# Patient Record
Sex: MALE | Race: WHITE | Employment: OTHER | URBAN - METROPOLITAN AREA
[De-identification: names, ages, dates, MRNs, and addresses within clinical notes are randomized per-mention and may not be internally consistent; named-entity substitution may affect disease eponyms.]

---

## 2017-01-20 PROBLEM — Z91.148 NONCOMPLIANCE WITH MEDICATIONS: Status: ACTIVE | Noted: 2017-01-20

## 2017-01-20 PROBLEM — Z91.14 NONCOMPLIANCE WITH MEDICATIONS: Status: ACTIVE | Noted: 2017-01-20

## 2017-01-20 PROBLEM — I38 VHD (VALVULAR HEART DISEASE): Status: ACTIVE | Noted: 2017-01-20

## 2017-01-20 PROBLEM — I48.0 PAROXYSMAL ATRIAL FIBRILLATION (HCC): Status: ACTIVE | Noted: 2017-01-20

## 2017-01-20 PROBLEM — I10 ESSENTIAL HYPERTENSION: Status: ACTIVE | Noted: 2017-01-20

## 2017-01-20 PROBLEM — I42.8 NONISCHEMIC CARDIOMYOPATHY (HCC): Status: ACTIVE | Noted: 2017-01-20

## 2017-01-20 PROBLEM — E66.01 MORBID OBESITY DUE TO EXCESS CALORIES (HCC): Status: ACTIVE | Noted: 2017-01-20

## 2017-04-21 PROBLEM — Z74.09 DECREASED AMBULATION STATUS: Status: ACTIVE | Noted: 2017-04-21

## 2017-04-21 PROBLEM — M25.50 POLYARTHRALGIA: Status: ACTIVE | Noted: 2017-04-21

## 2017-04-21 PROBLEM — M25.562 ARTHRALGIA OF LEFT KNEE: Status: ACTIVE | Noted: 2017-04-21

## 2018-03-26 ENCOUNTER — OFFICE VISIT (OUTPATIENT)
Dept: FAMILY MEDICINE CLINIC | Age: 38
End: 2018-03-26
Payer: MEDICARE

## 2018-03-26 VITALS
BODY MASS INDEX: 42.66 KG/M2 | RESPIRATION RATE: 18 BRPM | TEMPERATURE: 98.1 F | WEIGHT: 315 LBS | SYSTOLIC BLOOD PRESSURE: 116 MMHG | HEART RATE: 79 BPM | DIASTOLIC BLOOD PRESSURE: 83 MMHG | HEIGHT: 72 IN | OXYGEN SATURATION: 98 %

## 2018-03-26 DIAGNOSIS — Z79.4 TYPE 2 DIABETES MELLITUS WITH COMPLICATION, WITH LONG-TERM CURRENT USE OF INSULIN (HCC): Chronic | ICD-10-CM

## 2018-03-26 DIAGNOSIS — E11.8 TYPE 2 DIABETES MELLITUS WITH COMPLICATION, WITH LONG-TERM CURRENT USE OF INSULIN (HCC): Chronic | ICD-10-CM

## 2018-03-26 PROCEDURE — 99212 OFFICE O/P EST SF 10 MIN: CPT | Performed by: FAMILY MEDICINE

## 2018-03-26 PROCEDURE — 99213 OFFICE O/P EST LOW 20 MIN: CPT | Performed by: FAMILY MEDICINE

## 2018-03-27 NOTE — PROGRESS NOTES
[facility]   Ambulatory visit note  DATE OF VISIT : 3/26/2018    Patient : Nilay Boateng   Sex : male   Age : 40 y.o.  : 1980   MRN : [id number]            Chief Complaint :   Chief Complaint   Patient presents with    Diabetes     f/u visit       HPI:  Nilay Boateng presents to the office today for evaluation of Diabetes Mellitus    Current status:      Symptoms related to above problems: No    Tolerating medication:   Yes   Patient does not report new complaints today. Patient increased levemir as requested but not humalog. Sugars still running high. He denies any new symptoms    Past Medical History :  has a past medical history of Acute CHF ([facility] Acute CHF ([facility] Acute idiopathic gout of right foot; Anemia; CAD (coronary artery disease); Cerebral artery occlusion with cerebral infarction [facility]); CKD (chronic kidney disease); Hepatocellular carcinoma ([facility] HTN (hypertension); Hyperlipidemia; Hypertension; Nonischemic cardiomyopathy ([address] Nonischemic cardiomyopathy ([address] ARVIND (obstructive sleep apnea); S/P left heart catheterization by percutaneous approach; Systolic heart failure ([address] Type 2 diabetes mellitus with complication, with long-term current use of insulin ([facility] and URI, acute. Past Surgical history :    Past Surgical History:   Procedure Laterality Date    ECHO COMPL W DOP COLOR FLOW  2011         ECHO COMPL W DOP COLOR FLOW  3/27/2012         PACEMAKER PLACEMENT         Family Medical History :   Family History   Problem Relation Age of Onset    Cancer Mother        Social History :   Social History     Social History    Marital status: Single     Spouse name: N/A    Number of children: N/A    Years of education: N/A     Occupational History    Not on file.      Social History Main Topics    Smoking status: Never Smoker    Smokeless tobacco: Never Used    Alcohol use Yes      Comment: rare mixed drink;

## 2018-05-07 ENCOUNTER — OFFICE VISIT (OUTPATIENT)
Dept: FAMILY MEDICINE CLINIC | Age: 38
End: 2018-05-07
Payer: MEDICARE

## 2018-05-07 ENCOUNTER — HOSPITAL ENCOUNTER (OUTPATIENT)
Age: 38
Discharge: HOME | End: 2018-05-09
Payer: MEDICARE

## 2018-05-07 VITALS
WEIGHT: 315 LBS | HEIGHT: 72 IN | BODY MASS INDEX: 42.66 KG/M2 | DIASTOLIC BLOOD PRESSURE: 80 MMHG | RESPIRATION RATE: 18 BRPM | HEART RATE: 79 BPM | OXYGEN SATURATION: 98 % | SYSTOLIC BLOOD PRESSURE: 112 MMHG

## 2018-05-07 DIAGNOSIS — E11.8 TYPE 2 DIABETES MELLITUS WITH COMPLICATION, WITH LONG-TERM CURRENT USE OF INSULIN (HCC): Chronic | ICD-10-CM

## 2018-05-07 DIAGNOSIS — M54.50 ACUTE BILATERAL LOW BACK PAIN WITHOUT SCIATICA: ICD-10-CM

## 2018-05-07 DIAGNOSIS — I10 ESSENTIAL HYPERTENSION: Primary | Chronic | ICD-10-CM

## 2018-05-07 DIAGNOSIS — Z79.4 TYPE 2 DIABETES MELLITUS WITH COMPLICATION, WITH LONG-TERM CURRENT USE OF INSULIN (HCC): Chronic | ICD-10-CM

## 2018-05-07 LAB
ALBUMIN SERPL-MCNC: 4.2 G/DL (ref 3.5–5.2)
ALP BLD-CCNC: 163 U/L (ref 40–129)
ALT SERPL-CCNC: 24 U/L (ref 0–40)
ANION GAP SERPL CALCULATED.3IONS-SCNC: 18 MMOL/L (ref 7–16)
AST SERPL-CCNC: 20 U/L (ref 0–39)
BILIRUB SERPL-MCNC: 0.4 MG/DL (ref 0–1.2)
BUN BLDV-MCNC: 22 MG/DL (ref 6–20)
CALCIUM SERPL-MCNC: 9.8 MG/DL (ref 8.6–10.2)
CHLORIDE BLD-SCNC: 93 MMOL/L (ref 98–107)
CHOLESTEROL, TOTAL: 156 MG/DL (ref 0–199)
CO2: 21 MMOL/L (ref 22–29)
CREAT SERPL-MCNC: 1.1 MG/DL (ref 0.7–1.2)
GFR AFRICAN AMERICAN: >60
GFR NON-AFRICAN AMERICAN: >60 ML/MIN/1.73
GLUCOSE BLD-MCNC: 461 MG/DL (ref 74–109)
HBA1C MFR BLD: 14 %
HDLC SERPL-MCNC: 43 MG/DL
LDL CHOLESTEROL CALCULATED: 86 MG/DL (ref 0–99)
POTASSIUM SERPL-SCNC: 4.1 MMOL/L (ref 3.5–5)
SODIUM BLD-SCNC: 132 MMOL/L (ref 132–146)
TOTAL PROTEIN: 8.2 G/DL (ref 6.4–8.3)
TRIGL SERPL-MCNC: 137 MG/DL (ref 0–149)
VLDLC SERPL CALC-MCNC: 27 MG/DL

## 2018-05-07 PROCEDURE — 36415 COLL VENOUS BLD VENIPUNCTURE: CPT | Performed by: FAMILY MEDICINE

## 2018-05-07 PROCEDURE — 80053 COMPREHEN METABOLIC PANEL: CPT

## 2018-05-07 PROCEDURE — 99212 OFFICE O/P EST SF 10 MIN: CPT | Performed by: FAMILY MEDICINE

## 2018-05-07 PROCEDURE — 80061 LIPID PANEL: CPT

## 2018-05-07 PROCEDURE — 99214 OFFICE O/P EST MOD 30 MIN: CPT | Performed by: FAMILY MEDICINE

## 2018-05-07 PROCEDURE — 83036 HEMOGLOBIN GLYCOSYLATED A1C: CPT | Performed by: FAMILY MEDICINE

## 2018-05-24 ENCOUNTER — HOSPITAL ENCOUNTER (OUTPATIENT)
Dept: OTHER | Age: 38
Setting detail: THERAPIES SERIES
Discharge: HOME | End: 2018-05-24
Payer: MEDICARE

## 2018-05-24 VITALS
HEART RATE: 77 BPM | RESPIRATION RATE: 18 BRPM | DIASTOLIC BLOOD PRESSURE: 69 MMHG | SYSTOLIC BLOOD PRESSURE: 119 MMHG | BODY MASS INDEX: 44.05 KG/M2 | WEIGHT: 315 LBS

## 2018-05-24 LAB — PRO-BNP: 324 PG/ML (ref 0–125)

## 2018-05-24 PROCEDURE — 83880 ASSAY OF NATRIURETIC PEPTIDE: CPT

## 2018-05-24 PROCEDURE — 99214 OFFICE O/P EST MOD 30 MIN: CPT

## 2018-05-24 PROCEDURE — 36415 COLL VENOUS BLD VENIPUNCTURE: CPT

## 2018-06-13 ENCOUNTER — APPOINTMENT (OUTPATIENT)
Dept: GENERAL RADIOLOGY | Age: 38
End: 2018-06-13
Payer: MEDICARE

## 2018-06-13 ENCOUNTER — HOSPITAL ENCOUNTER (EMERGENCY)
Age: 38
Discharge: HOME | End: 2018-06-13
Attending: EMERGENCY MEDICINE
Payer: MEDICARE

## 2018-06-13 VITALS
OXYGEN SATURATION: 94 % | DIASTOLIC BLOOD PRESSURE: 98 MMHG | SYSTOLIC BLOOD PRESSURE: 120 MMHG | RESPIRATION RATE: 16 BRPM | HEART RATE: 96 BPM | BODY MASS INDEX: 42.66 KG/M2 | WEIGHT: 315 LBS | HEIGHT: 72 IN | TEMPERATURE: 98.6 F

## 2018-06-13 DIAGNOSIS — Z45.02 AICD DISCHARGE: Primary | ICD-10-CM

## 2018-06-13 LAB
ANION GAP SERPL CALCULATED.3IONS-SCNC: 16 MMOL/L (ref 7–16)
BASOPHILS ABSOLUTE: 0.03 E9/L (ref 0–0.2)
BASOPHILS RELATIVE PERCENT: 0.4 % (ref 0–2)
BUN BLDV-MCNC: 24 MG/DL (ref 6–20)
CALCIUM SERPL-MCNC: 9.5 MG/DL (ref 8.6–10.2)
CHLORIDE BLD-SCNC: 101 MMOL/L (ref 98–107)
CO2: 21 MMOL/L (ref 22–29)
CREAT SERPL-MCNC: 1.4 MG/DL (ref 0.7–1.2)
EOSINOPHILS ABSOLUTE: 0.15 E9/L (ref 0.05–0.5)
EOSINOPHILS RELATIVE PERCENT: 2 % (ref 0–6)
GFR AFRICAN AMERICAN: >60
GFR NON-AFRICAN AMERICAN: 57 ML/MIN/1.73
GLUCOSE BLD-MCNC: 255 MG/DL (ref 74–109)
HCT VFR BLD CALC: 41.7 % (ref 37–54)
HEMOGLOBIN: 14 G/DL (ref 12.5–16.5)
IMMATURE GRANULOCYTES #: 0.03 E9/L
IMMATURE GRANULOCYTES %: 0.4 % (ref 0–5)
LYMPHOCYTES ABSOLUTE: 2.28 E9/L (ref 1.5–4)
LYMPHOCYTES RELATIVE PERCENT: 29.9 % (ref 20–42)
MAGNESIUM: 1.8 MG/DL (ref 1.6–2.6)
MCH RBC QN AUTO: 29 PG (ref 26–35)
MCHC RBC AUTO-ENTMCNC: 33.6 % (ref 32–34.5)
MCV RBC AUTO: 86.5 FL (ref 80–99.9)
MONOCYTES ABSOLUTE: 0.62 E9/L (ref 0.1–0.95)
MONOCYTES RELATIVE PERCENT: 8.1 % (ref 2–12)
NEUTROPHILS ABSOLUTE: 4.52 E9/L (ref 1.8–7.3)
NEUTROPHILS RELATIVE PERCENT: 59.2 % (ref 43–80)
PDW BLD-RTO: 14.2 FL (ref 11.5–15)
PLATELET # BLD: 292 E9/L (ref 130–450)
PMV BLD AUTO: 10.1 FL (ref 7–12)
POTASSIUM SERPL-SCNC: 4.7 MMOL/L (ref 3.5–5)
PRO-BNP: 1932 PG/ML (ref 0–125)
RBC # BLD: 4.82 E12/L (ref 3.8–5.8)
SODIUM BLD-SCNC: 138 MMOL/L (ref 132–146)
WBC # BLD: 7.6 E9/L (ref 4.5–11.5)

## 2018-06-13 PROCEDURE — 93005 ELECTROCARDIOGRAM TRACING: CPT | Performed by: PHYSICIAN ASSISTANT

## 2018-06-13 PROCEDURE — 85025 COMPLETE CBC W/AUTO DIFF WBC: CPT

## 2018-06-13 PROCEDURE — 36415 COLL VENOUS BLD VENIPUNCTURE: CPT

## 2018-06-13 PROCEDURE — 6360000002 HC RX W HCPCS: Performed by: STUDENT IN AN ORGANIZED HEALTH CARE EDUCATION/TRAINING PROGRAM

## 2018-06-13 PROCEDURE — 83880 ASSAY OF NATRIURETIC PEPTIDE: CPT

## 2018-06-13 PROCEDURE — 99285 EMERGENCY DEPT VISIT HI MDM: CPT

## 2018-06-13 PROCEDURE — 83735 ASSAY OF MAGNESIUM: CPT

## 2018-06-13 PROCEDURE — 71045 X-RAY EXAM CHEST 1 VIEW: CPT

## 2018-06-13 PROCEDURE — 80048 BASIC METABOLIC PNL TOTAL CA: CPT

## 2018-06-13 PROCEDURE — 96374 THER/PROPH/DIAG INJ IV PUSH: CPT

## 2018-06-13 RX ORDER — FUROSEMIDE 10 MG/ML
40 INJECTION INTRAMUSCULAR; INTRAVENOUS ONCE
Status: COMPLETED | OUTPATIENT
Start: 2018-06-13 | End: 2018-06-13

## 2018-06-13 RX ADMIN — FUROSEMIDE 40 MG: 10 INJECTION, SOLUTION INTRAMUSCULAR; INTRAVENOUS at 17:13

## 2018-06-13 ASSESSMENT — ENCOUNTER SYMPTOMS
DIARRHEA: 0
CHEST TIGHTNESS: 0
SHORTNESS OF BREATH: 1
COLOR CHANGE: 0
CONSTIPATION: 0
WHEEZING: 0
COUGH: 0
VOMITING: 0
BACK PAIN: 0
ABDOMINAL PAIN: 0
NAUSEA: 0

## 2018-06-15 LAB
EKG ATRIAL RATE: 107 BPM
EKG P-R INTERVAL: 184 MS
EKG Q-T INTERVAL: 394 MS
EKG QRS DURATION: 164 MS
EKG QTC CALCULATION (BAZETT): 525 MS
EKG R AXIS: -127 DEGREES
EKG T AXIS: 49 DEGREES
EKG VENTRICULAR RATE: 107 BPM

## 2018-06-18 DIAGNOSIS — I42.8 CARDIOMYOPATHY, NONISCHEMIC (HCC): Chronic | ICD-10-CM

## 2018-06-18 RX ORDER — ALLOPURINOL 300 MG/1
300 TABLET ORAL DAILY
Qty: 30 TABLET | Refills: 5 | Status: SHIPPED | OUTPATIENT
Start: 2018-06-18 | End: 2018-11-12 | Stop reason: SDUPTHER

## 2018-06-18 RX ORDER — TORSEMIDE 20 MG/1
20 TABLET ORAL 2 TIMES DAILY
Qty: 60 TABLET | Refills: 9 | Status: SHIPPED | OUTPATIENT
Start: 2018-06-18 | End: 2018-12-17 | Stop reason: SDUPTHER

## 2018-06-18 RX ORDER — ISOSORBIDE DINITRATE 10 MG/1
10 TABLET ORAL 3 TIMES DAILY
Qty: 90 TABLET | Refills: 5 | Status: SHIPPED | OUTPATIENT
Start: 2018-06-18 | End: 2018-10-23 | Stop reason: SDUPTHER

## 2018-06-18 RX ORDER — MAGNESIUM OXIDE 400 MG/1
400 TABLET ORAL DAILY
Qty: 30 TABLET | Refills: 5 | Status: SHIPPED | OUTPATIENT
Start: 2018-06-18 | End: 2018-08-09 | Stop reason: SDUPTHER

## 2018-06-18 RX ORDER — SPIRONOLACTONE 25 MG/1
25 TABLET ORAL DAILY
Qty: 30 TABLET | Refills: 5 | Status: SHIPPED | OUTPATIENT
Start: 2018-06-18 | End: 2018-11-12 | Stop reason: SDUPTHER

## 2018-06-18 RX ORDER — FERROUS SULFATE 325(65) MG
325 TABLET ORAL
Qty: 30 TABLET | Refills: 3 | Status: SHIPPED | OUTPATIENT
Start: 2018-06-18 | End: 2018-07-16 | Stop reason: SDUPTHER

## 2018-06-18 RX ORDER — INSULIN DETEMIR 100 [IU]/ML
30 INJECTION, SOLUTION SUBCUTANEOUS 2 TIMES DAILY
Qty: 10 PEN | Refills: 5 | Status: SHIPPED | OUTPATIENT
Start: 2018-06-18 | End: 2018-07-16 | Stop reason: SDUPTHER

## 2018-06-18 RX ORDER — CARVEDILOL 6.25 MG/1
6.25 TABLET ORAL 2 TIMES DAILY
Qty: 60 TABLET | Refills: 9 | Status: ON HOLD | OUTPATIENT
Start: 2018-06-18 | End: 2018-10-15 | Stop reason: HOSPADM

## 2018-06-18 RX ORDER — POTASSIUM CHLORIDE 750 MG/1
10 TABLET, FILM COATED, EXTENDED RELEASE ORAL DAILY
Qty: 30 TABLET | Refills: 5 | Status: SHIPPED | OUTPATIENT
Start: 2018-06-18 | End: 2018-11-12 | Stop reason: SDUPTHER

## 2018-07-05 ENCOUNTER — HOSPITAL ENCOUNTER (OUTPATIENT)
Dept: OTHER | Age: 38
Setting detail: THERAPIES SERIES
Discharge: HOME | End: 2018-07-05
Payer: MEDICARE

## 2018-07-05 VITALS
RESPIRATION RATE: 18 BRPM | SYSTOLIC BLOOD PRESSURE: 120 MMHG | HEART RATE: 90 BPM | WEIGHT: 315 LBS | DIASTOLIC BLOOD PRESSURE: 75 MMHG | BODY MASS INDEX: 43.62 KG/M2

## 2018-07-05 LAB
ANION GAP SERPL CALCULATED.3IONS-SCNC: 15 MMOL/L (ref 7–16)
BUN BLDV-MCNC: 18 MG/DL (ref 6–20)
CALCIUM SERPL-MCNC: 9.4 MG/DL (ref 8.6–10.2)
CHLORIDE BLD-SCNC: 102 MMOL/L (ref 98–107)
CO2: 22 MMOL/L (ref 22–29)
CREAT SERPL-MCNC: 1 MG/DL (ref 0.7–1.2)
GFR AFRICAN AMERICAN: >60
GFR NON-AFRICAN AMERICAN: >60 ML/MIN/1.73
GLUCOSE BLD-MCNC: 307 MG/DL (ref 74–109)
POTASSIUM SERPL-SCNC: 3.6 MMOL/L (ref 3.5–5)
PRO-BNP: 936 PG/ML (ref 0–125)
SODIUM BLD-SCNC: 139 MMOL/L (ref 132–146)

## 2018-07-05 PROCEDURE — 36415 COLL VENOUS BLD VENIPUNCTURE: CPT

## 2018-07-05 PROCEDURE — 96374 THER/PROPH/DIAG INJ IV PUSH: CPT

## 2018-07-05 PROCEDURE — 80048 BASIC METABOLIC PNL TOTAL CA: CPT

## 2018-07-05 PROCEDURE — 83880 ASSAY OF NATRIURETIC PEPTIDE: CPT

## 2018-07-05 PROCEDURE — 99214 OFFICE O/P EST MOD 30 MIN: CPT

## 2018-07-05 PROCEDURE — 6360000002 HC RX W HCPCS: Performed by: INTERNAL MEDICINE

## 2018-07-05 PROCEDURE — 2580000003 HC RX 258: Performed by: INTERNAL MEDICINE

## 2018-07-05 RX ORDER — FUROSEMIDE 10 MG/ML
40 INJECTION INTRAMUSCULAR; INTRAVENOUS ONCE
Status: COMPLETED | OUTPATIENT
Start: 2018-07-05 | End: 2018-07-05

## 2018-07-05 RX ORDER — SODIUM CHLORIDE 0.9 % (FLUSH) 0.9 %
10 SYRINGE (ML) INJECTION PRN
Status: DISCONTINUED | OUTPATIENT
Start: 2018-07-05 | End: 2018-07-06 | Stop reason: HOSPADM

## 2018-07-05 RX ADMIN — Medication 10 ML: at 12:43

## 2018-07-05 RX ADMIN — FUROSEMIDE 40 MG: 10 INJECTION, SOLUTION INTRAMUSCULAR; INTRAVENOUS at 12:43

## 2018-07-10 ENCOUNTER — OFFICE VISIT (OUTPATIENT)
Dept: FAMILY MEDICINE CLINIC | Age: 38
End: 2018-07-10
Payer: MEDICARE

## 2018-07-10 VITALS
SYSTOLIC BLOOD PRESSURE: 102 MMHG | HEIGHT: 72 IN | OXYGEN SATURATION: 96 % | HEART RATE: 90 BPM | WEIGHT: 315 LBS | DIASTOLIC BLOOD PRESSURE: 70 MMHG | RESPIRATION RATE: 16 BRPM | TEMPERATURE: 98.6 F | BODY MASS INDEX: 42.66 KG/M2

## 2018-07-10 DIAGNOSIS — Z79.4 TYPE 2 DIABETES MELLITUS WITH COMPLICATION, WITH LONG-TERM CURRENT USE OF INSULIN (HCC): Chronic | ICD-10-CM

## 2018-07-10 DIAGNOSIS — R05.9 COUGH: Primary | ICD-10-CM

## 2018-07-10 DIAGNOSIS — E11.8 TYPE 2 DIABETES MELLITUS WITH COMPLICATION, WITH LONG-TERM CURRENT USE OF INSULIN (HCC): Chronic | ICD-10-CM

## 2018-07-10 LAB — GLUCOSE BLD-MCNC: 365 MG/DL

## 2018-07-10 PROCEDURE — 99212 OFFICE O/P EST SF 10 MIN: CPT | Performed by: STUDENT IN AN ORGANIZED HEALTH CARE EDUCATION/TRAINING PROGRAM

## 2018-07-10 PROCEDURE — 82962 GLUCOSE BLOOD TEST: CPT | Performed by: STUDENT IN AN ORGANIZED HEALTH CARE EDUCATION/TRAINING PROGRAM

## 2018-07-10 PROCEDURE — 99213 OFFICE O/P EST LOW 20 MIN: CPT | Performed by: STUDENT IN AN ORGANIZED HEALTH CARE EDUCATION/TRAINING PROGRAM

## 2018-07-10 RX ORDER — RANITIDINE 150 MG/1
150 TABLET ORAL 2 TIMES DAILY
Qty: 60 TABLET | Refills: 3 | Status: SHIPPED | OUTPATIENT
Start: 2018-07-10 | End: 2018-08-22 | Stop reason: ALTCHOICE

## 2018-07-10 RX ORDER — ATORVASTATIN CALCIUM 40 MG/1
40 TABLET, FILM COATED ORAL DAILY
Qty: 30 TABLET | Refills: 5 | Status: SHIPPED | OUTPATIENT
Start: 2018-07-10 | End: 2018-11-12 | Stop reason: SDUPTHER

## 2018-07-10 ASSESSMENT — PATIENT HEALTH QUESTIONNAIRE - PHQ9
2. FEELING DOWN, DEPRESSED OR HOPELESS: 0
1. LITTLE INTEREST OR PLEASURE IN DOING THINGS: 0
SUM OF ALL RESPONSES TO PHQ QUESTIONS 1-9: 0
SUM OF ALL RESPONSES TO PHQ9 QUESTIONS 1 & 2: 0

## 2018-07-10 NOTE — PROGRESS NOTES
[address] [facility]  Department of Family Medicine  Family Medicine Residency Program      Patient:  Varun Jovel III 40 y.o. male     Date of Service: 7/10/18      Chief complaint:   Chief Complaint   Patient presents with    Cough     worse at night, dry persistant          History of Present Illness   The patient is a 40 y.o. male  presented to the clinic with complaints as above. He has had cough x 1 month, worse at night. Cough is non productive. He denies chest pain, wheezing, fever or chills. He has been taking entresto for a couple of years and had not had any cough problems previously. He has no hx of asthma. He reports that he also occasionally has heartburn but has never taken anything for it.      Past Medical History:      Diagnosis Date    Acute CHF ([address] 11/29/2011    Acute CHF ([address] 12/26/2011    Acute idiopathic gout of right foot 8/13/2016    Anemia 11/29/2011    CAD (coronary artery disease)     Cerebral artery occlusion with cerebral infarction ([address]     CKD (chronic kidney disease)     Hepatocellular carcinoma ([address] 12/2/2013    HTN (hypertension) 11/29/2011    Hyperlipidemia     Hypertension     Nonischemic cardiomyopathy ([address] 11/29/2011    Nonischemic cardiomyopathy ([address] 7/2013 7/2013- cardiac MRI revealed an LVEF of 20%    ARVIND (obstructive sleep apnea) 11/29/2011    S/P left heart catheterization by percutaneous approach 12-27/2011    Dr Coretta Ballesteros Systolic heart failure [facility]) 5/7/2012 5/7/12- cardiac catheterization revealed an LVEF of 10-15%, mitral regurgitation along with borderline prolapse of mitral valve    Type 2 diabetes mellitus with complication, with long-term current use of insulin ([address] 8/22/2016    URI, acute 11/29/2011       Past Surgical History:        Procedure Laterality Date    ECHO COMPL W DOP COLOR FLOW  11/30/2011         ECHO COMPL W DOP COLOR FLOW  3/27/2012         PACEMAKER PLACEMENT         Allergies:    Patient has no known allergies. Social History:   Social History     Social History    Marital status: Single     Spouse name: N/A    Number of children: N/A    Years of education: N/A     Occupational History    Not on file. Social History Main Topics    Smoking status: Never Smoker    Smokeless tobacco: Never Used    Alcohol use Yes      Comment: rare mixed drink;  denies caffeine    Drug use: No    Sexual activity: Yes     Partners: Female     Other Topics Concern    Not on file     Social History Narrative    No narrative on file        Family History:   Family History   Problem Relation Age of Onset    Cancer Mother        Review of Systems:   Review of Systems   Respiratory: Positive for cough. Negative for sputum production, shortness of breath and wheezing. Cardiovascular: Negative for chest pain and palpitations. Gastrointestinal: Negative for abdominal pain and heartburn. Physical Exam   Vitals: /70   Pulse 90   Temp 98.6 °F (37 °C) (Oral)   Resp 16   Ht 6' (1.829 m)   Wt (!) 317 lb (143.8 kg)   SpO2 96%   BMI 42.99 kg/m²   General Appearance: awake, alert, oriented, no acute distress  HEENT: Normocephalic, atraumatic. PERRL, EOM's intact, no pallor or icterus. Neck: Supple, symmetrical, trachea midline. No JVD. Chest wall/Lung: Clear to auscultation bilaterally,  respirations unlabored. No ronchi/wheezing/rales  Heart: Regular rate and rhythm, S1 and S2 normal, no murmur, rub or gallop. Abdomen: Soft, non-tender, bowel sounds normoactive, no masses, no organomegaly  Extremities:  Extremities normal, atraumatic, no cyanosis. Mild peripheral edema edema. Psychiatric: has a normal mood and affect. Behavior is normal.     Assessment and Plan     1. Chronic cough  This is possibly due to CHF exacerbation however XR from 1 month ago (when his symptoms began) did not show any pulmonary edema, and his lungs are CTABL today.  There is also no remarkable peripheral edema that might (with breakfast) 30 tablet 3    LEVEMIR FLEXTOUCH 100 UNIT/ML injection pen Inject 30 Units into the skin 2 times daily 10 pen 5    insulin lispro (HUMALOG KWIKPEN) 100 UNIT/ML pen Inject 12 Units into the skin 3 times daily (before meals) 15 mL 11     No current facility-administered medications for this visit.          Agus Steven MD     Electronically signed by Agus Steven MD on 7/16/2018 at 1:39 PM

## 2018-07-16 ENCOUNTER — TELEPHONE (OUTPATIENT)
Dept: CARDIOLOGY CLINIC | Age: 38
End: 2018-07-16

## 2018-07-16 RX ORDER — INSULIN DETEMIR 100 [IU]/ML
30 INJECTION, SOLUTION SUBCUTANEOUS 2 TIMES DAILY
Qty: 10 PEN | Refills: 5 | Status: SHIPPED | OUTPATIENT
Start: 2018-07-16 | End: 2018-11-12 | Stop reason: SDUPTHER

## 2018-07-16 RX ORDER — FERROUS SULFATE 325(65) MG
325 TABLET ORAL
Qty: 30 TABLET | Refills: 3 | Status: SHIPPED | OUTPATIENT
Start: 2018-07-16 | End: 2018-11-12 | Stop reason: SDUPTHER

## 2018-07-16 ASSESSMENT — ENCOUNTER SYMPTOMS
WHEEZING: 0
COUGH: 1
SPUTUM PRODUCTION: 0
ABDOMINAL PAIN: 0
HEARTBURN: 0
SHORTNESS OF BREATH: 0

## 2018-07-16 NOTE — TELEPHONE ENCOUNTER
From: Nahun Montalvo III  Sent: 7/13/2018 6:22 PM EDT  Subject: Medication Renewal Request    Nahun Montalvo III would like a refill of the following medications:     ferrous sulfate 325 (65 Fe) MG tablet Cruz Dawn MD]    Preferred pharmacy: [address] [phone]    Comment:

## 2018-07-16 NOTE — TELEPHONE ENCOUNTER
From: Katarina Ponce III  Sent: 7/13/2018 6:21 PM EDT  Subject: Medication Renewal Request    Katarina Ponce III would like a refill of the following medications:     LEVEMIR FLEXTOUCH 100 UNIT/ML injection pen Jessica Wynn MD]    Preferred pharmacy: [address] [phone] - F [phone]    Comment:

## 2018-07-17 NOTE — TELEPHONE ENCOUNTER
I don't think his cardiac medications causing his cough.   He needs an OV to see Me or one of the [facility]

## 2018-08-09 ENCOUNTER — OFFICE VISIT (OUTPATIENT)
Dept: FAMILY MEDICINE CLINIC | Age: 38
End: 2018-08-09
Payer: MEDICARE

## 2018-08-09 ENCOUNTER — HOSPITAL ENCOUNTER (OUTPATIENT)
Age: 38
Discharge: HOME | End: 2018-08-11
Payer: MEDICARE

## 2018-08-09 VITALS
BODY MASS INDEX: 42.66 KG/M2 | OXYGEN SATURATION: 97 % | SYSTOLIC BLOOD PRESSURE: 102 MMHG | TEMPERATURE: 98.2 F | HEIGHT: 72 IN | DIASTOLIC BLOOD PRESSURE: 72 MMHG | WEIGHT: 315 LBS | HEART RATE: 76 BPM

## 2018-08-09 DIAGNOSIS — E11.8 TYPE 2 DIABETES MELLITUS WITH COMPLICATION, UNSPECIFIED WHETHER LONG TERM INSULIN USE: ICD-10-CM

## 2018-08-09 DIAGNOSIS — I42.8 CARDIOMYOPATHY, NONISCHEMIC (HCC): Chronic | ICD-10-CM

## 2018-08-09 DIAGNOSIS — E11.8 TYPE 2 DIABETES MELLITUS WITH COMPLICATION, UNSPECIFIED WHETHER LONG TERM INSULIN USE: Primary | ICD-10-CM

## 2018-08-09 LAB
CREATININE URINE: 199 MG/DL (ref 40–278)
HBA1C MFR BLD: 10.7 %
MICROALBUMIN UR-MCNC: 49.5 MG/L
MICROALBUMIN/CREAT UR-RTO: 24.9 (ref 0–30)

## 2018-08-09 PROCEDURE — 83036 HEMOGLOBIN GLYCOSYLATED A1C: CPT | Performed by: FAMILY MEDICINE

## 2018-08-09 PROCEDURE — 99214 OFFICE O/P EST MOD 30 MIN: CPT | Performed by: FAMILY MEDICINE

## 2018-08-09 PROCEDURE — 99212 OFFICE O/P EST SF 10 MIN: CPT | Performed by: FAMILY MEDICINE

## 2018-08-09 PROCEDURE — 82044 UR ALBUMIN SEMIQUANTITATIVE: CPT

## 2018-08-09 PROCEDURE — 82570 ASSAY OF URINE CREATININE: CPT

## 2018-08-09 RX ORDER — MAGNESIUM OXIDE 400 MG/1
400 TABLET ORAL DAILY
Qty: 30 TABLET | Refills: 5 | Status: SHIPPED | OUTPATIENT
Start: 2018-08-09 | End: 2019-07-15 | Stop reason: SDUPTHER

## 2018-08-09 ASSESSMENT — PATIENT HEALTH QUESTIONNAIRE - PHQ9
1. LITTLE INTEREST OR PLEASURE IN DOING THINGS: 0
SUM OF ALL RESPONSES TO PHQ9 QUESTIONS 1 & 2: 0
2. FEELING DOWN, DEPRESSED OR HOPELESS: 0
SUM OF ALL RESPONSES TO PHQ QUESTIONS 1-9: 0
SUM OF ALL RESPONSES TO PHQ QUESTIONS 1-9: 0

## 2018-08-09 ASSESSMENT — ENCOUNTER SYMPTOMS
HEARTBURN: 0
CONSTIPATION: 0
BLURRED VISION: 0
ABDOMINAL PAIN: 0
SPUTUM PRODUCTION: 0
HEMOPTYSIS: 0
DIARRHEA: 0
NAUSEA: 0
COUGH: 1

## 2018-08-09 NOTE — PROGRESS NOTES
3/27/2012         PACEMAKER PLACEMENT         Family Medical History :   Family History   Problem Relation Age of Onset    Cancer Mother        Social History :   Social History     Social History    Marital status: Single     Spouse name: N/A    Number of children: N/A    Years of education: N/A     Occupational History    Not on file.      Social History Main Topics    Smoking status: Never Smoker    Smokeless tobacco: Never Used    Alcohol use Yes      Comment: rare mixed drink;  denies caffeine    Drug use: No    Sexual activity: Yes     Partners: Female     Other Topics Concern    Not on file     Social History Narrative    No narrative on file        Current Medications    Current Outpatient Prescriptions on File Prior to Visit   Medication Sig Dispense Refill    ferrous sulfate 325 (65 Fe) MG tablet Take 1 tablet by mouth daily (with breakfast) 30 tablet 3    LEVEMIR FLEXTOUCH 100 UNIT/ML injection pen Inject 30 Units into the skin 2 times daily 10 pen 5    insulin lispro (HUMALOG KWIKPEN) 100 UNIT/ML pen Inject 12 Units into the skin 3 times daily (before meals) 15 mL 11    atorvastatin (LIPITOR) 40 MG tablet Take 1 tablet by mouth daily 30 tablet 5    ranitidine (ZANTAC) 150 MG tablet Take 1 tablet by mouth 2 times daily 60 tablet 3    carvedilol (COREG) 6.25 MG tablet Take 1 tablet by mouth 2 times daily 60 tablet 9    torsemide (DEMADEX) 20 MG tablet Take 1 tablet by mouth 2 times daily 60 tablet 9    allopurinol (ZYLOPRIM) 300 MG tablet Take 1 tablet by mouth daily 30 tablet 5    isosorbide dinitrate (ISORDIL) 10 MG tablet Take 1 tablet by mouth 3 times daily 90 tablet 5    potassium chloride (KLOR-CON) 10 MEQ extended release tablet Take 1 tablet by mouth daily 30 tablet 5    rivaroxaban (XARELTO) 20 MG TABS tablet Take 1 tablet by mouth daily 30 tablet 5    spironolactone (ALDACTONE) 25 MG tablet Take 1 tablet by mouth daily 30 tablet 5    hydrALAZINE (APRESOLINE) 25 MG tablet TAKE ONE (1) TABLET BY MOUTH THREE TIMES DAILY 90 tablet 11    Insulin Pen Needle 31G X 5 MM MISC 1 each by Does not apply route daily 100 each 3     No current facility-administered medications on file prior to visit. Allergies : No Known Allergies    Review of Systems :    Review of Systems   Constitutional: Negative for chills and fever. Eyes: Negative for blurred vision. Respiratory: Positive for cough. Negative for hemoptysis and sputum production. Cardiovascular: Positive for leg swelling. Negative for chest pain and palpitations. Gastrointestinal: Negative for abdominal pain, constipation, diarrhea, heartburn and nausea. Skin: Negative for rash. Neurological: Negative for dizziness, focal weakness and headaches. Physical Exam :    VITAL SIGNS : Blood pressure 102/72, pulse 76, temperature 98.2 °F (36.8 °C), temperature source Oral, height 6' (1.829 m), weight (!) 325 lb (147.4 kg), SpO2 97 %. GENERAL APPEARANCE : NAD, cooperative, appears stated age  NECK : Supple, trachea midline, no thyromegaly, full ROM  LUNGS : Clear to auscultation, without wheezes, rales, or rhonchi. Normal chest expansion. HEART : RRR, normal S1/S2, no murmurs, gallops, or abnormal sounds  EXTREMITIES : 1+ edema bilat  SKIN : Warm and dry, no rash or abnormal skin lesions noted  PSYCHIATRIC : Appropriate affect, AAO X 3         Assessment & Plan :    Edwige Blancas was seen today for hypertension, diabetes and 3 month follow-up. Diagnoses and all orders for this visit:    Type 2 diabetes mellitus with complication, unspecified whether long term insulin use (HCC)  -     POCT glycosylated hemoglobin (Hb A1C)  -     MICROALBUMIN / CREATININE URINE RATIO; Future    Cardiomyopathy, nonischemic (HCC)  -     sacubitril-valsartan (ENTRESTO) 24-26 MG per tablet; Take 1 tablet by mouth 2 times daily    Other orders  -     magnesium oxide (MAG-OX) 400 MG tablet;  Take 1 tablet by mouth daily        Health Maintenance

## 2018-08-22 ENCOUNTER — OFFICE VISIT (OUTPATIENT)
Dept: CARDIOLOGY CLINIC | Age: 38
End: 2018-08-22
Payer: MEDICARE

## 2018-08-22 VITALS
BODY MASS INDEX: 42.66 KG/M2 | HEART RATE: 86 BPM | OXYGEN SATURATION: 97 % | RESPIRATION RATE: 16 BRPM | WEIGHT: 315 LBS | HEIGHT: 72 IN | DIASTOLIC BLOOD PRESSURE: 72 MMHG | SYSTOLIC BLOOD PRESSURE: 104 MMHG

## 2018-08-22 DIAGNOSIS — Z95.810 ICD (IMPLANTABLE CARDIOVERTER-DEFIBRILLATOR) IN PLACE: ICD-10-CM

## 2018-08-22 DIAGNOSIS — G47.33 OSA (OBSTRUCTIVE SLEEP APNEA): Chronic | ICD-10-CM

## 2018-08-22 DIAGNOSIS — Z86.73 HISTORY OF CVA (CEREBROVASCULAR ACCIDENT): ICD-10-CM

## 2018-08-22 DIAGNOSIS — I48.0 PAF (PAROXYSMAL ATRIAL FIBRILLATION) (HCC): Primary | ICD-10-CM

## 2018-08-22 DIAGNOSIS — Z91.199 HISTORY OF NONCOMPLIANCE WITH MEDICAL TREATMENT, PRESENTING HAZARDS TO HEALTH: ICD-10-CM

## 2018-08-22 DIAGNOSIS — I38 VHD (VALVULAR HEART DISEASE): ICD-10-CM

## 2018-08-22 DIAGNOSIS — I10 ESSENTIAL HYPERTENSION: ICD-10-CM

## 2018-08-22 DIAGNOSIS — E66.01 MORBID OBESITY WITH BMI OF 40.0-44.9, ADULT (HCC): ICD-10-CM

## 2018-08-22 DIAGNOSIS — I50.42 CHRONIC COMBINED SYSTOLIC AND DIASTOLIC CONGESTIVE HEART FAILURE (HCC): ICD-10-CM

## 2018-08-22 DIAGNOSIS — I42.8 NONISCHEMIC CARDIOMYOPATHY (HCC): ICD-10-CM

## 2018-08-22 PROCEDURE — 93000 ELECTROCARDIOGRAM COMPLETE: CPT | Performed by: INTERNAL MEDICINE

## 2018-08-22 PROCEDURE — 99214 OFFICE O/P EST MOD 30 MIN: CPT | Performed by: INTERNAL MEDICINE

## 2018-08-22 NOTE — PROGRESS NOTES
deficit. Psych:   Alert, good mood and effect. REVIEW OF DIAGNOSTIC TESTS:        Electrocardiogram: Reviewed                      A/P:   ASSESSMENT / PLAN:    Cecile Graham was seen today for congestive heart failure, cardiomyopathy, atrial fibrillation, cardiac valve problem, sleep apnea, hypertension, shortness of breath and dizziness. Diagnoses and all orders for this visit:      PAF (paroxysmal atrial fibrillation) ([facility] On Xarelto  -     EKG 12 Lead     Nonischemic cardiomyopathy ([facility]): EF 20% by Echo 8/2016; On BB, Entresto, NTG+Hydralazine, and Aldactone, can not up-titrate today due to borderline BP; Goes to CHF clinic Q 3months  - EKG 12 Lead     ICD (implantable cardioverter-defibrillator) in place; 9/2013, Dr Hanna Helm follows     Noncompliance with medications, counseled to be compliant with meds and f/u visits     Essential hypertension, stable     Hx of CVA in 2015     Morbid obesity due to excess calories ([facility] Diet, exercise and weight loss discussed.     VHD (valvular heart disease), Mild MR and TR, stable     ARVIND (obstructive sleep apnea) - Compliant with CPAP     Preventive Cardiology: Low cholesterol diet, regular exercise as tolerate, and gradual weight loss discussed. Monitor BP and heart rates. All questions answered about cardiac diagnoses and cardiac medications. Continue current medications. Compliance with medications and f/u with all physicians discussed. Risk factor modification based on risk profile discussed. Call if any exertional chest pain, short of breath, dizzy or palpitations   Follow up in 6 months or earlier if needed.          [facility]  [address], [place], [address] [facility]  [phone]

## 2018-08-23 ENCOUNTER — HOSPITAL ENCOUNTER (OUTPATIENT)
Dept: OTHER | Age: 38
Setting detail: THERAPIES SERIES
Discharge: HOME | End: 2018-08-23
Payer: MEDICARE

## 2018-10-13 ENCOUNTER — APPOINTMENT (OUTPATIENT)
Dept: GENERAL RADIOLOGY | Age: 38
End: 2018-10-13
Payer: MEDICARE

## 2018-10-13 ENCOUNTER — HOSPITAL ENCOUNTER (OUTPATIENT)
Age: 38
Setting detail: OBSERVATION
Discharge: HOME | End: 2018-10-15
Attending: EMERGENCY MEDICINE | Admitting: FAMILY MEDICINE
Payer: MEDICARE

## 2018-10-13 DIAGNOSIS — R00.0 TACHYCARDIA: ICD-10-CM

## 2018-10-13 DIAGNOSIS — R00.2 PALPITATIONS: Primary | ICD-10-CM

## 2018-10-13 PROBLEM — I47.10 SVT (SUPRAVENTRICULAR TACHYCARDIA): Status: ACTIVE | Noted: 2018-10-13

## 2018-10-13 PROBLEM — I47.1 SVT (SUPRAVENTRICULAR TACHYCARDIA) (HCC): Status: ACTIVE | Noted: 2018-10-13

## 2018-10-13 LAB
ALBUMIN SERPL-MCNC: 3.7 G/DL (ref 3.5–5.2)
ALP BLD-CCNC: 178 U/L (ref 40–129)
ALT SERPL-CCNC: 37 U/L (ref 0–40)
ANION GAP SERPL CALCULATED.3IONS-SCNC: 15 MMOL/L (ref 7–16)
AST SERPL-CCNC: 40 U/L (ref 0–39)
BILIRUB SERPL-MCNC: 0.5 MG/DL (ref 0–1.2)
BUN BLDV-MCNC: 30 MG/DL (ref 6–20)
CALCIUM SERPL-MCNC: 9.4 MG/DL (ref 8.6–10.2)
CHLORIDE BLD-SCNC: 96 MMOL/L (ref 98–107)
CHLORIDE URINE RANDOM: 73 MMOL/L
CO2: 19 MMOL/L (ref 22–29)
CREAT SERPL-MCNC: 1.4 MG/DL (ref 0.7–1.2)
CREATININE URINE: 67 MG/DL (ref 40–278)
GFR AFRICAN AMERICAN: >60
GFR NON-AFRICAN AMERICAN: 57 ML/MIN/1.73
GLUCOSE BLD-MCNC: 364 MG/DL (ref 74–109)
HCT VFR BLD CALC: 40.7 % (ref 37–54)
HEMOGLOBIN: 13.2 G/DL (ref 12.5–16.5)
INR BLD: 1.5
MCH RBC QN AUTO: 28.2 PG (ref 26–35)
MCHC RBC AUTO-ENTMCNC: 32.4 % (ref 32–34.5)
MCV RBC AUTO: 87 FL (ref 80–99.9)
METER GLUCOSE: 161 MG/DL (ref 70–110)
METER GLUCOSE: 273 MG/DL (ref 70–110)
METER GLUCOSE: 304 MG/DL (ref 70–110)
PDW BLD-RTO: 14.8 FL (ref 11.5–15)
PLATELET # BLD: 298 E9/L (ref 130–450)
PMV BLD AUTO: 9.5 FL (ref 7–12)
POTASSIUM SERPL-SCNC: 3.9 MMOL/L (ref 3.5–5)
POTASSIUM, UR: 28.2 MMOL/L
PRO-BNP: 779 PG/ML (ref 0–125)
PROTHROMBIN TIME: 16.7 SEC (ref 9.3–12.4)
RBC # BLD: 4.68 E12/L (ref 3.8–5.8)
SODIUM BLD-SCNC: 130 MMOL/L (ref 132–146)
SODIUM URINE: 83 MMOL/L
TOTAL PROTEIN: 7.6 G/DL (ref 6.4–8.3)
TROPONIN: <0.01 NG/ML (ref 0–0.03)
WBC # BLD: 10.2 E9/L (ref 4.5–11.5)

## 2018-10-13 PROCEDURE — 6370000000 HC RX 637 (ALT 250 FOR IP): Performed by: STUDENT IN AN ORGANIZED HEALTH CARE EDUCATION/TRAINING PROGRAM

## 2018-10-13 PROCEDURE — 99285 EMERGENCY DEPT VISIT HI MDM: CPT

## 2018-10-13 PROCEDURE — 2580000003 HC RX 258: Performed by: STUDENT IN AN ORGANIZED HEALTH CARE EDUCATION/TRAINING PROGRAM

## 2018-10-13 PROCEDURE — G0378 HOSPITAL OBSERVATION PER HR: HCPCS

## 2018-10-13 PROCEDURE — 80053 COMPREHEN METABOLIC PANEL: CPT

## 2018-10-13 PROCEDURE — 82962 GLUCOSE BLOOD TEST: CPT

## 2018-10-13 PROCEDURE — 36415 COLL VENOUS BLD VENIPUNCTURE: CPT

## 2018-10-13 PROCEDURE — 85610 PROTHROMBIN TIME: CPT

## 2018-10-13 PROCEDURE — 99219 PR INITIAL OBSERVATION CARE/DAY 50 MINUTES: CPT | Performed by: FAMILY MEDICINE

## 2018-10-13 PROCEDURE — 6370000000 HC RX 637 (ALT 250 FOR IP): Performed by: INTERNAL MEDICINE

## 2018-10-13 PROCEDURE — 84300 ASSAY OF URINE SODIUM: CPT

## 2018-10-13 PROCEDURE — 83880 ASSAY OF NATRIURETIC PEPTIDE: CPT

## 2018-10-13 PROCEDURE — 96374 THER/PROPH/DIAG INJ IV PUSH: CPT

## 2018-10-13 PROCEDURE — 84133 ASSAY OF URINE POTASSIUM: CPT

## 2018-10-13 PROCEDURE — 99215 OFFICE O/P EST HI 40 MIN: CPT | Performed by: INTERNAL MEDICINE

## 2018-10-13 PROCEDURE — 71045 X-RAY EXAM CHEST 1 VIEW: CPT

## 2018-10-13 PROCEDURE — 84484 ASSAY OF TROPONIN QUANT: CPT

## 2018-10-13 PROCEDURE — 6360000002 HC RX W HCPCS: Performed by: INTERNAL MEDICINE

## 2018-10-13 PROCEDURE — 85027 COMPLETE CBC AUTOMATED: CPT

## 2018-10-13 PROCEDURE — 82436 ASSAY OF URINE CHLORIDE: CPT

## 2018-10-13 PROCEDURE — 2580000003 HC RX 258: Performed by: EMERGENCY MEDICINE

## 2018-10-13 PROCEDURE — 82570 ASSAY OF URINE CREATININE: CPT

## 2018-10-13 PROCEDURE — 93005 ELECTROCARDIOGRAM TRACING: CPT | Performed by: EMERGENCY MEDICINE

## 2018-10-13 RX ORDER — ALLOPURINOL 300 MG/1
300 TABLET ORAL DAILY
Status: CANCELLED | OUTPATIENT
Start: 2018-10-13

## 2018-10-13 RX ORDER — SPIRONOLACTONE 25 MG/1
25 TABLET ORAL DAILY
Status: CANCELLED | OUTPATIENT
Start: 2018-10-13

## 2018-10-13 RX ORDER — INSULIN GLARGINE 100 [IU]/ML
40 INJECTION, SOLUTION SUBCUTANEOUS NIGHTLY
Status: DISCONTINUED | OUTPATIENT
Start: 2018-10-13 | End: 2018-10-16 | Stop reason: HOSPADM

## 2018-10-13 RX ORDER — DEXTROSE MONOHYDRATE 50 MG/ML
100 INJECTION, SOLUTION INTRAVENOUS PRN
Status: DISCONTINUED | OUTPATIENT
Start: 2018-10-13 | End: 2018-10-16 | Stop reason: HOSPADM

## 2018-10-13 RX ORDER — POTASSIUM CHLORIDE 750 MG/1
10 TABLET, FILM COATED, EXTENDED RELEASE ORAL DAILY
Status: CANCELLED | OUTPATIENT
Start: 2018-10-13

## 2018-10-13 RX ORDER — INSULIN GLARGINE 100 [IU]/ML
40 INJECTION, SOLUTION SUBCUTANEOUS NIGHTLY
Status: CANCELLED | OUTPATIENT
Start: 2018-10-13

## 2018-10-13 RX ORDER — CARVEDILOL 6.25 MG/1
12.5 TABLET ORAL 2 TIMES DAILY WITH MEALS
Status: DISCONTINUED | OUTPATIENT
Start: 2018-10-14 | End: 2018-10-16 | Stop reason: HOSPADM

## 2018-10-13 RX ORDER — HYDRALAZINE HYDROCHLORIDE 25 MG/1
25 TABLET, FILM COATED ORAL EVERY 8 HOURS SCHEDULED
Status: DISCONTINUED | OUTPATIENT
Start: 2018-10-13 | End: 2018-10-13

## 2018-10-13 RX ORDER — HYDRALAZINE HYDROCHLORIDE 25 MG/1
25 TABLET, FILM COATED ORAL EVERY 8 HOURS SCHEDULED
Status: CANCELLED | OUTPATIENT
Start: 2018-10-13

## 2018-10-13 RX ORDER — FERROUS SULFATE 325(65) MG
325 TABLET ORAL
Status: DISCONTINUED | OUTPATIENT
Start: 2018-10-13 | End: 2018-10-16 | Stop reason: HOSPADM

## 2018-10-13 RX ORDER — ISOSORBIDE DINITRATE 10 MG/1
10 TABLET ORAL 3 TIMES DAILY
Status: CANCELLED | OUTPATIENT
Start: 2018-10-13

## 2018-10-13 RX ORDER — SODIUM CHLORIDE 0.9 % (FLUSH) 0.9 %
10 SYRINGE (ML) INJECTION EVERY 12 HOURS SCHEDULED
Status: CANCELLED | OUTPATIENT
Start: 2018-10-13

## 2018-10-13 RX ORDER — ATORVASTATIN CALCIUM 40 MG/1
40 TABLET, FILM COATED ORAL DAILY
Status: CANCELLED | OUTPATIENT
Start: 2018-10-13

## 2018-10-13 RX ORDER — SODIUM CHLORIDE 0.9 % (FLUSH) 0.9 %
10 SYRINGE (ML) INJECTION PRN
Status: DISCONTINUED | OUTPATIENT
Start: 2018-10-13 | End: 2018-10-13

## 2018-10-13 RX ORDER — CARVEDILOL 6.25 MG/1
6.25 TABLET ORAL 2 TIMES DAILY
Status: CANCELLED | OUTPATIENT
Start: 2018-10-13

## 2018-10-13 RX ORDER — 0.9 % SODIUM CHLORIDE 0.9 %
1000 INTRAVENOUS SOLUTION INTRAVENOUS ONCE
Status: COMPLETED | OUTPATIENT
Start: 2018-10-13 | End: 2018-10-13

## 2018-10-13 RX ORDER — LANOLIN ALCOHOL/MO/W.PET/CERES
400 CREAM (GRAM) TOPICAL DAILY
Status: DISCONTINUED | OUTPATIENT
Start: 2018-10-13 | End: 2018-10-16 | Stop reason: HOSPADM

## 2018-10-13 RX ORDER — FUROSEMIDE 10 MG/ML
40 INJECTION INTRAMUSCULAR; INTRAVENOUS ONCE
Status: COMPLETED | OUTPATIENT
Start: 2018-10-13 | End: 2018-10-13

## 2018-10-13 RX ORDER — CARVEDILOL 6.25 MG/1
6.25 TABLET ORAL 2 TIMES DAILY
Status: DISCONTINUED | OUTPATIENT
Start: 2018-10-13 | End: 2018-10-13

## 2018-10-13 RX ORDER — ISOSORBIDE DINITRATE 10 MG/1
10 TABLET ORAL 3 TIMES DAILY
Status: DISCONTINUED | OUTPATIENT
Start: 2018-10-13 | End: 2018-10-13

## 2018-10-13 RX ORDER — ALLOPURINOL 300 MG/1
300 TABLET ORAL DAILY
Status: DISCONTINUED | OUTPATIENT
Start: 2018-10-13 | End: 2018-10-16 | Stop reason: HOSPADM

## 2018-10-13 RX ORDER — SODIUM CHLORIDE 0.9 % (FLUSH) 0.9 %
10 SYRINGE (ML) INJECTION EVERY 12 HOURS SCHEDULED
Status: DISCONTINUED | OUTPATIENT
Start: 2018-10-13 | End: 2018-10-13

## 2018-10-13 RX ORDER — ONDANSETRON 2 MG/ML
4 INJECTION INTRAMUSCULAR; INTRAVENOUS EVERY 6 HOURS PRN
Status: DISCONTINUED | OUTPATIENT
Start: 2018-10-13 | End: 2018-10-16 | Stop reason: HOSPADM

## 2018-10-13 RX ORDER — MAGNESIUM OXIDE 400 MG/1
400 TABLET ORAL DAILY
Status: CANCELLED | OUTPATIENT
Start: 2018-10-13

## 2018-10-13 RX ORDER — SODIUM CHLORIDE 0.9 % (FLUSH) 0.9 %
10 SYRINGE (ML) INJECTION PRN
Status: CANCELLED | OUTPATIENT
Start: 2018-10-13

## 2018-10-13 RX ORDER — DEXTROSE MONOHYDRATE 25 G/50ML
12.5 INJECTION, SOLUTION INTRAVENOUS PRN
Status: DISCONTINUED | OUTPATIENT
Start: 2018-10-13 | End: 2018-10-16 | Stop reason: HOSPADM

## 2018-10-13 RX ORDER — POTASSIUM CHLORIDE 750 MG/1
10 TABLET, EXTENDED RELEASE ORAL DAILY
Status: DISCONTINUED | OUTPATIENT
Start: 2018-10-13 | End: 2018-10-16 | Stop reason: HOSPADM

## 2018-10-13 RX ORDER — SPIRONOLACTONE 25 MG/1
25 TABLET ORAL DAILY
Status: DISCONTINUED | OUTPATIENT
Start: 2018-10-13 | End: 2018-10-16 | Stop reason: HOSPADM

## 2018-10-13 RX ORDER — NICOTINE POLACRILEX 4 MG
15 LOZENGE BUCCAL PRN
Status: DISCONTINUED | OUTPATIENT
Start: 2018-10-13 | End: 2018-10-16 | Stop reason: HOSPADM

## 2018-10-13 RX ORDER — TORSEMIDE 20 MG/1
20 TABLET ORAL 2 TIMES DAILY
Status: DISCONTINUED | OUTPATIENT
Start: 2018-10-13 | End: 2018-10-16 | Stop reason: HOSPADM

## 2018-10-13 RX ORDER — ACETAMINOPHEN 325 MG/1
650 TABLET ORAL EVERY 4 HOURS PRN
Status: CANCELLED | OUTPATIENT
Start: 2018-10-13

## 2018-10-13 RX ORDER — ATORVASTATIN CALCIUM 40 MG/1
40 TABLET, FILM COATED ORAL DAILY
Status: DISCONTINUED | OUTPATIENT
Start: 2018-10-13 | End: 2018-10-16 | Stop reason: HOSPADM

## 2018-10-13 RX ORDER — TORSEMIDE 20 MG/1
20 TABLET ORAL 2 TIMES DAILY
Status: CANCELLED | OUTPATIENT
Start: 2018-10-13

## 2018-10-13 RX ORDER — ACETAMINOPHEN 325 MG/1
650 TABLET ORAL EVERY 4 HOURS PRN
Status: DISCONTINUED | OUTPATIENT
Start: 2018-10-13 | End: 2018-10-13

## 2018-10-13 RX ORDER — SODIUM CHLORIDE 0.9 % (FLUSH) 0.9 %
10 SYRINGE (ML) INJECTION EVERY 12 HOURS SCHEDULED
Status: DISCONTINUED | OUTPATIENT
Start: 2018-10-13 | End: 2018-10-16 | Stop reason: HOSPADM

## 2018-10-13 RX ORDER — SODIUM CHLORIDE 0.9 % (FLUSH) 0.9 %
10 SYRINGE (ML) INJECTION PRN
Status: DISCONTINUED | OUTPATIENT
Start: 2018-10-13 | End: 2018-10-16 | Stop reason: HOSPADM

## 2018-10-13 RX ORDER — FERROUS SULFATE 325(65) MG
325 TABLET ORAL
Status: CANCELLED | OUTPATIENT
Start: 2018-10-13

## 2018-10-13 RX ORDER — ONDANSETRON 2 MG/ML
4 INJECTION INTRAMUSCULAR; INTRAVENOUS EVERY 6 HOURS PRN
Status: CANCELLED | OUTPATIENT
Start: 2018-10-13

## 2018-10-13 RX ADMIN — INSULIN GLARGINE 40 UNITS: 100 INJECTION, SOLUTION SUBCUTANEOUS at 22:02

## 2018-10-13 RX ADMIN — Medication 400 MG: at 10:12

## 2018-10-13 RX ADMIN — FERROUS SULFATE TAB 325 MG (65 MG ELEMENTAL FE) 325 MG: 325 (65 FE) TAB at 10:13

## 2018-10-13 RX ADMIN — SACUBITRIL AND VALSARTAN 1 TABLET: 24; 26 TABLET, FILM COATED ORAL at 10:13

## 2018-10-13 RX ADMIN — RIVAROXABAN 20 MG: 20 TABLET, FILM COATED ORAL at 18:56

## 2018-10-13 RX ADMIN — SODIUM CHLORIDE 1000 ML: 9 INJECTION, SOLUTION INTRAVENOUS at 03:15

## 2018-10-13 RX ADMIN — HYDRALAZINE HYDROCHLORIDE 25 MG: 25 TABLET, FILM COATED ORAL at 10:12

## 2018-10-13 RX ADMIN — INSULIN LISPRO 12 UNITS: 100 INJECTION, SOLUTION INTRAVENOUS; SUBCUTANEOUS at 13:46

## 2018-10-13 RX ADMIN — POTASSIUM CHLORIDE 10 MEQ: 10 TABLET, EXTENDED RELEASE ORAL at 10:12

## 2018-10-13 RX ADMIN — SPIRONOLACTONE 25 MG: 25 TABLET ORAL at 10:12

## 2018-10-13 RX ADMIN — CARVEDILOL 6.25 MG: 6.25 TABLET, FILM COATED ORAL at 10:12

## 2018-10-13 RX ADMIN — FUROSEMIDE 40 MG: 10 INJECTION, SOLUTION INTRAMUSCULAR; INTRAVENOUS at 15:28

## 2018-10-13 RX ADMIN — Medication 10 ML: at 21:18

## 2018-10-13 RX ADMIN — ALLOPURINOL 300 MG: 300 TABLET ORAL at 10:13

## 2018-10-13 RX ADMIN — Medication 10 ML: at 15:28

## 2018-10-13 RX ADMIN — ATORVASTATIN CALCIUM 40 MG: 40 TABLET, FILM COATED ORAL at 21:18

## 2018-10-13 RX ADMIN — TORSEMIDE 20 MG: 20 TABLET ORAL at 10:13

## 2018-10-13 RX ADMIN — ISOSORBIDE DINITRATE 10 MG: 10 TABLET ORAL at 10:12

## 2018-10-13 RX ADMIN — SACUBITRIL AND VALSARTAN 1 TABLET: 49; 51 TABLET, FILM COATED ORAL at 21:18

## 2018-10-13 RX ADMIN — INSULIN LISPRO 12 UNITS: 100 INJECTION, SOLUTION INTRAVENOUS; SUBCUTANEOUS at 17:47

## 2018-10-13 RX ADMIN — TORSEMIDE 20 MG: 20 TABLET ORAL at 21:18

## 2018-10-13 ASSESSMENT — PAIN SCALES - GENERAL: PAINLEVEL_OUTOF10: 0

## 2018-10-13 NOTE — H&P
blood glucose less than 50 mg/dL and patient  ALERT and TOLERATING PO    HYPOGLYCEMIA TREATMENT: blood glucose less than 70 mg/dL and patient ALERT and TOLERATING PO    HYPOGLYCEMIA TREATMENT: blood glucose less than 70 mg/dL and patient NOT ALERT or NPO    Full Code    Inpatient consult to Cardiology    Initiate Oxygen Therapy Protocol    POCT Glucose    EKG 12 Lead    EKG 12 Lead    EKG 12 Lead    PATIENT STATUS (FROM ED OR OR/PROCEDURAL) Observation       PMH:  has a past medical history of Acute CHF ([facility] Acute CHF ([facility] Acute idiopathic gout of right foot; Anemia; CAD (coronary artery disease); Cerebral artery occlusion with cerebral infarction [facility]); CKD (chronic kidney disease); Hepatocellular carcinoma ([facility] HTN (hypertension); Hyperlipidemia; Hypertension; Nonischemic cardiomyopathy ([facility] Nonischemic cardiomyopathy ([facility] ARVIND (obstructive sleep apnea); S/P left heart catheterization by percutaneous approach; Systolic heart failure ([facility] Type 2 diabetes mellitus with complication, with long-term current use of insulin ([facility] and URI, acute. PSH:  has a past surgical history that includes ECHO Compl W Dop Color Flow (11/30/2011); ECHO Compl W Dop Color Flow (3/27/2012); and Cardiac defibrillator placement. FH: family history includes Cancer in his mother. Social:  reports that he has never smoked. He has never used smokeless tobacco. He reports that he drinks alcohol. He reports that he does not use drugs. Allergies: No Known Allergies     Home Medications:   No current facility-administered medications on file prior to encounter.       Current Outpatient Prescriptions on File Prior to Encounter   Medication Sig Dispense Refill    magnesium oxide (MAG-OX) 400 MG tablet Take 1 tablet by mouth daily 30 tablet 5    sacubitril-valsartan (ENTRESTO) 24-26 MG per tablet Take 1 tablet by mouth 2 times daily 60 tablet 5    ferrous sulfate 325 (65 Fe) MG tablet Take 1 tablet by mouth daily ideation    PE:  Blood pressure 116/72, pulse 81, temperature 97.8 °F (36.6 °C), temperature source Temporal, resp. rate 18, height 6' (1.829 m), weight (!) 320 lb (145.2 kg), SpO2 98 %. General: Alert, cooperative, no acute distress, morbidly obese   Chest: No tenderness or deformity, full & symmetric excursion  Lung: Clear to auscultation bilaterally,  respirations unlabored. No rales/wheezing/rubs  Heart: Slightly tachycardic, S1 and S2 normal, no murmur, rub or gallop. DP pulses 2/4  Abdomen: SNTND, no masses, no organomegaly, no guarding, rebound or rigidity. Genital/Rectal: deferred  Extremities:  Extremities normal, atraumatic, no cyanosis or edema.  Distal pulses equal bilaterally  Neurologic: Alert & Oriented    Labs:   Results for orders placed or performed during the hospital encounter of 10/13/18   CBC   Result Value Ref Range    WBC 10.2 4.5 - 11.5 E9/L    RBC 4.68 3.80 - 5.80 E12/L    Hemoglobin 13.2 12.5 - 16.5 g/dL    Hematocrit 40.7 37.0 - 54.0 %    MCV 87.0 80.0 - 99.9 fL    MCH 28.2 26.0 - 35.0 pg    MCHC 32.4 32.0 - 34.5 %    RDW 14.8 11.5 - 15.0 fL    Platelets 485 091 - 888 E9/L    MPV 9.5 7.0 - 12.0 fL   Comprehensive Metabolic Panel   Result Value Ref Range    Sodium 130 (L) 132 - 146 mmol/L    Potassium 3.9 3.5 - 5.0 mmol/L    Chloride 96 (L) 98 - 107 mmol/L    CO2 19 (L) 22 - 29 mmol/L    Anion Gap 15 7 - 16 mmol/L    Glucose 364 (H) 74 - 109 mg/dL    BUN 30 (H) 6 - 20 mg/dL    CREATININE 1.4 (H) 0.7 - 1.2 mg/dL    GFR Non-African American 57 >=60 mL/min/1.73    GFR African American >60     Calcium 9.4 8.6 - 10.2 mg/dL    Total Protein 7.6 6.4 - 8.3 g/dL    Alb 3.7 3.5 - 5.2 g/dL    Total Bilirubin 0.5 0.0 - 1.2 mg/dL    Alkaline Phosphatase 178 (H) 40 - 129 U/L    ALT 37 0 - 40 U/L    AST 40 (H) 0 - 39 U/L   Troponin   Result Value Ref Range    Troponin <0.01 0.00 - 0.03 ng/mL   Protime-INR   Result Value Ref Range    Protime 16.7 (H) 9.3 - 12.4 sec    INR 1.5    Brain Natriuretic Levemir 40 units QHS and Humalog 12 units pre-meals  -Started on 40 units of Lantus and 12 units pre-meals  -Carb-controlled diet  -Hypoglycemia protocol in place  -Continue to monitor BG     Paroxysmal PAF  -On BB for rate control   -Continue Xarelto    HTN  -controlled, continue home meds    TAIWO  -Creatinine of 1.4, BUN of 30  -Check urine lytes  -Likely prerenal  -Given IVF in the ED, follow-up BMP tomorrow       DVT / GI prophylaxis: On Xarelto, no indication for PPI    Dispo -  telemetry     Code: Full      Electronically signed by Eugene Moran MD on 10/13/2018 at 12:59 PM  This case was discussed with attending physician, Dr. Estelle Marques

## 2018-10-13 NOTE — ED NOTES
Pt continues to deny CP. States he still feels his heart is racing. Pt awaiting dispo.       Kenneth Bishop RN  10/13/18 [id number]

## 2018-10-13 NOTE — CONSULTS
defibrillator. On the night of hospitalization, he reported the development of paroxysmal nocturnal dyspnea followed by the onset of palpitations causing him to present to the emergency room where his initial electrocardiographic tracing demonstrated sinus rhythm with ventricular ectopy and evidence of a left bundle branch block conduction pattern. During the course of hospitalization, episodes of a wide complex tachycardia of approximately 170 bpm lasting up to reportedly approximately 2 minutes duration were noted with characteristics suggestive of probable atrial fibrillation. In addition the time of hospitalization a chest x-ray demonstrated evidence of cardiomegaly with diffuse interstitial infiltrates and a proBNP level of 780 pg/mL he relates no symptoms of a focal neurologic origin nor bleeding in the face of his existing chronic oral anticoagulation. .    Review of Systems: The remainder of a complete multisystem review including consitutional, central nervous, respiratory, circulatory, gastrointestinal, genitourinary, endocrinologic, hematologic, musculoskeletal and psychiatric are negative.     Past Medical History:  Past Medical History:   Diagnosis Date    Acute CHF ([address] 11/29/2011    Acute CHF ([address] 12/26/2011    Acute idiopathic gout of right foot 8/13/2016    Anemia 11/29/2011    CAD (coronary artery disease)     Cerebral artery occlusion with cerebral infarction ([address]     CKD (chronic kidney disease)     Hepatocellular carcinoma ([address] 12/2/2013    HTN (hypertension) 11/29/2011    Hyperlipidemia     Hypertension     Nonischemic cardiomyopathy ([address] 11/29/2011    Nonischemic cardiomyopathy ([facility] 7/2013 7/2013- cardiac MRI revealed an LVEF of 20%    ARVIND (obstructive sleep apnea) 11/29/2011    S/P left heart catheterization by percutaneous approach 12-27/2011    Dr Baker [facility] Systolic heart failure [facility]) 5/7/2012 5/7/12- cardiac catheterization revealed an LVEF of 10-15%, 10 mEq Oral Daily Atif Ley MD   10 mEq at 10/13/18 1012    rivaroxaban (XARELTO) tablet 20 mg  20 mg Oral Dinner Atif Ley MD        sacubitril-valsartan (ENTRESTO) 24-26 MG per tablet 1 tablet  1 tablet Oral BID Atif Ley MD   1 tablet at 10/13/18 1013    spironolactone (ALDACTONE) tablet 25 mg  25 mg Oral Daily Atif Ley MD   25 mg at 10/13/18 1012    torsemide (DEMADEX) tablet 20 mg  20 mg Oral BID Atif Ley MD   20 mg at 10/13/18 1013    sodium chloride flush 0.9 % injection 10 mL  10 mL Intravenous 2 times per day Atif Ley MD        sodium chloride flush 0.9 % injection 10 mL  10 mL Intravenous PRN Atif Ley MD        magnesium hydroxide (MILK OF MAGNESIA) 400 MG/5ML suspension 30 mL  30 mL Oral Daily PRN Atif Ley MD        ondansetron [facility]) injection 4 mg  4 mg Intravenous Q6H PRN Atif Ley MD        insulin glargine (LANTUS) injection vial 40 Units  40 Units Subcutaneous Nightly Atif Ley MD        glucose (GLUTOSE) 40 % oral gel 15 g  15 g Oral PRN Atif Ley MD        dextrose 50 % solution 12.5 g  12.5 g Intravenous PRN Atif Ley MD        glucagon (rDNA) injection 1 mg  1 mg Intramuscular PRN Atif Ley MD        dextrose 5 % solution  100 mL/hr Intravenous PRN Atif Ley MD          dextrose           Physical Exam:  /72   Pulse 81   Temp 97.8 °F (36.6 °C) (Temporal)   Resp 18   Ht 6' (1.829 m)   Wt (!) 320 lb (145.2 kg)   SpO2 98%   BMI 43.40 kg/m²   Weight change: Wt Readings from Last 3 Encounters:   10/13/18 (!) 320 lb (145.2 kg)   08/22/18 (!) 324 lb 6.4 oz (147.1 kg)   08/09/18 (!) 325 lb (147.4 kg)     The patient is awake, alert and in no discomfort or distress. No gross musculoskeletal deformity or lymphadenopathy are present. No significant skin or nail changes are present.  Gross examination of head, eyes, nose and throat are

## 2018-10-13 NOTE — ED PROVIDER NOTES
Gap 15 7 - 16 mmol/L    Glucose 364 (H) 74 - 109 mg/dL    BUN 30 (H) 6 - 20 mg/dL    CREATININE 1.4 (H) 0.7 - 1.2 mg/dL    GFR Non-African American 57 >=60 mL/min/1.73    GFR African American >60     Calcium 9.4 8.6 - 10.2 mg/dL    Total Protein 7.6 6.4 - 8.3 g/dL    Alb 3.7 3.5 - 5.2 g/dL    Total Bilirubin 0.5 0.0 - 1.2 mg/dL    Alkaline Phosphatase 178 (H) 40 - 129 U/L    ALT 37 0 - 40 U/L    AST 40 (H) 0 - 39 U/L   Troponin   Result Value Ref Range    Troponin <0.01 0.00 - 0.03 ng/mL   Protime-INR   Result Value Ref Range    Protime 16.7 (H) 9.3 - 12.4 sec    INR 1.5    Brain Natriuretic Peptide   Result Value Ref Range    Pro- (H) 0 - 125 pg/mL   EKG 12 Lead   Result Value Ref Range    Ventricular Rate 107 BPM    Atrial Rate 107 BPM    P-R Interval 240 ms    QRS Duration 168 ms    Q-T Interval 354 ms    QTc Calculation (Bazett) 472 ms    R Axis -64 degrees    T Axis 102 degrees       RADIOLOGY:  Interpreted by Radiologist.  XR CHEST PORTABLE    (Results Pending)       EKG: This EKG is signed and interpreted by me. Rate: 107  Rhythm: Sinus tachycardia  Interpretation: non-specific EKG, Left bundle branch block  Comparison: stable as compared to patient's most recent EKG    EKG Interpretation      This X-Ray is independently viewed and interpreted by me:   - Study: Chest X-Ray   - Number of Views: 1  - Findings: Mediastinum is normal, No pneumothorax and Cardiomegaly noted and vascular congestion    ------------------------- NURSING NOTES AND VITALS REVIEWED ---------------------------   The nursing notes within the ED encounter and vital signs as below have been reviewed by myself. BP 98/72   Pulse 107   Temp 96.9 °F (36.1 °C)   Resp 20   Ht 6' (1.829 m)   Wt (!) 320 lb (145.2 kg)   SpO2 95%   BMI 43.40 kg/m²   Oxygen Saturation Interpretation: Normal    The patients available past medical records and past encounters were reviewed.         ------------------------------ ED COURSE/MEDICAL

## 2018-10-14 LAB
ANION GAP SERPL CALCULATED.3IONS-SCNC: 14 MMOL/L (ref 7–16)
BUN BLDV-MCNC: 31 MG/DL (ref 6–20)
CALCIUM SERPL-MCNC: 9.5 MG/DL (ref 8.6–10.2)
CHLORIDE BLD-SCNC: 102 MMOL/L (ref 98–107)
CO2: 21 MMOL/L (ref 22–29)
CREAT SERPL-MCNC: 1.4 MG/DL (ref 0.7–1.2)
EKG ATRIAL RATE: 107 BPM
EKG P-R INTERVAL: 240 MS
EKG Q-T INTERVAL: 354 MS
EKG QRS DURATION: 168 MS
EKG QTC CALCULATION (BAZETT): 472 MS
EKG R AXIS: -64 DEGREES
EKG T AXIS: 102 DEGREES
EKG VENTRICULAR RATE: 107 BPM
GFR AFRICAN AMERICAN: >60
GFR NON-AFRICAN AMERICAN: 57 ML/MIN/1.73
GLUCOSE BLD-MCNC: 228 MG/DL (ref 74–109)
METER GLUCOSE: 156 MG/DL (ref 70–110)
METER GLUCOSE: 252 MG/DL (ref 70–110)
POTASSIUM REFLEX MAGNESIUM: 3.9 MMOL/L (ref 3.5–5)
SODIUM BLD-SCNC: 137 MMOL/L (ref 132–146)

## 2018-10-14 PROCEDURE — 80048 BASIC METABOLIC PNL TOTAL CA: CPT

## 2018-10-14 PROCEDURE — G0378 HOSPITAL OBSERVATION PER HR: HCPCS

## 2018-10-14 PROCEDURE — 2580000003 HC RX 258: Performed by: STUDENT IN AN ORGANIZED HEALTH CARE EDUCATION/TRAINING PROGRAM

## 2018-10-14 PROCEDURE — 6370000000 HC RX 637 (ALT 250 FOR IP): Performed by: INTERNAL MEDICINE

## 2018-10-14 PROCEDURE — 6370000000 HC RX 637 (ALT 250 FOR IP): Performed by: STUDENT IN AN ORGANIZED HEALTH CARE EDUCATION/TRAINING PROGRAM

## 2018-10-14 PROCEDURE — 82962 GLUCOSE BLOOD TEST: CPT

## 2018-10-14 PROCEDURE — 99225 PR SBSQ OBSERVATION CARE/DAY 25 MINUTES: CPT | Performed by: FAMILY MEDICINE

## 2018-10-14 PROCEDURE — 99215 OFFICE O/P EST HI 40 MIN: CPT | Performed by: INTERNAL MEDICINE

## 2018-10-14 PROCEDURE — 36415 COLL VENOUS BLD VENIPUNCTURE: CPT

## 2018-10-14 RX ADMIN — SPIRONOLACTONE 25 MG: 25 TABLET ORAL at 17:12

## 2018-10-14 RX ADMIN — FERROUS SULFATE TAB 325 MG (65 MG ELEMENTAL FE) 325 MG: 325 (65 FE) TAB at 09:13

## 2018-10-14 RX ADMIN — TORSEMIDE 20 MG: 20 TABLET ORAL at 20:34

## 2018-10-14 RX ADMIN — INSULIN LISPRO 12 UNITS: 100 INJECTION, SOLUTION INTRAVENOUS; SUBCUTANEOUS at 12:29

## 2018-10-14 RX ADMIN — INSULIN GLARGINE 40 UNITS: 100 INJECTION, SOLUTION SUBCUTANEOUS at 22:05

## 2018-10-14 RX ADMIN — SPIRONOLACTONE 25 MG: 25 TABLET ORAL at 09:15

## 2018-10-14 RX ADMIN — INSULIN LISPRO 12 UNITS: 100 INJECTION, SOLUTION INTRAVENOUS; SUBCUTANEOUS at 09:23

## 2018-10-14 RX ADMIN — SACUBITRIL AND VALSARTAN 1 TABLET: 49; 51 TABLET, FILM COATED ORAL at 20:34

## 2018-10-14 RX ADMIN — ATORVASTATIN CALCIUM 40 MG: 40 TABLET, FILM COATED ORAL at 20:34

## 2018-10-14 RX ADMIN — Medication 400 MG: at 09:14

## 2018-10-14 RX ADMIN — CARVEDILOL 12.5 MG: 6.25 TABLET, FILM COATED ORAL at 17:07

## 2018-10-14 RX ADMIN — TORSEMIDE 20 MG: 20 TABLET ORAL at 09:16

## 2018-10-14 RX ADMIN — SACUBITRIL AND VALSARTAN 1 TABLET: 49; 51 TABLET, FILM COATED ORAL at 09:14

## 2018-10-14 RX ADMIN — POTASSIUM CHLORIDE 10 MEQ: 10 TABLET, EXTENDED RELEASE ORAL at 09:14

## 2018-10-14 RX ADMIN — CARVEDILOL 12.5 MG: 6.25 TABLET, FILM COATED ORAL at 09:12

## 2018-10-14 RX ADMIN — Medication 10 ML: at 20:34

## 2018-10-14 RX ADMIN — RIVAROXABAN 20 MG: 20 TABLET, FILM COATED ORAL at 20:34

## 2018-10-14 RX ADMIN — INSULIN LISPRO 12 UNITS: 100 INJECTION, SOLUTION INTRAVENOUS; SUBCUTANEOUS at 17:09

## 2018-10-14 RX ADMIN — Medication 10 ML: at 09:15

## 2018-10-14 RX ADMIN — ALLOPURINOL 300 MG: 300 TABLET ORAL at 09:13

## 2018-10-14 ASSESSMENT — PAIN SCALES - GENERAL
PAINLEVEL_OUTOF10: 0

## 2018-10-15 VITALS
WEIGHT: 315 LBS | BODY MASS INDEX: 42.66 KG/M2 | HEART RATE: 81 BPM | SYSTOLIC BLOOD PRESSURE: 118 MMHG | TEMPERATURE: 97.1 F | DIASTOLIC BLOOD PRESSURE: 68 MMHG | HEIGHT: 72 IN | OXYGEN SATURATION: 96 % | RESPIRATION RATE: 16 BRPM

## 2018-10-15 LAB
ANION GAP SERPL CALCULATED.3IONS-SCNC: 14 MMOL/L (ref 7–16)
BUN BLDV-MCNC: 34 MG/DL (ref 6–20)
CALCIUM SERPL-MCNC: 9.6 MG/DL (ref 8.6–10.2)
CHLORIDE BLD-SCNC: 100 MMOL/L (ref 98–107)
CO2: 22 MMOL/L (ref 22–29)
CREAT SERPL-MCNC: 1.3 MG/DL (ref 0.7–1.2)
GFR AFRICAN AMERICAN: >60
GFR NON-AFRICAN AMERICAN: >60 ML/MIN/1.73
GLUCOSE BLD-MCNC: 194 MG/DL (ref 74–109)
LV EF: 18 %
LVEF MODALITY: NORMAL
METER GLUCOSE: 172 MG/DL (ref 70–110)
METER GLUCOSE: 294 MG/DL (ref 70–110)
POTASSIUM REFLEX MAGNESIUM: 3.7 MMOL/L (ref 3.5–5)
SODIUM BLD-SCNC: 136 MMOL/L (ref 132–146)

## 2018-10-15 PROCEDURE — 6370000000 HC RX 637 (ALT 250 FOR IP): Performed by: STUDENT IN AN ORGANIZED HEALTH CARE EDUCATION/TRAINING PROGRAM

## 2018-10-15 PROCEDURE — 99215 OFFICE O/P EST HI 40 MIN: CPT | Performed by: INTERNAL MEDICINE

## 2018-10-15 PROCEDURE — 80048 BASIC METABOLIC PNL TOTAL CA: CPT

## 2018-10-15 PROCEDURE — 93306 TTE W/DOPPLER COMPLETE: CPT

## 2018-10-15 PROCEDURE — 99239 HOSP IP/OBS DSCHRG MGMT >30: CPT | Performed by: FAMILY MEDICINE

## 2018-10-15 PROCEDURE — G0378 HOSPITAL OBSERVATION PER HR: HCPCS

## 2018-10-15 PROCEDURE — 6370000000 HC RX 637 (ALT 250 FOR IP): Performed by: INTERNAL MEDICINE

## 2018-10-15 PROCEDURE — 36415 COLL VENOUS BLD VENIPUNCTURE: CPT

## 2018-10-15 PROCEDURE — 2580000003 HC RX 258: Performed by: STUDENT IN AN ORGANIZED HEALTH CARE EDUCATION/TRAINING PROGRAM

## 2018-10-15 PROCEDURE — 82962 GLUCOSE BLOOD TEST: CPT

## 2018-10-15 RX ORDER — CARVEDILOL 12.5 MG/1
12.5 TABLET ORAL 2 TIMES DAILY WITH MEALS
Qty: 60 TABLET | Refills: 3 | Status: SHIPPED | OUTPATIENT
Start: 2018-10-15 | End: 2018-11-01 | Stop reason: SDUPTHER

## 2018-10-15 RX ORDER — BENZONATATE 100 MG/1
100 CAPSULE ORAL 3 TIMES DAILY PRN
Status: DISCONTINUED | OUTPATIENT
Start: 2018-10-15 | End: 2018-10-16 | Stop reason: HOSPADM

## 2018-10-15 RX ADMIN — CARVEDILOL 12.5 MG: 6.25 TABLET, FILM COATED ORAL at 09:08

## 2018-10-15 RX ADMIN — TORSEMIDE 20 MG: 20 TABLET ORAL at 21:17

## 2018-10-15 RX ADMIN — ATORVASTATIN CALCIUM 40 MG: 40 TABLET, FILM COATED ORAL at 21:17

## 2018-10-15 RX ADMIN — CARVEDILOL 12.5 MG: 6.25 TABLET, FILM COATED ORAL at 17:31

## 2018-10-15 RX ADMIN — RIVAROXABAN 20 MG: 20 TABLET, FILM COATED ORAL at 17:30

## 2018-10-15 RX ADMIN — Medication 400 MG: at 09:08

## 2018-10-15 RX ADMIN — INSULIN LISPRO 12 UNITS: 100 INJECTION, SOLUTION INTRAVENOUS; SUBCUTANEOUS at 12:42

## 2018-10-15 RX ADMIN — SACUBITRIL AND VALSARTAN 1 TABLET: 49; 51 TABLET, FILM COATED ORAL at 09:08

## 2018-10-15 RX ADMIN — TORSEMIDE 20 MG: 20 TABLET ORAL at 09:08

## 2018-10-15 RX ADMIN — FERROUS SULFATE TAB 325 MG (65 MG ELEMENTAL FE) 325 MG: 325 (65 FE) TAB at 09:08

## 2018-10-15 RX ADMIN — ALLOPURINOL 300 MG: 300 TABLET ORAL at 09:08

## 2018-10-15 RX ADMIN — Medication 10 ML: at 09:09

## 2018-10-15 RX ADMIN — INSULIN LISPRO 12 UNITS: 100 INJECTION, SOLUTION INTRAVENOUS; SUBCUTANEOUS at 09:09

## 2018-10-15 RX ADMIN — INSULIN LISPRO 12 UNITS: 100 INJECTION, SOLUTION INTRAVENOUS; SUBCUTANEOUS at 17:31

## 2018-10-15 RX ADMIN — POTASSIUM CHLORIDE 10 MEQ: 10 TABLET, EXTENDED RELEASE ORAL at 09:08

## 2018-10-15 RX ADMIN — SPIRONOLACTONE 25 MG: 25 TABLET ORAL at 09:08

## 2018-10-15 RX ADMIN — SACUBITRIL AND VALSARTAN 1 TABLET: 49; 51 TABLET, FILM COATED ORAL at 21:17

## 2018-10-15 ASSESSMENT — PAIN SCALES - GENERAL: PAINLEVEL_OUTOF10: 0

## 2018-10-15 NOTE — PROGRESS NOTES
Called Dr. Johnathon Hylton office, stated she is on her way to [facility] now.
Neha Soto MD   40 Units at 10/13/18 2202    glucose (GLUTOSE) 40 % oral gel 15 g  15 g Oral PRN Ying Bautista MD        dextrose 50 % solution 12.5 g  12.5 g Intravenous PRN Ying Bautista MD        glucagon (rDNA) injection 1 mg  1 mg Intramuscular PRN Ying Bautista MD        dextrose 5 % solution  100 mL/hr Intravenous PRN Ying Bautista MD        sacubitril-valsartan [facility]) 49-51 MG per tablet 1 tablet  1 tablet Oral BID Laly Gerber MD   1 tablet at 10/13/18 2118    perflutren lipid microspheres (DEFINITY) injection 1.65 mg  1.5 mL Intravenous ONCE PRN Laly Gerber MD        carvedilol (COREG) tablet 12.5 mg  12.5 mg Oral BID WC Katie Kuhn MD          dextrose         Physical Exam:  BP (!) 116/54   Pulse 110   Temp 98 °F (36.7 °C)   Resp 16   Ht 6' (1.829 m)   Wt (!) 320 lb (145.2 kg)   SpO2 96%   BMI 43.40 kg/m²   Weight change: Wt Readings from Last 3 Encounters:   10/13/18 (!) 320 lb (145.2 kg)   08/22/18 (!) 324 lb 6.4 oz (147.1 kg)   08/09/18 (!) 325 lb (147.4 kg)     The patient is awake, alert and in no discomfort or distress. No gross musculoskeletal deformity is present. No significant skin or nail changes are present. Gross examination of head, eyes, nose and throat are negative. Jugular venous pressure is normal and no carotid bruits are present. Normal respiratory effort is noted with no accessory muscle usage present. Lung fields are clear to ascultation. Cardiac examination is notable for a regular rate and rhythm with no palpable thrill. No gallop rhythm or cardiac murmur are identified. A benign abdominal examination is present with the exception of obesity and no masses or organomegaly. Intact pulses are present throughout all extremities and no peripheral edema is present. No focal neurologic deficits are present.     Intake/Output:    Intake/Output Summary (Last 24 hours) at 10/14/18 0628  Last data filed at 10/13/18 2125   Gross per 24
current use of insulin (HCC)    Paroxysmal atrial fibrillation (HCC)    Nonischemic cardiomyopathy (HCC)    VHD (valvular heart disease)    Morbid obesity with BMI of 40.0-44.9, adult (HCC)    Palpitations  Resolved Problems:    * No resolved hospital problems.  *    SVT  -Multiple runs of SVT/v-tach without ICD firing  -EKG in the ED with sinus tachycardia, ventricular rate of 107, occasional PVCs, LBBB, grossly unchanged from previously  -Repeated EKG several hours later showing SVT with HR of 177  -Patient of Tomasa Quiroz (cardiologist) and Dr. Kevin Oseguera (electrophysiologist) - both services consulted and following  -Based on history, questionable compliance with medications  -Patient on carvedilol, hydralazine, nitrate, Entresto, and Aldactone at home - cardiology stopped hydralazine, and Isordil; increased doses of carvedilol and Entresto.  -Echo ordered - follow-up results  -ICD device interrogation requested  -Follow up electrophysiologist recommendations  -Follow telemetry monitor     DM 2  -Elevated glucose in the ED  -On Levemir 40 units QHS and Humalog 12 units pre-meals  -Started on 40 units of Lantus and 12 units pre-meals  -Carb-controlled diet  -Hypoglycemia protocol in place  -Continue to monitor BG      Paroxysmal PAF  -On BB for rate control   -Continue Xarelto     HTN  -controlled, continue home meds     TAIWO  -Creatinine of 1.4, BUN of 30  -Repeat Bun/Cr unchanged; patient does not appear dehydrated or fluid overloaded  -Continue to monitor BMP        DVT / GI prophylaxis: On Xarelto, no indication for PPI     Dispo -  telemetry      Code: Full    Electronically signed by Jia Damon MD PGY-2 on 10/14/2018 at 7:25 AM  This case was discussed with attending physician: Dr. Jaime Rodriguez

## 2018-10-16 ENCOUNTER — TELEPHONE (OUTPATIENT)
Dept: FAMILY MEDICINE CLINIC | Age: 38
End: 2018-10-16

## 2018-10-17 ENCOUNTER — TELEPHONE (OUTPATIENT)
Dept: CARDIOLOGY CLINIC | Age: 38
End: 2018-10-17

## 2018-10-17 NOTE — TELEPHONE ENCOUNTER
----- Message from Christo Ontiveros MD sent at 10/15/2018 11:41 AM EDT -----  Please schedule patient for follow-up with Christen Mena later this week and with [facility] or  [place] within the next 10 days

## 2018-10-18 ENCOUNTER — HOSPITAL ENCOUNTER (EMERGENCY)
Age: 38
Discharge: HOME | End: 2018-10-19
Attending: EMERGENCY MEDICINE
Payer: MEDICARE

## 2018-10-18 ENCOUNTER — APPOINTMENT (OUTPATIENT)
Dept: GENERAL RADIOLOGY | Age: 38
End: 2018-10-18
Payer: MEDICARE

## 2018-10-18 DIAGNOSIS — R00.2 PALPITATIONS: Primary | ICD-10-CM

## 2018-10-18 LAB
ALBUMIN SERPL-MCNC: 4.1 G/DL (ref 3.5–5.2)
ALP BLD-CCNC: 177 U/L (ref 40–129)
ALT SERPL-CCNC: 28 U/L (ref 0–40)
ANION GAP SERPL CALCULATED.3IONS-SCNC: 14 MMOL/L (ref 7–16)
AST SERPL-CCNC: 23 U/L (ref 0–39)
BILIRUB SERPL-MCNC: 0.5 MG/DL (ref 0–1.2)
BILIRUBIN URINE: NEGATIVE
BLOOD, URINE: NEGATIVE
BUN BLDV-MCNC: 40 MG/DL (ref 6–20)
CALCIUM SERPL-MCNC: 9.7 MG/DL (ref 8.6–10.2)
CHLORIDE BLD-SCNC: 96 MMOL/L (ref 98–107)
CLARITY: CLEAR
CO2: 21 MMOL/L (ref 22–29)
COLOR: YELLOW
CREAT SERPL-MCNC: 1.5 MG/DL (ref 0.7–1.2)
EKG ATRIAL RATE: 83 BPM
EKG P AXIS: 29 DEGREES
EKG P-R INTERVAL: 196 MS
EKG Q-T INTERVAL: 454 MS
EKG QRS DURATION: 178 MS
EKG QTC CALCULATION (BAZETT): 533 MS
EKG R AXIS: -72 DEGREES
EKG T AXIS: 77 DEGREES
EKG VENTRICULAR RATE: 83 BPM
GFR AFRICAN AMERICAN: >60
GFR NON-AFRICAN AMERICAN: 52 ML/MIN/1.73
GLUCOSE BLD-MCNC: 246 MG/DL (ref 74–109)
GLUCOSE URINE: NEGATIVE MG/DL
HCT VFR BLD CALC: 43.3 % (ref 37–54)
HEMOGLOBIN: 14.2 G/DL (ref 12.5–16.5)
INR BLD: 2.8
KETONES, URINE: NEGATIVE MG/DL
LEUKOCYTE ESTERASE, URINE: NEGATIVE
MCH RBC QN AUTO: 28 PG (ref 26–35)
MCHC RBC AUTO-ENTMCNC: 32.8 % (ref 32–34.5)
MCV RBC AUTO: 85.2 FL (ref 80–99.9)
NITRITE, URINE: NEGATIVE
PDW BLD-RTO: 14.7 FL (ref 11.5–15)
PH UA: 5.5 (ref 5–9)
PLATELET # BLD: 326 E9/L (ref 130–450)
PMV BLD AUTO: 9.8 FL (ref 7–12)
POTASSIUM SERPL-SCNC: 4.1 MMOL/L (ref 3.5–5)
PRO-BNP: 483 PG/ML (ref 0–125)
PROTEIN UA: NEGATIVE MG/DL
PROTHROMBIN TIME: 31.3 SEC (ref 9.3–12.4)
RBC # BLD: 5.08 E12/L (ref 3.8–5.8)
SODIUM BLD-SCNC: 131 MMOL/L (ref 132–146)
SPECIFIC GRAVITY UA: 1.01 (ref 1–1.03)
TOTAL PROTEIN: 8.2 G/DL (ref 6.4–8.3)
TROPONIN: 0.01 NG/ML (ref 0–0.03)
UROBILINOGEN, URINE: 0.2 E.U./DL
WBC # BLD: 9.8 E9/L (ref 4.5–11.5)

## 2018-10-18 PROCEDURE — 85610 PROTHROMBIN TIME: CPT

## 2018-10-18 PROCEDURE — 36415 COLL VENOUS BLD VENIPUNCTURE: CPT

## 2018-10-18 PROCEDURE — 99285 EMERGENCY DEPT VISIT HI MDM: CPT

## 2018-10-18 PROCEDURE — 84484 ASSAY OF TROPONIN QUANT: CPT

## 2018-10-18 PROCEDURE — 83880 ASSAY OF NATRIURETIC PEPTIDE: CPT

## 2018-10-18 PROCEDURE — 80053 COMPREHEN METABOLIC PANEL: CPT

## 2018-10-18 PROCEDURE — 93005 ELECTROCARDIOGRAM TRACING: CPT | Performed by: EMERGENCY MEDICINE

## 2018-10-18 PROCEDURE — 71045 X-RAY EXAM CHEST 1 VIEW: CPT

## 2018-10-18 PROCEDURE — 81003 URINALYSIS AUTO W/O SCOPE: CPT

## 2018-10-18 PROCEDURE — 85027 COMPLETE CBC AUTOMATED: CPT

## 2018-10-18 ASSESSMENT — PAIN SCALES - GENERAL: PAINLEVEL_OUTOF10: 4

## 2018-10-18 ASSESSMENT — PAIN DESCRIPTION - PAIN TYPE: TYPE: ACUTE PAIN

## 2018-10-18 ASSESSMENT — PAIN DESCRIPTION - ORIENTATION: ORIENTATION: MID

## 2018-10-18 ASSESSMENT — PAIN DESCRIPTION - LOCATION: LOCATION: CHEST

## 2018-10-19 VITALS
BODY MASS INDEX: 42.66 KG/M2 | WEIGHT: 315 LBS | OXYGEN SATURATION: 98 % | TEMPERATURE: 97.8 F | HEART RATE: 93 BPM | SYSTOLIC BLOOD PRESSURE: 125 MMHG | RESPIRATION RATE: 18 BRPM | DIASTOLIC BLOOD PRESSURE: 80 MMHG | HEIGHT: 72 IN

## 2018-10-19 LAB — TROPONIN: 0.01 NG/ML (ref 0–0.03)

## 2018-10-19 PROCEDURE — 36415 COLL VENOUS BLD VENIPUNCTURE: CPT

## 2018-10-19 PROCEDURE — 84484 ASSAY OF TROPONIN QUANT: CPT

## 2018-10-23 ENCOUNTER — OFFICE VISIT (OUTPATIENT)
Dept: FAMILY MEDICINE CLINIC | Age: 38
End: 2018-10-23
Payer: MEDICARE

## 2018-10-23 VITALS
BODY MASS INDEX: 42.66 KG/M2 | HEART RATE: 100 BPM | RESPIRATION RATE: 16 BRPM | WEIGHT: 315 LBS | SYSTOLIC BLOOD PRESSURE: 96 MMHG | HEIGHT: 72 IN | OXYGEN SATURATION: 96 % | TEMPERATURE: 98.3 F | DIASTOLIC BLOOD PRESSURE: 62 MMHG

## 2018-10-23 DIAGNOSIS — Z23 NEED FOR INFLUENZA VACCINATION: ICD-10-CM

## 2018-10-23 DIAGNOSIS — I10 ESSENTIAL HYPERTENSION: ICD-10-CM

## 2018-10-23 DIAGNOSIS — E11.8 TYPE 2 DIABETES MELLITUS WITH COMPLICATION, UNSPECIFIED WHETHER LONG TERM INSULIN USE: ICD-10-CM

## 2018-10-23 DIAGNOSIS — I42.8 CARDIOMYOPATHY, NONISCHEMIC (HCC): Primary | ICD-10-CM

## 2018-10-23 DIAGNOSIS — I48.0 PAROXYSMAL ATRIAL FIBRILLATION (HCC): ICD-10-CM

## 2018-10-23 PROCEDURE — 6360000002 HC RX W HCPCS

## 2018-10-23 PROCEDURE — 1111F DSCHRG MED/CURRENT MED MERGE: CPT | Performed by: FAMILY MEDICINE

## 2018-10-23 PROCEDURE — 90686 IIV4 VACC NO PRSV 0.5 ML IM: CPT

## 2018-10-23 PROCEDURE — G0008 ADMIN INFLUENZA VIRUS VAC: HCPCS

## 2018-10-23 PROCEDURE — 99495 TRANSJ CARE MGMT MOD F2F 14D: CPT | Performed by: FAMILY MEDICINE

## 2018-10-23 PROCEDURE — 99212 OFFICE O/P EST SF 10 MIN: CPT | Performed by: FAMILY MEDICINE

## 2018-10-23 RX ORDER — HYDRALAZINE HYDROCHLORIDE 25 MG/1
TABLET, FILM COATED ORAL
Qty: 90 TABLET | Refills: 11 | Status: CANCELLED | OUTPATIENT
Start: 2018-10-23

## 2018-10-23 RX ORDER — ISOSORBIDE DINITRATE 10 MG/1
10 TABLET ORAL 2 TIMES DAILY
Qty: 90 TABLET | Refills: 1
Start: 2018-10-23 | End: 2018-11-01 | Stop reason: ALTCHOICE

## 2018-10-23 RX ORDER — HYDRALAZINE HYDROCHLORIDE 25 MG/1
25 TABLET, FILM COATED ORAL 2 TIMES DAILY
Qty: 90 TABLET | Refills: 0
Start: 2018-10-23 | End: 2018-11-01 | Stop reason: ALTCHOICE

## 2018-10-23 NOTE — PROGRESS NOTES
sulfate 325 (65 Fe) MG tablet Take 1 tablet by mouth daily (with breakfast) 30 tablet 3    LEVEMIR FLEXTOUCH 100 UNIT/ML injection pen Inject 30 Units into the skin 2 times daily 10 pen 5    insulin lispro (HUMALOG KWIKPEN) 100 UNIT/ML pen Inject 12 Units into the skin 3 times daily (before meals) 15 mL 11    atorvastatin (LIPITOR) 40 MG tablet Take 1 tablet by mouth daily 30 tablet 5    torsemide (DEMADEX) 20 MG tablet Take 1 tablet by mouth 2 times daily 60 tablet 9    allopurinol (ZYLOPRIM) 300 MG tablet Take 1 tablet by mouth daily 30 tablet 5    potassium chloride (KLOR-CON) 10 MEQ extended release tablet Take 1 tablet by mouth daily 30 tablet 5    rivaroxaban (XARELTO) 20 MG TABS tablet Take 1 tablet by mouth daily 30 tablet 5    spironolactone (ALDACTONE) 25 MG tablet Take 1 tablet by mouth daily 30 tablet 5    Insulin Pen Needle 31G X 5 MM MISC 1 each by Does not apply route daily 100 each 3        Medications patient taking as of now reconciled against medications ordered at time of hospital discharge: Yes, Hydralazine and Isordil decreased from TID to BID at this visit per cardiology recommendations in the hospital.    Chief Complaint   Patient presents with   [address] Visit        History of Present illness - Follow up of Hospital diagnosis(es): Patient presented to the ED with complaints of tachycardia and SOB especially when laying flat. Patient was seen in the hospital and the discussion was had with the patient about replacing his current 2 lead pacemaker with a 4 lead device. He is agree able to this and is scheduled to follow up with EP and cardiology on the near future. While hospitalized he was started on entresto and was recommended to discontinue hydralazine and isordil by cardiology. Since starting the new medication his symptoms have improved slightly but have not totally resolved. Inpatient course: Discharge summary reviewed- see chart.     Interval history/Current status: Awaiting EP and cardiology follow up for further medication titration and implantable device discussion. Review of Systems:  Constitutional: negative for chills, fatigue, fevers and malaise  Eyes: negative for icterus, redness and visual disturbance  Respiratory: negative for cough and sputum production but positive for SOBOE  Cardiovascular: negative for chest pain, claudication and syncope but positive for palpitations   Gastrointestinal: negative for constipation, diarrhea, nausea and vomiting  Musculoskeletal: negative for arthralgias, muscle weakness and myalgias  Neurological: negative for dizziness, headaches, paresthesia and weakness  Behavioral/Psych: negative for anxiety, depression and SI/HI    Vitals:    10/23/18 0946   BP: 96/62   Pulse: 100   Resp: 16   Temp: 98.3 °F (36.8 °C)   TempSrc: Oral   SpO2: 96%   Weight: (!) 321 lb (145.6 kg)   Height: 6' (1.829 m)     Body mass index is 43.54 kg/m². Wt Readings from Last 3 Encounters:   10/23/18 (!) 321 lb (145.6 kg)   10/18/18 (!) 320 lb (145.2 kg)   10/15/18 (!) 315 lb 8 oz (143.1 kg)     BP Readings from Last 3 Encounters:   10/23/18 96/62   10/19/18 125/80   10/15/18 118/68      Physical Exam:  General Appearance: alert and oriented to person, place and time and in no acute distress  Neck: neck supple and non tender without mass, thyroid non tender, no cervical adenopathy and no JVD or carotid bruit   Pulmonary/Chest: clear to auscultation bilaterally- no wheezes, rales or rhonchi, breath sounds diminished in bilateral bases, no respiratory distress  Cardiovascular: Irregularly irregular rhythm, normal rate, no murmurs, intact distal pulses  Abdomen: soft, non-tender, mildly distended, +BSx4, no obvious masses  Extremities: no cyanosis, clubbing or edema    Assessment/Plan:  1. Cardiomyopathy, nonischemic ([address]  2. Type 2 diabetes mellitus with complication, unspecified whether long term insulin use ([address]  3.  Essential

## 2018-10-25 NOTE — DISCHARGE SUMMARY
Chest: No tenderness or deformity, full & symmetric excursion  Lung: Clear to auscultation bilaterally,  respirations unlabored. No rales/wheezing/rubs  Heart: Slightly tachycardic, S1 and S2 normal, no murmur, rub or gallop. DP pulses 2/4  Abdomen: SNTND, no masses, no organomegaly, no guarding, rebound or rigidity. Genital/Rectal: deferred  Extremities:  Extremities normal, atraumatic, no cyanosis or edema. Distal pulses equal bilaterally  Neurologic: Alert & Oriented    Disposition:   home    Future Appointments  Date Time Provider Clemente Davis   11/1/2018 1:00 PM NATI Wong - CNP [facility]   11/12/2018 2:00 PM [facility]   3/6/2019 10:45 AM Torres Tyson MD [facility]       More than 30 minutes was spent in preparation of this patient's discharge including, but not limited to, examination, preparation of documents, prescription preparation, counseling and coordination.     Signed:  Josue Dejesus MD  10/25/2018, 9:35 AM

## 2018-10-26 LAB
EKG ATRIAL RATE: 177 BPM
EKG ATRIAL RATE: 82 BPM
EKG P AXIS: 19 DEGREES
EKG P-R INTERVAL: 186 MS
EKG P-R INTERVAL: 96 MS
EKG Q-T INTERVAL: 282 MS
EKG Q-T INTERVAL: 450 MS
EKG QRS DURATION: 158 MS
EKG QRS DURATION: 172 MS
EKG QTC CALCULATION (BAZETT): 484 MS
EKG QTC CALCULATION (BAZETT): 525 MS
EKG R AXIS: -66 DEGREES
EKG R AXIS: -89 DEGREES
EKG T AXIS: 66 DEGREES
EKG T AXIS: 99 DEGREES
EKG VENTRICULAR RATE: 177 BPM
EKG VENTRICULAR RATE: 82 BPM

## 2018-11-01 ENCOUNTER — OFFICE VISIT (OUTPATIENT)
Dept: CARDIOLOGY CLINIC | Age: 38
End: 2018-11-01
Payer: MEDICARE

## 2018-11-01 VITALS
DIASTOLIC BLOOD PRESSURE: 58 MMHG | HEART RATE: 68 BPM | BODY MASS INDEX: 42.66 KG/M2 | RESPIRATION RATE: 20 BRPM | OXYGEN SATURATION: 97 % | WEIGHT: 315 LBS | SYSTOLIC BLOOD PRESSURE: 92 MMHG | HEIGHT: 72 IN

## 2018-11-01 DIAGNOSIS — E66.01 CLASS 3 SEVERE OBESITY DUE TO EXCESS CALORIES WITH SERIOUS COMORBIDITY AND BODY MASS INDEX (BMI) OF 40.0 TO 44.9 IN ADULT (HCC): ICD-10-CM

## 2018-11-01 DIAGNOSIS — I42.8 NONISCHEMIC CARDIOMYOPATHY (HCC): ICD-10-CM

## 2018-11-01 DIAGNOSIS — I48.0 PAF (PAROXYSMAL ATRIAL FIBRILLATION) (HCC): ICD-10-CM

## 2018-11-01 DIAGNOSIS — E61.1 IRON DEFICIENCY: ICD-10-CM

## 2018-11-01 DIAGNOSIS — I50.22 CHRONIC SYSTOLIC HEART FAILURE (HCC): Primary | ICD-10-CM

## 2018-11-01 DIAGNOSIS — E11.8 TYPE 2 DIABETES MELLITUS WITH COMPLICATION, WITH LONG-TERM CURRENT USE OF INSULIN (HCC): ICD-10-CM

## 2018-11-01 DIAGNOSIS — I38 VHD (VALVULAR HEART DISEASE): ICD-10-CM

## 2018-11-01 DIAGNOSIS — Z79.4 TYPE 2 DIABETES MELLITUS WITH COMPLICATION, WITH LONG-TERM CURRENT USE OF INSULIN (HCC): ICD-10-CM

## 2018-11-01 DIAGNOSIS — I10 ESSENTIAL HYPERTENSION: ICD-10-CM

## 2018-11-01 DIAGNOSIS — G47.33 OBSTRUCTIVE SLEEP APNEA: ICD-10-CM

## 2018-11-01 DIAGNOSIS — Z91.199 HISTORY OF NONCOMPLIANCE WITH MEDICAL TREATMENT, PRESENTING HAZARDS TO HEALTH: ICD-10-CM

## 2018-11-01 PROCEDURE — 93000 ELECTROCARDIOGRAM COMPLETE: CPT | Performed by: INTERNAL MEDICINE

## 2018-11-01 PROCEDURE — 99214 OFFICE O/P EST MOD 30 MIN: CPT | Performed by: NURSE PRACTITIONER

## 2018-11-01 RX ORDER — TORSEMIDE 20 MG/1
20 TABLET ORAL DAILY PRN
Qty: 30 TABLET | Refills: 3 | Status: SHIPPED | OUTPATIENT
Start: 2018-11-01 | End: 2018-11-15 | Stop reason: SDUPTHER

## 2018-11-01 RX ORDER — CARVEDILOL 12.5 MG/1
18.75 TABLET ORAL 2 TIMES DAILY WITH MEALS
Qty: 60 TABLET | Refills: 3 | Status: SHIPPED | OUTPATIENT
Start: 2018-11-01 | End: 2018-11-15 | Stop reason: SDUPTHER

## 2018-11-01 NOTE — PATIENT INSTRUCTIONS
1. Increase Entresto to 97/103 mg TWICE daily  That is (take pill pack entresto 24-26mg oblong  Pink tablet with marking Z 7 , along with the prescription bottle marked 49-51mg  Look oblong light yellow with marking L 1 and the sample bottle 24-26mg oblong tablet marked Z 7:until you receive your new pill pack with a single pill of 97-103mg twice daily)    2. Increase Coreg to 18.75 mg TWICE daily (take pill pack and prescription bottle until your new pill pack arrives)    3. Discontinue hydralazine and isordil for now. These pills are: ( orange round  pill with markings: Pliva 27 and the white round pill scored with markings 66/26)    4. [facility] in one week [facility]. 5. Referral for cardioMEM    6. Return visit in two weeks. 6. Diet should sodium restricted to 2 grams    -STAY HYDRATED    -Again watch your daily weight trends and if you gain water weight please follow above instructions.    -If you gain 3-5 pounds in 2-3 days OR notice that you are retaining fluid in anyway just like you did before then take an extra dose of your water pill (torsemide) every day until you lose the weight or feel better.     -If you notice that you have taken more than 3 extra doses in 1 week then please call and let us know. -If at any time you feel that you are retaining fluid, your medications are not working, or you feel ill in anyway, then please call us for either same day appointment or the next day, and for instructions. Our goal is to keep you out of the emergency room and the hospital and we have ways to do it. You just need to call us in a timely manner.     -If you become sick for other reasons, and notice that you are not urinating as much, the urine is very dark, you have significant diarrhea or vomiting, then please DO NOT take your water pill and CALL US immediately.

## 2018-11-01 NOTE — PROGRESS NOTES
bleeding [id number]/93/9471    Systolic and diastolic CHF, acute on chronic ([facility] 09/30/2013     Updating Deprecated Diagnoses      History of noncompliance with medical treatment, presenting hazards to health 09/30/2013    Obesity 09/22/2013    Chronic combined systolic and diastolic congestive heart failure ([facility] 12/26/2011    Cardiomyopathy, nonischemic ([facility] 11/29/2011    HTN (hypertension) 11/29/2011    Obstructive sleep apnea 11/29/2011      PAST MEDICAL/SURGICAL HISTORY:   1. Cardiac catheterization February and December of 2011:  Patent coronary      arteries. EF 10% to 15%. Patient reportedly did receive an ICD vest at      that time, however, did not return for a regular followup. 2.    Echo March 2012:  EF 30% to 82%, stage 2 diastolic dysfunction, septal      hypokinesia, mild LVH, left atrium mildly-moderately dilated, mild MR.      RVSP of 25-30. Trace PI.   3.    Cardiac MRI July 2013:  EF 20%   4. Status post single chamber ICD implant, Medtronic Evera [id number],      September 30, 2013, for nonischemic cardiomyopathy (single chamber)   5. Hypertension   6. Hyperlipidemia   7. Obesity   8. History of medical noncompliance   9. Obstructive sleep apnea   10. Raven-Barr virus at age 28   8. Paroxysmal atrial fibrillation. Chronic anticoagulation with Xarelto      (initiation time unknown)   12. Reported history of ischemic left middle cerebral artery stroke      (further details unknown)   13. Bilateral carotid duplex January 2016, reportedly showed no      hemodynamically significant stenosis with 0% to 49% narrowing of both      carotid arteries   14. Echo 01/24/2016 [facility]):  LV severely dilated. LA      moderately dilated. Mild PHTN. Mild TR. Trace TR. Pacer wire visible      in the right atrium and ventricle. RA mildly dilated. EF 10% to 15%. Class 3 diastolic dysfunction. RV moderately dilated.   RV systolic      function Medication Sig Dispense Refill    sacubitril-valsartan (ENTRESTO)  MG per tablet Take 1 tablet by mouth 2 times daily 60 tablet 3    carvedilol (COREG) 12.5 MG tablet Take 1.5 tablets by mouth 2 times daily (with meals) 60 tablet 3    torsemide (DEMADEX) 20 MG tablet Take 1 tablet by mouth daily as needed (weight gain, shortness of breath, swelling or abdmonial bloating.) 30 tablet 3    magnesium oxide (MAG-OX) 400 MG tablet Take 1 tablet by mouth daily 30 tablet 5    ferrous sulfate 325 (65 Fe) MG tablet Take 1 tablet by mouth daily (with breakfast) 30 tablet 3    LEVEMIR FLEXTOUCH 100 UNIT/ML injection pen Inject 30 Units into the skin 2 times daily 10 pen 5    insulin lispro (HUMALOG KWIKPEN) 100 UNIT/ML pen Inject 12 Units into the skin 3 times daily (before meals) 15 mL 11    atorvastatin (LIPITOR) 40 MG tablet Take 1 tablet by mouth daily 30 tablet 5    torsemide (DEMADEX) 20 MG tablet Take 1 tablet by mouth 2 times daily 60 tablet 9    allopurinol (ZYLOPRIM) 300 MG tablet Take 1 tablet by mouth daily 30 tablet 5    potassium chloride (KLOR-CON) 10 MEQ extended release tablet Take 1 tablet by mouth daily 30 tablet 5    rivaroxaban (XARELTO) 20 MG TABS tablet Take 1 tablet by mouth daily 30 tablet 5    spironolactone (ALDACTONE) 25 MG tablet Take 1 tablet by mouth daily 30 tablet 5           Guideline directed medical/device therapy:  ARNI/ACE I/ARB: Yes  Beta blocker:   Yes  Aldosterone antagonist:  Yes  ICD/CRT-P/-D:  ICD  QRS interval on recent ECG (personally reviewed/interpreted): >150 ms  Percentage RV pacing (personally reviewed/interpreted): %/NA            Review of Systems:   Cardiac: As per HPI  General: No fever, chills, rigors  Pulmonary: As per HPI  HEENT: No visual disturbances, difficult swallowing  GI: No nausea, vomiting, abdominal pain  : No dysuria or hematuria  Endocrine: No thyroid disease or diabetes  Musculoskeletal: SILVA x 4, no focal motor deficits  Skin: Intact, no rashes  Neuro/Psych: No headache or seizures          Weights: Wt Readings from Last 3 Encounters:   11/01/18 (!) 318 lb 6.4 oz (144.4 kg)   10/23/18 (!) 321 lb (145.6 kg)   10/18/18 (!) 320 lb (145.2 kg)             Physical Examination:     BP (!) 92/58   Pulse 68   Resp 20   Ht 6' (1.829 m)   Wt (!) 318 lb 6.4 oz (144.4 kg)   SpO2 97% Comment: room air  BMI 43.18 kg/m²      CONSTITUTIONAL: Alert and oriented times 3, no acute distress and cooperative to examination with proper mood and affect. SKIN: Skin color, texture, turgor normal. No rashes or lesions. Warm and well perfused. LYMPH: no cervical nodes, no inguinal nodes  HEENT: Head is normocephalic, atraumatic. EOMI, PERRLA. NECK: Supple, symmetrical, trachea midline, no adenopathy, thyroid symmetric, not enlarged and no tenderness, skin normal. No JVD  CHEST/LUNGS: chest symmetric with normal A/P diameter, normal respiratory rate and rhythm, lungs clear to auscultation without wheezes, rales or rhonchi. No accessory muscle use. Scars None   CARDIOVASCULAR: Heart sounds are normal.  Regular rate and rhythm without murmur, gallop or rub. Normal S1 and S2. . Carotid and femoral pulses 2+/4 and equal bilaterally. ABDOMEN: Obese. No and Laparoscopic scar(s) present. Normal bowel sounds. No bruits. soft, nondistended, no masses or organomegaly. no evidence of hernia. Percussion: Normal without hepatosplenomegally. Tenderness: absent. RECTAL: deferred, not clinically indicated  NEUROLOGIC: There are no focalizing motor or sensory deficits. CN II-XII are grossly intact. EXTREMITIES: no cyanosis, no clubbing and no edema. All the following diagnostics were personally reviewed and interpreted by me.      LAB DATA:     10/18/2018 21:55   Sodium 131 (L)   Potassium 4.1   Chloride 96 (L)   CO2 21 (L)   BUN 40 (H)   Creatinine 1.5 (H)   Anion Gap 14   GFR Non- 52   GFR African American >60   Glucose 246 (H)   Calcium 9.7   Total

## 2018-11-02 ENCOUNTER — TELEPHONE (OUTPATIENT)
Dept: CARDIOLOGY CLINIC | Age: 38
End: 2018-11-02

## 2018-11-09 ENCOUNTER — HOSPITAL ENCOUNTER (OUTPATIENT)
Dept: OTHER | Age: 38
Setting detail: THERAPIES SERIES
Discharge: HOME | End: 2018-11-09
Payer: MEDICARE

## 2018-11-09 VITALS
RESPIRATION RATE: 18 BRPM | WEIGHT: 315 LBS | HEART RATE: 68 BPM | BODY MASS INDEX: 42.86 KG/M2 | SYSTOLIC BLOOD PRESSURE: 98 MMHG | DIASTOLIC BLOOD PRESSURE: 62 MMHG

## 2018-11-09 LAB
ANION GAP SERPL CALCULATED.3IONS-SCNC: 16 MMOL/L (ref 7–16)
BUN BLDV-MCNC: 29 MG/DL (ref 6–20)
CALCIUM SERPL-MCNC: 9.9 MG/DL (ref 8.6–10.2)
CHLORIDE BLD-SCNC: 99 MMOL/L (ref 98–107)
CO2: 20 MMOL/L (ref 22–29)
CREAT SERPL-MCNC: 1.2 MG/DL (ref 0.7–1.2)
FERRITIN: 332 NG/ML
GFR AFRICAN AMERICAN: >60
GFR NON-AFRICAN AMERICAN: >60 ML/MIN/1.73
GLUCOSE BLD-MCNC: 241 MG/DL (ref 74–99)
IRON SATURATION: 20 % (ref 20–55)
IRON: 49 MCG/DL (ref 59–158)
POTASSIUM SERPL-SCNC: 4.1 MMOL/L (ref 3.5–5)
PRO-BNP: 680 PG/ML (ref 0–125)
SODIUM BLD-SCNC: 135 MMOL/L (ref 132–146)
TOTAL IRON BINDING CAPACITY: 251 MCG/DL (ref 250–450)
TRANSFERRIN: 210 MG/DL (ref 200–360)

## 2018-11-09 PROCEDURE — 36415 COLL VENOUS BLD VENIPUNCTURE: CPT

## 2018-11-09 PROCEDURE — 84466 ASSAY OF TRANSFERRIN: CPT

## 2018-11-09 PROCEDURE — 83550 IRON BINDING TEST: CPT

## 2018-11-09 PROCEDURE — 82728 ASSAY OF FERRITIN: CPT

## 2018-11-09 PROCEDURE — 99214 OFFICE O/P EST MOD 30 MIN: CPT

## 2018-11-09 PROCEDURE — 83880 ASSAY OF NATRIURETIC PEPTIDE: CPT

## 2018-11-09 PROCEDURE — 80048 BASIC METABOLIC PNL TOTAL CA: CPT

## 2018-11-09 PROCEDURE — 83540 ASSAY OF IRON: CPT

## 2018-11-12 ENCOUNTER — HOSPITAL ENCOUNTER (OUTPATIENT)
Age: 38
Discharge: HOME | End: 2018-11-14
Payer: MEDICARE

## 2018-11-12 ENCOUNTER — TELEPHONE (OUTPATIENT)
Dept: CARDIOLOGY CLINIC | Age: 38
End: 2018-11-12

## 2018-11-12 ENCOUNTER — OFFICE VISIT (OUTPATIENT)
Dept: FAMILY MEDICINE CLINIC | Age: 38
End: 2018-11-12
Payer: MEDICARE

## 2018-11-12 VITALS
DIASTOLIC BLOOD PRESSURE: 83 MMHG | HEIGHT: 72 IN | TEMPERATURE: 98.3 F | WEIGHT: 315 LBS | BODY MASS INDEX: 42.66 KG/M2 | RESPIRATION RATE: 18 BRPM | SYSTOLIC BLOOD PRESSURE: 114 MMHG | OXYGEN SATURATION: 96 % | HEART RATE: 79 BPM

## 2018-11-12 DIAGNOSIS — E11.8 TYPE 2 DIABETES MELLITUS WITH COMPLICATION, WITH LONG-TERM CURRENT USE OF INSULIN (HCC): Chronic | ICD-10-CM

## 2018-11-12 DIAGNOSIS — Z79.4 TYPE 2 DIABETES MELLITUS WITH COMPLICATION, WITH LONG-TERM CURRENT USE OF INSULIN (HCC): Chronic | ICD-10-CM

## 2018-11-12 DIAGNOSIS — Z23 NEED FOR TDAP VACCINATION: ICD-10-CM

## 2018-11-12 DIAGNOSIS — I48.0 PAROXYSMAL ATRIAL FIBRILLATION (HCC): ICD-10-CM

## 2018-11-12 DIAGNOSIS — E11.8 TYPE 2 DIABETES MELLITUS WITH COMPLICATION, WITH LONG-TERM CURRENT USE OF INSULIN (HCC): Primary | Chronic | ICD-10-CM

## 2018-11-12 DIAGNOSIS — I42.8 CARDIOMYOPATHY, NONISCHEMIC (HCC): Chronic | ICD-10-CM

## 2018-11-12 DIAGNOSIS — I50.42 CHRONIC COMBINED SYSTOLIC AND DIASTOLIC CONGESTIVE HEART FAILURE (HCC): ICD-10-CM

## 2018-11-12 DIAGNOSIS — Z79.4 TYPE 2 DIABETES MELLITUS WITH COMPLICATION, WITH LONG-TERM CURRENT USE OF INSULIN (HCC): Primary | Chronic | ICD-10-CM

## 2018-11-12 LAB — HBA1C MFR BLD: 10.1 %

## 2018-11-12 PROCEDURE — 36415 COLL VENOUS BLD VENIPUNCTURE: CPT | Performed by: FAMILY MEDICINE

## 2018-11-12 PROCEDURE — 80053 COMPREHEN METABOLIC PANEL: CPT

## 2018-11-12 PROCEDURE — 83036 HEMOGLOBIN GLYCOSYLATED A1C: CPT | Performed by: FAMILY MEDICINE

## 2018-11-12 PROCEDURE — 99212 OFFICE O/P EST SF 10 MIN: CPT | Performed by: FAMILY MEDICINE

## 2018-11-12 PROCEDURE — 99214 OFFICE O/P EST MOD 30 MIN: CPT | Performed by: FAMILY MEDICINE

## 2018-11-12 RX ORDER — FERROUS SULFATE 325(65) MG
325 TABLET ORAL
Qty: 30 TABLET | Refills: 3 | Status: SHIPPED | OUTPATIENT
Start: 2018-11-12 | End: 2019-07-30 | Stop reason: ALTCHOICE

## 2018-11-12 RX ORDER — ATORVASTATIN CALCIUM 40 MG/1
40 TABLET, FILM COATED ORAL DAILY
Qty: 30 TABLET | Refills: 5 | Status: SHIPPED | OUTPATIENT
Start: 2018-11-12 | End: 2019-08-09 | Stop reason: SDUPTHER

## 2018-11-12 RX ORDER — ALLOPURINOL 300 MG/1
300 TABLET ORAL DAILY
Qty: 30 TABLET | Refills: 5 | Status: SHIPPED | OUTPATIENT
Start: 2018-11-12 | End: 2019-06-11 | Stop reason: SDUPTHER

## 2018-11-12 RX ORDER — INSULIN DETEMIR 100 [IU]/ML
30 INJECTION, SOLUTION SUBCUTANEOUS 2 TIMES DAILY
Qty: 10 PEN | Refills: 5 | Status: SHIPPED | OUTPATIENT
Start: 2018-11-12 | End: 2019-01-14 | Stop reason: DRUGHIGH

## 2018-11-12 RX ORDER — SPIRONOLACTONE 25 MG/1
25 TABLET ORAL DAILY
Qty: 30 TABLET | Refills: 5 | Status: SHIPPED | OUTPATIENT
Start: 2018-11-12 | End: 2019-06-11 | Stop reason: SDUPTHER

## 2018-11-12 RX ORDER — POTASSIUM CHLORIDE 750 MG/1
10 TABLET, FILM COATED, EXTENDED RELEASE ORAL DAILY
Qty: 30 TABLET | Refills: 5 | Status: SHIPPED | OUTPATIENT
Start: 2018-11-12 | End: 2018-12-17 | Stop reason: SDUPTHER

## 2018-11-12 NOTE — PROGRESS NOTES
Social History Main Topics    Smoking status: Never Smoker    Smokeless tobacco: Never Used    Alcohol use Yes      Comment: rare mixed drink    Drug use: No    Sexual activity: Yes     Partners: Female     Other Topics Concern    Not on file     Social History Narrative    Denies caffeine. Current Medications    Current Outpatient Prescriptions on File Prior to Visit   Medication Sig Dispense Refill    sacubitril-valsartan (ENTRESTO)  MG per tablet Take 1 tablet by mouth 2 times daily 60 tablet 3    torsemide (DEMADEX) 20 MG tablet Take 1 tablet by mouth daily as needed (weight gain, shortness of breath, swelling or abdmonial bloating.) 30 tablet 3    magnesium oxide (MAG-OX) 400 MG tablet Take 1 tablet by mouth daily 30 tablet 5    insulin lispro (HUMALOG KWIKPEN) 100 UNIT/ML pen Inject 12 Units into the skin 3 times daily (before meals) 15 mL 11    torsemide (DEMADEX) 20 MG tablet Take 1 tablet by mouth 2 times daily 60 tablet 9    Insulin Pen Needle 31G X 5 MM MISC 1 each by Does not apply route daily 100 each 3    carvedilol (COREG) 12.5 MG tablet Take 1.5 tablets by mouth 2 times daily (with meals) (Patient taking differently: Take 12.5 mg by mouth 2 times daily (with meals) ) 60 tablet 3     No current facility-administered medications on file prior to visit. Allergies : No Known Allergies    Review of Systems :    Review of Systems - History obtained from the patient  General ROS: negative  Respiratory ROS: no cough, shortness of breath, or wheezing  Cardiovascular ROS: no chest pain or dyspnea on exertion  Gastrointestinal ROS: no abdominal pain, change in bowel habits, or black or bloody stools  Genito-Urinary ROS: no dysuria, trouble voiding, or hematuria  Neurological ROS: no TIA or stroke symptoms  Dermatological ROS: negative      Physical Exam :    VITAL SIGNS : Blood pressure 114/83, pulse 79, temperature 98.3 °F (36.8 °C), temperature source Oral, resp.  rate 18, height 6' (1.829 m), weight (!) 324 lb (147 kg), SpO2 96 %. GENERAL APPEARANCE : NAD, cooperative, appears stated age  NECK : Supple, trachea midline, no thyromegaly, full ROM  LUNGS : Clear to auscultation, without wheezes, rales, or rhonchi. Normal chest expansion. HEART : irreg irreg, normal S1/S2, no murmurs, gallops, or abnormal sounds  ABDOMEN : Normoactive bowel sounds, soft, non-tender, no mass or organomegaly  EXTREMITIES : No peripheral edema, no clubbing, no cyanosis  SKIN : Warm and dry, no rash or abnormal skin lesions noted  PSYCHIATRIC : Appropriate affect, AAO X 3         Assessment & Plan :    Adam Smith was seen today for diabetes. Diagnoses and all orders for this visit:    Type 2 diabetes mellitus with complication, with long-term current use of insulin (HCC)  -     POCT glycosylated hemoglobin (Hb A1C)  -     Comprehensive Metabolic Panel; Future    Cardiomyopathy, nonischemic (HCC)    Chronic combined systolic and diastolic congestive heart failure (HCC)    Paroxysmal atrial fibrillation ([facility]    Need for Tdap vaccination  -     Tetanus-Diphth-Acell Pertussis ([address]) 5-2.5-18.5 LF-MCG/0.5 injection; Inject 0.5 mLs into the muscle once for 1 dose    Other orders  -     spironolactone (ALDACTONE) 25 MG tablet; Take 1 tablet by mouth daily  -     rivaroxaban (XARELTO) 20 MG TABS tablet; Take 1 tablet by mouth daily  -     potassium chloride (KLOR-CON) 10 MEQ extended release tablet; Take 1 tablet by mouth daily  -     LEVEMIR FLEXTOUCH 100 UNIT/ML injection pen; Inject 30 Units into the skin 2 times daily  -     ferrous sulfate 325 (65 Fe) MG tablet; Take 1 tablet by mouth daily (with breakfast)  -     atorvastatin (LIPITOR) 40 MG tablet; Take 1 tablet by mouth daily  -     allopurinol (ZYLOPRIM) 300 MG tablet;  Take 1 tablet by mouth daily        Health Maintenance     Health Maintenance Due   Topic Date Due    Diabetic retinal exam  07/26/1990    DTaP/Tdap/Td vaccine (1 - Tdap) 07/26/1999

## 2018-11-12 NOTE — TELEPHONE ENCOUNTER
Ricky Lovett III 1980 [id number]   Hemoglobin A1C   And labs sent to PCP to review and manage      Results for Molly Lemus (MRN [id number]) as of 11/12/2018 17:04   Ref.  Range 11/9/2018 12:50 11/12/2018 14:03   Sodium Latest Ref Range: 132 - 146 mmol/L 135    Potassium Latest Ref Range: 3.5 - 5.0 mmol/L 4.1    Chloride Latest Ref Range: 98 - 107 mmol/L 99    CO2 Latest Ref Range: 22 - 29 mmol/L 20 (L)    BUN Latest Ref Range: 6 - 20 mg/dL 29 (H)    Creatinine Latest Ref Range: 0.7 - 1.2 mg/dL 1.2    Anion Gap Latest Ref Range: 7 - 16 mmol/L 16    GFR Non- Latest Ref Range: >=60 mL/min/1.73 >60    GFR African American Unknown >60    Glucose Latest Ref Range: 74 - 99 mg/dL 241 (H)    Calcium Latest Ref Range: 8.6 - 10.2 mg/dL 9.9    Pro-BNP Latest Ref Range: 0 - 125 pg/mL 680 (H)    Hemoglobin A1C Latest Units: %  10.1   Ferritin Latest Units: ng/mL 332    Iron Latest Ref Range: 59 - 158 mcg/dL 49 (L)    Iron Saturation Latest Ref Range: 20 - 55 % 20    TIBC Latest Ref Range: 250 - 450 mcg/dL 251    Transferrin Latest Ref Range: 200 - 360 mg/dL 210      Current Outpatient Prescriptions   Medication Sig Dispense Refill    spironolactone (ALDACTONE) 25 MG tablet Take 1 tablet by mouth daily 30 tablet 5    rivaroxaban (XARELTO) 20 MG TABS tablet Take 1 tablet by mouth daily 30 tablet 5    potassium chloride (KLOR-CON) 10 MEQ extended release tablet Take 1 tablet by mouth daily 30 tablet 5    LEVEMIR FLEXTOUCH 100 UNIT/ML injection pen Inject 30 Units into the skin 2 times daily 10 pen 5    ferrous sulfate 325 (65 Fe) MG tablet Take 1 tablet by mouth daily (with breakfast) 30 tablet 3    atorvastatin (LIPITOR) 40 MG tablet Take 1 tablet by mouth daily 30 tablet 5    allopurinol (ZYLOPRIM) 300 MG tablet Take 1 tablet by mouth daily 30 tablet 5    Tetanus-Diphth-Acell Pertussis (BOOSTRIX) 5-2.5-18.5 LF-MCG/0.5 injection Inject 0.5 mLs into the muscle once for 1 dose 1 each 0    sacubitril-valsartan (ENTRESTO)  MG per tablet Take 1 tablet by mouth 2 times daily 60 tablet 3    carvedilol (COREG) 12.5 MG tablet Take 1.5 tablets by mouth 2 times daily (with meals) (Patient taking differently: Take 12.5 mg by mouth 2 times daily (with meals) ) 60 tablet 3    torsemide (DEMADEX) 20 MG tablet Take 1 tablet by mouth daily as needed (weight gain, shortness of breath, swelling or abdmonial bloating.) 30 tablet 3    magnesium oxide (MAG-OX) 400 MG tablet Take 1 tablet by mouth daily 30 tablet 5    insulin lispro (HUMALOG KWIKPEN) 100 UNIT/ML pen Inject 12 Units into the skin 3 times daily (before meals) 15 mL 11    torsemide (DEMADEX) 20 MG tablet Take 1 tablet by mouth 2 times daily 60 tablet 9    Insulin Pen Needle 31G X 5 MM MISC 1 each by Does not apply route daily 100 each 3     No current facility-administered medications for this visit.

## 2018-11-13 LAB
ALBUMIN SERPL-MCNC: 4.3 G/DL (ref 3.5–5.2)
ALP BLD-CCNC: 145 U/L (ref 40–129)
ALT SERPL-CCNC: 17 U/L (ref 0–40)
ANION GAP SERPL CALCULATED.3IONS-SCNC: 16 MMOL/L (ref 7–16)
AST SERPL-CCNC: 17 U/L (ref 0–39)
BILIRUB SERPL-MCNC: 0.3 MG/DL (ref 0–1.2)
BUN BLDV-MCNC: 26 MG/DL (ref 6–20)
CALCIUM SERPL-MCNC: 9.6 MG/DL (ref 8.6–10.2)
CHLORIDE BLD-SCNC: 101 MMOL/L (ref 98–107)
CO2: 21 MMOL/L (ref 22–29)
CREAT SERPL-MCNC: 1.1 MG/DL (ref 0.7–1.2)
GFR AFRICAN AMERICAN: >60
GFR NON-AFRICAN AMERICAN: >60 ML/MIN/1.73
GLUCOSE BLD-MCNC: 183 MG/DL (ref 74–99)
POTASSIUM SERPL-SCNC: 4.2 MMOL/L (ref 3.5–5)
SODIUM BLD-SCNC: 138 MMOL/L (ref 132–146)
TOTAL PROTEIN: 8.1 G/DL (ref 6.4–8.3)

## 2018-11-15 ENCOUNTER — OFFICE VISIT (OUTPATIENT)
Dept: CARDIOLOGY CLINIC | Age: 38
End: 2018-11-15
Payer: MEDICARE

## 2018-11-15 VITALS
HEART RATE: 75 BPM | DIASTOLIC BLOOD PRESSURE: 60 MMHG | HEIGHT: 72 IN | WEIGHT: 315 LBS | RESPIRATION RATE: 18 BRPM | SYSTOLIC BLOOD PRESSURE: 108 MMHG | BODY MASS INDEX: 42.66 KG/M2

## 2018-11-15 DIAGNOSIS — I42.8 CARDIOMYOPATHY, NONISCHEMIC (HCC): Primary | Chronic | ICD-10-CM

## 2018-11-15 DIAGNOSIS — E66.01 MORBID OBESITY WITH BMI OF 40.0-44.9, ADULT (HCC): ICD-10-CM

## 2018-11-15 DIAGNOSIS — N18.9 CHRONIC KIDNEY DISEASE, UNSPECIFIED CKD STAGE: ICD-10-CM

## 2018-11-15 DIAGNOSIS — Z91.199 HISTORY OF NONCOMPLIANCE WITH MEDICAL TREATMENT, PRESENTING HAZARDS TO HEALTH: ICD-10-CM

## 2018-11-15 DIAGNOSIS — I38 VHD (VALVULAR HEART DISEASE): ICD-10-CM

## 2018-11-15 DIAGNOSIS — G47.33 OBSTRUCTIVE SLEEP APNEA: ICD-10-CM

## 2018-11-15 DIAGNOSIS — I48.0 PAROXYSMAL ATRIAL FIBRILLATION (HCC): ICD-10-CM

## 2018-11-15 DIAGNOSIS — Z79.4 TYPE 2 DIABETES MELLITUS WITH COMPLICATION, WITH LONG-TERM CURRENT USE OF INSULIN (HCC): ICD-10-CM

## 2018-11-15 DIAGNOSIS — E11.8 TYPE 2 DIABETES MELLITUS WITH COMPLICATION, WITH LONG-TERM CURRENT USE OF INSULIN (HCC): ICD-10-CM

## 2018-11-15 DIAGNOSIS — I10 ESSENTIAL HYPERTENSION: ICD-10-CM

## 2018-11-15 DIAGNOSIS — Z95.810 S/P ICD (INTERNAL CARDIAC DEFIBRILLATOR) PROCEDURE: ICD-10-CM

## 2018-11-15 DIAGNOSIS — I50.42 CHRONIC COMBINED SYSTOLIC AND DIASTOLIC CONGESTIVE HEART FAILURE (HCC): ICD-10-CM

## 2018-11-15 PROCEDURE — 99214 OFFICE O/P EST MOD 30 MIN: CPT | Performed by: NURSE PRACTITIONER

## 2018-11-15 PROCEDURE — 93000 ELECTROCARDIOGRAM COMPLETE: CPT | Performed by: INTERNAL MEDICINE

## 2018-11-15 RX ORDER — POTASSIUM CHLORIDE 750 MG/1
10 TABLET, EXTENDED RELEASE ORAL DAILY PRN
Qty: 30 TABLET | Refills: 3 | Status: SHIPPED | OUTPATIENT
Start: 2018-11-15 | End: 2018-11-29

## 2018-11-15 RX ORDER — CARVEDILOL 25 MG/1
25 TABLET ORAL 2 TIMES DAILY WITH MEALS
Qty: 60 TABLET | Refills: 3 | Status: SHIPPED | OUTPATIENT
Start: 2018-11-15 | End: 2018-11-29

## 2018-11-15 RX ORDER — TORSEMIDE 20 MG/1
20 TABLET ORAL DAILY PRN
Qty: 30 TABLET | Refills: 3 | Status: SHIPPED | OUTPATIENT
Start: 2018-11-15 | End: 2018-11-29 | Stop reason: RX

## 2018-11-16 ENCOUNTER — HOSPITAL ENCOUNTER (OUTPATIENT)
Dept: OTHER | Age: 38
Setting detail: THERAPIES SERIES
Discharge: HOME | End: 2018-11-16
Payer: MEDICARE

## 2018-11-16 VITALS
RESPIRATION RATE: 18 BRPM | WEIGHT: 315 LBS | BODY MASS INDEX: 43.94 KG/M2 | SYSTOLIC BLOOD PRESSURE: 121 MMHG | HEART RATE: 72 BPM | DIASTOLIC BLOOD PRESSURE: 74 MMHG

## 2018-11-16 LAB
ANION GAP SERPL CALCULATED.3IONS-SCNC: 14 MMOL/L (ref 7–16)
BUN BLDV-MCNC: 19 MG/DL (ref 6–20)
CALCIUM SERPL-MCNC: 10.1 MG/DL (ref 8.6–10.2)
CHLORIDE BLD-SCNC: 100 MMOL/L (ref 98–107)
CO2: 25 MMOL/L (ref 22–29)
CREAT SERPL-MCNC: 1.1 MG/DL (ref 0.7–1.2)
GFR AFRICAN AMERICAN: >60
GFR NON-AFRICAN AMERICAN: >60 ML/MIN/1.73
GLUCOSE BLD-MCNC: 302 MG/DL (ref 74–99)
POTASSIUM SERPL-SCNC: 4.5 MMOL/L (ref 3.5–5)
PRO-BNP: 807 PG/ML (ref 0–125)
SODIUM BLD-SCNC: 139 MMOL/L (ref 132–146)

## 2018-11-16 PROCEDURE — 36415 COLL VENOUS BLD VENIPUNCTURE: CPT

## 2018-11-16 PROCEDURE — 96374 THER/PROPH/DIAG INJ IV PUSH: CPT

## 2018-11-16 PROCEDURE — 83880 ASSAY OF NATRIURETIC PEPTIDE: CPT

## 2018-11-16 PROCEDURE — 2580000003 HC RX 258: Performed by: NURSE PRACTITIONER

## 2018-11-16 PROCEDURE — 6360000002 HC RX W HCPCS: Performed by: NURSE PRACTITIONER

## 2018-11-16 PROCEDURE — 99214 OFFICE O/P EST MOD 30 MIN: CPT

## 2018-11-16 PROCEDURE — 80048 BASIC METABOLIC PNL TOTAL CA: CPT

## 2018-11-16 RX ORDER — FUROSEMIDE 10 MG/ML
40 INJECTION INTRAMUSCULAR; INTRAVENOUS ONCE
Status: COMPLETED | OUTPATIENT
Start: 2018-11-16 | End: 2018-11-16

## 2018-11-16 RX ORDER — SODIUM CHLORIDE 0.9 % (FLUSH) 0.9 %
10 SYRINGE (ML) INJECTION 2 TIMES DAILY
Status: DISCONTINUED | OUTPATIENT
Start: 2018-11-16 | End: 2018-11-17 | Stop reason: HOSPADM

## 2018-11-16 RX ORDER — HEPARIN SODIUM (PORCINE) LOCK FLUSH IV SOLN 100 UNIT/ML 100 UNIT/ML
100 SOLUTION INTRAVENOUS PRN
Status: DISCONTINUED | OUTPATIENT
Start: 2018-11-16 | End: 2018-11-16

## 2018-11-16 RX ADMIN — Medication 10 ML: at 12:36

## 2018-11-16 RX ADMIN — FUROSEMIDE 40 MG: 10 INJECTION, SOLUTION INTRAMUSCULAR; INTRAVENOUS at 12:35

## 2018-11-19 ENCOUNTER — HOSPITAL ENCOUNTER (OUTPATIENT)
Dept: OTHER | Age: 38
Setting detail: THERAPIES SERIES
Discharge: HOME | End: 2018-11-19
Payer: MEDICARE

## 2018-11-19 ENCOUNTER — TELEPHONE (OUTPATIENT)
Dept: CARDIOLOGY CLINIC | Age: 38
End: 2018-11-19

## 2018-11-19 VITALS
SYSTOLIC BLOOD PRESSURE: 125 MMHG | BODY MASS INDEX: 43.26 KG/M2 | RESPIRATION RATE: 18 BRPM | DIASTOLIC BLOOD PRESSURE: 76 MMHG | HEART RATE: 75 BPM | WEIGHT: 315 LBS

## 2018-11-19 LAB
ANION GAP SERPL CALCULATED.3IONS-SCNC: 18 MMOL/L (ref 7–16)
BUN BLDV-MCNC: 14 MG/DL (ref 6–20)
CALCIUM SERPL-MCNC: 9.7 MG/DL (ref 8.6–10.2)
CHLORIDE BLD-SCNC: 98 MMOL/L (ref 98–107)
CO2: 22 MMOL/L (ref 22–29)
CREAT SERPL-MCNC: 1.1 MG/DL (ref 0.7–1.2)
GFR AFRICAN AMERICAN: >60
GFR NON-AFRICAN AMERICAN: >60 ML/MIN/1.73
GLUCOSE BLD-MCNC: 353 MG/DL (ref 74–99)
POTASSIUM SERPL-SCNC: 3.9 MMOL/L (ref 3.5–5)
PRO-BNP: 501 PG/ML (ref 0–125)
SODIUM BLD-SCNC: 138 MMOL/L (ref 132–146)

## 2018-11-19 PROCEDURE — 6360000002 HC RX W HCPCS: Performed by: INTERNAL MEDICINE

## 2018-11-19 PROCEDURE — 36415 COLL VENOUS BLD VENIPUNCTURE: CPT

## 2018-11-19 PROCEDURE — 99214 OFFICE O/P EST MOD 30 MIN: CPT

## 2018-11-19 PROCEDURE — 96374 THER/PROPH/DIAG INJ IV PUSH: CPT

## 2018-11-19 PROCEDURE — 80048 BASIC METABOLIC PNL TOTAL CA: CPT

## 2018-11-19 PROCEDURE — 83880 ASSAY OF NATRIURETIC PEPTIDE: CPT

## 2018-11-19 PROCEDURE — 2580000003 HC RX 258: Performed by: INTERNAL MEDICINE

## 2018-11-19 RX ORDER — SODIUM CHLORIDE 0.9 % (FLUSH) 0.9 %
10 SYRINGE (ML) INJECTION PRN
Status: DISCONTINUED | OUTPATIENT
Start: 2018-11-19 | End: 2018-11-20 | Stop reason: HOSPADM

## 2018-11-19 RX ORDER — SODIUM CHLORIDE 0.9 % (FLUSH) 0.9 %
10 SYRINGE (ML) INJECTION PRN
Status: DISCONTINUED | OUTPATIENT
Start: 2018-11-19 | End: 2018-11-20 | Stop reason: SDUPTHER

## 2018-11-19 RX ORDER — FUROSEMIDE 10 MG/ML
40 INJECTION INTRAMUSCULAR; INTRAVENOUS ONCE
Status: DISCONTINUED | OUTPATIENT
Start: 2018-11-19 | End: 2018-11-22 | Stop reason: HOSPADM

## 2018-11-19 RX ORDER — FUROSEMIDE 10 MG/ML
40 INJECTION INTRAMUSCULAR; INTRAVENOUS ONCE
Status: COMPLETED | OUTPATIENT
Start: 2018-11-19 | End: 2018-11-19

## 2018-11-19 RX ADMIN — FUROSEMIDE 40 MG: 10 INJECTION, SOLUTION INTRAMUSCULAR; INTRAVENOUS at 14:03

## 2018-11-19 RX ADMIN — Medication 10 ML: at 14:03

## 2018-11-19 NOTE — TELEPHONE ENCOUNTER
I Called [employer]. 10:44 AM (they weren't open earlier, thus the email to [facility] this am)  Per pharmacist the K+ and torsemide has been dispensed in the pill pack along with the carvedilol. She will call Ryan Green III to tell him what the pills look like so he may be able to take as needed. Next shipment [facility] will  Provide Ryan Green III potassium and torsemide in bottles so Ryan Green III can manage his CHF with his as needed torsemide and K+ replacement. Ryan Green III  Will need to communicate with his pill pack pharmacy each time a med dose is changed, altered or new so they can explain expected delivery dates and how the med will be dispensed. (pill bottles and pill pack) to coordinate best care.   Tita Contreras RN 11/19/2018 10:46 AM

## 2018-11-20 ENCOUNTER — ANESTHESIA EVENT (OUTPATIENT)
Dept: CARDIAC CATH/INVASIVE PROCEDURES | Age: 38
End: 2018-11-20

## 2018-11-20 ENCOUNTER — APPOINTMENT (OUTPATIENT)
Dept: GENERAL RADIOLOGY | Age: 38
End: 2018-11-20
Attending: INTERNAL MEDICINE
Payer: MEDICARE

## 2018-11-20 ENCOUNTER — HOSPITAL ENCOUNTER (OUTPATIENT)
Dept: CARDIAC CATH/INVASIVE PROCEDURES | Age: 38
Discharge: HOME | End: 2018-11-21
Attending: INTERNAL MEDICINE | Admitting: INTERNAL MEDICINE
Payer: MEDICARE

## 2018-11-20 ENCOUNTER — ANESTHESIA (OUTPATIENT)
Dept: CARDIAC CATH/INVASIVE PROCEDURES | Age: 38
End: 2018-11-20

## 2018-11-20 ENCOUNTER — HOSPITAL ENCOUNTER (OUTPATIENT)
Dept: GENERAL RADIOLOGY | Age: 38
Discharge: HOME | End: 2018-11-22
Payer: MEDICARE

## 2018-11-20 VITALS
SYSTOLIC BLOOD PRESSURE: 125 MMHG | DIASTOLIC BLOOD PRESSURE: 72 MMHG | RESPIRATION RATE: 20 BRPM | OXYGEN SATURATION: 92 %

## 2018-11-20 DIAGNOSIS — Z95.810 S/P ICD (INTERNAL CARDIAC DEFIBRILLATOR) PROCEDURE: ICD-10-CM

## 2018-11-20 DIAGNOSIS — I50.43 CHF (CONGESTIVE HEART FAILURE), NYHA CLASS I, ACUTE ON CHRONIC, COMBINED (HCC): ICD-10-CM

## 2018-11-20 LAB
ANION GAP SERPL CALCULATED.3IONS-SCNC: 19 MMOL/L (ref 7–16)
BUN BLDV-MCNC: 14 MG/DL (ref 6–20)
CALCIUM SERPL-MCNC: 9.6 MG/DL (ref 8.6–10.2)
CHLORIDE BLD-SCNC: 98 MMOL/L (ref 98–107)
CO2: 23 MMOL/L (ref 22–29)
CREAT SERPL-MCNC: 1.1 MG/DL (ref 0.7–1.2)
GFR AFRICAN AMERICAN: >60
GFR NON-AFRICAN AMERICAN: >60 ML/MIN/1.73
GLUCOSE BLD-MCNC: 246 MG/DL (ref 74–99)
HCT VFR BLD CALC: 42.7 % (ref 37–54)
HEMOGLOBIN: 14.1 G/DL (ref 12.5–16.5)
INR BLD: 1.2
MCH RBC QN AUTO: 28.4 PG (ref 26–35)
MCHC RBC AUTO-ENTMCNC: 33 % (ref 32–34.5)
MCV RBC AUTO: 86.1 FL (ref 80–99.9)
METER GLUCOSE: 232 MG/DL (ref 74–99)
METER GLUCOSE: 239 MG/DL (ref 74–99)
PDW BLD-RTO: 15.2 FL (ref 11.5–15)
PLATELET # BLD: 287 E9/L (ref 130–450)
PMV BLD AUTO: 10.9 FL (ref 7–12)
POTASSIUM SERPL-SCNC: 4.1 MMOL/L (ref 3.5–5)
PROTHROMBIN TIME: 13.7 SEC (ref 9.3–12.4)
RBC # BLD: 4.96 E12/L (ref 3.8–5.8)
SODIUM BLD-SCNC: 140 MMOL/L (ref 132–146)
WBC # BLD: 8.6 E9/L (ref 4.5–11.5)

## 2018-11-20 PROCEDURE — 2709999900 HC NON-CHARGEABLE SUPPLY

## 2018-11-20 PROCEDURE — C1894 INTRO/SHEATH, NON-LASER: HCPCS

## 2018-11-20 PROCEDURE — C1898 LEAD, PMKR, OTHER THAN TRANS: HCPCS

## 2018-11-20 PROCEDURE — C1730 CATH, EP, 19 OR FEW ELECT: HCPCS

## 2018-11-20 PROCEDURE — C1781 MESH (IMPLANTABLE): HCPCS

## 2018-11-20 PROCEDURE — 6360000002 HC RX W HCPCS: Performed by: INTERNAL MEDICINE

## 2018-11-20 PROCEDURE — 2720000010 HC SURG SUPPLY STERILE

## 2018-11-20 PROCEDURE — 85027 COMPLETE CBC AUTOMATED: CPT

## 2018-11-20 PROCEDURE — 3700000001 HC ADD 15 MINUTES (ANESTHESIA)

## 2018-11-20 PROCEDURE — 82962 GLUCOSE BLOOD TEST: CPT

## 2018-11-20 PROCEDURE — 85610 PROTHROMBIN TIME: CPT

## 2018-11-20 PROCEDURE — 6360000002 HC RX W HCPCS

## 2018-11-20 PROCEDURE — C1900 LEAD, CORONARY VENOUS: HCPCS

## 2018-11-20 PROCEDURE — 6360000002 HC RX W HCPCS: Performed by: NURSE ANESTHETIST, CERTIFIED REGISTERED

## 2018-11-20 PROCEDURE — 2580000003 HC RX 258: Performed by: NURSE ANESTHETIST, CERTIFIED REGISTERED

## 2018-11-20 PROCEDURE — 33225 L VENTRIC PACING LEAD ADD-ON: CPT | Performed by: INTERNAL MEDICINE

## 2018-11-20 PROCEDURE — 36415 COLL VENOUS BLD VENIPUNCTURE: CPT

## 2018-11-20 PROCEDURE — C1769 GUIDE WIRE: HCPCS

## 2018-11-20 PROCEDURE — 2580000003 HC RX 258

## 2018-11-20 PROCEDURE — 33249 INSJ/RPLCMT DEFIB W/LEAD(S): CPT | Performed by: INTERNAL MEDICINE

## 2018-11-20 PROCEDURE — C1882 AICD, OTHER THAN SING/DUAL: HCPCS

## 2018-11-20 PROCEDURE — C1887 CATHETER, GUIDING: HCPCS

## 2018-11-20 PROCEDURE — C1893 INTRO/SHEATH, FIXED,NON-PEEL: HCPCS

## 2018-11-20 PROCEDURE — 71045 X-RAY EXAM CHEST 1 VIEW: CPT

## 2018-11-20 PROCEDURE — 80048 BASIC METABOLIC PNL TOTAL CA: CPT

## 2018-11-20 PROCEDURE — 2580000003 HC RX 258: Performed by: INTERNAL MEDICINE

## 2018-11-20 PROCEDURE — 6370000000 HC RX 637 (ALT 250 FOR IP): Performed by: INTERNAL MEDICINE

## 2018-11-20 PROCEDURE — 3700000000 HC ANESTHESIA ATTENDED CARE

## 2018-11-20 PROCEDURE — 2500000003 HC RX 250 WO HCPCS

## 2018-11-20 PROCEDURE — 33241 REMOVE PULSE GENERATOR: CPT | Performed by: INTERNAL MEDICINE

## 2018-11-20 RX ORDER — CEFAZOLIN SODIUM 1 G/3ML
INJECTION, POWDER, FOR SOLUTION INTRAMUSCULAR; INTRAVENOUS PRN
Status: DISCONTINUED | OUTPATIENT
Start: 2018-11-20 | End: 2018-11-20 | Stop reason: SDUPTHER

## 2018-11-20 RX ORDER — DEXTROSE MONOHYDRATE 25 G/50ML
12.5 INJECTION, SOLUTION INTRAVENOUS PRN
Status: DISCONTINUED | OUTPATIENT
Start: 2018-11-20 | End: 2018-11-21 | Stop reason: HOSPADM

## 2018-11-20 RX ORDER — CARVEDILOL 25 MG/1
25 TABLET ORAL 2 TIMES DAILY WITH MEALS
Status: DISCONTINUED | OUTPATIENT
Start: 2018-11-20 | End: 2018-11-21 | Stop reason: HOSPADM

## 2018-11-20 RX ORDER — DOXYCYCLINE HYCLATE 100 MG
100 TABLET ORAL 2 TIMES DAILY
Qty: 14 TABLET | Refills: 0 | Status: SHIPPED | OUTPATIENT
Start: 2018-11-20 | End: 2018-11-21

## 2018-11-20 RX ORDER — SODIUM CHLORIDE 9 MG/ML
INJECTION, SOLUTION INTRAVENOUS CONTINUOUS PRN
Status: DISCONTINUED | OUTPATIENT
Start: 2018-11-20 | End: 2018-11-20 | Stop reason: SDUPTHER

## 2018-11-20 RX ORDER — POTASSIUM CHLORIDE 750 MG/1
10 TABLET, EXTENDED RELEASE ORAL DAILY
Status: DISCONTINUED | OUTPATIENT
Start: 2018-11-21 | End: 2018-11-21 | Stop reason: HOSPADM

## 2018-11-20 RX ORDER — SODIUM CHLORIDE 0.9 % (FLUSH) 0.9 %
10 SYRINGE (ML) INJECTION EVERY 12 HOURS SCHEDULED
Status: DISCONTINUED | OUTPATIENT
Start: 2018-11-20 | End: 2018-11-21 | Stop reason: HOSPADM

## 2018-11-20 RX ORDER — ALLOPURINOL 300 MG/1
300 TABLET ORAL DAILY
Status: DISCONTINUED | OUTPATIENT
Start: 2018-11-21 | End: 2018-11-21 | Stop reason: HOSPADM

## 2018-11-20 RX ORDER — TORSEMIDE 20 MG/1
20 TABLET ORAL 2 TIMES DAILY
Status: DISCONTINUED | OUTPATIENT
Start: 2018-11-20 | End: 2018-11-21 | Stop reason: HOSPADM

## 2018-11-20 RX ORDER — PROPOFOL 10 MG/ML
INJECTION, EMULSION INTRAVENOUS CONTINUOUS PRN
Status: DISCONTINUED | OUTPATIENT
Start: 2018-11-20 | End: 2018-11-20 | Stop reason: SDUPTHER

## 2018-11-20 RX ORDER — PROPOFOL 10 MG/ML
INJECTION, EMULSION INTRAVENOUS PRN
Status: DISCONTINUED | OUTPATIENT
Start: 2018-11-20 | End: 2018-11-20 | Stop reason: SDUPTHER

## 2018-11-20 RX ORDER — LANOLIN ALCOHOL/MO/W.PET/CERES
400 CREAM (GRAM) TOPICAL DAILY
Status: DISCONTINUED | OUTPATIENT
Start: 2018-11-21 | End: 2018-11-21 | Stop reason: HOSPADM

## 2018-11-20 RX ORDER — MIDAZOLAM HYDROCHLORIDE 1 MG/ML
INJECTION INTRAMUSCULAR; INTRAVENOUS PRN
Status: DISCONTINUED | OUTPATIENT
Start: 2018-11-20 | End: 2018-11-20 | Stop reason: SDUPTHER

## 2018-11-20 RX ORDER — FERROUS SULFATE 325(65) MG
325 TABLET ORAL
Status: DISCONTINUED | OUTPATIENT
Start: 2018-11-21 | End: 2018-11-21 | Stop reason: HOSPADM

## 2018-11-20 RX ORDER — POTASSIUM CHLORIDE 750 MG/1
10 TABLET, EXTENDED RELEASE ORAL DAILY PRN
Status: DISCONTINUED | OUTPATIENT
Start: 2018-11-20 | End: 2018-11-21 | Stop reason: HOSPADM

## 2018-11-20 RX ORDER — FENTANYL CITRATE 50 UG/ML
INJECTION, SOLUTION INTRAMUSCULAR; INTRAVENOUS PRN
Status: DISCONTINUED | OUTPATIENT
Start: 2018-11-20 | End: 2018-11-20 | Stop reason: SDUPTHER

## 2018-11-20 RX ORDER — INSULIN GLARGINE 100 [IU]/ML
30 INJECTION, SOLUTION SUBCUTANEOUS 2 TIMES DAILY
Status: DISCONTINUED | OUTPATIENT
Start: 2018-11-20 | End: 2018-11-21 | Stop reason: HOSPADM

## 2018-11-20 RX ORDER — ACETAMINOPHEN 325 MG/1
650 TABLET ORAL EVERY 4 HOURS PRN
Status: DISCONTINUED | OUTPATIENT
Start: 2018-11-20 | End: 2018-11-21 | Stop reason: HOSPADM

## 2018-11-20 RX ORDER — SODIUM CHLORIDE 0.9 % (FLUSH) 0.9 %
10 SYRINGE (ML) INJECTION PRN
Status: DISCONTINUED | OUTPATIENT
Start: 2018-11-20 | End: 2018-11-21 | Stop reason: HOSPADM

## 2018-11-20 RX ORDER — ATORVASTATIN CALCIUM 40 MG/1
40 TABLET, FILM COATED ORAL DAILY
Status: DISCONTINUED | OUTPATIENT
Start: 2018-11-20 | End: 2018-11-21 | Stop reason: HOSPADM

## 2018-11-20 RX ORDER — HYDROCODONE BITARTRATE AND ACETAMINOPHEN 5; 325 MG/1; MG/1
1 TABLET ORAL EVERY 4 HOURS PRN
Status: DISCONTINUED | OUTPATIENT
Start: 2018-11-20 | End: 2018-11-21 | Stop reason: HOSPADM

## 2018-11-20 RX ORDER — NICOTINE POLACRILEX 4 MG
15 LOZENGE BUCCAL PRN
Status: DISCONTINUED | OUTPATIENT
Start: 2018-11-20 | End: 2018-11-21 | Stop reason: HOSPADM

## 2018-11-20 RX ORDER — DEXTROSE MONOHYDRATE 50 MG/ML
100 INJECTION, SOLUTION INTRAVENOUS PRN
Status: DISCONTINUED | OUTPATIENT
Start: 2018-11-20 | End: 2018-11-21 | Stop reason: HOSPADM

## 2018-11-20 RX ORDER — SPIRONOLACTONE 25 MG/1
25 TABLET ORAL DAILY
Status: DISCONTINUED | OUTPATIENT
Start: 2018-11-21 | End: 2018-11-21 | Stop reason: HOSPADM

## 2018-11-20 RX ADMIN — Medication 10 ML: at 20:13

## 2018-11-20 RX ADMIN — FENTANYL CITRATE 25 MCG: 50 INJECTION, SOLUTION INTRAMUSCULAR; INTRAVENOUS at 10:18

## 2018-11-20 RX ADMIN — PROPOFOL 10 MG: 10 INJECTION, EMULSION INTRAVENOUS at 11:28

## 2018-11-20 RX ADMIN — CEFAZOLIN 3000 MG: 1 INJECTION, POWDER, FOR SOLUTION INTRAMUSCULAR; INTRAVENOUS at 10:22

## 2018-11-20 RX ADMIN — MIDAZOLAM HYDROCHLORIDE 1 MG: 1 INJECTION, SOLUTION INTRAMUSCULAR; INTRAVENOUS at 10:43

## 2018-11-20 RX ADMIN — CARVEDILOL 25 MG: 25 TABLET, FILM COATED ORAL at 17:44

## 2018-11-20 RX ADMIN — PROPOFOL 20 MG: 10 INJECTION, EMULSION INTRAVENOUS at 12:10

## 2018-11-20 RX ADMIN — ATORVASTATIN CALCIUM 40 MG: 40 TABLET, FILM COATED ORAL at 20:13

## 2018-11-20 RX ADMIN — INSULIN LISPRO 12 UNITS: 100 INJECTION, SOLUTION INTRAVENOUS; SUBCUTANEOUS at 16:26

## 2018-11-20 RX ADMIN — PROPOFOL 10 MG: 10 INJECTION, EMULSION INTRAVENOUS at 11:00

## 2018-11-20 RX ADMIN — PROPOFOL 25 MCG/KG/MIN: 10 INJECTION, EMULSION INTRAVENOUS at 10:43

## 2018-11-20 RX ADMIN — SODIUM CHLORIDE: 9 INJECTION, SOLUTION INTRAVENOUS at 09:00

## 2018-11-20 RX ADMIN — HYDROCODONE BITARTRATE AND ACETAMINOPHEN 1 TABLET: 5; 325 TABLET ORAL at 18:11

## 2018-11-20 RX ADMIN — PROPOFOL 30 MG: 10 INJECTION, EMULSION INTRAVENOUS at 12:08

## 2018-11-20 RX ADMIN — MIDAZOLAM HYDROCHLORIDE 0.5 MG: 1 INJECTION, SOLUTION INTRAMUSCULAR; INTRAVENOUS at 10:18

## 2018-11-20 RX ADMIN — PROPOFOL 20 MG: 10 INJECTION, EMULSION INTRAVENOUS at 10:45

## 2018-11-20 RX ADMIN — FENTANYL CITRATE 50 MCG: 50 INJECTION, SOLUTION INTRAMUSCULAR; INTRAVENOUS at 10:43

## 2018-11-20 RX ADMIN — MIDAZOLAM HYDROCHLORIDE 0.5 MG: 1 INJECTION, SOLUTION INTRAMUSCULAR; INTRAVENOUS at 10:08

## 2018-11-20 RX ADMIN — INSULIN GLARGINE 30 UNITS: 100 INJECTION, SOLUTION SUBCUTANEOUS at 20:13

## 2018-11-20 RX ADMIN — SACUBITRIL AND VALSARTAN 1 TABLET: 97; 103 TABLET, FILM COATED ORAL at 20:13

## 2018-11-20 RX ADMIN — PROPOFOL 20 MG: 10 INJECTION, EMULSION INTRAVENOUS at 12:12

## 2018-11-20 RX ADMIN — DEXTROSE MONOHYDRATE 1 G: 50 INJECTION, SOLUTION INTRAVENOUS at 18:12

## 2018-11-20 RX ADMIN — SODIUM CHLORIDE: 9 INJECTION, SOLUTION INTRAVENOUS at 08:50

## 2018-11-20 RX ADMIN — ACETAMINOPHEN 650 MG: 325 TABLET, FILM COATED ORAL at 16:10

## 2018-11-20 RX ADMIN — TORSEMIDE 20 MG: 20 TABLET ORAL at 16:25

## 2018-11-20 RX ADMIN — PROPOFOL 10 MG: 10 INJECTION, EMULSION INTRAVENOUS at 11:15

## 2018-11-20 RX ADMIN — FENTANYL CITRATE 25 MCG: 50 INJECTION, SOLUTION INTRAMUSCULAR; INTRAVENOUS at 10:08

## 2018-11-20 ASSESSMENT — PAIN DESCRIPTION - ONSET
ONSET: ON-GOING
ONSET: ON-GOING

## 2018-11-20 ASSESSMENT — PAIN DESCRIPTION - DESCRIPTORS
DESCRIPTORS: DISCOMFORT;THROBBING;SORE
DESCRIPTORS: DISCOMFORT;SORE;THROBBING
DESCRIPTORS: DISCOMFORT;SORE;THROBBING

## 2018-11-20 ASSESSMENT — PULMONARY FUNCTION TESTS
PIF_VALUE: 1
PIF_VALUE: 0
PIF_VALUE: 1
PIF_VALUE: 0
PIF_VALUE: 1
PIF_VALUE: 1
PIF_VALUE: 0

## 2018-11-20 ASSESSMENT — PAIN DESCRIPTION - LOCATION
LOCATION: CHEST

## 2018-11-20 ASSESSMENT — PAIN DESCRIPTION - ORIENTATION
ORIENTATION: LEFT

## 2018-11-20 ASSESSMENT — PAIN SCALES - GENERAL
PAINLEVEL_OUTOF10: 7
PAINLEVEL_OUTOF10: 5
PAINLEVEL_OUTOF10: 7
PAINLEVEL_OUTOF10: 8
PAINLEVEL_OUTOF10: 5
PAINLEVEL_OUTOF10: 8

## 2018-11-20 ASSESSMENT — PAIN DESCRIPTION - FREQUENCY
FREQUENCY: INTERMITTENT
FREQUENCY: INTERMITTENT

## 2018-11-20 ASSESSMENT — PAIN DESCRIPTION - PAIN TYPE
TYPE: SURGICAL PAIN

## 2018-11-20 NOTE — ANESTHESIA PRE PROCEDURE
skin 3 times daily (before meals) 7/16/18   Pamela Dietrich MD   torsemide [facility]) 20 MG tablet Take 1 tablet by mouth 2 times daily 6/18/18   Pamela Dietrich MD   Insulin Pen Needle 31G X 5 MM MISC 1 each by Does not apply route daily 4/23/17   Tory Barton MD       Current medications:    Current Outpatient Prescriptions   Medication Sig Dispense Refill    torsemide (DEMADEX) 20 MG tablet Take 1 tablet by mouth daily as needed (weight gain, shortness of breath, swelling or abdmonial bloating.) 30 tablet 3    carvedilol (COREG) 25 MG tablet Take 1 tablet by mouth 2 times daily (with meals) 60 tablet 3    potassium chloride (KLOR-CON M) 10 MEQ extended release tablet Take 1 tablet by mouth daily as needed (on days that you take extra dose of torsemide) 30 tablet 3    spironolactone (ALDACTONE) 25 MG tablet Take 1 tablet by mouth daily 30 tablet 5    rivaroxaban (XARELTO) 20 MG TABS tablet Take 1 tablet by mouth daily 30 tablet 5    potassium chloride (KLOR-CON) 10 MEQ extended release tablet Take 1 tablet by mouth daily 30 tablet 5    LEVEMIR FLEXTOUCH 100 UNIT/ML injection pen Inject 30 Units into the skin 2 times daily 10 pen 5    ferrous sulfate 325 (65 Fe) MG tablet Take 1 tablet by mouth daily (with breakfast) 30 tablet 3    atorvastatin (LIPITOR) 40 MG tablet Take 1 tablet by mouth daily 30 tablet 5    allopurinol (ZYLOPRIM) 300 MG tablet Take 1 tablet by mouth daily 30 tablet 5    sacubitril-valsartan (ENTRESTO)  MG per tablet Take 1 tablet by mouth 2 times daily 60 tablet 3    magnesium oxide (MAG-OX) 400 MG tablet Take 1 tablet by mouth daily 30 tablet 5    insulin lispro (HUMALOG KWIKPEN) 100 UNIT/ML pen Inject 12 Units into the skin 3 times daily (before meals) 15 mL 11    torsemide (DEMADEX) 20 MG tablet Take 1 tablet by mouth 2 times daily 60 tablet 9    Insulin Pen Needle 31G X 5 MM MISC 1 each by Does not apply route daily 100 each 3     No current facility-administered medications for this encounter. Facility-Administered Medications Ordered in Other Encounters   Medication Dose Route Frequency Provider Last Rate Last Dose    sodium chloride flush 0.9 % injection 10 mL  10 mL Intravenous PRN aRmses Zhou MD        furosemide (LASIX) injection 40 mg  40 mg Intravenous Once Ramses Zhou MD           Allergies:  No Known Allergies    Problem List:    Patient Active Problem List   Diagnosis Code    Cardiomyopathy, nonischemic ([facility] I42.8    HTN (hypertension) I10    Obstructive sleep apnea G47.33    Chronic combined systolic and diastolic congestive heart failure (HCC) I50.42    Obesity O19.6    Systolic and diastolic CHF, acute on chronic (HCC) I50.43    History of noncompliance with medical treatment, presenting hazards to health Z91.19    Rectal bleeding K62.5    Chronic gout M1A. 9XX0    CKD (chronic kidney disease) N18.9    Anasarca R60.1    Type 2 diabetes mellitus with complication, with long-term current use of insulin (HCC) E11.8, Z79.4    Iron deficiency E61.1    Hepatomegaly R16.0    Paroxysmal atrial fibrillation (HCC) I48.0    Noncompliance with medications Z91.14    Nonischemic cardiomyopathy (HCC) I42.8    Morbid obesity (HCC) E66.01    Essential hypertension I10    VHD (valvular heart disease) I38    Arthralgia of left knee M25.562    Polyarthralgia M25.50    Decreased ambulation status Z74.09    Inability to ambulate due to multiple joints R26.2    Morbid obesity with BMI of 40.0-44.9, adult (HCC) E66.01, Z68.41    SVT (supraventricular tachycardia) ([facility]) I47.1    Palpitations R00.2       Past Medical History:        Diagnosis Date    Acute CHF ([facility] 11/29/2011    [facility] 12/26/2011    Acute idiopathic gout of right foot 8/13/2016    Anemia 11/29/2011    CAD (coronary artery disease)     Cerebral artery occlusion with cerebral infarction (HCC)     CKD (chronic kidney disease)     Hepatocellular carcinoma

## 2018-11-21 ENCOUNTER — APPOINTMENT (OUTPATIENT)
Dept: GENERAL RADIOLOGY | Age: 38
End: 2018-11-21
Attending: INTERNAL MEDICINE
Payer: MEDICARE

## 2018-11-21 VITALS
OXYGEN SATURATION: 100 % | HEART RATE: 86 BPM | DIASTOLIC BLOOD PRESSURE: 72 MMHG | WEIGHT: 315 LBS | TEMPERATURE: 99.1 F | RESPIRATION RATE: 18 BRPM | HEIGHT: 72 IN | SYSTOLIC BLOOD PRESSURE: 103 MMHG | BODY MASS INDEX: 42.66 KG/M2

## 2018-11-21 LAB
ANION GAP SERPL CALCULATED.3IONS-SCNC: 13 MMOL/L (ref 7–16)
BUN BLDV-MCNC: 15 MG/DL (ref 6–20)
CALCIUM SERPL-MCNC: 9.2 MG/DL (ref 8.6–10.2)
CHLORIDE BLD-SCNC: 96 MMOL/L (ref 98–107)
CO2: 23 MMOL/L (ref 22–29)
CREAT SERPL-MCNC: 1 MG/DL (ref 0.7–1.2)
GFR AFRICAN AMERICAN: >60
GFR NON-AFRICAN AMERICAN: >60 ML/MIN/1.73
GLUCOSE BLD-MCNC: 264 MG/DL (ref 74–99)
HCT VFR BLD CALC: 41.8 % (ref 37–54)
HEMOGLOBIN: 13.6 G/DL (ref 12.5–16.5)
MCH RBC QN AUTO: 28.5 PG (ref 26–35)
MCHC RBC AUTO-ENTMCNC: 32.5 % (ref 32–34.5)
MCV RBC AUTO: 87.6 FL (ref 80–99.9)
METER GLUCOSE: 205 MG/DL (ref 74–99)
METER GLUCOSE: 225 MG/DL (ref 74–99)
METER GLUCOSE: 275 MG/DL (ref 74–99)
METER GLUCOSE: 281 MG/DL (ref 74–99)
PDW BLD-RTO: 15.5 FL (ref 11.5–15)
PLATELET # BLD: 253 E9/L (ref 130–450)
PMV BLD AUTO: 10.5 FL (ref 7–12)
POTASSIUM REFLEX MAGNESIUM: 3.8 MMOL/L (ref 3.5–5)
RBC # BLD: 4.77 E12/L (ref 3.8–5.8)
SODIUM BLD-SCNC: 132 MMOL/L (ref 132–146)
WBC # BLD: 8.8 E9/L (ref 4.5–11.5)

## 2018-11-21 PROCEDURE — 80048 BASIC METABOLIC PNL TOTAL CA: CPT

## 2018-11-21 PROCEDURE — 82962 GLUCOSE BLOOD TEST: CPT

## 2018-11-21 PROCEDURE — 71046 X-RAY EXAM CHEST 2 VIEWS: CPT

## 2018-11-21 PROCEDURE — 85027 COMPLETE CBC AUTOMATED: CPT

## 2018-11-21 PROCEDURE — 2580000003 HC RX 258: Performed by: INTERNAL MEDICINE

## 2018-11-21 PROCEDURE — 6370000000 HC RX 637 (ALT 250 FOR IP): Performed by: INTERNAL MEDICINE

## 2018-11-21 PROCEDURE — 36415 COLL VENOUS BLD VENIPUNCTURE: CPT

## 2018-11-21 PROCEDURE — 6360000002 HC RX W HCPCS: Performed by: INTERNAL MEDICINE

## 2018-11-21 RX ORDER — DOXYCYCLINE HYCLATE 100 MG
100 TABLET ORAL 2 TIMES DAILY
Qty: 14 TABLET | Refills: 0 | Status: SHIPPED | OUTPATIENT
Start: 2018-11-21 | End: 2018-11-28

## 2018-11-21 RX ADMIN — DEXTROSE MONOHYDRATE 1 G: 50 INJECTION, SOLUTION INTRAVENOUS at 10:27

## 2018-11-21 RX ADMIN — HYDROCODONE BITARTRATE AND ACETAMINOPHEN 1 TABLET: 5; 325 TABLET ORAL at 00:25

## 2018-11-21 RX ADMIN — FERROUS SULFATE TAB 325 MG (65 MG ELEMENTAL FE) 325 MG: 325 (65 FE) TAB at 08:58

## 2018-11-21 RX ADMIN — SPIRONOLACTONE 25 MG: 25 TABLET ORAL at 10:28

## 2018-11-21 RX ADMIN — TORSEMIDE 20 MG: 20 TABLET ORAL at 10:28

## 2018-11-21 RX ADMIN — SACUBITRIL AND VALSARTAN 1 TABLET: 97; 103 TABLET, FILM COATED ORAL at 20:02

## 2018-11-21 RX ADMIN — CARVEDILOL 25 MG: 25 TABLET, FILM COATED ORAL at 16:42

## 2018-11-21 RX ADMIN — TORSEMIDE 20 MG: 20 TABLET ORAL at 16:42

## 2018-11-21 RX ADMIN — ALLOPURINOL 300 MG: 300 TABLET ORAL at 10:27

## 2018-11-21 RX ADMIN — INSULIN GLARGINE 30 UNITS: 100 INJECTION, SOLUTION SUBCUTANEOUS at 20:02

## 2018-11-21 RX ADMIN — Medication 400 MG: at 10:28

## 2018-11-21 RX ADMIN — ATORVASTATIN CALCIUM 40 MG: 40 TABLET, FILM COATED ORAL at 20:02

## 2018-11-21 RX ADMIN — INSULIN LISPRO 12 UNITS: 100 INJECTION, SOLUTION INTRAVENOUS; SUBCUTANEOUS at 17:43

## 2018-11-21 RX ADMIN — Medication 10 ML: at 10:28

## 2018-11-21 RX ADMIN — Medication 10 ML: at 11:51

## 2018-11-21 RX ADMIN — POTASSIUM CHLORIDE 10 MEQ: 750 TABLET, EXTENDED RELEASE ORAL at 10:28

## 2018-11-21 RX ADMIN — INSULIN GLARGINE 30 UNITS: 100 INJECTION, SOLUTION SUBCUTANEOUS at 10:34

## 2018-11-21 RX ADMIN — SACUBITRIL AND VALSARTAN 1 TABLET: 97; 103 TABLET, FILM COATED ORAL at 10:27

## 2018-11-21 RX ADMIN — INSULIN LISPRO 12 UNITS: 100 INJECTION, SOLUTION INTRAVENOUS; SUBCUTANEOUS at 12:38

## 2018-11-21 RX ADMIN — HYDROCODONE BITARTRATE AND ACETAMINOPHEN 1 TABLET: 5; 325 TABLET ORAL at 13:53

## 2018-11-21 RX ADMIN — DEXTROSE MONOHYDRATE 1 G: 50 INJECTION, SOLUTION INTRAVENOUS at 03:20

## 2018-11-21 RX ADMIN — CARVEDILOL 25 MG: 25 TABLET, FILM COATED ORAL at 08:58

## 2018-11-21 ASSESSMENT — PAIN DESCRIPTION - DESCRIPTORS
DESCRIPTORS: DISCOMFORT;THROBBING;SORE
DESCRIPTORS: DISCOMFORT

## 2018-11-21 ASSESSMENT — PAIN DESCRIPTION - ORIENTATION
ORIENTATION: LEFT
ORIENTATION: LEFT

## 2018-11-21 ASSESSMENT — PAIN DESCRIPTION - LOCATION
LOCATION: CHEST
LOCATION: CHEST

## 2018-11-21 ASSESSMENT — PAIN SCALES - GENERAL
PAINLEVEL_OUTOF10: 0
PAINLEVEL_OUTOF10: 0
PAINLEVEL_OUTOF10: 5
PAINLEVEL_OUTOF10: 8
PAINLEVEL_OUTOF10: 8
PAINLEVEL_OUTOF10: 0

## 2018-11-21 ASSESSMENT — PAIN DESCRIPTION - ONSET: ONSET: ON-GOING

## 2018-11-21 ASSESSMENT — PAIN DESCRIPTION - PAIN TYPE
TYPE: SURGICAL PAIN

## 2018-11-21 ASSESSMENT — PAIN DESCRIPTION - FREQUENCY
FREQUENCY: INTERMITTENT
FREQUENCY: INTERMITTENT

## 2018-11-21 NOTE — H&P
[address]                  [address] [facility],  [facility]                              HISTORY AND PHYSICAL    PATIENT NAME: Madhu Perales                      :        1980  MED REC NO:   [id number]                            ROOM:       [id number]  ACCOUNT NO:   [de-identified]                           ADMIT DATE: 2018  PROVIDER:     Sole Mulligan MD    HISTORY OF PRESENT ILLNESS:  The patient is a 80-year-old patient known  to me for his single-chamber ICD implant more than 5 years ago. The  patient's problem list includes the followin. Nonischemic cardiomyopathy. 2.  Morbid obesity. 3.  Congestive heart failure functional class II to III NYHA  classification. 4.  Sleep apnea, but the patient is noncompliant with the use of CPAP. 5.  Hypertension, longstanding. 6.  Diabetes, longstanding. 7.  Recent hospitalization in October with an ICD discharge. The  patient's heart rate was above 180 beats per minute for which he  received an ICD discharge. His cardiac workup during that  hospitalization revealed LVEF of 15%, echocardiography. The patient's  medications were changed and he was placed on Entresto during his last  hospitalization. He was also noted to have marked widening of his QRS  duration, intraventricular conduction delay, and a QRS duration of 160  milliseconds. In view of his relatively young age as well as severe LV  dysfunction, I had suggested also compliance with CPAP and placement of  biventricular pacer ICD. The patient was agreeable. He was brought in  for the same today. MEDICATIONS:  At home therefore include allopurinol, Lipitor, Coreg 25  b.i.d., Entresto 97/103 twice a day, Aldactone 25 mg daily, Demadex 20  mg twice a day, insulin, and Xarelto. REVIEW OF SYSTEMS:  Not significant for any other major noncardiac  issues.     PHYSICAL EXAMINATION:  VITAL SIGNS:  Today, his blood pressure in the hospital was

## 2018-11-27 NOTE — OP NOTE
outpatient.         Lbira Gold MD    D: 11/26/2018 19:59:57       T: 11/27/2018 3:58:24     /[email or web address]  Job#: [id number]     Doc#: [id number]    CC:

## 2018-11-29 ENCOUNTER — HOSPITAL ENCOUNTER (OUTPATIENT)
Dept: OTHER | Age: 38
Setting detail: THERAPIES SERIES
Discharge: HOME | End: 2018-11-29
Payer: MEDICARE

## 2018-11-29 ENCOUNTER — OFFICE VISIT (OUTPATIENT)
Dept: CARDIOLOGY CLINIC | Age: 38
End: 2018-11-29
Payer: MEDICARE

## 2018-11-29 VITALS
HEIGHT: 72 IN | DIASTOLIC BLOOD PRESSURE: 64 MMHG | SYSTOLIC BLOOD PRESSURE: 90 MMHG | BODY MASS INDEX: 42.66 KG/M2 | WEIGHT: 315 LBS | OXYGEN SATURATION: 98 % | HEART RATE: 68 BPM | RESPIRATION RATE: 18 BRPM

## 2018-11-29 VITALS
HEART RATE: 77 BPM | BODY MASS INDEX: 43.13 KG/M2 | RESPIRATION RATE: 18 BRPM | WEIGHT: 315 LBS | DIASTOLIC BLOOD PRESSURE: 62 MMHG | SYSTOLIC BLOOD PRESSURE: 100 MMHG

## 2018-11-29 DIAGNOSIS — I42.8 NONISCHEMIC CARDIOMYOPATHY (HCC): ICD-10-CM

## 2018-11-29 DIAGNOSIS — I51.9 RIGHT VENTRICULAR DYSFUNCTION: ICD-10-CM

## 2018-11-29 DIAGNOSIS — G47.33 OSA (OBSTRUCTIVE SLEEP APNEA): ICD-10-CM

## 2018-11-29 DIAGNOSIS — I50.22 CHRONIC SYSTOLIC HEART FAILURE (HCC): Primary | ICD-10-CM

## 2018-11-29 DIAGNOSIS — I48.0 PAROXYSMAL ATRIAL FIBRILLATION (HCC): ICD-10-CM

## 2018-11-29 DIAGNOSIS — Z95.810 PRESENCE OF CARDIAC RESYNCHRONIZATION THERAPY DEFIBRILLATOR (CRT-D): ICD-10-CM

## 2018-11-29 DIAGNOSIS — I34.0 NON-RHEUMATIC MITRAL REGURGITATION: ICD-10-CM

## 2018-11-29 DIAGNOSIS — I36.1 NON-RHEUMATIC TRICUSPID VALVE INSUFFICIENCY: ICD-10-CM

## 2018-11-29 DIAGNOSIS — E61.1 IRON DEFICIENCY: ICD-10-CM

## 2018-11-29 LAB
ANION GAP SERPL CALCULATED.3IONS-SCNC: 16 MMOL/L (ref 7–16)
BUN BLDV-MCNC: 24 MG/DL (ref 6–20)
CALCIUM SERPL-MCNC: 9.8 MG/DL (ref 8.6–10.2)
CHLORIDE BLD-SCNC: 98 MMOL/L (ref 98–107)
CO2: 22 MMOL/L (ref 22–29)
CREAT SERPL-MCNC: 1 MG/DL (ref 0.7–1.2)
GFR AFRICAN AMERICAN: >60
GFR NON-AFRICAN AMERICAN: >60 ML/MIN/1.73
GLUCOSE BLD-MCNC: 244 MG/DL (ref 74–99)
POTASSIUM SERPL-SCNC: 4.1 MMOL/L (ref 3.5–5)
PRO-BNP: 434 PG/ML (ref 0–125)
SODIUM BLD-SCNC: 136 MMOL/L (ref 132–146)

## 2018-11-29 PROCEDURE — 83880 ASSAY OF NATRIURETIC PEPTIDE: CPT

## 2018-11-29 PROCEDURE — 96374 THER/PROPH/DIAG INJ IV PUSH: CPT

## 2018-11-29 PROCEDURE — 6360000002 HC RX W HCPCS: Performed by: NURSE PRACTITIONER

## 2018-11-29 PROCEDURE — 2580000003 HC RX 258: Performed by: NURSE PRACTITIONER

## 2018-11-29 PROCEDURE — 99214 OFFICE O/P EST MOD 30 MIN: CPT

## 2018-11-29 PROCEDURE — 99214 OFFICE O/P EST MOD 30 MIN: CPT | Performed by: NURSE PRACTITIONER

## 2018-11-29 PROCEDURE — 80048 BASIC METABOLIC PNL TOTAL CA: CPT

## 2018-11-29 PROCEDURE — 36415 COLL VENOUS BLD VENIPUNCTURE: CPT

## 2018-11-29 PROCEDURE — 93000 ELECTROCARDIOGRAM COMPLETE: CPT | Performed by: INTERNAL MEDICINE

## 2018-11-29 RX ORDER — CARVEDILOL 12.5 MG/1
TABLET ORAL
COMMUNITY
End: 2018-12-26 | Stop reason: DRUGHIGH

## 2018-11-29 RX ORDER — TORSEMIDE 10 MG/1
20 TABLET ORAL SEE ADMIN INSTRUCTIONS
Qty: 60 TABLET | Refills: 3 | Status: SHIPPED | OUTPATIENT
Start: 2018-11-29 | End: 2019-02-22 | Stop reason: SDUPTHER

## 2018-11-29 RX ORDER — SODIUM CHLORIDE 0.9 % (FLUSH) 0.9 %
10 SYRINGE (ML) INJECTION 2 TIMES DAILY
Status: DISCONTINUED | OUTPATIENT
Start: 2018-11-29 | End: 2018-11-30 | Stop reason: HOSPADM

## 2018-11-29 RX ORDER — FUROSEMIDE 10 MG/ML
40 INJECTION INTRAMUSCULAR; INTRAVENOUS ONCE
Status: COMPLETED | OUTPATIENT
Start: 2018-11-29 | End: 2018-11-29

## 2018-11-29 RX ADMIN — FUROSEMIDE 40 MG: 10 INJECTION, SOLUTION INTRAMUSCULAR; INTRAVENOUS at 13:56

## 2018-11-29 RX ADMIN — Medication 10 ML: at 13:56

## 2018-11-29 NOTE — PROGRESS NOTES
mildly-moderately dilated, mild MR.      RVSP of 25-30. Trace PI.   3.    Cardiac MRI 2013:  EF 20%   4. Status post single chamber ICD implant, Medtronic Evera [id number],      2013, for nonischemic cardiomyopathy (single chamber)   5. Hypertension   6. Hyperlipidemia   7. Obesity   8. History of medical noncompliance   9. Obstructive sleep apnea   10. Raven-Barr virus at age 28   8. Paroxysmal atrial fibrillation. Chronic anticoagulation with Xarelto      (initiation time unknown)   12. Reported history of ischemic left middle cerebral artery stroke      (further details unknown)   13. Bilateral carotid duplex 2016, reportedly showed no      hemodynamically significant stenosis with 0% to 49% narrowing of both      carotid arteries   14. Echo 2016 [facility]):  LV severely dilated. LA      moderately dilated. Mild PHTN. Mild TR. Trace TR. Pacer wire visible      in the right atrium and ventricle. RA mildly dilated. EF 10% to 15%. Class 3 diastolic dysfunction. RV moderately dilated. RV systolic      function moderate-severely reduced. Mild-moderate MR.   15.   Echo 2012 (Dr. Shanelle Nolan):  LVEF 30% to 35% and enlarged LV   16.   Diabetes mellitus   17. Gout   18. Syncopal episode 2016, presented to [facility] (reports requested); per      patient underwent echocardiogram and ICD interrogation. Reported ICD      interrogation showed normal device function and no changes made (reports      requested)      FAMILY HISTORY:    Mother  at the age of 48 from complications of stomach cancer. His father is alive and well in his 62s. His siblings are alive and well. He has no children.        Past Medical History:   Diagnosis Date    Acute CHF ([address] 2011    Acute CHF ([address] 2011    Acute idiopathic gout of right foot 2016    Anemia 2011    CAD (coronary artery disease)     daily weight trends and if you gain water weight please follow above instructions.    -If you gain 3-5 pounds in 2-3 days OR notice that you are retaining fluid in anyway just like you did before then take an extra dose of your water pill (Torsemide) every day until you lose the weight or feel better.      -If you notice that you have taken more than 3 extra doses in 1 week then please call and let us know. -If at any time you feel that you are retaining fluid, your medications are not working, or you feel ill in anyway, then please call us for either same day appointment or the next day, and for instructions. Our goal is to keep you out of the emergency room and the hospital and we have ways to do it. You just need to call us in a timely manner.     -If you become sick for other reasons, and notice that you are not urinating as much, the urine is very dark, you have significant diarrhea or vomiting, then please DO NOT take your water pill and CALL US immediately. 5. Return visit with Dr. Latanya Martines end of December - will order follow up echocardiogram at that time. (on highest tolerated GDMT for 90 days come 2/1/2019) and discuss CPET testing. Felipa DAMIAN-CNP  Advanced Heart Failure and Pulmonary Hypertension  [facility].

## 2018-11-30 ENCOUNTER — OFFICE VISIT (OUTPATIENT)
Dept: FAMILY MEDICINE CLINIC | Age: 38
End: 2018-11-30
Payer: MEDICARE

## 2018-11-30 VITALS
TEMPERATURE: 98.1 F | OXYGEN SATURATION: 97 % | SYSTOLIC BLOOD PRESSURE: 88 MMHG | HEIGHT: 72 IN | HEART RATE: 69 BPM | WEIGHT: 315 LBS | BODY MASS INDEX: 42.66 KG/M2 | DIASTOLIC BLOOD PRESSURE: 61 MMHG

## 2018-11-30 DIAGNOSIS — I50.42 CHRONIC COMBINED SYSTOLIC AND DIASTOLIC CONGESTIVE HEART FAILURE (HCC): Primary | ICD-10-CM

## 2018-11-30 PROCEDURE — 99213 OFFICE O/P EST LOW 20 MIN: CPT | Performed by: FAMILY MEDICINE

## 2018-12-02 ASSESSMENT — ENCOUNTER SYMPTOMS
ABDOMINAL PAIN: 0
PHOTOPHOBIA: 0
CONSTIPATION: 0
VOMITING: 0
ABDOMINAL DISTENTION: 0
WHEEZING: 0
CHEST TIGHTNESS: 0
COUGH: 0
SHORTNESS OF BREATH: 0
DIARRHEA: 0

## 2018-12-04 LAB
EKG ATRIAL RATE: 77 BPM
EKG P AXIS: 19 DEGREES
EKG Q-T INTERVAL: 520 MS
EKG QRS DURATION: 202 MS
EKG QTC CALCULATION (BAZETT): 588 MS
EKG R AXIS: 161 DEGREES
EKG T AXIS: -21 DEGREES
EKG VENTRICULAR RATE: 77 BPM

## 2018-12-10 ENCOUNTER — HOSPITAL ENCOUNTER (OUTPATIENT)
Dept: OTHER | Age: 38
Setting detail: THERAPIES SERIES
Discharge: HOME | End: 2018-12-10
Payer: MEDICARE

## 2018-12-10 VITALS
WEIGHT: 315 LBS | HEART RATE: 70 BPM | SYSTOLIC BLOOD PRESSURE: 100 MMHG | BODY MASS INDEX: 42.86 KG/M2 | DIASTOLIC BLOOD PRESSURE: 62 MMHG | RESPIRATION RATE: 18 BRPM

## 2018-12-10 DIAGNOSIS — I50.42 CHRONIC COMBINED SYSTOLIC AND DIASTOLIC CONGESTIVE HEART FAILURE (HCC): Primary | ICD-10-CM

## 2018-12-10 DIAGNOSIS — Z01.818 PRE-OP TESTING: ICD-10-CM

## 2018-12-10 LAB
ANION GAP SERPL CALCULATED.3IONS-SCNC: 13 MMOL/L (ref 7–16)
BUN BLDV-MCNC: 18 MG/DL (ref 6–20)
CALCIUM SERPL-MCNC: 9.6 MG/DL (ref 8.6–10.2)
CHLORIDE BLD-SCNC: 101 MMOL/L (ref 98–107)
CO2: 22 MMOL/L (ref 22–29)
CREAT SERPL-MCNC: 1 MG/DL (ref 0.7–1.2)
GFR AFRICAN AMERICAN: >60
GFR NON-AFRICAN AMERICAN: >60 ML/MIN/1.73
GLUCOSE BLD-MCNC: 253 MG/DL (ref 74–99)
POTASSIUM SERPL-SCNC: 4.2 MMOL/L (ref 3.5–5)
PRO-BNP: 467 PG/ML (ref 0–125)
SODIUM BLD-SCNC: 136 MMOL/L (ref 132–146)

## 2018-12-10 PROCEDURE — 99214 OFFICE O/P EST MOD 30 MIN: CPT

## 2018-12-10 PROCEDURE — 83880 ASSAY OF NATRIURETIC PEPTIDE: CPT

## 2018-12-10 PROCEDURE — 36415 COLL VENOUS BLD VENIPUNCTURE: CPT

## 2018-12-10 PROCEDURE — 96374 THER/PROPH/DIAG INJ IV PUSH: CPT

## 2018-12-10 PROCEDURE — 80048 BASIC METABOLIC PNL TOTAL CA: CPT

## 2018-12-10 RX ORDER — FUROSEMIDE 10 MG/ML
40 INJECTION INTRAMUSCULAR; INTRAVENOUS ONCE
Status: DISCONTINUED | OUTPATIENT
Start: 2018-12-10 | End: 2018-12-10

## 2018-12-10 RX ORDER — SODIUM CHLORIDE 0.9 % (FLUSH) 0.9 %
10 SYRINGE (ML) INJECTION 2 TIMES DAILY
Status: DISCONTINUED | OUTPATIENT
Start: 2018-12-10 | End: 2018-12-10

## 2018-12-11 ENCOUNTER — CARE COORDINATION (OUTPATIENT)
Dept: CARE COORDINATION | Age: 38
End: 2018-12-11

## 2018-12-12 ENCOUNTER — HOSPITAL ENCOUNTER (OUTPATIENT)
Age: 38
Discharge: HOME | End: 2018-12-12
Payer: MEDICARE

## 2018-12-12 DIAGNOSIS — I50.42 CHRONIC COMBINED SYSTOLIC AND DIASTOLIC CONGESTIVE HEART FAILURE (HCC): ICD-10-CM

## 2018-12-12 DIAGNOSIS — Z01.818 PRE-OP TESTING: ICD-10-CM

## 2018-12-12 LAB
ANION GAP SERPL CALCULATED.3IONS-SCNC: 12 MMOL/L (ref 7–16)
BUN BLDV-MCNC: 19 MG/DL (ref 6–20)
CALCIUM SERPL-MCNC: 9.3 MG/DL (ref 8.6–10.2)
CHLORIDE BLD-SCNC: 99 MMOL/L (ref 98–107)
CO2: 25 MMOL/L (ref 22–29)
CREAT SERPL-MCNC: 1.1 MG/DL (ref 0.7–1.2)
GFR AFRICAN AMERICAN: >60
GFR NON-AFRICAN AMERICAN: >60 ML/MIN/1.73
GLUCOSE BLD-MCNC: 347 MG/DL (ref 74–99)
HCT VFR BLD CALC: 39.1 % (ref 37–54)
HEMOGLOBIN: 12.7 G/DL (ref 12.5–16.5)
INR BLD: 1.3
MCH RBC QN AUTO: 28.7 PG (ref 26–35)
MCHC RBC AUTO-ENTMCNC: 32.5 % (ref 32–34.5)
MCV RBC AUTO: 88.5 FL (ref 80–99.9)
PDW BLD-RTO: 15.3 FL (ref 11.5–15)
PLATELET # BLD: 231 E9/L (ref 130–450)
PMV BLD AUTO: 10.7 FL (ref 7–12)
POTASSIUM SERPL-SCNC: 4.1 MMOL/L (ref 3.5–5)
PROTHROMBIN TIME: 14.4 SEC (ref 9.3–12.4)
RBC # BLD: 4.42 E12/L (ref 3.8–5.8)
SODIUM BLD-SCNC: 136 MMOL/L (ref 132–146)
WBC # BLD: 6.2 E9/L (ref 4.5–11.5)

## 2018-12-12 PROCEDURE — 85027 COMPLETE CBC AUTOMATED: CPT

## 2018-12-12 PROCEDURE — 36415 COLL VENOUS BLD VENIPUNCTURE: CPT

## 2018-12-12 PROCEDURE — 85610 PROTHROMBIN TIME: CPT

## 2018-12-12 PROCEDURE — 80048 BASIC METABOLIC PNL TOTAL CA: CPT

## 2018-12-13 ENCOUNTER — HOSPITAL ENCOUNTER (OUTPATIENT)
Dept: CARDIAC CATH/INVASIVE PROCEDURES | Age: 38
Discharge: HOME | End: 2018-12-13
Payer: MEDICARE

## 2018-12-13 VITALS
RESPIRATION RATE: 20 BRPM | HEIGHT: 72 IN | HEART RATE: 70 BPM | WEIGHT: 315 LBS | SYSTOLIC BLOOD PRESSURE: 113 MMHG | DIASTOLIC BLOOD PRESSURE: 72 MMHG | BODY MASS INDEX: 42.66 KG/M2 | TEMPERATURE: 97.6 F

## 2018-12-13 LAB
ABO/RH: NORMAL
ANTIBODY SCREEN: NORMAL

## 2018-12-13 PROCEDURE — 86900 BLOOD TYPING SEROLOGIC ABO: CPT

## 2018-12-13 PROCEDURE — C1751 CATH, INF, PER/CENT/MIDLINE: HCPCS

## 2018-12-13 PROCEDURE — 2709999900 HC NON-CHARGEABLE SUPPLY

## 2018-12-13 PROCEDURE — 86850 RBC ANTIBODY SCREEN: CPT

## 2018-12-13 PROCEDURE — C9741 IMPL PRESSURE SENSOR W/ANGIO: HCPCS | Performed by: INTERNAL MEDICINE

## 2018-12-13 PROCEDURE — 6360000002 HC RX W HCPCS

## 2018-12-13 PROCEDURE — 93799 UNLISTED CV SVC/PROCEDURE: CPT | Performed by: INTERNAL MEDICINE

## 2018-12-13 PROCEDURE — C1769 GUIDE WIRE: HCPCS

## 2018-12-13 PROCEDURE — 93451 RIGHT HEART CATH: CPT | Performed by: INTERNAL MEDICINE

## 2018-12-13 PROCEDURE — 86901 BLOOD TYPING SEROLOGIC RH(D): CPT

## 2018-12-13 PROCEDURE — 2500000003 HC RX 250 WO HCPCS

## 2018-12-13 PROCEDURE — 93568 NJX CAR CTH NSLC P-ART ANGRP: CPT | Performed by: INTERNAL MEDICINE

## 2018-12-13 PROCEDURE — C1894 INTRO/SHEATH, NON-LASER: HCPCS

## 2018-12-13 PROCEDURE — C2624 WIRELESS PRESSURE SENSOR: HCPCS

## 2018-12-13 PROCEDURE — C1880 VENA CAVA FILTER: HCPCS

## 2018-12-13 PROCEDURE — 36415 COLL VENOUS BLD VENIPUNCTURE: CPT

## 2018-12-13 PROCEDURE — 6370000000 HC RX 637 (ALT 250 FOR IP)

## 2018-12-13 RX ORDER — SODIUM CHLORIDE 9 MG/ML
INJECTION, SOLUTION INTRAVENOUS ONCE
Status: DISCONTINUED | OUTPATIENT
Start: 2018-12-13 | End: 2018-12-14 | Stop reason: HOSPADM

## 2018-12-17 ENCOUNTER — TELEPHONE (OUTPATIENT)
Dept: CARDIOLOGY CLINIC | Age: 38
End: 2018-12-17

## 2018-12-17 DIAGNOSIS — I10 ESSENTIAL HYPERTENSION: ICD-10-CM

## 2018-12-17 DIAGNOSIS — I42.8 CARDIOMYOPATHY, NONISCHEMIC (HCC): Chronic | ICD-10-CM

## 2018-12-17 DIAGNOSIS — I42.8 CARDIOMYOPATHY, NONISCHEMIC (HCC): Primary | Chronic | ICD-10-CM

## 2018-12-17 DIAGNOSIS — I50.22 CHRONIC SYSTOLIC CONGESTIVE HEART FAILURE (HCC): ICD-10-CM

## 2018-12-20 RX ORDER — TORSEMIDE 20 MG/1
40 TABLET ORAL 2 TIMES DAILY
Qty: 60 TABLET | Refills: 3 | Status: SHIPPED | OUTPATIENT
Start: 2018-12-20 | End: 2019-02-22 | Stop reason: SDUPTHER

## 2018-12-20 RX ORDER — POTASSIUM CHLORIDE 1500 MG/1
20 TABLET, FILM COATED, EXTENDED RELEASE ORAL 2 TIMES DAILY
Qty: 60 TABLET | Refills: 3 | Status: SHIPPED | OUTPATIENT
Start: 2018-12-20 | End: 2019-03-18 | Stop reason: SDUPTHER

## 2018-12-21 ENCOUNTER — TELEPHONE (OUTPATIENT)
Dept: CARDIOLOGY CLINIC | Age: 38
End: 2018-12-21

## 2018-12-21 NOTE — TELEPHONE ENCOUNTER
5:01 PM  12/21/2018 called Deb Liriano [phone] (home) [phone] (work)     Says he feels good. Urinating well. Feels the demadex has helped his s/s chf.   Color : yellow, lemonade color  Sleeps on 3-4 pillows as usual  Encouraged cardiomems daily and follow low sodium diet    He had no ride to lab today. He will try to go over the weekend. Has appt next week at chf clinic will get labs then if cant arrange ride to any [place] lab prior to the 27th.

## 2018-12-21 NOTE — TELEPHONE ENCOUNTER
Jossue Simsstacey III 1980 [id number]           Had increased diuretic 12-17-18 per DR Amalia Pemberton  demadex to 40mg BID and to increase KCL to 20meq BID   Order for repeat labs for 12/21/2018 . Pending patient to get labs still.                                             PAS  PAD     MARILIA         Heart Rate   12-, 08:06 AM   74 mmHg  38 mmHg  52 mmHg  75 bpm   12-, 08:45 AM   65 mmHg  34 mmHg  46 mmHg  72 bpm   12-, 08:06 AM  69 mmHg  37 mmHg  49 mmHg  73 bpm

## 2018-12-26 ENCOUNTER — TELEPHONE (OUTPATIENT)
Dept: CARDIOLOGY CLINIC | Age: 38
End: 2018-12-26

## 2018-12-26 DIAGNOSIS — I10 ESSENTIAL HYPERTENSION: ICD-10-CM

## 2018-12-26 DIAGNOSIS — I50.22 CHRONIC SYSTOLIC CONGESTIVE HEART FAILURE (HCC): Primary | ICD-10-CM

## 2018-12-26 DIAGNOSIS — I48.0 PAROXYSMAL ATRIAL FIBRILLATION (HCC): ICD-10-CM

## 2018-12-26 RX ORDER — CARVEDILOL 25 MG/1
25 TABLET ORAL 2 TIMES DAILY WITH MEALS
Qty: 180 TABLET | Refills: 3 | Status: ON HOLD | OUTPATIENT
Start: 2018-12-26 | End: 2019-08-02 | Stop reason: HOSPADM

## 2018-12-27 ENCOUNTER — HOSPITAL ENCOUNTER (OUTPATIENT)
Dept: OTHER | Age: 38
Setting detail: THERAPIES SERIES
Discharge: HOME | End: 2018-12-27
Payer: MEDICARE

## 2018-12-27 VITALS
HEART RATE: 76 BPM | BODY MASS INDEX: 42.86 KG/M2 | WEIGHT: 315 LBS | DIASTOLIC BLOOD PRESSURE: 73 MMHG | RESPIRATION RATE: 18 BRPM | SYSTOLIC BLOOD PRESSURE: 116 MMHG

## 2018-12-27 LAB
ANION GAP SERPL CALCULATED.3IONS-SCNC: 13 MMOL/L (ref 7–16)
BUN BLDV-MCNC: 19 MG/DL (ref 6–20)
CALCIUM SERPL-MCNC: 9.6 MG/DL (ref 8.6–10.2)
CHLORIDE BLD-SCNC: 98 MMOL/L (ref 98–107)
CO2: 20 MMOL/L (ref 22–29)
CREAT SERPL-MCNC: 1 MG/DL (ref 0.7–1.2)
GFR AFRICAN AMERICAN: >60
GFR NON-AFRICAN AMERICAN: >60 ML/MIN/1.73
GLUCOSE BLD-MCNC: 336 MG/DL (ref 74–99)
POTASSIUM SERPL-SCNC: 4.1 MMOL/L (ref 3.5–5)
PRO-BNP: 549 PG/ML (ref 0–125)
SODIUM BLD-SCNC: 131 MMOL/L (ref 132–146)

## 2018-12-27 PROCEDURE — 80048 BASIC METABOLIC PNL TOTAL CA: CPT

## 2018-12-27 PROCEDURE — 99214 OFFICE O/P EST MOD 30 MIN: CPT

## 2018-12-27 PROCEDURE — 36415 COLL VENOUS BLD VENIPUNCTURE: CPT

## 2018-12-27 PROCEDURE — 83880 ASSAY OF NATRIURETIC PEPTIDE: CPT

## 2019-01-02 ENCOUNTER — HOSPITAL ENCOUNTER (OUTPATIENT)
Dept: OTHER | Age: 39
Setting detail: THERAPIES SERIES
Discharge: HOME | End: 2019-01-02
Payer: MEDICARE

## 2019-01-02 VITALS
RESPIRATION RATE: 18 BRPM | DIASTOLIC BLOOD PRESSURE: 67 MMHG | BODY MASS INDEX: 42.86 KG/M2 | WEIGHT: 315 LBS | HEART RATE: 71 BPM | SYSTOLIC BLOOD PRESSURE: 112 MMHG

## 2019-01-02 DIAGNOSIS — I50.22 CHRONIC SYSTOLIC CONGESTIVE HEART FAILURE (HCC): Primary | ICD-10-CM

## 2019-01-02 LAB
ANION GAP SERPL CALCULATED.3IONS-SCNC: 13 MMOL/L (ref 7–16)
BUN BLDV-MCNC: 34 MG/DL (ref 6–20)
CALCIUM SERPL-MCNC: 9.4 MG/DL (ref 8.6–10.2)
CHLORIDE BLD-SCNC: 100 MMOL/L (ref 98–107)
CO2: 21 MMOL/L (ref 22–29)
CREAT SERPL-MCNC: 1.1 MG/DL (ref 0.7–1.2)
GFR AFRICAN AMERICAN: >60
GFR NON-AFRICAN AMERICAN: >60 ML/MIN/1.73
GLUCOSE BLD-MCNC: 390 MG/DL (ref 74–99)
POTASSIUM SERPL-SCNC: 4.1 MMOL/L (ref 3.5–5)
PRO-BNP: 463 PG/ML (ref 0–125)
SODIUM BLD-SCNC: 134 MMOL/L (ref 132–146)

## 2019-01-02 PROCEDURE — 93299 PR REM INTERROG ICPMS/SCRMS <30 D TECH REVIEW: CPT | Performed by: INTERNAL MEDICINE

## 2019-01-02 PROCEDURE — 99214 OFFICE O/P EST MOD 30 MIN: CPT

## 2019-01-02 PROCEDURE — 36415 COLL VENOUS BLD VENIPUNCTURE: CPT

## 2019-01-02 PROCEDURE — 93297 REM INTERROG DEV EVAL ICPMS: CPT | Performed by: INTERNAL MEDICINE

## 2019-01-02 PROCEDURE — 83880 ASSAY OF NATRIURETIC PEPTIDE: CPT

## 2019-01-02 PROCEDURE — 80048 BASIC METABOLIC PNL TOTAL CA: CPT

## 2019-01-11 DIAGNOSIS — I50.22 CHRONIC SYSTOLIC CONGESTIVE HEART FAILURE (HCC): Primary | ICD-10-CM

## 2019-01-14 ENCOUNTER — OFFICE VISIT (OUTPATIENT)
Dept: FAMILY MEDICINE CLINIC | Age: 39
End: 2019-01-14
Payer: MEDICARE

## 2019-01-14 VITALS
BODY MASS INDEX: 42.66 KG/M2 | OXYGEN SATURATION: 99 % | DIASTOLIC BLOOD PRESSURE: 69 MMHG | HEIGHT: 72 IN | WEIGHT: 315 LBS | SYSTOLIC BLOOD PRESSURE: 99 MMHG | TEMPERATURE: 97.8 F | HEART RATE: 75 BPM

## 2019-01-14 DIAGNOSIS — I50.22 CHRONIC SYSTOLIC CONGESTIVE HEART FAILURE (HCC): ICD-10-CM

## 2019-01-14 DIAGNOSIS — Z79.4 TYPE 2 DIABETES MELLITUS WITH COMPLICATION, WITH LONG-TERM CURRENT USE OF INSULIN (HCC): Primary | Chronic | ICD-10-CM

## 2019-01-14 DIAGNOSIS — M75.41 IMPINGEMENT SYNDROME OF SHOULDER, RIGHT: ICD-10-CM

## 2019-01-14 DIAGNOSIS — E11.8 TYPE 2 DIABETES MELLITUS WITH COMPLICATION, WITH LONG-TERM CURRENT USE OF INSULIN (HCC): Primary | Chronic | ICD-10-CM

## 2019-01-14 DIAGNOSIS — E66.01 MORBID OBESITY WITH BMI OF 40.0-44.9, ADULT (HCC): ICD-10-CM

## 2019-01-14 PROBLEM — Z91.148 NONCOMPLIANCE WITH MEDICATIONS: Status: RESOLVED | Noted: 2017-01-20 | Resolved: 2019-01-14

## 2019-01-14 PROBLEM — M25.562 ARTHRALGIA OF LEFT KNEE: Status: RESOLVED | Noted: 2017-04-21 | Resolved: 2019-01-14

## 2019-01-14 PROBLEM — M25.50 POLYARTHRALGIA: Status: RESOLVED | Noted: 2017-04-21 | Resolved: 2019-01-14

## 2019-01-14 PROBLEM — Z91.14 NONCOMPLIANCE WITH MEDICATIONS: Status: RESOLVED | Noted: 2017-01-20 | Resolved: 2019-01-14

## 2019-01-14 PROBLEM — I42.8 NONISCHEMIC CARDIOMYOPATHY (HCC): Status: RESOLVED | Noted: 2017-01-20 | Resolved: 2019-01-14

## 2019-01-14 PROBLEM — I50.43 CHF (CONGESTIVE HEART FAILURE), NYHA CLASS I, ACUTE ON CHRONIC, COMBINED (HCC): Status: RESOLVED | Noted: 2018-11-20 | Resolved: 2019-01-14

## 2019-01-14 PROBLEM — Z74.09 DECREASED AMBULATION STATUS: Status: RESOLVED | Noted: 2017-04-21 | Resolved: 2019-01-14

## 2019-01-14 PROCEDURE — 99214 OFFICE O/P EST MOD 30 MIN: CPT | Performed by: FAMILY MEDICINE

## 2019-01-14 PROCEDURE — 99212 OFFICE O/P EST SF 10 MIN: CPT | Performed by: FAMILY MEDICINE

## 2019-01-14 RX ORDER — INSULIN DETEMIR 100 [IU]/ML
32 INJECTION, SOLUTION SUBCUTANEOUS 2 TIMES DAILY
Qty: 10 PEN | Refills: 5 | Status: ON HOLD
Start: 2019-01-14 | End: 2019-08-02 | Stop reason: SDUPTHER

## 2019-02-06 PROCEDURE — 93264 REM MNTR WRLS P-ART PRS SNR: CPT | Performed by: INTERNAL MEDICINE

## 2019-02-08 DIAGNOSIS — I50.22 CHRONIC SYSTOLIC CONGESTIVE HEART FAILURE (HCC): Primary | ICD-10-CM

## 2019-02-22 DIAGNOSIS — I42.8 CARDIOMYOPATHY, NONISCHEMIC (HCC): Chronic | ICD-10-CM

## 2019-02-22 RX ORDER — TORSEMIDE 10 MG/1
20 TABLET ORAL SEE ADMIN INSTRUCTIONS
Qty: 60 TABLET | Refills: 3 | Status: SHIPPED | OUTPATIENT
Start: 2019-02-22 | End: 2019-04-11 | Stop reason: SDUPTHER

## 2019-02-22 RX ORDER — TORSEMIDE 20 MG/1
40 TABLET ORAL 2 TIMES DAILY
Qty: 60 TABLET | Refills: 6 | Status: SHIPPED | OUTPATIENT
Start: 2019-02-22 | End: 2019-03-15

## 2019-03-06 ENCOUNTER — OFFICE VISIT (OUTPATIENT)
Dept: CARDIOLOGY CLINIC | Age: 39
End: 2019-03-06
Payer: MEDICARE

## 2019-03-06 VITALS
RESPIRATION RATE: 20 BRPM | WEIGHT: 315 LBS | HEIGHT: 72 IN | HEART RATE: 76 BPM | DIASTOLIC BLOOD PRESSURE: 80 MMHG | BODY MASS INDEX: 42.66 KG/M2 | SYSTOLIC BLOOD PRESSURE: 108 MMHG

## 2019-03-06 DIAGNOSIS — I42.8 CARDIOMYOPATHY, NONISCHEMIC (HCC): ICD-10-CM

## 2019-03-06 DIAGNOSIS — I34.0 NON-RHEUMATIC MITRAL REGURGITATION: ICD-10-CM

## 2019-03-06 DIAGNOSIS — I10 ESSENTIAL HYPERTENSION: ICD-10-CM

## 2019-03-06 DIAGNOSIS — Z95.810 BIVENTRICULAR IMPLANTABLE CARDIOVERTER-DEFIBRILLATOR (ICD) IN SITU: ICD-10-CM

## 2019-03-06 DIAGNOSIS — G47.33 OSA (OBSTRUCTIVE SLEEP APNEA): ICD-10-CM

## 2019-03-06 DIAGNOSIS — I50.22 CHRONIC SYSTOLIC CONGESTIVE HEART FAILURE (HCC): ICD-10-CM

## 2019-03-06 DIAGNOSIS — E66.01 MORBID OBESITY WITH BMI OF 40.0-44.9, ADULT (HCC): ICD-10-CM

## 2019-03-06 DIAGNOSIS — I48.91 ATRIAL FIBRILLATION, UNSPECIFIED TYPE (HCC): Primary | ICD-10-CM

## 2019-03-06 PROCEDURE — 99214 OFFICE O/P EST MOD 30 MIN: CPT | Performed by: INTERNAL MEDICINE

## 2019-03-06 PROCEDURE — 93000 ELECTROCARDIOGRAM COMPLETE: CPT | Performed by: INTERNAL MEDICINE

## 2019-03-13 DIAGNOSIS — I50.22 CHRONIC SYSTOLIC CONGESTIVE HEART FAILURE (HCC): Primary | ICD-10-CM

## 2019-03-13 PROCEDURE — 93264 REM MNTR WRLS P-ART PRS SNR: CPT | Performed by: INTERNAL MEDICINE

## 2019-03-14 ENCOUNTER — TELEPHONE (OUTPATIENT)
Dept: FAMILY MEDICINE CLINIC | Age: 39
End: 2019-03-14

## 2019-03-14 ENCOUNTER — HOSPITAL ENCOUNTER (OUTPATIENT)
Dept: OTHER | Age: 39
Setting detail: THERAPIES SERIES
Discharge: HOME | End: 2019-03-14
Payer: MEDICARE

## 2019-03-14 VITALS
WEIGHT: 315 LBS | BODY MASS INDEX: 43.67 KG/M2 | DIASTOLIC BLOOD PRESSURE: 69 MMHG | SYSTOLIC BLOOD PRESSURE: 126 MMHG | RESPIRATION RATE: 20 BRPM | HEART RATE: 72 BPM

## 2019-03-14 LAB
ANION GAP SERPL CALCULATED.3IONS-SCNC: 14 MMOL/L (ref 7–16)
BUN BLDV-MCNC: 41 MG/DL (ref 6–20)
CALCIUM SERPL-MCNC: 9.6 MG/DL (ref 8.6–10.2)
CHLORIDE BLD-SCNC: 90 MMOL/L (ref 98–107)
CO2: 21 MMOL/L (ref 22–29)
CREAT SERPL-MCNC: 1.4 MG/DL (ref 0.7–1.2)
GFR AFRICAN AMERICAN: >60
GFR NON-AFRICAN AMERICAN: >60 ML/MIN/1.73
GLUCOSE BLD-MCNC: 611 MG/DL (ref 74–99)
POTASSIUM SERPL-SCNC: 4.4 MMOL/L (ref 3.5–5)
PRO-BNP: 297 PG/ML (ref 0–125)
SODIUM BLD-SCNC: 125 MMOL/L (ref 132–146)

## 2019-03-14 PROCEDURE — 83880 ASSAY OF NATRIURETIC PEPTIDE: CPT

## 2019-03-14 PROCEDURE — 96374 THER/PROPH/DIAG INJ IV PUSH: CPT

## 2019-03-14 PROCEDURE — 80048 BASIC METABOLIC PNL TOTAL CA: CPT

## 2019-03-14 PROCEDURE — 99214 OFFICE O/P EST MOD 30 MIN: CPT

## 2019-03-14 PROCEDURE — 2580000003 HC RX 258: Performed by: INTERNAL MEDICINE

## 2019-03-14 PROCEDURE — 36415 COLL VENOUS BLD VENIPUNCTURE: CPT

## 2019-03-14 PROCEDURE — 6360000002 HC RX W HCPCS: Performed by: INTERNAL MEDICINE

## 2019-03-14 RX ORDER — SODIUM CHLORIDE 0.9 % (FLUSH) 0.9 %
10 SYRINGE (ML) INJECTION PRN
Status: DISCONTINUED | OUTPATIENT
Start: 2019-03-14 | End: 2019-03-15 | Stop reason: HOSPADM

## 2019-03-14 RX ORDER — FUROSEMIDE 10 MG/ML
40 INJECTION INTRAMUSCULAR; INTRAVENOUS ONCE
Status: COMPLETED | OUTPATIENT
Start: 2019-03-14 | End: 2019-03-14

## 2019-03-14 RX ADMIN — FUROSEMIDE 40 MG: 10 INJECTION, SOLUTION INTRAMUSCULAR; INTRAVENOUS at 08:45

## 2019-03-14 RX ADMIN — Medication 10 ML: at 08:45

## 2019-03-15 DIAGNOSIS — I42.8 CARDIOMYOPATHY, NONISCHEMIC (HCC): Chronic | ICD-10-CM

## 2019-03-15 RX ORDER — TORSEMIDE 20 MG/1
40 TABLET ORAL 2 TIMES DAILY
Qty: 120 TABLET | Refills: 6 | Status: ON HOLD | OUTPATIENT
Start: 2019-03-15 | End: 2019-08-02 | Stop reason: HOSPADM

## 2019-03-18 ENCOUNTER — OFFICE VISIT (OUTPATIENT)
Dept: FAMILY MEDICINE CLINIC | Age: 39
End: 2019-03-18
Payer: MEDICARE

## 2019-03-18 VITALS
HEIGHT: 72 IN | DIASTOLIC BLOOD PRESSURE: 68 MMHG | WEIGHT: 315 LBS | SYSTOLIC BLOOD PRESSURE: 101 MMHG | BODY MASS INDEX: 42.66 KG/M2 | HEART RATE: 69 BPM | OXYGEN SATURATION: 98 % | TEMPERATURE: 97.3 F

## 2019-03-18 DIAGNOSIS — E11.8 TYPE 2 DIABETES MELLITUS WITH COMPLICATION, WITH LONG-TERM CURRENT USE OF INSULIN (HCC): Chronic | ICD-10-CM

## 2019-03-18 DIAGNOSIS — I48.0 PAROXYSMAL ATRIAL FIBRILLATION (HCC): ICD-10-CM

## 2019-03-18 DIAGNOSIS — I10 ESSENTIAL HYPERTENSION: ICD-10-CM

## 2019-03-18 DIAGNOSIS — E66.01 MORBID OBESITY WITH BMI OF 40.0-44.9, ADULT (HCC): ICD-10-CM

## 2019-03-18 DIAGNOSIS — M75.51 BURSITIS OF RIGHT SHOULDER: ICD-10-CM

## 2019-03-18 DIAGNOSIS — G47.33 OBSTRUCTIVE SLEEP APNEA: Chronic | ICD-10-CM

## 2019-03-18 DIAGNOSIS — N18.9 CHRONIC KIDNEY DISEASE, UNSPECIFIED CKD STAGE: ICD-10-CM

## 2019-03-18 DIAGNOSIS — Z79.4 TYPE 2 DIABETES MELLITUS WITH COMPLICATION, WITH LONG-TERM CURRENT USE OF INSULIN (HCC): Chronic | ICD-10-CM

## 2019-03-18 DIAGNOSIS — E11.8 TYPE 2 DIABETES MELLITUS WITH COMPLICATION, UNSPECIFIED WHETHER LONG TERM INSULIN USE: Primary | ICD-10-CM

## 2019-03-18 DIAGNOSIS — I50.22 CHRONIC SYSTOLIC CONGESTIVE HEART FAILURE (HCC): ICD-10-CM

## 2019-03-18 LAB — HBA1C MFR BLD: 15 %

## 2019-03-18 PROCEDURE — 99213 OFFICE O/P EST LOW 20 MIN: CPT | Performed by: FAMILY MEDICINE

## 2019-03-18 PROCEDURE — 83036 HEMOGLOBIN GLYCOSYLATED A1C: CPT | Performed by: FAMILY MEDICINE

## 2019-03-18 PROCEDURE — 99212 OFFICE O/P EST SF 10 MIN: CPT | Performed by: FAMILY MEDICINE

## 2019-03-18 RX ORDER — POTASSIUM CHLORIDE 1500 MG/1
20 TABLET, FILM COATED, EXTENDED RELEASE ORAL 2 TIMES DAILY
Qty: 60 TABLET | Refills: 3 | Status: ON HOLD | OUTPATIENT
Start: 2019-03-18 | End: 2019-08-02 | Stop reason: HOSPADM

## 2019-03-18 ASSESSMENT — PATIENT HEALTH QUESTIONNAIRE - PHQ9
SUM OF ALL RESPONSES TO PHQ QUESTIONS 1-9: 0
SUM OF ALL RESPONSES TO PHQ9 QUESTIONS 1 & 2: 0
2. FEELING DOWN, DEPRESSED OR HOPELESS: 0
SUM OF ALL RESPONSES TO PHQ QUESTIONS 1-9: 0
1. LITTLE INTEREST OR PLEASURE IN DOING THINGS: 0

## 2019-03-25 ENCOUNTER — HOSPITAL ENCOUNTER (OUTPATIENT)
Dept: PHYSICAL THERAPY | Age: 39
Setting detail: THERAPIES SERIES
Discharge: HOME | End: 2019-03-25
Payer: MEDICARE

## 2019-03-25 ENCOUNTER — TELEPHONE (OUTPATIENT)
Dept: FAMILY MEDICINE CLINIC | Age: 39
End: 2019-03-25

## 2019-03-25 PROCEDURE — 97530 THERAPEUTIC ACTIVITIES: CPT | Performed by: PHYSICAL THERAPIST

## 2019-03-25 PROCEDURE — 97161 PT EVAL LOW COMPLEX 20 MIN: CPT | Performed by: PHYSICAL THERAPIST

## 2019-03-25 ASSESSMENT — PAIN DESCRIPTION - PAIN TYPE: TYPE: CHRONIC PAIN

## 2019-03-25 ASSESSMENT — PAIN DESCRIPTION - DESCRIPTORS: DESCRIPTORS: ACHING;CONSTANT

## 2019-03-25 ASSESSMENT — PAIN DESCRIPTION - LOCATION: LOCATION: SHOULDER

## 2019-03-25 ASSESSMENT — PAIN DESCRIPTION - ORIENTATION: ORIENTATION: RIGHT

## 2019-03-25 ASSESSMENT — PAIN - FUNCTIONAL ASSESSMENT: PAIN_FUNCTIONAL_ASSESSMENT: PREVENTS OR INTERFERES WITH ALL ACTIVE AND SOME PASSIVE ACTIVITIES

## 2019-03-26 ENCOUNTER — HOSPITAL ENCOUNTER (OUTPATIENT)
Dept: OTHER | Age: 39
Setting detail: THERAPIES SERIES
Discharge: HOME | End: 2019-03-26
Payer: MEDICARE

## 2019-03-28 ENCOUNTER — HOSPITAL ENCOUNTER (OUTPATIENT)
Dept: PHYSICAL THERAPY | Age: 39
Setting detail: THERAPIES SERIES
Discharge: HOME | End: 2019-03-28
Payer: MEDICARE

## 2019-03-28 PROCEDURE — 97110 THERAPEUTIC EXERCISES: CPT

## 2019-03-28 PROCEDURE — 97140 MANUAL THERAPY 1/> REGIONS: CPT

## 2019-04-03 ENCOUNTER — HOSPITAL ENCOUNTER (OUTPATIENT)
Dept: PHYSICAL THERAPY | Age: 39
Setting detail: THERAPIES SERIES
Discharge: HOME | End: 2019-04-03
Payer: MEDICARE

## 2019-04-03 PROCEDURE — 97110 THERAPEUTIC EXERCISES: CPT | Performed by: PHYSICAL THERAPIST

## 2019-04-03 NOTE — PROGRESS NOTES
[address] [facility]                Phone: [phone]   Fax: [phone]    Physical Therapy Daily Treatment Note  Date:  4/3/2019    Patient Name:  Marycruz Tidwell    :  1980  MRN: [id number]    Evaluating therapist:  DELIA Mazariegos            (3/25/19)  Restrictions/Precautions:  DEFIBRILLATOR IN PLACE  Diagnosis:  bursitis R shoulder   Treatment Diagnosis:    Insurance/Certification information:  [employer]                cert dates:    to  19               ICD-10:  M25.519, R29.90  Referring Physician:  [facility] signed (Y/N):  Y  Visit# / total visits:  3/6-  Pain level: 5/10       Time In:    1355  Time Out:   1506    Subjective:  Patient presents for firstof two scheduled treatment sessions this week. He states his shoulder is feeling better today and reports pain in R shoulder as 5/10 prior to session this afternoon. Exercises:  Exercise/Equipment Resistance/Repetitions Other comments   UBE  F/B 3/3 min           pulleys for flex/abd 5 min ea Rest between        Self extension str 3 x10  3s hold Rest breaks        scap ret 20x 3s         finger ladder flexion 2 x5  5s           table st:  flex 4 x20s                   abd 4 x20s                   ER 4 x20s         doorway str nt           pendulums   20x ea 2# Crosses circles. Objective: There. ex as listed per flow sheet above. Treatment completed with  x 15 minutes R shoulder. AROM R shoulder flexion to 120°   abduction to 110°   Strength R shoulder  3+ to 4-/5 WAR    Assessment:   Patient performs all exercises/stretches well with good effort and pacing. AROM into elevations is improved for flexion and abduction vs previous session. Strength is unchanged. Home Exercise Program:   Reviewed with copy provided 3/24/19.       Manual Treatments:      Modalities:  MH to R shoulder x 15 min    Timed Code Treatment Minutes: Total Treatment Minutes:      Treatment/Activity Tolerance:  [x] Patient tolerated treatment well [] Patient limited by fatique  [] Patient limited by pain  [] Patient limited by other medical complications  [] Other:     Prognosis: [] Good [] Fair  [] Poor    Patient Requires Follow-up: [x] Yes  [] No    Plan:   [x] Continue per plan of care [] Alter current plan (see comments)  [] Plan of care initiated [] Hold pending MD visit [] Discharge    Plan for Next Session:      See Weekly Progress Note: []  Yes  []  No  Next due:        Electronically signed by:   Juan Ramon Sagastume DPT

## 2019-04-05 ENCOUNTER — HOSPITAL ENCOUNTER (OUTPATIENT)
Dept: PHYSICAL THERAPY | Age: 39
Setting detail: THERAPIES SERIES
Discharge: HOME | End: 2019-04-05
Payer: MEDICARE

## 2019-04-05 NOTE — PROGRESS NOTES
[address] [facility]                Phone: [phone]  Fax: [phone]    Physical Therapy  Cancellation/No-show Note  Patient Name:  Taryn Beltran  :  1980   Date:  2019    For today's appointment patient:  [x]  Cancelled  []  Rescheduled appointment  []  No-show     Reason given by patient:  []  Patient ill  []  Conflicting appointment  []  No transportation    []  Conflict with work  []  No reason given  []  Other:     Comments:      Electronically signed by: Leia Sequeira.  MPT

## 2019-04-08 ENCOUNTER — CARE COORDINATION (OUTPATIENT)
Dept: CARE COORDINATION | Age: 39
End: 2019-04-08

## 2019-04-08 NOTE — CARE COORDINATION
[facility] invited [place] to care coordination, he politely declined. Will place in 90 day exclusion.

## 2019-04-10 ENCOUNTER — HOSPITAL ENCOUNTER (OUTPATIENT)
Dept: PHYSICAL THERAPY | Age: 39
Setting detail: THERAPIES SERIES
Discharge: HOME | End: 2019-04-10
Payer: MEDICARE

## 2019-04-10 PROCEDURE — 97110 THERAPEUTIC EXERCISES: CPT | Performed by: PHYSICAL THERAPIST

## 2019-04-10 PROCEDURE — 97530 THERAPEUTIC ACTIVITIES: CPT | Performed by: PHYSICAL THERAPIST

## 2019-04-12 ENCOUNTER — HOSPITAL ENCOUNTER (OUTPATIENT)
Dept: PHYSICAL THERAPY | Age: 39
Setting detail: THERAPIES SERIES
Discharge: HOME | End: 2019-04-12
Payer: MEDICARE

## 2019-04-12 PROCEDURE — 97110 THERAPEUTIC EXERCISES: CPT | Performed by: PHYSICAL THERAPIST

## 2019-04-12 RX ORDER — TORSEMIDE 10 MG/1
20 TABLET ORAL SEE ADMIN INSTRUCTIONS
Qty: 180 TABLET | Refills: 3 | Status: SHIPPED | OUTPATIENT
Start: 2019-04-12 | End: 2019-04-22 | Stop reason: DRUGHIGH

## 2019-04-17 ENCOUNTER — HOSPITAL ENCOUNTER (OUTPATIENT)
Dept: PHYSICAL THERAPY | Age: 39
Setting detail: THERAPIES SERIES
Discharge: HOME | End: 2019-04-17
Payer: MEDICARE

## 2019-04-17 PROCEDURE — 97110 THERAPEUTIC EXERCISES: CPT

## 2019-04-17 NOTE — PROGRESS NOTES
in strength noted R shoulder. Home Exercise Program:   Reviewed with copy provided 3/24/19. Elastic band high ext/mid rows added 4/17/19. Manual Treatments:      Modalities:  None today. Timed Code Treatment Minutes:       Total Treatment Minutes:      Treatment/Activity Tolerance:  [x] Patient tolerated treatment well [] Patient limited by fatique  [] Patient limited by pain  [] Patient limited by other medical complications  [] Other:     Prognosis: [] Good [] Fair  [] Poor    Patient Requires Follow-up: [x] Yes  [] No    Plan:   [x] Continue per plan of care [] Alter current plan (see comments)  [] Plan of care initiated [] Hold pending MD visit [] Discharge    Plan for Next Session:      See Weekly Progress Note: []  Yes  []  No  Next due:        Electronically signed by:  Yoli Martini DPT

## 2019-04-19 ENCOUNTER — HOSPITAL ENCOUNTER (OUTPATIENT)
Dept: PHYSICAL THERAPY | Age: 39
Setting detail: THERAPIES SERIES
Discharge: HOME | End: 2019-04-19
Payer: MEDICARE

## 2019-04-19 PROCEDURE — 97110 THERAPEUTIC EXERCISES: CPT

## 2019-04-19 NOTE — PROGRESS NOTES
Modalities:  None today. Timed Code Treatment Minutes:       Total Treatment Minutes:      Treatment/Activity Tolerance:  [x] Patient tolerated treatment well [] Patient limited by fatique  [] Patient limited by pain  [] Patient limited by other medical complications  [] Other:     Prognosis: [] Good [] Fair  [] Poor    Patient Requires Follow-up: [x] Yes  [] No    Plan:   [x] Continue per plan of care [] Alter current plan (see comments)  [] Plan of care initiated [] Hold pending MD visit [] Discharge    Plan for Next Session:      See Weekly Progress Note: []  Yes  []  No  Next due:        Electronically signed by:  Narciso Rebollar, PT, DPT

## 2019-04-22 ENCOUNTER — OFFICE VISIT (OUTPATIENT)
Dept: FAMILY MEDICINE CLINIC | Age: 39
End: 2019-04-22
Payer: MEDICARE

## 2019-04-22 VITALS
SYSTOLIC BLOOD PRESSURE: 85 MMHG | HEART RATE: 83 BPM | HEIGHT: 72 IN | WEIGHT: 315 LBS | BODY MASS INDEX: 42.66 KG/M2 | DIASTOLIC BLOOD PRESSURE: 62 MMHG | TEMPERATURE: 98 F | OXYGEN SATURATION: 96 % | RESPIRATION RATE: 18 BRPM

## 2019-04-22 DIAGNOSIS — Z79.4 TYPE 2 DIABETES MELLITUS WITH COMPLICATION, WITH LONG-TERM CURRENT USE OF INSULIN (HCC): Primary | Chronic | ICD-10-CM

## 2019-04-22 DIAGNOSIS — E11.8 TYPE 2 DIABETES MELLITUS WITH COMPLICATION, WITH LONG-TERM CURRENT USE OF INSULIN (HCC): Primary | Chronic | ICD-10-CM

## 2019-04-22 DIAGNOSIS — E66.01 MORBID OBESITY WITH BMI OF 40.0-44.9, ADULT (HCC): ICD-10-CM

## 2019-04-22 DIAGNOSIS — I48.0 PAROXYSMAL ATRIAL FIBRILLATION (HCC): ICD-10-CM

## 2019-04-22 DIAGNOSIS — I10 ESSENTIAL HYPERTENSION: ICD-10-CM

## 2019-04-22 DIAGNOSIS — I50.22 CHRONIC SYSTOLIC CONGESTIVE HEART FAILURE (HCC): ICD-10-CM

## 2019-04-22 PROCEDURE — 99213 OFFICE O/P EST LOW 20 MIN: CPT | Performed by: FAMILY MEDICINE

## 2019-04-22 PROCEDURE — 99212 OFFICE O/P EST SF 10 MIN: CPT | Performed by: FAMILY MEDICINE

## 2019-04-22 NOTE — PROGRESS NOTES
[facility]  FAMILY MEDICINE RESIDENCY PROGRAM   OFFICE PROGRESS NOTE  DATE OF VISIT : 2019    Patient : Belkis Nguyen   Sex : male   Age : 45 y.o.    : 1980   MRN : [id number]            Chief Complaint :   Chief Complaint   Patient presents with    Shoulder Pain     right  follow-up    Hypertension     BP low today please review VS flow sheet        HPI:   Silvia Orellana III comes to clinic today for     F/U of chronic problem(s)     Chronic problems reviewed today include:     Hypertension, Diabetes mellitus, Congestive Heart Failure and atrial fibrillation, CKD, right shoulder bursitis    Current status of this/these condition(s):  unstable    Tolerating meds: Yes    Additional history: Shoulder improved after PT, but still with limitations and pain when abducting/flexing beyond 90 degrees. Has to add insulin when he checks sugars, but doesn't check them more than 1-2 x daily. Never has hypoglycemic symptoms.      Patient Active Problem List   Diagnosis    Cardiomyopathy, nonischemic ([address]    Obstructive sleep apnea    Chronic systolic congestive heart failure ([address]    History of noncompliance with medical treatment, presenting hazards to health    Chronic gout    CKD (chronic kidney disease)    Type 2 diabetes mellitus with complication, with long-term current use of insulin (HCC)    Hepatomegaly    Paroxysmal atrial fibrillation ([address]    Essential hypertension    VHD (valvular heart disease)    Morbid obesity with BMI of 40.0-44.9, adult ([address]    SVT (supraventricular tachycardia) ([address]    S/P ICD (internal cardiac defibrillator) procedure       Past Medical History:   Diagnosis Date    Acute CHF ([address] 2011    Acute CHF ([address] 2011    Acute idiopathic gout of right foot 2016    Anemia 2011    CAD (coronary artery disease)     Cerebral artery occlusion with cerebral infarction (HCC)     CKD (chronic kidney disease)     daily 30 tablet 5    allopurinol (ZYLOPRIM) 300 MG tablet Take 1 tablet by mouth daily 30 tablet 5    magnesium oxide (MAG-OX) 400 MG tablet Take 1 tablet by mouth daily 30 tablet 5    insulin lispro (HUMALOG KWIKPEN) 100 UNIT/ML pen Inject 12 Units into the skin 3 times daily (before meals) (Patient taking differently: Inject 18 Units into the skin 3 times daily (before meals) ) 15 mL 11     No current facility-administered medications on file prior to visit. No Known Allergies    Family History   Problem Relation Age of Onset    Cancer Mother        Past Surgical History:   Procedure Laterality Date    CARDIAC DEFIBRILLATOR PLACEMENT  11/20/2018    UPGRADE S-ICD TO BIV ICD   ([employer]TR[employer]IC)  ARIC     ECHO COMPL W DOP COLOR FLOW  11/30/2011         ECHO COMPL W DOP COLOR FLOW  3/27/2012         INSERTABLE CARDIAC MONITOR  12/13/2018    cardioMIGUELINA luke, Dr. Rene Lloyd       Social History     Tobacco Use    Smoking status: Never Smoker    Smokeless tobacco: Never Used   Substance Use Topics    Alcohol use: Not Currently     Comment: rare mixed drink    Drug use: No       Review of Systems - History obtained from the patient  General ROS: negative  Respiratory ROS: no cough, shortness of breath, or wheezing  Cardiovascular ROS: no chest pain or dyspnea on exertion  Musculoskeletal ROS: See HPI    Objective:    VS:  Blood pressure 85/62, pulse 83, temperature 98 °F (36.7 °C), resp. rate 18, height 6' (1.829 m), weight (!) 320 lb (145.2 kg), SpO2 96 %. General Appearance: Well developed, awake, alert, oriented, no acute distress. Obese  Chest wall/Lung: Clear to auscultation bilaterally,  respirations unlabored. No ronchi/wheezing/rales  Heart[de-identified]  Regular rate and rhythm, S1and S2 normal, +murmur. Extremities:  Extremities normal, atraumatic, no cyanosis. No edema. Skin: Skin color, texture, turgor normal, no rashes or lesions  Musculokeletal: right shoulder with painful arc.   No active abduction beyond 90 degrees         Psychiatric: has a normal mood and affect. Behavior is normal.     Most recent labs and imaging reviewed. Findings include:     A1C > 15. Reviewed PT notes    Collette July was seen today for shoulder pain and hypertension. Diagnoses and all orders for this visit:    Type 2 diabetes mellitus with complication, with long-term current use of insulin (HCC)    Essential hypertension    Chronic systolic congestive heart failure (HCC)    Paroxysmal atrial fibrillation ([facility]    Morbid obesity with BMI of 40.0-44.9, adult ([facility]      Additional Plan:    Patient Instructions   Increase Levemir to 40 units twice a day  Increase mealtime insulin to 20 units with meals  Continue home exercises for shoulder  Make appointment for 2 months    We had a prolonged discussion regarding bariatric surgery. I explained risks/benefits and he would like to meet with the bariatric group. I did make referral.  Ailin Leaven in in 2 month(s) or sooner for any persistent, new, or worsening symptoms. Please see Patient Instructions for further counseling and information given. Advised to be adherent to the treatment plans discussed today, and please call with any questions or concerns, letting the office know of any reasons that the plans may not be followed. The risks of untreated conditions include worsening illness, injury, disability, and possibly, death. Please call if symptoms change in any way, worsen, or fail to completely resolve, as this could necessitate a change to treatment plans. Patient and/or caregiver expressed understanding. Indications and proper use of medication(s) reviewed. Potential side-effects and risks of medication(s) also explained. Patient and/or caregiver was instructed to call if any new symptoms develop prior to next visit. Health risk factors discussed and addressed. I have personally reviewed labs and/or imaging (if any).       Signed by : Gloria Montalvo M.D.

## 2019-04-22 NOTE — PATIENT INSTRUCTIONS
Increase Levemir to 40 units twice a day  Increase mealtime insulin to 20 units with meals  Continue home exercises for shoulder  Make appointment for 2 months

## 2019-05-24 ENCOUNTER — TELEPHONE (OUTPATIENT)
Dept: BARIATRICS/WEIGHT MGMT | Age: 39
End: 2019-05-24

## 2019-05-24 ENCOUNTER — INITIAL CONSULT (OUTPATIENT)
Dept: BARIATRICS/WEIGHT MGMT | Age: 39
End: 2019-05-24
Payer: MEDICARE

## 2019-05-24 VITALS
BODY MASS INDEX: 42.66 KG/M2 | HEIGHT: 72 IN | TEMPERATURE: 97 F | SYSTOLIC BLOOD PRESSURE: 97 MMHG | HEART RATE: 74 BPM | WEIGHT: 315 LBS | DIASTOLIC BLOOD PRESSURE: 70 MMHG | RESPIRATION RATE: 20 BRPM

## 2019-05-24 DIAGNOSIS — Z01.818 PRE-OPERATIVE CLEARANCE: Primary | ICD-10-CM

## 2019-05-24 DIAGNOSIS — K30 INDIGESTION: ICD-10-CM

## 2019-05-24 DIAGNOSIS — Z79.4 DIABETES MELLITUS DUE TO UNDERLYING CONDITION WITH COMPLICATION, WITH LONG-TERM CURRENT USE OF INSULIN (HCC): ICD-10-CM

## 2019-05-24 DIAGNOSIS — Z01.818 PRE-OP EVALUATION: ICD-10-CM

## 2019-05-24 DIAGNOSIS — E46 MALNUTRITION, UNSPECIFIED TYPE (HCC): ICD-10-CM

## 2019-05-24 DIAGNOSIS — E08.8 DIABETES MELLITUS DUE TO UNDERLYING CONDITION WITH COMPLICATION, WITH LONG-TERM CURRENT USE OF INSULIN (HCC): ICD-10-CM

## 2019-05-24 DIAGNOSIS — E43 SEVERE PROTEIN-CALORIE MALNUTRITION (HCC): ICD-10-CM

## 2019-05-24 DIAGNOSIS — I50.22 CHRONIC SYSTOLIC CONGESTIVE HEART FAILURE (HCC): ICD-10-CM

## 2019-05-24 DIAGNOSIS — E66.01 MORBID OBESITY DUE TO EXCESS CALORIES (HCC): Primary | ICD-10-CM

## 2019-05-24 DIAGNOSIS — K21.9 GASTROESOPHAGEAL REFLUX DISEASE WITHOUT ESOPHAGITIS: ICD-10-CM

## 2019-05-24 PROCEDURE — 99202 OFFICE O/P NEW SF 15 MIN: CPT

## 2019-05-24 PROCEDURE — 99204 OFFICE O/P NEW MOD 45 MIN: CPT | Performed by: SURGERY

## 2019-05-24 NOTE — PROGRESS NOTES
Patient has tried the following programs to lose weight in the past, but none have yielded substantial, long-term success:    No Atkins   No Herbal Life    No [place]ise [place]  No [place] Weight Loss    No Liquid protein fast  No Medifast    No Optifast   No Overeaters Anonymous    No Daniel Jaramillo  Yes Slim Fast    No Zulma Villalpando  No TOPS    Yes [facility] Watchers  Yes Dexatrim    No Redux   No Fenfluramine    No Meridia   No Phentermine    No Xenical   No Physician Supervised    Yes Self-Imposed      Other: Total number of years dieting: Dieting on and off for years.

## 2019-05-24 NOTE — PROGRESS NOTES
Salina Chavez III  5/24/2019  [facility]    Initial Evaluation  Bariatric Surgery and EGD       Subjective:  CHIEF COMPLAINT: Morbid obesity, malnutrition, Type 2 Diabetes Mellitus, Hypertension, Hyperlipidemia, Obstructive Sleep Apnea, Dyspnea on Exertion, GERD, Degenerative Joint Disease (DJD), CHF and hx CVA    HISTORY OF PRESENT ILLNESS: Salina Chavez III is a morbidly-obese 45 y.o.  male, who weighs (!) 316 lb (143.3 kg). He is 146 pounds over his ideal body weight. The Body mass index is 42.86 kg/m². He has multiple medical problems aggravated by his obesity. He wishes to have bariatric surgery so that he can lose a large amount of weight and keep the weight off. I have [facility] where we discussed the surgery in great detail and went over the risks and benefits. He has watched our informational video so he understands all of the extensive risks involved. He states that he understands all of the risks and wishes to proceed with the evaluation. Diagnosed with CHF at age 29yrs old. Severely advanced with EF 14% in 2018. He is followed by cardiology.     Past Medical History  Patient Active Problem List   Diagnosis    Cardiomyopathy, nonischemic ([address]    Obstructive sleep apnea    Chronic systolic congestive heart failure (HCC)    History of noncompliance with medical treatment, presenting hazards to health    Chronic gout    CKD (chronic kidney disease)    Type 2 diabetes mellitus with complication, with long-term current use of insulin (HCC)    Hepatomegaly    Paroxysmal atrial fibrillation ([address]    Essential hypertension    VHD (valvular heart disease)    Morbid obesity with BMI of 40.0-44.9, adult (HCC)    SVT (supraventricular tachycardia) ([address]    S/P ICD (internal cardiac defibrillator) procedure     Past Surgical History  Past Surgical History:   Procedure Laterality Date    CARDIAC DEFIBRILLATOR PLACEMENT cessation program and stop smoking for 3 months before having any Bariatric surgery. ETOH:    reports that he drank alcohol. The patient has a family history that is negative for severe cardiovascular or respiratory issues, negative for reaction to anesthesia. Review of Systems    A complete 10 system review was performed and are otherwise negative unless mentioned in the above HPI. Specific negatives are listed below but may not include all those reviewed.     General ROS: negative obtundation, AMS  ENT ROS: negative rhinorrhea, epistaxis  Allergy and Immunology ROS: negative itchy/watery eyes or nasal congestion  Hematological and Lymphatic ROS: negative spontaneous bleeding or bruising  Endocrine ROS: negative  lethargy, mood swings, palpitations or polydipsia/polyuria  Respiratory ROS: negative sputum changes, stridor, tachypnea or wheezing  Cardiovascular ROS: negative for - loss of consciousness, murmur or orthopnea  Gastrointestinal ROS: negative for - hematochezia or hematemesis  Genito-Urinary ROS: negative for -  genital discharge or hematuria  Musculoskeletal ROS: negative for - focal weakness, gangrene  Psych/Neuro ROS: negative for - visual or auditory hallucinations, suicidal ideation      Physical Exam:   VITALS: BP 97/70 (Site: Left Upper Arm, Position: Sitting, Cuff Size: Medium Adult)   Pulse 74   Temp 97 °F (36.1 °C) (Temporal)   Resp 20   Ht 6' (1.829 m)   Wt (!) 316 lb (143.3 kg)   BMI 42.86 kg/m²   General appearance: AAO, NAD  Eyes: PERRL, EOMI, red conjunctiva  Lungs: equal chest rise bilateral, no wheeze, no rhonchi  Chest wall: atraumatic, no tenderness, no echymosis or abrasions  Heart: reg rate, no murmur  Abdomen: soft, nondistended, nontender  Extremities: full ROM all 4 ext, no gross motor or sensory deficits  Pulses: 2+ distal  Skin: warm and dry  Neurologic: spontanous eye opening, purposeful, follows complex commands      Assessment:  Morbid obesity with inability to keep the weight off with diet and exercise. He is interested in Laparoscopic Vasu-en- Y Gastric Bypass or Sleeve Gastrectomy. We discussed that our goal is to ameliorate the medical problems and not to obtain a specific body mass index. He understands the risks and benefits, and wishes to proceed with the evaluation. Plan:  Psych evaluation, medical and cardio/pulmonary clearance, gallbladder ultrasound. These patients often have vitamin deficiencies so I will do screening labs for malnutrition. I will do an upper endoscopy to check for hiatal hernias, ulcers and H. Pylori while we wait for insurance approval for the surgery. Significant cardiac hx with EF 14% in 2018. Will need cardiac eval to determine if he is candidate for our program. If high risk would need referred to tertiary center.     Physician Signature: Electronically signed by Dr. Srinivasa Sun MD    Send copy of H&P to PCP, Dino Aguirre MD

## 2019-05-24 NOTE — PATIENT INSTRUCTIONS
What is the next step to proceed with weight loss surgery? Please be aware that any co-pays or deductibles may be requested prior to testing and / or procedures. You will need to schedule a psychological evaluation for weight loss surgery. Patients will be required to complete all psychological testing as required by the psychologist. Patients must follow all of the psychologist's recommendations before weight loss surgery can be scheduled. The evaluation must be done a standard way for weight loss surgery. We strongly recommend that you contact one of our preferred providers listed below to arrange this:    Dr. Lorelee Alpers, PhD [address]        [phone]      Dr. Elsa Carvajal, PhD   [address] [place], [place]         [phone]    You will also need to plan on attending a 2 hour nutrition class at the [facility] prior to your surgery. We will schedule this for you when we schedule your surgery. Please remember to have your labs drawn prior to your scheduled appointment to review the results of your testing. Please remember, that while we will submit your case to insurance for surgery authorization, it is your responsibility to know if your plan covers weight loss surgery and keep up-to-date with changes to your insurance coverage. We will do everything possible to help you get approved for weight loss surgery, but cannot guarantee an approval.     Please note that you will not be submitted to your insurance company until all   pre-operative testing requirements are met.

## 2019-05-24 NOTE — TELEPHONE ENCOUNTER
Patient will be referred to Dr Trip Morales ( established with him ) for cardiac clearance prior to any testing to be done.  Patient has strong hx of chf.

## 2019-06-07 DIAGNOSIS — I50.22 CHRONIC SYSTOLIC CONGESTIVE HEART FAILURE (HCC): Primary | ICD-10-CM

## 2019-06-07 PROCEDURE — 93264 REM MNTR WRLS P-ART PRS SNR: CPT | Performed by: INTERNAL MEDICINE

## 2019-06-11 RX ORDER — RIVAROXABAN 20 MG/1
TABLET, FILM COATED ORAL
Qty: 30 TABLET | Refills: 10 | Status: SHIPPED | OUTPATIENT
Start: 2019-06-11 | End: 2019-10-16 | Stop reason: SDUPTHER

## 2019-06-11 RX ORDER — ALLOPURINOL 300 MG/1
TABLET ORAL
Qty: 30 TABLET | Refills: 10 | Status: SHIPPED | OUTPATIENT
Start: 2019-06-11 | End: 2019-10-16 | Stop reason: SDUPTHER

## 2019-06-11 RX ORDER — SPIRONOLACTONE 25 MG/1
TABLET ORAL
Qty: 30 TABLET | Refills: 10 | Status: ON HOLD | OUTPATIENT
Start: 2019-06-11 | End: 2019-08-02 | Stop reason: HOSPADM

## 2019-06-11 RX ORDER — TORSEMIDE 10 MG/1
20 TABLET ORAL SEE ADMIN INSTRUCTIONS
Qty: 60 TABLET | Refills: 10 | Status: SHIPPED | OUTPATIENT
Start: 2019-06-11 | End: 2019-06-17

## 2019-06-17 ENCOUNTER — HOSPITAL ENCOUNTER (OUTPATIENT)
Age: 39
Discharge: HOME | End: 2019-06-19
Payer: MEDICARE

## 2019-06-17 ENCOUNTER — OFFICE VISIT (OUTPATIENT)
Dept: FAMILY MEDICINE CLINIC | Age: 39
End: 2019-06-17
Payer: MEDICARE

## 2019-06-17 VITALS
DIASTOLIC BLOOD PRESSURE: 70 MMHG | HEART RATE: 79 BPM | TEMPERATURE: 98.3 F | WEIGHT: 315 LBS | SYSTOLIC BLOOD PRESSURE: 111 MMHG | OXYGEN SATURATION: 97 % | BODY MASS INDEX: 42.66 KG/M2 | HEIGHT: 72 IN

## 2019-06-17 DIAGNOSIS — I42.8 CARDIOMYOPATHY, NONISCHEMIC (HCC): Chronic | ICD-10-CM

## 2019-06-17 DIAGNOSIS — Z79.4 TYPE 2 DIABETES MELLITUS WITH COMPLICATION, WITH LONG-TERM CURRENT USE OF INSULIN (HCC): Chronic | ICD-10-CM

## 2019-06-17 DIAGNOSIS — E66.01 MORBID OBESITY WITH BMI OF 40.0-44.9, ADULT (HCC): ICD-10-CM

## 2019-06-17 DIAGNOSIS — I50.22 CHRONIC SYSTOLIC CONGESTIVE HEART FAILURE (HCC): Primary | ICD-10-CM

## 2019-06-17 DIAGNOSIS — N18.9 CHRONIC KIDNEY DISEASE, UNSPECIFIED CKD STAGE: ICD-10-CM

## 2019-06-17 DIAGNOSIS — E11.8 TYPE 2 DIABETES MELLITUS WITH COMPLICATION, WITH LONG-TERM CURRENT USE OF INSULIN (HCC): Chronic | ICD-10-CM

## 2019-06-17 DIAGNOSIS — I10 ESSENTIAL HYPERTENSION: ICD-10-CM

## 2019-06-17 LAB
ALBUMIN SERPL-MCNC: 4.3 G/DL (ref 3.5–5.2)
ALP BLD-CCNC: 141 U/L (ref 40–129)
ALT SERPL-CCNC: 20 U/L (ref 0–40)
ANION GAP SERPL CALCULATED.3IONS-SCNC: 18 MMOL/L (ref 7–16)
AST SERPL-CCNC: 16 U/L (ref 0–39)
BILIRUB SERPL-MCNC: 0.3 MG/DL (ref 0–1.2)
BUN BLDV-MCNC: 32 MG/DL (ref 6–20)
CALCIUM SERPL-MCNC: 10.4 MG/DL (ref 8.6–10.2)
CHLORIDE BLD-SCNC: 96 MMOL/L (ref 98–107)
CHOLESTEROL, TOTAL: 191 MG/DL (ref 0–199)
CO2: 20 MMOL/L (ref 22–29)
CREAT SERPL-MCNC: 1.4 MG/DL (ref 0.7–1.2)
GFR AFRICAN AMERICAN: >60
GFR NON-AFRICAN AMERICAN: >60 ML/MIN/1.73
GLUCOSE BLD-MCNC: 380 MG/DL (ref 74–99)
HDLC SERPL-MCNC: 48 MG/DL
LDL CHOLESTEROL CALCULATED: 109 MG/DL (ref 0–99)
POTASSIUM SERPL-SCNC: 4.4 MMOL/L (ref 3.5–5)
SODIUM BLD-SCNC: 134 MMOL/L (ref 132–146)
TOTAL PROTEIN: 8.6 G/DL (ref 6.4–8.3)
TRIGL SERPL-MCNC: 169 MG/DL (ref 0–149)
VLDLC SERPL CALC-MCNC: 34 MG/DL

## 2019-06-17 PROCEDURE — 99212 OFFICE O/P EST SF 10 MIN: CPT | Performed by: FAMILY MEDICINE

## 2019-06-17 PROCEDURE — 83036 HEMOGLOBIN GLYCOSYLATED A1C: CPT

## 2019-06-17 PROCEDURE — 99214 OFFICE O/P EST MOD 30 MIN: CPT | Performed by: FAMILY MEDICINE

## 2019-06-17 PROCEDURE — 80061 LIPID PANEL: CPT

## 2019-06-17 PROCEDURE — 80053 COMPREHEN METABOLIC PANEL: CPT

## 2019-06-17 NOTE — PROGRESS NOTES
with cerebral infarction ([facility]     CKD (chronic kidney disease)     Gout     HTN (hypertension) 11/29/2011    Hyperlipidemia     Hypertension     Morbid obesity due to excess calories (HCC)     Nonische[facility] ([facility] 11/29/2011    Nonischemic cardiomyopathy ([facility] 7/2013 7/2013- cardiac MRI revealed an LVEF of 20%    ARVIND (obstructive sleep apnea) 11/29/2011    S/P left heart catheterization by percutaneous approach 12-27/2011    Dr Steve Vila Systolic heart failure [facility]) 5/7/2012 5/7/12- cardiac catheterization revealed an LVEF of 10-15%, mitral regurgitation along with borderline prolapse of mitral valve    Type 2 diabetes mellitus with complication, with long-term current use of insulin ([facility] 8/22/2016    URI, acute 11/29/2011       Current Outpatient Medications on File Prior to Visit   Medication Sig Dispense Refill    XARELTO 20 MG TABS tablet TAKE 1 TABLET BY MOUTH DAILY 30 tablet 10    spironolactone (ALDACTONE) 25 MG tablet TAKE (1) TABLET BY MOUTH DAILY 30 tablet 10    allopurinol (ZYLOPRIM) 300 MG tablet TAKE (1) TABLET BY MOUTH DAILY 30 tablet 10    potassium chloride (KLOR-CON M) 20 MEQ TBCR extended release tablet Take 1 tablet by mouth 2 times daily 60 tablet 3    torsemide (DEMADEX) 20 MG tablet Take 2 tablets by mouth 2 times daily 120 tablet 6    LEVEMIR FLEXTOUCH 100 UNIT/ML injection pen Inject 32 Units into the skin 2 times daily (Patient taking differently: Inject 34 Units into the skin 2 times daily ) 10 pen 5    sacubitril-valsartan (ENTRESTO)  MG per tablet Take 1 tablet by mouth 2 times daily 60 tablet 11    carvedilol (COREG) 25 MG tablet Take 1 tablet by mouth 2 times daily (with meals) 180 tablet 3    ferrous sulfate 325 (65 Fe) MG tablet Take 1 tablet by mouth daily (with breakfast) 30 tablet 3    atorvastatin (LIPITOR) 40 MG tablet Take 1 tablet by mouth daily 30 tablet 5    magnesium oxide (MAG-OX) 400 MG tablet Take 1 tablet by mouth daily 30 tablet 5    insulin lispro (HUMALOG KWIKPEN) 100 UNIT/ML pen Inject 12 Units into the skin 3 times daily (before meals) (Patient taking differently: Inject 18 Units into the skin 3 times daily (before meals) ) 15 mL 11    sharps container For needle disposal. 1 each 5     No current facility-administered medications on file prior to visit. No Known Allergies    Family History   Problem Relation Age of Onset    Cancer Mother        Past Surgical History:   Procedure Laterality Date    CARDIAC DEFIBRILLATOR PLACEMENT  11/20/2018    UPGRADE S-ICD TO BIV ICD   ([employer])  ARIC     ECHO COMPL W DOP COLOR FLOW  11/30/2011         ECHO COMPL W DOP COLOR FLOW  3/27/2012         INSERTABLE CARDIAC MONITOR  12/13/2018    cardiomems, [place], Dr. Rene Lloyd       Social History     Tobacco Use    Smoking status: Never Smoker    Smokeless tobacco: Never Used   Substance Use Topics    Alcohol use: Not Currently     Comment: rare mixed drink    Drug use: No       Review of Systems - History obtained from the patient  General ROS: negative  Respiratory ROS: no cough, shortness of breath, or wheezing  Cardiovascular ROS: no chest pain or dyspnea on exertion  Gastrointestinal ROS: no abdominal pain, change in bowel habits, or black or bloody stools  Genito-Urinary ROS: no dysuria, trouble voiding, or hematuria  Musculoskeletal ROS: negative  Neurological ROS: negative    Objective:    VS:  Blood pressure 111/70, pulse 79, temperature 98.3 °F (36.8 °C), temperature source Oral, height 6' (1.829 m), weight (!) 327 lb (148.3 kg), SpO2 97 %. General Appearance: Well developed, awake, alert, oriented, no acute distress  Neck: Supple, symmetrical, trachea midline. No JVD. Thyroid smooth, not enlarged. Chest wall/Lung: Clear to auscultation bilaterally,  respirations unlabored. No rhonchi/wheezing/rales  Heart[de-identified]  Regular rate and rhythm, S1and S2 normal, no murmur, rub or gallop.   Abdomen: Obese  Extremities:  Extremities normal, atraumatic, no cyanosis. 1+ edema. Skin: Skin color, texture, turgor normal, no rashes or lesions  Musculoskeletal: ROM grossly normal in all joints of extremities, no obvious joint swelling. Lymph nodes: no lymph node enlargement appreciated  Neurologic:    Alert, oriented. Normal gait and coordination   No focal neurologic deficits noted. Psychiatric: has a normal mood and affect. Behavior is normal.    Foot:  Normal pulses, no skin changes, normal sensation by monofilament      Most recent labs and imaging reviewed. Findings include:     N/A    Yesenia Green was seen today for other. Diagnoses and all orders for this visit:    Chronic systolic congestive heart failure (HCC)    Chronic kidney disease, unspecified CKD stage  -     Comprehensive Metabolic Panel; Future    Essential hypertension    Cardiomyopathy, nonischemic ([facility]    Morbid obesity with BMI of 40.0-44.9, adult ([facility]  -     Lipid Panel; Future    Type 2 diabetes mellitus with complication, with long-term current use of insulin ([facility])  -     Hemoglobin A1C; Future  -     Lipid Panel; Future  -      DIABETES FOOT EXAM        Additional Plan:  I once again reviewed changes to his insulin and printed them out for him. I'm going to have him increase on his own until sugars are better controlled. We discussed diet/exercise. I will see him in several months, but he will definitely need cardiology appointment prior to any surgery. RTO in in 2 month(s) or sooner for any persistent, new, or worsening symptoms. Please see Patient Instructions for further counseling and information given.      Patient Instructions   Increase Levemir to 40 units twice a day  Keep humalog at 20 units with each meal  Check your sugar at least once or twice a day  If you never have sugars below 100 and most are above 200, increase Levemir to 42 units twice a day  You can continue to increase Levemir by 2 units at a time every 3-7 days until your sugars are mostly below 180  Return here in two months        Advised to be adherent to the treatment plans discussed today, and please call with any questions or concerns, letting the office know of any reasons that the plans may not be followed. The risks of untreated conditions include worsening illness, injury, disability, and possibly, death. Please call if symptoms change in any way, worsen, or fail to completely resolve, as this could necessitate a change to treatment plans. Patient and/or caregiver expressed understanding. Indications and proper use of medication(s) reviewed. Potential side-effects and risks of medication(s) also explained. Patient and/or caregiver was instructed to call if any new symptoms develop prior to next visit. Health risk factors discussed and addressed. I have personally reviewed labs and/or imaging (if any).       Signed by : Damon Loomis M.D.

## 2019-06-17 NOTE — PATIENT INSTRUCTIONS
Increase Levemir to 40 units twice a day  Keep humalog at 20 units with each meal  Check your sugar at least once or twice a day  If you never have sugars below 100 and most are above 200, increase Levemir to 42 units twice a day  You can continue to increase Levemir by 2 units at a time every 3-7 days until your sugars are mostly below 180  Return here in two months

## 2019-06-20 LAB — HBA1C MFR BLD: 15.2 %

## 2019-07-11 ENCOUNTER — TELEPHONE (OUTPATIENT)
Dept: CARDIOLOGY CLINIC | Age: 39
End: 2019-07-11

## 2019-07-11 NOTE — TELEPHONE ENCOUNTER
Spoke with patient regarding no cardiomems reading since 6-20-19. Pt stated he has been out of town and was unable to send a reading. Pt states he feels great. He denies any shortness of breath, edema or orthopnea. He reports medication and dietary compliance. He stated he would send a reading sometime today.

## 2019-07-16 DIAGNOSIS — I50.22 CHRONIC SYSTOLIC CONGESTIVE HEART FAILURE (HCC): Primary | ICD-10-CM

## 2019-07-16 PROCEDURE — 93264 REM MNTR WRLS P-ART PRS SNR: CPT | Performed by: INTERNAL MEDICINE

## 2019-07-16 RX ORDER — MAGNESIUM OXIDE 400 MG/1
400 TABLET ORAL DAILY
Qty: 30 TABLET | Refills: 11 | Status: SHIPPED | OUTPATIENT
Start: 2019-07-16 | End: 2019-08-08 | Stop reason: SDUPTHER

## 2019-07-22 ENCOUNTER — TELEPHONE (OUTPATIENT)
Dept: CARDIOLOGY CLINIC | Age: 39
End: 2019-07-22

## 2019-07-22 NOTE — TELEPHONE ENCOUNTER
Danish Allen III 1980 [phone] (home) [phone] (work)     He will send reading , cardiomems when he gets home today. Had \"food poisoning\" few days ago. Better now  Weights steady, no swelling, no trouble breathing.     hemaglobin A1C 15.2 pcp managing. Says hes taking cardiac meds as directed.     8-7 to see Dr Cassandra Tejeda (cardiology)    Last mems reading was 7-18-19   Taken on 07-, 24:83 AM   PA Systolic: 46 mmHg PA Diastolic: 25 mmHg PA Mean: 33 mmHg Heart Rate: 90 bpm

## 2019-07-30 ENCOUNTER — HOSPITAL ENCOUNTER (INPATIENT)
Age: 39
LOS: 3 days | Discharge: HOME | DRG: 287 | End: 2019-08-02
Attending: EMERGENCY MEDICINE | Admitting: FAMILY MEDICINE
Payer: MEDICARE

## 2019-07-30 ENCOUNTER — APPOINTMENT (OUTPATIENT)
Dept: CT IMAGING | Age: 39
DRG: 287 | End: 2019-07-30
Payer: MEDICARE

## 2019-07-30 ENCOUNTER — APPOINTMENT (OUTPATIENT)
Dept: GENERAL RADIOLOGY | Age: 39
DRG: 287 | End: 2019-07-30
Payer: MEDICARE

## 2019-07-30 DIAGNOSIS — N18.9 CHRONIC KIDNEY DISEASE, UNSPECIFIED CKD STAGE: ICD-10-CM

## 2019-07-30 DIAGNOSIS — E61.1 LOW IRON: ICD-10-CM

## 2019-07-30 DIAGNOSIS — R73.9 HYPERGLYCEMIA: ICD-10-CM

## 2019-07-30 DIAGNOSIS — Z45.02 ICD (IMPLANTABLE CARDIOVERTER-DEFIBRILLATOR) DISCHARGE: ICD-10-CM

## 2019-07-30 DIAGNOSIS — R55 SYNCOPE AND COLLAPSE: Primary | ICD-10-CM

## 2019-07-30 LAB
ALBUMIN SERPL-MCNC: 4.2 G/DL (ref 3.5–5.2)
ALP BLD-CCNC: 126 U/L (ref 40–129)
ALT SERPL-CCNC: 25 U/L (ref 0–40)
ANION GAP SERPL CALCULATED.3IONS-SCNC: 14 MMOL/L (ref 7–16)
ANION GAP SERPL CALCULATED.3IONS-SCNC: 20 MMOL/L (ref 7–16)
AST SERPL-CCNC: 29 U/L (ref 0–39)
BASOPHILS ABSOLUTE: 0.03 E9/L (ref 0–0.2)
BASOPHILS RELATIVE PERCENT: 0.3 % (ref 0–2)
BETA-HYDROXYBUTYRATE: 0.36 MMOL/L (ref 0.02–0.27)
BILIRUB SERPL-MCNC: 0.5 MG/DL (ref 0–1.2)
BILIRUBIN URINE: NEGATIVE
BLOOD, URINE: NEGATIVE
BUN BLDV-MCNC: 57 MG/DL (ref 6–20)
BUN BLDV-MCNC: 60 MG/DL (ref 6–20)
CALCIUM SERPL-MCNC: 9.8 MG/DL (ref 8.6–10.2)
CALCIUM SERPL-MCNC: 9.9 MG/DL (ref 8.6–10.2)
CHLORIDE BLD-SCNC: 87 MMOL/L (ref 98–107)
CHLORIDE BLD-SCNC: 89 MMOL/L (ref 98–107)
CHLORIDE URINE RANDOM: 27 MMOL/L
CLARITY: CLEAR
CO2: 19 MMOL/L (ref 22–29)
CO2: 21 MMOL/L (ref 22–29)
COLOR: YELLOW
CREAT SERPL-MCNC: 1.9 MG/DL (ref 0.7–1.2)
CREAT SERPL-MCNC: 2 MG/DL (ref 0.7–1.2)
EKG ATRIAL RATE: 75 BPM
EKG P AXIS: 47 DEGREES
EKG Q-T INTERVAL: 462 MS
EKG QRS DURATION: 164 MS
EKG QTC CALCULATION (BAZETT): 515 MS
EKG R AXIS: 151 DEGREES
EKG T AXIS: -35 DEGREES
EKG VENTRICULAR RATE: 75 BPM
EOSINOPHILS ABSOLUTE: 0.09 E9/L (ref 0.05–0.5)
EOSINOPHILS RELATIVE PERCENT: 1 % (ref 0–6)
GFR AFRICAN AMERICAN: 45
GFR AFRICAN AMERICAN: 48
GFR NON-AFRICAN AMERICAN: 45 ML/MIN/1.73
GFR NON-AFRICAN AMERICAN: 48 ML/MIN/1.73
GLUCOSE BLD-MCNC: 465 MG/DL (ref 74–99)
GLUCOSE BLD-MCNC: 530 MG/DL (ref 74–99)
GLUCOSE URINE: >=1000 MG/DL
HCT VFR BLD CALC: 40.5 % (ref 37–54)
HEMOGLOBIN: 13.7 G/DL (ref 12.5–16.5)
IMMATURE GRANULOCYTES #: 0.05 E9/L
IMMATURE GRANULOCYTES %: 0.5 % (ref 0–5)
KETONES, URINE: NEGATIVE MG/DL
LEUKOCYTE ESTERASE, URINE: NEGATIVE
LYMPHOCYTES ABSOLUTE: 1.45 E9/L (ref 1.5–4)
LYMPHOCYTES RELATIVE PERCENT: 15.6 % (ref 20–42)
MCH RBC QN AUTO: 29.5 PG (ref 26–35)
MCHC RBC AUTO-ENTMCNC: 33.8 % (ref 32–34.5)
MCV RBC AUTO: 87.1 FL (ref 80–99.9)
METER GLUCOSE: 435 MG/DL (ref 74–99)
METER GLUCOSE: 472 MG/DL (ref 74–99)
MONOCYTES ABSOLUTE: 0.67 E9/L (ref 0.1–0.95)
MONOCYTES RELATIVE PERCENT: 7.2 % (ref 2–12)
NEUTROPHILS ABSOLUTE: 6.98 E9/L (ref 1.8–7.3)
NEUTROPHILS RELATIVE PERCENT: 75.4 % (ref 43–80)
NITRITE, URINE: NEGATIVE
PDW BLD-RTO: 13.3 FL (ref 11.5–15)
PH UA: 5.5 (ref 5–9)
PLATELET # BLD: 286 E9/L (ref 130–450)
PMV BLD AUTO: 10.1 FL (ref 7–12)
POTASSIUM REFLEX MAGNESIUM: 6.1 MMOL/L (ref 3.5–5)
POTASSIUM SERPL-SCNC: 4.8 MMOL/L (ref 3.5–5)
POTASSIUM SERPL-SCNC: 5.7 MMOL/L (ref 3.5–5)
POTASSIUM, UR: 39.4 MMOL/L
PRO-BNP: 631 PG/ML (ref 0–125)
PROTEIN UA: NEGATIVE MG/DL
RBC # BLD: 4.65 E12/L (ref 3.8–5.8)
SODIUM BLD-SCNC: 122 MMOL/L (ref 132–146)
SODIUM BLD-SCNC: 128 MMOL/L (ref 132–146)
SODIUM URINE: 25 MMOL/L
SPECIFIC GRAVITY UA: 1.01 (ref 1–1.03)
TOTAL PROTEIN: 8.2 G/DL (ref 6.4–8.3)
TROPONIN: 0.02 NG/ML (ref 0–0.03)
TROPONIN: 0.02 NG/ML (ref 0–0.03)
TROPONIN: 0.03 NG/ML (ref 0–0.03)
UREA NITROGEN, UR: 421 MG/DL (ref 800–1666)
UROBILINOGEN, URINE: 0.2 E.U./DL
WBC # BLD: 9.3 E9/L (ref 4.5–11.5)

## 2019-07-30 PROCEDURE — 93005 ELECTROCARDIOGRAM TRACING: CPT | Performed by: STUDENT IN AN ORGANIZED HEALTH CARE EDUCATION/TRAINING PROGRAM

## 2019-07-30 PROCEDURE — 82962 GLUCOSE BLOOD TEST: CPT

## 2019-07-30 PROCEDURE — 93010 ELECTROCARDIOGRAM REPORT: CPT | Performed by: INTERNAL MEDICINE

## 2019-07-30 PROCEDURE — 82010 KETONE BODYS QUAN: CPT

## 2019-07-30 PROCEDURE — 99285 EMERGENCY DEPT VISIT HI MDM: CPT

## 2019-07-30 PROCEDURE — 84540 ASSAY OF URINE/UREA-N: CPT

## 2019-07-30 PROCEDURE — 71045 X-RAY EXAM CHEST 1 VIEW: CPT

## 2019-07-30 PROCEDURE — 82436 ASSAY OF URINE CHLORIDE: CPT

## 2019-07-30 PROCEDURE — 85025 COMPLETE CBC W/AUTO DIFF WBC: CPT

## 2019-07-30 PROCEDURE — 2580000003 HC RX 258: Performed by: STUDENT IN AN ORGANIZED HEALTH CARE EDUCATION/TRAINING PROGRAM

## 2019-07-30 PROCEDURE — 84132 ASSAY OF SERUM POTASSIUM: CPT

## 2019-07-30 PROCEDURE — 83880 ASSAY OF NATRIURETIC PEPTIDE: CPT

## 2019-07-30 PROCEDURE — 81003 URINALYSIS AUTO W/O SCOPE: CPT

## 2019-07-30 PROCEDURE — 80053 COMPREHEN METABOLIC PANEL: CPT

## 2019-07-30 PROCEDURE — 84484 ASSAY OF TROPONIN QUANT: CPT

## 2019-07-30 PROCEDURE — 6370000000 HC RX 637 (ALT 250 FOR IP): Performed by: STUDENT IN AN ORGANIZED HEALTH CARE EDUCATION/TRAINING PROGRAM

## 2019-07-30 PROCEDURE — 80048 BASIC METABOLIC PNL TOTAL CA: CPT

## 2019-07-30 PROCEDURE — 70450 CT HEAD/BRAIN W/O DYE: CPT

## 2019-07-30 PROCEDURE — 94760 N-INVAS EAR/PLS OXIMETRY 1: CPT

## 2019-07-30 PROCEDURE — 84133 ASSAY OF URINE POTASSIUM: CPT

## 2019-07-30 PROCEDURE — 84300 ASSAY OF URINE SODIUM: CPT

## 2019-07-30 PROCEDURE — 2060000000 HC ICU INTERMEDIATE R&B

## 2019-07-30 PROCEDURE — 36415 COLL VENOUS BLD VENIPUNCTURE: CPT

## 2019-07-30 RX ORDER — ONDANSETRON 2 MG/ML
4 INJECTION INTRAMUSCULAR; INTRAVENOUS EVERY 6 HOURS PRN
Status: DISCONTINUED | OUTPATIENT
Start: 2019-07-30 | End: 2019-07-31

## 2019-07-30 RX ORDER — ALLOPURINOL 300 MG/1
300 TABLET ORAL DAILY
Status: DISCONTINUED | OUTPATIENT
Start: 2019-07-31 | End: 2019-08-02 | Stop reason: HOSPADM

## 2019-07-30 RX ORDER — 0.9 % SODIUM CHLORIDE 0.9 %
1000 INTRAVENOUS SOLUTION INTRAVENOUS ONCE
Status: COMPLETED | OUTPATIENT
Start: 2019-07-30 | End: 2019-07-30

## 2019-07-30 RX ORDER — DEXTROSE MONOHYDRATE 25 G/50ML
12.5 INJECTION, SOLUTION INTRAVENOUS PRN
Status: DISCONTINUED | OUTPATIENT
Start: 2019-07-30 | End: 2019-08-02 | Stop reason: HOSPADM

## 2019-07-30 RX ORDER — CARVEDILOL 25 MG/1
25 TABLET ORAL 2 TIMES DAILY WITH MEALS
Status: DISCONTINUED | OUTPATIENT
Start: 2019-07-30 | End: 2019-08-01

## 2019-07-30 RX ORDER — DEXTROSE MONOHYDRATE 50 MG/ML
100 INJECTION, SOLUTION INTRAVENOUS PRN
Status: DISCONTINUED | OUTPATIENT
Start: 2019-07-30 | End: 2019-08-02 | Stop reason: HOSPADM

## 2019-07-30 RX ORDER — NICOTINE POLACRILEX 4 MG
15 LOZENGE BUCCAL PRN
Status: DISCONTINUED | OUTPATIENT
Start: 2019-07-30 | End: 2019-08-02 | Stop reason: HOSPADM

## 2019-07-30 RX ORDER — SPIRONOLACTONE 25 MG/1
25 TABLET ORAL ONCE
Status: COMPLETED | OUTPATIENT
Start: 2019-07-31 | End: 2019-07-30

## 2019-07-30 RX ORDER — ACETAMINOPHEN 325 MG/1
650 TABLET ORAL EVERY 4 HOURS PRN
Status: DISCONTINUED | OUTPATIENT
Start: 2019-07-30 | End: 2019-08-02 | Stop reason: HOSPADM

## 2019-07-30 RX ORDER — POTASSIUM CHLORIDE 20 MEQ/1
20 TABLET, EXTENDED RELEASE ORAL 2 TIMES DAILY
Status: DISCONTINUED | OUTPATIENT
Start: 2019-07-31 | End: 2019-07-31

## 2019-07-30 RX ORDER — INSULIN GLARGINE 100 [IU]/ML
40 INJECTION, SOLUTION SUBCUTANEOUS 2 TIMES DAILY
Status: DISCONTINUED | OUTPATIENT
Start: 2019-07-30 | End: 2019-08-01

## 2019-07-30 RX ORDER — ATORVASTATIN CALCIUM 40 MG/1
40 TABLET, FILM COATED ORAL DAILY
Status: DISCONTINUED | OUTPATIENT
Start: 2019-07-31 | End: 2019-08-02 | Stop reason: HOSPADM

## 2019-07-30 RX ORDER — TORSEMIDE 20 MG/1
40 TABLET ORAL 2 TIMES DAILY
Status: DISCONTINUED | OUTPATIENT
Start: 2019-07-30 | End: 2019-07-30

## 2019-07-30 RX ORDER — LANOLIN ALCOHOL/MO/W.PET/CERES
400 CREAM (GRAM) TOPICAL DAILY
Status: DISCONTINUED | OUTPATIENT
Start: 2019-07-31 | End: 2019-08-02 | Stop reason: HOSPADM

## 2019-07-30 RX ORDER — 0.9 % SODIUM CHLORIDE 0.9 %
500 INTRAVENOUS SOLUTION INTRAVENOUS ONCE
Status: COMPLETED | OUTPATIENT
Start: 2019-07-30 | End: 2019-07-30

## 2019-07-30 RX ORDER — SODIUM CHLORIDE 0.9 % (FLUSH) 0.9 %
10 SYRINGE (ML) INJECTION EVERY 12 HOURS SCHEDULED
Status: DISCONTINUED | OUTPATIENT
Start: 2019-07-30 | End: 2019-08-02 | Stop reason: HOSPADM

## 2019-07-30 RX ORDER — TORSEMIDE 20 MG/1
20 TABLET ORAL DAILY
Status: DISCONTINUED | OUTPATIENT
Start: 2019-07-31 | End: 2019-07-31

## 2019-07-30 RX ORDER — SODIUM CHLORIDE 0.9 % (FLUSH) 0.9 %
10 SYRINGE (ML) INJECTION PRN
Status: DISCONTINUED | OUTPATIENT
Start: 2019-07-30 | End: 2019-08-02 | Stop reason: HOSPADM

## 2019-07-30 RX ADMIN — SPIRONOLACTONE 25 MG: 25 TABLET ORAL at 23:31

## 2019-07-30 RX ADMIN — SODIUM CHLORIDE 1000 ML: 9 INJECTION, SOLUTION INTRAVENOUS at 14:04

## 2019-07-30 RX ADMIN — CARVEDILOL 25 MG: 25 TABLET, FILM COATED ORAL at 16:52

## 2019-07-30 RX ADMIN — INSULIN GLARGINE 40 UNITS: 100 INJECTION, SOLUTION SUBCUTANEOUS at 20:11

## 2019-07-30 RX ADMIN — INSULIN LISPRO 9 UNITS: 100 INJECTION, SOLUTION INTRAVENOUS; SUBCUTANEOUS at 21:17

## 2019-07-30 RX ADMIN — Medication 10 ML: at 21:17

## 2019-07-30 RX ADMIN — INSULIN LISPRO 18 UNITS: 100 INJECTION, SOLUTION INTRAVENOUS; SUBCUTANEOUS at 16:53

## 2019-07-30 RX ADMIN — INSULIN LISPRO 20 UNITS: 100 INJECTION, SOLUTION INTRAVENOUS; SUBCUTANEOUS at 16:53

## 2019-07-30 RX ADMIN — ACETAMINOPHEN 650 MG: 325 TABLET, FILM COATED ORAL at 23:31

## 2019-07-30 RX ADMIN — INSULIN HUMAN 10 UNITS: 100 INJECTION, SOLUTION PARENTERAL at 16:51

## 2019-07-30 RX ADMIN — ACETAMINOPHEN 650 MG: 325 TABLET, FILM COATED ORAL at 19:04

## 2019-07-30 RX ADMIN — SACUBITRIL AND VALSARTAN 1 TABLET: 97; 103 TABLET, FILM COATED ORAL at 21:17

## 2019-07-30 RX ADMIN — SODIUM CHLORIDE 500 ML: 9 INJECTION, SOLUTION INTRAVENOUS at 12:24

## 2019-07-30 ASSESSMENT — ENCOUNTER SYMPTOMS
EYE PAIN: 0
VOMITING: 0
BACK PAIN: 0
ABDOMINAL PAIN: 0
DIARRHEA: 0
COUGH: 0
NAUSEA: 0
WHEEZING: 0
SORE THROAT: 0
EYE DISCHARGE: 0
SHORTNESS OF BREATH: 0
SINUS PRESSURE: 0
EYE REDNESS: 0

## 2019-07-30 ASSESSMENT — PAIN DESCRIPTION - LOCATION: LOCATION: FACE

## 2019-07-30 ASSESSMENT — PAIN SCALES - GENERAL
PAINLEVEL_OUTOF10: 4
PAINLEVEL_OUTOF10: 7
PAINLEVEL_OUTOF10: 8

## 2019-07-30 ASSESSMENT — PAIN DESCRIPTION - PAIN TYPE: TYPE: ACUTE PAIN

## 2019-07-30 NOTE — H&P
betahydroxybutyrate, daily BMP  · Hypoglycemia protocol in place, ordered diabetic education       HTN/HLD/CAD  · Home meds: lipitor and medications for HF  · Continue home meds      ARVIND  · Continue cpap at night      Gout  · Continue allopurinol      Paroxysmal A-fib/CVA 2015  · NSR on admission with ventricular pacing  · Continue xarelto        Diet: cardiac, carb control, fluid restricted    Disposition: tele, intermediate    DVT / GI prophylaxis: xarelto and GI prophylaxis not indicated      Denys Prado M.D., PGY2    Attending physician: Dr. Hector De Leon

## 2019-07-30 NOTE — CONSULTS
torsemide (DEMADEX) tablet 20 mg, 20 mg, Oral, Daily  Allergies:  Patient has no known allergies. Social History:    TOBACCO:   reports that he has never smoked. He has never used smokeless tobacco.  ETOH:   reports that he drank alcohol.     Family History:       Problem Relation Age of Onset    Cancer Mother      REVIEW OF SYSTEMS:    CONSTITUTIONAL:  negative for  fevers and chills  EYES:  negative for  double vision  HEENT:  negative for  hearing loss and epistaxis  RESPIRATORY:  positive for  dyspnea  CARDIOVASCULAR:  negative for  chest pain  GASTROINTESTINAL:  negative for nausea, vomiting and diarrhea  GENITOURINARY:  negative for frequency and hematuria  INTEGUMENT/BREAST:  negative for rash  HEMATOLOGIC/LYMPHATIC:  negative for easy bruising and bleeding  ALLERGIC/IMMUNOLOGIC:  negative  ENDOCRINE:  negative for heat intolerance and cold intolerance  MUSCULOSKELETAL:  negative for  myalgias and arthralgias  NEUROLOGICAL:  negative for headaches and seizures    PHYSICAL EXAM:      Vitals:    VITALS:  BP (!) 92/46   Pulse 74   Temp 97.9 °F (36.6 °C) (Temporal)   Resp 18   Ht 6' (1.829 m)   Wt (!) 322 lb (146.1 kg)   SpO2 99%   BMI 43.67 kg/m²   24HR INTAKE/OUTPUT:    Intake/Output Summary (Last 24 hours) at 7/30/2019 1621  Last data filed at 7/30/2019 1324  Gross per 24 hour   Intake 500 ml   Output --   Net 500 ml       Constitutional: Patient is awake alert no distress  HEENT: Pupils are equal reactive  Respiratory: Lungs are clear  Cardiovascular/Edema: Heart sounds are regular rate  Gastrointestinal: Abdomen soft nontender  Neurologic: Nonfocal  Skin: No lesion  Other: No edema    DATA:    CBC:   Lab Results   Component Value Date    WBC 9.3 07/30/2019    RBC 4.65 07/30/2019    HGB 13.7 07/30/2019    HCT 40.5 07/30/2019    MCV 87.1 07/30/2019    MCH 29.5 07/30/2019    MCHC 33.8 07/30/2019    RDW 13.3 07/30/2019     07/30/2019    MPV 10.1 07/30/2019     CMP:    Lab Results   Component

## 2019-07-31 LAB
ANION GAP SERPL CALCULATED.3IONS-SCNC: 16 MMOL/L (ref 7–16)
BUN BLDV-MCNC: 57 MG/DL (ref 6–20)
CALCIUM SERPL-MCNC: 9.7 MG/DL (ref 8.6–10.2)
CHLORIDE BLD-SCNC: 94 MMOL/L (ref 98–107)
CO2: 22 MMOL/L (ref 22–29)
CREAT SERPL-MCNC: 2 MG/DL (ref 0.7–1.2)
FERRITIN: 649 NG/ML
GFR AFRICAN AMERICAN: 45
GFR NON-AFRICAN AMERICAN: 45 ML/MIN/1.73
GLUCOSE BLD-MCNC: 226 MG/DL (ref 74–99)
HCT VFR BLD CALC: 37.2 % (ref 37–54)
HEMOGLOBIN: 12.4 G/DL (ref 12.5–16.5)
IRON SATURATION: 12 % (ref 20–55)
IRON: 36 MCG/DL (ref 59–158)
LEFT VENTRICULAR EJECTION FRACTION HIGH VALUE: 25 %
LEFT VENTRICULAR EJECTION FRACTION MODE: NORMAL
LV EF: 20 %
LV EF: 23 %
LVEF MODALITY: NORMAL
MCH RBC QN AUTO: 29 PG (ref 26–35)
MCHC RBC AUTO-ENTMCNC: 33.3 % (ref 32–34.5)
MCV RBC AUTO: 87.1 FL (ref 80–99.9)
METER GLUCOSE: 179 MG/DL (ref 74–99)
METER GLUCOSE: 211 MG/DL (ref 74–99)
METER GLUCOSE: 221 MG/DL (ref 74–99)
METER GLUCOSE: 270 MG/DL (ref 74–99)
PDW BLD-RTO: 13.6 FL (ref 11.5–15)
PLATELET # BLD: 244 E9/L (ref 130–450)
PMV BLD AUTO: 9.8 FL (ref 7–12)
POTASSIUM REFLEX MAGNESIUM: 4.2 MMOL/L (ref 3.5–5)
RBC # BLD: 4.27 E12/L (ref 3.8–5.8)
SODIUM BLD-SCNC: 132 MMOL/L (ref 132–146)
TOTAL IRON BINDING CAPACITY: 294 MCG/DL (ref 250–450)
TRANSFERRIN: 217 MG/DL (ref 200–360)
WBC # BLD: 9.1 E9/L (ref 4.5–11.5)

## 2019-07-31 PROCEDURE — 6360000002 HC RX W HCPCS: Performed by: NURSE PRACTITIONER

## 2019-07-31 PROCEDURE — 99222 1ST HOSP IP/OBS MODERATE 55: CPT | Performed by: FAMILY MEDICINE

## 2019-07-31 PROCEDURE — 6370000000 HC RX 637 (ALT 250 FOR IP): Performed by: INTERNAL MEDICINE

## 2019-07-31 PROCEDURE — 6370000000 HC RX 637 (ALT 250 FOR IP): Performed by: STUDENT IN AN ORGANIZED HEALTH CARE EDUCATION/TRAINING PROGRAM

## 2019-07-31 PROCEDURE — 2580000003 HC RX 258: Performed by: FAMILY MEDICINE

## 2019-07-31 PROCEDURE — 80048 BASIC METABOLIC PNL TOTAL CA: CPT

## 2019-07-31 PROCEDURE — 84466 ASSAY OF TRANSFERRIN: CPT

## 2019-07-31 PROCEDURE — 2580000003 HC RX 258: Performed by: STUDENT IN AN ORGANIZED HEALTH CARE EDUCATION/TRAINING PROGRAM

## 2019-07-31 PROCEDURE — 85027 COMPLETE CBC AUTOMATED: CPT

## 2019-07-31 PROCEDURE — 82962 GLUCOSE BLOOD TEST: CPT

## 2019-07-31 PROCEDURE — 36415 COLL VENOUS BLD VENIPUNCTURE: CPT

## 2019-07-31 PROCEDURE — 2580000003 HC RX 258: Performed by: NURSE PRACTITIONER

## 2019-07-31 PROCEDURE — 83550 IRON BINDING TEST: CPT

## 2019-07-31 PROCEDURE — 94660 CPAP INITIATION&MGMT: CPT

## 2019-07-31 PROCEDURE — 93306 TTE W/DOPPLER COMPLETE: CPT

## 2019-07-31 PROCEDURE — 93005 ELECTROCARDIOGRAM TRACING: CPT | Performed by: STUDENT IN AN ORGANIZED HEALTH CARE EDUCATION/TRAINING PROGRAM

## 2019-07-31 PROCEDURE — 82728 ASSAY OF FERRITIN: CPT

## 2019-07-31 PROCEDURE — 83540 ASSAY OF IRON: CPT

## 2019-07-31 PROCEDURE — 99223 1ST HOSP IP/OBS HIGH 75: CPT | Performed by: INTERNAL MEDICINE

## 2019-07-31 PROCEDURE — 2060000000 HC ICU INTERMEDIATE R&B

## 2019-07-31 RX ORDER — 0.9 % SODIUM CHLORIDE 0.9 %
250 INTRAVENOUS SOLUTION INTRAVENOUS ONCE
Status: DISCONTINUED | OUTPATIENT
Start: 2019-07-31 | End: 2019-07-31

## 2019-07-31 RX ORDER — 0.9 % SODIUM CHLORIDE 0.9 %
250 INTRAVENOUS SOLUTION INTRAVENOUS ONCE
Status: COMPLETED | OUTPATIENT
Start: 2019-07-31 | End: 2019-07-31

## 2019-07-31 RX ORDER — POTASSIUM CHLORIDE 20 MEQ/1
20 TABLET, EXTENDED RELEASE ORAL
Status: DISCONTINUED | OUTPATIENT
Start: 2019-08-01 | End: 2019-08-02 | Stop reason: HOSPADM

## 2019-07-31 RX ADMIN — Medication 10 ML: at 20:48

## 2019-07-31 RX ADMIN — SACUBITRIL AND VALSARTAN 1 TABLET: 97; 103 TABLET, FILM COATED ORAL at 23:27

## 2019-07-31 RX ADMIN — INSULIN LISPRO 20 UNITS: 100 INJECTION, SOLUTION INTRAVENOUS; SUBCUTANEOUS at 17:46

## 2019-07-31 RX ADMIN — ALLOPURINOL 300 MG: 300 TABLET ORAL at 09:14

## 2019-07-31 RX ADMIN — INSULIN LISPRO 20 UNITS: 100 INJECTION, SOLUTION INTRAVENOUS; SUBCUTANEOUS at 08:18

## 2019-07-31 RX ADMIN — ACETAMINOPHEN 650 MG: 325 TABLET, FILM COATED ORAL at 22:54

## 2019-07-31 RX ADMIN — INSULIN LISPRO 20 UNITS: 100 INJECTION, SOLUTION INTRAVENOUS; SUBCUTANEOUS at 12:51

## 2019-07-31 RX ADMIN — Medication: at 22:54

## 2019-07-31 RX ADMIN — INSULIN LISPRO 6 UNITS: 100 INJECTION, SOLUTION INTRAVENOUS; SUBCUTANEOUS at 08:18

## 2019-07-31 RX ADMIN — ACETAMINOPHEN 650 MG: 325 TABLET, FILM COATED ORAL at 12:06

## 2019-07-31 RX ADMIN — Medication 10 ML: at 09:14

## 2019-07-31 RX ADMIN — INSULIN GLARGINE 40 UNITS: 100 INJECTION, SOLUTION SUBCUTANEOUS at 20:48

## 2019-07-31 RX ADMIN — SODIUM CHLORIDE 25 MG: 9 INJECTION, SOLUTION INTRAVENOUS at 22:55

## 2019-07-31 RX ADMIN — Medication 10 ML: at 22:55

## 2019-07-31 RX ADMIN — INSULIN LISPRO 6 UNITS: 100 INJECTION, SOLUTION INTRAVENOUS; SUBCUTANEOUS at 17:44

## 2019-07-31 RX ADMIN — INSULIN LISPRO 9 UNITS: 100 INJECTION, SOLUTION INTRAVENOUS; SUBCUTANEOUS at 12:52

## 2019-07-31 RX ADMIN — ACETAMINOPHEN 650 MG: 325 TABLET, FILM COATED ORAL at 17:47

## 2019-07-31 RX ADMIN — SODIUM CHLORIDE 100 MG: 9 INJECTION, SOLUTION INTRAVENOUS at 23:53

## 2019-07-31 RX ADMIN — POTASSIUM CHLORIDE 20 MEQ: 20 TABLET, EXTENDED RELEASE ORAL at 09:14

## 2019-07-31 RX ADMIN — Medication 400 MG: at 09:14

## 2019-07-31 RX ADMIN — SODIUM CHLORIDE 250 ML: 9 INJECTION, SOLUTION INTRAVENOUS at 01:16

## 2019-07-31 RX ADMIN — ATORVASTATIN CALCIUM 40 MG: 40 TABLET, FILM COATED ORAL at 09:14

## 2019-07-31 RX ADMIN — SACUBITRIL AND VALSARTAN 1 TABLET: 97; 103 TABLET, FILM COATED ORAL at 09:14

## 2019-07-31 RX ADMIN — INSULIN GLARGINE 40 UNITS: 100 INJECTION, SOLUTION SUBCUTANEOUS at 09:14

## 2019-07-31 RX ADMIN — INSULIN LISPRO 2 UNITS: 100 INJECTION, SOLUTION INTRAVENOUS; SUBCUTANEOUS at 20:48

## 2019-07-31 ASSESSMENT — PAIN DESCRIPTION - ONSET
ONSET: ON-GOING
ONSET: ON-GOING

## 2019-07-31 ASSESSMENT — PAIN DESCRIPTION - DESCRIPTORS
DESCRIPTORS: ACHING;CONSTANT;DISCOMFORT
DESCRIPTORS: ACHING;CONSTANT;DISCOMFORT

## 2019-07-31 ASSESSMENT — PAIN SCALES - GENERAL
PAINLEVEL_OUTOF10: 0
PAINLEVEL_OUTOF10: 4
PAINLEVEL_OUTOF10: 0
PAINLEVEL_OUTOF10: 10
PAINLEVEL_OUTOF10: 8
PAINLEVEL_OUTOF10: 8

## 2019-07-31 ASSESSMENT — PAIN DESCRIPTION - PAIN TYPE
TYPE: ACUTE PAIN

## 2019-07-31 ASSESSMENT — PAIN DESCRIPTION - FREQUENCY
FREQUENCY: CONTINUOUS
FREQUENCY: CONTINUOUS

## 2019-07-31 ASSESSMENT — PAIN DESCRIPTION - LOCATION: LOCATION: HEAD

## 2019-07-31 ASSESSMENT — PAIN DESCRIPTION - ORIENTATION
ORIENTATION: LEFT
ORIENTATION: LEFT

## 2019-07-31 NOTE — PROGRESS NOTES
Department of Internal Medicine  Nephrology Attending Consult Note      Reason for Consult: Hyponatremia  Requesting Physician:  Dr. Malou Cline: Syncopal episode    History Obtained From:  patient, electronic medical record    Sub: Feels better today  States dizziness has improved    Past Medical History:        Diagnosis Date    Acute CHF ([address] 11/29/2011    Acute CHF ([address] 12/26/2011    Acute idiopathic gout of right foot 8/13/2016    Anemia 11/29/2011    CAD (coronary artery disease)     Cerebral artery occlusion with cerebral infarction ([address]     CKD (chronic kidney disease)     Gout     HTN (hypertension) 11/29/2011    Hyperlipidemia     Hypertension     Morbid obesity due to excess calories ([address]     Nonischemic cardiomyopathy ([address] 11/29/2011    Nonischemic cardiomyopathy ([address] 7/2013 7/2013- cardiac MRI revealed an LVEF of 20%    ARVIND (obstructive sleep apnea) 11/29/2011    S/P left heart catheterization by percutaneous approach 12-27/2011    Dr Raul Rider Systolic heart failure [facility]) 5/7/2012 5/7/12- cardiac catheterization revealed an LVEF of 10-15%, mitral regurgitation along with borderline prolapse of mitral valve    Type 2 diabetes mellitus with complication, with long-term current use of insulin ([address] 8/22/2016    URI, acute 11/29/2011     Past Surgical History:        Procedure Laterality Date    CARDIAC DEFIBRILLATOR PLACEMENT  11/20/2018    UPGRADE S-ICD TO BIV ICD   (MEDTRONIC)  ARIC     ECHO COMPL W DOP COLOR FLOW  11/30/2011         ECHO COMPL W DOP COLOR FLOW  3/27/2012         INSERTABLE CARDIAC MONITOR  12/13/2018    cardiomems, [facility], Dr. Hodan Carrasquillo     Current Medications:    Current Facility-Administered Medications: benzocaine (ORAJEL) 20 % mucosal gel, , Mouth/Throat, BID PRN  perflutren lipid microspheres (DEFINITY) injection 1.65 mg, 1.5 mL, Intravenous, ONCE PRN  [START ON 8/1/2019] potassium chloride (KLOR-CON M) extended release tablet 20 mEq, 20 mEq, Oral, Daily with breakfast  sacubitril-valsartan (ENTRESTO)  MG per tablet 1 tablet, 1 tablet, Oral, BID  glucose (GLUTOSE) 40 % oral gel 15 g, 15 g, Oral, PRN  dextrose 50 % IV solution, 12.5 g, Intravenous, PRN  glucagon (rDNA) injection 1 mg, 1 mg, Intramuscular, PRN  dextrose 5 % solution, 100 mL/hr, Intravenous, PRN  allopurinol (ZYLOPRIM) tablet 300 mg, 300 mg, Oral, Daily  atorvastatin (LIPITOR) tablet 40 mg, 40 mg, Oral, Daily  carvedilol (COREG) tablet 25 mg, 25 mg, Oral, BID WC  insulin lispro (HUMALOG) injection vial 20 Units, 20 Units, Subcutaneous, TID AC  insulin glargine (LANTUS) injection vial 40 Units, 40 Units, Subcutaneous, BID  magnesium oxide (MAG-OX) tablet 400 mg, 400 mg, Oral, Daily  rivaroxaban (XARELTO) tablet 20 mg, 20 mg, Oral, Daily  sodium chloride flush 0.9 % injection 10 mL, 10 mL, Intravenous, 2 times per day  sodium chloride flush 0.9 % injection 10 mL, 10 mL, Intravenous, PRN  magnesium hydroxide (MILK OF MAGNESIA) 400 MG/5ML suspension 30 mL, 30 mL, Oral, Daily PRN  acetaminophen (TYLENOL) tablet 650 mg, 650 mg, Oral, Q4H PRN  insulin lispro (HUMALOG) injection vial 0-18 Units, 0-18 Units, Subcutaneous, TID WC  insulin lispro (HUMALOG) injection vial 0-9 Units, 0-9 Units, Subcutaneous, Nightly  Allergies:  Patient has no known allergies. Social History:    TOBACCO:   reports that he has never smoked. He has never used smokeless tobacco.  ETOH:   reports that he drank alcohol.     Family History:       Problem Relation Age of Onset    Cancer Mother      PHYSICAL EXAM:      Vitals:    VITALS:  BP (!) 86/51   Pulse 75   Temp 98.7 °F (37.1 °C) (Temporal)   Resp 18   Ht 6' (1.829 m)   Wt (!) 322 lb (146.1 kg)   SpO2 100%   BMI 43.67 kg/m²   24HR INTAKE/OUTPUT:      Intake/Output Summary (Last 24 hours) at 7/31/2019 1413  Last data filed at 7/31/2019 0930  Gross per 24 hour   Intake 480 ml   Output --   Net 480 ml       Constitutional: Patient is awake alert no distress, bruises on face and lips  HEENT: Pupils are equal reactive  Respiratory: Lungs are clear  Cardiovascular/Edema: Heart sounds are regular rate  Gastrointestinal: Abdomen soft nontender  Neurologic: Nonfocal  Skin: No lesion  Other: No edema    DATA:    CBC:   Lab Results   Component Value Date    WBC 9.1 07/31/2019    RBC 4.27 07/31/2019    HGB 12.4 07/31/2019    HCT 37.2 07/31/2019    MCV 87.1 07/31/2019    MCH 29.0 07/31/2019    MCHC 33.3 07/31/2019    RDW 13.6 07/31/2019     07/31/2019    MPV 9.8 07/31/2019     CMP:    Lab Results   Component Value Date     07/31/2019    K 4.2 07/31/2019    CL 94 07/31/2019    CO2 22 07/31/2019    BUN 57 07/31/2019    CREATININE 2.0 07/31/2019    GFRAA 45 07/31/2019    LABGLOM 45 07/31/2019    GLUCOSE 226 07/31/2019    GLUCOSE 192 05/15/2012    PROT 8.2 07/30/2019    LABALBU 4.2 07/30/2019    LABALBU 4.5 05/15/2012    CALCIUM 9.7 07/31/2019    BILITOT 0.5 07/30/2019    ALKPHOS 126 07/30/2019    AST 29 07/30/2019    ALT 25 07/30/2019     Magnesium:    Lab Results   Component Value Date    MG 1.8 06/13/2018     Phosphorus:    Lab Results   Component Value Date    PHOS 3.9 08/16/2016     Radiology Review:      CT head without contrast July 30, 2019   1. No CT evidence of acute intracranial abnormality, as questioned. If   there is clinical concern for potential underlying acute   cerebrovascular infarction/accident or other potential abnormalities   of underlying brain parenchyma, MRI of the brain would be recommended   for more detailed evaluation. .   2. Vascular calcifications within the bilateral internal carotid   artery cavernous segments. .   3. Hypodensity left occipital region suggestive of remote   cerebrovascular infarction, noted on 10/31/2013 exam.     Chest x-ray July 30, 2019   1. Stable, enlarged cardiac mediastinal silhouette with underlying   pulmonary edema.    2. Nonspecific bibasilar airspace disease, findings can be seen in infiltrate/pneumonia and/or atelectasis. .           IMPRESSION/RECOMMENDATIONS:      Briefly Mr. Noemi Murillo is a 78-year-old man with history of HTN, type II DM, HFrEF 10-15%, status post biventricular ICD placement, ARVIND, hyperlipidemia, CVA, on July 30, 2019 after a syncopal episode. On admission he was found to have a sodium of 122 mEq/L and hyperkalemia K:5.7 mEq/L, creatinine of 2 mg procedure, reasons for this consultation. Letter to admission his medications included torsemide, spironolactone, sacubitril-valsartan. Prior to admission motor wise he reports doing fairly well he has not noticed edema. He denies any nausea vomiting or diarrhea. His baseline creatinine is 1.4 mg/dL. 1. TAIWO stage 2, likely volume responsive prerenal TAIWO due to overt diuresis?and ARB's,  Nonoliguric  2. Hyponatremia, both with component of translocation (hyperglycemia), corrected sodium of 129 mEq/L, with hypervolemia. The latter hemodynamically mediated due to intravascular volume depletion  3. Hyperkalemia, multifactorial due to decreased GFR and translocation due to hyperglycemia  4. HFrEF 10-15%, no signs of volume overload, proBNP only 621  5.  Status post syncopal episode      Plan:    · Better blood glucose control  · Monitor sodium levels  · Monitor potassium level  · Monitor renal function closely  · For right heart cath tomorrow      Thank you very much Dr. Boaz Martini for allowing us to participate in the care of Mr. Noemi Murillo

## 2019-07-31 NOTE — PROGRESS NOTES
NYHA Class 3 HF  · Echo this admission shows ejection fraction of 20-25%, moderate mitral regurge, greatly dilated LA, mildly dilated RA  · R catch 12/2018 showed moderate to severe pulmonary HTN cardiomems inserted at this time  · Resume entresto, coreg, torsemide, aldactone, hold KCl for now  · Monitor vitals, admitted to intermediate floor  · For L and R heart cath tomorrow with HF specialist, possible cardiomems recalibration        TAIWO on CKD 3a  · Patient experiencing polyuria and polydipsia   · Baseline Cr. 1.3, today 2.0   · F/u urine studies for FeUrea, daily BMP  · Nephrology Bryant Jackson) consulted        Electrolyte abnormalities  · Hyponatremia resolved  · Hyperkalemia resolved, pt is on KCl at home 20meq BID  · Order 10 regular insulin, hold KCl replacement at this time  · F/u daily BMP        Hyperglycemia hyperosmolar, non anion gap, DM2  · A1C 15.2%  · Home meds: levemir 40 BID, humalog 20u TID  · Resume home meds + HDSS  · UA + for glucose, betahydroxybutyrate elevated 1x, daily BMP  · Hypoglycemia protocol in place, ordered diabetic education         HTN/HLD/CAD  · Home meds: lipitor and medications for HF  · Continue home meds        ARVIND  · Continue cpap at night        Gout  · Continue allopurinol        Paroxysmal A-fib/CVA 2015  · NSR on admission with ventricular pacing  · Continue xarelto           Diet: cardiac, carb control, fluid restricted     Disposition: tele, intermediate     DVT / GI prophylaxis: xarelto and GI prophylaxis not indicated    Electronically signed by Hollie Mcclelland MD PGY-2 on 7/31/2019 at 8:16 AM  This case was discussed with attending physician: Dr. Jhon Mckeon        This note was dictated with [address].

## 2019-07-31 NOTE — PROGRESS NOTES
collapse  Active Problems:    Cardiomyopathy, nonischemic (HCC)    Obstructive sleep apnea    CKD (chronic kidney disease)    Type 2 diabetes mellitus with complication, with long-term current use of insulin (HCC)    Ventricular arrhythmia    Hypotension    Nonischemic cardiomyopathy (HCC)    Syncope    Chronic HFrEF (heart failure with reduced ejection fraction) ([facility])    Ventricular tachycardia (HCC)    S/P left pulmonary artery pressure sensor implant placement  Resolved Problems:    Obesity      TAIWO- CKD 3 (follow with nephros recommendation)    ICD fire/Chest pain  · Syncopal episode, interrogation showed run of V. Tach, patient felt no palpitations or shock  · CardioMEMS was placed on 12/2018  · EKG showed ventricular paced sinus rhythm, unchanged from last time, follow-up EKG in a.m.   · Troponin 0.02, repeat x2  · Orthostatic (-) in ER, repeat (-)   · EP consulted (Freeman), Cardiology consulted (Mackenzie)        HFrEF, NYHA Class 3 HF  · Echo this admission shows ejection fraction of 20-25%, moderate mitral regurge, greatly dilated LA, mildly dilated RA  · R catch 12/2018 showed moderate to severe pulmonary HTN cardiomems inserted at this time  · Resume entresto, coreg, torsemide, aldactone, hold KCl for now  · Monitor vitals, admitted to intermediate floor  · For L and R heart cath today with HF specialist, possible cardiomems recalibration        TAIWO on CKD 3a  · Patient experiencing polyuria and polydipsia   · Baseline Cr. 1.3, today 2.0   · F/u urine studies for FeUrea, daily BMP  · Continue to follow with nephrologies reccomendation        Electrolyte abnormalities  · Hyponatremia resolved  · Hyperkalemia resolved, pt is on KCl at home 20meq BID  · Order 10 regular insulin, hold KCl replacement at this time  · F/u daily BMP        Hyperglycemia hyperosmolar, non anion gap, DM2  · A1C 15.2%  · Pt admits to non-compliance of medication, glucose has been high throughout admission  · Increased Humalog

## 2019-07-31 NOTE — CONSULTS
showed no      hemodynamically significant stenosis with 0% to 49% narrowing of both      carotid arteries   14.   Echo 2016 [facility]):  LV severely dilated.  LA      moderately dilated.  Mild PHTN.  Mild TR.  Trace TR.  Pacer wire visible      in the right atrium and ventricle.  RA mildly dilated.  EF 10% to 15%.     Class 3 diastolic dysfunction.  RV moderately dilated.  RV systolic      function moderate-severely reduced.  Mild-moderate MR.   15.   Echo 2012 (Dr. Noemi Toledo):  LVEF 30% to 35% and enlarged LV   16.   Diabetes mellitus   17.   Gout   61.   Syncopal episode 2016, presented to [facility] (reports requested); per      patient underwent echocardiogram and ICD interrogation.  Reported ICD      interrogation showed normal device function and no changes made (reports      requested)    19. TTE (10/15/2018, Dr. Peyton Osborne) Severely dilated left ventricle. Abnormal (paradoxical) motion consistent with conduction abnormality. Severely reduced left ventricular systolic function. EF 15-20%. There is doppler evidence of stage III diastolic dysfunction. The left atrium is severely dilated. Right ventricle global systolic function is reduced .   Mild to moderate mitral regurgitation is present. Mild tricuspid regurgitation. There is a trivial circumferential pericardial effusion noted. 20. RHC with CardioMEM implant (2018, Dr. Lauren Clark) PRESSURES: RA 12/15, 11. RV 67/8, 14. PA 65/27, 43. PCW 22. CARDIAC OUTPUT:  4.3 L/min. CARDIAC INDEX:  1.7 L/min/m2. PULSE OXIMETRY:  100%, (on 2 L nasal cannula). Pulmonary artery, oxygen saturation 56%.       FAMILY HISTORY:    Mother  at the age of 48 from complications of stomach cancer. Father is alive and well in his 62s.    His siblings are alive and well.    He has no children.       Past Medical History:   Diagnosis Date    Acute CHF ([address] 2011    Acute CHF ([address] 2011    Acute idiopathic gout of right foot 2016 extends and wraps around the apex.  It covers also the inferoapical segment of the left ventricle with no significant disease. 3.    Left circumflex is a nondominant vessel with no significant disease. 4.    Right coronary artery is a dominant vessel with no significant disease. 5.    Right common femoral is a large vessel with no atherosclerosis and the site of insertion is above bifurcation. FINAL IMPRESSION:  1.    Huge left ventricle with evidence of severe left ventricular dysfunction, ejection fraction around 10% to 15%. 2.    Mitral regurgitation along with borderline prolapse of mitral valve. 3.    Normal coronaries. 4.    Significantly elevated left ventricular end-diastolic pressure, around 34 to 36 mmHg. TTE (10/15/2018, Dr. Carlos Soliz)   Summary   Severely dilated left ventricle.   Abnormal (paradoxical) motion consistent with conduction abnormality.   Severely reduced left ventricular systolic function. EF 15-20%.    There is doppler evidence of stage III diastolic dysfunction.   The left atrium is severely dilated.   Right ventricle global systolic function is reduced .   Mild to moderate mitral regurgitation is present.   Mild tricuspid regurgitation.  Espiridion Amel is a trivial circumferential pericardial effusion noted. RHC with CardioMEM implant (12/13/2018, Dr. Bozena Angulo)  PRESSURES:  RA 12/15, 11.  RV 67/8, 14.  PA 65/27, 43. PCW 22. CARDIAC OUTPUT:  4.3 L/min. CARDIAC INDEX:  1.7 L/min/m2. PULSE OXIMETRY:  100%, (on 2 L nasal cannula). Pulmonary artery, oxygen saturation 56%.     CardioMEM Readings  Taken on PA Systolic PA Diastolic PA Mean Heart Rate    07-, 08:04 PM 44 mmHg 28 mmHg 34 mmHg 103 bpm    07-, 10:12 AM 43 mmHg 25 mmHg 32 mmHg 93 bpm    07-, 03:15 PM 41 mmHg 23 mmHg 30 mmHg 95 bpm    07-, 03:34 PM 38 mmHg 21 mmHg 28 mmHg 105 bpm    07-, 08:16 AM 48 mmHg 27 mmHg 36 mmHg 111 bpm    07-, 06:54 AM 44 mmHg 25 mmHg 33 mmHg 86 bpm    07-, %/NA          ASSESSMENT:  1. Syncope / Monomorphic VT > Vfib > ICD shock x 1  2. Chronic HFrEF  3. ACC stage C / NYHA class II  4. PAD 27 per cardiomem today   5. Nonischemic cardiomyopathy, possible familial  6. lvef 15-20%, lvedd 7.5  7. CRT-D in situ - follows with Dr. Yvette Calderon  8. Last ischemia evaluation - Normal coronaries (2012)  9. RV dysfunction  10. Hypotension with hx of hypertension  11. PAF - anticoagulated with Eliquis  12. T2DM - uncontrolled (a1c 15.2)  13. CKD  14. Iron deficiency anemia   15. HLD  16. Iron deficiency  17. Morbid obesity - contemplating gastric bypass surgery (would need cardiac clearance)  18. ARVIND - compliant over the past 6 months. PLAN:  1. Hold torsemide   2. Device interrogation reviewed  3. Interrogated cardioMEM device - PAD 27 today  4. Echocardiogram  5. Needs ischemia evaluation > LHC/RHC  6. Iron studies  7. Daily weights, I/O  8. Sodium restricted diet      Above case discussed with Dr. Nancy Arredondo    Thank you for allowing us to participate in the care of this patient. We will follow as medical course develops. Do not hesitate to contact us with further questions. Codi Moreno APRN-CNP  Advanced Heart Failure and Pulmonary Hypertension  [id number] [facility]. I have reviewed the notes, assessments, and/or procedures performed by Codi Moreno NP, I interviewed and examined the patient, I concur with her/his documentation of Shanta Mendez III.          LHC (ischemia eval in light of VT and only mildly improving LVEF despite GDMT) and RHC tomorrow, depending on RHC possible CardioMEMS recalibration. Otherwise continue other GDMT at optimal dosing - carvedilol 25 mg BID, Entresto  mg BID, and spironolactone 25 mg. If ultimately need BP room (ie after invasive hemodynamic assessment, etc) would switch to Toprol. Can reassess as outpatient as well as BPs have already improved with hydration.

## 2019-07-31 NOTE — PROGRESS NOTES
arrange for this when he is ready. Evaluation/Plan/Recommendations:   Patient's understanding of diabetes:   []Poor   [x]Fair    []Good   [] Excellent     Outpatient diabetes education is recommended:   [x] Yes  [] No  Patient is interested in outpatient diabetes education:   [x] Yes    [] No    [] Unsure    Recommended:     [] Consult to social work; patient has no insurance or financial hardship      [x] Script for glucometer and supplies (per preference of patient's insurance)               [x] Script for outpatient diabetes education classes from PCP    [x] Carbohydrate-controlled diet      Thank you for this consult.

## 2019-08-01 PROBLEM — I95.9 HYPOTENSION: Status: ACTIVE | Noted: 2019-08-01

## 2019-08-01 PROBLEM — I49.9 VENTRICULAR ARRHYTHMIA: Status: ACTIVE | Noted: 2019-08-01

## 2019-08-01 LAB
ABO/RH: NORMAL
ANION GAP SERPL CALCULATED.3IONS-SCNC: 16 MMOL/L (ref 7–16)
ANTIBODY SCREEN: NORMAL
BUN BLDV-MCNC: 38 MG/DL (ref 6–20)
CALCIUM SERPL-MCNC: 9.6 MG/DL (ref 8.6–10.2)
CHLORIDE BLD-SCNC: 100 MMOL/L (ref 98–107)
CO2: 20 MMOL/L (ref 22–29)
CREAT SERPL-MCNC: 1.6 MG/DL (ref 0.7–1.2)
GFR AFRICAN AMERICAN: 59
GFR NON-AFRICAN AMERICAN: 59 ML/MIN/1.73
GLUCOSE BLD-MCNC: 245 MG/DL (ref 74–99)
HCT VFR BLD CALC: 38.4 % (ref 37–54)
HEMOGLOBIN: 12.8 G/DL (ref 12.5–16.5)
MCH RBC QN AUTO: 29.5 PG (ref 26–35)
MCHC RBC AUTO-ENTMCNC: 33.3 % (ref 32–34.5)
MCV RBC AUTO: 88.5 FL (ref 80–99.9)
METER GLUCOSE: 236 MG/DL (ref 74–99)
METER GLUCOSE: 264 MG/DL (ref 74–99)
METER GLUCOSE: 277 MG/DL (ref 74–99)
METER GLUCOSE: 278 MG/DL (ref 74–99)
METER GLUCOSE: 331 MG/DL (ref 74–99)
PDW BLD-RTO: 13.7 FL (ref 11.5–15)
PLATELET # BLD: 230 E9/L (ref 130–450)
PMV BLD AUTO: 10 FL (ref 7–12)
POTASSIUM REFLEX MAGNESIUM: 4.6 MMOL/L (ref 3.5–5)
RBC # BLD: 4.34 E12/L (ref 3.8–5.8)
SODIUM BLD-SCNC: 136 MMOL/L (ref 132–146)
WBC # BLD: 7.4 E9/L (ref 4.5–11.5)

## 2019-08-01 PROCEDURE — 2580000003 HC RX 258: Performed by: INTERNAL MEDICINE

## 2019-08-01 PROCEDURE — 93458 L HRT ARTERY/VENTRICLE ANGIO: CPT | Performed by: INTERNAL MEDICINE

## 2019-08-01 PROCEDURE — 6370000000 HC RX 637 (ALT 250 FOR IP): Performed by: STUDENT IN AN ORGANIZED HEALTH CARE EDUCATION/TRAINING PROGRAM

## 2019-08-01 PROCEDURE — C1894 INTRO/SHEATH, NON-LASER: HCPCS

## 2019-08-01 PROCEDURE — 86900 BLOOD TYPING SEROLOGIC ABO: CPT

## 2019-08-01 PROCEDURE — 6360000002 HC RX W HCPCS

## 2019-08-01 PROCEDURE — C1769 GUIDE WIRE: HCPCS

## 2019-08-01 PROCEDURE — 6370000000 HC RX 637 (ALT 250 FOR IP): Performed by: INTERNAL MEDICINE

## 2019-08-01 PROCEDURE — C1725 CATH, TRANSLUMIN NON-LASER: HCPCS

## 2019-08-01 PROCEDURE — 2709999900 HC NON-CHARGEABLE SUPPLY

## 2019-08-01 PROCEDURE — 86850 RBC ANTIBODY SCREEN: CPT

## 2019-08-01 PROCEDURE — 2500000003 HC RX 250 WO HCPCS

## 2019-08-01 PROCEDURE — 2060000000 HC ICU INTERMEDIATE R&B

## 2019-08-01 PROCEDURE — 85027 COMPLETE CBC AUTOMATED: CPT

## 2019-08-01 PROCEDURE — 4A023N7 MEASUREMENT OF CARDIAC SAMPLING AND PRESSURE, LEFT HEART, PERCUTANEOUS APPROACH: ICD-10-PCS | Performed by: INTERNAL MEDICINE

## 2019-08-01 PROCEDURE — 2580000003 HC RX 258: Performed by: STUDENT IN AN ORGANIZED HEALTH CARE EDUCATION/TRAINING PROGRAM

## 2019-08-01 PROCEDURE — 80048 BASIC METABOLIC PNL TOTAL CA: CPT

## 2019-08-01 PROCEDURE — 6360000002 HC RX W HCPCS: Performed by: INTERNAL MEDICINE

## 2019-08-01 PROCEDURE — 99233 SBSQ HOSP IP/OBS HIGH 50: CPT | Performed by: INTERNAL MEDICINE

## 2019-08-01 PROCEDURE — 86901 BLOOD TYPING SEROLOGIC RH(D): CPT

## 2019-08-01 PROCEDURE — 36415 COLL VENOUS BLD VENIPUNCTURE: CPT

## 2019-08-01 PROCEDURE — 99232 SBSQ HOSP IP/OBS MODERATE 35: CPT | Performed by: FAMILY MEDICINE

## 2019-08-01 PROCEDURE — B2111ZZ FLUOROSCOPY OF MULTIPLE CORONARY ARTERIES USING LOW OSMOLAR CONTRAST: ICD-10-PCS | Performed by: INTERNAL MEDICINE

## 2019-08-01 PROCEDURE — 94660 CPAP INITIATION&MGMT: CPT

## 2019-08-01 PROCEDURE — 82962 GLUCOSE BLOOD TEST: CPT

## 2019-08-01 RX ORDER — METOPROLOL SUCCINATE 50 MG/1
50 TABLET, EXTENDED RELEASE ORAL 2 TIMES DAILY
Status: DISCONTINUED | OUTPATIENT
Start: 2019-08-01 | End: 2019-08-02 | Stop reason: HOSPADM

## 2019-08-01 RX ORDER — SODIUM CHLORIDE 9 MG/ML
INJECTION, SOLUTION INTRAVENOUS CONTINUOUS
Status: DISCONTINUED | OUTPATIENT
Start: 2019-08-01 | End: 2019-08-02 | Stop reason: HOSPADM

## 2019-08-01 RX ORDER — INSULIN GLARGINE 100 [IU]/ML
45 INJECTION, SOLUTION SUBCUTANEOUS 2 TIMES DAILY
Status: DISCONTINUED | OUTPATIENT
Start: 2019-08-01 | End: 2019-08-02 | Stop reason: HOSPADM

## 2019-08-01 RX ADMIN — INSULIN LISPRO 5 UNITS: 100 INJECTION, SOLUTION INTRAVENOUS; SUBCUTANEOUS at 20:15

## 2019-08-01 RX ADMIN — ACETAMINOPHEN 650 MG: 325 TABLET, FILM COATED ORAL at 20:12

## 2019-08-01 RX ADMIN — SODIUM CHLORIDE: 9 INJECTION, SOLUTION INTRAVENOUS at 14:02

## 2019-08-01 RX ADMIN — Medication 10 ML: at 09:26

## 2019-08-01 RX ADMIN — METOPROLOL SUCCINATE 50 MG: 50 TABLET, EXTENDED RELEASE ORAL at 20:12

## 2019-08-01 RX ADMIN — Medication 10 ML: at 20:12

## 2019-08-01 RX ADMIN — INSULIN GLARGINE 40 UNITS: 100 INJECTION, SOLUTION SUBCUTANEOUS at 09:30

## 2019-08-01 RX ADMIN — INSULIN LISPRO 9 UNITS: 100 INJECTION, SOLUTION INTRAVENOUS; SUBCUTANEOUS at 12:48

## 2019-08-01 RX ADMIN — SODIUM CHLORIDE: 9 INJECTION, SOLUTION INTRAVENOUS at 14:13

## 2019-08-01 RX ADMIN — SODIUM CHLORIDE: 9 INJECTION, SOLUTION INTRAVENOUS at 14:14

## 2019-08-01 RX ADMIN — INSULIN LISPRO 20 UNITS: 100 INJECTION, SOLUTION INTRAVENOUS; SUBCUTANEOUS at 17:36

## 2019-08-01 RX ADMIN — SODIUM CHLORIDE 125 MG: 9 INJECTION, SOLUTION INTRAVENOUS at 22:56

## 2019-08-01 RX ADMIN — INSULIN GLARGINE 45 UNITS: 100 INJECTION, SOLUTION SUBCUTANEOUS at 20:16

## 2019-08-01 RX ADMIN — POTASSIUM CHLORIDE 20 MEQ: 20 TABLET, EXTENDED RELEASE ORAL at 09:25

## 2019-08-01 RX ADMIN — INSULIN LISPRO 12 UNITS: 100 INJECTION, SOLUTION INTRAVENOUS; SUBCUTANEOUS at 17:29

## 2019-08-01 RX ADMIN — ATORVASTATIN CALCIUM 40 MG: 40 TABLET, FILM COATED ORAL at 09:25

## 2019-08-01 RX ADMIN — Medication 10 ML: at 22:56

## 2019-08-01 RX ADMIN — Medication 400 MG: at 09:25

## 2019-08-01 RX ADMIN — ALLOPURINOL 300 MG: 300 TABLET ORAL at 09:25

## 2019-08-01 RX ADMIN — CARVEDILOL 25 MG: 25 TABLET, FILM COATED ORAL at 09:25

## 2019-08-01 ASSESSMENT — PAIN SCALES - GENERAL
PAINLEVEL_OUTOF10: 4
PAINLEVEL_OUTOF10: 8
PAINLEVEL_OUTOF10: 8
PAINLEVEL_OUTOF10: 0
PAINLEVEL_OUTOF10: 7

## 2019-08-01 ASSESSMENT — PAIN DESCRIPTION - ORIENTATION
ORIENTATION: LEFT
ORIENTATION: LEFT

## 2019-08-01 ASSESSMENT — PAIN DESCRIPTION - PAIN TYPE
TYPE: ACUTE PAIN

## 2019-08-01 ASSESSMENT — PAIN DESCRIPTION - LOCATION: LOCATION: FACE

## 2019-08-01 ASSESSMENT — PAIN DESCRIPTION - FREQUENCY
FREQUENCY: CONTINUOUS

## 2019-08-01 ASSESSMENT — PAIN DESCRIPTION - ONSET
ONSET: ON-GOING

## 2019-08-01 ASSESSMENT — PAIN DESCRIPTION - DESCRIPTORS
DESCRIPTORS: ACHING;CONSTANT;DISCOMFORT
DESCRIPTORS: ACHING;CONSTANT;DISCOMFORT
DESCRIPTORS: ACHING;DISCOMFORT;SORE

## 2019-08-01 NOTE — PROGRESS NOTES
[facility] - [facility] Inpatient   Resident Progress Note     S:  Hospital day: 2   Brief Synopsis: Chinmay Posey is a 44 y.o. male who presented with syncope, ICD fire     - pt is going in for L Cath, R Cath today, possible rrecalibration of ICD     Patient was seen and examined at bedside. · Denies any cp or sob, denies any palpitations           Most Recent Echo or Stress:  7/2019 ECHO EF 20-25% diffuse hypokinesis, MR, severely dilated LA, mildly enlarged RA,      Allergy: Patient has no known allergies.     Scheduled Medications:    Scheduled Medications single line   allopurinol, 300 mg, Oral, Daily    atorvastatin, 40 mg, Oral, Daily    carvedilol, 25 mg, Oral, BID WC    insulin lispro, 20 Units, Subcutaneous, TID AC    insulin glargine, 40 Units, Subcutaneous, BID    magnesium oxide, 400 mg, Oral, Daily    potassium chloride, 20 mEq, Oral, BID    sacubitril-valsartan, 1 tablet, Oral, BID    rivaroxaban, 20 mg, Oral, Daily    sodium chloride flush, 10 mL, Intravenous, 2 times per day    insulin lispro, 0-18 Units, Subcutaneous, TID WC    insulin lispro, 0-9 Units, Subcutaneous, Nightly    torsemide, 20 mg, Oral, Daily         PRN:   PRN Medications   benzocaine, glucose, dextrose, glucagon (rDNA), dextrose, sodium chloride flush, magnesium hydroxide, ondansetron, acetaminophen          Infusions:   Infusions Meds   dextrose           I reviewed the patient's past medical and surgical history, Medications and Allergies.     O:  BP (!) 86/51   Pulse 75   Temp 98.7 °F (37.1 °C) (Temporal)   Resp 18   Ht 6' (1.829 m)   Wt (!) 322 lb (146.1 kg)   SpO2 100%   BMI 43.67 kg/m²   24 hour I&O: I/O last 3 completed shifts: In: 740 [P.O.:240; IV Piggyback:500]  Out: -   No intake/output data recorded.      General Appearance: Alert, cooperative, no acute distress. Morbid truncal obesity  HEENT: Normocephalic, atraumatic. SHYANN, conjunctiva/corneas clear, EOM's intact, no pallor  or icterus. (-)   · EP consulted (Freeman), Cardiology consulted (Mackenzie)        HFrEF, NYHA Class 3 HF  · Echo this admission shows ejection fraction of 20-25%, moderate mitral regurge, greatly dilated LA, mildly dilated RA  · R catch 12/2018 showed moderate to severe pulmonary HTN cardiomems inserted at this time  · Resume entresto, coreg, torsemide, aldactone, hold KCl for now  · Monitor vitals, admitted to intermediate floor  · For L and R heart cath tomorrow with HF specialist, possible cardiomems recalibration        TAIWO on CKD 3a  · Patient experiencing polyuria and polydipsia   · Baseline Cr. 1.3, today 2.0   · F/u urine studies for FeUrea, daily BMP  · Nephrology Maggie Lundborg) consulted        Electrolyte abnormalities  · Hyponatremia resolved  · Hyperkalemia resolved, pt is on KCl at home 20meq BID  · Order 10 regular insulin, hold KCl replacement at this time  · F/u daily BMP        Hyperglycemia hyperosmolar, non anion gap, DM2  · A1C 15.2%  · Increased Humalog and Lantus dose  · Home meds: levemir 40 BID, humalog 20u TID  · Resume home meds + HDSS  · UA + for glucose, betahydroxybutyrate elevated 1x, daily BMP  · Hypoglycemia protocol in place, ordered diabetic education         HTN/HLD/CAD  · Home meds: lipitor and medications for HF  · Continue home meds        ARVIND  · Continue cpap at night        Gout  · Continue allopurinol        Paroxysmal A-fib/CVA 2015  · NSR on admission with ventricular pacing  · Continue xarelto           Diet: cardiac, carb control, fluid restricted     Disposition: tele, intermediate     DVT / GI prophylaxis: xarelto and GI prophylaxis not indicated     Electronically signed by Chary Beach MD PGY-1.

## 2019-08-01 NOTE — PROGRESS NOTES
Department of Internal Medicine  Nephrology Attending Consult Note      SUBJECTIVE: We following Mr. Дмитрий Lancaster for hyponatremia and TAIWO. Reports no complaints.     PHYSICAL EXAM:      Vitals:    VITALS:  BP (!) 93/58   Pulse 82   Temp 98.9 °F (37.2 °C) (Temporal)   Resp 16   Ht 6' (1.829 m)   Wt (!) 321 lb 3.2 oz (145.7 kg)   SpO2 96%   BMI 43.56 kg/m²   24HR INTAKE/OUTPUT:      Intake/Output Summary (Last 24 hours) at 8/1/2019 1133  Last data filed at 8/1/2019 7908  Gross per 24 hour   Intake 395 ml   Output 700 ml   Net -305 ml       Constitutional: Patient is awake alert no distress  HEENT: Pupils are equal reactive  Respiratory: Lungs are clear  Cardiovascular/Edema: Heart sounds are regular rate  Gastrointestinal: Abdomen soft nontender  Neurologic: Nonfocal  Skin: No lesion  Other: No edema    Scheduled Meds:   potassium chloride  20 mEq Oral Daily with breakfast    [Held by provider] sacubitril-valsartan  1 tablet Oral BID    ferric gluconate (FERRLECIT) IVPB  125 mg Intravenous Once    allopurinol  300 mg Oral Daily    atorvastatin  40 mg Oral Daily    carvedilol  25 mg Oral BID WC    insulin lispro  20 Units Subcutaneous TID AC    insulin glargine  40 Units Subcutaneous BID    magnesium oxide  400 mg Oral Daily    sodium chloride flush  10 mL Intravenous 2 times per day    insulin lispro  0-18 Units Subcutaneous TID WC    insulin lispro  0-9 Units Subcutaneous Nightly     Continuous Infusions:   dextrose       PRN Meds:.benzocaine, perflutren lipid microspheres, glucose, dextrose, glucagon (rDNA), dextrose, sodium chloride flush, magnesium hydroxide, acetaminophen    DATA:    CBC:   Lab Results   Component Value Date    WBC 7.4 08/01/2019    RBC 4.34 08/01/2019    HGB 12.8 08/01/2019    HCT 38.4 08/01/2019    MCV 88.5 08/01/2019    MCH 29.5 08/01/2019    MCHC 33.3 08/01/2019    RDW 13.7 08/01/2019     08/01/2019    MPV 10.0 08/01/2019     CMP:    Lab Results   Component Value Date

## 2019-08-01 NOTE — PROGRESS NOTES
Medical resident contacted about low BP's overnight - going as low as 62/41. Patient asymptomatic and resting. Advised to monitor BP for the time being.

## 2019-08-01 NOTE — PROCEDURES
30%  angiographic luminal narrowing. RCA:  The right coronary artery is a very large dominant vessel which  also had luminal irregularities in its proximal segment with around 20%  to 30% angiographic luminal narrowing. There was also around 20% to 30%  narrowing more distally at the level of the crux. The rest of the  vessel did not appear to have any significant disease. The right radial arterial sheath was removed at the end of the procedure  and a TR band was applied with adequate hemostasis and with preservation  of pulse. The patient tolerated the procedure well and left the cardiac  catheterization laboratory in a stable condition. CONCLUSION:  1. Coronary artery disease:  a. Left main:  No significant disease. b.  LAD:  Up to 40% to 50% proximal/mid vessel narrowing and 30% to 40%  distal vessel disease. Around 50% hazy proximal narrowing of a long,  but small caliber diagonal branch.  c.  LCX:  Diffuse atherosclerosis, but with no more than 30%  angiographic luminal narrowing.  d.  RCA:  A large-dominant vessel with around 30% proximal and mid to  distal vessel narrowing. 2.  Mildly elevated left ventricular end-diastolic pressure, consistent  with LV diastolic dysfunction.         Hoyle Gowers, MD    D: 08/01/2019 15:32:58       T: 08/01/2019 16:46:28     [email or web address]  Job#: [id number]     Doc#: [id number]    CC:

## 2019-08-02 VITALS
HEIGHT: 72 IN | OXYGEN SATURATION: 98 % | DIASTOLIC BLOOD PRESSURE: 75 MMHG | HEART RATE: 82 BPM | RESPIRATION RATE: 16 BRPM | SYSTOLIC BLOOD PRESSURE: 113 MMHG | BODY MASS INDEX: 42.66 KG/M2 | TEMPERATURE: 99.1 F | WEIGHT: 315 LBS

## 2019-08-02 LAB
ANION GAP SERPL CALCULATED.3IONS-SCNC: 15 MMOL/L (ref 7–16)
BUN BLDV-MCNC: 29 MG/DL (ref 6–20)
CALCIUM SERPL-MCNC: 9.5 MG/DL (ref 8.6–10.2)
CHLORIDE BLD-SCNC: 100 MMOL/L (ref 98–107)
CO2: 18 MMOL/L (ref 22–29)
CREAT SERPL-MCNC: 1.5 MG/DL (ref 0.7–1.2)
EKG ATRIAL RATE: 74 BPM
EKG P AXIS: 21 DEGREES
EKG P-R INTERVAL: 152 MS
EKG Q-T INTERVAL: 490 MS
EKG QRS DURATION: 202 MS
EKG QTC CALCULATION (BAZETT): 543 MS
EKG R AXIS: 151 DEGREES
EKG T AXIS: -34 DEGREES
EKG VENTRICULAR RATE: 74 BPM
GFR AFRICAN AMERICAN: >60
GFR NON-AFRICAN AMERICAN: >60 ML/MIN/1.73
GLUCOSE BLD-MCNC: 209 MG/DL (ref 74–99)
HCT VFR BLD CALC: 36.9 % (ref 37–54)
HEMOGLOBIN: 12.3 G/DL (ref 12.5–16.5)
MCH RBC QN AUTO: 29.6 PG (ref 26–35)
MCHC RBC AUTO-ENTMCNC: 33.3 % (ref 32–34.5)
MCV RBC AUTO: 88.7 FL (ref 80–99.9)
METER GLUCOSE: 255 MG/DL (ref 74–99)
METER GLUCOSE: 334 MG/DL (ref 74–99)
PDW BLD-RTO: 13.7 FL (ref 11.5–15)
PLATELET # BLD: 233 E9/L (ref 130–450)
PMV BLD AUTO: 9.6 FL (ref 7–12)
POTASSIUM REFLEX MAGNESIUM: 4.5 MMOL/L (ref 3.5–5)
RBC # BLD: 4.16 E12/L (ref 3.8–5.8)
SODIUM BLD-SCNC: 133 MMOL/L (ref 132–146)
WBC # BLD: 9.3 E9/L (ref 4.5–11.5)

## 2019-08-02 PROCEDURE — 93010 ELECTROCARDIOGRAM REPORT: CPT | Performed by: FAMILY MEDICINE

## 2019-08-02 PROCEDURE — 85027 COMPLETE CBC AUTOMATED: CPT

## 2019-08-02 PROCEDURE — 80048 BASIC METABOLIC PNL TOTAL CA: CPT

## 2019-08-02 PROCEDURE — 6370000000 HC RX 637 (ALT 250 FOR IP): Performed by: INTERNAL MEDICINE

## 2019-08-02 PROCEDURE — 2580000003 HC RX 258: Performed by: INTERNAL MEDICINE

## 2019-08-02 PROCEDURE — 6370000000 HC RX 637 (ALT 250 FOR IP): Performed by: NURSE PRACTITIONER

## 2019-08-02 PROCEDURE — 99233 SBSQ HOSP IP/OBS HIGH 50: CPT | Performed by: INTERNAL MEDICINE

## 2019-08-02 PROCEDURE — 99239 HOSP IP/OBS DSCHRG MGMT >30: CPT | Performed by: FAMILY MEDICINE

## 2019-08-02 PROCEDURE — 82962 GLUCOSE BLOOD TEST: CPT

## 2019-08-02 PROCEDURE — 36415 COLL VENOUS BLD VENIPUNCTURE: CPT

## 2019-08-02 PROCEDURE — 6370000000 HC RX 637 (ALT 250 FOR IP): Performed by: STUDENT IN AN ORGANIZED HEALTH CARE EDUCATION/TRAINING PROGRAM

## 2019-08-02 PROCEDURE — 94660 CPAP INITIATION&MGMT: CPT

## 2019-08-02 RX ORDER — SPIRONOLACTONE 25 MG/1
12.5 TABLET ORAL
Qty: 30 TABLET | Refills: 3 | Status: SHIPPED | OUTPATIENT
Start: 2019-08-03 | End: 2020-02-25

## 2019-08-02 RX ORDER — METOPROLOL SUCCINATE 50 MG/1
50 TABLET, EXTENDED RELEASE ORAL 2 TIMES DAILY
Qty: 30 TABLET | Refills: 3 | Status: SHIPPED | OUTPATIENT
Start: 2019-08-02 | End: 2019-08-03 | Stop reason: SDUPTHER

## 2019-08-02 RX ORDER — INSULIN DETEMIR 100 [IU]/ML
45 INJECTION, SOLUTION SUBCUTANEOUS 2 TIMES DAILY
Qty: 10 PEN | Refills: 5 | Status: SHIPPED | OUTPATIENT
Start: 2019-08-02 | End: 2020-01-02

## 2019-08-02 RX ORDER — SPIRONOLACTONE 25 MG/1
12.5 TABLET ORAL
Status: DISCONTINUED | OUTPATIENT
Start: 2019-08-02 | End: 2019-08-02 | Stop reason: HOSPADM

## 2019-08-02 RX ADMIN — INSULIN LISPRO 22 UNITS: 100 INJECTION, SOLUTION INTRAVENOUS; SUBCUTANEOUS at 09:52

## 2019-08-02 RX ADMIN — ALLOPURINOL 300 MG: 300 TABLET ORAL at 09:50

## 2019-08-02 RX ADMIN — METOPROLOL SUCCINATE 50 MG: 50 TABLET, EXTENDED RELEASE ORAL at 09:50

## 2019-08-02 RX ADMIN — INSULIN GLARGINE 45 UNITS: 100 INJECTION, SOLUTION SUBCUTANEOUS at 09:52

## 2019-08-02 RX ADMIN — INSULIN LISPRO 12 UNITS: 100 INJECTION, SOLUTION INTRAVENOUS; SUBCUTANEOUS at 09:52

## 2019-08-02 RX ADMIN — INSULIN LISPRO 22 UNITS: 100 INJECTION, SOLUTION INTRAVENOUS; SUBCUTANEOUS at 12:13

## 2019-08-02 RX ADMIN — SPIRONOLACTONE 12.5 MG: 25 TABLET ORAL at 12:14

## 2019-08-02 RX ADMIN — SACUBITRIL AND VALSARTAN 1 TABLET: 24; 26 TABLET, FILM COATED ORAL at 10:58

## 2019-08-02 RX ADMIN — POTASSIUM CHLORIDE 20 MEQ: 20 TABLET, EXTENDED RELEASE ORAL at 09:50

## 2019-08-02 RX ADMIN — INSULIN LISPRO 9 UNITS: 100 INJECTION, SOLUTION INTRAVENOUS; SUBCUTANEOUS at 12:13

## 2019-08-02 RX ADMIN — ATORVASTATIN CALCIUM 40 MG: 40 TABLET, FILM COATED ORAL at 09:50

## 2019-08-02 RX ADMIN — Medication 10 ML: at 09:50

## 2019-08-02 RX ADMIN — Medication 400 MG: at 09:50

## 2019-08-02 ASSESSMENT — PAIN SCALES - GENERAL: PAINLEVEL_OUTOF10: 0

## 2019-08-02 NOTE — PROGRESS NOTES
Electrophysiology progress note         Date:  8/1/2019  Patient: Becka Dutton III  Admission:  7/30/2019 11:28 AM  Admit DX: Syncope and collapse [R55]  Syncope and collapse [R55]  Age:  44 y.o., 1980     LOS: 2 days     Reason for evaluation:   cardiomyopathy, CHF and biventricular pacer ICD in situ      SUBJECTIVE:    The patient was seen and examined. Notes and labs reviewed. There were no complications over night. Patient's cardiac review of systems: negative. The patient is generally feeling unchanged. OBJECTIVE:    Telemetry: Sinus with biv pacing  /66   Pulse 83   Temp 98 °F (36.7 °C) (Temporal)   Resp 18   Ht 6' (1.829 m)   Wt (!) 321 lb 3.2 oz (145.7 kg)   SpO2 97%   BMI 43.56 kg/m²     Intake/Output Summary (Last 24 hours) at 8/1/2019 2207  Last data filed at 8/1/2019 1414  Gross per 24 hour   Intake 155 ml   Output 700 ml   Net -545 ml       EXAM:   CONSTITUTIONAL:  awake, alert, cooperative, no apparent distress, and appears stated age. HEENT: Normal jugular venous pulsations, . Head is atraumatic, normocephalic. Eyes and oral mucosa are normal.  LUNGS: Good respiratory effort. No for increased work of breathing. On auscultation: clear to auscultation bilaterally  CARDIOVASCULAR:  Normal apical impulse, regular rate and rhythm, normal S1 and S2, no S3   ABDOMEN: Soft, nontender, nondistended. SKIN: Warm and dry. EXTREMITIES: No lower extremity edema. Motor movement grossly intact. No cyanosis or clubbing.     Current Inpatient Medications:   insulin glargine  45 Units Subcutaneous BID    [START ON 8/2/2019] insulin lispro  22 Units Subcutaneous TID AC    metoprolol succinate  50 mg Oral BID    potassium chloride  20 mEq Oral Daily with breakfast    [Held by provider] sacubitril-valsartan  1 tablet Oral BID    ferric gluconate (FERRLECIT) IVPB  125 mg Intravenous Once    allopurinol  300 mg Oral Daily    atorvastatin  40 mg Oral Daily    magnesium oxide  400 mg

## 2019-08-02 NOTE — DISCHARGE INSTR - DIET

## 2019-08-02 NOTE — DISCHARGE SUMMARY
and mid-distal vessel narrowing. Mildly elevated left ventircular end-diastolic pressure consistent with LV diastolic dysfunction. He progressed well and pain was controlled on PO medications. He was tolerating a regular diet with no nausea or vomiting and was in suitable condition for discharge with follow-up out-patient. Discharge Medications:         Medication List      START taking these medications    insulin lispro 100 UNIT/ML injection vial  Commonly known as:  HUMALOG  Inject 22 Units into the skin 3 times daily (before meals)  Replaces:  insulin lispro 100 UNIT/ML pen     metoprolol succinate 50 MG extended release tablet  Commonly known as:  TOPROL XL  Take 1 tablet by mouth 2 times daily     sacubitril-valsartan 24-26 MG per tablet  Commonly known as:  ENTRESTO  Take 1 tablet by mouth 2 times daily  Replaces:  sacubitril-valsartan  MG per tablet        CHANGE how you take these medications    LEVEMIR FLEXTOUCH 100 UNIT/ML injection pen  Generic drug:  insulin detemir  Inject 45 Units into the skin 2 times daily  What changed:  how much to take     spironolactone 25 MG tablet  Commonly known as:  ALDACTONE  Take 0.5 tablets by mouth Daily with lunch  Start taking on:  8/3/2019  What changed:  See the new instructions.         CONTINUE taking these medications    allopurinol 300 MG tablet  Commonly known as:  ZYLOPRIM  TAKE (1) TABLET BY MOUTH DAILY     atorvastatin 40 MG tablet  Commonly known as:  LIPITOR  Take 1 tablet by mouth daily     magnesium oxide 400 MG tablet  Commonly known as:  MAG-OX  TAKE 1 TABLET BY MOUTH DAILY     sharps container  For needle disposal.     XARELTO 20 MG Tabs tablet  Generic drug:  rivaroxaban  TAKE 1 TABLET BY MOUTH DAILY        STOP taking these medications    carvedilol 25 MG tablet  Commonly known as:  COREG     ferrous sulfate 325 (65 Fe) MG tablet     insulin lispro 100 UNIT/ML pen  Commonly known as:  HUMALOG  Replaced by:  insulin lispro 100 UNIT/ML unremarkable. No Chiari malformation. . Thinning of the optic lenses bilaterally, otherwise, optic globes and orbital contents are unremarkable. Visualized paranasal sinuses are unremarkable. No acute fracture or lytic or blastic bony lesion is noted. 1. No CT evidence of acute intracranial abnormality, as questioned. If there is clinical concern for potential underlying acute cerebrovascular infarction/accident or other potential abnormalities of underlying brain parenchyma, MRI of the brain would be recommended for more detailed evaluation. . 2. Vascular calcifications within the bilateral internal carotid artery cavernous segments. . 3. Hypodensity left occipital region suggestive of remote cerebrovascular infarction, noted on 10/31/2013 exam.    Xr Chest Portable    Result Date: 2019  Patient MRN: [id number] : 1980 Age:  44 years Gender: Male Order Date: 2019 12:00 PM Exam: XR CHEST PORTABLE Number of Views: 1 Indication:   Syncope Comparison: 2018 Findings: There is a stable, enlarged cardiomediastinal silhouette with underlying pulmonary edema with nonspecific bibasilar airspace disease. Stable postoperative changes. . No pneumothorax. 1. Stable, enlarged cardiac mediastinal silhouette with underlying pulmonary edema. 2. Nonspecific bibasilar airspace disease, findings can be seen in infiltrate/pneumonia and/or atelectasis. .        Treatments:  Cardiac Catheterization    Discharge Exam: please refer to progress note       Disposition:       Future Appointments   Date Time Provider Clemente Davis   2019 12:00 PM [facility] CHF ROOM 4 [facility] CHF None   2019  9:00 AM Awa Hinds MD [facility]   2019  9:20 AM MD Joshua Lara [facility]   2019  2:40 PM MD Joshua Lara [facility]       More than 30 minutes was spent in preparation of this patient's discharge including, but not limited to, examination, preparation of documents, prescription

## 2019-08-02 NOTE — PROGRESS NOTES
[facility] Inpatient   Resident Progress Note    S:  Hospital day: 3     Brief Synopsis: Rober Phillips a 44 y. o. man with a hx of CVA (was on xarelto) pacemaker, HFrEF (15-20%- Echo in 2018), HTN, CAD, HPLD, Paroxysmal Afib, DM 2, CKD 3a,  who is being evaluated for a syncopal episode     The pt went in for cardiac catheterization that revealed CAD and the patient will be discharged with out-patient follow-up today. Pt denies chest pain, sob, abdominal pain, leg swelling and is fairing well after his catheterization. Cont meds:   Infusions Meds    sodium chloride 20 mL/hr at 08/01/19 1414    sodium chloride 20 mL/hr at 08/01/19 1413    dextrose           Scheduled meds:   Scheduled Medications    insulin glargine  45 Units Subcutaneous BID    [START ON 8/2/2019] insulin lispro  22 Units Subcutaneous TID AC    metoprolol succinate  50 mg Oral BID    potassium chloride  20 mEq Oral Daily with breakfast    [Held by provider] sacubitril-valsartan  1 tablet Oral BID    ferric gluconate (FERRLECIT) IVPB  125 mg Intravenous Once    allopurinol  300 mg Oral Daily    atorvastatin  40 mg Oral Daily    magnesium oxide  400 mg Oral Daily    sodium chloride flush  10 mL Intravenous 2 times per day    insulin lispro  0-18 Units Subcutaneous TID WC    insulin lispro  0-9 Units Subcutaneous Nightly         PRN meds:   PRN Medications   benzocaine, perflutren lipid microspheres, glucose, dextrose, glucagon (rDNA), dextrose, sodium chloride flush, magnesium hydroxide, acetaminophen         I reviewed the patient's past medical and surgical history, Medications and Allergies.     O:  BP 97/61   Pulse 77   Temp 98 °F (36.7 °C) (Temporal)   Resp 16   Ht 6' (1.829 m)   Wt (!) 321 lb 3.2 oz (145.7 kg)   SpO2 97%   BMI 43.56 kg/m²   24 hour I&O: I/O last 3 completed shifts:   In: 155 [I.V.:10; IV Piggyback:145]  Out: 700 [Urine:700]  No intake/output data recorded.         GENERAL: Alert,

## 2019-08-02 NOTE — PROGRESS NOTES
[facility] Inpatient   Resident Progress Note     S:  Hospital day: 1   Brief Synopsis: Ignacio Sierra is a 44 y.o. man with a hx of CVA (was on xarelto) pacemaker, HFrEF (15-20%- Echo in 2018), HTN, CAD, HPLD, Paroxysmal Afib, DM 2, CKD 3a,  who presented for a syncopal episode. Mr. Rip Leary was exercising outdoors and started to feel his weight belt tightening, and the next thing he knew he was on the ground. He continued to feel lightheaded, and called the EMS after returning home.  Of note, his echocardiogram showed improvement of EF on this admission (20-25%), moderate MR, dilated LA, mildly dilated RA.      Today the pt will be going in for L&R catheterization, with possible cardiomems for recalibration.            Cont meds:   Infusions Meds    sodium chloride 20 mL/hr at 08/01/19 1414    sodium chloride 20 mL/hr at 08/01/19 1413    dextrose           Scheduled meds:   Scheduled Medications    insulin glargine  45 Units Subcutaneous BID    [START ON 8/2/2019] insulin lispro  22 Units Subcutaneous TID AC    metoprolol succinate  50 mg Oral BID    potassium chloride  20 mEq Oral Daily with breakfast    [Held by provider] sacubitril-valsartan  1 tablet Oral BID    ferric gluconate (FERRLECIT) IVPB  125 mg Intravenous Once    allopurinol  300 mg Oral Daily    atorvastatin  40 mg Oral Daily    magnesium oxide  400 mg Oral Daily    sodium chloride flush  10 mL Intravenous 2 times per day    insulin lispro  0-18 Units Subcutaneous TID WC    insulin lispro  0-9 Units Subcutaneous Nightly         PRN meds:   PRN Medications   benzocaine, perflutren lipid microspheres, glucose, dextrose, glucagon (rDNA), dextrose, sodium chloride flush, magnesium hydroxide, acetaminophen         I reviewed the patient's past medical and surgical history, Medications and Allergies.     O:  BP 97/61   Pulse 77   Temp 98 °F (36.7 °C) (Temporal)   Resp 16   Ht 6' (1.829 m)   Wt (!) 321 lb 3.2 oz (145.7 kg) SpO2 97%   BMI 43.56 kg/m²   24 hour I&O: I/O last 3 completed shifts: In: 155 [I.V.:10; IV Piggyback:145]  Out: 700 [Urine:700]  No intake/output data recorded.         GENERAL: Alert, cooperative, no acute distress. HEENT: Normocephalic, atraumatic. EOMs intact  NECK: Supple. No JVD  CHEST: No tenderness   LUNG: Clear to auscultation bilaterally,  respirations unlabored. No rales/wheezing/rubs  HEART: RRR, S1 and S2 normal, no murmur, rub or gallop. DP pulses 2/4  ABDOMEN: SNTND, no masses, no organomegaly, no guarding, rebound or rigidity. GENITOURINARY: Urinary cath present  EXTREMITIES:  Extremities normal, atraumatic, no cyanosis or edema. Distal pulses equal bilaterally  SKIN: Skin color, texture, turgor normal, no rashes or lesions  MUSCULOSKELETAL: No joint swelling, no muscle tenderness. Normal ROM in extremities. NEUROLOGIC: Alert & Oriented;         Labs:  Na/K/Cl/CO2:  136/4.6/100/20 (08/01 0430)  BUN/Cr/glu/ALT/AST/amyl/lip:  38/1.6/--/--/--/--/-- (08/01 0430)  WBC/Hgb/Hct/Plts:  7.4/12.8/38.4/230 (08/01 0430)  estimated creatinine clearance is 92 mL/min (A) (based on SCr of 1.6 mg/dL (H)). Other pertinent labs as noted below     New Imaging:  CT Head WO Contrast   Final Result   1. No CT evidence of acute intracranial abnormality, as questioned. If   there is clinical concern for potential underlying acute   cerebrovascular infarction/accident or other potential abnormalities   of underlying brain parenchyma, MRI of the brain would be recommended   for more detailed evaluation. .   2. Vascular calcifications within the bilateral internal carotid   artery cavernous segments. .   3. Hypodensity left occipital region suggestive of remote   cerebrovascular infarction, noted on 10/31/2013 exam.       XR CHEST PORTABLE   Final Result   1. Stable, enlarged cardiac mediastinal silhouette with underlying   pulmonary edema.    2. Nonspecific bibasilar airspace disease, findings can be seen in

## 2019-08-02 NOTE — PROGRESS NOTES
Vicky  Medicine Attending    S: 44 y.o. male with a history of htn,  HFrEF s/p AICD placement, CVA, CKD ARVIND, afib on xarelto, CAD and uncontrolled dm2 (a1c 15.2) who presented with several episodes of syncope after exercising with a waist belt. Used a sauna bag as well in the past month. He has been drinking more water. He came to the ER when he didn't feel well afterward and the interogation of his aicd found that it had fired on vtach. He also had vtach overnight here as well. Today, states that he has no CP, palpitations or dizziness. Sugars still high   Had cath yesterday - reviewed    O: VS- Blood pressure 102/72, pulse 82, temperature 98.8 °F (37.1 °C), temperature source Temporal, resp. rate 16, height 6' (1.829 m), weight (!) 324 lb 12.8 oz (147.3 kg), SpO2 97 %. Exam is as noted by resident with the following changes, additions or corrections:   Gen: NAD   HEENT: NCAT, PERRL, MMM  CV-RRR   Lungs-CTA bilaterally  ABD-soft nonttp no masses   Ext-no C/C/E    Impressions:   Principal Problem:    Syncope and collapse  Active Problems:    Cardiomyopathy, nonischemic (HCC)    Obstructive sleep apnea    CKD (chronic kidney disease)    Type 2 diabetes mellitus with complication, with long-term current use of insulin (HCC)    Ventricular arrhythmia    Hypotension    Nonischemic cardiomyopathy (HCC)    Syncope    Chronic HFrEF (heart failure with reduced ejection fraction) (HCC)    Ventricular tachycardia (HCC)    S/P left pulmonary artery pressure sensor implant placement  Resolved Problems:    Obesity      Plan:   OK for home    Cardiology to assist with med rec   Insulin increased to Lantus - 45 U BID, Humalog 22 U with meals. He will try to eat three meals daily   F/U in office next week     Attending Physician Statement  I have reviewed the chart and seen the patient with the resident(s). I personally reviewed images, EKG's and similar tests, if present.   I personally

## 2019-08-03 ENCOUNTER — CARE COORDINATION (OUTPATIENT)
Dept: CASE MANAGEMENT | Age: 39
End: 2019-08-03

## 2019-08-03 DIAGNOSIS — I50.22 CHRONIC SYSTOLIC CONGESTIVE HEART FAILURE (HCC): Primary | ICD-10-CM

## 2019-08-03 DIAGNOSIS — R55 SYNCOPE AND COLLAPSE: Primary | ICD-10-CM

## 2019-08-03 PROCEDURE — 1111F DSCHRG MED/CURRENT MED MERGE: CPT | Performed by: FAMILY MEDICINE

## 2019-08-03 RX ORDER — METOPROLOL SUCCINATE 50 MG/1
50 TABLET, EXTENDED RELEASE ORAL 2 TIMES DAILY
Qty: 30 TABLET | Refills: 3 | Status: SHIPPED | OUTPATIENT
Start: 2019-08-03 | End: 2019-08-12

## 2019-08-05 ENCOUNTER — TELEPHONE (OUTPATIENT)
Dept: CARDIOLOGY CLINIC | Age: 39
End: 2019-08-05

## 2019-08-05 ENCOUNTER — HOSPITAL ENCOUNTER (OUTPATIENT)
Dept: OTHER | Age: 39
Setting detail: THERAPIES SERIES
Discharge: HOME | DRG: 287 | End: 2019-08-05
Payer: MEDICARE

## 2019-08-05 VITALS
RESPIRATION RATE: 16 BRPM | DIASTOLIC BLOOD PRESSURE: 62 MMHG | SYSTOLIC BLOOD PRESSURE: 117 MMHG | WEIGHT: 315 LBS | BODY MASS INDEX: 44.35 KG/M2 | HEART RATE: 80 BPM

## 2019-08-05 LAB
ANION GAP SERPL CALCULATED.3IONS-SCNC: 8 MMOL/L (ref 7–16)
BUN BLDV-MCNC: 13 MG/DL (ref 6–20)
CALCIUM SERPL-MCNC: 9.7 MG/DL (ref 8.6–10.2)
CHLORIDE BLD-SCNC: 107 MMOL/L (ref 98–107)
CO2: 19 MMOL/L (ref 22–29)
CREAT SERPL-MCNC: 1.2 MG/DL (ref 0.7–1.2)
GFR AFRICAN AMERICAN: >60
GFR NON-AFRICAN AMERICAN: >60 ML/MIN/1.73
GLUCOSE BLD-MCNC: 255 MG/DL (ref 74–99)
POTASSIUM SERPL-SCNC: 4.5 MMOL/L (ref 3.5–5)
PRO-BNP: 862 PG/ML (ref 0–125)
SODIUM BLD-SCNC: 134 MMOL/L (ref 132–146)

## 2019-08-05 PROCEDURE — 99204 OFFICE O/P NEW MOD 45 MIN: CPT

## 2019-08-05 PROCEDURE — 36415 COLL VENOUS BLD VENIPUNCTURE: CPT

## 2019-08-05 PROCEDURE — 80048 BASIC METABOLIC PNL TOTAL CA: CPT

## 2019-08-05 PROCEDURE — 83880 ASSAY OF NATRIURETIC PEPTIDE: CPT

## 2019-08-05 NOTE — PROGRESS NOTES
Discharged from hospital last Friday. No current complaints.  Labs obtained per order Dr. Kyle Manzo

## 2019-08-06 ENCOUNTER — CARE COORDINATION (OUTPATIENT)
Dept: CASE MANAGEMENT | Age: 39
End: 2019-08-06

## 2019-08-07 ENCOUNTER — OFFICE VISIT (OUTPATIENT)
Dept: CARDIOLOGY CLINIC | Age: 39
End: 2019-08-07
Payer: MEDICARE

## 2019-08-07 VITALS
HEIGHT: 72 IN | WEIGHT: 315 LBS | SYSTOLIC BLOOD PRESSURE: 116 MMHG | RESPIRATION RATE: 16 BRPM | HEART RATE: 71 BPM | DIASTOLIC BLOOD PRESSURE: 80 MMHG | BODY MASS INDEX: 42.66 KG/M2 | OXYGEN SATURATION: 97 %

## 2019-08-07 DIAGNOSIS — I42.8 CARDIOMYOPATHY, NONISCHEMIC (HCC): ICD-10-CM

## 2019-08-07 DIAGNOSIS — E11.8 TYPE 2 DIABETES MELLITUS WITH COMPLICATION, WITH LONG-TERM CURRENT USE OF INSULIN (HCC): ICD-10-CM

## 2019-08-07 DIAGNOSIS — I10 ESSENTIAL HYPERTENSION: ICD-10-CM

## 2019-08-07 DIAGNOSIS — I34.0 NON-RHEUMATIC MITRAL REGURGITATION: ICD-10-CM

## 2019-08-07 DIAGNOSIS — Z95.810 BIVENTRICULAR IMPLANTABLE CARDIOVERTER-DEFIBRILLATOR (ICD) IN SITU: ICD-10-CM

## 2019-08-07 DIAGNOSIS — I38 VHD (VALVULAR HEART DISEASE): ICD-10-CM

## 2019-08-07 DIAGNOSIS — Z79.4 TYPE 2 DIABETES MELLITUS WITH COMPLICATION, WITH LONG-TERM CURRENT USE OF INSULIN (HCC): ICD-10-CM

## 2019-08-07 DIAGNOSIS — Z86.73 HISTORY OF CVA (CEREBROVASCULAR ACCIDENT): ICD-10-CM

## 2019-08-07 DIAGNOSIS — N18.9 CHRONIC KIDNEY DISEASE, UNSPECIFIED CKD STAGE: ICD-10-CM

## 2019-08-07 DIAGNOSIS — I48.0 PAF (PAROXYSMAL ATRIAL FIBRILLATION) (HCC): Primary | ICD-10-CM

## 2019-08-07 PROCEDURE — 93000 ELECTROCARDIOGRAM COMPLETE: CPT | Performed by: INTERNAL MEDICINE

## 2019-08-07 PROCEDURE — 99214 OFFICE O/P EST MOD 30 MIN: CPT | Performed by: INTERNAL MEDICINE

## 2019-08-07 RX ORDER — POTASSIUM CHLORIDE 1.5 G/1.77G
20 POWDER, FOR SOLUTION ORAL 2 TIMES DAILY
COMMUNITY
End: 2019-08-07 | Stop reason: ALTCHOICE

## 2019-08-07 RX ORDER — CARVEDILOL 25 MG/1
25 TABLET ORAL 2 TIMES DAILY WITH MEALS
COMMUNITY
End: 2019-08-07 | Stop reason: ALTCHOICE

## 2019-08-07 NOTE — PROGRESS NOTES
OFFICE VISIT     PRIMARY CARE PHYSICIAN:      Jodi Duverney, MD       ALLERGIES / SENSITIVITIES:      No Known Allergies       REVIEWED MEDICATIONS:        Current Outpatient Medications:     carvedilol (COREG) 25 MG tablet, Take 25 mg by mouth 2 times daily (with meals), Disp: , Rfl:     potassium chloride (KLOR-CON) 20 MEQ packet, Take 20 mEq by mouth 2 times daily, Disp: , Rfl:     sacubitril-valsartan (ENTRESTO) 24-26 MG per tablet, Take 1 tablet by mouth 2 times daily, Disp: 60 tablet, Rfl: 1    metoprolol succinate (TOPROL XL) 50 MG extended release tablet, Take 1 tablet by mouth 2 times daily, Disp: 30 tablet, Rfl: 3    insulin lispro (HUMALOG) 100 UNIT/ML injection vial, Inject 22 Units into the skin 3 times daily (before meals), Disp: 1 vial, Rfl: 3    LEVEMIR FLEXTOUCH 100 UNIT/ML injection pen, Inject 45 Units into the skin 2 times daily, Disp: 10 pen, Rfl: 5    spironolactone (ALDACTONE) 25 MG tablet, Take 0.5 tablets by mouth Daily with lunch, Disp: 30 tablet, Rfl: 3    magnesium oxide (MAG-OX) 400 MG tablet, TAKE 1 TABLET BY MOUTH DAILY, Disp: 30 tablet, Rfl: 11    XARELTO 20 MG TABS tablet, TAKE 1 TABLET BY MOUTH DAILY, Disp: 30 tablet, Rfl: 10    allopurinol (ZYLOPRIM) 300 MG tablet, TAKE (1) TABLET BY MOUTH DAILY, Disp: 30 tablet, Rfl: 10    atorvastatin (LIPITOR) 40 MG tablet, Take 1 tablet by mouth daily, Disp: 30 tablet, Rfl: 5    sharps container, For needle disposal., Disp: 1 each, Rfl: 5      S: REASON FOR VISIT:       Chief Complaint   Patient presents with    Coronary Artery Disease     5 month ov. (Ov originally sched for cardiac clearance for bariatric surgery). To ED/Hosp 7/30/19 d/t syncope & collapse/ICD discharge; echo done 7/31/19; S/P LHC 8/1/19. Right radial cath site is healed. Last labs 8/5/19. Follows with Dr Claudia Spaulding; no discharges since hosp.  (pt has post hosp visit 8/12/19 with Dr Marcy Almanza). Pt with Cardiomems. No complaints today.   Pt did not Cardiology  [address], [place], [address]  [phone]

## 2019-08-08 ENCOUNTER — HOSPITAL ENCOUNTER (OUTPATIENT)
Age: 39
Discharge: HOME | End: 2019-08-10
Payer: MEDICARE

## 2019-08-08 ENCOUNTER — OFFICE VISIT (OUTPATIENT)
Dept: FAMILY MEDICINE CLINIC | Age: 39
End: 2019-08-08
Payer: MEDICARE

## 2019-08-08 ENCOUNTER — TELEPHONE (OUTPATIENT)
Dept: CARDIOLOGY CLINIC | Age: 39
End: 2019-08-08

## 2019-08-08 VITALS
HEIGHT: 72 IN | HEART RATE: 73 BPM | BODY MASS INDEX: 42.66 KG/M2 | WEIGHT: 315 LBS | DIASTOLIC BLOOD PRESSURE: 75 MMHG | RESPIRATION RATE: 16 BRPM | OXYGEN SATURATION: 99 % | TEMPERATURE: 98.3 F | SYSTOLIC BLOOD PRESSURE: 109 MMHG

## 2019-08-08 DIAGNOSIS — N18.9 CHRONIC KIDNEY DISEASE, UNSPECIFIED CKD STAGE: ICD-10-CM

## 2019-08-08 DIAGNOSIS — E11.8 TYPE 2 DIABETES MELLITUS WITH COMPLICATION, WITH LONG-TERM CURRENT USE OF INSULIN (HCC): Primary | ICD-10-CM

## 2019-08-08 DIAGNOSIS — I42.8 CARDIOMYOPATHY, NONISCHEMIC (HCC): Primary | ICD-10-CM

## 2019-08-08 DIAGNOSIS — G47.33 OBSTRUCTIVE SLEEP APNEA: ICD-10-CM

## 2019-08-08 DIAGNOSIS — I10 ESSENTIAL HYPERTENSION: ICD-10-CM

## 2019-08-08 DIAGNOSIS — E66.01 MORBID OBESITY WITH BMI OF 40.0-44.9, ADULT (HCC): ICD-10-CM

## 2019-08-08 DIAGNOSIS — I48.0 PAF (PAROXYSMAL ATRIAL FIBRILLATION) (HCC): ICD-10-CM

## 2019-08-08 DIAGNOSIS — Z79.4 TYPE 2 DIABETES MELLITUS WITH COMPLICATION, WITH LONG-TERM CURRENT USE OF INSULIN (HCC): ICD-10-CM

## 2019-08-08 DIAGNOSIS — E11.8 TYPE 2 DIABETES MELLITUS WITH COMPLICATION, WITH LONG-TERM CURRENT USE OF INSULIN (HCC): ICD-10-CM

## 2019-08-08 DIAGNOSIS — I50.22 CHRONIC SYSTOLIC CONGESTIVE HEART FAILURE (HCC): ICD-10-CM

## 2019-08-08 DIAGNOSIS — Z79.4 TYPE 2 DIABETES MELLITUS WITH COMPLICATION, WITH LONG-TERM CURRENT USE OF INSULIN (HCC): Primary | ICD-10-CM

## 2019-08-08 LAB
CREATININE URINE: 365 MG/DL (ref 40–278)
MICROALBUMIN UR-MCNC: 68.5 MG/L
MICROALBUMIN/CREAT UR-RTO: 18.8 (ref 0–30)

## 2019-08-08 PROCEDURE — 99495 TRANSJ CARE MGMT MOD F2F 14D: CPT | Performed by: FAMILY MEDICINE

## 2019-08-08 PROCEDURE — 1111F DSCHRG MED/CURRENT MED MERGE: CPT | Performed by: FAMILY MEDICINE

## 2019-08-08 PROCEDURE — 82044 UR ALBUMIN SEMIQUANTITATIVE: CPT

## 2019-08-08 PROCEDURE — 99212 OFFICE O/P EST SF 10 MIN: CPT | Performed by: FAMILY MEDICINE

## 2019-08-08 PROCEDURE — 82570 ASSAY OF URINE CREATININE: CPT

## 2019-08-08 RX ORDER — MAGNESIUM OXIDE 400 MG/1
400 TABLET ORAL DAILY
Qty: 30 TABLET | Refills: 11 | Status: SHIPPED | OUTPATIENT
Start: 2019-08-08 | End: 2019-08-12

## 2019-08-08 RX ORDER — POTASSIUM CHLORIDE 20 MEQ/1
TABLET, EXTENDED RELEASE ORAL
Refills: 3 | COMMUNITY
Start: 2019-06-24 | End: 2019-08-12

## 2019-08-08 NOTE — PATIENT INSTRUCTIONS
Increase Levemir to 46 Units twice a day  Take 22 units of humalog with breakfast and dinner.   Take 20 units with lunch  Call if your sugar goes below 80 or above 300

## 2019-08-09 ENCOUNTER — CARE COORDINATION (OUTPATIENT)
Dept: CASE MANAGEMENT | Age: 39
End: 2019-08-09

## 2019-08-12 ENCOUNTER — OFFICE VISIT (OUTPATIENT)
Dept: CARDIOLOGY CLINIC | Age: 39
End: 2019-08-12
Payer: MEDICARE

## 2019-08-12 VITALS
DIASTOLIC BLOOD PRESSURE: 58 MMHG | HEART RATE: 70 BPM | OXYGEN SATURATION: 97 % | SYSTOLIC BLOOD PRESSURE: 102 MMHG | BODY MASS INDEX: 42.66 KG/M2 | HEIGHT: 72 IN | WEIGHT: 315 LBS

## 2019-08-12 DIAGNOSIS — I50.22 CHRONIC SYSTOLIC HEART FAILURE (HCC): Primary | ICD-10-CM

## 2019-08-12 DIAGNOSIS — I42.8 NONISCHEMIC CARDIOMYOPATHY (HCC): ICD-10-CM

## 2019-08-12 PROCEDURE — 99214 OFFICE O/P EST MOD 30 MIN: CPT | Performed by: INTERNAL MEDICINE

## 2019-08-12 RX ORDER — AMOXICILLIN 500 MG/1
TABLET, FILM COATED ORAL
Refills: 0 | COMMUNITY
Start: 2019-08-09 | End: 2019-11-14 | Stop reason: ALTCHOICE

## 2019-08-12 RX ORDER — ATORVASTATIN CALCIUM 40 MG/1
40 TABLET, FILM COATED ORAL DAILY
Qty: 30 TABLET | Refills: 5 | Status: SHIPPED | OUTPATIENT
Start: 2019-08-12 | End: 2019-08-19

## 2019-08-12 NOTE — PROGRESS NOTES
Raven-Barr virus at age 28   8. Paroxysmal atrial fibrillation. Chronic anticoagulation with Xarelto      (initiation time unknown)   12. Reported history of ischemic left middle cerebral artery stroke      (further details unknown)   13. Bilateral carotid duplex 2016, reportedly showed no      hemodynamically significant stenosis with 0% to 49% narrowing of both      carotid arteries   14. Echo 2016 [facility]):  LV severely dilated. LA      moderately dilated. Mild PHTN. Mild TR. Trace TR. Pacer wire visible      in the right atrium and ventricle. RA mildly dilated. EF 10% to 15%. Class 3 diastolic dysfunction. RV moderately dilated. RV systolic      function moderate-severely reduced. Mild-moderate MR.   15.   Echo 2012 (Dr. Noemi Toledo):  LVEF 30% to 35% and enlarged LV   16.   Diabetes mellitus   17. Gout   18. Syncopal episode 2016, presented to [facility] (reports requested); per      patient underwent echocardiogram and ICD interrogation. Reported ICD      interrogation showed normal device function and no changes made (reports      requested)          FAMILY HISTORY:    Mother  at the age of 48 from complications of stomach cancer. His father is alive and well in his 62s. His siblings are alive and well. He has no children.          Past Medical History:   Diagnosis Date    Acute CHF ([address] 2011    Acute CHF ([address] 2011    Acute idiopathic gout of right foot 2016    Anemia 2011    CAD (coronary artery disease)     Cerebral artery occlusion with cerebral infarction ([address]     CKD (chronic kidney disease)     Gout     HTN (hypertension) 2011    Hyperlipidemia     Hypertension     Morbid obesity due to excess calories ([address]     Nonischemic cardiomyopathy ([address] 2011    Nonischemic cardiomyopathy ([address] 2013- cardiac MRI revealed an LVEF of 20%    ARVIND (obstructive sleep apnea) with regards to his medications though we have tried to simplify it and he has pill packs at home. It seems there is some confusion surrounding the pill packs. He left the visit angry/upset/?feeling bad that he was not taking them correctly and perhaps perceiving the encounter as confrontational though what I witnessed between the patient and nursing/MA was not this. Therefore think he is feeling badly and tried to frame it from a standpoint that we are striving towards cardiac recovery so he can potentially undergo safe gastric bypass surgery. I am hopeful that he returns to the office with his medications so we can see what he is truly taking or not taking. Regardless the CardioMEMS team will follow his pressures on a daily basis to ensure euvolemia. Otherwise no recurrent ICD shocks as he was just hospitalized with VT and syncope. He had undergon LHC which revealed no CAD and an LVEDP of 16. Ultimate goal is to optimize meds, repeat TTE and have him undergo metabolic stress testing. ASSESSMENT:  1. Chronic HFrEF  2. ACC stage C  3. NYHA class III  4. Warm, well perfused and mentating well  5. Clinically near euvolemic   6. Nonischemic dilated cardiomyopathy, likely burn out hypertensive due to noncompliance  7. LVEF 15-20%, LVEDD 75  8. Normal coronaries (2012)  9. CRT-D placed 11/21/2018 - follows with Dr. Giovany Duncan  10. RV dysfunction  11. Mild-moderate MR and Mild TR  12. PAF, anticoagulated with Xarelto  13. T2DM  14. HLD - on statin therapy  15. Iron deficiency  16. Morbid obesity  17. ARVIND - reports compliance with CPAP  18. CKD  19. Hx of medical noncompliance - has improved over the past several months  20. Flat affect          PLAN:  1. Go home and get your medications  2. It is so important for you to take them correctly so your heart will get better        Yves Leung M.D.  [facility] [place]  Heart Failure and Pulmonary Hypertension  Mobile [phone]

## 2019-08-14 ENCOUNTER — CARE COORDINATION (OUTPATIENT)
Dept: CASE MANAGEMENT | Age: 39
End: 2019-08-14

## 2019-08-14 NOTE — CARE COORDINATION
[facility] Transitions Follow Up Call    2019    Patient: Chinmay Posey  Patient : 1980   MRN: [id number]  Reason for Admission: syncope and collapse  Discharge Date: 19 RARS: Readmission Risk Score: 15         Spoke with: [id number] Transitions Subsequent and Final Call    Subsequent and Final Calls  Do you have any ongoing symptoms?:  No  Do you have any questions related to your medications?:  No  Do you currently have any active services?:  No  Do you have any needs or concerns that I can assist you with?:  No  Identified Barriers:  Lack of Education  Care Transitions Interventions  No Identified Needs  Other Interventions:          -Spoke with patient Aliza Santos for final care transition call.   -Patient states he is \"doing great! \"  Patient denies any dizziness, chest pain, palpitations, or episodes of falling.  -Patient states he has all his medications and is taking them as prescribed. -CTN explained the Care Coordination Program available at no cost through the physician practice, patient declines at this time.  -Since Aliza Santos  has expressed no concerns today, care transition signing off, patient is in agreement.     Follow Up  Future Appointments   Date Time Provider Clemente Davis   2019 12:30 PM [facility] CHF ROOM 4 [facility] CHF None   2019  3:40 PM MD Aliza Almanza [facility]       Kimber Kulkarni RN

## 2019-08-16 ENCOUNTER — HOSPITAL ENCOUNTER (OUTPATIENT)
Dept: OTHER | Age: 39
Setting detail: THERAPIES SERIES
Discharge: HOME | End: 2019-08-16
Payer: MEDICARE

## 2019-08-16 VITALS
SYSTOLIC BLOOD PRESSURE: 117 MMHG | RESPIRATION RATE: 16 BRPM | BODY MASS INDEX: 44.76 KG/M2 | HEART RATE: 70 BPM | DIASTOLIC BLOOD PRESSURE: 72 MMHG | WEIGHT: 315 LBS

## 2019-08-16 LAB
ANION GAP SERPL CALCULATED.3IONS-SCNC: 11 MMOL/L (ref 7–16)
BUN BLDV-MCNC: 9 MG/DL (ref 6–20)
CALCIUM SERPL-MCNC: 9.3 MG/DL (ref 8.6–10.2)
CHLORIDE BLD-SCNC: 109 MMOL/L (ref 98–107)
CO2: 20 MMOL/L (ref 22–29)
CREAT SERPL-MCNC: 1 MG/DL (ref 0.7–1.2)
GFR AFRICAN AMERICAN: >60
GFR NON-AFRICAN AMERICAN: >60 ML/MIN/1.73
GLUCOSE BLD-MCNC: 149 MG/DL (ref 74–99)
POTASSIUM SERPL-SCNC: 3.8 MMOL/L (ref 3.5–5)
PRO-BNP: 1293 PG/ML (ref 0–125)
SODIUM BLD-SCNC: 140 MMOL/L (ref 132–146)

## 2019-08-16 PROCEDURE — 36415 COLL VENOUS BLD VENIPUNCTURE: CPT

## 2019-08-16 PROCEDURE — 99204 OFFICE O/P NEW MOD 45 MIN: CPT

## 2019-08-16 PROCEDURE — 6360000002 HC RX W HCPCS

## 2019-08-16 PROCEDURE — 96374 THER/PROPH/DIAG INJ IV PUSH: CPT

## 2019-08-16 PROCEDURE — 80048 BASIC METABOLIC PNL TOTAL CA: CPT

## 2019-08-16 PROCEDURE — 83880 ASSAY OF NATRIURETIC PEPTIDE: CPT

## 2019-08-16 RX ORDER — FUROSEMIDE 10 MG/ML
40 INJECTION INTRAMUSCULAR; INTRAVENOUS ONCE
Status: DISCONTINUED | OUTPATIENT
Start: 2019-08-16 | End: 2019-08-17 | Stop reason: HOSPADM

## 2019-08-16 RX ORDER — SODIUM CHLORIDE 0.9 % (FLUSH) 0.9 %
10 SYRINGE (ML) INJECTION PRN
Status: DISCONTINUED | OUTPATIENT
Start: 2019-08-16 | End: 2019-08-17 | Stop reason: HOSPADM

## 2019-08-16 RX ORDER — TORSEMIDE 20 MG/1
20 TABLET ORAL DAILY
COMMUNITY
End: 2019-11-14 | Stop reason: SDUPTHER

## 2019-08-19 ENCOUNTER — TELEPHONE (OUTPATIENT)
Dept: CARDIOLOGY CLINIC | Age: 39
End: 2019-08-19

## 2019-08-19 RX ORDER — ATORVASTATIN CALCIUM 40 MG/1
TABLET, FILM COATED ORAL
Qty: 30 TABLET | Refills: 5 | Status: SHIPPED | OUTPATIENT
Start: 2019-08-19 | End: 2019-10-16 | Stop reason: SDUPTHER

## 2019-08-19 NOTE — TELEPHONE ENCOUNTER
Spoke with pt regarding cardiomems. Last reading sent on 8-16-19 #36. Per pt he did take demadex 20mg x1 today for orthopnea. He denies any shortness of breath while sitting or walking and no edema. He reports medication compliance and staying hydrated. Since taking the demadex he feels much better. He reports an increase in urination. Pt does have an appt at the CHF clinic this Friday 8-23-19. I offered a sooner appt but pt declined. He has issues with transportation and unable to go to any appts Wednesday of this week. I encouraged daily readings to closely montior his cardiomems. I instructed him to send a reading tonight at some point and to send another one first thing tomorrow morning. I also instructed him to call me if he takes another dose of demadex this week as he is using it PRN only.

## 2019-08-22 DIAGNOSIS — I10 ESSENTIAL HYPERTENSION: Primary | ICD-10-CM

## 2019-08-22 DIAGNOSIS — I50.22 CHRONIC SYSTOLIC HEART FAILURE (HCC): Primary | ICD-10-CM

## 2019-08-22 PROCEDURE — 93264 REM MNTR WRLS P-ART PRS SNR: CPT | Performed by: INTERNAL MEDICINE

## 2019-08-22 RX ORDER — METOPROLOL SUCCINATE 50 MG/1
50 TABLET, EXTENDED RELEASE ORAL 2 TIMES DAILY
Qty: 60 TABLET | Refills: 3 | Status: SHIPPED | OUTPATIENT
Start: 2019-08-22 | End: 2019-10-16 | Stop reason: SDUPTHER

## 2019-08-23 ENCOUNTER — HOSPITAL ENCOUNTER (OUTPATIENT)
Dept: OTHER | Age: 39
Setting detail: THERAPIES SERIES
Discharge: HOME | End: 2019-08-23
Payer: MEDICARE

## 2019-08-23 VITALS
WEIGHT: 315 LBS | HEART RATE: 66 BPM | SYSTOLIC BLOOD PRESSURE: 123 MMHG | BODY MASS INDEX: 43.67 KG/M2 | RESPIRATION RATE: 18 BRPM | DIASTOLIC BLOOD PRESSURE: 71 MMHG

## 2019-08-23 LAB
ANION GAP SERPL CALCULATED.3IONS-SCNC: 11 MMOL/L (ref 7–16)
BUN BLDV-MCNC: 19 MG/DL (ref 6–20)
CALCIUM SERPL-MCNC: 10 MG/DL (ref 8.6–10.2)
CHLORIDE BLD-SCNC: 106 MMOL/L (ref 98–107)
CO2: 23 MMOL/L (ref 22–29)
CREAT SERPL-MCNC: 1.1 MG/DL (ref 0.7–1.2)
GFR AFRICAN AMERICAN: >60
GFR NON-AFRICAN AMERICAN: >60 ML/MIN/1.73
GLUCOSE BLD-MCNC: 146 MG/DL (ref 74–99)
POTASSIUM SERPL-SCNC: 3.9 MMOL/L (ref 3.5–5)
PRO-BNP: 790 PG/ML (ref 0–125)
SODIUM BLD-SCNC: 140 MMOL/L (ref 132–146)

## 2019-08-23 PROCEDURE — 36415 COLL VENOUS BLD VENIPUNCTURE: CPT

## 2019-08-23 PROCEDURE — 99214 OFFICE O/P EST MOD 30 MIN: CPT

## 2019-08-23 PROCEDURE — 80048 BASIC METABOLIC PNL TOTAL CA: CPT

## 2019-08-23 PROCEDURE — 83880 ASSAY OF NATRIURETIC PEPTIDE: CPT

## 2019-08-23 NOTE — PROGRESS NOTES
Weight today 322lb, pt down 8 lbs. From 8/16/19. Pt denies any discomfort, no edema noted, labs drawn and follow up appt. Made.

## 2019-08-26 ENCOUNTER — TELEPHONE (OUTPATIENT)
Dept: CARDIOLOGY CLINIC | Age: 39
End: 2019-08-26

## 2019-08-26 DIAGNOSIS — I42.8 NONISCHEMIC CARDIOMYOPATHY (HCC): ICD-10-CM

## 2019-08-26 DIAGNOSIS — I50.22 CHRONIC SYSTOLIC HEART FAILURE (HCC): ICD-10-CM

## 2019-08-30 ENCOUNTER — HOSPITAL ENCOUNTER (OUTPATIENT)
Dept: OTHER | Age: 39
Setting detail: THERAPIES SERIES
Discharge: HOME | End: 2019-08-30
Payer: MEDICARE

## 2019-08-30 VITALS
RESPIRATION RATE: 18 BRPM | BODY MASS INDEX: 43.81 KG/M2 | WEIGHT: 315 LBS | DIASTOLIC BLOOD PRESSURE: 87 MMHG | HEART RATE: 67 BPM | SYSTOLIC BLOOD PRESSURE: 126 MMHG

## 2019-08-30 LAB
ANION GAP SERPL CALCULATED.3IONS-SCNC: 15 MMOL/L (ref 7–16)
BUN BLDV-MCNC: 11 MG/DL (ref 6–20)
CALCIUM SERPL-MCNC: 9.9 MG/DL (ref 8.6–10.2)
CHLORIDE BLD-SCNC: 106 MMOL/L (ref 98–107)
CO2: 19 MMOL/L (ref 22–29)
CREAT SERPL-MCNC: 1 MG/DL (ref 0.7–1.2)
GFR AFRICAN AMERICAN: >60
GFR NON-AFRICAN AMERICAN: >60 ML/MIN/1.73
GLUCOSE BLD-MCNC: 187 MG/DL (ref 74–99)
POTASSIUM SERPL-SCNC: 4 MMOL/L (ref 3.5–5)
PRO-BNP: 810 PG/ML (ref 0–125)
SODIUM BLD-SCNC: 140 MMOL/L (ref 132–146)

## 2019-08-30 PROCEDURE — 36415 COLL VENOUS BLD VENIPUNCTURE: CPT

## 2019-08-30 PROCEDURE — 99204 OFFICE O/P NEW MOD 45 MIN: CPT

## 2019-08-30 PROCEDURE — 83880 ASSAY OF NATRIURETIC PEPTIDE: CPT

## 2019-08-30 PROCEDURE — 80048 BASIC METABOLIC PNL TOTAL CA: CPT

## 2019-09-09 ENCOUNTER — OFFICE VISIT (OUTPATIENT)
Dept: FAMILY MEDICINE CLINIC | Age: 39
End: 2019-09-09
Payer: MEDICARE

## 2019-09-09 VITALS
WEIGHT: 315 LBS | SYSTOLIC BLOOD PRESSURE: 122 MMHG | HEART RATE: 75 BPM | HEIGHT: 72 IN | BODY MASS INDEX: 42.66 KG/M2 | OXYGEN SATURATION: 98 % | RESPIRATION RATE: 20 BRPM | DIASTOLIC BLOOD PRESSURE: 78 MMHG | TEMPERATURE: 98.4 F

## 2019-09-09 DIAGNOSIS — N18.9 CHRONIC KIDNEY DISEASE, UNSPECIFIED CKD STAGE: ICD-10-CM

## 2019-09-09 DIAGNOSIS — I10 ESSENTIAL HYPERTENSION: ICD-10-CM

## 2019-09-09 DIAGNOSIS — E11.8 TYPE 2 DIABETES MELLITUS WITH COMPLICATION, WITH LONG-TERM CURRENT USE OF INSULIN (HCC): Chronic | ICD-10-CM

## 2019-09-09 DIAGNOSIS — I48.0 PAROXYSMAL ATRIAL FIBRILLATION (HCC): ICD-10-CM

## 2019-09-09 DIAGNOSIS — I42.8 NONISCHEMIC CARDIOMYOPATHY (HCC): ICD-10-CM

## 2019-09-09 DIAGNOSIS — E66.01 MORBID OBESITY WITH BMI OF 40.0-44.9, ADULT (HCC): ICD-10-CM

## 2019-09-09 DIAGNOSIS — G47.33 OBSTRUCTIVE SLEEP APNEA: Chronic | ICD-10-CM

## 2019-09-09 DIAGNOSIS — Z79.4 TYPE 2 DIABETES MELLITUS WITH COMPLICATION, WITH LONG-TERM CURRENT USE OF INSULIN (HCC): Primary | Chronic | ICD-10-CM

## 2019-09-09 DIAGNOSIS — E66.01 MORBID OBESITY WITH BMI OF 40.0-44.9, ADULT (HCC): Primary | ICD-10-CM

## 2019-09-09 DIAGNOSIS — Z79.4 TYPE 2 DIABETES MELLITUS WITH COMPLICATION, WITH LONG-TERM CURRENT USE OF INSULIN (HCC): Chronic | ICD-10-CM

## 2019-09-09 DIAGNOSIS — E11.8 TYPE 2 DIABETES MELLITUS WITH COMPLICATION, WITH LONG-TERM CURRENT USE OF INSULIN (HCC): Primary | Chronic | ICD-10-CM

## 2019-09-09 DIAGNOSIS — I50.22 CHRONIC HFREF (HEART FAILURE WITH REDUCED EJECTION FRACTION) (HCC): ICD-10-CM

## 2019-09-09 PROBLEM — I47.1 SVT (SUPRAVENTRICULAR TACHYCARDIA) (HCC): Status: RESOLVED | Noted: 2018-10-13 | Resolved: 2019-09-09

## 2019-09-09 PROBLEM — I95.9 HYPOTENSION: Status: RESOLVED | Noted: 2019-08-01 | Resolved: 2019-09-09

## 2019-09-09 PROBLEM — I47.10 SVT (SUPRAVENTRICULAR TACHYCARDIA): Status: RESOLVED | Noted: 2018-10-13 | Resolved: 2019-09-09

## 2019-09-09 PROCEDURE — 99212 OFFICE O/P EST SF 10 MIN: CPT | Performed by: FAMILY MEDICINE

## 2019-09-09 PROCEDURE — G0010 ADMIN HEPATITIS B VACCINE: HCPCS

## 2019-09-09 PROCEDURE — 99213 OFFICE O/P EST LOW 20 MIN: CPT | Performed by: FAMILY MEDICINE

## 2019-09-09 PROCEDURE — 90740 HEPB VACC 3 DOSE IMMUNSUP IM: CPT

## 2019-09-09 PROCEDURE — 90715 TDAP VACCINE 7 YRS/> IM: CPT

## 2019-09-09 PROCEDURE — 6360000002 HC RX W HCPCS

## 2019-09-09 PROCEDURE — 90471 IMMUNIZATION ADMIN: CPT

## 2019-09-09 RX ORDER — MAGNESIUM OXIDE 400 MG/1
TABLET ORAL
Refills: 11 | COMMUNITY
Start: 2019-08-30 | End: 2020-07-08

## 2019-09-09 NOTE — PROGRESS NOTES
 CKD (chronic kidney disease)     Gout     HTN (hypertension) 11/29/2011    Hyperlipidemia     Hypertension     Morbid obesity due to excess calories [facility]     Nonischemic cardiomyopathy ([facility] 11/29/2011    [facility] 7/2013 7/2013- cardiac MRI revealed an LVEF of 20%    ARVIND (obstructive sleep apnea) 11/29/2011    S/P left heart catheterization by percutaneous approach 12-27/2011    Dr Kristen Morgan Systolic heart failure [facility]) 5/7/2012 5/7/12- cardiac catheterization revealed an LVEF of 10-15%, mitral regurgitation along with borderline prolapse of mitral valve    Type 2 diabetes mellitus with complication, with long-term current use of insulin ([facility] 8/22/2016    URI, acute 11/29/2011       Current Outpatient Medications on File Prior to Visit   Medication Sig Dispense Refill    sacubitril-valsartan (ENTRESTO)  MG per tablet Take 1 tablet by mouth 2 times daily 90 tablet 3    metoprolol succinate (TOPROL XL) 50 MG extended release tablet Take 1 tablet by mouth 2 times daily 60 tablet 3    atorvastatin (LIPITOR) 40 MG tablet TAKE (1) TABLET BY MOUTH DAILY 30 tablet 5    torsemide (DEMADEX) 20 MG tablet Take 20 mg by mouth daily      Amoxicillin 500 MG TABS TK 1 T PO TID TAT  0    blood glucose test strips (ASCENSIA AUTODISC VI;ONE TOUCH ULTRA TEST VI) strip Test 4-5 times daily for labile hyperglycemia 100 strip 5    insulin lispro (HUMALOG) 100 UNIT/ML injection vial Inject 22 Units into the skin 3 times daily (before meals) 1 vial 3    LEVEMIR FLEXTOUCH 100 UNIT/ML injection pen Inject 45 Units into the skin 2 times daily 10 pen 5    spironolactone (ALDACTONE) 25 MG tablet Take 0.5 tablets by mouth Daily with lunch 30 tablet 3    XARELTO 20 MG TABS tablet TAKE 1 TABLET BY MOUTH DAILY 30 tablet 10    allopurinol (ZYLOPRIM) 300 MG tablet TAKE (1) TABLET BY MOUTH DAILY 30 tablet 10    sharps container For needle disposal. 1 each 5     No current medication(s) reviewed. Potential side-effects and risks of medication(s) also explained. Patient and/or caregiver was instructed to call if any new symptoms develop prior to next visit. Health risk factors discussed and addressed. I have personally reviewed labs and/or imaging (if any).       Signed by : Roma Ma M.D.

## 2019-09-27 ENCOUNTER — HOSPITAL ENCOUNTER (OUTPATIENT)
Dept: OTHER | Age: 39
Setting detail: THERAPIES SERIES
Discharge: HOME | End: 2019-09-27
Payer: MEDICARE

## 2019-09-27 VITALS
RESPIRATION RATE: 18 BRPM | HEART RATE: 69 BPM | BODY MASS INDEX: 42.99 KG/M2 | WEIGHT: 315 LBS | SYSTOLIC BLOOD PRESSURE: 119 MMHG | DIASTOLIC BLOOD PRESSURE: 79 MMHG

## 2019-09-27 DIAGNOSIS — I50.22 CHRONIC SYSTOLIC HEART FAILURE (HCC): Primary | ICD-10-CM

## 2019-09-27 LAB
ANION GAP SERPL CALCULATED.3IONS-SCNC: 10 MMOL/L (ref 7–16)
BUN BLDV-MCNC: 14 MG/DL (ref 6–20)
CALCIUM SERPL-MCNC: 9.7 MG/DL (ref 8.6–10.2)
CHLORIDE BLD-SCNC: 102 MMOL/L (ref 98–107)
CO2: 26 MMOL/L (ref 22–29)
CREAT SERPL-MCNC: 1.1 MG/DL (ref 0.7–1.2)
GFR AFRICAN AMERICAN: >60
GFR NON-AFRICAN AMERICAN: >60 ML/MIN/1.73
GLUCOSE BLD-MCNC: 220 MG/DL (ref 74–99)
POTASSIUM SERPL-SCNC: 3.5 MMOL/L (ref 3.5–5)
PRO-BNP: 836 PG/ML (ref 0–125)
SODIUM BLD-SCNC: 138 MMOL/L (ref 132–146)

## 2019-09-27 PROCEDURE — 93264 REM MNTR WRLS P-ART PRS SNR: CPT | Performed by: INTERNAL MEDICINE

## 2019-09-27 PROCEDURE — 99214 OFFICE O/P EST MOD 30 MIN: CPT

## 2019-09-27 PROCEDURE — 36415 COLL VENOUS BLD VENIPUNCTURE: CPT

## 2019-09-27 PROCEDURE — 80048 BASIC METABOLIC PNL TOTAL CA: CPT

## 2019-09-27 PROCEDURE — 83880 ASSAY OF NATRIURETIC PEPTIDE: CPT

## 2019-09-27 NOTE — PROGRESS NOTES
Pt weight 317 lbs today, down 6 lbs from previous visit. No c/o discomfort or SOB. Labs obtained & f/u appt. Made.

## 2019-10-02 ENCOUNTER — HOSPITAL ENCOUNTER (OUTPATIENT)
Dept: SLEEP CENTER | Age: 39
Discharge: HOME | End: 2019-10-02
Payer: MEDICARE

## 2019-10-02 DIAGNOSIS — I10 ESSENTIAL HYPERTENSION: ICD-10-CM

## 2019-10-02 DIAGNOSIS — E66.01 MORBID OBESITY WITH BMI OF 40.0-44.9, ADULT (HCC): ICD-10-CM

## 2019-10-02 DIAGNOSIS — G47.33 OBSTRUCTIVE SLEEP APNEA: ICD-10-CM

## 2019-10-02 PROCEDURE — 95811 POLYSOM 6/>YRS CPAP 4/> PARM: CPT

## 2019-10-02 PROCEDURE — 95810 POLYSOM 6/> YRS 4/> PARAM: CPT | Performed by: INTERNAL MEDICINE

## 2019-10-03 VITALS
DIASTOLIC BLOOD PRESSURE: 81 MMHG | SYSTOLIC BLOOD PRESSURE: 123 MMHG | HEIGHT: 72 IN | OXYGEN SATURATION: 90 % | HEART RATE: 64 BPM | WEIGHT: 315 LBS | BODY MASS INDEX: 42.66 KG/M2

## 2019-10-03 ASSESSMENT — SLEEP AND FATIGUE QUESTIONNAIRES
HOW LIKELY ARE YOU TO NOD OFF OR FALL ASLEEP WHILE SITTING AND TALKING TO SOMEONE: 0
HOW LIKELY ARE YOU TO NOD OFF OR FALL ASLEEP IN A CAR, WHILE STOPPED FOR A FEW MINUTES IN TRAFFIC: 0
HOW LIKELY ARE YOU TO NOD OFF OR FALL ASLEEP WHILE SITTING AND READING: 0
HOW LIKELY ARE YOU TO NOD OFF OR FALL ASLEEP WHEN YOU ARE A PASSENGER IN A CAR FOR AN HOUR WITHOUT A BREAK: 0
HOW LIKELY ARE YOU TO NOD OFF OR FALL ASLEEP WHILE SITTING QUIETLY AFTER LUNCH WITHOUT ALCOHOL: 0
ESS TOTAL SCORE: 3
HOW LIKELY ARE YOU TO NOD OFF OR FALL ASLEEP WHILE LYING DOWN TO REST IN THE AFTERNOON WHEN CIRCUMSTANCES PERMIT: 1
HOW LIKELY ARE YOU TO NOD OFF OR FALL ASLEEP WHILE WATCHING TV: 2
HOW LIKELY ARE YOU TO NOD OFF OR FALL ASLEEP WHILE SITTING INACTIVE IN A PUBLIC PLACE: 0

## 2019-10-16 DIAGNOSIS — I10 ESSENTIAL HYPERTENSION: ICD-10-CM

## 2019-10-16 RX ORDER — ALLOPURINOL 300 MG/1
300 TABLET ORAL DAILY
Qty: 30 TABLET | Refills: 11 | Status: SHIPPED
Start: 2019-10-16 | End: 2020-05-06

## 2019-10-16 RX ORDER — METOPROLOL SUCCINATE 50 MG/1
50 TABLET, EXTENDED RELEASE ORAL 2 TIMES DAILY
Qty: 60 TABLET | Refills: 3 | Status: SHIPPED | OUTPATIENT
Start: 2019-10-16 | End: 2019-11-19 | Stop reason: ALTCHOICE

## 2019-10-16 RX ORDER — ATORVASTATIN CALCIUM 40 MG/1
TABLET, FILM COATED ORAL
Qty: 30 TABLET | Refills: 5 | Status: SHIPPED
Start: 2019-10-16 | End: 2020-02-13 | Stop reason: SDUPTHER

## 2019-11-06 ENCOUNTER — HOSPITAL ENCOUNTER (OUTPATIENT)
Dept: OTHER | Age: 39
Setting detail: THERAPIES SERIES
Discharge: HOME | End: 2019-11-06
Payer: MEDICARE

## 2019-11-06 VITALS
WEIGHT: 309 LBS | RESPIRATION RATE: 18 BRPM | HEART RATE: 66 BPM | BODY MASS INDEX: 41.91 KG/M2 | SYSTOLIC BLOOD PRESSURE: 107 MMHG | DIASTOLIC BLOOD PRESSURE: 72 MMHG

## 2019-11-06 LAB
ANION GAP SERPL CALCULATED.3IONS-SCNC: 10 MMOL/L (ref 7–16)
BUN BLDV-MCNC: 10 MG/DL (ref 6–20)
CALCIUM SERPL-MCNC: 9.9 MG/DL (ref 8.6–10.2)
CHLORIDE BLD-SCNC: 105 MMOL/L (ref 98–107)
CO2: 26 MMOL/L (ref 22–29)
CREAT SERPL-MCNC: 1.1 MG/DL (ref 0.7–1.2)
GFR AFRICAN AMERICAN: >60
GFR NON-AFRICAN AMERICAN: >60 ML/MIN/1.73
GLUCOSE BLD-MCNC: 190 MG/DL (ref 74–99)
POTASSIUM SERPL-SCNC: 3.6 MMOL/L (ref 3.5–5)
PRO-BNP: 1025 PG/ML (ref 0–125)
SODIUM BLD-SCNC: 141 MMOL/L (ref 132–146)

## 2019-11-06 PROCEDURE — 36415 COLL VENOUS BLD VENIPUNCTURE: CPT

## 2019-11-06 PROCEDURE — 80048 BASIC METABOLIC PNL TOTAL CA: CPT

## 2019-11-06 PROCEDURE — 99214 OFFICE O/P EST MOD 30 MIN: CPT

## 2019-11-06 PROCEDURE — 83880 ASSAY OF NATRIURETIC PEPTIDE: CPT

## 2019-11-14 ENCOUNTER — OFFICE VISIT (OUTPATIENT)
Dept: FAMILY MEDICINE CLINIC | Age: 39
End: 2019-11-14
Payer: MEDICARE

## 2019-11-14 VITALS
OXYGEN SATURATION: 98 % | SYSTOLIC BLOOD PRESSURE: 109 MMHG | HEART RATE: 68 BPM | RESPIRATION RATE: 16 BRPM | WEIGHT: 305 LBS | TEMPERATURE: 97.8 F | DIASTOLIC BLOOD PRESSURE: 78 MMHG | HEIGHT: 72 IN | BODY MASS INDEX: 41.31 KG/M2

## 2019-11-14 DIAGNOSIS — E11.8 TYPE 2 DIABETES MELLITUS WITH COMPLICATION, WITH LONG-TERM CURRENT USE OF INSULIN (HCC): Primary | ICD-10-CM

## 2019-11-14 DIAGNOSIS — Z79.4 TYPE 2 DIABETES MELLITUS WITH COMPLICATION, WITH LONG-TERM CURRENT USE OF INSULIN (HCC): Primary | ICD-10-CM

## 2019-11-14 DIAGNOSIS — Z23 NEED FOR INFLUENZA VACCINATION: ICD-10-CM

## 2019-11-14 LAB — HBA1C MFR BLD: 7.8 %

## 2019-11-14 PROCEDURE — 99214 OFFICE O/P EST MOD 30 MIN: CPT | Performed by: FAMILY MEDICINE

## 2019-11-14 PROCEDURE — 90740 HEPB VACC 3 DOSE IMMUNSUP IM: CPT

## 2019-11-14 PROCEDURE — G0010 ADMIN HEPATITIS B VACCINE: HCPCS

## 2019-11-14 PROCEDURE — 99212 OFFICE O/P EST SF 10 MIN: CPT | Performed by: FAMILY MEDICINE

## 2019-11-14 PROCEDURE — 6360000002 HC RX W HCPCS

## 2019-11-14 PROCEDURE — 90686 IIV4 VACC NO PRSV 0.5 ML IM: CPT

## 2019-11-14 PROCEDURE — 83036 HEMOGLOBIN GLYCOSYLATED A1C: CPT | Performed by: FAMILY MEDICINE

## 2019-11-14 PROCEDURE — G0008 ADMIN INFLUENZA VIRUS VAC: HCPCS

## 2019-11-14 RX ORDER — GLUCOSAMINE HCL/CHONDROITIN SU 500-400 MG
CAPSULE ORAL
Qty: 300 STRIP | Refills: 3 | Status: SHIPPED
Start: 2019-11-14 | End: 2020-11-04

## 2019-11-14 RX ORDER — LANCETS 30 GAUGE
EACH MISCELLANEOUS
Qty: 300 EACH | Refills: 3 | Status: SHIPPED
Start: 2019-11-14 | End: 2021-01-19

## 2019-11-14 RX ORDER — BLOOD-GLUCOSE METER
1 KIT MISCELLANEOUS DAILY
Qty: 1 KIT | Refills: 0 | Status: SHIPPED | OUTPATIENT
Start: 2019-11-14 | End: 2019-12-06 | Stop reason: SDUPTHER

## 2019-11-14 RX ORDER — TORSEMIDE 20 MG/1
20 TABLET ORAL EVERY 12 HOURS PRN
Qty: 60 TABLET | Refills: 3 | Status: SHIPPED
Start: 2019-11-14 | End: 2020-02-24 | Stop reason: DRUGHIGH

## 2019-11-19 ENCOUNTER — TELEPHONE (OUTPATIENT)
Dept: CARDIOLOGY CLINIC | Age: 39
End: 2019-11-19

## 2019-11-19 RX ORDER — CARVEDILOL 25 MG/1
25 TABLET ORAL 2 TIMES DAILY WITH MEALS
COMMUNITY
End: 2020-02-06

## 2019-11-20 ENCOUNTER — HOSPITAL ENCOUNTER (OUTPATIENT)
Dept: OTHER | Age: 39
Setting detail: THERAPIES SERIES
Discharge: HOME | End: 2019-11-20
Payer: MEDICARE

## 2019-11-20 VITALS
SYSTOLIC BLOOD PRESSURE: 137 MMHG | RESPIRATION RATE: 18 BRPM | WEIGHT: 314 LBS | HEART RATE: 71 BPM | DIASTOLIC BLOOD PRESSURE: 89 MMHG | BODY MASS INDEX: 42.59 KG/M2

## 2019-11-20 LAB
ANION GAP SERPL CALCULATED.3IONS-SCNC: 13 MMOL/L (ref 7–16)
BUN BLDV-MCNC: 12 MG/DL (ref 6–20)
CALCIUM SERPL-MCNC: 9.8 MG/DL (ref 8.6–10.2)
CHLORIDE BLD-SCNC: 107 MMOL/L (ref 98–107)
CO2: 20 MMOL/L (ref 22–29)
CREAT SERPL-MCNC: 1 MG/DL (ref 0.7–1.2)
GFR AFRICAN AMERICAN: >60
GFR NON-AFRICAN AMERICAN: >60 ML/MIN/1.73
GLUCOSE BLD-MCNC: 166 MG/DL (ref 74–99)
POTASSIUM SERPL-SCNC: 3.9 MMOL/L (ref 3.5–5)
PRO-BNP: 1470 PG/ML (ref 0–125)
SODIUM BLD-SCNC: 140 MMOL/L (ref 132–146)

## 2019-11-20 PROCEDURE — 36415 COLL VENOUS BLD VENIPUNCTURE: CPT

## 2019-11-20 PROCEDURE — 2580000003 HC RX 258: Performed by: INTERNAL MEDICINE

## 2019-11-20 PROCEDURE — 96374 THER/PROPH/DIAG INJ IV PUSH: CPT

## 2019-11-20 PROCEDURE — 83880 ASSAY OF NATRIURETIC PEPTIDE: CPT

## 2019-11-20 PROCEDURE — 6360000002 HC RX W HCPCS: Performed by: INTERNAL MEDICINE

## 2019-11-20 PROCEDURE — 80048 BASIC METABOLIC PNL TOTAL CA: CPT

## 2019-11-20 PROCEDURE — 99214 OFFICE O/P EST MOD 30 MIN: CPT

## 2019-11-20 RX ORDER — FUROSEMIDE 10 MG/ML
40 INJECTION INTRAMUSCULAR; INTRAVENOUS ONCE
Status: COMPLETED | OUTPATIENT
Start: 2019-11-20 | End: 2019-11-20

## 2019-11-20 RX ORDER — SODIUM CHLORIDE 0.9 % (FLUSH) 0.9 %
10 SYRINGE (ML) INJECTION 2 TIMES DAILY
Status: DISCONTINUED | OUTPATIENT
Start: 2019-11-20 | End: 2019-11-21 | Stop reason: HOSPADM

## 2019-11-20 RX ADMIN — Medication 10 ML: at 13:51

## 2019-11-20 RX ADMIN — FUROSEMIDE 40 MG: 10 INJECTION, SOLUTION INTRAMUSCULAR; INTRAVENOUS at 13:51

## 2019-11-26 ENCOUNTER — TELEPHONE (OUTPATIENT)
Dept: CARDIOLOGY CLINIC | Age: 39
End: 2019-11-26

## 2019-11-27 ENCOUNTER — HOSPITAL ENCOUNTER (OUTPATIENT)
Dept: OTHER | Age: 39
Setting detail: THERAPIES SERIES
Discharge: HOME | End: 2019-11-27
Payer: MEDICARE

## 2019-11-27 VITALS
RESPIRATION RATE: 18 BRPM | BODY MASS INDEX: 41.23 KG/M2 | DIASTOLIC BLOOD PRESSURE: 76 MMHG | SYSTOLIC BLOOD PRESSURE: 116 MMHG | WEIGHT: 304 LBS | HEART RATE: 78 BPM

## 2019-11-27 LAB
ANION GAP SERPL CALCULATED.3IONS-SCNC: 13 MMOL/L (ref 7–16)
BUN BLDV-MCNC: 22 MG/DL (ref 6–20)
CALCIUM SERPL-MCNC: 9.6 MG/DL (ref 8.6–10.2)
CHLORIDE BLD-SCNC: 104 MMOL/L (ref 98–107)
CO2: 23 MMOL/L (ref 22–29)
CREAT SERPL-MCNC: 1.1 MG/DL (ref 0.7–1.2)
GFR AFRICAN AMERICAN: >60
GFR NON-AFRICAN AMERICAN: >60 ML/MIN/1.73
GLUCOSE BLD-MCNC: 181 MG/DL (ref 74–99)
POTASSIUM SERPL-SCNC: 3.7 MMOL/L (ref 3.5–5)
PRO-BNP: 936 PG/ML (ref 0–125)
SODIUM BLD-SCNC: 140 MMOL/L (ref 132–146)

## 2019-11-27 PROCEDURE — 99214 OFFICE O/P EST MOD 30 MIN: CPT

## 2019-11-27 PROCEDURE — 83880 ASSAY OF NATRIURETIC PEPTIDE: CPT

## 2019-11-27 PROCEDURE — 80048 BASIC METABOLIC PNL TOTAL CA: CPT

## 2019-11-27 PROCEDURE — 96374 THER/PROPH/DIAG INJ IV PUSH: CPT

## 2019-11-27 PROCEDURE — 2580000003 HC RX 258: Performed by: INTERNAL MEDICINE

## 2019-11-27 PROCEDURE — 36415 COLL VENOUS BLD VENIPUNCTURE: CPT

## 2019-11-27 PROCEDURE — 6360000002 HC RX W HCPCS: Performed by: INTERNAL MEDICINE

## 2019-11-27 RX ORDER — FUROSEMIDE 10 MG/ML
40 INJECTION INTRAMUSCULAR; INTRAVENOUS ONCE
Status: COMPLETED | OUTPATIENT
Start: 2019-11-27 | End: 2019-11-27

## 2019-11-27 RX ORDER — SODIUM CHLORIDE 0.9 % (FLUSH) 0.9 %
10 SYRINGE (ML) INJECTION 2 TIMES DAILY
Status: DISCONTINUED | OUTPATIENT
Start: 2019-11-27 | End: 2019-11-28 | Stop reason: HOSPADM

## 2019-11-27 RX ADMIN — FUROSEMIDE 40 MG: 10 INJECTION, SOLUTION INTRAMUSCULAR; INTRAVENOUS at 09:39

## 2019-11-27 RX ADMIN — Medication 10 ML: at 09:39

## 2019-12-06 ENCOUNTER — TELEPHONE (OUTPATIENT)
Dept: CARDIOLOGY CLINIC | Age: 39
End: 2019-12-06

## 2019-12-06 DIAGNOSIS — E11.8 TYPE 2 DIABETES MELLITUS WITH COMPLICATION, WITH LONG-TERM CURRENT USE OF INSULIN (HCC): ICD-10-CM

## 2019-12-06 DIAGNOSIS — Z79.4 TYPE 2 DIABETES MELLITUS WITH COMPLICATION, WITH LONG-TERM CURRENT USE OF INSULIN (HCC): ICD-10-CM

## 2019-12-09 RX ORDER — BLOOD-GLUCOSE METER
EACH MISCELLANEOUS
Qty: 1 KIT | Refills: 0 | Status: SHIPPED
Start: 2019-12-09 | End: 2021-01-19

## 2019-12-10 ENCOUNTER — TELEPHONE (OUTPATIENT)
Dept: CARDIOLOGY CLINIC | Age: 39
End: 2019-12-10

## 2019-12-11 ENCOUNTER — HOSPITAL ENCOUNTER (OUTPATIENT)
Dept: OTHER | Age: 39
Setting detail: THERAPIES SERIES
Discharge: HOME | End: 2019-12-11
Payer: MEDICARE

## 2019-12-11 DIAGNOSIS — I50.22 CHRONIC SYSTOLIC HEART FAILURE (HCC): Primary | ICD-10-CM

## 2019-12-11 PROCEDURE — 93264 REM MNTR WRLS P-ART PRS SNR: CPT | Performed by: INTERNAL MEDICINE

## 2019-12-11 RX ORDER — SODIUM CHLORIDE 0.9 % (FLUSH) 0.9 %
10 SYRINGE (ML) INJECTION 2 TIMES DAILY
Status: DISCONTINUED | OUTPATIENT
Start: 2019-12-11 | End: 2019-12-11

## 2019-12-11 RX ORDER — FUROSEMIDE 10 MG/ML
40 INJECTION INTRAMUSCULAR; INTRAVENOUS ONCE
Status: DISCONTINUED | OUTPATIENT
Start: 2019-12-11 | End: 2019-12-11

## 2019-12-13 ENCOUNTER — HOSPITAL ENCOUNTER (OUTPATIENT)
Dept: OTHER | Age: 39
Setting detail: THERAPIES SERIES
Discharge: HOME | End: 2019-12-13
Payer: MEDICARE

## 2019-12-13 VITALS
SYSTOLIC BLOOD PRESSURE: 116 MMHG | HEART RATE: 76 BPM | WEIGHT: 308 LBS | BODY MASS INDEX: 41.77 KG/M2 | DIASTOLIC BLOOD PRESSURE: 76 MMHG | RESPIRATION RATE: 16 BRPM

## 2019-12-13 LAB
ANION GAP SERPL CALCULATED.3IONS-SCNC: 15 MMOL/L (ref 7–16)
BUN BLDV-MCNC: 27 MG/DL (ref 6–20)
CALCIUM SERPL-MCNC: 9.3 MG/DL (ref 8.6–10.2)
CHLORIDE BLD-SCNC: 103 MMOL/L (ref 98–107)
CO2: 20 MMOL/L (ref 22–29)
CREAT SERPL-MCNC: 1.1 MG/DL (ref 0.7–1.2)
GFR AFRICAN AMERICAN: >60
GFR NON-AFRICAN AMERICAN: >60 ML/MIN/1.73
GLUCOSE BLD-MCNC: 172 MG/DL (ref 74–99)
POTASSIUM SERPL-SCNC: 3.8 MMOL/L (ref 3.5–5)
PRO-BNP: 742 PG/ML (ref 0–125)
SODIUM BLD-SCNC: 138 MMOL/L (ref 132–146)

## 2019-12-13 PROCEDURE — 36415 COLL VENOUS BLD VENIPUNCTURE: CPT

## 2019-12-13 PROCEDURE — 80048 BASIC METABOLIC PNL TOTAL CA: CPT

## 2019-12-13 PROCEDURE — 99204 OFFICE O/P NEW MOD 45 MIN: CPT

## 2019-12-13 PROCEDURE — 6360000002 HC RX W HCPCS: Performed by: INTERNAL MEDICINE

## 2019-12-13 PROCEDURE — 2580000003 HC RX 258: Performed by: INTERNAL MEDICINE

## 2019-12-13 PROCEDURE — 83880 ASSAY OF NATRIURETIC PEPTIDE: CPT

## 2019-12-13 PROCEDURE — 96374 THER/PROPH/DIAG INJ IV PUSH: CPT

## 2019-12-13 RX ORDER — SODIUM CHLORIDE 0.9 % (FLUSH) 0.9 %
10 SYRINGE (ML) INJECTION PRN
Status: DISCONTINUED | OUTPATIENT
Start: 2019-12-13 | End: 2019-12-14 | Stop reason: HOSPADM

## 2019-12-13 RX ORDER — FUROSEMIDE 10 MG/ML
40 INJECTION INTRAMUSCULAR; INTRAVENOUS ONCE
Status: COMPLETED | OUTPATIENT
Start: 2019-12-13 | End: 2019-12-13

## 2019-12-13 RX ADMIN — FUROSEMIDE 40 MG: 10 INJECTION, SOLUTION INTRAMUSCULAR; INTRAVENOUS at 09:25

## 2019-12-13 RX ADMIN — Medication 10 ML: at 09:25

## 2019-12-26 ENCOUNTER — HOSPITAL ENCOUNTER (OUTPATIENT)
Dept: OTHER | Age: 39
Setting detail: THERAPIES SERIES
Discharge: HOME | End: 2019-12-26
Payer: MEDICARE

## 2019-12-26 VITALS
BODY MASS INDEX: 42.04 KG/M2 | RESPIRATION RATE: 18 BRPM | DIASTOLIC BLOOD PRESSURE: 80 MMHG | HEART RATE: 67 BPM | WEIGHT: 310 LBS | SYSTOLIC BLOOD PRESSURE: 135 MMHG

## 2019-12-26 LAB
ANION GAP SERPL CALCULATED.3IONS-SCNC: 12 MMOL/L (ref 7–16)
BUN BLDV-MCNC: 17 MG/DL (ref 6–20)
CALCIUM SERPL-MCNC: 9.3 MG/DL (ref 8.6–10.2)
CHLORIDE BLD-SCNC: 104 MMOL/L (ref 98–107)
CO2: 22 MMOL/L (ref 22–29)
CREAT SERPL-MCNC: 1 MG/DL (ref 0.7–1.2)
GFR AFRICAN AMERICAN: >60
GFR NON-AFRICAN AMERICAN: >60 ML/MIN/1.73
GLUCOSE BLD-MCNC: 183 MG/DL (ref 74–99)
POTASSIUM SERPL-SCNC: 3.6 MMOL/L (ref 3.5–5)
PRO-BNP: 1016 PG/ML (ref 0–125)
SODIUM BLD-SCNC: 138 MMOL/L (ref 132–146)

## 2019-12-26 PROCEDURE — 99214 OFFICE O/P EST MOD 30 MIN: CPT

## 2019-12-26 PROCEDURE — 36415 COLL VENOUS BLD VENIPUNCTURE: CPT

## 2019-12-26 PROCEDURE — 96374 THER/PROPH/DIAG INJ IV PUSH: CPT

## 2019-12-26 PROCEDURE — 6360000002 HC RX W HCPCS: Performed by: INTERNAL MEDICINE

## 2019-12-26 PROCEDURE — 80048 BASIC METABOLIC PNL TOTAL CA: CPT

## 2019-12-26 PROCEDURE — 83880 ASSAY OF NATRIURETIC PEPTIDE: CPT

## 2019-12-26 PROCEDURE — 2580000003 HC RX 258: Performed by: INTERNAL MEDICINE

## 2019-12-26 RX ORDER — SODIUM CHLORIDE 0.9 % (FLUSH) 0.9 %
10 SYRINGE (ML) INJECTION 2 TIMES DAILY
Status: DISCONTINUED | OUTPATIENT
Start: 2019-12-26 | End: 2019-12-27 | Stop reason: HOSPADM

## 2019-12-26 RX ORDER — FUROSEMIDE 10 MG/ML
40 INJECTION INTRAMUSCULAR; INTRAVENOUS ONCE
Status: COMPLETED | OUTPATIENT
Start: 2019-12-26 | End: 2019-12-26

## 2019-12-26 RX ADMIN — Medication 10 ML: at 08:44

## 2019-12-26 RX ADMIN — FUROSEMIDE 40 MG: 10 INJECTION, SOLUTION INTRAMUSCULAR; INTRAVENOUS at 08:44

## 2019-12-27 ENCOUNTER — TELEPHONE (OUTPATIENT)
Dept: CARDIOLOGY CLINIC | Age: 39
End: 2019-12-27

## 2019-12-30 ENCOUNTER — TELEPHONE (OUTPATIENT)
Dept: CARDIOLOGY CLINIC | Age: 39
End: 2019-12-30

## 2020-01-02 RX ORDER — INSULIN DETEMIR 100 [IU]/ML
45 INJECTION, SOLUTION SUBCUTANEOUS 2 TIMES DAILY
Qty: 30 ML | Refills: 10 | Status: ON HOLD | OUTPATIENT
Start: 2020-01-02 | End: 2020-03-17 | Stop reason: SDUPTHER

## 2020-01-08 ENCOUNTER — HOSPITAL ENCOUNTER (OUTPATIENT)
Dept: OTHER | Age: 40
Setting detail: THERAPIES SERIES
Discharge: HOME | End: 2020-01-08
Payer: MEDICARE

## 2020-01-08 VITALS
BODY MASS INDEX: 41.64 KG/M2 | HEART RATE: 69 BPM | WEIGHT: 307 LBS | RESPIRATION RATE: 18 BRPM | SYSTOLIC BLOOD PRESSURE: 120 MMHG | DIASTOLIC BLOOD PRESSURE: 70 MMHG

## 2020-01-08 LAB
ANION GAP SERPL CALCULATED.3IONS-SCNC: 15 MMOL/L (ref 7–16)
BUN BLDV-MCNC: 23 MG/DL (ref 6–20)
CALCIUM SERPL-MCNC: 9.7 MG/DL (ref 8.6–10.2)
CHLORIDE BLD-SCNC: 105 MMOL/L (ref 98–107)
CO2: 19 MMOL/L (ref 22–29)
CREAT SERPL-MCNC: 1.1 MG/DL (ref 0.7–1.2)
GFR AFRICAN AMERICAN: >60
GFR NON-AFRICAN AMERICAN: >60 ML/MIN/1.73
GLUCOSE BLD-MCNC: 141 MG/DL (ref 74–99)
POTASSIUM SERPL-SCNC: 3.7 MMOL/L (ref 3.5–5)
PRO-BNP: 841 PG/ML (ref 0–125)
SODIUM BLD-SCNC: 139 MMOL/L (ref 132–146)

## 2020-01-08 PROCEDURE — 36415 COLL VENOUS BLD VENIPUNCTURE: CPT

## 2020-01-08 PROCEDURE — 99214 OFFICE O/P EST MOD 30 MIN: CPT

## 2020-01-08 PROCEDURE — 83880 ASSAY OF NATRIURETIC PEPTIDE: CPT

## 2020-01-08 PROCEDURE — 80048 BASIC METABOLIC PNL TOTAL CA: CPT

## 2020-01-08 NOTE — PROGRESS NOTES
Weight today 307lb. , pt denies any discomfort. Breath sounds clear diminished bilat bases. Trace to  No amt of non pitting edema noted BLE. Respirations easy and non labored. Labs drawn and follow up appt. Made.

## 2020-01-17 PROCEDURE — 93264 REM MNTR WRLS P-ART PRS SNR: CPT | Performed by: INTERNAL MEDICINE

## 2020-01-23 ENCOUNTER — HOSPITAL ENCOUNTER (OUTPATIENT)
Dept: OTHER | Age: 40
Setting detail: THERAPIES SERIES
Discharge: HOME | End: 2020-01-23
Payer: MEDICARE

## 2020-01-23 VITALS
DIASTOLIC BLOOD PRESSURE: 79 MMHG | WEIGHT: 305 LBS | BODY MASS INDEX: 41.37 KG/M2 | HEART RATE: 69 BPM | SYSTOLIC BLOOD PRESSURE: 124 MMHG | RESPIRATION RATE: 20 BRPM

## 2020-01-23 LAB
ANION GAP SERPL CALCULATED.3IONS-SCNC: 16 MMOL/L (ref 7–16)
BUN BLDV-MCNC: 19 MG/DL (ref 6–20)
CALCIUM SERPL-MCNC: 9.8 MG/DL (ref 8.6–10.2)
CHLORIDE BLD-SCNC: 105 MMOL/L (ref 98–107)
CO2: 16 MMOL/L (ref 22–29)
CREAT SERPL-MCNC: 0.9 MG/DL (ref 0.7–1.2)
GFR AFRICAN AMERICAN: >60
GFR NON-AFRICAN AMERICAN: >60 ML/MIN/1.73
GLUCOSE BLD-MCNC: 157 MG/DL (ref 74–99)
POTASSIUM SERPL-SCNC: 4.1 MMOL/L (ref 3.5–5)
PRO-BNP: 693 PG/ML (ref 0–125)
SODIUM BLD-SCNC: 137 MMOL/L (ref 132–146)

## 2020-01-23 PROCEDURE — 36415 COLL VENOUS BLD VENIPUNCTURE: CPT

## 2020-01-23 PROCEDURE — 83880 ASSAY OF NATRIURETIC PEPTIDE: CPT

## 2020-01-23 PROCEDURE — 80048 BASIC METABOLIC PNL TOTAL CA: CPT

## 2020-01-23 PROCEDURE — 99214 OFFICE O/P EST MOD 30 MIN: CPT

## 2020-02-05 ENCOUNTER — HOSPITAL ENCOUNTER (OUTPATIENT)
Dept: OTHER | Age: 40
Setting detail: THERAPIES SERIES
Discharge: HOME | End: 2020-02-05
Payer: MEDICARE

## 2020-02-05 VITALS
BODY MASS INDEX: 40.96 KG/M2 | WEIGHT: 302 LBS | RESPIRATION RATE: 20 BRPM | DIASTOLIC BLOOD PRESSURE: 65 MMHG | HEART RATE: 85 BPM | SYSTOLIC BLOOD PRESSURE: 110 MMHG

## 2020-02-05 LAB
ANION GAP SERPL CALCULATED.3IONS-SCNC: 13 MMOL/L (ref 7–16)
BUN BLDV-MCNC: 31 MG/DL (ref 6–20)
CALCIUM SERPL-MCNC: 9.5 MG/DL (ref 8.6–10.2)
CHLORIDE BLD-SCNC: 105 MMOL/L (ref 98–107)
CO2: 22 MMOL/L (ref 22–29)
CREAT SERPL-MCNC: 1.3 MG/DL (ref 0.7–1.2)
GFR AFRICAN AMERICAN: >60
GFR NON-AFRICAN AMERICAN: >60 ML/MIN/1.73
GLUCOSE BLD-MCNC: 183 MG/DL (ref 74–99)
POTASSIUM SERPL-SCNC: 3.6 MMOL/L (ref 3.5–5)
PRO-BNP: 2323 PG/ML (ref 0–125)
SODIUM BLD-SCNC: 140 MMOL/L (ref 132–146)

## 2020-02-05 PROCEDURE — 80048 BASIC METABOLIC PNL TOTAL CA: CPT

## 2020-02-05 PROCEDURE — 99214 OFFICE O/P EST MOD 30 MIN: CPT

## 2020-02-05 PROCEDURE — 36415 COLL VENOUS BLD VENIPUNCTURE: CPT

## 2020-02-05 PROCEDURE — 83880 ASSAY OF NATRIURETIC PEPTIDE: CPT

## 2020-02-05 RX ORDER — FUROSEMIDE 10 MG/ML
40 INJECTION INTRAMUSCULAR; INTRAVENOUS ONCE
Status: DISCONTINUED | OUTPATIENT
Start: 2020-02-05 | End: 2020-02-05

## 2020-02-05 NOTE — PROGRESS NOTES
Weight today  302.9, pt. Denies any discomfort , pt thought he was in AfIB, monitor shows, v-paced. Breath sounds clear, no edema noted  Checked with ANDREA Holt, cardiomems within normal limits,. Labs drawn and follow up appt. Made.

## 2020-02-13 RX ORDER — ATORVASTATIN CALCIUM 40 MG/1
TABLET, FILM COATED ORAL
Qty: 30 TABLET | Refills: 5 | Status: SHIPPED
Start: 2020-02-13 | End: 2020-08-27

## 2020-02-13 NOTE — TELEPHONE ENCOUNTER
Last Appointment:  11/14/2019  Future Appointments   Date Time Provider Clemente Davis   2/20/2020 12:30 PM [facility] ROOM 4 [facility] CHF [facility]   2/24/2020  3:20 PM MD Jaiden Bird [facility]

## 2020-02-14 ENCOUNTER — ANESTHESIA (OUTPATIENT)
Dept: CARDIAC CATH/INVASIVE PROCEDURES | Age: 40
End: 2020-02-14

## 2020-02-14 ENCOUNTER — ANESTHESIA EVENT (OUTPATIENT)
Dept: CARDIAC CATH/INVASIVE PROCEDURES | Age: 40
End: 2020-02-14

## 2020-02-14 ENCOUNTER — HOSPITAL ENCOUNTER (OUTPATIENT)
Dept: CARDIAC CATH/INVASIVE PROCEDURES | Age: 40
Discharge: HOME | End: 2020-02-14
Payer: MEDICARE

## 2020-02-14 VITALS
DIASTOLIC BLOOD PRESSURE: 69 MMHG | HEART RATE: 66 BPM | OXYGEN SATURATION: 98 % | TEMPERATURE: 97.8 F | WEIGHT: 297 LBS | BODY MASS INDEX: 40.23 KG/M2 | HEIGHT: 72 IN | SYSTOLIC BLOOD PRESSURE: 104 MMHG

## 2020-02-14 VITALS
DIASTOLIC BLOOD PRESSURE: 73 MMHG | RESPIRATION RATE: 14 BRPM | SYSTOLIC BLOOD PRESSURE: 96 MMHG | OXYGEN SATURATION: 100 %

## 2020-02-14 LAB
ANION GAP SERPL CALCULATED.3IONS-SCNC: 14 MMOL/L (ref 7–16)
BUN BLDV-MCNC: 24 MG/DL (ref 6–20)
CALCIUM SERPL-MCNC: 9.9 MG/DL (ref 8.6–10.2)
CHLORIDE BLD-SCNC: 102 MMOL/L (ref 98–107)
CO2: 23 MMOL/L (ref 22–29)
CREAT SERPL-MCNC: 1.2 MG/DL (ref 0.7–1.2)
GFR AFRICAN AMERICAN: >60
GFR NON-AFRICAN AMERICAN: >60 ML/MIN/1.73
GLUCOSE BLD-MCNC: 115 MG/DL (ref 74–99)
HCT VFR BLD CALC: 42.2 % (ref 37–54)
HEMOGLOBIN: 13.3 G/DL (ref 12.5–16.5)
MCH RBC QN AUTO: 27.7 PG (ref 26–35)
MCHC RBC AUTO-ENTMCNC: 31.5 % (ref 32–34.5)
MCV RBC AUTO: 87.9 FL (ref 80–99.9)
PDW BLD-RTO: 16.1 FL (ref 11.5–15)
PLATELET # BLD: 258 E9/L (ref 130–450)
PMV BLD AUTO: 10 FL (ref 7–12)
POTASSIUM SERPL-SCNC: 3.9 MMOL/L (ref 3.5–5)
RBC # BLD: 4.8 E12/L (ref 3.8–5.8)
SODIUM BLD-SCNC: 139 MMOL/L (ref 132–146)
WBC # BLD: 6.1 E9/L (ref 4.5–11.5)

## 2020-02-14 PROCEDURE — 3700000000 HC ANESTHESIA ATTENDED CARE

## 2020-02-14 PROCEDURE — 2709999900 HC NON-CHARGEABLE SUPPLY

## 2020-02-14 PROCEDURE — 85027 COMPLETE CBC AUTOMATED: CPT

## 2020-02-14 PROCEDURE — 80048 BASIC METABOLIC PNL TOTAL CA: CPT

## 2020-02-14 PROCEDURE — 92960 CARDIOVERSION ELECTRIC EXT: CPT | Performed by: INTERNAL MEDICINE

## 2020-02-14 PROCEDURE — 6360000002 HC RX W HCPCS: Performed by: NURSE ANESTHETIST, CERTIFIED REGISTERED

## 2020-02-14 PROCEDURE — 36415 COLL VENOUS BLD VENIPUNCTURE: CPT

## 2020-02-14 PROCEDURE — 3700000001 HC ADD 15 MINUTES (ANESTHESIA)

## 2020-02-14 PROCEDURE — 2580000003 HC RX 258: Performed by: INTERNAL MEDICINE

## 2020-02-14 RX ORDER — PROPOFOL 10 MG/ML
INJECTION, EMULSION INTRAVENOUS PRN
Status: DISCONTINUED | OUTPATIENT
Start: 2020-02-14 | End: 2020-02-14 | Stop reason: SDUPTHER

## 2020-02-14 RX ORDER — SODIUM CHLORIDE 9 MG/ML
INJECTION, SOLUTION INTRAVENOUS CONTINUOUS
Status: DISCONTINUED | OUTPATIENT
Start: 2020-02-14 | End: 2020-02-15 | Stop reason: HOSPADM

## 2020-02-14 RX ADMIN — SODIUM CHLORIDE: 9 INJECTION, SOLUTION INTRAVENOUS at 14:03

## 2020-02-14 RX ADMIN — PROPOFOL 100 MG: 10 INJECTION, EMULSION INTRAVENOUS at 14:29

## 2020-02-14 RX ADMIN — SODIUM CHLORIDE: 9 INJECTION, SOLUTION INTRAVENOUS at 14:23

## 2020-02-14 NOTE — ANESTHESIA POSTPROCEDURE EVALUATION
Department of Anesthesiology  Postprocedure Note    Patient: Gaby Honeycutt  MRN: [id number]  YOB: 1980  Date of evaluation: 2/14/2020  Time:  3:25 PM     Procedure Summary     Date:  02/14/20 Room / Location:  [facility] CATH LAB    Anesthesia Start:  3808 Anesthesia Stop:  7714    Procedure:  CARDIOVERSION WITH ANESTHESIA Diagnosis:      Scheduled Providers:   Responsible Provider:  Jimbo Roger DO    Anesthesia Type:  MAC ASA Status:  4          Anesthesia Type: No value filed. George Phase I:      George Phase II:      Last vitals: Reviewed and per EMR flowsheets.        Anesthesia Post Evaluation    Patient location during evaluation: bedside  Patient participation: complete - patient cannot participate  Level of consciousness: awake and alert  Airway patency: patent  Nausea & Vomiting: no nausea and no vomiting  Complications: no  Cardiovascular status: blood pressure returned to baseline  Respiratory status: acceptable  Hydration status: euvolemic

## 2020-02-14 NOTE — ANESTHESIA PRE PROCEDURE
Department of Anesthesiology  Preprocedure Note       Name:  Gaby Honeycutt   Age:  44 y.o.  :  1980                                          MRN:  [id number]         Date:  2020      Surgeon: * No surgeons listed *Roddyene Clarendon    Procedure: CARDIOVERSION WITH ANESTHESIA    Medications prior to admission:   Prior to Admission medications    Medication Sig Start Date End Date Taking? Authorizing Provider   atorvastatin (LIPITOR) 40 MG tablet TAKE (1) TABLET BY MOUTH DAILY 20  Yes Yanely Jennings MD   carvedilol (COREG) 25 MG tablet Take 1 tablet by mouth 2 times daily (with meals) 20  Yes Jackie Matias MD   LEVEMIR FLEXTOUCH 100 UNIT/ML injection pen INJECT 45 UNITS INTO THE SKIN 2 TIMES DAILY 20  Yes Yanely Jennings MD   insulin lispro (HUMALOG) 100 UNIT/ML injection vial INJECT 22 UNITS SUBCUTANEOUSLY THREE TIMES A DAY BEFORE MEALS 20  Yes Yanely Jennings MD   Blood Glucose Monitoring Suppl (ONE TOUCH ULTRA 2) w/Device KIT USE AS DIRECTED DAILY *SUBSTITUTE AS NEEDED PER INSURANCE PREFERENCE* 19  Yes Rose Espinosa MD   blood glucose monitor strips Test 2-3 times a day & as needed for symptoms of irregular blood glucose.  19  Yes Yanely Jennings MD   Lancets MISC Test 3-4 times daily 19  Yes Yanely Jennings MD   torsemide (DEMADEX) 20 MG tablet Take 1 tablet by mouth every 12 hours as needed (swelling)  Patient taking differently: Take 20 mg by mouth daily  19  Yes Yanely Jennings MD   rivaroxaban (XARELTO) 20 MG TABS tablet TAKE 1 TABLET BY MOUTH DAILY 10/16/19  Yes Yanely Jennings MD   allopurinol (ZYLOPRIM) 300 MG tablet Take 1 tablet by mouth daily 10/16/19  Yes Yanely Jennings MD   sacubitril-valsartan (ENTRESTO)  MG per tablet Take 1 tablet by mouth 2 times daily 10/16/19  Yes Tanika Taylor MD   magnesium oxide (MAG-OX) 400 MG tablet TAKE 1 TABLET BY MOUTH DAILY 19  Yes Historical Provider, MD   spironolactone (ALDACTONE) 25 MG tablet Take 0.5 tablets by mouth Daily with lunch 8/3/19  Yes Princess Magen MD   sharps container For needle disposal. 2/11/19   Bill Castanon MD       Current medications:    Current Outpatient Medications   Medication Sig Dispense Refill    atorvastatin (LIPITOR) 40 MG tablet TAKE (1) TABLET BY MOUTH DAILY 30 tablet 5    carvedilol (COREG) 25 MG tablet Take 1 tablet by mouth 2 times daily (with meals) 60 tablet 11    LEVEMIR FLEXTOUCH 100 UNIT/ML injection pen INJECT 45 UNITS INTO THE SKIN 2 TIMES DAILY 30 mL 10    insulin lispro (HUMALOG) 100 UNIT/ML injection vial INJECT 22 UNITS SUBCUTANEOUSLY THREE TIMES A DAY BEFORE MEALS 10 mL 10    Blood Glucose Monitoring Suppl (ONE TOUCH ULTRA 2) w/Device KIT USE AS DIRECTED DAILY *SUBSTITUTE AS NEEDED PER INSURANCE PREFERENCE* 1 kit 0    blood glucose monitor strips Test 2-3 times a day & as needed for symptoms of irregular blood glucose.  300 strip 3    Lancets MISC Test 3-4 times daily 300 each 3    torsemide (DEMADEX) 20 MG tablet Take 1 tablet by mouth every 12 hours as needed (swelling) (Patient taking differently: Take 20 mg by mouth daily ) 60 tablet 3    rivaroxaban (XARELTO) 20 MG TABS tablet TAKE 1 TABLET BY MOUTH DAILY 30 tablet 10    allopurinol (ZYLOPRIM) 300 MG tablet Take 1 tablet by mouth daily 30 tablet 11    sacubitril-valsartan (ENTRESTO)  MG per tablet Take 1 tablet by mouth 2 times daily 90 tablet 3    magnesium oxide (MAG-OX) 400 MG tablet TAKE 1 TABLET BY MOUTH DAILY  11    spironolactone (ALDACTONE) 25 MG tablet Take 0.5 tablets by mouth Daily with lunch 30 tablet 3    sharps container For needle disposal. 1 each 5     Current Facility-Administered Medications   Medication Dose Route Frequency Provider Last Rate Last Dose    0.9 % sodium chloride infusion   Intravenous Continuous America Ro MD   Stopped at 02/14/20 1530    Tetanus-Diphth-Acell Pertussis (BOOSTRIX) injection 0.5 mL  0.5 mL Intramuscular Once Bill Castanon MD Allergies:  No Known Allergies    Problem List:    Patient Active Problem List   Diagnosis Code    Obstructive sleep apnea G47.33    Chronic gout M1A. 9XX0    CKD (chronic kidney disease) N18.9    Type 2 diabetes mellitus with complication, with long-term current use of insulin (HCC) E11.8, Z79.4    Hepatomegaly R16.0    Paroxysmal atrial fibrillation (HCC) I48.0    Essential hypertension I10    VHD (valvular heart disease) I38    Morbid obesity with BMI of 40.0-44.9, adult (HCC) E66.01, Z68.41    S/P ICD (internal cardiac defibrillator) procedure Z95.810    Ventricular arrhythmia I49.9    Nonischemic cardiomyopathy (HCC) I42.8    Chronic HFrEF (heart failure with reduced ejection fraction) ([facility]) I50.22    Ventricular tachycardia (HCC) I47.2    S/P left pulmonary artery pressure sensor implant placement Z95.9       Past Medical History:        Diagnosis Date    Acute CHF ([address] 11/29/2011    Acute CHF ([address] 12/26/2011    Acute idiopathic gout of right foot 8/13/2016    Anemia 11/29/2011    CAD (coronary artery disease)     Cerebral artery occlusion with cerebral infarction (HCC)     CKD (chronic kidney disease)     Gout     HTN (hypertension) 11/29/2011    Hyperlipidemia     Hypertension     Morbid obesity due to excess calories (HCC)     Nonischemic cardiomyopathy ([address] 11/29/2011    Nonischemic cardiomyopathy ([address] 7/2013 7/2013- cardiac MRI revealed an LVEF of 20%    ARVIND (obstructive sleep apnea) 11/29/2011    S/P left heart catheterization by percutaneous approach 12-27/2011    Dr Fab Hernández Systolic heart failure [facility]) 5/7/2012 5/7/12- cardiac catheterization revealed an LVEF of 10-15%, mitral regurgitation along with borderline prolapse of mitral valve    Type 2 diabetes mellitus with complication, with long-term current use of insulin ([address] 8/22/2016    URI, acute 11/29/2011       Past Surgical History:        Procedure Laterality Date    CARDIAC CATHETERIZATION 08/01/2019    Dr Martínez   11/20/2018    UPGRADE S-ICD TO BIV ICD   ([employer])  ARIC     ECHO COMPL W DOP COLOR FLOW  11/30/2011         ECHO COMPL W DOP COLOR FLOW  3/27/2012         INSERTABLE CARDIAC MONITOR  12/13/2018    cardiomems, [facility], Dr. Chichi Spencer       Social History:    Social History     Tobacco Use    Smoking status: Never Smoker    Smokeless tobacco: Never Used   Substance Use Topics    Alcohol use: Not Currently     Comment: rare mixed drink                                Counseling given: Not Answered      Vital Signs (Current):   Vitals:    02/14/20 1345   BP: 104/69   Pulse: 66   Temp: 97.8 °F (36.6 °C)   SpO2: 98%   Weight: 297 lb (134.7 kg)   Height: 6' (1.829 m)                                              BP Readings from Last 3 Encounters:   02/14/20 96/73   02/14/20 104/69   02/05/20 110/65       NPO Status:  npo today only meds                                                                               BMI:   Wt Readings from Last 3 Encounters:   02/14/20 297 lb (134.7 kg)   02/05/20 (!) 302 lb (137 kg)   01/23/20 (!) 305 lb (138.3 kg)     Body mass index is 40.28 kg/m². CBC:   Lab Results   Component Value Date    WBC 6.1 02/14/2020    RBC 4.80 02/14/2020    HGB 13.3 02/14/2020    HCT 42.2 02/14/2020    MCV 87.9 02/14/2020    RDW 16.1 02/14/2020     02/14/2020       CMP:   Lab Results   Component Value Date     02/14/2020    K 3.9 02/14/2020    K 4.5 08/02/2019     02/14/2020    CO2 23 02/14/2020    BUN 24 02/14/2020    CREATININE 1.2 02/14/2020    GFRAA >60 02/14/2020    LABGLOM >60 02/14/2020    GLUCOSE 115 02/14/2020    GLUCOSE 192 05/15/2012    PROT 8.2 07/30/2019    CALCIUM 9.9 02/14/2020    BILITOT 0.5 07/30/2019    ALKPHOS 126 07/30/2019    AST 29 07/30/2019    ALT 25 07/30/2019       POC Tests: No results for input(s): POCGLU, POCNA, POCK, POCCL, POCBUN, POCHEMO, POCHCT in the last 72 hours.     Coags:   Lab Results

## 2020-02-15 NOTE — H&P
[address]                  [address] [facility], [address] [place] [address]                              HISTORY AND PHYSICAL    PATIENT NAME: Codey Lo                      :        1980  MED REC NO:   [id number]                            ROOM:  ACCOUNT NO:   [de-identified]                           ADMIT DATE: 2020  PROVIDER:     Aileen August MD    HISTORY OF PRESENT ILLNESS:  This is a 68-year-old patient, well known  to me for a history of severe cardiomyopathy, obesity, sleep apnea, and  most recently a biventricular pacer ICD implantation in 2018. The  patient has not followed up in the office regularly; however, we did  receive a communication through his _____ monitor that atrial  fibrillation developed approximately 10 days ago. He was therefore  brought into the office yesterday. We discussed cardioversion since he  is on chronic systemic anticoagulation with Xarelto. The patient  himself is currently asymptomatic, however. He did have another episode  of atrial fibrillation approximately 6 months ago at which time the  arrhythmia deteriorated to ventricular fibrillation and the patient  received an ICD discharge. The patient was, therefore, brought in today for elective cardioversion. He has been taking all his medications regularly and follows up in the  congestive heart failure clinic regularly. He also uses CPAP regularly. MEDICATIONS:  Medication list includes Entresto 97/103 twice a day,  Coreg 25 mg twice a day along with Aldactone 25 mg daily, Xarelto 20 mg  daily, magnesium oxide, insulin, atorvastatin 40 mg daily, and  allopurinol. REVIEW OF SYSTEMS:  Not significant for any other major noncardiac  issues. PHYSICAL EXAMINATION:  VITAL SIGNS:  Blood pressure here was 105/70, pulse was 70 and  irregular. GENERAL:  Revealed no JVD or leg edema. HEENT:  Unremarkable.   RESPIRATORY SYSTEM:  Revealed a normal chest on inspection. Breath  sounds were equal.  No rales or rhonchi. CARDIOVASCULAR SYSTEM:  Revealed a laterally displaced PMI with a soft  S1 and normal S2 at the left sternal border and the apex. ASSESSMENT:  3  A 80-year-old patient with severe LV dysfunction, status post  biventricular pacer ICD, which is in situ. 2.  Device function is appropriate. 3.  Congestive heart failure, chronic, combined. 4.  Paroxysmal atrial fibrillation. The patient has been in this rhythm  for the last 10 days. RECOMMENDATIONS:  Proceed with cardioversion today. Recovery and  discharge, followup in the office in three months.         Eugenio Landin MD    D: 02/14/2020 15:28:25       T: 02/14/2020 17:04:24     /[email or web address]  Job#: [id number]     Doc#: [id number]    CC:  Saddie Klinefelter, MD

## 2020-02-20 ENCOUNTER — HOSPITAL ENCOUNTER (OUTPATIENT)
Dept: OTHER | Age: 40
Setting detail: THERAPIES SERIES
Discharge: HOME | End: 2020-02-20
Payer: MEDICARE

## 2020-02-20 VITALS
HEART RATE: 73 BPM | DIASTOLIC BLOOD PRESSURE: 73 MMHG | RESPIRATION RATE: 18 BRPM | SYSTOLIC BLOOD PRESSURE: 130 MMHG | WEIGHT: 305.5 LBS | BODY MASS INDEX: 41.43 KG/M2

## 2020-02-20 PROCEDURE — 99214 OFFICE O/P EST MOD 30 MIN: CPT

## 2020-02-24 ENCOUNTER — OFFICE VISIT (OUTPATIENT)
Dept: FAMILY MEDICINE CLINIC | Age: 40
End: 2020-02-24
Payer: MEDICARE

## 2020-02-24 ENCOUNTER — HOSPITAL ENCOUNTER (OUTPATIENT)
Age: 40
Discharge: HOME | End: 2020-02-26
Payer: MEDICARE

## 2020-02-24 VITALS
HEART RATE: 70 BPM | WEIGHT: 305 LBS | OXYGEN SATURATION: 96 % | DIASTOLIC BLOOD PRESSURE: 71 MMHG | BODY MASS INDEX: 41.31 KG/M2 | TEMPERATURE: 98.1 F | HEIGHT: 72 IN | SYSTOLIC BLOOD PRESSURE: 103 MMHG

## 2020-02-24 PROCEDURE — 83036 HEMOGLOBIN GLYCOSYLATED A1C: CPT

## 2020-02-24 PROCEDURE — 80053 COMPREHEN METABOLIC PANEL: CPT

## 2020-02-24 PROCEDURE — 99214 OFFICE O/P EST MOD 30 MIN: CPT | Performed by: FAMILY MEDICINE

## 2020-02-24 PROCEDURE — 99212 OFFICE O/P EST SF 10 MIN: CPT | Performed by: FAMILY MEDICINE

## 2020-02-24 PROCEDURE — 80061 LIPID PANEL: CPT

## 2020-02-24 PROCEDURE — 36415 COLL VENOUS BLD VENIPUNCTURE: CPT | Performed by: FAMILY MEDICINE

## 2020-02-24 PROCEDURE — 84443 ASSAY THYROID STIM HORMONE: CPT

## 2020-02-24 RX ORDER — TORSEMIDE 20 MG/1
20 TABLET ORAL DAILY
Qty: 90 TABLET | Refills: 0 | Status: ON HOLD
Start: 2020-02-24 | End: 2020-03-17 | Stop reason: HOSPADM

## 2020-02-24 ASSESSMENT — PATIENT HEALTH QUESTIONNAIRE - PHQ9
2. FEELING DOWN, DEPRESSED OR HOPELESS: 0
SUM OF ALL RESPONSES TO PHQ9 QUESTIONS 1 & 2: 0
SUM OF ALL RESPONSES TO PHQ QUESTIONS 1-9: 0
SUM OF ALL RESPONSES TO PHQ QUESTIONS 1-9: 0
1. LITTLE INTEREST OR PLEASURE IN DOING THINGS: 0

## 2020-02-24 NOTE — PROGRESS NOTES
[facility]  FAMILY MEDICINE RESIDENCY PROGRAM   OFFICE PROGRESS NOTE  DATE OF VISIT : 2020    Patient : Soila Zelaya III   Sex : male   Age : 44 y.o.  : 1980   MRN : [id number]            Chief Complaint :   Chief Complaint   Patient presents with    Diabetes       HPI:   Curt Dunn comes to clinic today for     F/U of chronic problem(s)     Chronic problems reviewed today include:     Hypertension, Diabetes mellitus, Obesity, Congestive Heart Failure, Chronic kidney disease and ARVIND    Current status of this/these condition(s):  stable    Tolerating meds: Yes    Additional history: Lexie Freed is doing well. He is following a diet, although he doesn't exercise regularly. His current insulin dose is  45 units BID of Levemir with humalog 22 units with meals. Sugars run between 120-150. He denies true hypoglycemia. He recently had DC cardioversion for atrial fibrillation and did well with that.      Patient Active Problem List   Diagnosis    Obstructive sleep apnea    Chronic gout    CKD (chronic kidney disease)    Type 2 diabetes mellitus with complication, with long-term current use of insulin (HCC)    Hepatomegaly    Paroxysmal atrial fibrillation (HCC)    Essential hypertension    VHD (valvular heart disease)    Morbid obesity with BMI of 40.0-44.9, adult ([address]    S/P ICD (internal cardiac defibrillator) procedure    Ventricular arrhythmia    Nonischemic cardiomyopathy ([address]    Chronic HFrEF (heart failure with reduced ejection fraction) ([address]    S/P left pulmonary artery pressure sensor implant placement       Past Medical History:   Diagnosis Date    Acute CHF ([address] 2011    Acute CHF ([address] 2011    Acute idiopathic gout of right foot 2016    AF (atrial fibrillation) ([address] 2020    Anemia 2011    CAD (coronary artery disease)     Cerebral artery occlusion with cerebral infarction (HCC)     CKD (chronic kidney disease)     Gout tablet 3    sharps container For needle disposal. 1 each 5     Current Facility-Administered Medications on File Prior to Visit   Medication Dose Route Frequency Provider Last Rate Last Dose    Tetanus-Diphth-Acell Pertussis (BOOSTRIX) injection 0.5 mL  0.5 mL Intramuscular Once Bill Castanon MD           No Known Allergies    Family History   Problem Relation Age of Onset    Cancer Mother     No Known Problems Father        Past Surgical History:   Procedure Laterality Date    CARDIAC CATHETERIZATION  08/01/2019    Dr Jay Smith  11/20/2018    UPGRADE S-ICD TO BIV ICD   ([employer])  [address]     CARDIOVERSION  02/14/2020    Successful CV of AF to NSR      (Dr. Nora aLng)    ECHO COMPL W DOP COLOR FLOW  11/30/2011         ECHO COMPL W DOP COLOR FLOW  3/27/2012         INSERTABLE CARDIAC MONITOR  12/13/2018    cardiomeMIGUELINA fregoso, Dr. Wilson Catchfloridalma       Social History     Tobacco Use    Smoking status: Never Smoker    Smokeless tobacco: Never Used   Substance Use Topics    Alcohol use: Not Currently     Comment: rare mixed drink    Drug use: Never       Review of Systems - History obtained from the patient  General ROS: negative  Psychological ROS: negative  Respiratory ROS: no cough, shortness of breath, or wheezing  Cardiovascular ROS: no chest pain or dyspnea on exertion  Gastrointestinal ROS: no abdominal pain, change in bowel habits, or black or bloody stools  Genito-Urinary ROS: no dysuria, trouble voiding, or hematuria  Musculoskeletal ROS: negative  Neurological ROS: negative    Objective:    VS:  Blood pressure 103/71, pulse 70, temperature 98.1 °F (36.7 °C), temperature source Oral, height 6' (1.829 m), weight (!) 305 lb (138.3 kg), SpO2 96 %. General Appearance: Obese, awake, alert, oriented, no acute distress  HEENT: Normocephalic,atraumatic. PERRL, EOM's intact, EAC without erythema or swelling, no pallor or icterus. Neck: Supple, symmetrical, trachea midline. No JVD. plans discussed today, and please call with any questions or concerns, letting the office know of any reasons that the plans may not be followed. The risks of untreated conditions include worsening illness, injury, disability, and possibly, death. Please call if symptoms change in any way, worsen, or fail to completely resolve, as this could necessitate a change to treatment plans. Patient and/or caregiver expressed understanding. Indications and proper use of medication(s) reviewed. Potential side-effects and risks of medication(s) also explained. Patient and/or caregiver was instructed to call if any new symptoms develop prior to next visit. Health risk factors discussed and addressed. I have personally reviewed labs and/or imaging (if any).       Signed by : Samuel Salas M.D.

## 2020-02-25 LAB
ALBUMIN SERPL-MCNC: 4.1 G/DL (ref 3.5–5.2)
ALP BLD-CCNC: 139 U/L (ref 40–129)
ALT SERPL-CCNC: 20 U/L (ref 0–40)
ANION GAP SERPL CALCULATED.3IONS-SCNC: 16 MMOL/L (ref 7–16)
AST SERPL-CCNC: 21 U/L (ref 0–39)
BILIRUB SERPL-MCNC: 0.9 MG/DL (ref 0–1.2)
BUN BLDV-MCNC: 13 MG/DL (ref 6–20)
CALCIUM SERPL-MCNC: 9.5 MG/DL (ref 8.6–10.2)
CHLORIDE BLD-SCNC: 101 MMOL/L (ref 98–107)
CHOLESTEROL, TOTAL: 128 MG/DL (ref 0–199)
CO2: 21 MMOL/L (ref 22–29)
CREAT SERPL-MCNC: 1 MG/DL (ref 0.7–1.2)
GFR AFRICAN AMERICAN: >60
GFR NON-AFRICAN AMERICAN: >60 ML/MIN/1.73
GLUCOSE BLD-MCNC: 175 MG/DL (ref 74–99)
HBA1C MFR BLD: 7.6 % (ref 4–5.6)
HDLC SERPL-MCNC: 42 MG/DL
LDL CHOLESTEROL CALCULATED: 71 MG/DL (ref 0–99)
POTASSIUM SERPL-SCNC: 3.4 MMOL/L (ref 3.5–5)
SODIUM BLD-SCNC: 138 MMOL/L (ref 132–146)
TOTAL PROTEIN: 7.6 G/DL (ref 6.4–8.3)
TRIGL SERPL-MCNC: 73 MG/DL (ref 0–149)
TSH SERPL DL<=0.05 MIU/L-ACNC: 1.27 UIU/ML (ref 0.27–4.2)
VLDLC SERPL CALC-MCNC: 15 MG/DL

## 2020-02-25 RX ORDER — SPIRONOLACTONE 25 MG/1
25 TABLET ORAL
Qty: 30 TABLET | Refills: 3
Start: 2020-02-25 | End: 2020-03-12

## 2020-03-12 RX ORDER — SPIRONOLACTONE 25 MG/1
TABLET ORAL
Qty: 30 TABLET | Refills: 10 | Status: SHIPPED
Start: 2020-03-12 | End: 2020-08-28 | Stop reason: SDUPTHER

## 2020-03-14 ENCOUNTER — APPOINTMENT (OUTPATIENT)
Dept: GENERAL RADIOLOGY | Age: 40
DRG: 291 | End: 2020-03-14
Payer: MEDICARE

## 2020-03-14 ENCOUNTER — HOSPITAL ENCOUNTER (INPATIENT)
Age: 40
LOS: 3 days | Discharge: HOME | DRG: 291 | End: 2020-03-17
Attending: EMERGENCY MEDICINE | Admitting: FAMILY MEDICINE
Payer: MEDICARE

## 2020-03-14 PROBLEM — I77.9 CAROTID DISEASE, BILATERAL (HCC): Status: ACTIVE | Noted: 2020-03-14

## 2020-03-14 PROBLEM — I50.23 ACUTE ON CHRONIC SYSTOLIC HEART FAILURE (HCC): Status: ACTIVE | Noted: 2020-03-14

## 2020-03-14 LAB
ADENOVIRUS BY PCR: NOT DETECTED
ALBUMIN SERPL-MCNC: 4.2 G/DL (ref 3.5–5.2)
ALBUMIN SERPL-MCNC: 4.4 G/DL (ref 3.5–5.2)
ALP BLD-CCNC: 132 U/L (ref 40–129)
ALP BLD-CCNC: 137 U/L (ref 40–129)
ALT SERPL-CCNC: 21 U/L (ref 0–40)
ALT SERPL-CCNC: 23 U/L (ref 0–40)
ANION GAP SERPL CALCULATED.3IONS-SCNC: 15 MMOL/L (ref 7–16)
ANION GAP SERPL CALCULATED.3IONS-SCNC: 16 MMOL/L (ref 7–16)
AST SERPL-CCNC: 18 U/L (ref 0–39)
AST SERPL-CCNC: 25 U/L (ref 0–39)
BASOPHILS ABSOLUTE: 0.02 E9/L (ref 0–0.2)
BASOPHILS RELATIVE PERCENT: 0.3 % (ref 0–2)
BILIRUB SERPL-MCNC: 0.4 MG/DL (ref 0–1.2)
BILIRUB SERPL-MCNC: 0.6 MG/DL (ref 0–1.2)
BORDETELLA PARAPERTUSSIS BY PCR: NOT DETECTED
BORDETELLA PERTUSSIS BY PCR: NOT DETECTED
BUN BLDV-MCNC: 15 MG/DL (ref 6–20)
BUN BLDV-MCNC: 16 MG/DL (ref 6–20)
C-REACTIVE PROTEIN: 0.3 MG/DL (ref 0–0.4)
CALCIUM SERPL-MCNC: 9.3 MG/DL (ref 8.6–10.2)
CALCIUM SERPL-MCNC: 9.7 MG/DL (ref 8.6–10.2)
CHLAMYDOPHILIA PNEUMONIAE BY PCR: NOT DETECTED
CHLORIDE BLD-SCNC: 103 MMOL/L (ref 98–107)
CHLORIDE BLD-SCNC: 105 MMOL/L (ref 98–107)
CO2: 19 MMOL/L (ref 22–29)
CO2: 20 MMOL/L (ref 22–29)
CORONAVIRUS 229E BY PCR: NOT DETECTED
CORONAVIRUS HKU1 BY PCR: NOT DETECTED
CORONAVIRUS NL63 BY PCR: NOT DETECTED
CORONAVIRUS OC43 BY PCR: NOT DETECTED
CREAT SERPL-MCNC: 1.2 MG/DL (ref 0.7–1.2)
CREAT SERPL-MCNC: 1.2 MG/DL (ref 0.7–1.2)
EOSINOPHILS ABSOLUTE: 0.24 E9/L (ref 0.05–0.5)
EOSINOPHILS RELATIVE PERCENT: 3.8 % (ref 0–6)
GFR AFRICAN AMERICAN: >60
GFR AFRICAN AMERICAN: >60
GFR NON-AFRICAN AMERICAN: >60 ML/MIN/1.73
GFR NON-AFRICAN AMERICAN: >60 ML/MIN/1.73
GLUCOSE BLD-MCNC: 139 MG/DL (ref 74–99)
GLUCOSE BLD-MCNC: 90 MG/DL (ref 74–99)
HCT VFR BLD CALC: 37.7 % (ref 37–54)
HEMOGLOBIN: 12.1 G/DL (ref 12.5–16.5)
HUMAN METAPNEUMOVIRUS BY PCR: NOT DETECTED
HUMAN RHINOVIRUS/ENTEROVIRUS BY PCR: NOT DETECTED
IMMATURE GRANULOCYTES #: 0.03 E9/L
IMMATURE GRANULOCYTES %: 0.5 % (ref 0–5)
INFLUENZA A BY PCR: NOT DETECTED
INFLUENZA B BY PCR: NOT DETECTED
LYMPHOCYTES ABSOLUTE: 1.75 E9/L (ref 1.5–4)
LYMPHOCYTES RELATIVE PERCENT: 27.8 % (ref 20–42)
MAGNESIUM: 1.6 MG/DL (ref 1.6–2.6)
MCH RBC QN AUTO: 28.3 PG (ref 26–35)
MCHC RBC AUTO-ENTMCNC: 32.1 % (ref 32–34.5)
MCV RBC AUTO: 88.1 FL (ref 80–99.9)
METER GLUCOSE: 102 MG/DL (ref 74–99)
METER GLUCOSE: 134 MG/DL (ref 74–99)
METER GLUCOSE: 70 MG/DL (ref 74–99)
MONOCYTES ABSOLUTE: 0.51 E9/L (ref 0.1–0.95)
MONOCYTES RELATIVE PERCENT: 8.1 % (ref 2–12)
MYCOPLASMA PNEUMONIAE BY PCR: NOT DETECTED
NEUTROPHILS ABSOLUTE: 3.75 E9/L (ref 1.8–7.3)
NEUTROPHILS RELATIVE PERCENT: 59.5 % (ref 43–80)
PARAINFLUENZA VIRUS 1 BY PCR: NOT DETECTED
PARAINFLUENZA VIRUS 2 BY PCR: NOT DETECTED
PARAINFLUENZA VIRUS 3 BY PCR: NOT DETECTED
PARAINFLUENZA VIRUS 4 BY PCR: NOT DETECTED
PDW BLD-RTO: 15.8 FL (ref 11.5–15)
PLATELET # BLD: 257 E9/L (ref 130–450)
PMV BLD AUTO: 10.1 FL (ref 7–12)
POTASSIUM SERPL-SCNC: 3.5 MMOL/L (ref 3.5–5)
POTASSIUM SERPL-SCNC: 4 MMOL/L (ref 3.5–5)
PRO-BNP: 3245 PG/ML (ref 0–125)
PROCALCITONIN: 0.05 NG/ML (ref 0–0.08)
RBC # BLD: 4.28 E12/L (ref 3.8–5.8)
RESPIRATORY SYNCYTIAL VIRUS BY PCR: NOT DETECTED
SEDIMENTATION RATE, ERYTHROCYTE: 17 MM/HR (ref 0–15)
SODIUM BLD-SCNC: 137 MMOL/L (ref 132–146)
SODIUM BLD-SCNC: 141 MMOL/L (ref 132–146)
TOTAL PROTEIN: 7.4 G/DL (ref 6.4–8.3)
TOTAL PROTEIN: 7.6 G/DL (ref 6.4–8.3)
TROPONIN: <0.01 NG/ML (ref 0–0.03)
TROPONIN: <0.01 NG/ML (ref 0–0.03)
WBC # BLD: 6.3 E9/L (ref 4.5–11.5)

## 2020-03-14 PROCEDURE — 80053 COMPREHEN METABOLIC PANEL: CPT

## 2020-03-14 PROCEDURE — 6370000000 HC RX 637 (ALT 250 FOR IP): Performed by: INTERNAL MEDICINE

## 2020-03-14 PROCEDURE — 6370000000 HC RX 637 (ALT 250 FOR IP): Performed by: STUDENT IN AN ORGANIZED HEALTH CARE EDUCATION/TRAINING PROGRAM

## 2020-03-14 PROCEDURE — 2500000003 HC RX 250 WO HCPCS: Performed by: NURSE PRACTITIONER

## 2020-03-14 PROCEDURE — 71046 X-RAY EXAM CHEST 2 VIEWS: CPT

## 2020-03-14 PROCEDURE — 84484 ASSAY OF TROPONIN QUANT: CPT

## 2020-03-14 PROCEDURE — 99285 EMERGENCY DEPT VISIT HI MDM: CPT

## 2020-03-14 PROCEDURE — 0100U HC RESPIRPTHGN MULT REV TRANS & AMP PRB TECH 21 TRGT: CPT

## 2020-03-14 PROCEDURE — 85025 COMPLETE CBC W/AUTO DIFF WBC: CPT

## 2020-03-14 PROCEDURE — 36415 COLL VENOUS BLD VENIPUNCTURE: CPT

## 2020-03-14 PROCEDURE — 99222 1ST HOSP IP/OBS MODERATE 55: CPT | Performed by: FAMILY MEDICINE

## 2020-03-14 PROCEDURE — 2140000000 HC CCU INTERMEDIATE R&B

## 2020-03-14 PROCEDURE — 87040 BLOOD CULTURE FOR BACTERIA: CPT

## 2020-03-14 PROCEDURE — 6360000002 HC RX W HCPCS: Performed by: NURSE PRACTITIONER

## 2020-03-14 PROCEDURE — 85651 RBC SED RATE NONAUTOMATED: CPT

## 2020-03-14 PROCEDURE — 6360000002 HC RX W HCPCS: Performed by: STUDENT IN AN ORGANIZED HEALTH CARE EDUCATION/TRAINING PROGRAM

## 2020-03-14 PROCEDURE — 96375 TX/PRO/DX INJ NEW DRUG ADDON: CPT

## 2020-03-14 PROCEDURE — 2580000003 HC RX 258: Performed by: NURSE PRACTITIONER

## 2020-03-14 PROCEDURE — 82962 GLUCOSE BLOOD TEST: CPT

## 2020-03-14 PROCEDURE — 99223 1ST HOSP IP/OBS HIGH 75: CPT | Performed by: INTERNAL MEDICINE

## 2020-03-14 PROCEDURE — 83880 ASSAY OF NATRIURETIC PEPTIDE: CPT

## 2020-03-14 PROCEDURE — 84145 PROCALCITONIN (PCT): CPT

## 2020-03-14 PROCEDURE — 86140 C-REACTIVE PROTEIN: CPT

## 2020-03-14 PROCEDURE — 93005 ELECTROCARDIOGRAM TRACING: CPT | Performed by: NURSE PRACTITIONER

## 2020-03-14 PROCEDURE — 83735 ASSAY OF MAGNESIUM: CPT

## 2020-03-14 PROCEDURE — 6370000000 HC RX 637 (ALT 250 FOR IP): Performed by: NURSE PRACTITIONER

## 2020-03-14 PROCEDURE — 71045 X-RAY EXAM CHEST 1 VIEW: CPT

## 2020-03-14 PROCEDURE — 96374 THER/PROPH/DIAG INJ IV PUSH: CPT

## 2020-03-14 PROCEDURE — 2500000003 HC RX 250 WO HCPCS: Performed by: INTERNAL MEDICINE

## 2020-03-14 RX ORDER — SPIRONOLACTONE 25 MG/1
25 TABLET ORAL DAILY
Status: DISCONTINUED | OUTPATIENT
Start: 2020-03-15 | End: 2020-03-17 | Stop reason: HOSPADM

## 2020-03-14 RX ORDER — ASPIRIN 81 MG/1
162 TABLET, CHEWABLE ORAL ONCE
Status: COMPLETED | OUTPATIENT
Start: 2020-03-14 | End: 2020-03-14

## 2020-03-14 RX ORDER — BUMETANIDE 0.25 MG/ML
2 INJECTION, SOLUTION INTRAMUSCULAR; INTRAVENOUS ONCE
Status: DISCONTINUED | OUTPATIENT
Start: 2020-03-14 | End: 2020-03-14 | Stop reason: SDUPTHER

## 2020-03-14 RX ORDER — POTASSIUM CHLORIDE 7.45 MG/ML
10 INJECTION INTRAVENOUS PRN
Status: DISCONTINUED | OUTPATIENT
Start: 2020-03-14 | End: 2020-03-17 | Stop reason: HOSPADM

## 2020-03-14 RX ORDER — POTASSIUM CHLORIDE 20 MEQ/1
40 TABLET, EXTENDED RELEASE ORAL PRN
Status: DISCONTINUED | OUTPATIENT
Start: 2020-03-14 | End: 2020-03-17 | Stop reason: HOSPADM

## 2020-03-14 RX ORDER — DEXTROSE MONOHYDRATE 25 G/50ML
12.5 INJECTION, SOLUTION INTRAVENOUS PRN
Status: DISCONTINUED | OUTPATIENT
Start: 2020-03-14 | End: 2020-03-17 | Stop reason: HOSPADM

## 2020-03-14 RX ORDER — MAGNESIUM SULFATE IN WATER 40 MG/ML
2 INJECTION, SOLUTION INTRAVENOUS ONCE
Status: COMPLETED | OUTPATIENT
Start: 2020-03-14 | End: 2020-03-14

## 2020-03-14 RX ORDER — POLYETHYLENE GLYCOL 3350 17 G/17G
17 POWDER, FOR SOLUTION ORAL DAILY PRN
Status: DISCONTINUED | OUTPATIENT
Start: 2020-03-14 | End: 2020-03-17 | Stop reason: HOSPADM

## 2020-03-14 RX ORDER — SODIUM CHLORIDE 0.9 % (FLUSH) 0.9 %
10 SYRINGE (ML) INJECTION PRN
Status: DISCONTINUED | OUTPATIENT
Start: 2020-03-14 | End: 2020-03-17 | Stop reason: HOSPADM

## 2020-03-14 RX ORDER — POTASSIUM CHLORIDE 20 MEQ/1
40 TABLET, EXTENDED RELEASE ORAL EVERY 4 HOURS
Status: DISCONTINUED | OUTPATIENT
Start: 2020-03-14 | End: 2020-03-14

## 2020-03-14 RX ORDER — ACETAMINOPHEN 325 MG/1
650 TABLET ORAL EVERY 4 HOURS PRN
Status: DISCONTINUED | OUTPATIENT
Start: 2020-03-14 | End: 2020-03-14 | Stop reason: SDUPTHER

## 2020-03-14 RX ORDER — SODIUM CHLORIDE 0.9 % (FLUSH) 0.9 %
10 SYRINGE (ML) INJECTION PRN
Status: DISCONTINUED | OUTPATIENT
Start: 2020-03-14 | End: 2020-03-14 | Stop reason: SDUPTHER

## 2020-03-14 RX ORDER — ATORVASTATIN CALCIUM 40 MG/1
40 TABLET, FILM COATED ORAL DAILY
Status: DISCONTINUED | OUTPATIENT
Start: 2020-03-14 | End: 2020-03-17 | Stop reason: HOSPADM

## 2020-03-14 RX ORDER — ACETAMINOPHEN 650 MG/1
650 SUPPOSITORY RECTAL EVERY 6 HOURS PRN
Status: DISCONTINUED | OUTPATIENT
Start: 2020-03-14 | End: 2020-03-17 | Stop reason: HOSPADM

## 2020-03-14 RX ORDER — FENTANYL CITRATE 50 UG/ML
25 INJECTION, SOLUTION INTRAMUSCULAR; INTRAVENOUS ONCE
Status: COMPLETED | OUTPATIENT
Start: 2020-03-14 | End: 2020-03-14

## 2020-03-14 RX ORDER — SODIUM CHLORIDE 0.9 % (FLUSH) 0.9 %
10 SYRINGE (ML) INJECTION EVERY 12 HOURS SCHEDULED
Status: DISCONTINUED | OUTPATIENT
Start: 2020-03-14 | End: 2020-03-17 | Stop reason: HOSPADM

## 2020-03-14 RX ORDER — SPIRONOLACTONE 25 MG/1
12.5 TABLET ORAL DAILY
Status: DISCONTINUED | OUTPATIENT
Start: 2020-03-14 | End: 2020-03-14

## 2020-03-14 RX ORDER — CARVEDILOL 25 MG/1
25 TABLET ORAL 2 TIMES DAILY WITH MEALS
Status: DISCONTINUED | OUTPATIENT
Start: 2020-03-14 | End: 2020-03-17 | Stop reason: HOSPADM

## 2020-03-14 RX ORDER — ACETAMINOPHEN 325 MG/1
650 TABLET ORAL EVERY 6 HOURS PRN
Status: DISCONTINUED | OUTPATIENT
Start: 2020-03-14 | End: 2020-03-17 | Stop reason: HOSPADM

## 2020-03-14 RX ORDER — ONDANSETRON 2 MG/ML
4 INJECTION INTRAMUSCULAR; INTRAVENOUS EVERY 6 HOURS PRN
Status: DISCONTINUED | OUTPATIENT
Start: 2020-03-14 | End: 2020-03-14 | Stop reason: ALTCHOICE

## 2020-03-14 RX ORDER — SODIUM CHLORIDE 0.9 % (FLUSH) 0.9 %
10 SYRINGE (ML) INJECTION EVERY 12 HOURS SCHEDULED
Status: DISCONTINUED | OUTPATIENT
Start: 2020-03-14 | End: 2020-03-14 | Stop reason: SDUPTHER

## 2020-03-14 RX ORDER — POTASSIUM CHLORIDE 20 MEQ/1
40 TABLET, EXTENDED RELEASE ORAL ONCE
Status: COMPLETED | OUTPATIENT
Start: 2020-03-14 | End: 2020-03-14

## 2020-03-14 RX ORDER — TORSEMIDE 20 MG/1
20 TABLET ORAL DAILY
Status: DISCONTINUED | OUTPATIENT
Start: 2020-03-14 | End: 2020-03-14

## 2020-03-14 RX ORDER — DEXTROSE MONOHYDRATE 50 MG/ML
100 INJECTION, SOLUTION INTRAVENOUS PRN
Status: DISCONTINUED | OUTPATIENT
Start: 2020-03-14 | End: 2020-03-17 | Stop reason: HOSPADM

## 2020-03-14 RX ORDER — INSULIN GLARGINE 100 [IU]/ML
45 INJECTION, SOLUTION SUBCUTANEOUS 2 TIMES DAILY
Status: DISCONTINUED | OUTPATIENT
Start: 2020-03-14 | End: 2020-03-15

## 2020-03-14 RX ORDER — PROMETHAZINE HYDROCHLORIDE 25 MG/1
12.5 TABLET ORAL EVERY 6 HOURS PRN
Status: DISCONTINUED | OUTPATIENT
Start: 2020-03-14 | End: 2020-03-15

## 2020-03-14 RX ORDER — NICOTINE POLACRILEX 4 MG
15 LOZENGE BUCCAL PRN
Status: DISCONTINUED | OUTPATIENT
Start: 2020-03-14 | End: 2020-03-17 | Stop reason: HOSPADM

## 2020-03-14 RX ORDER — BUMETANIDE 0.25 MG/ML
1 INJECTION, SOLUTION INTRAMUSCULAR; INTRAVENOUS ONCE
Status: COMPLETED | OUTPATIENT
Start: 2020-03-14 | End: 2020-03-14

## 2020-03-14 RX ORDER — ALLOPURINOL 300 MG/1
300 TABLET ORAL DAILY
Status: DISCONTINUED | OUTPATIENT
Start: 2020-03-14 | End: 2020-03-17 | Stop reason: HOSPADM

## 2020-03-14 RX ORDER — BUMETANIDE 0.25 MG/ML
2 INJECTION, SOLUTION INTRAMUSCULAR; INTRAVENOUS 3 TIMES DAILY
Status: DISCONTINUED | OUTPATIENT
Start: 2020-03-14 | End: 2020-03-17

## 2020-03-14 RX ORDER — DOFETILIDE 0.25 MG/1
250 CAPSULE ORAL EVERY 12 HOURS SCHEDULED
Status: DISCONTINUED | OUTPATIENT
Start: 2020-03-14 | End: 2020-03-17 | Stop reason: HOSPADM

## 2020-03-14 RX ADMIN — SPIRONOLACTONE 25 MG: 25 TABLET ORAL at 09:12

## 2020-03-14 RX ADMIN — INSULIN GLARGINE 45 UNITS: 100 INJECTION, SOLUTION SUBCUTANEOUS at 09:20

## 2020-03-14 RX ADMIN — ASPIRIN 81 MG 162 MG: 81 TABLET ORAL at 04:18

## 2020-03-14 RX ADMIN — ALLOPURINOL 300 MG: 300 TABLET ORAL at 09:14

## 2020-03-14 RX ADMIN — Medication 10 ML: at 09:11

## 2020-03-14 RX ADMIN — ATORVASTATIN CALCIUM 40 MG: 40 TABLET, FILM COATED ORAL at 09:13

## 2020-03-14 RX ADMIN — DOXYCYCLINE 100 MG: 100 INJECTION, POWDER, LYOPHILIZED, FOR SOLUTION INTRAVENOUS at 04:19

## 2020-03-14 RX ADMIN — FENTANYL CITRATE 25 MCG: 50 INJECTION, SOLUTION INTRAMUSCULAR; INTRAVENOUS at 04:18

## 2020-03-14 RX ADMIN — Medication 10 ML: at 21:04

## 2020-03-14 RX ADMIN — MAGNESIUM SULFATE HEPTAHYDRATE 2 G: 40 INJECTION, SOLUTION INTRAVENOUS at 09:21

## 2020-03-14 RX ADMIN — CARVEDILOL 25 MG: 25 TABLET, FILM COATED ORAL at 09:13

## 2020-03-14 RX ADMIN — BUMETANIDE 2 MG: 0.25 INJECTION INTRAMUSCULAR; INTRAVENOUS at 14:48

## 2020-03-14 RX ADMIN — DOFETILIDE 250 MCG: 0.25 CAPSULE ORAL at 18:00

## 2020-03-14 RX ADMIN — BUMETANIDE 2 MG: 0.25 INJECTION INTRAMUSCULAR; INTRAVENOUS at 21:04

## 2020-03-14 RX ADMIN — INSULIN LISPRO 20 UNITS: 100 INJECTION, SOLUTION INTRAVENOUS; SUBCUTANEOUS at 09:14

## 2020-03-14 RX ADMIN — RIVAROXABAN 20 MG: 20 TABLET, FILM COATED ORAL at 09:11

## 2020-03-14 RX ADMIN — POTASSIUM CHLORIDE 40 MEQ: 20 TABLET, EXTENDED RELEASE ORAL at 09:12

## 2020-03-14 RX ADMIN — CARVEDILOL 25 MG: 25 TABLET, FILM COATED ORAL at 18:00

## 2020-03-14 RX ADMIN — MAGNESIUM GLUCONATE 500 MG ORAL TABLET 400 MG: 500 TABLET ORAL at 18:00

## 2020-03-14 RX ADMIN — SACUBITRIL AND VALSARTAN 1 TABLET: 97; 103 TABLET, FILM COATED ORAL at 21:04

## 2020-03-14 RX ADMIN — BUMETANIDE 2 MG: 0.25 INJECTION INTRAMUSCULAR; INTRAVENOUS at 09:14

## 2020-03-14 RX ADMIN — SACUBITRIL AND VALSARTAN 1 TABLET: 97; 103 TABLET, FILM COATED ORAL at 09:12

## 2020-03-14 RX ADMIN — BUMETANIDE 1 MG: 0.25 INJECTION INTRAMUSCULAR; INTRAVENOUS at 04:18

## 2020-03-14 RX ADMIN — CEFTRIAXONE SODIUM 1 G: 1 INJECTION, POWDER, FOR SOLUTION INTRAMUSCULAR; INTRAVENOUS at 04:17

## 2020-03-14 RX ADMIN — INSULIN LISPRO 20 UNITS: 100 INJECTION, SOLUTION INTRAVENOUS; SUBCUTANEOUS at 12:36

## 2020-03-14 ASSESSMENT — PAIN DESCRIPTION - LOCATION: LOCATION: CHEST

## 2020-03-14 ASSESSMENT — PAIN DESCRIPTION - FREQUENCY: FREQUENCY: CONTINUOUS

## 2020-03-14 ASSESSMENT — PAIN SCALES - GENERAL
PAINLEVEL_OUTOF10: 2
PAINLEVEL_OUTOF10: 0
PAINLEVEL_OUTOF10: 0
PAINLEVEL_OUTOF10: 4
PAINLEVEL_OUTOF10: 0
PAINLEVEL_OUTOF10: 0

## 2020-03-14 ASSESSMENT — PAIN DESCRIPTION - PROGRESSION: CLINICAL_PROGRESSION: NOT CHANGED

## 2020-03-14 ASSESSMENT — PAIN DESCRIPTION - ONSET: ONSET: SUDDEN

## 2020-03-14 ASSESSMENT — PAIN DESCRIPTION - ORIENTATION: ORIENTATION: MID

## 2020-03-14 ASSESSMENT — PAIN DESCRIPTION - DESCRIPTORS: DESCRIPTORS: HEAVINESS

## 2020-03-14 ASSESSMENT — HEART SCORE: ECG: 1

## 2020-03-14 ASSESSMENT — PAIN DESCRIPTION - PAIN TYPE: TYPE: ACUTE PAIN

## 2020-03-14 NOTE — ED PROVIDER NOTES
ED physician   HPI:  3/14/20, Time: 2:26 AM EDT         Cherry Painter is a 44 y.o. male presenting to the ED for palpitations. Patient reports that he woke up tonight out of his sleep and was having palpitations. Patient reports that he is fearful he may be back in A. fib. Patient reports that while he was having palpitations he had some midsternal chest pain. Patient reports that the chest pain is still constant. Does not radiate anywhere else. Patient denies shortness of breath. Patient does report having a cough but describes it as dry and typical for his CHF. He does report being compliant with medications. Patient does have a history of paroxysmal atrial fibrillation congestive heart failure with an EF of 20%, nonischemic cardiomyopathy coronary artery disease, chronic kidney disease as well as pacemaker AICD. Patient denies ever firing. Patient denies any unusual weakness or fatigue as well as no noted abdominal pain, fevers, vomiting, diarrhea. Patient did have a cardioversion February 14. Patient's cardiologist is Nik Appiah and electrophysiologist is Dr. Nora Lang. Patient denies any lower extremity swelling.   Review of Systems:   Pertinent positives and negatives are stated within HPI, all other systems reviewed and are negative.          --------------------------------------------- PAST HISTORY ---------------------------------------------  Past Medical History:  has a past medical history of Acute CHF ([facility] [address] Acute CHF ([facility] [address] Acute idiopathic gout of right foot, AF (atrial fibrillation) ([facility] [address] Anemia, CAD (coronary artery disease), Cerebral artery occlusion with cerebral infarction ([facility] [address] CKD (chronic kidney disease), Gout, HTN (hypertension), Hyperlipidemia, Hypertension, Morbid obesity due to excess calories ([address] Nonischemic cardiomyopathy ([facility] [address] Nonischemic cardiomyopathy ([facility] [address] ARVIND (obstructive sleep apnea), S/P left heart catheterization by percutaneous approach, Systolic heart failure ([facility] [address] Type 2 diabetes mellitus with complication, with long-term current use of insulin ([facility] [address] and URI, acute. Past Surgical History:  has a past surgical history that includes ECHO Compl W Dop Color Flow (11/30/2011); ECHO Compl W Dop Color Flow (3/27/2012); Cardiac defibrillator placement (11/20/2018); Insertable Cardiac Monitor (12/13/2018); Cardiac catheterization (08/01/2019); and Cardioversion (02/14/2020). Social History:  reports that he has never smoked. He has never used smokeless tobacco. He reports previous alcohol use. He reports that he does not use drugs. Family History: family history includes Cancer in his mother; No Known Problems in his father. The patients home medications have been reviewed. Allergies: Patient has no known allergies.     -------------------------------------------------- RESULTS -------------------------------------------------  All laboratory and radiology results have been personally reviewed by myself   LABS:  Results for orders placed or performed during the hospital encounter of 03/14/20   Troponin   Result Value Ref Range    Troponin <0.01 0.00 - 0.03 ng/mL   CBC Auto Differential   Result Value Ref Range    WBC 6.3 4.5 - 11.5 E9/L    RBC 4.28 3.80 - 5.80 E12/L    Hemoglobin 12.1 (L) 12.5 - 16.5 g/dL    Hematocrit 37.7 37.0 - 54.0 %    MCV 88.1 80.0 - 99.9 fL    MCH 28.3 26.0 - 35.0 pg    MCHC 32.1 32.0 - 34.5 %    RDW 15.8 (H) 11.5 - 15.0 fL    Platelets 549 602 - 420 E9/L    MPV 10.1 7.0 - 12.0 fL    Neutrophils % 59.5 43.0 - 80.0 %    Immature Granulocytes % 0.5 0.0 - 5.0 %    Lymphocytes % 27.8 20.0 - 42.0 %    Monocytes % 8.1 2.0 - 12.0 %    Eosinophils % 3.8 0.0 - 6.0 %    Basophils % 0.3 0.0 - 2.0 %    Neutrophils Absolute 3.75 1.80 - 7.30 E9/L    Immature Granulocytes # 0.03 E9/L    Lymphocytes Absolute 1.75 1.50 - 4.00 E9/L    Monocytes Absolute 0.51 0.10 - 0.95 E9/L    Eosinophils Absolute 0.24 0.05 - 0.50 E9/L    Basophils Absolute 0.02 0.00 - 0.20 E9/L   Comprehensive Metabolic Panel   Result Value Ref Range    Sodium 137 132 - 146 mmol/L    Potassium 3.5 3.5 - 5.0 mmol/L    Chloride 103 98 - 107 mmol/L    CO2 19 (L) 22 - 29 mmol/L    Anion Gap 15 7 - 16 mmol/L    Glucose 139 (H) 74 - 99 mg/dL    BUN 15 6 - 20 mg/dL    CREATININE 1.2 0.7 - 1.2 mg/dL    GFR Non-African American >60 >=60 mL/min/1.73    GFR African American >60     Calcium 9.3 8.6 - 10.2 mg/dL    Total Protein 7.4 6.4 - 8.3 g/dL    Alb 4.2 3.5 - 5.2 g/dL    Total Bilirubin 0.6 0.0 - 1.2 mg/dL    Alkaline Phosphatase 137 (H) 40 - 129 U/L    ALT 23 0 - 40 U/L    AST 25 0 - 39 U/L   Magnesium   Result Value Ref Range    Magnesium 1.6 1.6 - 2.6 mg/dL   Brain Natriuretic Peptide   Result Value Ref Range    Pro-BNP 3,245 (H) 0 - 125 pg/mL   EKG 12 Lead   Result Value Ref Range    Ventricular Rate 104 BPM    Atrial Rate 104 BPM    QRS Duration 206 ms    Q-T Interval 466 ms    QTc Calculation (Bazett) 612 ms    R Axis 150 degrees    T Axis -9 degrees       RADIOLOGY:  Interpreted by Radiologist.  XR CHEST PORTABLE   Final Result   Tortuous ectatic aorta   Cardiomegaly   Airspace disease compatible with pneumonia, worse at the right lung   base as compared to the left   The chest appears to be worse in the interval                      ------------------------- NURSING NOTES AND VITALS REVIEWED ---------------------------   The nursing notes within the ED encounter and vital signs as below have been reviewed.    BP (!) 122/98   Pulse 104   Temp 96.2 °F (35.7 °C) (Temporal)   Resp 16   Ht 6' (1.829 m)   Wt 300 lb (136.1 kg)   SpO2 95%   BMI 40.69 kg/m²   Oxygen Saturation Interpretation: Normal      ---------------------------------------------------PHYSICAL EXAM--------------------------------------      Constitutional/General: Alert and oriented x3, well appearing, non toxic in NAD  Head: Normocephalic and atraumatic  Eyes: PERRL, EOMI  Mouth: Oropharynx clear, handling secretions, no Patient expressed understanding for admission. Patient resting comfortably at this time with head of bed elevated, /90, heart rate 104, temp 96.2, respiratory rate 16, pulse ox 95% on room air. I did speak with Dr. Charline Muller she was agreeable to admit patient to telemetry inpatient. Counseling: The emergency provider has spoken with the patient and discussed todays results, in addition to providing specific details for the plan of care and counseling regarding the diagnosis and prognosis. Questions are answered at this time and they are agreeable with the plan.      --------------------------------- IMPRESSION AND DISPOSITION ---------------------------------    IMPRESSION  1. Acute on chronic systolic heart failure ([facility] [address]    2. Community acquired pneumonia of right lower lobe of lung ([facility] [address]    3. Chest pain, unspecified type    4. Palpitations        DISPOSITION  Disposition: admission to Tele  Patient condition is good      NOTE: This report was transcribed using voice recognition software.  Every effort was made to ensure accuracy; however, inadvertent computerized transcription errors may be present     NATI Lazaro CNP  03/14/20 [id number]

## 2020-03-14 NOTE — CONSULTS
Advanced Heart Failure and Pulmonary Hypertension Consult        Reason for Consultation: acute heart failure   Primary Cardiologist: Dr. Nain Alejandro  EP Cardiologist: Dr. Holly Kitchen        History of Present Illness:     Mr. Khadra Marquez is a 44year old  male with nonischemic cardiomyopathy, chronic HFrEF with recurrent hospitalizations, CRT-D, PAF with recent DCCV, T2DM, iron deficiency anemia of chronic disease, obesity, ARVIND/CPAP non compliance, and poor medical literacy who presented to the emergency room early this morning with palpitations, chest pressure, dyspnea, orthopnea, abdominal distention. We last saw him in August. At that time there was significant confusion about his medications and pill packs had not yet arrived at his home. We were unsure of what he was exactly taking and that exact doses, as he had been hospitalized in the several weeks prior with what was initially felt to be volume depletion and therefore medications were readjusted. He does have a CardioMEMS in place with a 6 % compliance rate. We last called him early February reminding him to lay on his pillow more frequently. He did lay on it 2 days ago with a PAD of 35 however did not intervene since this was an isolated read without any recent trend. We have titrated meds in the past based on PADs, and he is currently on maximum dose Entresto, MRA, torsemide, carvedilol. He is seen regularly in the HF infusion clinic, and the last two visits, in February, demonstrated stable weights though proBNP at the 2/5 visit was elevated; we had called him after this time to remind him to use pillows and to take extra torsemide. In the interim also underwent DCCV by Dr Holly Kitchen restoring NSR however is now back in AF, V pacing, and awaiting a device interrogation. History is negative for neurological symptoms including transient loss of vision, asymmetric weakness, aphasia, dysphasia, numbness, tingling.        Past medical, surgical, family and social histories have been reviewed. Any changes in the past medical history, social history or family history have been made and are reflected in the electronic medical record. Patient Active Problem List    Diagnosis Date Noted    Acute on chronic systolic heart failure ([facility] [address] 03/14/2020     Priority: High    Nonischemic cardiomyopathy [facility])      Priority: High     Raven-Barr virus at age 32    Cardiac catheterization February and December of 2011:  Patent coronary      arteries. EF 10% to 15%. Patient reportedly did receive an ICD vest at      that time, however, did not return for a regular followup. 2.    Echo March 2012:  EF 30% to 42%, stage 2 diastolic dysfunction, septal      hypokinesia, mild LVH, left atrium mildly-moderately dilated, mild MR.      RVSP of 25-30. Trace PI.   3.    Cardiac MRI July 2013:  EF 20%   Echo 01/24/2016 [facility]):  LV severely dilated. LA      moderately dilated. Mild PHTN. Mild TR. Trace TR. Pacer wire visible      in the right atrium and ventricle. RA mildly dilated. EF 10% to 15%. Class 3 diastolic dysfunction. RV moderately dilated. RV systolic      function moderate-severely reduced.   Mild-moderate MR.   15.   Echo March 2012 (Dr. Cuca Mccormick):  LVEF 30% to 35% and enlarged LV      S/P left pulmonary artery pressure sensor implant placement      Priority: Medium    S/P ICD (internal cardiac defibrillator) procedure 11/20/2018     Priority: Medium     Status post single chamber ICD implant, Medtronic Evera [id number],      September 30, 2013, for nonischemic cardiomyopathy (single chamber)      Carotid disease, bilateral ([facility] [address] 03/14/2020     Priority: Low       Bilateral carotid duplex January 2016, reportedly showed no      hemodynamically significant stenosis with 0% to 49% narrowing of both      carotid arteries  Reported history of ischemic left middle cerebral artery stroke      (further details MONITOR  2018    cardiomems, [facility], Dr. Salvador Polanco History     Socioeconomic History    Marital status: Single     Spouse name: None    Number of children: None    Years of education: None    Highest education level: None   Occupational History    None   Social Needs    Financial resource strain: None    Food insecurity     Worry: None     Inability: None    Transportation needs     Medical: None     Non-medical: None   Tobacco Use    Smoking status: Never Smoker    Smokeless tobacco: Never Used   Substance and Sexual Activity    Alcohol use: Not Currently     Comment: rare mixed drink    Drug use: Never    Sexual activity: Yes     Partners: Female   Lifestyle    Physical activity     Days per week: None     Minutes per session: None    Stress: None   Relationships    Social connections     Talks on phone: None     Gets together: None     Attends Spiritism service: None     Active member of club or organization: None     Attends meetings of clubs or organizations: None     Relationship status: None    Intimate partner violence     Fear of current or ex partner: None     Emotionally abused: None     Physically abused: None     Forced sexual activity: None   Other Topics Concern    None   Social History Narrative    Mother  at the age of 48 from complications of stomach cancer. His father is alive and well in his 62s. His siblings are alive and well. He has no children. Denies caffeine.         Family History   Problem Relation Age of Onset   Sherren Kehr Cancer Mother     No Known Problems Father            No Known Allergies        Outpatient Medications Marked as Taking for the 3/14/20 encounter [facility] Encounter)   Medication Sig Dispense Refill    spironolactone (ALDACTONE) 25 MG tablet TAKE 1/2 TABLET BY MOUTH DAILY WITH LUNCH 30 tablet 10    torsemide (DEMADEX) 20 MG tablet Take 1 tablet by mouth daily 90 tablet 0    sacubitril-valsartan perfused. LYMPH: no cervical nodes, no inguinal nodes  HEENT: Head is normocephalic, atraumatic. EOMI, PERRLA. NECK: Supple, symmetrical, trachea midline, no adenopathy, thyroid symmetric, not enlarged and no tenderness, skin normal. No JVD/HJR. CHEST/LUNGS: chest symmetric with normal A/P diameter, normal respiratory rate and rhythm, lungs clear to auscultation without wheezes, rales or rhonchi. CARDIOVASCULAR: Heart sounds are normal. Regular rate and rhythm without murmur, gallop or rub. Normal S1 and S2.   ABDOMEN: Round/obese. No and laparoscopic scar(s) present. Normal bowel sounds. No bruits. soft, nondistended, no masses or organomegaly. No evidence of hernia. NEUROLOGIC: There are no focalizing motor or sensory deficits. CN II-XII are grossly intact. EXTREMITIES: no cyanosis, no clubbing and no edema. All the following diagnostics were personally reviewed and interpreted by me. LAB DATA:       2/5/2020 10:15 2/14/2020 14:05 2/24/2020 12:00 3/14/2020 03:09 3/14/2020 03:38   Sodium 140 139 138 137    Potassium 3.6 3.9 3.4 (L) 3.5    Chloride 105 102 101 103    CO2 22 23 21 (L) 19 (L)    BUN 31 (H) 24 (H) 13 15    Creatinine 1.3 (H) 1.2 1.0 1.2    Anion Gap 13 14 16 15    GFR Non- >60 >60 >60 >60    GFR  >60 >60 >60 >60    Magnesium    1.6    Glucose 183 (H) 115 (H) 175 (H) 139 (H)    Calcium 9.5 9.9 9.5 9.3    Total Protein   7.6 7.4    Procalcitonin     0.05   Pro-BNP 2,323 (H)   3,245 (H)    Cholesterol, Total   128     HDL Cholesterol   42     LDL Calculated   71     Triglycerides   73     VLDL Cholesterol Calculated   15     Troponin    <0.01    Albumin   4.1 4.2    Alk Phos   139 (H) 137 (H)    ALT   20 23    AST   21 25    Bilirubin   0.9 0.6    Hemoglobin A1C   7.6 (H)     TSH   1.270         DIAGNOSTICS:    CXR (10/18/2018)  Impression  Cardiomegaly  Tortuous aorta  There are patchy infiltrates seen throughout both the lung fields.   Pulmonary structure.  Jackson Leonor is mild tricuspid regurgitation, RVSP 35mmHg.   Normal aortic root and ascending aorta.   No evidence of pericardial effusion. Pericardium appears normal.   The inferior vena cava diameter is normal with decreased respiratory variation.   No intracardiac mass. Echocardiogram (10/15/2018, Dr. Domenico Sommers)   Summary   Severely dilated left ventricle.   Abnormal (paradoxical) motion consistent with conduction abnormality.   Severely reduced left ventricular systolic function.   There is doppler evidence of stage III diastolic dysfunction.   The left atrium is severely dilated.   Right ventricle global systolic function is reduced.    Mild to moderate mitral regurgitation is present.   Mild tricuspid regurgitation.  Jackson Leonor is a trivial circumferential pericardial effusion noted. [facility] 7/2019 -  CONCLUSION:  1. Coronary artery disease:  a. Left main:  No significant disease. b.  LAD:  Up to 40% to 50% proximal/mid vessel narrowing and 30% to 40%  distal vessel disease. Around 50% hazy proximal narrowing of a long,  but small caliber diagonal branch.  c.  LCX:  Diffuse atherosclerosis, but with no more than 30%  angiographic luminal narrowing.  d.  RCA:  A large-dominant vessel with around 30% proximal and mid to  distal vessel narrowing. 2.  Mildly elevated left ventricular end-diastolic pressure, consistent  with LV diastolic dysfunction.      6/2019 TTE -      Summary   Severely dilated left ventricle. Ejection fraction is visually estimated at 20-25%. However there is a   definite improvement in overall LV systolic function since previous study   from 2018. Diffuse global hypokinesis   The left atrium is moderate-severely dilated. Structurally normal mitral valve. Mild mitral regurgitation is present. Increased E point septal separation noted,suggesting decreased LV cardiac   output. Aortic valve opens well. The aortic valve appears mildly sclerotic.    Normal tricuspid valve

## 2020-03-14 NOTE — H&P
[address] [facility]  Department of Family Medicine  Family Medicine Residency Program  History and Physical    Patient:  Axel Mondragon III 44 y.o. male MRN: [id number]     Date of Service: 3/14/2020      Chief complaint: Palpitation     History of Present Illness     The patient is a 44 y.o. male PMHX  HFrEF (Echo July 2019 EF 20%) s/o cardiomems, ICD and ventricular pacemaker, HTN/HPLD/CAD, CKD 3a, paroxysmal Afib on xarelto, DM2 (A1C 7.6%). Patient states he was his baseline health until this morning around 1:30 when he woke up from sleep with sensation of  racing of heart and chest pain. He thought he is in atrial fibrillation and called EMS and was brought to the ED. He did not experience any other symptoms and was feeling fine. Currently while having conversation with him he shares he feels no symptoms, denies racing of heart chest pain or shortness of breath. States has been taking all his medication. He states he did not experience fever, chills, cough shortness of breath sputums production, denies sick contact, has been eating normally. Denies orthopnea, leg swelling.      ED Course:     EKG V-paced  Mg 1.6  Pro-BNP 3245 baseline 700  Troponin negative   XR chest -  EKG - ventricular paced   Received - , Bumex 1 mg iv, Rocephin / doxycyline, fentanyl       Past Medical History:       Diagnosis Date    Acute CHF ([address] 11/29/2011    Acute CHF ([address] 12/26/2011    Acute idiopathic gout of right foot 8/13/2016    AF (atrial fibrillation) ([address] 02/2020    Anemia 11/29/2011    CAD (coronary artery disease)     Cerebral artery occlusion with cerebral infarction ([address]     CKD (chronic kidney disease)     Gout     HTN (hypertension) 11/29/2011    Hyperlipidemia     Hypertension     Morbid obesity due to excess calories ([address]     Nonischemic cardiomyopathy ([address] 11/29/2011    Nonischemic cardiomyopathy ([address] 7/2013 7/2013- cardiac MRI revealed an LVEF of 20%    ARVIND (obstructive PREFERENCE* 12/9/19   Gaviota Clemons MD   blood glucose monitor strips Test 2-3 times a day & as needed for symptoms of irregular blood glucose. 11/14/19   Jimbo Barreto MD   Lancets MISC Test 3-4 times daily 11/14/19   Jimbo Barreto MD   rivaroxaban (XARELTO) 20 MG TABS tablet TAKE 1 TABLET BY MOUTH DAILY 10/16/19   Jimbo Barreto MD   allopurinol (ZYLOPRIM) 300 MG tablet Take 1 tablet by mouth daily 10/16/19   Jimbo Barreto MD   magnesium oxide (MAG-OX) 400 MG tablet TAKE 1 TABLET BY MOUTH DAILY 8/30/19   Historical Provider, MD   sharps container For needle disposal. 2/11/19   Jimbo Barreto MD       Allergies:  Patient has no known allergies. Family History:       Problem Relation Age of Onset    Cancer Mother     No Known Problems Father        Social History:   TOBACCO:   reports that he has never smoked. He has never used smokeless tobacco.  ETOH:   reports previous alcohol use. OCCUPATION:      REVIEW OF SYSTEMS:  · Constitutional: No fever, no chill or change in weight; good appetite  · HEENT: No blurred vision, no ear problems, no sore throat, no running nose. · Respiratory: No cough, no sputum, no pleuritic chest pain, no shortness of breath  · Cardiology: PND and palpitation and chest pain, No angina, no dyspnea on exertion, no orthopnea, no leg swelling. · Gastroenterology: No dysphagia, no reflux; no abdominal pain, no nausea or vomiting; no constipation or diarrhea. No blood in stool. · Genitourinary: No dysuria, no frequency, hesitancy; no hematuria  · Musculoskeletal: no joint pain, no myalgia, no change in range of movement  · Neurology: no focal weakness in extremities, no slurred speech, no double vision, no tingling or numbness sensation.    · Endocrinology: no temperature intolerance, no polyphagia, polydipsia or polyuria  · Hematology: no increased bleeding, no bruising, no lymphadenopathy  · Skin: no skin change noticed by patient  · Psychology: no depressed mood, no AST 25 0 - 39 U/L   Magnesium   Result Value Ref Range    Magnesium 1.6 1.6 - 2.6 mg/dL   Brain Natriuretic Peptide   Result Value Ref Range    Pro-BNP 3,245 (H) 0 - 125 pg/mL   Procalcitonin   Result Value Ref Range    Procalcitonin 0.05 0.00 - 0.08 ng/mL   EKG 12 Lead   Result Value Ref Range    Ventricular Rate 104 BPM    Atrial Rate 104 BPM    QRS Duration 206 ms    Q-T Interval 466 ms    QTc Calculation (Bazett) 612 ms    R Axis 150 degrees    T Axis -9 degrees       Imaging Studies:  Radiology:   XR CHEST PORTABLE   Final Result   Tortuous ectatic aorta   Cardiomegaly   Airspace disease compatible with pneumonia, worse at the right lung   base as compared to the left   The chest appears to be worse in the interval                      Resident's Assessment and PLan       Palpitation   Patient with h/o Afib, vtach, syncope on defibrillator pacemaker in past  Currently rhythm regular clinically   Chest pain   Troponin negative, repeat x 2  Pro-BNP 3245 baseline 700  Continue xarelto    HFrEF, NYHA Class 3 HF  Possible decompensation  Elevated pro-BNP and XR showing increased vascularity / opacification R>L   Clinically does not appear pneumonia   Will check CRP and monitor   No signs of volume overload on exam  · Echo 06/2019 shows ejection fraction of 20%  · S/p cardiomems  · Home meds: entresto  BID, coreg 25 BID, Torsemide 20mg BID, aldactone 12.5 QD  · Resume entresto, coreg, torsemide, aldactone  · Monitor vitals, input and output, Diuresis     Electrolyte abnormalities  · K 3.5, Mg 1.6  · Will replace to keep above 4 and 2               DM2  · A1C 7.6 % February 2020  · Home meds: levemir 45 BID, humalog 22u TID  · Resume home meds   · Hypoglycemia protocol in place     HTN/HLD/CAD  · Home meds: lipitor and medications for HF  · Continue home meds     ARVIND  · Continue cpap at night     Gout  · Continue allopurinol     Diet: cardiac, carb control, salt restriction      Disposition: tele, intermediate     DVT / GI prophylaxis:    Marleni Aaron M.D., PGY-2  Attending physician: Dr. Marylene Esters

## 2020-03-14 NOTE — CONSULTS
BEFORE MEALS 1/2/20  Yes Gary Moore MD   rivaroxaban (XARELTO) 20 MG TABS tablet TAKE 1 TABLET BY MOUTH DAILY 10/16/19  Yes Gary Moore MD   allopurinol (ZYLOPRIM) 300 MG tablet Take 1 tablet by mouth daily 10/16/19  Yes Gary Moore MD   magnesium oxide (MAG-OX) 400 MG tablet TAKE 1 TABLET BY MOUTH DAILY 8/30/19  Yes Historical Provider, MD   Blood Glucose Monitoring Suppl (ONE TOUCH ULTRA 2) w/Device KIT USE AS DIRECTED DAILY *SUBSTITUTE AS NEEDED PER INSURANCE PREFERENCE* 12/9/19   Kerline Lott MD   blood glucose monitor strips Test 2-3 times a day & as needed for symptoms of irregular blood glucose. 11/14/19   Gary Moore MD   Lancets MISC Test 3-4 times daily 11/14/19   Gary Moore MD   sharps container For needle disposal. 2/11/19   Gary Moore MD       Allergies:  Patient has no known allergies. Social History:   reports that he has never smoked. He has never used smokeless tobacco. He reports previous alcohol use. He reports that he does not use drugs. Family History: family history includes Cancer in his mother; No Known Problems in his father. No h/o sudden cardiac death. No for premature CAD    REVIEW OF SYSTEMS:    · Constitutional: there has been no unanticipated weight loss. There's been No change in energy level, No change in activity level. · Eyes: No visual changes or diplopia. No scleral icterus. · ENT: No Headaches, hearing loss or vertigo. No mouth sores or sore throat. · Cardiovascular: No chest pain, Yes dyspnea on exertion, Yes palpitations or No loss of consciousness. No cough, hemoptysis, No pleuritic pain, or phlebitis. · Respiratory: No cough or wheezing, no sputum production. No hematemesis. · Gastrointestinal: No abdominal pain, appetite loss, blood in stools. No change in bowel or bladder habits. · Genitourinary: No dysuria, trouble voiding, or hematuria.   · Musculoskeletal:  No gait disturbance, No weakness or joint complaints. · Integumentary: No rash or pruritis. · Neurological: No headache, diplopia, change in muscle strength, numbness or tingling. No change in gait, balance, coordination, mood, affect, memory, mentation, behavior. · Psychiatric: No anxiety, or depression. · Endocrine: No temperature intolerance. No excessive thirst, fluid intake, or urination. No tremor. · Hematologic/Lymphatic: No abnormal bruising or bleeding, blood clots or swollen lymph nodes. · Allergic/Immunologic: No nasal congestion or hives. PHYSICAL EXAM:      BP 96/62   Pulse 70   Temp 97.1 °F (36.2 °C) (Temporal)   Resp 14   Ht 6' (1.829 m)   Wt 299 lb 12.8 oz (136 kg)   SpO2 98%   BMI 40.66 kg/m²    Constitutional and General Appearance: alert, cooperative, no distress and appears stated age  HEENT: PERRL, no cervical lymphadenopathy. No masses palpable. Normal oral mucosa  Respiratory:  · Normal excursion and expansion without use of accessory muscles  · Resp Auscultation: Good respiratory effort. No for increased work of breathing. On auscultation: clear to auscultation bilaterally  Cardiovascular:  · The apical impulse is laterally displaced  · Heart tones are normal. regular S1 and S2, no gallops or murmurs  · Jugular venous pulsation Normal  · The carotid upstroke is normal in amplitude and contour without delay or bruit  · Peripheral pulses are symmetrical and full  Abdomen:  · No masses or tenderness  · Bowel sounds present  Extremities:  ·  No Cyanosis or Clubbing  ·  Lower extremity edema: No  · Skin: Warm and dry  Neurological:  · Alert and oriented. · Moves all extremities well  · No abnormalities of mood, affect, memory, mentation, or behavior are noted    DATA:    Diagnostics:    EKG: Atrial fibrillation with rapid ventricular rate  ECHO: reviewed. 7/2019  Summary   Severely dilated left ventricle. Ejection fraction is visually estimated at 20-25%. However there is a   definite improvement in overall LV systolic function since previous study   from 2018. Diffuse global hypokinesis   The left atrium is moderate-severely dilated. Structurally normal mitral valve. Mild mitral regurgitation is present. Increased E point septal separation noted,suggesting decreased LV cardiac   output. Aortic valve opens well. The aortic valve appears mildly sclerotic. Normal tricuspid valve structure and function. Moderate tricuspid regurgitation. RVSP is 45 mmHg. No evidence of pericardial effusion  Ejection fraction: 20%  Stress Test: not obtained. Cardiac Angiography: reviewed. CONCLUSION:  1. Coronary artery disease:  a. Left main:  No significant disease. b.  LAD:  Up to 40% to 50% proximal/mid vessel narrowing and 30% to 40%  distal vessel disease. Around 50% hazy proximal narrowing of a long,  but small caliber diagonal branch.  c.  LCX:  Diffuse atherosclerosis, but with no more than 30%  angiographic luminal narrowing.  d.  RCA:  A large-dominant vessel with around 30% proximal and mid to  distal vessel narrowing. 2.  Mildly elevated left ventricular end-diastolic pressure, consistent  with LV diastolic dysfunction.       Labs:   CBC:   Recent Labs     03/14/20 0309   WBC 6.3   HGB 12.1*   HCT 37.7        BMP:   Recent Labs     03/14/20 0309 03/14/20  1436    141   K 3.5 4.0   CO2 19* 20*   BUN 15 16   CREATININE 1.2 1.2   LABGLOM >60 >60   GLUCOSE 139* 90     BNP: No results for input(s): BNP in the last 72 hours. PT/INR: No results for input(s): PROTIME, INR in the last 72 hours. APTT:No results for input(s): APTT in the last 72 hours.   CARDIAC ENZYMES:  Recent Labs     03/14/20 0309 03/14/20  1436   TROPONINI <0.01 <0.01     FASTING LIPID PANEL:  Lab Results   Component Value Date    HDL 42 02/24/2020    LDLCALC 71 02/24/2020    TRIG 73 02/24/2020     LIVER PROFILE:  Recent Labs     03/14/20  0309 03/14/20  1436   AST 25 18   ALT 23 21   LABALBU 4.2 4.4       IMPRESSION:    Patient Active

## 2020-03-15 LAB
ANION GAP SERPL CALCULATED.3IONS-SCNC: 14 MMOL/L (ref 7–16)
BASOPHILS ABSOLUTE: 0.03 E9/L (ref 0–0.2)
BASOPHILS RELATIVE PERCENT: 0.5 % (ref 0–2)
BUN BLDV-MCNC: 18 MG/DL (ref 6–20)
CALCIUM SERPL-MCNC: 9.3 MG/DL (ref 8.6–10.2)
CHLORIDE BLD-SCNC: 108 MMOL/L (ref 98–107)
CO2: 21 MMOL/L (ref 22–29)
CREAT SERPL-MCNC: 1.1 MG/DL (ref 0.7–1.2)
EOSINOPHILS ABSOLUTE: 0.31 E9/L (ref 0.05–0.5)
EOSINOPHILS RELATIVE PERCENT: 5.2 % (ref 0–6)
FERRITIN: 248 NG/ML
GFR AFRICAN AMERICAN: >60
GFR NON-AFRICAN AMERICAN: >60 ML/MIN/1.73
GLUCOSE BLD-MCNC: 91 MG/DL (ref 74–99)
HCT VFR BLD CALC: 38.8 % (ref 37–54)
HEMOGLOBIN: 12.3 G/DL (ref 12.5–16.5)
IMMATURE GRANULOCYTES #: 0.01 E9/L
IMMATURE GRANULOCYTES %: 0.2 % (ref 0–5)
IRON SATURATION: 26 % (ref 20–55)
IRON: 63 MCG/DL (ref 59–158)
LYMPHOCYTES ABSOLUTE: 2.17 E9/L (ref 1.5–4)
LYMPHOCYTES RELATIVE PERCENT: 36.1 % (ref 20–42)
MAGNESIUM: 1.9 MG/DL (ref 1.6–2.6)
MCH RBC QN AUTO: 27.8 PG (ref 26–35)
MCHC RBC AUTO-ENTMCNC: 31.7 % (ref 32–34.5)
MCV RBC AUTO: 87.8 FL (ref 80–99.9)
METER GLUCOSE: 129 MG/DL (ref 74–99)
METER GLUCOSE: 178 MG/DL (ref 74–99)
METER GLUCOSE: 220 MG/DL (ref 74–99)
METER GLUCOSE: 65 MG/DL (ref 74–99)
METER GLUCOSE: 83 MG/DL (ref 74–99)
METER GLUCOSE: 88 MG/DL (ref 74–99)
MONOCYTES ABSOLUTE: 0.5 E9/L (ref 0.1–0.95)
MONOCYTES RELATIVE PERCENT: 8.3 % (ref 2–12)
NEUTROPHILS ABSOLUTE: 2.99 E9/L (ref 1.8–7.3)
NEUTROPHILS RELATIVE PERCENT: 49.7 % (ref 43–80)
PDW BLD-RTO: 15.8 FL (ref 11.5–15)
PLATELET # BLD: 248 E9/L (ref 130–450)
PMV BLD AUTO: 10.5 FL (ref 7–12)
POTASSIUM REFLEX MAGNESIUM: 3.6 MMOL/L (ref 3.5–5)
PRO-BNP: 1749 PG/ML (ref 0–125)
RBC # BLD: 4.42 E12/L (ref 3.8–5.8)
SODIUM BLD-SCNC: 143 MMOL/L (ref 132–146)
TOTAL IRON BINDING CAPACITY: 247 MCG/DL (ref 250–450)
TRANSFERRIN: 196 MG/DL (ref 200–360)
WBC # BLD: 6 E9/L (ref 4.5–11.5)

## 2020-03-15 PROCEDURE — 83735 ASSAY OF MAGNESIUM: CPT

## 2020-03-15 PROCEDURE — 2580000003 HC RX 258: Performed by: NURSE PRACTITIONER

## 2020-03-15 PROCEDURE — 85025 COMPLETE CBC W/AUTO DIFF WBC: CPT

## 2020-03-15 PROCEDURE — 6360000002 HC RX W HCPCS: Performed by: STUDENT IN AN ORGANIZED HEALTH CARE EDUCATION/TRAINING PROGRAM

## 2020-03-15 PROCEDURE — 94660 CPAP INITIATION&MGMT: CPT

## 2020-03-15 PROCEDURE — 2500000003 HC RX 250 WO HCPCS: Performed by: INTERNAL MEDICINE

## 2020-03-15 PROCEDURE — 82962 GLUCOSE BLOOD TEST: CPT

## 2020-03-15 PROCEDURE — 99232 SBSQ HOSP IP/OBS MODERATE 35: CPT | Performed by: FAMILY MEDICINE

## 2020-03-15 PROCEDURE — 6370000000 HC RX 637 (ALT 250 FOR IP): Performed by: INTERNAL MEDICINE

## 2020-03-15 PROCEDURE — 2140000000 HC CCU INTERMEDIATE R&B

## 2020-03-15 PROCEDURE — 80048 BASIC METABOLIC PNL TOTAL CA: CPT

## 2020-03-15 PROCEDURE — 82728 ASSAY OF FERRITIN: CPT

## 2020-03-15 PROCEDURE — 99233 SBSQ HOSP IP/OBS HIGH 50: CPT | Performed by: INTERNAL MEDICINE

## 2020-03-15 PROCEDURE — 36415 COLL VENOUS BLD VENIPUNCTURE: CPT

## 2020-03-15 PROCEDURE — 83550 IRON BINDING TEST: CPT

## 2020-03-15 PROCEDURE — 84466 ASSAY OF TRANSFERRIN: CPT

## 2020-03-15 PROCEDURE — 6370000000 HC RX 637 (ALT 250 FOR IP): Performed by: STUDENT IN AN ORGANIZED HEALTH CARE EDUCATION/TRAINING PROGRAM

## 2020-03-15 PROCEDURE — 83880 ASSAY OF NATRIURETIC PEPTIDE: CPT

## 2020-03-15 PROCEDURE — 83540 ASSAY OF IRON: CPT

## 2020-03-15 RX ORDER — MAGNESIUM SULFATE IN WATER 40 MG/ML
2 INJECTION, SOLUTION INTRAVENOUS ONCE
Status: COMPLETED | OUTPATIENT
Start: 2020-03-15 | End: 2020-03-15

## 2020-03-15 RX ORDER — POTASSIUM CHLORIDE 20 MEQ/1
40 TABLET, EXTENDED RELEASE ORAL ONCE
Status: COMPLETED | OUTPATIENT
Start: 2020-03-15 | End: 2020-03-15

## 2020-03-15 RX ORDER — INSULIN GLARGINE 100 [IU]/ML
40 INJECTION, SOLUTION SUBCUTANEOUS 2 TIMES DAILY
Status: DISCONTINUED | OUTPATIENT
Start: 2020-03-15 | End: 2020-03-17 | Stop reason: HOSPADM

## 2020-03-15 RX ADMIN — ALLOPURINOL 300 MG: 300 TABLET ORAL at 09:19

## 2020-03-15 RX ADMIN — SACUBITRIL AND VALSARTAN 1 TABLET: 97; 103 TABLET, FILM COATED ORAL at 20:44

## 2020-03-15 RX ADMIN — ATORVASTATIN CALCIUM 40 MG: 40 TABLET, FILM COATED ORAL at 09:19

## 2020-03-15 RX ADMIN — CARVEDILOL 25 MG: 25 TABLET, FILM COATED ORAL at 17:13

## 2020-03-15 RX ADMIN — Medication 10 ML: at 09:27

## 2020-03-15 RX ADMIN — POTASSIUM CHLORIDE 40 MEQ: 20 TABLET, EXTENDED RELEASE ORAL at 09:17

## 2020-03-15 RX ADMIN — SACUBITRIL AND VALSARTAN 1 TABLET: 97; 103 TABLET, FILM COATED ORAL at 09:18

## 2020-03-15 RX ADMIN — DOFETILIDE 250 MCG: 0.25 CAPSULE ORAL at 18:01

## 2020-03-15 RX ADMIN — CARVEDILOL 25 MG: 25 TABLET, FILM COATED ORAL at 09:19

## 2020-03-15 RX ADMIN — SPIRONOLACTONE 25 MG: 25 TABLET ORAL at 09:19

## 2020-03-15 RX ADMIN — BUMETANIDE 2 MG: 0.25 INJECTION INTRAMUSCULAR; INTRAVENOUS at 09:19

## 2020-03-15 RX ADMIN — MAGNESIUM SULFATE HEPTAHYDRATE 2 G: 40 INJECTION, SOLUTION INTRAVENOUS at 09:18

## 2020-03-15 RX ADMIN — INSULIN LISPRO 20 UNITS: 100 INJECTION, SOLUTION INTRAVENOUS; SUBCUTANEOUS at 11:38

## 2020-03-15 RX ADMIN — DOFETILIDE 250 MCG: 0.25 CAPSULE ORAL at 06:18

## 2020-03-15 RX ADMIN — POTASSIUM CHLORIDE 40 MEQ: 20 TABLET, EXTENDED RELEASE ORAL at 17:13

## 2020-03-15 RX ADMIN — INSULIN GLARGINE 45 UNITS: 100 INJECTION, SOLUTION SUBCUTANEOUS at 09:24

## 2020-03-15 RX ADMIN — BUMETANIDE 2 MG: 0.25 INJECTION INTRAMUSCULAR; INTRAVENOUS at 15:49

## 2020-03-15 RX ADMIN — Medication 10 ML: at 20:44

## 2020-03-15 RX ADMIN — RIVAROXABAN 20 MG: 20 TABLET, FILM COATED ORAL at 09:27

## 2020-03-15 RX ADMIN — BUMETANIDE 2 MG: 0.25 INJECTION INTRAMUSCULAR; INTRAVENOUS at 20:45

## 2020-03-15 ASSESSMENT — PAIN SCALES - GENERAL
PAINLEVEL_OUTOF10: 0
PAINLEVEL_OUTOF10: 0

## 2020-03-15 NOTE — PROGRESS NOTES
Advanced Heart Failure and Pulmonary Hypertension  Inpatient Progress Note          CC/ID: Mr. Macey Goetz is a 44year old  male with nonischemic cardiomyopathy, chronic HFrEF with recurrent hospitalizations, CRT-D, PAF with recent DCCV, T2DM, iron deficiency anemia of chronic disease, obesity, ARVIND/CPAP non compliance, and poor medical literacy who presented to the emergency room early this morning with palpitations, chest pressure, dyspnea, orthopnea, abdominal distention. 24-hour events/subjective:  - No acute events overnight  - Good urinary response to diuretic  - UOP 3.1 L  - Net negative 2.2 L  - Weight down 4 pounds since admission  - Subjectively improving, denies shortness of breath, chest pain or palpitations   - Dofetilide started per EP   - Potential DCCV tomorrow per EP      Telemetry:  Atrial fibrillation         Inotropes, Pressors, and Intravenous Therapy:  IV bumex 2 mg TID          Physical Exam:    /82   Pulse 80   Temp 97.2 °F (36.2 °C) (Temporal)   Resp 16   Ht 6' (1.829 m)   Wt 296 lb 6.4 oz (134.4 kg)   SpO2 100%   BMI 40.20 kg/m²   Wt Readings from Last 3 Encounters:   03/15/20 296 lb 6.4 oz (134.4 kg)   02/24/20 (!) 305 lb (138.3 kg)   02/20/20 (!) 305 lb 8 oz (138.6 kg)     Appearance: Awake, alert, no acute respiratory distress  Skin: Intact, no rash  Head: Normocephalic, atraumatic  Eyes: EOMI, no conjunctival erythema, anicteric sclerae  ENMT: No pharyngeal erythema, MMM, no rhinorrhea  Neck: Soft, supple, JVD  Lungs: Clear to auscultation bilaterally. No wheezes, rales, or rhonchi.   Cardiac: Regular rate and rhythm, +S1S2, no murmurs apparent  Abdomen: Soft, nontender, +bowel sounds  Extremities: Moves all extremities x 4, no lower extremity edema  Neurologic: No focal motor deficits apparent, normal mood and affect  Peripheral Pulses: Intact posterior tibial pulses bilaterally      Laboratory Tests:    Lab Results   Component Value Date CREATININE 1.1 03/15/2020    BUN 18 03/15/2020     03/15/2020    K 3.6 03/15/2020     (H) 03/15/2020    CO2 21 (L) 03/15/2020     Lab Results   Component Value Date    MG 1.9 03/15/2020     Lab Results   Component Value Date    WBC 6.0 03/15/2020    HGB 12.3 (L) 03/15/2020    HCT 38.8 03/15/2020    MCV 87.8 03/15/2020     03/15/2020     Lab Results   Component Value Date    CKTOTAL 186 08/14/2016    CKMB 2.1 11/25/2013    TROPONINI <0.01 03/14/2020     Lab Results   Component Value Date    INR 1.3 12/12/2018    INR 1.2 11/20/2018    INR 2.8 10/18/2018    PROTIME 14.4 (H) 12/12/2018    PROTIME 13.7 (H) 11/20/2018    PROTIME 31.3 (H) 10/18/2018     Lab Results   Component Value Date    ALT 21 03/14/2020    AST 18 03/14/2020    ALKPHOS 132 (H) 03/14/2020    BILITOT 0.4 03/14/2020     Lab Results   Component Value Date    TSH 1.270 02/24/2020     Lab Results   Component Value Date    LABA1C 7.6 (H) 02/24/2020     Lab Results   Component Value Date    CHOL 128 02/24/2020    CHOL 191 06/17/2019    CHOL 156 05/07/2018     Lab Results   Component Value Date    TRIG 73 02/24/2020    TRIG 169 (H) 06/17/2019    TRIG 137 05/07/2018     Lab Results   Component Value Date    HDL 42 02/24/2020    HDL 48 06/17/2019    HDL 43 05/07/2018     Lab Results   Component Value Date    LDLCALC 71 02/24/2020    LDLCALC 109 (H) 06/17/2019    LDLCALC 86 05/07/2018     Lab Results   Component Value Date    LABVLDL 15 02/24/2020    LABVLDL 34 06/17/2019    LABVLDL 27 05/07/2018       Current Medications:  Current Facility-Administered Medications   Medication Dose Route Frequency Provider Last Rate Last Dose    insulin glargine (LANTUS) injection vial 40 Units  40 Units Subcutaneous BID Bird Ann MD        potassium chloride (KLOR-CON M) extended release tablet 40 mEq  40 mEq Oral Once America Ro MD        sodium chloride flush 0.9 % injection 10 mL  10 mL Intravenous 2 times per day NATI Quinn - PRIYA   10 mL at 03/15/20 0927    sodium chloride flush 0.9 % injection 10 mL  10 mL Intravenous PRN Brenda Must, APRN - CNP        allopurinol (ZYLOPRIM) tablet 300 mg  300 mg Oral Daily Verna Diaz MD   300 mg at 03/15/20 0919    atorvastatin (LIPITOR) tablet 40 mg  40 mg Oral Daily Verna Diaz MD   40 mg at 03/15/20 0919    carvedilol (COREG) tablet 25 mg  25 mg Oral BID  Verna Diaz MD   25 mg at 03/15/20 0919    insulin lispro (HUMALOG) injection vial 20 Units  20 Units Subcutaneous TID  Verna Diaz MD   20 Units at 03/15/20 1138    rivaroxaban (XARELTO) tablet 20 mg  20 mg Oral Daily Verna Diaz MD   20 mg at 03/15/20 0927    sacubitril-valsartan (ENTRESTO)  MG per tablet 1 tablet  1 tablet Oral BID Verna Diaz MD   1 tablet at 03/15/20 3694    acetaminophen (TYLENOL) tablet 650 mg  650 mg Oral Q6H PRN Verna Diaz MD        Or   Robb acetaminophen (TYLENOL) suppository 650 mg  650 mg Rectal Q6H PRN Verna Diaz MD        polyethylene glycol (GLYCOLAX) packet 17 g  17 g Oral Daily PRN Verna Diaz MD        glucose (GLUTOSE) 40 % oral gel 15 g  15 g Oral PRN Verna Diaz MD        dextrose 50 % IV solution  12.5 g Intravenous PRN Verna Diaz MD        glucagon (rDNA) injection 1 mg  1 mg Intramuscular PRN Verna Diaz MD        dextrose 5 % solution  100 mL/hr Intravenous PRN Verna Diaz MD        bumetanide (BUMEX) injection 2 mg  2 mg Intravenous TID David Cortez MD   2 mg at 03/15/20 0919    potassium chloride (KLOR-CON M) extended release tablet 40 mEq  40 mEq Oral PRN David Cortez MD        Or    potassium bicarb-citric acid (EFFER-K) effervescent tablet 40 mEq  40 mEq Oral PRN David Cortez MD        Or    potassium chloride 10 mEq/100 mL IVPB (Peripheral Line)  10 mEq Intravenous PRN David Cortez MD        spironolactone (ALDACTONE) tablet 25 mg  25 mg Oral Daily David Cortez MD   25 mg at 03/15/20 0919    dofetilide (TIKOSYN) capsule 250 mcg  250 mcg DCCV tomorrow per EP  6. Sodium restricted diet   7. Check iron studies   8. Potassium sliding scale  9. Encourage CPAP use  10. HF nurse navigator  11. Continue statin  12. Please consider SGLT-2 inhibitor for T2DM and cardiac disease  13. [address] [facility] for AF (xarelto)     Thank you for this consultation. We will continue to participate in the care of this  patient. Please do not hesitate to contact us with any questions.     Demarco Spain M.D.  [facility]  Heart Failure and Pulmonary Hypertension  Mobile [phone]

## 2020-03-15 NOTE — PROGRESS NOTES
[facility], Family Medicine Residency Program  Resident Progress  Note      Patient:  Arjun Ross III 44 y.o. male MRN: [id number]     Date of Service: 3/15/2020      [facility] day 1  Patient admitted for paroxysmal atrial fibrillation, CHF decompensation. Patient on diuresis negative 2000 ml approx, Cardiology and EP consulted. Pacemaker interrogated. Runs of Afib and Vtach, Tikosyn added. Patient feels better. Subjective      Patient was seen and examined this am. Feels better. Denies complaints. 24-hour events  Run of Vtach , missed insulin nighttime dose. Objective     Physical Exam:  · Vitals: BP 98/60   Pulse 72   Temp 97.2 °F (36.2 °C) (Temporal)   Resp 18   Ht 6' (1.829 m)   Wt 299 lb 12.8 oz (136 kg)   SpO2 96%   BMI 40.66 kg/m²     · General Appearance: AAOX3  · HEENT:  NC/AT. · Neck: Trachea midline. · Lung: Clear breath sounds B/l  · Heart: RRR, normal S1, S2. No MRG  · Abdomen: Soft, NT, ND. BS +tru. · Extremities: No leg edema. Pedal pulses 2+ symmetric b/l. · Musculokeletal: No joint swelling, no muscle tenderness. ROM normal in all joints of extremities.    · Neurologic: Mental status: AAOX3     Pertinent/ New Labs and Imaging Studies     CBC:   Lab Results   Component Value Date    WBC 6.3 03/14/2020    RBC 4.28 03/14/2020    HGB 12.1 03/14/2020    HCT 37.7 03/14/2020     03/14/2020    MCV 88.1 03/14/2020     BMP:    Lab Results   Component Value Date     03/14/2020    K 4.0 03/14/2020    K 4.5 08/02/2019     03/14/2020    CO2 20 03/14/2020    BUN 16 03/14/2020    CREATININE 1.2 03/14/2020    GLUCOSE 90 03/14/2020    GLUCOSE 192 05/15/2012    CALCIUM 9.7 03/14/2020       Resident's Assessment and PLan     Paroxysmal Atrial fibrillation with RVR  Asymptomatic and rate controlled this am  Troponin negative, repeat x 2  Pro-BNP 3245 baseline 700  Continue xarelto  EP recs - Tikosyn started     HFrEF, NYHA Class 3 HF  Possible decompensation  Elevated pro-BNP and XR showing increased vascularity / opacification R>L   No signs of volume overload on exam  · Echo 06/2019 shows ejection fraction of 20%  · S/p cardiomems  · entresto  BID, coreg 25 BID,  aldactone 12.5 QD  · Negative 2000 ml, Bumex 2 mg iv tid  · Monitor vitals, input and output, Diuresis     Electrolyte abnormalities  · K 3.6, Mg 1.9  · Will replace to keep above 4 and 2  · Ordered K and Mg               DM2  · A1C 7.6 % February 2020  · Home meds: levemir 45 BID, humalog 22 u TID  · Continue 40 unit levemir BID, Humalog 20 units  · Hypoglycemia protocol in place  · Missed insulin at night, elevated blood sugars      HTN/HLD/CAD  · Home meds: lipitor and medications for HF  · Continue home meds    ARVIND  · Continue cpap at night     Gout  · Continue allopurinol     Diet: cardiac, carb control, salt restriction      Mana Arthur M.D., PGY-2  Family Medicine   Attending physician: Dr. Fabio Clark

## 2020-03-15 NOTE — PLAN OF CARE
Problem: Falls - Risk of:  Goal: Will remain free from falls  Description: Will remain free from falls  3/14/2020 1727 by Edilson Lowe RN  Outcome: Met This Shift     Problem: Cardiac:  Goal: Ability to maintain vital signs within normal range will improve  Description: Ability to maintain vital signs within normal range will improve  Outcome: Ongoing  Goal: Cardiovascular alteration will improve  Description: Cardiovascular alteration will improve  Outcome: Ongoing  Goal: Complications related to the disease process, condition or treatment will be avoided or minimized  Description: Complications related to the disease process, condition or treatment will be avoided or minimized  Outcome: Ongoing  Goal: Ability to maintain an adequate cardiac output will improve  Description: Ability to maintain an adequate cardiac output will improve  Outcome: Ongoing     Problem: Physical Regulation:  Goal: Complications related to the disease process, condition or treatment will be avoided or minimized  Description: Complications related to the disease process, condition or treatment will be avoided or minimized  Outcome: Ongoing

## 2020-03-16 LAB
ANION GAP SERPL CALCULATED.3IONS-SCNC: 12 MMOL/L (ref 7–16)
BUN BLDV-MCNC: 23 MG/DL (ref 6–20)
CALCIUM SERPL-MCNC: 10 MG/DL (ref 8.6–10.2)
CHLORIDE BLD-SCNC: 100 MMOL/L (ref 98–107)
CO2: 24 MMOL/L (ref 22–29)
CREAT SERPL-MCNC: 1.2 MG/DL (ref 0.7–1.2)
EKG ATRIAL RATE: 104 BPM
EKG Q-T INTERVAL: 466 MS
EKG QRS DURATION: 206 MS
EKG QTC CALCULATION (BAZETT): 612 MS
EKG R AXIS: 150 DEGREES
EKG T AXIS: -9 DEGREES
EKG VENTRICULAR RATE: 104 BPM
GFR AFRICAN AMERICAN: >60
GFR NON-AFRICAN AMERICAN: >60 ML/MIN/1.73
GLUCOSE BLD-MCNC: 186 MG/DL (ref 74–99)
MAGNESIUM: 2.1 MG/DL (ref 1.6–2.6)
METER GLUCOSE: 101 MG/DL (ref 74–99)
METER GLUCOSE: 102 MG/DL (ref 74–99)
METER GLUCOSE: 164 MG/DL (ref 74–99)
METER GLUCOSE: 208 MG/DL (ref 74–99)
POTASSIUM REFLEX MAGNESIUM: 3.6 MMOL/L (ref 3.5–5)
SODIUM BLD-SCNC: 136 MMOL/L (ref 132–146)

## 2020-03-16 PROCEDURE — 2500000003 HC RX 250 WO HCPCS: Performed by: INTERNAL MEDICINE

## 2020-03-16 PROCEDURE — 6370000000 HC RX 637 (ALT 250 FOR IP): Performed by: STUDENT IN AN ORGANIZED HEALTH CARE EDUCATION/TRAINING PROGRAM

## 2020-03-16 PROCEDURE — 6370000000 HC RX 637 (ALT 250 FOR IP): Performed by: INTERNAL MEDICINE

## 2020-03-16 PROCEDURE — 94660 CPAP INITIATION&MGMT: CPT

## 2020-03-16 PROCEDURE — 36415 COLL VENOUS BLD VENIPUNCTURE: CPT

## 2020-03-16 PROCEDURE — 2580000003 HC RX 258: Performed by: NURSE PRACTITIONER

## 2020-03-16 PROCEDURE — 83735 ASSAY OF MAGNESIUM: CPT

## 2020-03-16 PROCEDURE — 2140000000 HC CCU INTERMEDIATE R&B

## 2020-03-16 PROCEDURE — 99232 SBSQ HOSP IP/OBS MODERATE 35: CPT | Performed by: FAMILY MEDICINE

## 2020-03-16 PROCEDURE — 80048 BASIC METABOLIC PNL TOTAL CA: CPT

## 2020-03-16 PROCEDURE — 82962 GLUCOSE BLOOD TEST: CPT

## 2020-03-16 RX ORDER — DOFETILIDE 0.25 MG/1
250 CAPSULE ORAL EVERY 12 HOURS SCHEDULED
Qty: 60 CAPSULE | Refills: 3 | Status: SHIPPED | OUTPATIENT
Start: 2020-03-17 | End: 2020-06-11 | Stop reason: SDUPTHER

## 2020-03-16 RX ORDER — POTASSIUM CHLORIDE 20 MEQ/1
40 TABLET, EXTENDED RELEASE ORAL ONCE
Status: COMPLETED | OUTPATIENT
Start: 2020-03-16 | End: 2020-03-16

## 2020-03-16 RX ADMIN — BUMETANIDE 2 MG: 0.25 INJECTION INTRAMUSCULAR; INTRAVENOUS at 14:44

## 2020-03-16 RX ADMIN — SACUBITRIL AND VALSARTAN 1 TABLET: 97; 103 TABLET, FILM COATED ORAL at 20:29

## 2020-03-16 RX ADMIN — BUMETANIDE 2 MG: 0.25 INJECTION INTRAMUSCULAR; INTRAVENOUS at 20:30

## 2020-03-16 RX ADMIN — SACUBITRIL AND VALSARTAN 1 TABLET: 97; 103 TABLET, FILM COATED ORAL at 10:08

## 2020-03-16 RX ADMIN — INSULIN LISPRO 20 UNITS: 100 INJECTION, SOLUTION INTRAVENOUS; SUBCUTANEOUS at 12:42

## 2020-03-16 RX ADMIN — Medication 10 ML: at 10:09

## 2020-03-16 RX ADMIN — SPIRONOLACTONE 25 MG: 25 TABLET ORAL at 10:08

## 2020-03-16 RX ADMIN — DOFETILIDE 250 MCG: 0.25 CAPSULE ORAL at 17:59

## 2020-03-16 RX ADMIN — CARVEDILOL 25 MG: 25 TABLET, FILM COATED ORAL at 10:08

## 2020-03-16 RX ADMIN — DOFETILIDE 250 MCG: 0.25 CAPSULE ORAL at 06:01

## 2020-03-16 RX ADMIN — Medication 10 ML: at 20:38

## 2020-03-16 RX ADMIN — INSULIN LISPRO 20 UNITS: 100 INJECTION, SOLUTION INTRAVENOUS; SUBCUTANEOUS at 16:54

## 2020-03-16 RX ADMIN — BUMETANIDE 2 MG: 0.25 INJECTION INTRAMUSCULAR; INTRAVENOUS at 10:08

## 2020-03-16 RX ADMIN — POTASSIUM CHLORIDE 40 MEQ: 20 TABLET, EXTENDED RELEASE ORAL at 12:14

## 2020-03-16 RX ADMIN — CARVEDILOL 25 MG: 25 TABLET, FILM COATED ORAL at 18:48

## 2020-03-16 RX ADMIN — RIVAROXABAN 20 MG: 20 TABLET, FILM COATED ORAL at 10:08

## 2020-03-16 RX ADMIN — ATORVASTATIN CALCIUM 40 MG: 40 TABLET, FILM COATED ORAL at 10:08

## 2020-03-16 RX ADMIN — ALLOPURINOL 300 MG: 300 TABLET ORAL at 10:08

## 2020-03-16 ASSESSMENT — PAIN SCALES - GENERAL: PAINLEVEL_OUTOF10: 0

## 2020-03-16 NOTE — PLAN OF CARE
Problem: Falls - Risk of:  Goal: Will remain free from falls  Description: Will remain free from falls  Outcome: Met This Shift     Problem: Cardiac:  Goal: Ability to maintain vital signs within normal range will improve  Description: Ability to maintain vital signs within normal range will improve  Outcome: Met This Shift

## 2020-03-16 NOTE — PLAN OF CARE
Problem: Falls - Risk of:  Goal: Will remain free from falls  Description: Will remain free from falls  3/15/2020 2303 by Natanael Negrete RN  Outcome: Met This Shift  3/15/2020 2230 by Carmencita Lucero RN  Outcome: Met This Shift     Problem: Cardiac:  Goal: Ability to maintain vital signs within normal range will improve  Description: Ability to maintain vital signs within normal range will improve  3/15/2020 2303 by Natanael Negrete RN  Outcome: Met This Shift  3/15/2020 2230 by Carmencita Lucero RN  Outcome: Met This Shift  Goal: Risk factors for ineffective tissue perfusion will decrease  Description: Risk factors for ineffective tissue perfusion will decrease  Outcome: Met This Shift     Problem: Health Behavior:  Goal: Will modify at least one risk factor affecting health status  Description: Will modify at least one risk factor affecting health status  Outcome: Met This Shift

## 2020-03-16 NOTE — CARE COORDINATION
Transition of care: Met with pt in room. Pt was started on Tikosyn. Pt lives alone in a 1 story home. Independent with ADLs. Family/friends drive for him or he walks. His medicaid coverage provides rides to doctor triston'ts. DME- CPAP and Cardiomems \" transmission\" unit. Denies any needs for home at present. PCP is Dr. Yvan Felipe and pharmacy is [address] [facility].  Sw/cm will follow

## 2020-03-16 NOTE — PROGRESS NOTES
Electrophysiology progress note         Date:  3/16/2020  Patient: Demetrius Klinefelter III  Admission:  3/14/2020  2:07 AM  Admit DX: Acute on chronic systolic heart failure ([facility] [I50.23]  Age:  44 y.o., 1980     LOS: 2 days     Reason for evaluation:   atrial fibrillation, cardiomyopathy, CHF and monitoring of antiarrhythmic drug therapy      SUBJECTIVE:    The patient was seen and examined. Notes and labs reviewed. There were no complications over night. Patient's cardiac review of systems: negative. The patient is generally feeling gradually improving. OBJECTIVE:    Telemetry: Sinus with biventricular pacing  /64   Pulse 71   Temp 98 °F (36.7 °C) (Oral)   Resp 14   Ht 6' (1.829 m)   Wt 294 lb 6.4 oz (133.5 kg)   SpO2 100%   BMI 39.93 kg/m²     Intake/Output Summary (Last 24 hours) at 3/16/2020 1900  Last data filed at 3/16/2020 1415  Gross per 24 hour   Intake 1320 ml   Output 3425 ml   Net -2105 ml       EXAM:   CONSTITUTIONAL:  awake, alert, cooperative, no apparent distress, and appears stated age. HEENT: Normal jugular venous pulsations, Head is atraumatic, normocephalic. Eyes and oral mucosa are normal.  LUNGS: Good respiratory effort. No for increased work of breathing. On auscultation: clear to auscultation bilaterally  CARDIOVASCULAR: Laterally displaced PMI regular rate and rhythm, normal S1 and S2, no S3   ABDOMEN: Soft, nontender, nondistended. SKIN: Warm and dry. EXTREMITIES: No lower extremity edema. Motor movement grossly intact. No cyanosis or clubbing.     Current Inpatient Medications:   insulin glargine  40 Units Subcutaneous BID    sodium chloride flush  10 mL Intravenous 2 times per day    allopurinol  300 mg Oral Daily    atorvastatin  40 mg Oral Daily    carvedilol  25 mg Oral BID WC    insulin lispro  20 Units Subcutaneous TID WC    rivaroxaban  20 mg Oral Daily    sacubitril-valsartan  1 tablet Oral BID    bumetanide  2 mg Intravenous TID    spironolactone  25 mg Oral Daily    dofetilide  250 mcg Oral 2 times per day       IV Infusions (if any):   dextrose         Diagnostics:    EKG: normal sinus rhythm, QT prolonged,  ms. ECHO: reviewed. Ejection fraction: 20%  Stress Test: not obtained. Cardiac Angiography: not obtained. Labs:   CBC:   Recent Labs     03/14/20  0309 03/15/20  0615   WBC 6.3 6.0   HGB 12.1* 12.3*   HCT 37.7 38.8    248     BMP:   Recent Labs     03/15/20  0615 03/16/20  0844    136   K 3.6 3.6   CO2 21* 24   BUN 18 23*   CREATININE 1.1 1.2   LABGLOM >60 >60   GLUCOSE 91 186*     BNP: No results for input(s): BNP in the last 72 hours. PT/INR: No results for input(s): PROTIME, INR in the last 72 hours. APTT:No results for input(s): APTT in the last 72 hours. CARDIAC ENZYMES:  Recent Labs     03/14/20  0309 03/14/20  1436   TROPONINI <0.01 <0.01     FASTING LIPID PANEL:  Lab Results   Component Value Date    HDL 42 02/24/2020    LDLCALC 71 02/24/2020    TRIG 73 02/24/2020     LIVER PROFILE:  Recent Labs     03/14/20  0309 03/14/20  1436   AST 25 18   ALT 23 21   LABALBU 4.2 4.4       ASSESSMENT:    Patient Active Problem List   Diagnosis    Obstructive sleep apnea    Chronic gout    CKD (chronic kidney disease)    Type 2 diabetes mellitus with complication, with long-term current use of insulin (HCC)    Paroxysmal atrial fibrillation--now on dofetilide and tolerating drug well. Patient converted to sinus rhythm spontaneously overnight with treatment of heart failure and dofetilide    Essential hypertension    Morbid obesity with BMI of 40.0-44.9, adult (HCC)    Nonischemic cardiomyopathy (HCC)    S/P left pulmonary artery pressure sensor implant placement    Acute on chronic systolic heart failure --patient responded to IV diuresis.   However somewhat hypotensive today    Carotid disease, bilateral ([address]       PLAN:    Continue dofetilide  Consider reducing doses of diuretic  Okay for discharge

## 2020-03-16 NOTE — PROGRESS NOTES
[address] [facility]  Family Medicine Attending    S: 44 y.o. male with a history of Dm2 (a1c 7.6), HFrEF with cardiomems s/p pacer/aicd, hicm, afib, keshia, htn, ckd 3, cva, anemia, and gout who presented with palpitations. Denies medication non-compliance. Some cough but no shortness of breath or lower extremity swelling. No f/c/s. This morning, in good spirits. Feels well and has no chest pain or palpitations. O: VS- Blood pressure 105/69, pulse 94, temperature 97.3 °F (36.3 °C), temperature source Temporal, resp. rate 16, height 6' (1.829 m), weight 294 lb 6.4 oz (133.5 kg), SpO2 99 %. Exam is as noted by resident with the following changes, additions or corrections:  Gen: NAD   HEENT: NCAT, PERRL, MMM  CV-irregularly irregular  Lungs-CTA b/l no R/R/W  ABD-soft nonttp no masses, slightly protuberant  Ext-no C/C/tr edema    Impressions: Active Problems:    S/P ICD (internal cardiac defibrillator) procedure    Nonischemic cardiomyopathy (HCC)    S/P left pulmonary artery pressure sensor implant placement    Acute on chronic systolic heart failure ([address]  Resolved Problems:    * No resolved hospital problems. *      Plan:   Appreciate CHF and EPS consults   Potential cardioversion, although improved today on Tikosyn   Monitor glucose and adjust insulin as necessary        Attending Physician Statement  I have reviewed the chart and seen the patient with the resident(s). I personally reviewed images, EKG's and similar tests, if present. I personally reviewed and performed key elements of the history and exam.  I have reviewed and confirmed student and/or resident history and exam with changes as indicated above. I agree with the assessment, plan and orders as documented by the resident. Please refer to the resident and/or student note for additional information.       John Reece

## 2020-03-17 VITALS
OXYGEN SATURATION: 100 % | SYSTOLIC BLOOD PRESSURE: 97 MMHG | HEIGHT: 72 IN | DIASTOLIC BLOOD PRESSURE: 61 MMHG | HEART RATE: 62 BPM | RESPIRATION RATE: 18 BRPM | WEIGHT: 298.4 LBS | TEMPERATURE: 98.1 F | BODY MASS INDEX: 40.42 KG/M2

## 2020-03-17 PROBLEM — I50.23 ACUTE ON CHRONIC SYSTOLIC HEART FAILURE (HCC): Status: RESOLVED | Noted: 2020-03-14 | Resolved: 2020-03-17

## 2020-03-17 LAB
ANION GAP SERPL CALCULATED.3IONS-SCNC: 17 MMOL/L (ref 7–16)
BUN BLDV-MCNC: 34 MG/DL (ref 6–20)
CALCIUM SERPL-MCNC: 10.3 MG/DL (ref 8.6–10.2)
CHLORIDE BLD-SCNC: 104 MMOL/L (ref 98–107)
CO2: 17 MMOL/L (ref 22–29)
CREAT SERPL-MCNC: 1.4 MG/DL (ref 0.7–1.2)
GFR AFRICAN AMERICAN: >60
GFR NON-AFRICAN AMERICAN: 56 ML/MIN/1.73
GLUCOSE BLD-MCNC: 130 MG/DL (ref 74–99)
METER GLUCOSE: 117 MG/DL (ref 74–99)
POTASSIUM REFLEX MAGNESIUM: 5.2 MMOL/L (ref 3.5–5)
PRO-BNP: 454 PG/ML (ref 0–125)
SODIUM BLD-SCNC: 138 MMOL/L (ref 132–146)

## 2020-03-17 PROCEDURE — 82962 GLUCOSE BLOOD TEST: CPT

## 2020-03-17 PROCEDURE — 94660 CPAP INITIATION&MGMT: CPT

## 2020-03-17 PROCEDURE — 99239 HOSP IP/OBS DSCHRG MGMT >30: CPT | Performed by: FAMILY MEDICINE

## 2020-03-17 PROCEDURE — 2500000003 HC RX 250 WO HCPCS: Performed by: INTERNAL MEDICINE

## 2020-03-17 PROCEDURE — 2580000003 HC RX 258: Performed by: NURSE PRACTITIONER

## 2020-03-17 PROCEDURE — 99232 SBSQ HOSP IP/OBS MODERATE 35: CPT | Performed by: INTERNAL MEDICINE

## 2020-03-17 PROCEDURE — 83880 ASSAY OF NATRIURETIC PEPTIDE: CPT

## 2020-03-17 PROCEDURE — 36415 COLL VENOUS BLD VENIPUNCTURE: CPT

## 2020-03-17 PROCEDURE — 6370000000 HC RX 637 (ALT 250 FOR IP): Performed by: INTERNAL MEDICINE

## 2020-03-17 PROCEDURE — 80048 BASIC METABOLIC PNL TOTAL CA: CPT

## 2020-03-17 PROCEDURE — 6370000000 HC RX 637 (ALT 250 FOR IP): Performed by: STUDENT IN AN ORGANIZED HEALTH CARE EDUCATION/TRAINING PROGRAM

## 2020-03-17 RX ORDER — BUMETANIDE 1 MG/1
2 TABLET ORAL 2 TIMES DAILY
Status: DISCONTINUED | OUTPATIENT
Start: 2020-03-18 | End: 2020-03-17 | Stop reason: HOSPADM

## 2020-03-17 RX ORDER — BUMETANIDE 2 MG/1
2 TABLET ORAL 2 TIMES DAILY
Qty: 30 TABLET | Refills: 3 | Status: SHIPPED | OUTPATIENT
Start: 2020-03-18 | End: 2020-03-19

## 2020-03-17 RX ORDER — INSULIN DETEMIR 100 [IU]/ML
40 INJECTION, SOLUTION SUBCUTANEOUS 2 TIMES DAILY
Qty: 30 ML | Refills: 10 | Status: SHIPPED | OUTPATIENT
Start: 2020-03-17 | End: 2020-03-24 | Stop reason: SDUPTHER

## 2020-03-17 RX ORDER — POTASSIUM CHLORIDE 20 MEQ/1
40 TABLET, EXTENDED RELEASE ORAL DAILY
Qty: 60 TABLET | Refills: 3 | Status: SHIPPED | OUTPATIENT
Start: 2020-03-17 | End: 2020-03-19

## 2020-03-17 RX ADMIN — RIVAROXABAN 20 MG: 20 TABLET, FILM COATED ORAL at 09:48

## 2020-03-17 RX ADMIN — BUMETANIDE 2 MG: 0.25 INJECTION INTRAMUSCULAR; INTRAVENOUS at 09:48

## 2020-03-17 RX ADMIN — ALLOPURINOL 300 MG: 300 TABLET ORAL at 09:48

## 2020-03-17 RX ADMIN — INSULIN GLARGINE 40 UNITS: 100 INJECTION, SOLUTION SUBCUTANEOUS at 09:49

## 2020-03-17 RX ADMIN — ATORVASTATIN CALCIUM 40 MG: 40 TABLET, FILM COATED ORAL at 09:48

## 2020-03-17 RX ADMIN — SACUBITRIL AND VALSARTAN 1 TABLET: 97; 103 TABLET, FILM COATED ORAL at 09:48

## 2020-03-17 RX ADMIN — DOFETILIDE 250 MCG: 0.25 CAPSULE ORAL at 06:03

## 2020-03-17 RX ADMIN — INSULIN LISPRO 20 UNITS: 100 INJECTION, SOLUTION INTRAVENOUS; SUBCUTANEOUS at 09:49

## 2020-03-17 RX ADMIN — SPIRONOLACTONE 25 MG: 25 TABLET ORAL at 09:48

## 2020-03-17 RX ADMIN — Medication 10 ML: at 09:48

## 2020-03-17 RX ADMIN — CARVEDILOL 25 MG: 25 TABLET, FILM COATED ORAL at 10:37

## 2020-03-17 ASSESSMENT — PAIN SCALES - GENERAL: PAINLEVEL_OUTOF10: 0

## 2020-03-17 NOTE — PROGRESS NOTES
Advanced Heart Failure and Pulmonary Hypertension  Inpatient Progress Note          CC/ID: Mr. Jose Schmidt is a 44year old  male with nonischemic cardiomyopathy, chronic HFrEF with recurrent hospitalizations, CRT-D, PAF with recent DCCV, T2DM, iron deficiency anemia of chronic disease, obesity, ARVIND/CPAP non compliance, and poor medical literacy who presented to the emergency room early this morning with palpitations, chest pressure, dyspnea, orthopnea, abdominal distention. 24-hour events/subjective:  -no acute overnight events  -diuresing well  -converted to NSR two nights ago after initiation of dofetilide          Inotropes, Pressors, and Intravenous Therapy:  IV bumex 2 mg TID          Physical Exam:    BP (!) 95/55   Pulse 63   Temp 97.4 °F (36.3 °C) (Temporal)   Resp 16   Ht 6' (1.829 m)   Wt 298 lb 6.4 oz (135.4 kg)   SpO2 99%   BMI 40.47 kg/m²   Wt Readings from Last 3 Encounters:   03/17/20 298 lb 6.4 oz (135.4 kg)   02/24/20 (!) 305 lb (138.3 kg)   02/20/20 (!) 305 lb 8 oz (138.6 kg)     Appearance: Awake, alert, no acute respiratory distress  Skin: Intact, no rash  Head: Normocephalic, atraumatic  Eyes: EOMI, no conjunctival erythema, anicteric sclerae  ENMT: No pharyngeal erythema, MMM, no rhinorrhea  Neck: Soft, supple, JVD  Lungs: Clear to auscultation bilaterally. No wheezes, rales, or rhonchi.   Cardiac: Regular rate and rhythm, +S1S2, no murmurs apparent  Abdomen: Soft, nontender, +bowel sounds  Extremities: Moves all extremities x 4, no lower extremity edema  Neurologic: No focal motor deficits apparent, normal mood and affect  Peripheral Pulses: Intact posterior tibial pulses bilaterally      Laboratory Tests:    Lab Results   Component Value Date    CREATININE 1.2 03/16/2020    BUN 23 (H) 03/16/2020     03/16/2020    K 3.6 03/16/2020     03/16/2020    CO2 24 03/16/2020     Lab Results   Component Value Date    MG 2.1 03/16/2020     Lab Results Component Value Date    WBC 6.0 03/15/2020    HGB 12.3 (L) 03/15/2020    HCT 38.8 03/15/2020    MCV 87.8 03/15/2020     03/15/2020     Lab Results   Component Value Date    CKTOTAL 186 08/14/2016    CKMB 2.1 11/25/2013    TROPONINI <0.01 03/14/2020     Lab Results   Component Value Date    INR 1.3 12/12/2018    INR 1.2 11/20/2018    INR 2.8 10/18/2018    PROTIME 14.4 (H) 12/12/2018    PROTIME 13.7 (H) 11/20/2018    PROTIME 31.3 (H) 10/18/2018     Lab Results   Component Value Date    ALT 21 03/14/2020    AST 18 03/14/2020    ALKPHOS 132 (H) 03/14/2020    BILITOT 0.4 03/14/2020     Lab Results   Component Value Date    TSH 1.270 02/24/2020     Lab Results   Component Value Date    LABA1C 7.6 (H) 02/24/2020     Lab Results   Component Value Date    CHOL 128 02/24/2020    CHOL 191 06/17/2019    CHOL 156 05/07/2018     Lab Results   Component Value Date    TRIG 73 02/24/2020    TRIG 169 (H) 06/17/2019    TRIG 137 05/07/2018     Lab Results   Component Value Date    HDL 42 02/24/2020    HDL 48 06/17/2019    HDL 43 05/07/2018     Lab Results   Component Value Date    LDLCALC 71 02/24/2020    LDLCALC 109 (H) 06/17/2019    LDLCALC 86 05/07/2018     Lab Results   Component Value Date    LABVLDL 15 02/24/2020    LABVLDL 34 06/17/2019    LABVLDL 27 05/07/2018       Current Medications:  Current Facility-Administered Medications   Medication Dose Route Frequency Provider Last Rate Last Dose    [START ON 3/18/2020] bumetanide (BUMEX) tablet 2 mg  2 mg Oral BID Felice Hernandez MD        insulin glargine (LANTUS) injection vial 40 Units  40 Units Subcutaneous BID Abdulaziz Cade MD   40 Units at 03/17/20 0949    sodium chloride flush 0.9 % injection 10 mL  10 mL Intravenous 2 times per day NATI Downing Mai - CNP   10 mL at 03/17/20 0948    sodium chloride flush 0.9 % injection 10 mL  10 mL Intravenous PRN NATI Downing Mai - CNP        allopurinol (ZYLOPRIM) tablet 300 mg  300 mg Oral Daily Abdulaziz Cade MD 300 mg at 03/17/20 0948    atorvastatin (LIPITOR) tablet 40 mg  40 mg Oral Daily Marleni Aaron MD   40 mg at 03/17/20 0948    carvedilol (COREG) tablet 25 mg  25 mg Oral BID KEVIN Aaron MD   25 mg at 03/16/20 1848    insulin lispro (HUMALOG) injection vial 20 Units  20 Units Subcutaneous TID KEVIN Aaron MD   20 Units at 03/17/20 0949    rivaroxaban (XARELTO) tablet 20 mg  20 mg Oral Daily Marleni Aaron MD   20 mg at 03/17/20 0948    sacubitril-valsartan (ENTRESTO)  MG per tablet 1 tablet  1 tablet Oral BID Marleni Aaron MD   1 tablet at 03/17/20 0948    acetaminophen (TYLENOL) tablet 650 mg  650 mg Oral Q6H PRN Marleni Aaron MD        Or   Rasheed Jordan acetaminophen (TYLENOL) suppository 650 mg  650 mg Rectal Q6H PRN Marleni Aaron MD        polyethylene glycol (GLYCOLAX) packet 17 g  17 g Oral Daily PRN Marleni Aaron MD        glucose (GLUTOSE) 40 % oral gel 15 g  15 g Oral PRN Marleni Aaron MD        dextrose 50 % IV solution  12.5 g Intravenous PRN Marleni Aaron MD        glucagon (rDNA) injection 1 mg  1 mg Intramuscular PRN Marleni Aaron MD        dextrose 5 % solution  100 mL/hr Intravenous PRN Marleni Aaron MD        potassium chloride (KLOR-CON M) extended release tablet 40 mEq  40 mEq Oral PRN Tony Cope MD        Or    potassium bicarb-citric acid (EFFER-K) effervescent tablet 40 mEq  40 mEq Oral PRN Tony Cope MD        Or    potassium chloride 10 mEq/100 mL IVPB (Peripheral Line)  10 mEq Intravenous PRN Tony Cope MD        spironolactone (ALDACTONE) tablet 25 mg  25 mg Oral Daily Tony Cpoe MD   25 mg at 03/17/20 0948    dofetilide (TIKOSYN) capsule 250 mcg  250 mcg Oral 2 times per day Yoel Lara MD   250 mcg at 03/17/20 0603      dextrose         DIAGNOSTICS:     CXR (3/14/2020)  FINDINGS:    Heart size mildly enlarged. There is a left-sided pacer. There is a  questionable subtle vague infiltrate in the region of the right  costophrenic angle.  Lungs are otherwise normal. There are no effusions. Impression  Questionable vague small infiltrate in the region of the right  costophrenic angle        CARDIAC TESTING:     [facility] (5/7/2012, Dr. Rajinder Meredith)  INTERPRETATION OF LEFT VENTRICULOGRAPHY:    The left ventricle appears to be huge.    The ejection fraction appears to be around 10% to 15%.    MR was also noticed. Unable to quantify due to the amount of dye.    Possible borderline prolapse of the mitral valve. INTERPRETATION OF CORONARY ANGIOGRAPHY:  1.    Left main is a large vessel in length and down until it bifurcates into both left anterior descending and left circumflex with no significant disease. 2.    Left anterior descending artery is a huge vessel, extends and wraps around the apex.  It covers also the inferoapical segment of the left ventricle with no significant disease. 3.    Left circumflex is a nondominant vessel with no significant disease. 4.    Right coronary artery is a dominant vessel with no significant disease. 5.    Right common femoral is a large vessel with no atherosclerosis and the site of insertion is above bifurcation. FINAL IMPRESSION:  1.    Huge left ventricle with evidence of severe left ventricular dysfunction, ejection fraction around 10% to 15%. 2.    Mitral regurgitation along with borderline prolapse of mitral valve. 3.    Normal coronaries.   4.    Significantly elevated left ventricular end-diastolic pressure, around 34 to 36 mmHg.     Echocardiogram (8/11/2016, Dr. Emiliana Lobo)   Summary   Left ventricular chamber moderately dilated at 7.3cm.   Severe global hypokinesis, mild abnormal septal motion, EF estimated about 20%.   Stage 3 diastolic dysfunction.   Left atrium is enlarged.   Increased LA volume index.   Interatrial septum not well visualized but appears intact.   Normal right ventricle structure and function.   Pacer/ICD lead in RV.   No mitral valve prolapse.   There is mild mitral regurgitation.   Normal aortic valve structure and function.   Normal tricuspid valve structure.   There is mild tricuspid regurgitation, RVSP 35mmHg.   Normal aortic root and ascending aorta.   No evidence of pericardial effusion. Pericardium appears normal.   The inferior vena cava diameter is normal with decreased respiratory variation.   No intracardiac mass.     Echocardiogram (10/15/2018, Dr. Julia Madera)   Summary   Severely dilated left ventricle.   Abnormal (paradoxical) motion consistent with conduction abnormality.   Severely reduced left ventricular systolic function.   There is doppler evidence of stage III diastolic dysfunction.   The left atrium is severely dilated.   Right ventricle global systolic function is reduced.    Mild to moderate mitral regurgitation is present.   Mild tricuspid regurgitation.  Liddie Sonoma is a trivial circumferential pericardial effusion noted.     [facility] 7/2019 -  CONCLUSION:  1.  Coronary artery disease:  a.  Left main:  No significant disease. b.  LAD:  Up to 40% to 50% proximal/mid vessel narrowing and 30% to 40%  distal vessel disease.  Around 50% hazy proximal narrowing of a long,  but small caliber diagonal branch.  c.  LCX:  Diffuse atherosclerosis, but with no more than 30%  angiographic luminal narrowing.  d.  RCA:  A large-dominant vessel with around 30% proximal and mid to  distal vessel narrowing. 2. [facility] elevated left ventricular end-diastolic pressure, consistent  with LV diastolic dysfunction.      6/2019 TTE -   Summary   Severely dilated left ventricle.   Ejection fraction is visually estimated at 20-25%. However there is a   definite improvement in overall LV systolic function since previous study   from 2018.   Diffuse global hypokinesis   The left atrium is moderate-severely dilated.   Structurally normal mitral valve.   Mild mitral regurgitation is present.   Increased E point septal separation noted,suggesting decreased LV cardiac   output.   Aortic valve opens well.   The aortic valve appears Hypertension  [place] [phone]

## 2020-03-17 NOTE — PLAN OF CARE
Problem: Falls - Risk of:  Goal: Will remain free from falls  Description: Will remain free from falls  Outcome: Met This Shift     Problem: Cardiac:  Goal: Ability to maintain vital signs within normal range will improve  Description: Ability to maintain vital signs within normal range will improve  Outcome: Met This Shift  Goal: Risk factors for ineffective tissue perfusion will decrease  Description: Risk factors for ineffective tissue perfusion will decrease  Outcome: Met This Shift     Problem: Health Behavior:  Goal: Will modify at least one risk factor affecting health status  Description: Will modify at least one risk factor affecting health status  Outcome: Met This Shift     Problem: Fluid Volume:  Goal: Will show no signs or symptoms of fluid imbalance  Description: Will show no signs or symptoms of fluid imbalance  Outcome: Met This Shift

## 2020-03-17 NOTE — PROGRESS NOTES
[facility]), Family Medicine Residency Program  Resident Progress  Note      Patient:  Justino Price III 44 y.o. male MRN: [id number]     Date of Service: 3/17/2020      [facility] day 3  Patient admitted for paroxysmal atrial fibrillation, CHF decompensation. Patient on diuresis negative 2000 ml approx, Cardiology and EP consulted. Pacemaker interrogated. Runs of Afib and Vtach, Tikosyn added. Patient feels better. Subjective      Patient was seen and examined this am. Feels better. Denies complaints. Spoke with CHF, and okay to discharge. Objective     Physical Exam:  · Vitals: BP 97/61   Pulse 62   Temp 98.1 °F (36.7 °C) (Temporal)   Resp 18   Ht 6' (1.829 m)   Wt 298 lb 6.4 oz (135.4 kg)   SpO2 100%   BMI 40.47 kg/m²     · General Appearance: AAOX3  · HEENT:  NC/AT. · Neck: Trachea midline. · Lung: Clear breath sounds B/l  · Heart: RRR, normal S1, S2. No MRG  · Abdomen: Soft, NT, ND. BS +tru. · Extremities: No leg edema. Pedal pulses 2+ symmetric b/l. · Musculokeletal: No joint swelling, no muscle tenderness. ROM normal in all joints of extremities. · Neurologic: Mental status: AAOX3     Pertinent/ New Labs and Imaging Studies     CBC:   Lab Results   Component Value Date    WBC 6.0 03/15/2020    RBC 4.42 03/15/2020    HGB 12.3 03/15/2020    HCT 38.8 03/15/2020     03/15/2020    MCV 87.8 03/15/2020     BMP:    Lab Results   Component Value Date     03/16/2020    K 3.6 03/16/2020     03/16/2020    CO2 24 03/16/2020    BUN 23 03/16/2020    CREATININE 1.2 03/16/2020    GLUCOSE 186 03/16/2020    GLUCOSE 192 05/15/2012    CALCIUM 10.0 03/16/2020       Resident's Assessment and PLan     Paroxysmal Atrial fibrillation with RVR  Asymptomatic and rate controlled this am  Troponin negative, repeat x 2  Pro-BNP 3245 baseline 700  Continue xarelto  EP okay for discharge.   Discharged on Tikosyn.     HFrEF, NYHA Class 3 HF  Possible decompensation  Elevated pro-BNP

## 2020-03-17 NOTE — PROGRESS NOTES
[address] [facility]  Family Medicine Attending    S: 44 y.o. male with a history of Dm2 (a1c 7.6), HFrEF with cardiomems s/p pacer/aicd, hicm, afib, keshia, htn, ckd 3, cva, anemia, and gout who presented with palpitations. Denies medication non-compliance. Some cough but no shortness of breath or lower extremity swelling. No f/c/s. This morning, in good spirits. Feels well and has no chest pain or palpitations. Rhythm paced    O: VS- Blood pressure (!) 95/55, pulse 63, temperature 97.4 °F (36.3 °C), temperature source Temporal, resp. rate 16, height 6' (1.829 m), weight 298 lb 6.4 oz (135.4 kg), SpO2 99 %. Exam is as noted by resident with the following changes, additions or corrections:  Gen: NAD   HEENT: NCAT, PERRL, MMM  CV-irregularly irregular  Lungs-CTA b/l no R/R/W  ABD-soft nonttp no masses, slightly protuberant  Ext-no C/C/tr edema    Impressions: Active Problems:    S/P ICD (internal cardiac defibrillator) procedure    Nonischemic cardiomyopathy (HCC)    S/P left pulmonary artery pressure sensor implant placement    Acute on chronic systolic heart failure ([address]  Resolved Problems:    * No resolved hospital problems. *      Plan:   Appreciate CHF and EPS consults   Continue tikosyn. Diuretics per cardiology   OK for discharge when cardiology agrees        Attending Physician Statement  I have reviewed the chart and seen the patient with the resident(s). I personally reviewed images, EKG's and similar tests, if present. I personally reviewed and performed key elements of the history and exam.  I have reviewed and confirmed student and/or resident history and exam with changes as indicated above. I agree with the assessment, plan and orders as documented by the resident. Please refer to the resident and/or student note for additional information.       Lindy Chung

## 2020-03-18 ENCOUNTER — CARE COORDINATION (OUTPATIENT)
Dept: CASE MANAGEMENT | Age: 40
End: 2020-03-18

## 2020-03-18 PROBLEM — I50.23 ACUTE ON CHRONIC SYSTOLIC (CONGESTIVE) HEART FAILURE (HCC): Status: ACTIVE | Noted: 2020-03-18

## 2020-03-18 PROCEDURE — 1111F DSCHRG MED/CURRENT MED MERGE: CPT | Performed by: FAMILY MEDICINE

## 2020-03-18 NOTE — CARE COORDINATION
COVID-19 Screening Initial Follow-up Note    Patient contacted regarding COVID-19  risk. Care Transition Nurse/ Ambulatory Care Manager contacted the patient by telephone to perform post discharge assessment. Verified name and  with patient as identifiers. Provided introduction to self, and explanation of the CTN/ACM role, and reason for call due to risk factors for infection and/or exposure to COVID-19. Symptoms reviewed with patient who verbalized the following symptoms:   Fever no    Fatigue no   Pain or aching joints no  Cough no  Shortness of breath no    Confusion or unusual change in mental status no    Chills or shaking no    Sweating no    Fast heart rate no    Fast breathing no    Dizziness/lightheadedness no    Less urine output no    Cold, clammy, and pale skin no  Low body temperature no       Due to onset/worsening of new symptoms, encounter routed to provider for escalation. Patient has following risk factors of: heart failure. CTN/ACM reviewed discharge instructions, medical action plan and red flags such as increased shortness of breath, increasing fever and signs of decompensation with patient who verbalized understanding. Discussed exposure protocols and quarantine with CDC Guidelines What to do if you are sick with coronavirus disease 2019 Patient who was given an opportunity for questions and concerns. The patient agrees to contact the [facility] exposure line, local health department and PCP office for questions related to their healthcare. CTN provided contact information for future reference. Reviewed and educated patient on any new and changed medications related to discharge diagnosis     Plan for follow-up call in 14 days based on severity of symptoms and risk factors related to COVID-19 risk. CTN will contact sooner related to post hospitalization follow up.     L[facility] Transitions Initial Follow Up Call    Call within 2 business days of discharge:

## 2020-03-19 ENCOUNTER — CARE COORDINATION (OUTPATIENT)
Dept: CASE MANAGEMENT | Age: 40
End: 2020-03-19

## 2020-03-19 ENCOUNTER — TELEPHONE (OUTPATIENT)
Dept: CARDIOLOGY CLINIC | Age: 40
End: 2020-03-19

## 2020-03-19 LAB
BLOOD CULTURE, ROUTINE: NORMAL
CULTURE, BLOOD 2: NORMAL

## 2020-03-19 RX ORDER — POTASSIUM CHLORIDE 20 MEQ/1
40 TABLET, EXTENDED RELEASE ORAL DAILY
Qty: 60 TABLET | Refills: 11 | Status: SHIPPED
Start: 2020-03-19 | End: 2020-03-24 | Stop reason: ALTCHOICE

## 2020-03-19 RX ORDER — BUMETANIDE 2 MG/1
2 TABLET ORAL 2 TIMES DAILY
Qty: 60 TABLET | Refills: 11 | Status: SHIPPED
Start: 2020-03-19 | End: 2020-08-28 | Stop reason: SDUPTHER

## 2020-03-19 NOTE — CARE COORDINATION
-CTN phoned patient Rehan Laguerre to inform that he is to be on Entresto per cardiology note today and that communication occurred to [facility] pharmacy. Patient states he plans on calling the pharmacy today. CTN encouraged patient to call CTN for any needs, patient has CTN contact information and verbalized understanding.  -Patient is agreeable to continued follow up from CTN.

## 2020-03-19 NOTE — TELEPHONE ENCOUNTER
· I Faxed [facility] pharmacy [phone] med list from hospital discharge list.   Gema Hauser is listed at the correct dose 97-103mg BID which is a continued medication not a new med!)   · Called [facility] [phone] with pharmacisit Edwina Matos  11:22 AM      RE stop demadex , And Travist Nilton III  locally filled bumex script. Travist Nilton III  is to be on entresto 97-103mg BID as prescribed. · It is also noted [facility] was used to fill his new scripts. Humalog, levemir, bumex, K+  (meds to beds)  See clinical pharmacy notes 3/17 @130pm.   · His  Current cardiology meds were not  escribed as they are active with [facility]  And unchanged! (except the bumex which was locally filled so he could have the med immediately to start use of his new diureitc.)   · Also faxed the PA that is still active and good for entresto. To [facility]. Edwina Matos ran it and entresto went through. PA is good. · 4-8-20  is his next start date for exactpack delivery (pillpacking)   ·  DR Leonora Bowers is EP (managing Afib)  She can decide if she will utilize exactpack for her meds she orders for Travist Nilton III     · 3- note from DR Whitley-Brandon inpatient with:   PLAN:  1. Bumetanide 2 mg oral bid  2. Continue other meds at discharge  3. Chaim Ness for discharge from HF standpoint  4. Encourage CPAP use  5. HF nurse navigator  6. Continue statin  7. Please consider SGLT-2 inhibitor for T2DM and cardiac disease  8. [address] for AF (xarelto); dofetilide per EP    Script was sent 2-20-20  to [facility] for  entresto 97-103mg BID (mail order pharmacy) . It is noted receipt confirmed by pharmacy 2/20/20 8:55am.  Confirmed by Edwina Matos pharmacist this is active script. So is carvedilol 25mg bid, aldactone 25mg daily. Mail order does not have tikosyn , K+, humalog or levimir  Scripts. DR Gema Hines Just sent the bumex script and K+ script to [facility] ( they will dispense bottle not pill pack as dose may change) .  Once doses

## 2020-03-20 ENCOUNTER — CARE COORDINATION (OUTPATIENT)
Dept: CASE MANAGEMENT | Age: 40
End: 2020-03-20

## 2020-03-20 NOTE — DISCHARGE SUMMARY
Physician Discharge Summary     Patient ID:  Shirin England III  [id number]  56 y.o.  1980    Admit date: 3/14/2020    Discharge date:  3/17/2020    Admission Diagnoses:   Present on Admission:   (Resolved) Acute on chronic systolic heart failure (HCC)   Nonischemic cardiomyopathy ([facility] [address]   S/P ICD (internal cardiac defibrillator) procedure   S/P left pulmonary artery pressure sensor implant placement   Acute on chronic systolic (congestive) heart failure ([facility] [address]      Admission Condition:  poor    Discharged Condition:  stable      Hospital Course    The patient is a 44 y.o. male PMHX  HFrEF (Echo July 2019 EF 20%) s/o cardiomems, ICD and ventricular pacemaker, HTN/HPLD/CAD, CKD 3a, paroxysmal Afib on xarelto, DM2 (A1C 7.6%) who presented to the hospital due to chest pain, and racing heart. He was admitted for HFrEF exacerbation, was found to be in paroxysmal atrial fibrillation. Heart failure specialist, and EP were both consulted. EP placed the patient on Tikosyn, and heart rhythm resolved. CHF specialist placed him on Bumex, and diuresed well, and BNP improved. He was discharged home on Tikosyn, and on 2 mg of Bumex twice a day. Torsemide was discontinued. Of note QTC was elongated while in the hospital, was okay  according to EP. Potassium was low likely due to Bumex. Repleted while in the hospital.  Discharged on Klor-Con. Insulin was modified while in the hospital, and was just discharged on 40 mg  of Levemir twice daily, and 20 mg of Humalog 3 times daily. Both were decreased compared to doses  Prior to admission. At follow up appointment, consider adjusting insulin as needed. Ensure medication compliance. Can consider checking ECG since being placed on Tikosyn. Check potassium. Discussed follow-up with specialists. Discharge Diagnoses:    Active Problems:    S/P ICD (internal cardiac defibrillator) procedure    Nonischemic cardiomyopathy (HCC)    S/P left pulmonary artery sacubitril-valsartan  MG per tablet  Commonly known as:  Entresto  Take 1 tablet by mouth 2 times daily     sharps container  For needle disposal.     spironolactone 25 MG tablet  Commonly known as:  ALDACTONE  TAKE 1/2 TABLET BY MOUTH DAILY WITH LUNCH        STOP taking these medications    torsemide 20 MG tablet  Commonly known as:  DEMADEX           Where to Get Your Medications      These medications were sent to [facility], [address] [address]    Phone:  [phone]   · dofetilide 250 MCG capsule  · insulin lispro 100 UNIT/ML injection vial  · Levemir FlexTouch 100 UNIT/ML injection pen              Medication List      START taking these medications    dofetilide 250 MCG capsule  Commonly known as:  TIKOSYN  Take 1 capsule by mouth every 12 hours        CHANGE how you take these medications    insulin lispro 100 UNIT/ML injection vial  Commonly known as:  HUMALOG  Inject 20 Units into the skin 3 times daily (before meals)  What changed:  See the new instructions. Levemir FlexTouch 100 UNIT/ML injection pen  Generic drug:  insulin detemir  Inject 40 Units into the skin 2 times daily  What changed:  how much to take        CONTINUE taking these medications    allopurinol 300 MG tablet  Commonly known as:  ZYLOPRIM  Take 1 tablet by mouth daily     atorvastatin 40 MG tablet  Commonly known as:  LIPITOR  TAKE (1) TABLET BY MOUTH DAILY     blood glucose test strips  Test 2-3 times a day & as needed for symptoms of irregular blood glucose.      carvedilol 25 MG tablet  Commonly known as:  COREG  Take 1 tablet by mouth 2 times daily (with meals)     Lancets Misc  Test 3-4 times daily     magnesium oxide 400 MG tablet  Commonly known as:  MAG-OX     ONE TOUCH ULTRA 2 w/Device Kit  USE AS DIRECTED DAILY *SUBSTITUTE AS NEEDED PER INSURANCE PREFERENCE*     rivaroxaban 20 MG Tabs tablet  Commonly known as:

## 2020-03-20 NOTE — CARE COORDINATION
-Trixie(RN from Dr Darrion Ortiz office) returned CTN call and left detailed message regarding Entresto/patient medications. -CTN phoned Maria R Motley and left message that I had spoken to patient and that he was going to call his pharmacy regarding Entresto. -Maria R Motley responded to [address] [place] [address] Drive via email that she had received CTN message.

## 2020-03-23 ENCOUNTER — OFFICE VISIT (OUTPATIENT)
Dept: FAMILY MEDICINE CLINIC | Age: 40
End: 2020-03-23
Payer: MEDICARE

## 2020-03-23 VITALS
OXYGEN SATURATION: 97 % | DIASTOLIC BLOOD PRESSURE: 76 MMHG | HEART RATE: 64 BPM | WEIGHT: 296 LBS | TEMPERATURE: 98.4 F | BODY MASS INDEX: 40.09 KG/M2 | SYSTOLIC BLOOD PRESSURE: 109 MMHG | HEIGHT: 72 IN

## 2020-03-23 PROCEDURE — 99496 TRANSJ CARE MGMT HIGH F2F 7D: CPT | Performed by: FAMILY MEDICINE

## 2020-03-23 PROCEDURE — 1111F DSCHRG MED/CURRENT MED MERGE: CPT | Performed by: FAMILY MEDICINE

## 2020-03-23 PROCEDURE — 99212 OFFICE O/P EST SF 10 MIN: CPT | Performed by: FAMILY MEDICINE

## 2020-03-23 RX ORDER — TORSEMIDE 20 MG/1
20 TABLET ORAL DAILY
COMMUNITY
End: 2020-03-23

## 2020-03-23 NOTE — PROGRESS NOTES
Post-Discharge Transitional Care Management Services  Nurse/MA Progress Note     Paul Quiñonez III, 1980  Date of Visit:  3/23/2020     Please evaluate the following checklist (all answers must be yes)     The care coordinator documented communication with the patient/caretaker within 2 business days of the discharge date and filed an encounter? Yes If NO - convert to an office visit; patient does not qualify for AMAIRANI visit     If YES - go to next question   The discharge information is available for the physician to review? Yes If NO - convert to an office visit; patient does not qualify for AMAIRANI visit     If YES - go to next question   Is this visit within 7 or 14 days of discharge? Yes, less than 7 days of discharge date. Able to bill [id number] for high complexity visit or [phone] for moderate complexity visit. If NO - convert to an office visit; patient does not qualify for AMAIRANI visit     If YES - go to next question   Is the visit the first TCM in the 30d prior to this admission. Yes - this is the first TCM within the time period including 30d prior to this currently discussed admission. If NO - convert to an office visit; patient does not qualify for AMAIRANI visit      If YES - go on to room the patient as a TCM visit. The Doctor should use the system smart phrase TCMPN as their note template. This visit requires attending presence if completed by a resident.     Billing codes should be as above    - 87520 - moderate complexity (visit occurring between 1-14d after discharge)   - 06841 - high complexity (high complexity visit occurring between 1-7d after discharge)

## 2020-03-24 ENCOUNTER — OFFICE VISIT (OUTPATIENT)
Dept: CARDIOLOGY CLINIC | Age: 40
End: 2020-03-24
Payer: MEDICARE

## 2020-03-24 VITALS
OXYGEN SATURATION: 97 % | RESPIRATION RATE: 16 BRPM | WEIGHT: 293.6 LBS | DIASTOLIC BLOOD PRESSURE: 76 MMHG | BODY MASS INDEX: 39.77 KG/M2 | HEART RATE: 70 BPM | HEIGHT: 72 IN | SYSTOLIC BLOOD PRESSURE: 106 MMHG

## 2020-03-24 PROCEDURE — 99214 OFFICE O/P EST MOD 30 MIN: CPT | Performed by: NURSE PRACTITIONER

## 2020-03-24 PROCEDURE — 93000 ELECTROCARDIOGRAM COMPLETE: CPT | Performed by: INTERNAL MEDICINE

## 2020-03-24 RX ORDER — INSULIN DETEMIR 100 [IU]/ML
40 INJECTION, SOLUTION SUBCUTANEOUS 2 TIMES DAILY
Qty: 30 ML | Refills: 10 | Status: SHIPPED
Start: 2020-03-24 | End: 2020-08-28 | Stop reason: SDUPTHER

## 2020-03-24 NOTE — PATIENT INSTRUCTIONS
1. Stop potassium supplements    2. Restart Entresto at 49/51 mg twice daily ( cut 97/103 mg tablets in half)    3. Has HF infusion clinic appointment on Friday - will evaluate visit and determine medication adjustments. 4. Continue to lay on cardioMEMS pillow daily     5. Continue to wear cpap nightly     6. Follow up visit with Dr. Stiven Chiu in 2-3 weeks ( for E visit due to COVID-19). 7. Weigh yourself daily    -Stay Hydrated    -Diet should sodium restricted to 2 grams    -Again watch your daily weight trends and if you gain water weight please follow below instructions.    -If you gain 3-5 pounds in 2-3 days OR notice that you are retaining fluid in anyway just like you did before then take an extra dose of your water pill (bumetanide/Bumex) every day until you lose the weight or feel better.     -If you notice that you have taken more than 3 extra doses in 1 week then please call and let us know. -If at any time you feel that you are retaining fluid, your medications are not working, or you feel ill in anyway, then please call us for either same day appointment or the next day, and for instructions. Our goal is to keep you out of the emergency room and the hospital and we have ways to do it. You just need to call us in a timely manner.     -If you become sick for other reasons, and notice that you are not urinating as much, the urine is very dark, you have significant diarrhea or vomiting, then please DO NOT take your water pill and CALL US immediately. 8. As you know, there is concern about the spread of COVID-19.  ? People with cardiovascular conditions such as yourself are vulnerable to infection.   ?  We recommend that you remain diligent about social distancing:  -staying 6 feet away from individuals when out in public  -limit public trips for necessary reasons only  -washing your hands frequently  -sanitize all hard surfaces including grocery cart handles  -remain hydrated  -if you feel unwell, call your doctor for advice on what to do  -If you can, avoid prolonged exposure to little children as well as elderly adults    The goal is limiting any potential exposure to COVID-19. You need to protect yourself.   ?

## 2020-03-24 NOTE — TELEPHONE ENCOUNTER
Last Appointment:  3/23/2020  Future Appointments   Date Time Provider Clemente Davis   3/24/2020  1:30 PM NATI Cabrera - CNP [place] [facility]   3/26/2020 12:30 PM [facility] CHF ROOM 4 [facility] CHF [facility]

## 2020-03-24 NOTE — PROGRESS NOTES
Advanced Heart Failure and Pulmonary Hypertension Clinic  Office Visit         Reason for Visit: Heart failure  Primary Cardiologist: Dr. Emiliana Lobo  EP Cardiologist: Dr. Leobardo Hughes        History of Present Illness:     Mr. Macey Goetz is a 44year old  male with a PMHx of long standing nonischemic dilated cardiomyopathy that was dx at age 27, chronic HFrEF with recurrent hospitalizations, CRT-D in situ, PAF, T2DM, iron deficiency, obesity, ARVIND and hx medical noncompliance. He was recently hospitalized for complaints of palpitations and increased shortness of breath. He had underwent a DCCV per Dr. Leobardo Hughes several weeks prior to presentation however on arrival was back in atrial fibrillation. He was IV diuresed with subjective improvement and initiated on Tikosyn therapy per EP services. He had subjective improvement and was discharged home. He presents today in post acute heart failure hospitalization follow-up, since discharge from the hospital he continues to have improving symptoms however continues to have occasional CLAYTON and orthopnea. 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 EF 10% to 15%. Patient reportedly did receive an ICD vest at      that time, however, did not return for a regular followup.    2. Echo March 2012:  EF 30% to 24%, stage 2 diastolic dysfunction, septal      hypokinesia, mild LVH, left atrium mildly-moderately dilated, mild MR.      RVSP of 25-30. Trace PI.   3.    Cardiac MRI July 2013:  EF 20%   Echo 01/24/2016 [facility]):  LV severely dilated. LA      moderately dilated. Mild PHTN. Mild TR. Trace TR. Pacer wire visible      in the right atrium and ventricle. RA mildly dilated. EF 10% to 15%. Class 3 diastolic dysfunction. RV moderately dilated. RV systolic      function moderate-severely reduced. Mild-moderate MR.   15.   Echo March 2012 (Dr. Urmila Chinchilla):  LVEF 30% to 35% and enlarged LV      S/P left pulmonary artery pressure sensor implant placement     S/P ICD (internal cardiac defibrillator) procedure 11/20/2018     Status post single chamber ICD implant, Medtronic Evera [id number],      September 30, 2013, for nonischemic cardiomyopathy (single chamber)      Morbid obesity with BMI of 40.0-44.9, adult ([address] 08/22/2018    Paroxysmal atrial fibrillation ([address] 01/20/2017    Essential hypertension 01/20/2017    VHD (valvular heart disease) 01/20/2017    Hepatomegaly 12/01/2016    Type 2 diabetes mellitus with complication, with long-term current use of insulin ([address] 08/22/2016    Chronic gout 08/13/2016    CKD (chronic kidney disease)     Obstructive sleep apnea 11/29/2011          PAST MEDICAL/SURGICAL HISTORY:   1. Cardiac catheterization February and December of 2011:  Patent coronary      arteries. EF 10% to 15%. Patient reportedly did receive an ICD vest at      that time, however, did not return for a regular followup. 2.    Echo March 2012:  EF 30% to 37%, stage 2 diastolic dysfunction, septal      hypokinesia, mild LVH, left atrium mildly-moderately dilated, mild MR.      RVSP of 25-30. Trace PI.   3.    Cardiac MRI July 2013:  EF 20%   4.     Status post single chamber ICD implant, Medtronic Evera [id number],      September 30, 2013, Hypertension     Morbid obesity due to excess calories ([facility]     Nonischemic cardiomyopathy ([facility] 11/29/2011    Nonischemic cardiomyopathy ([facility] 7/2013 7/2013- cardiac MRI revealed an LVEF of 20%    ARVIND (obstructive sleep apnea) 11/29/2011    S/P left heart catheterization by percutaneous approach 12-27/2011    Dr Mark Caldwell Systolic heart failure [facility]) 5/7/2012 5/7/12- cardiac catheterization revealed an LVEF of 10-15%, mitral regurgitation along with borderline prolapse of mitral valve    Type 2 diabetes mellitus with complication, with long-term current use of insulin ([facility] 8/22/2016    URI, acute 11/29/2011         Past Surgical History:   Procedure Laterality Date    CARDIAC CATHETERIZATION  08/01/2019    Dr Radha Villarreal  11/20/2018    UPGRADE S-ICD TO BIV ICD   ([employer])  [facility]     CARDIOVERSION  02/14/2020    Successful CV of AF to NSR      (Dr. Heydi Estes)    ECHO COMPL W DOP COLOR FLOW  11/30/2011         ECHO COMPL W DOP COLOR FLOW  3/27/2012         INSERTABLE CARDIAC MONITOR  12/13/2018    [facility], [facility], Dr. Hilary Butler             No Known Allergies        Outpatient Medications Marked as Taking for the 3/24/20 encounter (Office Visit) with NATI Chong - CNP   Medication Sig Dispense Refill    bumetanide (BUMEX) 2 MG tablet Take 1 tablet by mouth 2 times daily 60 tablet 11    [DISCONTINUED] LEVEMIR FLEXTOUCH 100 UNIT/ML injection pen Inject 40 Units into the skin 2 times daily 30 mL 10    [DISCONTINUED] insulin lispro (HUMALOG) 100 UNIT/ML injection vial Inject 20 Units into the skin 3 times daily (before meals) 10 mL 10    dofetilide (TIKOSYN) 250 MCG capsule Take 1 capsule by mouth every 12 hours 60 capsule 3    spironolactone (ALDACTONE) 25 MG tablet TAKE 1/2 TABLET BY MOUTH DAILY WITH LUNCH 30 tablet 10    atorvastatin (LIPITOR) 40 MG tablet TAKE (1) TABLET BY MOUTH DAILY 30 tablet 5    carvedilol (COREG) 25 MG tablet Take 1 tablet by mouth 2 times daily (with meals) 60 tablet 11    rivaroxaban (XARELTO) 20 MG TABS tablet TAKE 1 TABLET BY MOUTH DAILY 30 tablet 10    allopurinol (ZYLOPRIM) 300 MG tablet Take 1 tablet by mouth daily 30 tablet 11    magnesium oxide (MAG-OX) 400 MG tablet TAKE 1 TABLET BY MOUTH DAILY  11         Guideline directed medical/device therapy:  ARNI/ACE I/ARB: Yes  Beta blocker: Yes  Aldosterone antagonist:  Yes  ICD/CRT-P/-D:  CRT-D  QRS interval on recent ECG (personally reviewed/interpreted): >150 ms  Percentage RV pacing (personally reviewed/interpreted): %/NA        Review of Systems:   Cardiac: As per HPI  General: No fever, chills, rigors  Pulmonary: As per HPI  HEENT: No visual disturbances, difficult swallowing  GI: No nausea, vomiting, abdominal pain  : No dysuria or hematuria  Endocrine: No thyroid disease or diabetes  Musculoskeletal: SILVA x 4, no focal motor deficits  Skin: Intact, no rashes  Neuro/Psych: No headache or seizures        Weights: Wt Readings from Last 3 Encounters:   03/24/20 293 lb 9.6 oz (133.2 kg)   03/23/20 296 lb (134.3 kg)   03/17/20 298 lb 6.4 oz (135.4 kg)     Physical Examination:     /76 (Site: Right Upper Arm, Position: Sitting, Cuff Size: Large Adult)   Pulse 70   Resp 16   Ht 6' (1.829 m)   Wt 293 lb 9.6 oz (133.2 kg)   SpO2 97%   BMI 39.82 kg/m²      CONSTITUTIONAL: Alert and oriented times 3, no acute distress and cooperative to examination with proper mood and affect. SKIN: Skin color, texture, turgor normal. No rashes or lesions. LYMPH: no cervical nodes, no inguinal nodes  HEENT: Head is normocephalic, atraumatic. EOMI, PERRLA. NECK: Supple, symmetrical, trachea midline, no adenopathy, thyroid symmetric, not enlarged and no tenderness, skin normal. No jvd/hjr. CHEST/LUNGS: chest symmetric with normal A/P diameter, normal respiratory rate and rhythm, lungs clear to auscultation without wheezes, rales or rhonchi. No accessory muscle use. Scars None   CARDIOVASCULAR: Heart sounds are normal.  Regular rate and rhythm without murmur, gallop or rub. Normal S1 and S2. . Carotid and femoral pulses 2+/4 and equal bilaterally. ABDOMEN: Obese/Soft. No and Laparoscopic scar(s) present. Normal bowel sounds. No bruits. soft, nondistended, no masses or organomegaly. no evidence of hernia. Percussion: Normal without hepatosplenomegally. Tenderness: absent. RECTAL: deferred, not clinically indicated  NEUROLOGIC: There are no focalizing motor or sensory deficits. CN II-XII are grossly intact. Aneita Frank EXTREMITIES: no cyanosis, no clubbing and no edema. Warm and well perfused. All the following diagnostics were personally reviewed and interpreted by me. LAB DATA:     3/16/2020 08:44 3/17/2020 11:12   Sodium 136 138   Potassium 3.6 5.2 (H)   Chloride 100 104   CO2 24 17 (L)   BUN 23 (H) 34 (H)   Creatinine 1.2 1.4 (H)   Anion Gap 12 17 (H)   GFR Non- >60 >60   GFR African American >60 >60   Magnesium 2.1    Glucose 186 (H) 130 (H)   Calcium 10.0 10.3 (H)   Meter Glucose     Pro-BNP  454 (H)         DIAGNOSTICS:    CXR (3/14/2020)  FINDINGS:    Heart size mildly enlarged. There is a left-sided pacer. There is a  questionable subtle vague infiltrate in the region of the right  costophrenic angle. Lungs are otherwise normal. There are no effusions.   Impression:  Questionable vague small infiltrate in the region of the right  costophrenic angle       CARDIAC TESTING:      Echocardiogram (8/11/2016, Dr. Shelly Gomez)   Summary   Left ventricular chamber moderately dilated at 7.3cm.   Severe global hypokinesis, mild abnormal septal motion, EF estimated about 20%.   Stage 3 diastolic dysfunction.   Left atrium is enlarged.   Increased LA volume index.   Interatrial septum not well visualized but appears intact.   Normal right ventricle structure and function.   Pacer/ICD lead in RV.   No mitral valve prolapse.   There is mild mitral instructions.    -If you gain 3-5 pounds in 2-3 days OR notice that you are retaining fluid in anyway just like you did before then take an extra dose of your water pill (bumetanide/Bumex) every day until you lose the weight or feel better.     -If you notice that you have taken more than 3 extra doses in 1 week then please call and let us know. -If at any time you feel that you are retaining fluid, your medications are not working, or you feel ill in anyway, then please call us for either same day appointment or the next day, and for instructions. Our goal is to keep you out of the emergency room and the hospital and we have ways to do it. You just need to call us in a timely manner.     -If you become sick for other reasons, and notice that you are not urinating as much, the urine is very dark, you have significant diarrhea or vomiting, then please DO NOT take your water pill and CALL US immediately. 8. As you know, there is concern about the spread of COVID-19.  ? People with cardiovascular conditions such as yourself are vulnerable to infection. ?  We recommend that you remain diligent about social distancing:  -staying 6 feet away from individuals when out in public  -limit public trips for necessary reasons only  -washing your hands frequently  -sanitize all hard surfaces including grocery cart handles  -remain hydrated  -if you feel unwell, call your doctor for advice on what to do  -If you can, avoid prolonged exposure to little children as well as elderly adults    The goal is limiting any potential exposure to COVID-19. You need to protect yourself. Alysia DAMIAN-CNP  Heart Failure and Pulmonary Hypertension  [id number] [facility].

## 2020-03-25 ENCOUNTER — CARE COORDINATION (OUTPATIENT)
Dept: CASE MANAGEMENT | Age: 40
End: 2020-03-25

## 2020-03-25 RX ORDER — SACUBITRIL AND VALSARTAN 49; 51 MG/1; MG/1
1 TABLET, FILM COATED ORAL 2 TIMES DAILY
Qty: 60 TABLET | Refills: 11 | Status: SHIPPED
Start: 2020-03-25 | End: 2020-04-03 | Stop reason: DRUGHIGH

## 2020-03-25 NOTE — PROGRESS NOTES
Carlos Ayala III   Called [facility] pharmacisit updated on L Talia CNP K+ and entresto instructions. Cal Barrier is not scored to be broke in 1/2. Therefore Carlos Ayala III will be sent a script of 49-51mg  (dispense  in a bottle) (marked to send outside of pill packing) inactivated the 97-103mg BID script and pulled from current pill packing to be sent soon. Until his maintenance dose is reached will send bottles outside of pillpacking. Please let [facility] know when a dose is changed and when they can pill pack the Entersto.

## 2020-03-26 ENCOUNTER — HOSPITAL ENCOUNTER (OUTPATIENT)
Dept: OTHER | Age: 40
Setting detail: THERAPIES SERIES
Discharge: HOME | End: 2020-03-26
Payer: MEDICARE

## 2020-03-26 VITALS
SYSTOLIC BLOOD PRESSURE: 109 MMHG | RESPIRATION RATE: 18 BRPM | HEART RATE: 64 BPM | BODY MASS INDEX: 40.28 KG/M2 | WEIGHT: 297 LBS | DIASTOLIC BLOOD PRESSURE: 64 MMHG

## 2020-03-26 LAB
ANION GAP SERPL CALCULATED.3IONS-SCNC: 14 MMOL/L (ref 7–16)
BUN BLDV-MCNC: 33 MG/DL (ref 6–20)
CALCIUM SERPL-MCNC: 10 MG/DL (ref 8.6–10.2)
CHLORIDE BLD-SCNC: 101 MMOL/L (ref 98–107)
CO2: 19 MMOL/L (ref 22–29)
CREAT SERPL-MCNC: 1.1 MG/DL (ref 0.7–1.2)
GFR AFRICAN AMERICAN: >60
GFR NON-AFRICAN AMERICAN: >60 ML/MIN/1.73
GLUCOSE BLD-MCNC: 221 MG/DL (ref 74–99)
POTASSIUM SERPL-SCNC: 4.2 MMOL/L (ref 3.5–5)
PRO-BNP: 682 PG/ML (ref 0–125)
SODIUM BLD-SCNC: 134 MMOL/L (ref 132–146)

## 2020-03-26 PROCEDURE — 99214 OFFICE O/P EST MOD 30 MIN: CPT

## 2020-03-26 PROCEDURE — 80048 BASIC METABOLIC PNL TOTAL CA: CPT

## 2020-03-26 PROCEDURE — 36415 COLL VENOUS BLD VENIPUNCTURE: CPT

## 2020-03-26 PROCEDURE — 83880 ASSAY OF NATRIURETIC PEPTIDE: CPT

## 2020-03-26 NOTE — PROGRESS NOTES
Weight down 8 lbs from last visit. Has no c/o. Assessment unremarkable. Medication changes reviewed. Labs obtained.

## 2020-04-01 ENCOUNTER — CARE COORDINATION (OUTPATIENT)
Dept: CASE MANAGEMENT | Age: 40
End: 2020-04-01

## 2020-04-01 NOTE — CARE COORDINATION
Center   4/8/2020 11:00 AM MD ANGELA Castillo CARDIO [facility]   4/23/2020  1:15 PM [facility] CHF ROOM 4 [facility] CHF [facility]   5/18/2020  3:20 PM [facility]       Nilay Clemente RN

## 2020-04-03 ENCOUNTER — TELEPHONE (OUTPATIENT)
Dept: CARDIOLOGY CLINIC | Age: 40
End: 2020-04-03

## 2020-04-03 RX ORDER — BUMETANIDE 2 MG/1
2 TABLET ORAL 2 TIMES DAILY
Qty: 10 TABLET | Refills: 0 | Status: SHIPPED
Start: 2020-04-03 | End: 2020-05-26 | Stop reason: CLARIF

## 2020-04-03 RX ORDER — SACUBITRIL AND VALSARTAN 97; 103 MG/1; MG/1
1 TABLET, FILM COATED ORAL 2 TIMES DAILY
Qty: 180 TABLET | Refills: 3 | Status: SHIPPED
Start: 2020-04-03 | End: 2020-08-28 | Stop reason: SDUPTHER

## 2020-04-03 NOTE — TELEPHONE ENCOUNTER
----- Message from Batsheva Urbano MD sent at 4/3/2020  3:26 PM EDT -----    PAD elevated  Go up on his entresto to  mg BID  Blood work in 2-4 weeks since he is young

## 2020-04-03 NOTE — TELEPHONE ENCOUNTER
Called [facility] -[phone] (home)      Updated on entresto and lab instructions. Labs placed bmp/bnp. He already has preset chf clinic visit and will have labs done then; unless he has adverse effects and need to have labs sooner, then will go to lab and utilize placed labs from 4/3/2020     [facility] Stop all other entresto scripts. Waleska Caballero says he is out of  Bumex. He is calling exact script to see delivery date of next med packs. Dr Mariano Reis will send a partial script to [facility] outpatient pharmacy so he has meds.      Jesus Oropeza RN

## 2020-04-06 ENCOUNTER — TELEPHONE (OUTPATIENT)
Dept: CARDIOLOGY CLINIC | Age: 40
End: 2020-04-06

## 2020-04-08 ENCOUNTER — TELEPHONE (OUTPATIENT)
Dept: CARDIOLOGY CLINIC | Age: 40
End: 2020-04-08

## 2020-04-08 ENCOUNTER — CARE COORDINATION (OUTPATIENT)
Dept: CASE MANAGEMENT | Age: 40
End: 2020-04-08

## 2020-04-08 ENCOUNTER — VIRTUAL VISIT (OUTPATIENT)
Dept: CARDIOLOGY CLINIC | Age: 40
End: 2020-04-08
Payer: MEDICARE

## 2020-04-08 VITALS — WEIGHT: 295 LBS | BODY MASS INDEX: 40.01 KG/M2

## 2020-04-08 PROCEDURE — 99213 OFFICE O/P EST LOW 20 MIN: CPT | Performed by: INTERNAL MEDICINE

## 2020-04-08 NOTE — CARE COORDINATION
[facility] Transitions Follow Up Call    2020    Patient: Rosa Maria Llamas  Patient : 1980   MRN: [id number]  Reason for Admission: CHF  Discharge Date: 3/17/20 RARS: Readmission Risk Score: 16         Spoke with: Chary Watson III, patient    Care Transitions Subsequent and Final Call    Subsequent and Final Calls  Do you have any ongoing symptoms?:  No  Do you have any questions related to your medications?:  No  Do you currently have any active services?:  No  Do you have any needs or concerns that I can assist you with?:  No  Identified Barriers:  None  Care Transitions Interventions  No Identified Needs  Other Interventions:          -Spoke with patient Kong Wang for follow up care transition call.   -Patient states he is doing well, is following medication changes for entresto and bumex as per cardiology.  -Aware of CHF clinic visit as noted below.  -Patient denies any needs, questions, or concerns at this time and is agreeable to final call from CTN next week.     Follow Up  Future Appointments   Date Time Provider Clemente Davis   2020  1:15 PM [facility] CHF ROOM 4 [facility]   2020  3:20 PM MD Yazmin Calle [facility]   2020 10:00 AM Kale Doss MD [facility] CARDIO [facility]       Lu Cruz RN

## 2020-04-08 NOTE — PROGRESS NOTES
Hypertension  [place] [phone]        Minh Wen is a 44 y.o. male evaluated via telephone on 4/8/2020. Consent:  He and/or health care decision maker is aware that that he may receive a bill for this telephone service, depending on his insurance coverage, and has provided verbal consent to proceed: Yes      Documentation:  I communicated with the patient and/or health care decision maker about chronic hfref. Details of this discussion including any medical advice provided: see above      I affirm this is a Patient Initiated Episode with an Established Patient who has not had a related appointment within my department in the past 7 days or scheduled within the next 24 hours.     Total Time: minutes: 11-20 minutes    Note: not billable if this call serves to triage the patient into an appointment for the relevant concern      Silas Gale

## 2020-04-08 NOTE — PATIENT INSTRUCTIONS
yourself. ?      6. Keep up the good work wearing your cpap and watching the sodium! 7.  Virtual Visit in 6 weeks

## 2020-04-15 ENCOUNTER — CARE COORDINATION (OUTPATIENT)
Dept: CASE MANAGEMENT | Age: 40
End: 2020-04-15

## 2020-04-23 ENCOUNTER — HOSPITAL ENCOUNTER (OUTPATIENT)
Dept: OTHER | Age: 40
Setting detail: THERAPIES SERIES
Discharge: HOME | End: 2020-04-23
Payer: MEDICARE

## 2020-04-23 VITALS
HEART RATE: 70 BPM | DIASTOLIC BLOOD PRESSURE: 72 MMHG | SYSTOLIC BLOOD PRESSURE: 121 MMHG | RESPIRATION RATE: 18 BRPM | WEIGHT: 305.8 LBS | BODY MASS INDEX: 41.47 KG/M2

## 2020-04-23 LAB
ANION GAP SERPL CALCULATED.3IONS-SCNC: 13 MMOL/L (ref 7–16)
BUN BLDV-MCNC: 26 MG/DL (ref 6–20)
CALCIUM SERPL-MCNC: 10.4 MG/DL (ref 8.6–10.2)
CHLORIDE BLD-SCNC: 104 MMOL/L (ref 98–107)
CO2: 24 MMOL/L (ref 22–29)
CREAT SERPL-MCNC: 1 MG/DL (ref 0.7–1.2)
GFR AFRICAN AMERICAN: >60
GFR NON-AFRICAN AMERICAN: >60 ML/MIN/1.73
GLUCOSE BLD-MCNC: 193 MG/DL (ref 74–99)
POTASSIUM SERPL-SCNC: 3.7 MMOL/L (ref 3.5–5)
PRO-BNP: 733 PG/ML (ref 0–125)
SODIUM BLD-SCNC: 141 MMOL/L (ref 132–146)

## 2020-04-23 PROCEDURE — 83880 ASSAY OF NATRIURETIC PEPTIDE: CPT

## 2020-04-23 PROCEDURE — 80048 BASIC METABOLIC PNL TOTAL CA: CPT

## 2020-04-23 PROCEDURE — 36415 COLL VENOUS BLD VENIPUNCTURE: CPT

## 2020-04-23 PROCEDURE — 99204 OFFICE O/P NEW MOD 45 MIN: CPT

## 2020-04-26 LAB
EKG ATRIAL RATE: 277 BPM
EKG ATRIAL RATE: 64 BPM
EKG ATRIAL RATE: 67 BPM
EKG ATRIAL RATE: 87 BPM
EKG ATRIAL RATE: 88 BPM
EKG ATRIAL RATE: 88 BPM
EKG P AXIS: 129 DEGREES
EKG P AXIS: 38 DEGREES
EKG P AXIS: 91 DEGREES
EKG P-R INTERVAL: 132 MS
EKG P-R INTERVAL: 164 MS
EKG Q-T INTERVAL: 510 MS
EKG Q-T INTERVAL: 524 MS
EKG Q-T INTERVAL: 526 MS
EKG Q-T INTERVAL: 530 MS
EKG Q-T INTERVAL: 554 MS
EKG Q-T INTERVAL: 570 MS
EKG QRS DURATION: 150 MS
EKG QRS DURATION: 182 MS
EKG QRS DURATION: 200 MS
EKG QRS DURATION: 206 MS
EKG QRS DURATION: 208 MS
EKG QRS DURATION: 210 MS
EKG QTC CALCULATION (BAZETT): 550 MS
EKG QTC CALCULATION (BAZETT): 571 MS
EKG QTC CALCULATION (BAZETT): 602 MS
EKG QTC CALCULATION (BAZETT): 611 MS
EKG QTC CALCULATION (BAZETT): 634 MS
EKG QTC CALCULATION (BAZETT): 637 MS
EKG R AXIS: -159 DEGREES
EKG R AXIS: 140 DEGREES
EKG R AXIS: 152 DEGREES
EKG R AXIS: 152 DEGREES
EKG R AXIS: 154 DEGREES
EKG R AXIS: 161 DEGREES
EKG T AXIS: -12 DEGREES
EKG T AXIS: -13 DEGREES
EKG T AXIS: -22 DEGREES
EKG T AXIS: 121 DEGREES
EKG VENTRICULAR RATE: 64 BPM
EKG VENTRICULAR RATE: 67 BPM
EKG VENTRICULAR RATE: 70 BPM
EKG VENTRICULAR RATE: 81 BPM
EKG VENTRICULAR RATE: 87 BPM
EKG VENTRICULAR RATE: 88 BPM

## 2020-04-28 PROCEDURE — 93264 REM MNTR WRLS P-ART PRS SNR: CPT | Performed by: INTERNAL MEDICINE

## 2020-04-29 ENCOUNTER — TELEPHONE (OUTPATIENT)
Dept: CARDIOLOGY CLINIC | Age: 40
End: 2020-04-29

## 2020-05-06 RX ORDER — ALLOPURINOL 300 MG/1
TABLET ORAL
Qty: 30 TABLET | Refills: 10 | Status: SHIPPED
Start: 2020-05-06 | End: 2020-08-28 | Stop reason: SDUPTHER

## 2020-05-18 ENCOUNTER — TELEMEDICINE (OUTPATIENT)
Dept: FAMILY MEDICINE CLINIC | Age: 40
End: 2020-05-18
Payer: MEDICARE

## 2020-05-18 VITALS — BODY MASS INDEX: 40.9 KG/M2 | HEIGHT: 72 IN | WEIGHT: 302 LBS

## 2020-05-18 PROCEDURE — 3051F HG A1C>EQUAL 7.0%<8.0%: CPT | Performed by: FAMILY MEDICINE

## 2020-05-18 PROCEDURE — 99214 OFFICE O/P EST MOD 30 MIN: CPT | Performed by: FAMILY MEDICINE

## 2020-05-18 NOTE — PROGRESS NOTES
daily (before meals) 10 mL 10    LEVEMIR FLEXTOUCH 100 UNIT/ML injection pen Inject 40 Units into the skin 2 times daily 30 mL 10    bumetanide (BUMEX) 2 MG tablet Take 1 tablet by mouth 2 times daily 60 tablet 11    spironolactone (ALDACTONE) 25 MG tablet TAKE 1/2 TABLET BY MOUTH DAILY WITH LUNCH 30 tablet 10    atorvastatin (LIPITOR) 40 MG tablet TAKE (1) TABLET BY MOUTH DAILY 30 tablet 5    carvedilol (COREG) 25 MG tablet Take 1 tablet by mouth 2 times daily (with meals) 60 tablet 11    magnesium oxide (MAG-OX) 400 MG tablet TAKE 1 TABLET BY MOUTH DAILY  11    dofetilide (TIKOSYN) 250 MCG capsule Take 1 capsule by mouth every 12 hours 60 capsule 3    Blood Glucose Monitoring Suppl (ONE TOUCH ULTRA 2) w/Device KIT USE AS DIRECTED DAILY *SUBSTITUTE AS NEEDED PER INSURANCE PREFERENCE* 1 kit 0    blood glucose monitor strips Test 2-3 times a day & as needed for symptoms of irregular blood glucose. 300 strip 3    Lancets MISC Test 3-4 times daily 300 each 3    sharps container For needle disposal. (Patient not taking: Reported on 3/18/2020) 1 each 5     Current Facility-Administered Medications on File Prior to Visit   Medication Dose Route Frequency Provider Last Rate Last Dose    Tetanus-Diphth-Acell Pertussis (BOOSTRIX) injection 0.5 mL  0.5 mL Intramuscular Once Roopa Field MD           No Known Allergies    Social History     Tobacco Use    Smoking status: Never Smoker    Smokeless tobacco: Never Used   Substance Use Topics    Alcohol use: Not Currently     Comment: rare mixed drink    Drug use: Never         Physical Exam:    General: normal  Respiratory: normal  Musculoskeletal: Not Examined  Skin: normal  Psych: normal    Most recent labs and imaging reviewed. Findings include:     N/A    Assessments:     1. Paroxysmal atrial fibrillation (HCC)  stable    2. Essential hypertension  Stable    3. Morbid obesity with BMI of 40.0-44.9, adult ([facility]  Improving    4.  Nonischemic cardiomyopathy

## 2020-05-21 ENCOUNTER — HOSPITAL ENCOUNTER (OUTPATIENT)
Dept: OTHER | Age: 40
Setting detail: THERAPIES SERIES
Discharge: HOME | End: 2020-05-21
Payer: MEDICARE

## 2020-05-21 VITALS
WEIGHT: 303 LBS | HEART RATE: 65 BPM | BODY MASS INDEX: 41.09 KG/M2 | RESPIRATION RATE: 16 BRPM | DIASTOLIC BLOOD PRESSURE: 73 MMHG | SYSTOLIC BLOOD PRESSURE: 121 MMHG

## 2020-05-21 LAB
ALBUMIN SERPL-MCNC: 4.6 G/DL (ref 3.5–5.2)
ALP BLD-CCNC: 176 U/L (ref 40–129)
ALT SERPL-CCNC: 30 U/L (ref 0–40)
ANION GAP SERPL CALCULATED.3IONS-SCNC: 15 MMOL/L (ref 7–16)
AST SERPL-CCNC: 37 U/L (ref 0–39)
BILIRUB SERPL-MCNC: 0.5 MG/DL (ref 0–1.2)
BUN BLDV-MCNC: 35 MG/DL (ref 6–20)
CALCIUM SERPL-MCNC: 10 MG/DL (ref 8.6–10.2)
CHLORIDE BLD-SCNC: 101 MMOL/L (ref 98–107)
CHOLESTEROL, TOTAL: 167 MG/DL (ref 0–199)
CO2: 21 MMOL/L (ref 22–29)
CREAT SERPL-MCNC: 1.1 MG/DL (ref 0.7–1.2)
GFR AFRICAN AMERICAN: >60
GFR NON-AFRICAN AMERICAN: >60 ML/MIN/1.73
GLUCOSE BLD-MCNC: 251 MG/DL (ref 74–99)
HDLC SERPL-MCNC: 51 MG/DL
LDL CHOLESTEROL CALCULATED: 102 MG/DL (ref 0–99)
POTASSIUM SERPL-SCNC: 5.4 MMOL/L (ref 3.5–5)
PRO-BNP: 439 PG/ML (ref 0–125)
SODIUM BLD-SCNC: 137 MMOL/L (ref 132–146)
TOTAL PROTEIN: 9.1 G/DL (ref 6.4–8.3)
TRIGL SERPL-MCNC: 69 MG/DL (ref 0–149)
VLDLC SERPL CALC-MCNC: 14 MG/DL

## 2020-05-21 PROCEDURE — 36415 COLL VENOUS BLD VENIPUNCTURE: CPT

## 2020-05-21 PROCEDURE — 99204 OFFICE O/P NEW MOD 45 MIN: CPT

## 2020-05-21 PROCEDURE — 80053 COMPREHEN METABOLIC PANEL: CPT

## 2020-05-21 PROCEDURE — 83880 ASSAY OF NATRIURETIC PEPTIDE: CPT

## 2020-05-21 PROCEDURE — 80061 LIPID PANEL: CPT

## 2020-05-26 ENCOUNTER — TELEPHONE (OUTPATIENT)
Dept: CARDIOLOGY CLINIC | Age: 40
End: 2020-05-26

## 2020-05-26 NOTE — TELEPHONE ENCOUNTER
Young Carver III 1980     Faxed referral to [facility]   DR Kaci Ahn  Advanced HF.      Faxed [facility]:   Echo request and films/ radiology pax transfer last 12 months to [facility] for referral.

## 2020-06-01 PROCEDURE — 93264 REM MNTR WRLS P-ART PRS SNR: CPT | Performed by: INTERNAL MEDICINE

## 2020-06-11 ENCOUNTER — HOSPITAL ENCOUNTER (OUTPATIENT)
Dept: OTHER | Age: 40
Setting detail: THERAPIES SERIES
Discharge: HOME | End: 2020-06-11
Payer: MEDICARE

## 2020-06-11 VITALS
DIASTOLIC BLOOD PRESSURE: 67 MMHG | WEIGHT: 306 LBS | SYSTOLIC BLOOD PRESSURE: 113 MMHG | RESPIRATION RATE: 18 BRPM | HEART RATE: 64 BPM | BODY MASS INDEX: 41.5 KG/M2

## 2020-06-11 PROCEDURE — 6360000002 HC RX W HCPCS: Performed by: INTERNAL MEDICINE

## 2020-06-11 PROCEDURE — 96374 THER/PROPH/DIAG INJ IV PUSH: CPT

## 2020-06-11 PROCEDURE — 2580000003 HC RX 258: Performed by: INTERNAL MEDICINE

## 2020-06-11 PROCEDURE — 99214 OFFICE O/P EST MOD 30 MIN: CPT

## 2020-06-11 RX ORDER — FUROSEMIDE 10 MG/ML
40 INJECTION INTRAMUSCULAR; INTRAVENOUS ONCE
Status: COMPLETED | OUTPATIENT
Start: 2020-06-11 | End: 2020-06-11

## 2020-06-11 RX ORDER — SODIUM CHLORIDE 0.9 % (FLUSH) 0.9 %
10 SYRINGE (ML) INJECTION 2 TIMES DAILY
Status: DISCONTINUED | OUTPATIENT
Start: 2020-06-11 | End: 2020-06-12 | Stop reason: HOSPADM

## 2020-06-11 RX ORDER — DOFETILIDE 0.25 MG/1
250 CAPSULE ORAL EVERY 12 HOURS SCHEDULED
Qty: 60 CAPSULE | Refills: 3 | Status: SHIPPED
Start: 2020-06-11 | End: 2020-08-28 | Stop reason: SDUPTHER

## 2020-06-11 RX ADMIN — Medication 10 ML: at 12:07

## 2020-06-11 RX ADMIN — FUROSEMIDE 40 MG: 10 INJECTION, SOLUTION INTRAMUSCULAR; INTRAVENOUS at 12:07

## 2020-06-18 ENCOUNTER — TELEPHONE (OUTPATIENT)
Dept: CARDIOLOGY CLINIC | Age: 40
End: 2020-06-18

## 2020-07-08 RX ORDER — MAGNESIUM OXIDE 400 MG/1
TABLET ORAL
Qty: 30 TABLET | Refills: 10 | Status: SHIPPED
Start: 2020-07-08 | End: 2020-08-28 | Stop reason: SDUPTHER

## 2020-07-08 NOTE — TELEPHONE ENCOUNTER
Last Appointment:  3/23/2020  Future Appointments   Date Time Provider Clemente Davis   7/21/2020  1:00 PM [facility] CHF ROOM 4 [facility] CHF [facility] Wen   7/22/2020  1:00 PM NATI Head - CNP [facility] CARDIO [facility]

## 2020-07-10 ENCOUNTER — INITIAL CONSULT (OUTPATIENT)
Dept: CARDIOLOGY CLINIC | Age: 40
End: 2020-07-10
Payer: MEDICARE

## 2020-07-10 ENCOUNTER — TELEPHONE (OUTPATIENT)
Dept: CARDIOLOGY CLINIC | Age: 40
End: 2020-07-10

## 2020-07-10 VITALS
HEART RATE: 67 BPM | SYSTOLIC BLOOD PRESSURE: 130 MMHG | WEIGHT: 308 LBS | RESPIRATION RATE: 22 BRPM | DIASTOLIC BLOOD PRESSURE: 60 MMHG | HEIGHT: 72 IN | BODY MASS INDEX: 41.72 KG/M2 | OXYGEN SATURATION: 98 %

## 2020-07-10 PROCEDURE — 93000 ELECTROCARDIOGRAM COMPLETE: CPT | Performed by: INTERNAL MEDICINE

## 2020-07-10 PROCEDURE — 99213 OFFICE O/P EST LOW 20 MIN: CPT | Performed by: NURSE PRACTITIONER

## 2020-07-10 NOTE — PROGRESS NOTES
failure ([address] 03/18/2020    Carotid disease, bilateral ([address] 03/14/2020       Bilateral carotid duplex January 2016, reportedly showed no      hemodynamically significant stenosis with 0% to 49% narrowing of both      carotid arteries  Reported history of ischemic left middle cerebral artery stroke      (further details unknown)      Ventricular arrhythmia 08/01/2019    Nonischemic cardiomyopathy ([address]      Raven-Willson virus at age 32    Cardiac catheterization February and December of 2011:  Patent coronary      arteries. EF 10% to 15%. Patient reportedly did receive an ICD vest at      that time, however, did not return for a regular followup. 2.    Echo March 2012:  EF 30% to 61%, stage 2 diastolic dysfunction, septal      hypokinesia, mild LVH, left atrium mildly-moderately dilated, mild MR.      RVSP of 25-30. Trace PI.   3.    Cardiac MRI July 2013:  EF 20%   Echo 01/24/2016 [facility]):  LV severely dilated. LA      moderately dilated. Mild PHTN. Mild TR. Trace TR. Pacer wire visible      in the right atrium and ventricle. RA mildly dilated. EF 10% to 15%. Class 3 diastolic dysfunction. RV moderately dilated. RV systolic      function moderate-severely reduced.   Mild-moderate MR.   15.   Echo March 2012 (Dr. Rolando Mcfarlane):  LVEF 30% to 35% and enlarged LV      S/P left pulmonary artery pressure sensor implant placement     S/P ICD (internal cardiac defibrillator) procedure 11/20/2018     Status post single chamber ICD implant, Medtronic Evera [id number],      September 30, 2013, for nonischemic cardiomyopathy (single chamber)      Morbid obesity with BMI of 40.0-44.9, adult ([address] 08/22/2018    Paroxysmal atrial fibrillation ([address] 01/20/2017    Essential hypertension 01/20/2017    VHD (valvular heart disease) 01/20/2017    Hepatomegaly 12/01/2016    Type 2 diabetes mellitus with complication, with long-term current use of insulin ([address] 08/22/2016    Chronic gout 08/13/2016    CKD (chronic kidney disease)     Obstructive sleep apnea 11/29/2011          PAST MEDICAL/SURGICAL HISTORY:   1. Cardiac catheterization February and December of 2011:  Patent coronary      arteries. EF 10% to 15%. Patient reportedly did receive an ICD vest at      that time, however, did not return for a regular followup. 2.    Echo March 2012:  EF 30% to 41%, stage 2 diastolic dysfunction, septal      hypokinesia, mild LVH, left atrium mildly-moderately dilated, mild MR.      RVSP of 25-30. Trace PI.   3.    Cardiac MRI July 2013:  EF 20%   4. Status post single chamber ICD implant, Medtronic Evera [id number],      September 30, 2013, for nonischemic cardiomyopathy (single chamber)   5. Hypertension   6. Hyperlipidemia   7. Obesity   8. History of medical noncompliance   9. Obstructive sleep apnea   10. Raven-Barr virus at age 28   8. Paroxysmal atrial fibrillation. Chronic anticoagulation with Xarelto      (initiation time unknown)   12. Reported history of ischemic left middle cerebral artery stroke      (further details unknown)   13. Bilateral carotid duplex January 2016, reportedly showed no      hemodynamically significant stenosis with 0% to 49% narrowing of both      carotid arteries   14. Echo 01/24/2016 [facility]):  LV severely dilated. LA      moderately dilated. Mild PHTN. Mild TR. Trace TR. Pacer wire visible      in the right atrium and ventricle. RA mildly dilated. EF 10% to 15%. Class 3 diastolic dysfunction. RV moderately dilated. RV systolic      function moderate-severely reduced. Mild-moderate MR.   15.   Echo March 2012 (Dr. Monster Clark):  LVEF 30% to 35% and enlarged LV   16.   Diabetes mellitus   17. Gout   18. Syncopal episode 07/30/2016, presented to [facility] (reports requested); per      patient underwent echocardiogram and ICD interrogation.   Reported ICD      interrogation showed normal device function and no changes made (reports      requested)        FAMILY HISTORY:    Mother  at the age of 48 from complications of stomach cancer. His father is alive and well in his 62s. His siblings are alive and well. He has no children.          Past Medical History:   Diagnosis Date    Acute CHF ([address] 2011    Acute CHF ([address] 2011    Acute idiopathic gout of right foot 2016    AF (atrial fibrillation) ([address] 2020    Anemia 2011    CAD (coronary artery disease)     Cerebral artery occlusion with cerebral infarction ([address]     CKD (chronic kidney disease)     Gout     HTN (hypertension) 2011    Hyperlipidemia     Hypertension     Morbid obesity due to excess calories ([address]     Nonischemic cardiomyopathy ([address] 2011    Nonischemic cardiomyopathy ([address] 2013- cardiac MRI revealed an LVEF of 20%    ARVIND (obstructive sleep apnea) 2011    S/P left heart catheterization by percutaneous approach     Dr Jaren Pride Systolic heart failure [facility]) 2012- cardiac catheterization revealed an LVEF of 10-15%, mitral regurgitation along with borderline prolapse of mitral valve    Type 2 diabetes mellitus with complication, with long-term current use of insulin ([address] 2016    URI, acute 2011         Past Surgical History:   Procedure Laterality Date    CARDIAC CATHETERIZATION  2019    Dr Reinaldo Cullen  2018    UPGRADE S-ICD TO BIV ICD   ([employer])  [facility]     CARDIOVERSION  2020    Successful CV of AF to NSR      (Dr. Jackelyn Potter)    ECHO COMPL W DOP COLOR FLOW  2011         ECHO COMPL W DOP COLOR FLOW  3/27/2012         INSERTABLE CARDIAC MONITOR  2018    cardiomems, [facility], Dr. Phylils Queen           No Known Allergies        Outpatient Medications Marked as Taking for the 7/10/20 encounter (Initial consult) with NATI De La Torre - CNP   Medication Sig Skin color, texture, turgor normal. No rashes or lesions. LYMPH: no cervical nodes, no inguinal nodes  HEENT: Head is normocephalic, atraumatic. EOMI, PERRLA. NECK: Supple, symmetrical, trachea midline, no adenopathy, thyroid symmetric, not enlarged and no tenderness, skin normal. No jvd/hjr. CHEST/LUNGS: chest symmetric with normal A/P diameter, normal respiratory rate and rhythm, lungs clear to auscultation without wheezes, rales or rhonchi. No accessory muscle use. Scars None   CARDIOVASCULAR: Heart sounds are normal.  Regular rate and rhythm without murmur, gallop or rub. Normal S1 and S2. . Carotid and femoral pulses 2+/4 and equal bilaterally. ABDOMEN: Normal shape. No and Laparoscopic scar(s) present. Normal bowel sounds. No bruits. soft, nondistended, no masses or organomegaly. no evidence of hernia. Percussion: Normal without hepatosplenomegally. Tenderness: absent. RECTAL: deferred, not clinically indicated  NEUROLOGIC: There are no focalizing motor or sensory deficits. CN II-XII are grossly intact. Thomasena Dilling EXTREMITIES: no cyanosis, no clubbing and no edema. Warm and well perfused. All the following diagnostics were personally reviewed and interpreted by me. LAB DATA:     3/26/2020 12:30 4/23/2020 12:15 5/21/2020 12:40   Sodium 134 141 137   Potassium 4.2 3.7 5.4 (H)   Chloride 101 104 101   CO2 19 (L) 24 21 (L)   BUN 33 (H) 26 (H) 35 (H)   Creatinine 1.1 1.0 1.1   Anion Gap 14 13 15   GFR Non- >60 >60 >60   GFR  >60 >60 >60   Glucose 221 (H) 193 (H) 251 (H)   Calcium 10.0 10.4 (H) 10.0   Total Protein   9.1 (H)   Pro- (H) 733 (H)    Cholesterol, Total   167   HDL Cholesterol   51   LDL Calculated   102 (H)   Triglycerides   69   VLDL Cholesterol Calculated   14   Albumin   4.6   Alk Phos   176 (H)   ALT   30   AST   37   Bilirubin   0.5         DIAGNOSTICS:    CXR (3/14/2020)  FINDINGS:    Heart size mildly enlarged. There is a left-sided pacer.  There is hypertension. 2.  Low pulmonary artery oxygen saturation and low cardiac output and cardiac index consistent with LV dysfunction. 3.  Successful placement of CardioMEMS heart failure monitoring device in the left pulmonary artery. TTE (7/31/2019)   Summary   Severely dilated left ventricle. Ejection fraction is visually estimated at 20-25%. However there is a   definite improvement in overall LV systolic function since previous study from 2018. Diffuse global hypokinesis   The left atrium is moderate-severely dilated. Structurally normal mitral valve. Mild mitral regurgitation is present. Increased E point septal separation noted,suggesting decreased LV cardiac output. Aortic valve opens well. The aortic valve appears mildly sclerotic. Normal tricuspid valve structure and function. Moderate tricuspid regurgitation. RVSP is 45 mmHg. No evidence of pericardial effusion. LHC (8/2019, Dr. Janie Cuenca)  Findings:  Left main: 0%  stenosis  LAD: 40-50 %  stenosis  Circumflex: 30 %   stenosis  RCA: Dominant. 30 %  stenosis  LV angio: not performed  Hemodynamics:  LV: 89 mmHg. EDP: 16 No gradient across AV. Ao: 81/67 ( 69 ). Post op diagnosis:  Mild CAD involving the LAD  PLAN:  Medical therapy / risk factor modification as per the advanced HF / Pulmonary hypertension team        CardioMEMS readings        EKG  V paced           Guideline directed medical/device therapy:  ARNI/ACE I/ARB: Yes  Beta blocker: Yes  Aldosterone antagonist:  Yes  ICD/CRT-P/-D:  CRT-D  QRS interval on recent ECG (personally reviewed/interpreted): >150 ms  Percentage RV pacing (personally reviewed/interpreted): %/NA            Discussion:  Stable from a cardiovascular standpoint. PADs on CardioMEMS is 20, HRs int he 60s. He is tolerating carvedilol 25 mg BID, entresto  mg bid, spironolactone 12.5 mg daily. crt-d in place.  If he continues to have palpitations, advised him to call Dr Hilary Yanez for remote interrogation to determine if he is in and out of afib. Last TTE almost a year ago with LVEF 20 %, LVEDD 6.9 cm. Reporting stable NYHA FC II symptoms, however will refer to advanced HF once every 1-2 years since he is very young, OR if his FC deteriorates, and/or re hospitalized with acute HF.        ASSESSMENT:  1. Chronic HFrEF  2. ACC stage C / NYHA class II  3. PAD on CardioMEMS is 21  5. Nonischemic dilated cardiomyopathy, likely burn out hypertensive due to noncompliance  6. LVEF 15-20% > 20-25%, LVEDD 75 >69  7. Normal coronaries (2012) and only mild disease (7/2019)  -history of EBV  8. CRT-D placed 11/21/2018 - follows with Dr. Lavon Flowers  9. RV dysfunction  10. Mild-moderate MR and Mild TR  11. PAF, on Tikosyn. Anticoagulated with Xarelto, no bleeding, no thromboembolic events  12. T2DM, HgA1c 7.5 in 2/2020 (was 15.9 June 2019)  13. HLD - on statin therapy  14. Iron deficiency, iron replaced during hospitalization earlier this year  13. Morbid obesity, significant amount of flesh weight loss by reducing calories  16. ARVIND - reports compliance with CPAP  17. CKD, egfr fluctuates between 64 and 60, but suspect low numbers were in the setting of AHF and cardiorenal syndrome  18. Hx of poor medical literacy and non adherence - has improved over the past several months. Think now with better understanding and support he is able to better able to self-manage. 19. Flat affect, history of depression  20. His has blood type O (+)      PLAN:  1. Echocardiogram    2. Patient calling Dr. Lavon Flowers to follow up on palpitations and remote transmission    3. Following with CHF infusion clinic     4. Referral to [facility] in [place] ( needs to be within 60 miles for insurance to a provide ride)    5. Continue current cardiac medications. 6. Lay on cardioMEMS pillow daily    7. Continue to wear CPAP nightly     8.  Weigh yourself daily    -Stay Hydrated    -Diet should sodium restricted to 2 grams    -Again watch your daily weight trends and if you gain water weight please follow below instructions.    -If you gain 3-5 pounds in 2-3 days OR notice that you are retaining fluid in anyway just like you did before then take an extra dose of your water pill (bumetanide/Bumex) every day until you lose the weight or feel better.    -If you notice that you have taken more than 3 extra doses in 1 week then please call and let us know. -If at any time you feel that you are retaining fluid, your medications are not working, or you feel ill in anyway, then please call us for either same day appointment or the next day, and for instructions. Our goal is to keep you out of the emergency room and the hospital and we have ways to do it. You just need to call us in a timely manner.     -If you become sick for other reasons, and notice that you are not urinating as much, the urine is very dark, you have significant diarrhea or vomiting, then please DO NOT take your water pill and CALL US immediately. Abigail Harris APRN-CNP  [facility].

## 2020-07-10 NOTE — TELEPHONE ENCOUNTER
Nickolas Mtz III 1980    I Called him to update on Mems trends and see how he was feeling. He says last night developed SOB, feels HR may be irregular  Just doesn't feel good/ right. Add on 2pm today ANTIONE Melgar PRIYA  He Will catch bus to get here (please [place] him what ever time he arrives please) taking public transportation.        His primary cardiologist is DR Vanessa Geuvara  He Was also referred to DR Huntley Goodell in [place] for advanced HF care as DR Keshawn Qiu is not with [address] [place] [address] any further.     07/17/2020 Office Visit [facility], [address] [place] [address],[address]   [address]   [phone] ([facility]

## 2020-07-10 NOTE — PATIENT INSTRUCTIONS
1. Echocardiogram    2. Call Dr. Norma Rose    3. Following with CHF infusion clinic     [facility]. [facility] [place] ( needs to be within 60 miles for insurance to provide ride)    5. Continue current cardiac medications. 6. Lay on cardioMEMS pillow daily    7. Continue to wear CPAP nightly     8. Weigh yourself daily    -Stay Hydrated    -Diet should sodium restricted to 2 grams    -Again watch your daily weight trends and if you gain water weight please follow below instructions.    -If you gain 3-5 pounds in 2-3 days OR notice that you are retaining fluid in anyway just like you did before then take an extra dose of your water pill (bumetanide/Bumex) every day until you lose the weight or feel better.    -If you notice that you have taken more than 3 extra doses in 1 week then please call and let us know. -If at any time you feel that you are retaining fluid, your medications are not working, or you feel ill in anyway, then please call us for either same day appointment or the next day, and for instructions. Our goal is to keep you out of the emergency room and the hospital and we have ways to do it. You just need to call us in a timely manner.     -If you become sick for other reasons, and notice that you are not urinating as much, the urine is very dark, you have significant diarrhea or vomiting, then please DO NOT take your water pill and CALL US immediately.

## 2020-07-14 NOTE — TELEPHONE ENCOUNTER
DR Richard Ponce @ [facility]   Advanced  [phone]  Fax [phone]   Set initial visit 7 21 2020 at 10am

## 2020-07-21 ENCOUNTER — HOSPITAL ENCOUNTER (OUTPATIENT)
Dept: OTHER | Age: 40
Setting detail: THERAPIES SERIES
End: 2020-07-21
Payer: MEDICARE

## 2020-07-22 ENCOUNTER — TELEPHONE (OUTPATIENT)
Dept: CARDIOLOGY CLINIC | Age: 40
End: 2020-07-22

## 2020-07-22 NOTE — TELEPHONE ENCOUNTER
Established with DR [employer] advanced HF [place]. Fax [phone]   [phone] today NP local f/u per ANTIONE Melgar CNP    Upcoming Echo DR Jazmin Pickett to read. Requested DR [employer] receive copy of result and PACS transfer. On call back for DR Jazmin Pickett. Has cardiomems. Currently [facility] managing. Per patient DR [employer] would like to see recent mems trends.    Will fax today to Ovidio Godfrey [facility] 1980 [phone]   [facility] [address] [facility] [phone] (home)        Sherly Meyers RN

## 2020-07-28 ENCOUNTER — HOSPITAL ENCOUNTER (OUTPATIENT)
Dept: OTHER | Age: 40
Setting detail: THERAPIES SERIES
Discharge: HOME | End: 2020-07-28
Payer: MEDICARE

## 2020-07-28 VITALS
WEIGHT: 307 LBS | SYSTOLIC BLOOD PRESSURE: 117 MMHG | DIASTOLIC BLOOD PRESSURE: 61 MMHG | BODY MASS INDEX: 41.64 KG/M2 | RESPIRATION RATE: 18 BRPM | HEART RATE: 66 BPM

## 2020-07-28 LAB
ANION GAP SERPL CALCULATED.3IONS-SCNC: 18 MMOL/L (ref 7–16)
BUN BLDV-MCNC: 26 MG/DL (ref 6–20)
CALCIUM SERPL-MCNC: 9.9 MG/DL (ref 8.6–10.2)
CHLORIDE BLD-SCNC: 96 MMOL/L (ref 98–107)
CO2: 21 MMOL/L (ref 22–29)
CREAT SERPL-MCNC: 1.3 MG/DL (ref 0.7–1.2)
GFR AFRICAN AMERICAN: >60
GFR NON-AFRICAN AMERICAN: >60 ML/MIN/1.73
GLUCOSE BLD-MCNC: 391 MG/DL (ref 74–99)
POTASSIUM SERPL-SCNC: 3.7 MMOL/L (ref 3.5–5)
PRO-BNP: 504 PG/ML (ref 0–125)
SODIUM BLD-SCNC: 135 MMOL/L (ref 132–146)

## 2020-07-28 PROCEDURE — 80048 BASIC METABOLIC PNL TOTAL CA: CPT

## 2020-07-28 PROCEDURE — 36415 COLL VENOUS BLD VENIPUNCTURE: CPT

## 2020-07-28 PROCEDURE — 99214 OFFICE O/P EST MOD 30 MIN: CPT

## 2020-07-28 PROCEDURE — 83880 ASSAY OF NATRIURETIC PEPTIDE: CPT

## 2020-08-04 ENCOUNTER — TELEPHONE (OUTPATIENT)
Dept: CARDIOLOGY | Age: 40
End: 2020-08-04

## 2020-08-05 ENCOUNTER — HOSPITAL ENCOUNTER (OUTPATIENT)
Dept: CARDIOLOGY | Age: 40
Discharge: HOME | End: 2020-08-05
Payer: MEDICARE

## 2020-08-05 LAB
LV EF: 28 %
LVEF MODALITY: NORMAL

## 2020-08-05 PROCEDURE — 93306 TTE W/DOPPLER COMPLETE: CPT

## 2020-08-10 PROCEDURE — 93264 REM MNTR WRLS P-ART PRS SNR: CPT | Performed by: INTERNAL MEDICINE

## 2020-08-27 RX ORDER — ATORVASTATIN CALCIUM 40 MG/1
TABLET, FILM COATED ORAL
Qty: 30 TABLET | Refills: 5 | Status: SHIPPED
Start: 2020-08-27 | End: 2020-08-28 | Stop reason: SDUPTHER

## 2020-08-27 NOTE — TELEPHONE ENCOUNTER
Last Appointment:  5/18/2020  Future Appointments   Date Time Provider Clemente Davis   9/2/2020 12:15 PM [facility] CHF ROOM 4 SEYZ CHF Jenaro Lopez

## 2020-08-28 RX ORDER — CARVEDILOL 25 MG/1
25 TABLET ORAL 2 TIMES DAILY WITH MEALS
Qty: 60 TABLET | Refills: 11 | Status: SHIPPED
Start: 2020-08-28 | End: 2021-09-02

## 2020-08-28 RX ORDER — INSULIN DETEMIR 100 [IU]/ML
40 INJECTION, SOLUTION SUBCUTANEOUS 2 TIMES DAILY
Qty: 30 ML | Refills: 10 | Status: SHIPPED
Start: 2020-08-28 | End: 2021-04-26

## 2020-08-28 RX ORDER — DOFETILIDE 0.25 MG/1
250 CAPSULE ORAL EVERY 12 HOURS SCHEDULED
Qty: 60 CAPSULE | Refills: 3 | Status: SHIPPED
Start: 2020-08-28 | End: 2021-01-21

## 2020-08-28 RX ORDER — ATORVASTATIN CALCIUM 40 MG/1
TABLET, FILM COATED ORAL
Qty: 30 TABLET | Refills: 5 | Status: SHIPPED
Start: 2020-08-28 | End: 2021-01-19

## 2020-08-28 RX ORDER — MAGNESIUM OXIDE 400 MG/1
400 TABLET ORAL DAILY
Qty: 30 TABLET | Refills: 11 | Status: SHIPPED
Start: 2020-08-28 | Stop reason: SDUPTHER

## 2020-08-28 RX ORDER — ALLOPURINOL 300 MG/1
300 TABLET ORAL DAILY
Qty: 30 TABLET | Refills: 11 | Status: SHIPPED
Start: 2020-08-28 | End: 2021-05-04 | Stop reason: SDUPTHER

## 2020-08-28 RX ORDER — SPIRONOLACTONE 25 MG/1
12.5 TABLET ORAL DAILY
Qty: 30 TABLET | Refills: 11 | Status: SHIPPED
Start: 2020-08-28 | End: 2021-04-26

## 2020-08-28 NOTE — TELEPHONE ENCOUNTER
Last Appointment:  5/18/2020  Future Appointments   Date Time Provider Clemente Davis   9/2/2020 12:15 PM [place] CHF ROOM 4 SEYZ CHF [facility]   10/5/2020  1:30 PM Elkin Villafana MD [address] [place] [address]

## 2020-08-31 RX ORDER — BUMETANIDE 2 MG/1
2 TABLET ORAL 2 TIMES DAILY
Qty: 180 TABLET | Refills: 3 | Status: SHIPPED
Start: 2020-08-31 | End: 2021-03-16 | Stop reason: SDUPTHER

## 2020-08-31 RX ORDER — SACUBITRIL AND VALSARTAN 97; 103 MG/1; MG/1
1 TABLET, FILM COATED ORAL 2 TIMES DAILY
Qty: 180 TABLET | Refills: 3 | Status: SHIPPED
Start: 2020-08-31 | End: 2021-02-27 | Stop reason: SDUPTHER

## 2020-09-02 ENCOUNTER — TELEPHONE (OUTPATIENT)
Dept: CARDIOLOGY CLINIC | Age: 40
End: 2020-09-02

## 2020-09-02 ENCOUNTER — HOSPITAL ENCOUNTER (OUTPATIENT)
Dept: OTHER | Age: 40
Setting detail: THERAPIES SERIES
Discharge: HOME | End: 2020-09-02
Payer: MEDICARE

## 2020-09-02 VITALS
SYSTOLIC BLOOD PRESSURE: 120 MMHG | RESPIRATION RATE: 18 BRPM | HEART RATE: 72 BPM | BODY MASS INDEX: 42.18 KG/M2 | DIASTOLIC BLOOD PRESSURE: 66 MMHG | WEIGHT: 311 LBS

## 2020-09-02 LAB
ANION GAP SERPL CALCULATED.3IONS-SCNC: 14 MMOL/L (ref 7–16)
BUN BLDV-MCNC: 18 MG/DL (ref 6–20)
CALCIUM SERPL-MCNC: 10 MG/DL (ref 8.6–10.2)
CHLORIDE BLD-SCNC: 99 MMOL/L (ref 98–107)
CO2: 24 MMOL/L (ref 22–29)
CREAT SERPL-MCNC: 1.3 MG/DL (ref 0.7–1.2)
GFR AFRICAN AMERICAN: >60
GFR NON-AFRICAN AMERICAN: >60 ML/MIN/1.73
GLUCOSE BLD-MCNC: 121 MG/DL (ref 74–99)
POTASSIUM SERPL-SCNC: 4 MMOL/L (ref 3.5–5)
PRO-BNP: 299 PG/ML (ref 0–125)
SODIUM BLD-SCNC: 137 MMOL/L (ref 132–146)

## 2020-09-02 PROCEDURE — 80048 BASIC METABOLIC PNL TOTAL CA: CPT

## 2020-09-02 PROCEDURE — 99214 OFFICE O/P EST MOD 30 MIN: CPT

## 2020-09-02 PROCEDURE — 2580000003 HC RX 258: Performed by: INTERNAL MEDICINE

## 2020-09-02 PROCEDURE — 6360000002 HC RX W HCPCS: Performed by: INTERNAL MEDICINE

## 2020-09-02 PROCEDURE — 36415 COLL VENOUS BLD VENIPUNCTURE: CPT

## 2020-09-02 PROCEDURE — 83880 ASSAY OF NATRIURETIC PEPTIDE: CPT

## 2020-09-02 PROCEDURE — 96374 THER/PROPH/DIAG INJ IV PUSH: CPT

## 2020-09-02 RX ORDER — SODIUM CHLORIDE 0.9 % (FLUSH) 0.9 %
10 SYRINGE (ML) INJECTION PRN
Status: DISCONTINUED | OUTPATIENT
Start: 2020-09-02 | End: 2020-09-03 | Stop reason: HOSPADM

## 2020-09-02 RX ORDER — FUROSEMIDE 10 MG/ML
40 INJECTION INTRAMUSCULAR; INTRAVENOUS ONCE
Status: COMPLETED | OUTPATIENT
Start: 2020-09-02 | End: 2020-09-02

## 2020-09-02 RX ADMIN — Medication 10 ML: at 12:18

## 2020-09-02 RX ADMIN — FUROSEMIDE 40 MG: 10 INJECTION, SOLUTION INTRAMUSCULAR; INTRAVENOUS at 12:17

## 2020-09-02 NOTE — TELEPHONE ENCOUNTER
cardiomems reading today PAD 24 today   Sends readings every few days. Rarely at goal of 21 PAD. Trends 22-27. [facility] wt [facility]  Lasix 40mg iv push given.    See chf assessment/ notes/ med list

## 2020-09-02 NOTE — PROGRESS NOTES
Weight up 7 lbs from last month. Pt admits he's not weighing daily. Encouraged him to start to weigh daily and explained the importance. Assessment basically unchanged. States SOB is unchanged. Feels as though IV Lasix would be helpful because of weight gain. Lasix 40 mg IVP given and labs obtained.

## 2020-09-11 PROCEDURE — 93264 REM MNTR WRLS P-ART PRS SNR: CPT | Performed by: INTERNAL MEDICINE

## 2020-09-14 PROBLEM — I50.42 CHRONIC COMBINED SYSTOLIC AND DIASTOLIC CONGESTIVE HEART FAILURE (HCC): Status: ACTIVE | Noted: 2020-03-18

## 2020-09-29 ENCOUNTER — TELEPHONE (OUTPATIENT)
Dept: CARDIOLOGY CLINIC | Age: 40
End: 2020-09-29

## 2020-09-29 NOTE — TELEPHONE ENCOUNTER
[facility] as we have not been receiving cardioMEM readings, he has been busy and forgot to lay on the pillow. He will restart laying on the pillow today. He has no current complaints of shortness of breath, lower extremity edema, weight gain. He has established care with Dr. Sony Wilks (Advanced heart failure in [place])   He is following locally with Dr. Georgie Castillo and scheduled in October. Scarlett DAMIAN-CNP  [facility].

## 2020-10-05 ENCOUNTER — OFFICE VISIT (OUTPATIENT)
Dept: CARDIOLOGY CLINIC | Age: 40
End: 2020-10-05
Payer: MEDICARE

## 2020-10-05 VITALS
SYSTOLIC BLOOD PRESSURE: 110 MMHG | WEIGHT: 313 LBS | HEIGHT: 72 IN | RESPIRATION RATE: 16 BRPM | OXYGEN SATURATION: 97 % | HEART RATE: 67 BPM | DIASTOLIC BLOOD PRESSURE: 68 MMHG | BODY MASS INDEX: 42.39 KG/M2

## 2020-10-05 PROCEDURE — 99214 OFFICE O/P EST MOD 30 MIN: CPT | Performed by: INTERNAL MEDICINE

## 2020-10-05 PROCEDURE — 93000 ELECTROCARDIOGRAM COMPLETE: CPT | Performed by: INTERNAL MEDICINE

## 2020-10-05 NOTE — PROGRESS NOTES
Denies ED/Hosp admits. Follows with Dr Iam Briones; no discharges. Labs 9/2/20. Is c/o SOBOE, some dizziness (no syncope). Is following with Dr Fredi Valero in [facility] for CHF.  Cardiomyopathy    Cardiac Valve Problem    Atrial Fibrillation    Shortness of Breath    Dizziness          History of Present Illness:       Office Visit for follow up of CMP, A Fib, ICD   36 yr old [place] with history of A fib, CMP, s/p ICD came for f/u visit   c/o mild SOBOE, No PND or orthopnea   C/o occ dizzy, no associated palpitations or syncope.    No hospitalizations or surgeries since last visit   Patient is compliant with all medications   Curt any exertional chest pain   Active at homea   Try to watch diet          Past Medical History:   Diagnosis Date    Acute CHF ([address] 11/29/2011    Acute CHF ([address] 12/26/2011    Acute idiopathic gout of right foot 8/13/2016    AF (atrial fibrillation) ([address] 02/2020    Anemia 11/29/2011    CAD (coronary artery disease)     Cerebral artery occlusion with cerebral infarction ([address]     CKD (chronic kidney disease)     Gout     HTN (hypertension) 11/29/2011    Hyperlipidemia     Hypertension     Morbid obesity due to excess calories ([address]     Nonischemic cardiomyopathy ([address] 11/29/2011    Nonischemic cardiomyopathy ([address] 7/2013 7/2013- cardiac MRI revealed an LVEF of 20%    ARVIND (obstructive sleep apnea) 11/29/2011    S/P left heart catheterization by percutaneous approach 12-27/2011    Dr Charles Allan Systolic heart failure [facility]) 5/7/2012 5/7/12- cardiac catheterization revealed an LVEF of 10-15%, mitral regurgitation along with borderline prolapse of mitral valve    Type 2 diabetes mellitus with complication, with long-term current use of insulin ([address] 8/22/2016    URI, acute 11/29/2011            Past Surgical History:   Procedure Laterality Date    CARDIAC CATHETERIZATION  08/01/2019    Dr Francia Burton  11/20/2018 rhonchi. Cardiovascular:  Regular rhythm, 1/6 systolic murmur, No S3, PMI at 5th ICS, no palpable thrills,    Abdomen:  Soft, Non tender, Bowel sounds normal, no pulsatile abdominal aorta. Extremities:  No edema. Distal pulses palpable. No cyanosis, no clubbing. Skin:   Good turgor, warm and dry, no cyanosis. Musculoskeletal: No joint swelling or deformity. Neuro:   Cranial nerves grossly intact; No focal neurologic deficit. Psych:   Alert, good mood and effect. REVIEW OF DIAGNOSTIC TESTS:        Electrocardiogram: reviewed                   A/P:   ASSESSMENT / PLAN:    Flora Olea was seen today for congestive heart failure, cardiomyopathy, cardiac valve problem, atrial fibrillation, shortness of breath and dizziness. Diagnoses and all orders for this visit:    PAF (paroxysmal atrial fibrillation) ([address] - On On Xarelto for [address]  -     EKG 12 Lead     Cardiomyopathy, nonischemic (HCC) - EF 20% - follows with Dr Carmelo Claudio at [facility], [address]  clinic - had CardioMEMS 12/2018      Biventricular implantable cardioverter-defibrillator (ICD) in situ ICD ugraded to Biv 11/2018-Follows with Dr Millard     Essential hypertension - Controlled     Chronic systolic congestive heart failure (HCC) - Compensated     Non-rheumatic mitral regurgitation  - Stable     ARVIND (obstructive sleep apnea)     Morbid obesity with BMI of 40.0-44.9, adult (HCC) - Diet, exercise and weight loss discussed.     Type 2 diabetes mellitus with complication, with long-term current use of insulin (HCC)     Chronic kidney disease, unspecified CKD stage     History of CVA (cerebrovascular accident)     Preventive Cardiology: Low cholesterol diet, regular exercise as tolerate, and gradual weight loss discussed. Monitor BP and heart rates. Above recommendations discussed with him   All questions answered about cardiac diagnoses and cardiac medications. Continue current medications.                 Compliance with medications and f/u

## 2020-10-07 ENCOUNTER — HOSPITAL ENCOUNTER (OUTPATIENT)
Dept: OTHER | Age: 40
Setting detail: THERAPIES SERIES
Discharge: HOME | End: 2020-10-07
Payer: MEDICARE

## 2020-10-07 VITALS
HEART RATE: 64 BPM | RESPIRATION RATE: 18 BRPM | WEIGHT: 315 LBS | SYSTOLIC BLOOD PRESSURE: 131 MMHG | DIASTOLIC BLOOD PRESSURE: 76 MMHG | BODY MASS INDEX: 42.86 KG/M2

## 2020-10-07 LAB
ANION GAP SERPL CALCULATED.3IONS-SCNC: 16 MMOL/L (ref 7–16)
BUN BLDV-MCNC: 16 MG/DL (ref 6–20)
CALCIUM SERPL-MCNC: 9.9 MG/DL (ref 8.6–10.2)
CHLORIDE BLD-SCNC: 96 MMOL/L (ref 98–107)
CO2: 25 MMOL/L (ref 22–29)
CREAT SERPL-MCNC: 1.2 MG/DL (ref 0.7–1.2)
GFR AFRICAN AMERICAN: >60
GFR NON-AFRICAN AMERICAN: >60 ML/MIN/1.73
GLUCOSE BLD-MCNC: 251 MG/DL (ref 74–99)
POTASSIUM SERPL-SCNC: 3.6 MMOL/L (ref 3.5–5)
PRO-BNP: 362 PG/ML (ref 0–125)
SODIUM BLD-SCNC: 137 MMOL/L (ref 132–146)

## 2020-10-07 PROCEDURE — 6360000002 HC RX W HCPCS: Performed by: INTERNAL MEDICINE

## 2020-10-07 PROCEDURE — 80048 BASIC METABOLIC PNL TOTAL CA: CPT

## 2020-10-07 PROCEDURE — 83880 ASSAY OF NATRIURETIC PEPTIDE: CPT

## 2020-10-07 PROCEDURE — 96374 THER/PROPH/DIAG INJ IV PUSH: CPT

## 2020-10-07 PROCEDURE — 2580000003 HC RX 258: Performed by: INTERNAL MEDICINE

## 2020-10-07 PROCEDURE — 36415 COLL VENOUS BLD VENIPUNCTURE: CPT

## 2020-10-07 PROCEDURE — 99214 OFFICE O/P EST MOD 30 MIN: CPT

## 2020-10-07 RX ORDER — FUROSEMIDE 10 MG/ML
40 INJECTION INTRAMUSCULAR; INTRAVENOUS ONCE
Status: COMPLETED | OUTPATIENT
Start: 2020-10-07 | End: 2020-10-07

## 2020-10-07 RX ORDER — SODIUM CHLORIDE 0.9 % (FLUSH) 0.9 %
10 SYRINGE (ML) INJECTION 2 TIMES DAILY
Status: DISCONTINUED | OUTPATIENT
Start: 2020-10-07 | End: 2020-10-08 | Stop reason: HOSPADM

## 2020-10-07 RX ADMIN — FUROSEMIDE 40 MG: 10 INJECTION, SOLUTION INTRAMUSCULAR; INTRAVENOUS at 12:22

## 2020-10-07 RX ADMIN — Medication 10 ML: at 12:22

## 2020-10-07 NOTE — PROGRESS NOTES
Pt. Up 5lbs from last month, c/o feeling bloated, abd softly distended. Breath sounds clear, diminished bilat. IV Lasix given.

## 2020-10-13 PROCEDURE — 93264 REM MNTR WRLS P-ART PRS SNR: CPT | Performed by: INTERNAL MEDICINE

## 2020-10-26 ENCOUNTER — HOSPITAL ENCOUNTER (OUTPATIENT)
Dept: PHYSICAL THERAPY | Age: 40
Setting detail: THERAPIES SERIES
Discharge: HOME | End: 2020-10-26
Payer: MEDICARE

## 2020-10-26 NOTE — PROGRESS NOTES
[address] [facility]                Phone: [phone]  Fax: [phone]    Physical Therapy  Cancellation/No-show Note  Patient Name:  Narciso Hansen  :  1980   Date:  10/26/2020    For today's appointment patient:  [x]  Cancelled  [x]  Rescheduled appointment  []  No-show     Reason given by patient:  []  Patient ill  []  Conflicting appointment  []  No transportation    []  Conflict with work  []  No reason given  [x]  Other:  Pt rescheduled for evaluation by reception.           Electronically signed by:    Betsey Bailey DPT  [id number]

## 2020-11-04 ENCOUNTER — HOSPITAL ENCOUNTER (OUTPATIENT)
Dept: OTHER | Age: 40
Setting detail: THERAPIES SERIES
Discharge: HOME | End: 2020-11-04
Payer: MEDICARE

## 2020-11-04 VITALS
RESPIRATION RATE: 18 BRPM | HEART RATE: 65 BPM | BODY MASS INDEX: 42.59 KG/M2 | WEIGHT: 314 LBS | SYSTOLIC BLOOD PRESSURE: 121 MMHG | DIASTOLIC BLOOD PRESSURE: 65 MMHG

## 2020-11-04 LAB
ANION GAP SERPL CALCULATED.3IONS-SCNC: 10 MMOL/L (ref 7–16)
BUN BLDV-MCNC: 34 MG/DL (ref 6–20)
CALCIUM SERPL-MCNC: 9.9 MG/DL (ref 8.6–10.2)
CHLORIDE BLD-SCNC: 101 MMOL/L (ref 98–107)
CO2: 25 MMOL/L (ref 22–29)
CREAT SERPL-MCNC: 1.4 MG/DL (ref 0.7–1.2)
GFR AFRICAN AMERICAN: >60
GFR NON-AFRICAN AMERICAN: 56 ML/MIN/1.73
GLUCOSE BLD-MCNC: 246 MG/DL (ref 74–99)
POTASSIUM SERPL-SCNC: 3.9 MMOL/L (ref 3.5–5)
PRO-BNP: 261 PG/ML (ref 0–125)
SODIUM BLD-SCNC: 136 MMOL/L (ref 132–146)

## 2020-11-04 PROCEDURE — 83880 ASSAY OF NATRIURETIC PEPTIDE: CPT

## 2020-11-04 PROCEDURE — 36415 COLL VENOUS BLD VENIPUNCTURE: CPT

## 2020-11-04 PROCEDURE — 99214 OFFICE O/P EST MOD 30 MIN: CPT

## 2020-11-04 PROCEDURE — 80048 BASIC METABOLIC PNL TOTAL CA: CPT

## 2020-11-05 ENCOUNTER — HOSPITAL ENCOUNTER (OUTPATIENT)
Dept: PHYSICAL THERAPY | Age: 40
Setting detail: THERAPIES SERIES
Discharge: HOME | End: 2020-11-05
Payer: MEDICARE

## 2020-11-05 PROCEDURE — 97161 PT EVAL LOW COMPLEX 20 MIN: CPT

## 2020-11-05 NOTE — PROGRESS NOTES
[facility]                Phone: [phone]   Fax: [phone]    Physical Therapy Daily Treatment Note  Date:  2020    Patient Name:  Brianne Valdez    :  1980  MRN: [id number]    Referring Physician:  Rasheed Alcantar  Insurance Information:  [employer]V      Evaluation date:  20   Diagnosis:  L shoulder bursitis     Precautions:  DEFIBRILLATOR (no modalities)    Evaluating Physical Therapist:  Jennifer Zacarias DPT  Plan of care signed (Y/N):    Visit# / total visits:  (approved for 7 visits from 2020 to 1/3/2021)       Subjective:        Exercises:   Exercise/Equipment Reps  During/ after  Other comments    Pulleys        UBE         Wall ladder        Table slides                         Manual PROM stretching                                                     Home Exercise Program:       Comments:       Treatment/Activity Tolerance:  [] Patient tolerated treatment well [] Patient limited by fatique  [] Patient limited by pain  [] Patient limited by other medical complications  [] Other:     Prognosis: [] Good [x] Fair  [] Poor    Patient Requires Follow-up: [x] Yes  [] No    Plan:   [] Continue per plan of care [] Alter current plan (see comments)  [x] Plan of care initiated [] Hold pending MD visit [] Discharge    Plan for Next Session:        See Weekly Progress Note: []  Yes  []  No  Next due:          CPT codes 2020 Units    Low Complexity PT evaluation 78255 13327    Moderate Complexity PT evaluation  31014    High Complexity PT evaluation 61009    PT Re-evaluation  [id number]    Gait training 06592    Manual therapy  [phone]    Therapeutic activities  [phone]    Therapeutic exercises  [phone]    Neuromuscular reeducation  [phone]        Time In:   Time Out:     Electronically signed by:    Jennifer Zacarias DPT  [id number]

## 2020-11-05 NOTE — PROGRESS NOTES
[address] [facility]                Phone: [phone]   Fax: [phone]    Physical Therapy Initial Evaluation  Date:  2020    Patient Name:  Alisa Arita    :  1980  MRN: [id number]  Referring Physician:  Nneka Duarte  Insurance Information:  [employer]V     Evaluation date:  20   Diagnosis:  L shoulder bursitis   Precautions:  DEFIBRILLATOR (no modalities)  Evaluating Physical Therapist:  eHrb Hough DPT      Subjective:  History/Onset of Symptoms:  Pt reports insidious onset L shoulder pain approximately 1 month ago. Pt reports bursitis in R shoulder a few years ago also. Pt denies specific injury to L shoulder. Pt is R hand dominant. Pt denies pain in L shoulder at rest.  He reports pain is worst with attempts at overhead movement. Pain:  0/10 at rest 9/10 with AROM/ PROM L shoulder        Sensation:  Pt reports intermittent tingling to L shoulder only, denies radiating pain and numbness/ tingling down LUE     Previous methods of Treatment:  None       Objective:    R shoulder AROM grossly WFL     L shoulder    AROM     PROM      Flexion  100 °     110 °      Abduction  90 °     95 °      IR  L lateral back pocket   NT     ER  L ear     NT        Strength:  RUE 5/5      L elbow/ hand 5/5    L shoulder 4-/5 throughout except shoulder ext 5/5     Pt reports significant pain with PROM of L shoulder into ER and end range flexion     Cervical spine:   AROM is grossly with min loss in all directions but pt denies pain    No reproduction of L shoulder pain with cervical ROM       Summary/Assessment:    Pt presents to outpatient physical therapy with severely limited L shoulder AROM/ PROM, L shoulder pain, L shoulder weakness, and impaired ability to perform daily activities due to L shoulder limitations.       Plan:     Below checked are areas for improvement during physical therapy POC:        [x]  Pain reduction  []  Balance Improvement       [x]  Strengthening  [x]  Postural Improvement   [x]  Range of Motion  [x]  Soft Tissue Improvement    []  Gait Training   []  Other:      [x]  Home Exercise Program      Pt will be see for 1-2 visits per week for 4-6 weeks for a total of 4-12 visits to accomplish goals set below:        Short Term/ Long Term Goals: (4-6 weeks)      1. Pt will report at least 90% reduction of L shoulder pain with daily activities     2. Pt will increase L shoulder strength to at least 4+/5 throughout     3. Pt will increase L shoulder AROM to at least:      160 ° flexion       160 ° abduction       80  ° IR/ ER     4. Pt will improve sitting posture from FAIR to GOOD    5. Pt will be independent with HEP         Pt's potential for reaching Physical Therapy goals: fair . Time In:  1410  Time Out:  Roman Mcconnell, DPPRANEETH  [id number]    [facility]  T: [phone]   F: [phone]     If you have any questions or concerns, please don't hesitate to call. Thank you for your referral.    Physician Signature:________________________________Date:__________________  By signing above, therapists plan is approved by physician. All patients under [employer]   must be signed by physician.

## 2020-11-09 ENCOUNTER — HOSPITAL ENCOUNTER (OUTPATIENT)
Dept: PHYSICAL THERAPY | Age: 40
Setting detail: THERAPIES SERIES
Discharge: HOME | End: 2020-11-09
Payer: MEDICARE

## 2020-11-09 PROCEDURE — 97110 THERAPEUTIC EXERCISES: CPT

## 2020-11-12 ENCOUNTER — HOSPITAL ENCOUNTER (OUTPATIENT)
Dept: PHYSICAL THERAPY | Age: 40
Setting detail: THERAPIES SERIES
Discharge: HOME | End: 2020-11-12
Payer: MEDICARE

## 2020-11-12 PROCEDURE — 97110 THERAPEUTIC EXERCISES: CPT

## 2020-11-12 PROCEDURE — 97140 MANUAL THERAPY 1/> REGIONS: CPT

## 2020-11-12 NOTE — PROGRESS NOTES
Home Exercise Program:       Treatment/Activity Tolerance:  [x] Patient tolerated treatment well [] Patient limited by fatigue  [x] Patient limited by pain  [] Patient limited by other medical complications  [] Other:     Prognosis: [] Good [x] Fair  [] Poor    Patient Requires Follow-up: [x] Yes  [] No    Plan:   [x] Continue per plan of care [] Alter current plan (see comments)  [] Plan of care initiated [] Hold pending MD visit [] Discharge    Plan for Next Session:  Continue POC with progressions per patient tolerance.         See Weekly Progress Note: []  Yes  []  No  Next due:          CPT codes 6/2/2020 Units    Low Complexity PT evaluation 85927 70403    Moderate Complexity PT evaluation  64201    High Complexity PT evaluation 18252    PT Re-evaluation  34847    Gait training 66584    Manual therapy  46421    Therapeutic activities  07650    Therapeutic exercises  88506 3   Neuromuscular reeducation  07443            Electronically signed by:  Rey Overton, PTA [id number]

## 2020-11-16 PROCEDURE — 93264 REM MNTR WRLS P-ART PRS SNR: CPT | Performed by: INTERNAL MEDICINE

## 2020-11-17 ENCOUNTER — HOSPITAL ENCOUNTER (OUTPATIENT)
Dept: PHYSICAL THERAPY | Age: 40
Setting detail: THERAPIES SERIES
Discharge: HOME | End: 2020-11-17
Payer: MEDICARE

## 2020-11-17 PROCEDURE — 97110 THERAPEUTIC EXERCISES: CPT

## 2020-11-17 NOTE — PROGRESS NOTES
[address] [facility]                Phone: [phone]   Fax: [phone]    Physical Therapy Daily Treatment Note  Date:  2020    Patient Name:  Josh Olvera    :  1980  MRN: [id number]      Evaluating Physical Therapist:  Catrachito Miller DPT   20   Referring Physician:  Isabela Louie  Insurance Information:  [employer]V   Diagnosis:  L shoulder bursitis   Precautions:  DEFIBRILLATOR (no modalities)  Plan of care signed (Y/N):    Visit# / total visits:  / (approved for 7 visits from 2020 to 1/3/2021)  Pain:   0-8/10    Time In:         Time Out:    4     Subjective:   Patient presents for second of two scheduled treatment sessions this week. He reports no pain L shoulder at rest prior to session this afternoon. He reports pain 8/10 with use of L shoulder. Exercises:  Exercise/Equipment Reps  During/ after  Other comments   UBE 5 min     Pulleys 5 min           Doorway str 5 x20s           Table slides/stretch  flex 10x 5s                           scaption 10x 5s                           ER 10x 5s           Wall ladder  10x ea flex/abduction           Cervical stretches demonstrated and performed for HEP          Wand ER str 10x 5s           Manual PROM stretching  10 min           Pendulums  crosses/circles  NT                  Objective:   Ther. exercises as listed per flow sheet above. Standing AROM R shoulder  flex 100°  abd 90°   ER/IR  lateral base of skull/lateral back pocket  Supine PROM  flexion 115°  abduction 95°    Strength L shoulder grossly 3+ to 4-/5    Assessment:  Patient performs all exercises with good effort and pacing. Patient reports pain overall mildly reduced following session. Goals:  Short Term/ Long Term Goals: (4-6 weeks)  1. Pt will report at least 90% reduction of L shoulder pain with daily activities   2. Pt will increase L shoulder strength to at least 4+/5 throughout   3.   Pt will increase L shoulder AROM to at least:                160 ° flexion                 160 ° abduction                  80  ° IR/ ER   4. Pt will improve sitting posture from FAIR to GOOD  5. Pt will be independent with HEP     Home Exercise Program:  Reviewed with copy provided 11/17/20     Treatment/Activity Tolerance:  [x] Patient tolerated treatment well [] Patient limited by fatigue  [x] Patient limited by pain  [] Patient limited by other medical complications  [] Other:     Prognosis: [] Good [x] Fair  [] Poor    Patient Requires Follow-up: [x] Yes  [] No    Plan:   [x] Continue per plan of care [] Alter current plan (see comments)  [] Plan of care initiated [] Hold pending MD visit [] Discharge    Plan for Next Session:  Continue POC with progressions per patient tolerance.         See Weekly Progress Note: []  Yes  []  No  Next due:          CPT codes 6/2/2020 Units    Low Complexity PT evaluation 35963 63630    Moderate Complexity PT evaluation  15615    High Complexity PT evaluation 44903    PT Re-evaluation  09856    Gait training 50649    Manual therapy  07544    Therapeutic activities  23143    Therapeutic exercises  95884 3   Neuromuscular reeducation  93361            Electronically signed by:  Rodena Holstein, [place], PTA [id number]

## 2020-11-19 ENCOUNTER — OFFICE VISIT (OUTPATIENT)
Dept: FAMILY MEDICINE CLINIC | Age: 40
End: 2020-11-19
Payer: MEDICARE

## 2020-11-19 ENCOUNTER — HOSPITAL ENCOUNTER (OUTPATIENT)
Dept: PHYSICAL THERAPY | Age: 40
Setting detail: THERAPIES SERIES
Discharge: HOME | End: 2020-11-19
Payer: MEDICARE

## 2020-11-19 ENCOUNTER — TELEPHONE (OUTPATIENT)
Dept: CARDIOLOGY CLINIC | Age: 40
End: 2020-11-19

## 2020-11-19 VITALS
DIASTOLIC BLOOD PRESSURE: 70 MMHG | RESPIRATION RATE: 16 BRPM | WEIGHT: 315 LBS | BODY MASS INDEX: 42.66 KG/M2 | TEMPERATURE: 97.6 F | HEART RATE: 71 BPM | OXYGEN SATURATION: 95 % | HEIGHT: 72 IN | SYSTOLIC BLOOD PRESSURE: 120 MMHG

## 2020-11-19 DIAGNOSIS — E11.8 TYPE 2 DIABETES MELLITUS WITH COMPLICATION, WITH LONG-TERM CURRENT USE OF INSULIN (HCC): Chronic | ICD-10-CM

## 2020-11-19 DIAGNOSIS — Z79.4 TYPE 2 DIABETES MELLITUS WITH COMPLICATION, WITH LONG-TERM CURRENT USE OF INSULIN (HCC): Chronic | ICD-10-CM

## 2020-11-19 DIAGNOSIS — N18.9 CHRONIC KIDNEY DISEASE, UNSPECIFIED CKD STAGE: ICD-10-CM

## 2020-11-19 LAB
ANION GAP SERPL CALCULATED.3IONS-SCNC: 15 MMOL/L (ref 7–16)
BUN BLDV-MCNC: 25 MG/DL (ref 6–20)
CALCIUM SERPL-MCNC: 10 MG/DL (ref 8.6–10.2)
CHLORIDE BLD-SCNC: 103 MMOL/L (ref 98–107)
CO2: 20 MMOL/L (ref 22–29)
CREAT SERPL-MCNC: 1.2 MG/DL (ref 0.7–1.2)
GFR AFRICAN AMERICAN: >60
GFR NON-AFRICAN AMERICAN: >60 ML/MIN/1.73
GLUCOSE BLD-MCNC: 177 MG/DL (ref 74–99)
HBA1C MFR BLD: 10.4 % (ref 4–5.6)
POTASSIUM SERPL-SCNC: 4 MMOL/L (ref 3.5–5)
SODIUM BLD-SCNC: 138 MMOL/L (ref 132–146)

## 2020-11-19 PROCEDURE — 99213 OFFICE O/P EST LOW 20 MIN: CPT | Performed by: STUDENT IN AN ORGANIZED HEALTH CARE EDUCATION/TRAINING PROGRAM

## 2020-11-19 PROCEDURE — 97140 MANUAL THERAPY 1/> REGIONS: CPT

## 2020-11-19 PROCEDURE — 99212 OFFICE O/P EST SF 10 MIN: CPT | Performed by: STUDENT IN AN ORGANIZED HEALTH CARE EDUCATION/TRAINING PROGRAM

## 2020-11-19 PROCEDURE — G0008 ADMIN INFLUENZA VIRUS VAC: HCPCS

## 2020-11-19 PROCEDURE — 97110 THERAPEUTIC EXERCISES: CPT

## 2020-11-19 PROCEDURE — 36415 COLL VENOUS BLD VENIPUNCTURE: CPT | Performed by: FAMILY MEDICINE

## 2020-11-19 PROCEDURE — 6360000002 HC RX W HCPCS

## 2020-11-19 PROCEDURE — 90686 IIV4 VACC NO PRSV 0.5 ML IM: CPT

## 2020-11-19 NOTE — PROGRESS NOTES
Subjective:      Patient ID: Yaya Page is a 36 y.o. male. Patient 51-year-old male with past medical history of type 2 diabetes, morbid obesity, CHF presenting to the office for follow-up on diabetes. Patient states he has been checking his blood sugars and they have been ranging 180 fasting, some mornings 140s obtain fasting. He states he has been taking his diabetic medication as prescribed. He states he takes 4 units of Levemir at night, 20 units of short acting insulin with meals. He also has noticed blood sugars fluctuate with his diet. His last hemoglobin A1c of 7.6% in February. He states he has been trying to watch salt and sugar intake. He denies chest pain shortness of breath with exertion and orthopnea. Denies leg swelling. Denies fever nausea vomiting chills cough. States have been feeling well. Also has been following up with his cardiologist.  Patient states he has established with bariatric surgery in [place] and has not coming appointment for evaluation. Review of Systems   Constitutional: Negative for activity change, appetite change, fatigue and fever. Respiratory: Negative for cough, shortness of breath and wheezing. Cardiovascular: Negative for chest pain, palpitations and leg swelling. Gastrointestinal: Negative for diarrhea, nausea and vomiting. Endocrine: Negative for polydipsia, polyphagia and polyuria. Psychiatric/Behavioral: Negative for decreased concentration and dysphoric mood. Objective:   Physical Exam  Vitals signs reviewed. Constitutional:       General: He is not in acute distress. Appearance: Normal appearance. He is not ill-appearing, toxic-appearing or diaphoretic. Cardiovascular:      Rate and Rhythm: Normal rate and regular rhythm. Pulses: Normal pulses. Heart sounds: Normal heart sounds. Pulmonary:      Effort: Pulmonary effort is normal.      Breath sounds: Normal breath sounds.    Abdominal:      General: Bowel sounds are normal.      Palpations: Abdomen is soft. Musculoskeletal:      Right lower leg: No edema. Left lower leg: No edema. Neurological:      Mental Status: He is alert and oriented to person, place, and time. Psychiatric:         Mood and Affect: Mood normal.         Behavior: Behavior normal.       Blood pressure 120/70, pulse 71, temperature 97.6 °F (36.4 °C), temperature source Infrared, resp. rate 16, height 6' (1.829 m), weight (!) 319 lb (144.7 kg), SpO2 95 %. Assessment:   Massimo Ramirez was seen today for follow-up and flu vaccine. Diagnoses and all orders for this visit:    Immunization due  -     INFLUENZA, QUADV, 3 YRS AND OLDER, IM PF, PREFILL SYR OR SDV, 0.5ML (AFLURIA QUADV, PF)    Type 2 diabetes mellitus with complication, with long-term current use of insulin ([facility] [address]  Based on previous A1c control  Will call back patient  -     HEMOGLOBIN A1C; Future  -     BASIC METABOLIC PANEL; Future    Chronic kidney disease, unspecified CKD stage  -     BASIC METABOLIC PANEL;  Future      Encouraged patient to watch diet and salt intake  Also encouraged patient to follow-up with breast surgery  Follow-up in 3 months    Sukhi Shaver MD

## 2020-11-19 NOTE — TELEPHONE ENCOUNTER
Antonietta Esparzachment III 1980 says he Feels good, denies edema, weight gain, SOB. Says chf s/s are managed. Knows s/s chf to call CHF Clinic or mems team for. He will send a Mems reading today. Has not sent all week. Diuretic:   bumex 2mg BID    Has not used any PRN dosing of diuretic. Tries to monitor sodium intake, keeps hydrated. Takes meds as directed. Keeps dr f/u cesilia. Participates in PT    Has no c/o at present. Goal :  Send mems readings daily. He feels he can send them at least 2-3 x week consistantly. Will continue to work with him on increasing frequency of mems sends. Call office if any new cardiac issues arise. Cardiology  Primary cardiologist: DR Rio Hutson team: DR Castillo/ Merced Orozco CNP  [id number] - advanced HF      Nonischemic Cardiomyopathy:  EF 25-30%     8 5 2020 Echo            Compared to prior echo from 7/31/19 - No significant changes noted.

## 2020-11-19 NOTE — PROGRESS NOTES
S: Delila Alpers III 36 y.o. male  here for check up. IRDM  IDM. Levemir 40 units/day and Humalog 20 units TID. FBS high 100s; fluctuations based on diet. Last HgbA1C 2/2020: 7.6%  History CHF. No exertional or orthopneic symptoms. Del LE edema. Getting PT for unrelated problem; able to walk here from PT to office  Morbid obesity: Bariatric care in [place]  HM: flu shot  O: VS: /70 (Site: Left Upper Arm, Position: Sitting, Cuff Size: Large Adult)   Pulse 71   Temp 97.6 °F (36.4 °C) (Infrared)   Resp 16   Ht 6' (1.829 m)   Wt (!) 319 lb (144.7 kg)   SpO2 95%   BMI 43.26 kg/m²    General: NAD   CV:  Paced rate & Rhythm, no gallops, rubs, or murmurs   Resp: CTAB no R/R/W   Abd:  Soft, nontender, no masses    Ext:  no C/C/E    Assessment / Plan:      Kourtney Ledezma was seen today for follow-up and flu vaccine. Diagnoses and all orders for this visit:    Immunization due  -     INFLUENZA, QUADV, 3 YRS AND OLDER, IM PF, PREFILL SYR OR SDV, 0.5ML (AFLURIA QUADV, PF)     Type 2 diabetes mellitus with complication, with long-term current use of insulin ([facility] [address]  Based on previous A1c control  Will call back patient  -     HEMOGLOBIN A1C; Future  -     BASIC METABOLIC PANEL; Future     Chronic kidney disease, unspecified CKD stage  -     BASIC METABOLIC PANEL; Future        Encouraged patient to watch diet and salt intake  Also encouraged patient to follow-up with breast surgery  Follow-up in 3 months      IRDM: labs including HgbA1C. No change POC  HM: flu shot        Return in about 3 months (around 2/19/2021). F/U check up in 3 months    Attending Physician Statement  I have discussed the case, including pertinent history and exam findings with the resident. I agree with the documented assessment and plan.          Ricardo Douglas MD

## 2020-11-19 NOTE — PROGRESS NOTES
[address] [facility]                Phone: [phone]   Fax: [phone]    Physical Therapy Daily Treatment Note  Date:  2020    Patient Name:  Brianne Valdez    :  1980  MRN: [id number]      Evaluating Physical Therapist:  Jennifer Zacarias DPT   20   Referring Physician:  Rasheed Alcantar  Insurance Information:  [employer] D[employer] ADV   Diagnosis:  L shoulder bursitis   Precautions:  DEFIBRILLATOR (no modalities)  Plan of care signed (Y/N):    Visit# / total visits:   (approved for 7 visits from 2020 to 1/3/2021)  Pain:    5-8/10    Time In:       1416  Time Out:     1352    Subjective:   Patient presents for second of two scheduled treatment sessions this week. He reports pain 5/10 in L shoulder at rest prior to session this afternoon. He reports pain 8/10 with use of L shoulder. Exercises:  Exercise/Equipment Reps  During/ after  Other comments   UBE 5 min     Pulleys  flex/abd 5/5 min            Doorway str 5 x20s           High ext 2 x10 gtb     Mid row 2 x10 btb           Table slides/stretch  flex 10x 5s                           scaption 10x 5s                           ER 10x 5s           Wall ladder  10x ea flex/abduction           Cervical stretches HEP           Wand ER str 10x 10s           Extension str 5x 20s            Manual PROM stretching  15 min           Pendulums  crosses/circles 20x ea 2#                   Objective:   Ther. exercises as listed per flow sheet above. Elastic band high ext and mid row added to HEP with elastic band provided. Supine PROM flexion to 120° abduction to 115° following manual supine PROM    Assessment:  Patient performs all exercises with good effort and pacing. Patient had increased pain prior to session today compared to previous session. Patient was able to perform all exercises/stretches per flow sheet above. PROM improved from earlier this week.       Goals:  Short Term/ Long Term Goals: (4-6 weeks)  1. Pt will report at least 90% reduction of L shoulder pain with daily activities   2. Pt will increase L shoulder strength to at least 4+/5 throughout   3. Pt will increase L shoulder AROM to at least:                160 ° flexion                 160 ° abduction                  80  ° IR/ ER   4. Pt will improve sitting posture from FAIR to GOOD  5. Pt will be independent with HEP     Home Exercise Program:  Reviewed with copy provided 11/17/20; posture awareness/positioning reviewed 11/17/20     Treatment/Activity Tolerance:  [x] Patient tolerated treatment well [] Patient limited by fatigue  [x] Patient limited by pain  [] Patient limited by other medical complications  [] Other:     Prognosis: [] Good [x] Fair  [] Poor    Patient Requires Follow-up: [x] Yes  [] No    Plan:   [x] Continue per plan of care [] Alter current plan (see comments)  [] Plan of care initiated [] Hold pending MD visit [] Discharge    Plan for Next Session:  Continue POC with progressions per patient tolerance.         See Weekly Progress Note: []  Yes  []  No  Next due:          CPT codes 6/2/2020 Units    Low Complexity PT evaluation 15369 06160    Moderate Complexity PT evaluation  47981    High Complexity PT evaluation 59436    PT Re-evaluation  88525    Gait training 74160    Manual therapy  93213 1   Therapeutic activities  08508    Therapeutic exercises  90211 3   Neuromuscular reeducation  95818            Electronically signed by:  Rey Huang, PTA [id number]

## 2020-11-22 ASSESSMENT — ENCOUNTER SYMPTOMS
SHORTNESS OF BREATH: 0
NAUSEA: 0
COUGH: 0
VOMITING: 0
DIARRHEA: 0
WHEEZING: 0

## 2020-11-24 ENCOUNTER — HOSPITAL ENCOUNTER (OUTPATIENT)
Dept: PHYSICAL THERAPY | Age: 40
Setting detail: THERAPIES SERIES
Discharge: HOME | End: 2020-11-24
Payer: MEDICARE

## 2020-11-24 ENCOUNTER — TELEPHONE (OUTPATIENT)
Dept: FAMILY MEDICINE CLINIC | Age: 40
End: 2020-11-24

## 2020-11-24 PROCEDURE — 97110 THERAPEUTIC EXERCISES: CPT

## 2020-11-24 NOTE — PROGRESS NOTES
[address] [facility]                Phone: [phone]   Fax: [phone]    Physical Therapy Daily Treatment Note  Date:  2020    Patient Name:  Hodan Aleman    :  1980  MRN: [id number]      Evaluating Physical Therapist:  Christine Berman DPT   20   Referring Physician:  Melinda Devine  Insurance Information:  [employer] D[employer] ADV   Diagnosis:  L shoulder bursitis   Precautions:  DEFIBRILLATOR (no modalities)  Plan of care signed (Y/N):    Visit# / total visits:  6/7 (approved for 7 visits from 2020 to 1/3/2021)  Pain:    5-8/10    Time In:       9556  Time Out:    1432    Subjective:   Patient presents for only scheduled treatment session this week. He reports no pain in L shoulder at rest prior to session this afternoon. He reports pain as much as 8/10 with use of L shoulder. Exercises:  Exercise/Equipment Reps  During/ after  Other comments   UBE 5 min     Pulleys  flex/abd 5/5 min            Doorway str 5 x20s           High ext 2 x15 gtb     Mid row 2 x15 btb           Table slides/stretch  flex 10x 5s                           scaption 10x 5s                           ER 10x 5s           Wall ladder  10x ea flex/abduction           Cervical stretches HEP           Wand ER str 10x 10s           Extension str 5x 20s            Manual PROM stretching  nt           Pendulums  crosses/circles 20x ea 2#                   Objective:   Ther. exercises as listed per flow sheet above. Standing AROM R shoulder  flex 110°  abd 95°   ER/IR  lateral base of skull/ midline S1-2  Supine PROM  flexion 130°  abduction 100    Strength L shoulder WAR 3+/5 for flexion, abduction, ER 4-/5 for IR    Assessment:  Patient performs all exercises with good effort and pacing. MIld improvement in AROM noted today. Advised to get over the door pulleys to add to HEP, patient agreed. Pain increased to 5/10 with exercises/activities of session.      Goals:  Short Term/ Long Term Goals: (4-6 weeks)  1. Pt will report at least 90% reduction of L shoulder pain with daily activities   2. Pt will increase L shoulder strength to at least 4+/5 throughout   3. Pt will increase L shoulder AROM to at least:                160 ° flexion                 160 ° abduction                  80  ° IR/ ER   4. Pt will improve sitting posture from FAIR to GOOD  5. Pt will be independent with HEP     Home Exercise Program:  Reviewed with copy provided 11/17/20; posture awareness/positioning reviewed 11/17/20     Treatment/Activity Tolerance:  [x] Patient tolerated treatment well [] Patient limited by fatigue  [x] Patient limited by pain  [] Patient limited by other medical complications  [] Other:     Prognosis: [] Good [x] Fair  [] Poor    Patient Requires Follow-up: [x] Yes  [] No    Plan:   [x] Continue per plan of care [] Alter current plan (see comments)  [] Plan of care initiated [] Hold pending MD visit [] Discharge    Plan for Next Session:  Continue POC with progressions per patient tolerance.         See Weekly Progress Note: []  Yes  []  No  Next due:          CPT codes 6/2/2020 Units    Low Complexity PT evaluation 17355 15200    Moderate Complexity PT evaluation  98136    High Complexity PT evaluation 42122    PT Re-evaluation  02924    Gait training 22937    Manual therapy  09509 1   Therapeutic activities  18698    Therapeutic exercises  38349 3   Neuromuscular reeducation  53308            Electronically signed by:  Rey Zimmerman, PTA [id number]

## 2020-11-24 NOTE — TELEPHONE ENCOUNTER
Spoke with patient showed results of recent hemoglobin A1c. Patient says he has taken metformin in the past he does not have any problem with diet. He is not sure why his not taking it. Check renal function. We discussed and agreed to start Metformin again.

## 2020-12-03 ENCOUNTER — HOSPITAL ENCOUNTER (OUTPATIENT)
Dept: PHYSICAL THERAPY | Age: 40
Setting detail: THERAPIES SERIES
Discharge: HOME | End: 2020-12-03
Payer: MEDICARE

## 2020-12-03 ENCOUNTER — HOSPITAL ENCOUNTER (OUTPATIENT)
Age: 40
Setting detail: OBSERVATION
Discharge: HOME | End: 2020-12-04
Attending: EMERGENCY MEDICINE | Admitting: FAMILY MEDICINE
Payer: MEDICARE

## 2020-12-03 ENCOUNTER — APPOINTMENT (OUTPATIENT)
Dept: GENERAL RADIOLOGY | Age: 40
End: 2020-12-03
Payer: MEDICARE

## 2020-12-03 PROBLEM — I47.20 V-TACH (HCC): Status: ACTIVE | Noted: 2020-12-03

## 2020-12-03 LAB
ANION GAP SERPL CALCULATED.3IONS-SCNC: 14 MMOL/L (ref 7–16)
BASOPHILS ABSOLUTE: 0.03 E9/L (ref 0–0.2)
BASOPHILS RELATIVE PERCENT: 0.4 % (ref 0–2)
BUN BLDV-MCNC: 40 MG/DL (ref 6–20)
CALCIUM SERPL-MCNC: 9.3 MG/DL (ref 8.6–10.2)
CHLORIDE BLD-SCNC: 94 MMOL/L (ref 98–107)
CO2: 20 MMOL/L (ref 22–29)
CREAT SERPL-MCNC: 1.5 MG/DL (ref 0.7–1.2)
EOSINOPHILS ABSOLUTE: 0.11 E9/L (ref 0.05–0.5)
EOSINOPHILS RELATIVE PERCENT: 1.5 % (ref 0–6)
GFR AFRICAN AMERICAN: >60
GFR NON-AFRICAN AMERICAN: 52 ML/MIN/1.73
GLUCOSE BLD-MCNC: 391 MG/DL (ref 74–99)
HCT VFR BLD CALC: 40.3 % (ref 37–54)
HEMOGLOBIN: 13.8 G/DL (ref 12.5–16.5)
IMMATURE GRANULOCYTES #: 0.03 E9/L
IMMATURE GRANULOCYTES %: 0.4 % (ref 0–5)
LYMPHOCYTES ABSOLUTE: 1.85 E9/L (ref 1.5–4)
LYMPHOCYTES RELATIVE PERCENT: 26 % (ref 20–42)
MAGNESIUM: 2.4 MG/DL (ref 1.6–2.6)
MCH RBC QN AUTO: 29.4 PG (ref 26–35)
MCHC RBC AUTO-ENTMCNC: 34.2 % (ref 32–34.5)
MCV RBC AUTO: 85.9 FL (ref 80–99.9)
MONOCYTES ABSOLUTE: 0.91 E9/L (ref 0.1–0.95)
MONOCYTES RELATIVE PERCENT: 12.8 % (ref 2–12)
NEUTROPHILS ABSOLUTE: 4.19 E9/L (ref 1.8–7.3)
NEUTROPHILS RELATIVE PERCENT: 58.9 % (ref 43–80)
PDW BLD-RTO: 14.5 FL (ref 11.5–15)
PLATELET # BLD: 265 E9/L (ref 130–450)
PMV BLD AUTO: 10 FL (ref 7–12)
POTASSIUM REFLEX MAGNESIUM: 4.3 MMOL/L (ref 3.5–5)
PRO-BNP: 1837 PG/ML (ref 0–125)
RBC # BLD: 4.69 E12/L (ref 3.8–5.8)
SODIUM BLD-SCNC: 128 MMOL/L (ref 132–146)
TROPONIN: <0.01 NG/ML (ref 0–0.03)
WBC # BLD: 7.1 E9/L (ref 4.5–11.5)

## 2020-12-03 PROCEDURE — 71045 X-RAY EXAM CHEST 1 VIEW: CPT

## 2020-12-03 PROCEDURE — 80048 BASIC METABOLIC PNL TOTAL CA: CPT

## 2020-12-03 PROCEDURE — 99283 EMERGENCY DEPT VISIT LOW MDM: CPT

## 2020-12-03 PROCEDURE — 83880 ASSAY OF NATRIURETIC PEPTIDE: CPT

## 2020-12-03 PROCEDURE — 83735 ASSAY OF MAGNESIUM: CPT

## 2020-12-03 PROCEDURE — 93005 ELECTROCARDIOGRAM TRACING: CPT | Performed by: EMERGENCY MEDICINE

## 2020-12-03 PROCEDURE — 85025 COMPLETE CBC W/AUTO DIFF WBC: CPT

## 2020-12-03 PROCEDURE — 84484 ASSAY OF TROPONIN QUANT: CPT

## 2020-12-03 NOTE — PROGRESS NOTES
[address] [facility]                Phone: [phone]  Fax: [phone]    Physical Therapy  Cancellation/No-show Note  Patient Name:  Blanca Boone  :  1980   Date:  12/3/2020    For today's appointment patient:  [x]  Cancelled  []  Rescheduled appointment  []  No-show     Reason given by patient:  []  Patient ill  []  Conflicting appointment  []  No transportation    []  Conflict with work  []  No reason given  [x]  Other:     Comments:  Patient called yesterday to report he was going to the hospital (no reason given) and would miss today's appt.       Electronically signed by:  Jarett Schafer ATC, PTA

## 2020-12-04 VITALS
HEIGHT: 72 IN | BODY MASS INDEX: 42.66 KG/M2 | RESPIRATION RATE: 18 BRPM | OXYGEN SATURATION: 97 % | WEIGHT: 315 LBS | HEART RATE: 64 BPM | TEMPERATURE: 97 F | SYSTOLIC BLOOD PRESSURE: 100 MMHG | DIASTOLIC BLOOD PRESSURE: 57 MMHG

## 2020-12-04 PROBLEM — I47.20 V-TACH (HCC): Status: RESOLVED | Noted: 2020-12-03 | Resolved: 2020-12-04

## 2020-12-04 LAB
ANION GAP SERPL CALCULATED.3IONS-SCNC: 12 MMOL/L (ref 7–16)
BASOPHILS ABSOLUTE: 0.04 E9/L (ref 0–0.2)
BASOPHILS RELATIVE PERCENT: 0.5 % (ref 0–2)
BUN BLDV-MCNC: 40 MG/DL (ref 6–20)
CALCIUM SERPL-MCNC: 9.4 MG/DL (ref 8.6–10.2)
CHLORIDE BLD-SCNC: 98 MMOL/L (ref 98–107)
CO2: 22 MMOL/L (ref 22–29)
CREAT SERPL-MCNC: 1.6 MG/DL (ref 0.7–1.2)
EKG ATRIAL RATE: 81 BPM
EKG P-R INTERVAL: 136 MS
EKG Q-T INTERVAL: 516 MS
EKG QRS DURATION: 228 MS
EKG QTC CALCULATION (BAZETT): 599 MS
EKG R AXIS: 161 DEGREES
EKG T AXIS: -39 DEGREES
EKG VENTRICULAR RATE: 81 BPM
EOSINOPHILS ABSOLUTE: 0.14 E9/L (ref 0.05–0.5)
EOSINOPHILS RELATIVE PERCENT: 1.9 % (ref 0–6)
GFR AFRICAN AMERICAN: 58
GFR NON-AFRICAN AMERICAN: 48 ML/MIN/1.73
GLUCOSE BLD-MCNC: 346 MG/DL (ref 74–99)
HCT VFR BLD CALC: 39.8 % (ref 37–54)
HEMOGLOBIN: 13.2 G/DL (ref 12.5–16.5)
IMMATURE GRANULOCYTES #: 0.03 E9/L
IMMATURE GRANULOCYTES %: 0.4 % (ref 0–5)
LYMPHOCYTES ABSOLUTE: 1.86 E9/L (ref 1.5–4)
LYMPHOCYTES RELATIVE PERCENT: 25 % (ref 20–42)
MCH RBC QN AUTO: 29.2 PG (ref 26–35)
MCHC RBC AUTO-ENTMCNC: 33.2 % (ref 32–34.5)
MCV RBC AUTO: 88.1 FL (ref 80–99.9)
METER GLUCOSE: 193 MG/DL (ref 74–99)
METER GLUCOSE: 197 MG/DL (ref 74–99)
METER GLUCOSE: 327 MG/DL (ref 74–99)
MONOCYTES ABSOLUTE: 1.18 E9/L (ref 0.1–0.95)
MONOCYTES RELATIVE PERCENT: 15.9 % (ref 2–12)
NEUTROPHILS ABSOLUTE: 4.19 E9/L (ref 1.8–7.3)
NEUTROPHILS RELATIVE PERCENT: 56.3 % (ref 43–80)
PDW BLD-RTO: 14.5 FL (ref 11.5–15)
PLATELET # BLD: 265 E9/L (ref 130–450)
PMV BLD AUTO: 9.9 FL (ref 7–12)
POTASSIUM REFLEX MAGNESIUM: 4.1 MMOL/L (ref 3.5–5)
RBC # BLD: 4.52 E12/L (ref 3.8–5.8)
SODIUM BLD-SCNC: 132 MMOL/L (ref 132–146)
TROPONIN: 0.02 NG/ML (ref 0–0.03)
TROPONIN: 0.03 NG/ML (ref 0–0.03)
WBC # BLD: 7.4 E9/L (ref 4.5–11.5)

## 2020-12-04 PROCEDURE — 99236 HOSP IP/OBS SAME DATE HI 85: CPT | Performed by: FAMILY MEDICINE

## 2020-12-04 PROCEDURE — 6370000000 HC RX 637 (ALT 250 FOR IP): Performed by: STUDENT IN AN ORGANIZED HEALTH CARE EDUCATION/TRAINING PROGRAM

## 2020-12-04 PROCEDURE — 82962 GLUCOSE BLOOD TEST: CPT

## 2020-12-04 PROCEDURE — G0378 HOSPITAL OBSERVATION PER HR: HCPCS

## 2020-12-04 PROCEDURE — 85025 COMPLETE CBC W/AUTO DIFF WBC: CPT

## 2020-12-04 PROCEDURE — 93010 ELECTROCARDIOGRAM REPORT: CPT | Performed by: INTERNAL MEDICINE

## 2020-12-04 PROCEDURE — 80048 BASIC METABOLIC PNL TOTAL CA: CPT

## 2020-12-04 PROCEDURE — 84484 ASSAY OF TROPONIN QUANT: CPT

## 2020-12-04 PROCEDURE — 36415 COLL VENOUS BLD VENIPUNCTURE: CPT

## 2020-12-04 PROCEDURE — 2580000003 HC RX 258: Performed by: STUDENT IN AN ORGANIZED HEALTH CARE EDUCATION/TRAINING PROGRAM

## 2020-12-04 RX ORDER — SPIRONOLACTONE 25 MG/1
12.5 TABLET ORAL DAILY
Status: DISCONTINUED | OUTPATIENT
Start: 2020-12-04 | End: 2020-12-04 | Stop reason: HOSPADM

## 2020-12-04 RX ORDER — CARVEDILOL 25 MG/1
25 TABLET ORAL 2 TIMES DAILY WITH MEALS
Status: DISCONTINUED | OUTPATIENT
Start: 2020-12-04 | End: 2020-12-04 | Stop reason: HOSPADM

## 2020-12-04 RX ORDER — INSULIN GLARGINE 100 [IU]/ML
40 INJECTION, SOLUTION SUBCUTANEOUS 2 TIMES DAILY
Status: DISCONTINUED | OUTPATIENT
Start: 2020-12-04 | End: 2020-12-04 | Stop reason: HOSPADM

## 2020-12-04 RX ORDER — DEXTROSE MONOHYDRATE 50 MG/ML
100 INJECTION, SOLUTION INTRAVENOUS PRN
Status: DISCONTINUED | OUTPATIENT
Start: 2020-12-04 | End: 2020-12-04 | Stop reason: HOSPADM

## 2020-12-04 RX ORDER — ACETAMINOPHEN 325 MG/1
650 TABLET ORAL EVERY 6 HOURS PRN
Status: DISCONTINUED | OUTPATIENT
Start: 2020-12-04 | End: 2020-12-04 | Stop reason: HOSPADM

## 2020-12-04 RX ORDER — NICOTINE POLACRILEX 4 MG
15 LOZENGE BUCCAL PRN
Status: DISCONTINUED | OUTPATIENT
Start: 2020-12-04 | End: 2020-12-04 | Stop reason: HOSPADM

## 2020-12-04 RX ORDER — SODIUM CHLORIDE 0.9 % (FLUSH) 0.9 %
10 SYRINGE (ML) INJECTION PRN
Status: DISCONTINUED | OUTPATIENT
Start: 2020-12-04 | End: 2020-12-04 | Stop reason: HOSPADM

## 2020-12-04 RX ORDER — ATORVASTATIN CALCIUM 40 MG/1
40 TABLET, FILM COATED ORAL DAILY
Status: DISCONTINUED | OUTPATIENT
Start: 2020-12-04 | End: 2020-12-04 | Stop reason: HOSPADM

## 2020-12-04 RX ORDER — DEXTROSE MONOHYDRATE 25 G/50ML
12.5 INJECTION, SOLUTION INTRAVENOUS PRN
Status: DISCONTINUED | OUTPATIENT
Start: 2020-12-04 | End: 2020-12-04 | Stop reason: HOSPADM

## 2020-12-04 RX ORDER — ALLOPURINOL 300 MG/1
300 TABLET ORAL DAILY
Status: DISCONTINUED | OUTPATIENT
Start: 2020-12-04 | End: 2020-12-04 | Stop reason: HOSPADM

## 2020-12-04 RX ORDER — SODIUM CHLORIDE 0.9 % (FLUSH) 0.9 %
10 SYRINGE (ML) INJECTION EVERY 12 HOURS SCHEDULED
Status: DISCONTINUED | OUTPATIENT
Start: 2020-12-04 | End: 2020-12-04 | Stop reason: HOSPADM

## 2020-12-04 RX ORDER — ACETAMINOPHEN 650 MG/1
650 SUPPOSITORY RECTAL EVERY 6 HOURS PRN
Status: DISCONTINUED | OUTPATIENT
Start: 2020-12-04 | End: 2020-12-04 | Stop reason: HOSPADM

## 2020-12-04 RX ORDER — DOFETILIDE 0.25 MG/1
250 CAPSULE ORAL EVERY 12 HOURS SCHEDULED
Status: DISCONTINUED | OUTPATIENT
Start: 2020-12-04 | End: 2020-12-04 | Stop reason: HOSPADM

## 2020-12-04 RX ORDER — BUMETANIDE 1 MG/1
2 TABLET ORAL 2 TIMES DAILY
Status: DISCONTINUED | OUTPATIENT
Start: 2020-12-04 | End: 2020-12-04 | Stop reason: HOSPADM

## 2020-12-04 RX ADMIN — CARVEDILOL 25 MG: 25 TABLET, FILM COATED ORAL at 08:55

## 2020-12-04 RX ADMIN — CARVEDILOL 25 MG: 25 TABLET, FILM COATED ORAL at 16:24

## 2020-12-04 RX ADMIN — MAGNESIUM GLUCONATE 500 MG ORAL TABLET 400 MG: 500 TABLET ORAL at 08:55

## 2020-12-04 RX ADMIN — Medication 10 ML: at 08:56

## 2020-12-04 RX ADMIN — BUMETANIDE 2 MG: 1 TABLET ORAL at 16:24

## 2020-12-04 RX ADMIN — METFORMIN HYDROCHLORIDE 1000 MG: 1000 TABLET ORAL at 16:24

## 2020-12-04 RX ADMIN — INSULIN LISPRO 20 UNITS: 100 INJECTION, SOLUTION INTRAVENOUS; SUBCUTANEOUS at 10:20

## 2020-12-04 RX ADMIN — INSULIN GLARGINE 40 UNITS: 100 INJECTION, SOLUTION SUBCUTANEOUS at 05:34

## 2020-12-04 RX ADMIN — ALLOPURINOL 300 MG: 300 TABLET ORAL at 08:55

## 2020-12-04 RX ADMIN — INSULIN LISPRO 20 UNITS: 100 INJECTION, SOLUTION INTRAVENOUS; SUBCUTANEOUS at 16:24

## 2020-12-04 RX ADMIN — METFORMIN HYDROCHLORIDE 1000 MG: 1000 TABLET ORAL at 08:55

## 2020-12-04 RX ADMIN — SPIRONOLACTONE 12.5 MG: 25 TABLET ORAL at 08:54

## 2020-12-04 RX ADMIN — DOFETILIDE 250 MCG: 0.25 CAPSULE ORAL at 08:55

## 2020-12-04 RX ADMIN — SACUBITRIL AND VALSARTAN 1 TABLET: 97; 103 TABLET, FILM COATED ORAL at 08:55

## 2020-12-04 RX ADMIN — ATORVASTATIN CALCIUM 40 MG: 40 TABLET, FILM COATED ORAL at 10:19

## 2020-12-04 RX ADMIN — RIVAROXABAN 15 MG: 15 TABLET, FILM COATED ORAL at 16:24

## 2020-12-04 RX ADMIN — BUMETANIDE 2 MG: 1 TABLET ORAL at 08:55

## 2020-12-04 ASSESSMENT — PAIN SCALES - GENERAL
PAINLEVEL_OUTOF10: 0
PAINLEVEL_OUTOF10: 0

## 2020-12-04 NOTE — ED NOTES
Bed: 24  Expected date:   Expected time:   Means of arrival:   Comments:  jorge Manzanares RN  12/03/20 9037

## 2020-12-04 NOTE — ED NOTES
Pacemaker interrogated successfully.  Data sent per medtronic device     Edgar Webster RN  12/03/20 7070

## 2020-12-04 NOTE — PROGRESS NOTES
Stat troponin called in to lab     unable to obtain stat troponin.  Called stat lab to see if another phlebotomist can draw

## 2020-12-04 NOTE — CONSULTS
Today's Date: 12/4/2020  Patient Name: Amanuel Castaneda  Date of admission: 12/3/2020 10:17 PM  Patient's age: 36 y.o., 1980  Admission Dx: [facility]) [I47.2]    Reason for Consult: Ventricular tachycardia and ICD shock  Requesting Physician: Santi Jaffe MD    CHIEF COMPLAINT: ICD discharge    History Obtained From:  patient    HISTORY OF PRESENT ILLNESS:      The patient is a 36 y.o.  male who is admitted to the hospital for presenting to the hospital after an ICD shock  He is known to me for history of nonischemic cardiomyopathy, obesity and sleep apnea  Congestive heart failure, functional class II NYHA classification  Paroxysmal atrial fibrillation  Biventricular pacer ICD implantation in 2018. This was an upgrade of a single-chamber ICD placed previously. He was in his usual state of health, but was noncompliant with his diet during the Thanksgiving holiday. [facility] [place] He developed atrial fibrillation 2 or 3 days ago and did notice  symptoms of rapid heartbeat initially. He was out shopping yesterday however was able to do so without difficulty. Subsequently however  later in the day he noticed symptoms of diaphoresis and lightheadedness and his device discharged. Since hospitalization he has felt better.   No chest pain or shortness of breath at present  No symptoms of paroxysmal nocturnal dyspnea, orthopnea or leg edema at present  No recent episodes of syncope or near syncope  He received 1 ICD shock for sustained VT    Past Medical History:   has a past medical history of Acute CHF ([address] Acute CHF ([address] Acute idiopathic gout of right foot, AF (atrial fibrillation) ([address] Anemia, CAD (coronary artery disease), Cerebral artery occlusion with cerebral infarction ([address] CKD (chronic kidney disease), Gout, HTN (hypertension), Hyperlipidemia, Hypertension, Morbid obesity due to excess calories ([address] Nonischemic cardiomyopathy ([address] Nonischemic cardiomyopathy ([address] ARVIND (obstructive sleep apnea), S/P left heart catheterization by percutaneous approach, Systolic heart failure ([facility] [address] Type 2 diabetes mellitus with complication, with long-term current use of insulin ([facility] [address] and URI, acute. Past Surgical History:   has a past surgical history that includes ECHO Compl W Dop Color Flow (11/30/2011); ECHO Compl W Dop Color Flow (3/27/2012); Insertable Cardiac Monitor (12/13/2018); Cardioversion (02/14/2020); Cardiac defibrillator placement (11/20/2018); Cardiac catheterization (08/01/2019); and pacemaker placement. Home Medications:    Prior to Admission medications    Medication Sig Start Date End Date Taking?  Authorizing Provider   metFORMIN (GLUCOPHAGE) 1000 MG tablet Take 1 tablet by mouth 2 times daily (with meals) 11/24/20  Yes Yuliya Ingram MD   blood glucose test strips (ONETOUCH ULTRA) strip USE TO TEST BLOOD SUGAR 2-3 TIMES DAILY AND AS NEEDED FOR SYMPTOMS OF IRREGULAR BLOOD GLUCOSE *SUBSTITUTE AS NEEDED PER INSURANCE PREFERENCE* 11/4/20  Yes Hansel Hubbard,    bumetanide (BUMEX) 2 MG tablet Take 1 tablet by mouth 2 times daily 8/31/20  Yes Mariama Gomez MD   sacubitril-valsartan (ENTRESTO)  MG per tablet Take 1 tablet by mouth 2 times daily 8/31/20  Yes Mariama Gomez MD   spironolactone (ALDACTONE) 25 MG tablet Take 0.5 tablets by mouth daily 8/28/20  Yes Becka Aguilar MD   insulin lispro (HUMALOG) 100 UNIT/ML injection vial Inject 20 Units into the skin 3 times daily (before meals) 8/28/20  Yes Becka Aguilar MD   LEVEMIR FLEXTOUCH 100 UNIT/ML injection pen Inject 40 Units into the skin 2 times daily 8/28/20  Yes Becka Aguilar MD   rivaroxaban (XARELTO) 20 MG TABS tablet TAKE (1) TABLET BY MOUTH DAILY 8/28/20  Yes Becka Aguilar MD   allopurinol (ZYLOPRIM) 300 MG tablet Take 1 tablet by mouth daily 8/28/20  Yes Becka Aguilar MD   dofetilide (TIKOSYN) 250 MCG capsule Take 1 capsule by mouth every 12 hours 8/28/20  Yes Becka Aguilar MD   magnesium oxide (MAG-OX) 400 MG tablet Take 1 tablet by mouth daily 8/28/20  Yes Matheus Wright MD   atorvastatin (LIPITOR) 40 MG tablet TAKE 1 TABLET BY MOUTH EVERY DAY 8/28/20  Yes Matheus Wright MD   carvedilol (COREG) 25 MG tablet Take 1 tablet by mouth 2 times daily (with meals) 8/28/20  Yes Maren Starr MD   Blood Glucose Monitoring Suppl (ONE TOUCH ULTRA 2) w/Device KIT USE AS DIRECTED DAILY *SUBSTITUTE AS NEEDED PER INSURANCE PREFERENCE* 12/9/19  Yes Kayla Syed MD   Lancets MISC Test 3-4 times daily 11/14/19  Yes Matheus Wright MD       Allergies:  [place] [dapagliflozin]    Social History:   reports that he has never smoked. He has never used smokeless tobacco. He reports previous alcohol use. He reports that he does not use drugs. Family History: family history includes Cancer in his mother; No Known Problems in his father. No h/o sudden cardiac death. No for premature CAD    REVIEW OF SYSTEMS:    · Constitutional: there has been no unanticipated weight loss. There's been No change in energy level, No change in activity level. · Eyes: No visual changes or diplopia. No scleral icterus. · ENT: No Headaches, hearing loss or vertigo. No mouth sores or sore throat. · Cardiovascular: No chest pain, No dyspnea on exertion, Yes palpitations or No loss of consciousness. No cough, hemoptysis, No pleuritic pain, or phlebitis. · Respiratory: No cough or wheezing, no sputum production. No hematemesis. · Gastrointestinal: No abdominal pain, appetite loss, blood in stools. No change in bowel or bladder habits. · Genitourinary: No dysuria, trouble voiding, or hematuria. · Musculoskeletal:  No gait disturbance, No weakness or joint complaints. · Integumentary: No rash or pruritis. · Neurological: No headache, diplopia, change in muscle strength, numbness or tingling. No change in gait, balance, coordination, mood, affect, memory, mentation, behavior.   · Psychiatric: No anxiety, or depression. · Endocrine: No temperature intolerance. No excessive thirst, fluid intake, or urination. No tremor. · Hematologic/Lymphatic: No abnormal bruising or bleeding, blood clots or swollen lymph nodes. · Allergic/Immunologic: No nasal congestion or hives. PHYSICAL EXAM:      BP 97/63   Pulse 66   Temp 97.2 °F (36.2 °C) (Temporal)   Resp 20   Ht 6' (1.829 m)   Wt (!) 320 lb (145.2 kg)   SpO2 97%   BMI 43.40 kg/m²    Constitutional and General Appearance: alert, cooperative, no distress and appears stated age  HEENT: PERRL, no cervical lymphadenopathy. No masses palpable. Normal oral mucosa  Respiratory:  · Normal excursion and expansion without use of accessory muscles  · Resp Auscultation: Good respiratory effort. No for increased work of breathing. On auscultation: clear to auscultation bilaterally  Cardiovascular:  · The apical impulse is laterally displaced  · Heart tones are crisp and normal. regular S1 and S2.  · Jugular venous pulsation Normal  · The carotid upstroke is normal in amplitude and contour without delay or bruit  · Peripheral pulses are symmetrical and full  Abdomen:  · No masses or tenderness  · Bowel sounds present  Extremities:  ·  No Cyanosis or Clubbing  ·  Lower extremity edema: No  · Skin: Warm and dry  Neurological:  · Alert and oriented. · Moves all extremities well  · No abnormalities of mood, affect, memory, mentation, or behavior are noted    DATA:    Diagnostics:    EKG: Sinus rhythm with biventricular pacing, QTC is unremarkable  ECHO: reviewed. Ejection fraction: 30%  Summary   Left ventricle dilated at 7.1cm. Severe global hypokinesis, LV EF visually estimated about 25-30%. Mild left ventricular concentric hypertrophy noted. Stage II diastolic dysfunction. Left atrium is enlarged. Interatrial septum not well visualized but appears intact. Normal right ventricle size and function. Pacer/ICD lead in RV. No mitral valve prolapse.    There is trace aortic regurgitation. There is trace tricuspid regurgitation, RVSP 30mmHg. Normal aortic root size. Trace of pericardial effusion. Pericardium appears normal.   No intra cardiac mass or thrombus. Compared to prior echo from 7/31/19 - No significant changes noted. Signature      ----------------------------------------------------------------   Electronically signed by Stacie Gomez MD(Interpreting   physician) on 08/06/2020 06:39 AM   ----------------------------------------------------------------    Stress Test: not obtained. Cardiac Angiography: not obtained. Device Evaluation    Make/model Medtronic Claria MRI Quad CRT-D  Mode DDD    P waves   2.5    mV     Impedance   361      ohms   Pacing threshold   0.75 [email or web address]    msec  R waves   11.3    MV     Impedance    456     ohms   Pacing threshold 0.75 [email or web address]   msec   LV lead impedance 1140 ohms and a pacing threshold of 3 V at 1 ms, LV 1 to LV 4  Pacing percentage  A    0.3%           V       97.6%  High voltage impedance     61 ohms  Battery longevity    3.2 years  Arrhythmias    1. Paroxysmal atrial fibrillation. This current episode was about 3days old (48 hours)  2. Sustained monomorphic VT which led to his successful ICD shock and converted the patient to sinus rhythm successfully      Evaluation and reprogramming included   Overall device function is normal  All  device programmable settings were evaluated per above, along with iterative adjustments (capture thresholds) to assess and select the most appropriate final programming for consistent delivery off the appropriate therapy and to verify function of the device.     Labs:   CBC:   Recent Labs     12/03/20 2233 12/04/20 0441   WBC 7.1 7.4   HGB 13.8 13.2   HCT 40.3 39.8    265     BMP:   Recent Labs     12/03/20 2233 12/04/20 0441   * 132   K 4.3 4.1   CO2 20* 22   BUN 40* 40*   CREATININE 1.5* 1.6*   LABGLOM 52 48   GLUCOSE 391* 346*     BNP: No results for input(s): BNP in the last 72 hours. PT/INR: No results for input(s): PROTIME, INR in the last 72 hours. APTT:No results for input(s): APTT in the last 72 hours. CARDIAC ENZYMES:  Recent Labs     12/03/20  2233 12/04/20  0441   TROPONINI <0.01 0.02     FASTING LIPID PANEL:  Lab Results   Component Value Date    HDL 51 05/21/2020    LDLCALC 102 05/21/2020    TRIG 69 05/21/2020     LIVER PROFILE:No results for input(s): AST, ALT, LABALBU in the last 72 hours. IMPRESSION:    Patient Active Problem List   Diagnosis    Obstructive sleep apnea    Chronic gout    CKD (chronic kidney disease)    Type 2 diabetes mellitus with complication, with long-term current use of insulin (HCC)    Hepatomegaly    Paroxysmal atrial fibrillation (HCC)--current episode is 50 hours old and converted to sinus rhythm with the ICD discharge    Essential hypertension    VHD (valvular heart disease)    Morbid obesity with BMI of 40.0-44.9, adult ([address]    Ventricular arrhythmia    Nonischemic cardiomyopathy ([address]    S/P left pulmonary artery pressure sensor implant placement    Carotid disease, bilateral (HCC)   Biventricular pacer ICD in situ which is functioning appropriately    Sustained monomorphic VT leading to the successful ICD shock restoring sinus rhythm prior to this hospitalization    Congestive heart failure, combined, chronic. ICD interrogation does not show any recent changes in transthoracic impedance suggesting worsening of heart failure/fluid accumulation in the chest.  Elevated proBNP level is possibly secondary to the episode of A. fib that was present for about 48 hours and underlying renal insufficiency    RECOMMENDATIONS:    No specific intervention from electrophysiology standpoint since ICD function was appropriate.     Compliance with diet and medications discussed with patient at length    Continued use of CPAP    Okay for discharge from electrophysiology standpoint    Follow-up in the office for ICD

## 2020-12-04 NOTE — CARE COORDINATION
Spoke with patient. He lives at home alone. Does not drive. No assistive devices in the home. PCP is Dr. Ayah Boles. His aunt will provide transport home. No homecare or DONNY. Plan is home with no needs when released. For questions I can be reached at [phone].  [place], [place]

## 2020-12-04 NOTE — PROGRESS NOTES
[facility]  Family Medicine Attending    S: 36 y.o. male with PMH of T2DM, A. fib, ARVIND, CHF, nonischemic cardiomyopathy who presented with episode of chest pain followed by ICD firing. Pain resolved afterwards. He had been having feeling of palpitations throughout the day prior to episode that brought him in. No obvious changes in medications, activity or diet. IN ER    O: VS- Blood pressure (!) 118/90, pulse 74, temperature 97.4 °F (36.3 °C), temperature source Temporal, resp. rate 23, height 6' (1.829 m), weight (!) 320 lb (145.2 kg), SpO2 97 %. Exam is as noted by resident with the following changes, additions or corrections:  Awake, alert, NAD  Heart - RRR with soft systolic murmur  Lungs- clear  Ext - trace edema    EKG - ventricular paced rhythm with apparent underlying sinus rhythm    Impressions:   Principal Problem:    V-tach (HCC)  Active Problems:    CKD (chronic kidney disease)    Type 2 diabetes mellitus with complication, with long-term current use of insulin (HCC)    Paroxysmal atrial fibrillation (HCC)    Essential hypertension    S/P ICD (internal cardiac defibrillator) procedure    Nonischemic cardiomyopathy (HCC)    Chronic combined systolic and diastolic congestive heart failure ([address]  Resolved Problems:    * No resolved hospital problems. *      Plan:   Continue same medications   Consult EP to see if they would suggest changes    Outpatient management of DM - still poorly controlled based on history and A1C    Would consider flozin addition     Attending Physician Statement  I have reviewed the chart and seen the patient with the resident(s). I personally reviewed images, EKG's and similar tests, if present. I personally reviewed and performed key elements of the history and exam.  I have reviewed and confirmed student and/or resident history and exam with changes as indicated above. I agree with the assessment, plan and orders as documented by the resident.   Please refer to the resident and/or student note for additional information.       Kath Wheeler

## 2020-12-04 NOTE — H&P
[facility] - Family Medicine Resident Inpatient  History and Physical      CC: chest pain     HPI: History obtained from patient, electronic medical record, Quality of history:  good historian. Patient is oriented to time, place, person   Jean Lesch is a 36 y.o. male with a PMH of T2DM, A. fib, ARVIND, CHF, nonischemic cardiomyopathy who presents to ED for left-sided chest pain that started today associated with palpitations and shortness of breath, patient was feeling of before presenting to the ER today. when he felt the chest pain he dialed 911 but shortly after his ICD fired. Throughout the event he did not experience any syncope. He was alert and oriented x3 he remembers all the events. He headache, syncope, blurred/double vision, nausea, vomiting, diarrhea, cough, fevers, chills. Patient denies smoking, drinking, drug abuse. He says that he has never had a heart attack before, and is never experienced this kind of chest pain before, he says that his nonischemic cardiomyopathy runs in the family and in fact his children got checked for this nonischemic cardiomyopathy. ED Course: The patient remained hemodynamically stable. Labs unremarkable  Imaging was unremarkable  EKG: Paced.   ICD interrogated: V. tach event which led to defibrillation  Pt admitted for chest pain, ACS rule out and V. tach event status post ICD firing       PMH:  has a past medical history of Acute CHF ([address] Acute CHF ([address] Acute idiopathic gout of right foot, AF (atrial fibrillation) ([address] Anemia, CAD (coronary artery disease), Cerebral artery occlusion with cerebral infarction ([address] CKD (chronic kidney disease), Gout, HTN (hypertension), Hyperlipidemia, Hypertension, Morbid obesity due to excess calories ([address] Nonischemic cardiomyopathy ([address] Nonischemic cardiomyopathy ([address] ARVIND (obstructive sleep apnea), S/P left heart catheterization by percutaneous approach, Systolic heart failure ([address] Type 2 diabetes mellitus with complication, with long-term current use of insulin ([address] and URI, acute. PSH:  has a past surgical history that includes ECHO Compl W Dop Color Flow (11/30/2011); ECHO Compl W Dop Color Flow (3/27/2012); Insertable Cardiac Monitor (12/13/2018); Cardioversion (02/14/2020); Cardiac defibrillator placement (11/20/2018); Cardiac catheterization (08/01/2019); and pacemaker placement. FH: family history includes Cancer in his mother; No Known Problems in his father. Social:  reports that he has never smoked. He has never used smokeless tobacco. He reports previous alcohol use. He reports that he does not use drugs. Allergies: Allergies   Allergen Reactions    Farxiga [Dapagliflozin] Other (See Comments)     Caused throat to swell        Home Medications:   No current facility-administered medications on file prior to encounter.       Current Outpatient Medications on File Prior to Encounter   Medication Sig Dispense Refill    metFORMIN (GLUCOPHAGE) 1000 MG tablet Take 1 tablet by mouth 2 times daily (with meals) 60 tablet 0    blood glucose test strips (ONETOUCH ULTRA) strip USE TO TEST BLOOD SUGAR 2-3 TIMES DAILY AND AS NEEDED FOR SYMPTOMS OF IRREGULAR BLOOD GLUCOSE *SUBSTITUTE AS NEEDED PER INSURANCE PREFERENCE* 100 each 5    bumetanide (BUMEX) 2 MG tablet Take 1 tablet by mouth 2 times daily 180 tablet 3    sacubitril-valsartan (ENTRESTO)  MG per tablet Take 1 tablet by mouth 2 times daily 180 tablet 3    spironolactone (ALDACTONE) 25 MG tablet Take 0.5 tablets by mouth daily 30 tablet 11    insulin lispro (HUMALOG) 100 UNIT/ML injection vial Inject 20 Units into the skin 3 times daily (before meals) 10 mL 10    LEVEMIR FLEXTOUCH 100 UNIT/ML injection pen Inject 40 Units into the skin 2 times daily 30 mL 10    rivaroxaban (XARELTO) 20 MG TABS tablet TAKE (1) TABLET BY MOUTH DAILY 30 tablet 10    allopurinol (ZYLOPRIM) 300 MG tablet Take 1 tablet by mouth daily 30 tablet 11    dofetilide (TIKOSYN) 250 MCG capsule Take 1 capsule by mouth every 12 hours 60 capsule 3    magnesium oxide (MAG-OX) 400 MG tablet Take 1 tablet by mouth daily 30 tablet 11    atorvastatin (LIPITOR) 40 MG tablet TAKE 1 TABLET BY MOUTH EVERY DAY 30 tablet 5    carvedilol (COREG) 25 MG tablet Take 1 tablet by mouth 2 times daily (with meals) 60 tablet 11    Blood Glucose Monitoring Suppl (ONE TOUCH ULTRA 2) w/Device KIT USE AS DIRECTED DAILY *SUBSTITUTE AS NEEDED PER INSURANCE PREFERENCE* 1 kit 0    Lancets MISC Test 3-4 times daily 300 each 3       ROS:   relevant ROS as mentioned in HPI  Const: No fever, chills, night sweats, no recent unexplained weight gain/loss  HEENT: No blurred vision, double vision; no URI symptoms  Resp: No cough, no sputum, no pleuritic chest pain, +sob  Cardio: +chest pain, no exertional dyspnea, no PND, no orthopnea, no palpitation, no leg swelling. GI: No dysphagia, no reflux; no abdominal pain, no n/v; no c/d. No hematochezia    : No dysuria, no frequency, hesitancy; no hematuria  MSK: no joint pain, no myalgia, no change in ROM  Neuro: no focal weakness, no slurred speech, no double vision, no numbness or tingling in extremities  Endo: no heat/cold intolerance, no polyphagia, polydipsia or polyuria  Hem: no increased bleeding, no bruising, no lymphadenopathy  Skin: no skin changes  Psych: no depressed mood, no suicidal ideation    PE:  Blood pressure (!) 118/90, pulse 74, temperature 97.4 °F (36.3 °C), temperature source Temporal, resp. rate 23, height 6' (1.829 m), weight (!) 320 lb (145.2 kg), SpO2 97 %. General: Alert, cooperative, no acute distress. Obese   HEENT: Normocephalic, atraumatic. PERRLA, conjunctiva/corneas clear, EOM's intact, no pallor or icterus. Oropharynx clear. Neck: Supple, symmetrical, trachea midline, no JVD. Thyroid non tender, no obvious masses. No cervical lymphadenopathy.    Chest: No tenderness or deformity, full & symmetric excursion  Lung: Clear to auscultation bilaterally,  respirations unlabored. No rales/wheezing/rubs  Heart: RRR, S1 and S2 normal, ?murmur, rub or gallop. DP pulses 2/4  Abdomen: SNTND, no masses, no organomegaly, no guarding, rebound or rigidity. Genital/Rectal: deferred  Extremities:  Extremities normal, atraumatic, no cyanosis or edema. Distal pulses equal bilaterally  Skin: Skin color, texture, turgor normal, no rashes or lesions  Musculoskeletal: No joint swelling, no muscle tenderness. Normal ROM in extremities. Neurologic: Alert & Oriented; CNII-XII intact; Normal and symmetric strength in UEs and LEs; Sensation intact  Psychiatric: appropriate affect. Intact judgment and insight.      Labs:   Results for orders placed or performed during the hospital encounter of 12/03/20   CBC Auto Differential   Result Value Ref Range    WBC 7.1 4.5 - 11.5 E9/L    RBC 4.69 3.80 - 5.80 E12/L    Hemoglobin 13.8 12.5 - 16.5 g/dL    Hematocrit 40.3 37.0 - 54.0 %    MCV 85.9 80.0 - 99.9 fL    MCH 29.4 26.0 - 35.0 pg    MCHC 34.2 32.0 - 34.5 %    RDW 14.5 11.5 - 15.0 fL    Platelets 715 879 - 498 E9/L    MPV 10.0 7.0 - 12.0 fL    Neutrophils % 58.9 43.0 - 80.0 %    Immature Granulocytes % 0.4 0.0 - 5.0 %    Lymphocytes % 26.0 20.0 - 42.0 %    Monocytes % 12.8 (H) 2.0 - 12.0 %    Eosinophils % 1.5 0.0 - 6.0 %    Basophils % 0.4 0.0 - 2.0 %    Neutrophils Absolute 4.19 1.80 - 7.30 E9/L    Immature Granulocytes # 0.03 E9/L    Lymphocytes Absolute 1.85 1.50 - 4.00 E9/L    Monocytes Absolute 0.91 0.10 - 0.95 E9/L    Eosinophils Absolute 0.11 0.05 - 0.50 E9/L    Basophils Absolute 0.03 0.00 - 0.20 O7/M   Basic Metabolic Panel w/ Reflex to MG   Result Value Ref Range    Sodium 128 (L) 132 - 146 mmol/L    Potassium reflex Magnesium 4.3 3.5 - 5.0 mmol/L    Chloride 94 (L) 98 - 107 mmol/L    CO2 20 (L) 22 - 29 mmol/L    Anion Gap 14 7 - 16 mmol/L    Glucose 391 (H) 74 - 99 mg/dL    BUN 40 (H) 6 - 20 mg/dL    CREATININE 1.5 (H) 0.7 - 1.2 mg/dL    GFR Non-African hypokinesis, ICD lead in right ventricle, RVSP 30 mmHg, dilated left ventricle, dilated left atrium, trace pericardial effusion, compared to prior echo it is unchanged,consider repeat ECHO   Admit to Telemetry    ICD firing   VTs/ventricular arrythmia   Consult cardio/EP   Telemetry  Continue to monitor    NICM  Combined CHF  A. fib  Appears to be well compensated now  We will monitor for now  EP consulted  Continue Tikosyn  Continue beta-blockers  Continue Bumex  Continue Entresto  Continue Xarelto  Continue Aldactone    Gout  Continue allopurinol    Apparent hyponatremia when corrected to glucose    T2DM   Last A1c 11/19/2020 10.4%  Continue Levemir, continue lispro      CKD stage II-IIIa  Continue to monitor  Likely element of cardiorenal syndrome        DVT / GI prophylaxis: Xarelto  Dispo - per EP       Electronically signed by Padmini Briceño MD on 12/3/20 at 11:46 PM EST  This case was discussed with attending physician: Dr. Nani Pleitez

## 2020-12-04 NOTE — ED PROVIDER NOTES
HPI:  12/3/20,   Time: 10:25 PM CHIRAG Marte III is a 36 y.o. male presenting to the ED for chest pain, beginning 1 day ago. The complaint has been persistent, moderate in severity, and worsened by nothing. The patient states that throughout the day he felt as if he was in A. fib. He states he has been having palpitations throughout the entire day. He states when he went to bed tonight he began feeling short of breath and had a substernal chest pain that he describes as a dull ache. He states that he called EMS after sustaining the chest pain and then the next thing he knew he was shocked by his defibrillator. He states that after being shocked his symptoms completely resolved. He states that he is feeling better at this time. Review of Systems:   Pertinent positives and negatives are stated within HPI, all other systems reviewed and are negative.          --------------------------------------------- PAST HISTORY ---------------------------------------------  Past Medical History:  has a past medical history of Acute CHF ([address] Acute CHF ([address] Acute idiopathic gout of right foot, AF (atrial fibrillation) ([address] Anemia, CAD (coronary artery disease), Cerebral artery occlusion with cerebral infarction ([address] CKD (chronic kidney disease), Gout, HTN (hypertension), Hyperlipidemia, Hypertension, Morbid obesity due to excess calories ([address] Nonischemic cardiomyopathy ([address] Nonischemic cardiomyopathy ([address] ARVIND (obstructive sleep apnea), S/P left heart catheterization by percutaneous approach, Systolic heart failure ([address] Type 2 diabetes mellitus with complication, with long-term current use of insulin ([address] and URI, acute. Past Surgical History:  has a past surgical history that includes ECHO Compl W Dop Color Flow (11/30/2011); ECHO Compl W Dop Color Flow (3/27/2012); Insertable Cardiac Monitor (12/13/2018); Cardioversion (02/14/2020); Cardiac defibrillator placement (11/20/2018);  Cardiac catheterization (08/01/2019); and pacemaker placement. Social History:  reports that he has never smoked. He has never used smokeless tobacco. He reports previous alcohol use. He reports that he does not use drugs. Family History: family history includes Cancer in his mother; No Known Problems in his father. The patients home medications have been reviewed. Allergies: Amari Princesanjeev        ---------------------------------------------------PHYSICAL EXAM--------------------------------------    Constitutional/General: Alert and oriented x3, well appearing, non toxic in NAD  Head: Normocephalic and atraumatic  Eyes: PERRL, EOMI, conjunctive normal, sclera non icteric  Mouth: Oropharynx clear, handling secretions, no trismus, no asymmetry of the posterior oropharynx or uvular edema  Neck: Supple, full ROM, non tender to palpation in the midline, no stridor, no crepitus, no meningeal signs  Respiratory: Lungs clear to auscultation bilaterally, no wheezes, rales, or rhonchi. Not in respiratory distress  Cardiovascular:  Regular rate. Regular rhythm. No murmurs, gallops, or rubs. 2+ distal pulses  GI:  Abdomen Soft, Non tender, Non distended. +BS. No organomegaly, no palpable masses,  No rebound, guarding, or rigidity. Musculoskeletal: Moves all extremities x 4. Warm and well perfused, no clubbing, cyanosis, or edema. Capillary refill <3 seconds  Integument: skin warm and dry. No rashes. Neurologic: GCS 15, no focal deficits  Psychiatric: Normal Affect    -------------------------------------------------- RESULTS -------------------------------------------------  I have personally reviewed all laboratory and imaging results for this patient. Results are listed below.      LABS:  Results for orders placed or performed during the hospital encounter of 12/03/20   CBC Auto Differential   Result Value Ref Range    WBC 7.1 4.5 - 11.5 E9/L    RBC 4.69 3.80 - 5.80 E12/L    Hemoglobin 13.8 12.5 - 16.5 STEMI.  ------------------------- NURSING NOTES AND VITALS REVIEWED ---------------------------   The nursing notes within the ED encounter and vital signs as below have been reviewed by myself. BP (!) 123/91   Pulse 99   Temp 97.7 °F (36.5 °C) (Oral)   Resp 16   Ht 6' (1.829 m)   Wt (!) 320 lb (145.2 kg)   SpO2 97%   BMI 43.40 kg/m²   Oxygen Saturation Interpretation: Normal    The patients available past medical records and past encounters were reviewed. ------------------------------ ED COURSE/MEDICAL DECISION MAKING----------------------  Medications - No data to display      ED COURSE:       Medical Decision Making: This is a 68-year-old male who presented to the ED for chest pain. Upon arrival to the ED the patient is asymptomatic. Patient's EKG showed a paced rhythm. Labs showed a normal CBC. Chemistry showed a sodium of 128 BUN and creatinine of 40/1.5 with a glucose of 391. BNP elevated at 1837. Magnesium and troponin negative. Chest x-ray shows cardiomegaly but no other acute process. Patient's defibrillator was interrogated which revealed atrial tachycardia throughout the day and a possible run of V. tach tonight prior to defibrillation. Due to the patient having atrial fibrillation the patient will admitted for observation. Case discussed with Dr. Shantel Saavedra who agrees for admission. Patient remains asymptomatic at time of admission. I, Dr. Titi Freeman, am the primary provider for this encounter    This patient's ED course included: a personal history and physicial examination and re-evaluation prior to disposition    This patient has remained hemodynamically stable during their ED course. Re-Evaluations:             Re-evaluation. Patients symptoms are improving      Counseling:    The emergency provider has spoken with the patient and discussed todays results, in addition to providing specific details for the plan of care and counseling regarding the diagnosis and prognosis. Questions are answered at this time and they are agreeable with the plan.       --------------------------------- IMPRESSION AND DISPOSITION ---------------------------------    IMPRESSION  1. Chest pain, unspecified type    2. V tach ([address]        DISPOSITION  Disposition: Admit to telemetry  Patient condition is stable    NOTE: This report was transcribed using voice recognition software.  Every effort was made to ensure accuracy; however, inadvertent computerized transcription errors may be present        Teresa Alvarez DO  12/03/20 [id number]

## 2020-12-10 NOTE — PROGRESS NOTES
[address] [facility]                Phone: [phone]   Fax: [phone]      Physical Therapy  Progress Summary         Date:  12/10/2020    Patient Name:  Glenroy Maria    :  1980  MRN: [id number]      DIAGNOSIS:  L shoulder bursitis   REFERRING PHYSICIAN:  Sanaz Arnett  PHYSICAL THERAPIST:  Karyle Spikes, DPT      ATTENDANCE:  Patient has attended 6 of 7 scheduled treatments from 20   to 12/3/20. TREATMENTS RECEIVED:  Therapeutic exercise, PROM/AROM, stretching, strengthening, functional activities, postural awareness/positioning training, HEP, modalities. INITIAL STATUS:  · c/o pain 9/10 with AROM/ PROM L shoulder  · c/o significant pain with PROM of L shoulder into ER and end range flexion                         · Sensation:  intermittent tingling to L shoulder only   · L shoulder                                     AROM                                     PROM                                       Flexion            100 °                                        110 °                                       Abduction        90 °                                          95 °                                       IR                     L lateral back pocket               NT                                      ER                   L ear                                        NT              · Strength:   L shoulder 4-/5 throughout except shoulder ext 5/5      FINAL STATUS:  · c/o pain 9/10 with AROM/ PROM L shoulder  · Standing AROM R shoulder  flex 110°  abd 95°   ER/IR  lateral base of skull/ midline S1-2  · Supine PROM  flexion 130°  abduction 100    · Strength L shoulder WAR 3+/5 for flexion, abduction, ER 4-/5 for IR  · Sitting posture FAIR+  · Independent with HEP    GOALS:  1 out of 5 Long Term Goals were obtained. LONG TERM GOALS OBTAINED/NOT OBTAINED:   1.   Pt will report at least 90% reduction of L shoulder pain with daily activities   NOT ACHIEVED  2. Pt will increase L shoulder strength to at least 4+/5 throughout   NOT ACHIEVED  3. Pt will increase L shoulder AROM to at least:                160 ° flexion                 160 ° abduction                  80  ° IR/ ER   PROGRESSED  4. Pt will improve sitting posture from FAIR to GOOD  PROGRESSED  5. Pt will be independent with HEP  ACHIEVED       PATIENT EDUCATION/INSTRUCTIONS:  Patient instructed on and provided copy of HEP          Thank you for the opportunity to work with your patient. If you have questions or comments, please feel free to contact me by phone or fax.       Electronically Signed by: Rey Shaver, PTA [id number]   12/10/2020

## 2020-12-11 ENCOUNTER — HOSPITAL ENCOUNTER (OUTPATIENT)
Age: 40
Discharge: HOME | End: 2020-12-13
Payer: MEDICARE

## 2020-12-11 PROCEDURE — U0003 INFECTIOUS AGENT DETECTION BY NUCLEIC ACID (DNA OR RNA); SEVERE ACUTE RESPIRATORY SYNDROME CORONAVIRUS 2 (SARS-COV-2) (CORONAVIRUS DISEASE [COVID-19]), AMPLIFIED PROBE TECHNIQUE, MAKING USE OF HIGH THROUGHPUT TECHNOLOGIES AS DESCRIBED BY CMS-2020-01-R: HCPCS

## 2020-12-12 LAB
SARS-COV-2: NOT DETECTED
SOURCE: NORMAL

## 2020-12-15 ENCOUNTER — ANESTHESIA EVENT (OUTPATIENT)
Dept: CARDIAC CATH/INVASIVE PROCEDURES | Age: 40
End: 2020-12-15

## 2020-12-15 ENCOUNTER — HOSPITAL ENCOUNTER (OUTPATIENT)
Dept: CARDIAC CATH/INVASIVE PROCEDURES | Age: 40
Discharge: HOME | End: 2020-12-15
Payer: MEDICARE

## 2020-12-15 ENCOUNTER — ANESTHESIA (OUTPATIENT)
Dept: CARDIAC CATH/INVASIVE PROCEDURES | Age: 40
End: 2020-12-15

## 2020-12-15 VITALS
SYSTOLIC BLOOD PRESSURE: 117 MMHG | DIASTOLIC BLOOD PRESSURE: 96 MMHG | HEIGHT: 72 IN | TEMPERATURE: 97.8 F | OXYGEN SATURATION: 99 % | RESPIRATION RATE: 20 BRPM | BODY MASS INDEX: 42.66 KG/M2 | WEIGHT: 315 LBS | HEART RATE: 98 BPM

## 2020-12-15 PROBLEM — I48.92 ATRIAL FLUTTER (HCC): Status: ACTIVE | Noted: 2020-12-15

## 2020-12-15 LAB
ANION GAP SERPL CALCULATED.3IONS-SCNC: 9 MMOL/L (ref 7–16)
BASOPHILS ABSOLUTE: 0.04 E9/L (ref 0–0.2)
BASOPHILS RELATIVE PERCENT: 0.4 % (ref 0–2)
BUN BLDV-MCNC: 30 MG/DL (ref 6–20)
CALCIUM SERPL-MCNC: 9.6 MG/DL (ref 8.6–10.2)
CHLORIDE BLD-SCNC: 97 MMOL/L (ref 98–107)
CO2: 24 MMOL/L (ref 22–29)
CREAT SERPL-MCNC: 1.4 MG/DL (ref 0.7–1.2)
EOSINOPHILS ABSOLUTE: 0.22 E9/L (ref 0.05–0.5)
EOSINOPHILS RELATIVE PERCENT: 2.3 % (ref 0–6)
GFR AFRICAN AMERICAN: >60
GFR NON-AFRICAN AMERICAN: 56 ML/MIN/1.73
GLUCOSE BLD-MCNC: 199 MG/DL (ref 74–99)
HCT VFR BLD CALC: 40.3 % (ref 37–54)
HEMOGLOBIN: 13.2 G/DL (ref 12.5–16.5)
IMMATURE GRANULOCYTES #: 0.05 E9/L
IMMATURE GRANULOCYTES %: 0.5 % (ref 0–5)
LYMPHOCYTES ABSOLUTE: 2.64 E9/L (ref 1.5–4)
LYMPHOCYTES RELATIVE PERCENT: 27.5 % (ref 20–42)
MCH RBC QN AUTO: 28.9 PG (ref 26–35)
MCHC RBC AUTO-ENTMCNC: 32.8 % (ref 32–34.5)
MCV RBC AUTO: 88.2 FL (ref 80–99.9)
MONOCYTES ABSOLUTE: 0.79 E9/L (ref 0.1–0.95)
MONOCYTES RELATIVE PERCENT: 8.2 % (ref 2–12)
NEUTROPHILS ABSOLUTE: 5.87 E9/L (ref 1.8–7.3)
NEUTROPHILS RELATIVE PERCENT: 61.1 % (ref 43–80)
PDW BLD-RTO: 14.5 FL (ref 11.5–15)
PLATELET # BLD: 286 E9/L (ref 130–450)
PMV BLD AUTO: 9.7 FL (ref 7–12)
POTASSIUM SERPL-SCNC: 5 MMOL/L (ref 3.5–5)
RBC # BLD: 4.57 E12/L (ref 3.8–5.8)
SODIUM BLD-SCNC: 130 MMOL/L (ref 132–146)
WBC # BLD: 9.6 E9/L (ref 4.5–11.5)

## 2020-12-15 PROCEDURE — 85025 COMPLETE CBC W/AUTO DIFF WBC: CPT

## 2020-12-15 PROCEDURE — 36415 COLL VENOUS BLD VENIPUNCTURE: CPT

## 2020-12-15 PROCEDURE — 93724 ELEC ALYS ANTITCHYCAR PM SYS: CPT | Performed by: INTERNAL MEDICINE

## 2020-12-15 PROCEDURE — 80048 BASIC METABOLIC PNL TOTAL CA: CPT

## 2020-12-15 PROCEDURE — 2580000003 HC RX 258: Performed by: INTERNAL MEDICINE

## 2020-12-15 RX ORDER — SODIUM CHLORIDE 9 MG/ML
INJECTION, SOLUTION INTRAVENOUS ONCE
Status: COMPLETED | OUTPATIENT
Start: 2020-12-15 | End: 2020-12-15

## 2020-12-15 RX ADMIN — SODIUM CHLORIDE: 9 INJECTION, SOLUTION INTRAVENOUS at 09:47

## 2020-12-15 NOTE — ANESTHESIA PRE PROCEDURE
Department of Anesthesiology  Preprocedure Note       Name:  Blanca Boone   Age:  36 y.o.  :  1980                                          MRN:  [id number]         Date:  12/15/2020      Surgeon: * Surgery not found *Raheja    Procedure: CARDIOVERSION WITH ANESTHESIA    Medications prior to admission:   Prior to Admission medications    Medication Sig Start Date End Date Taking?  Authorizing Provider   metFORMIN (GLUCOPHAGE) 1000 MG tablet Take 1 tablet by mouth 2 times daily (with meals) 20   Lyle Mederos MD   blood glucose test strips (ONETOUCH ULTRA) strip USE TO TEST BLOOD SUGAR 2-3 TIMES DAILY AND AS NEEDED FOR SYMPTOMS OF IRREGULAR BLOOD GLUCOSE *SUBSTITUTE AS NEEDED PER INSURANCE PREFERENCE* 20   Martinez Jensen DO   bumetanide (BUMEX) 2 MG tablet Take 1 tablet by mouth 2 times daily 20   Curt Christiansen MD   sacubitril-valsartan (ENTRESTO)  MG per tablet Take 1 tablet by mouth 2 times daily 20   Curt Christiansen MD   spironolactone (ALDACTONE) 25 MG tablet Take 0.5 tablets by mouth daily 20   Oumar Cervantes MD   insulin lispro (HUMALOG) 100 UNIT/ML injection vial Inject 20 Units into the skin 3 times daily (before meals) 20   Oumar Cervantes MD   LEVEMIR FLEXTOUCH 100 UNIT/ML injection pen Inject 40 Units into the skin 2 times daily 20   Oumar Cervantes MD   rivaroxaban (XARELTO) 20 MG TABS tablet TAKE (1) TABLET BY MOUTH DAILY 20   Oumar Cervantes MD   allopurinol (ZYLOPRIM) 300 MG tablet Take 1 tablet by mouth daily 20   Oumar Cervantes MD   dofetilide (TIKOSYN) 250 MCG capsule Take 1 capsule by mouth every 12 hours 20   Oumar Cervantes MD   magnesium oxide (MAG-OX) 400 MG tablet Take 1 tablet by mouth daily 20   Oumar Cervantes MD   atorvastatin (LIPITOR) 40 MG tablet TAKE 1 TABLET BY MOUTH EVERY DAY 20   Oumar Cervantes MD   carvedilol (COREG) 25 MG tablet Take 1 tablet by mouth 2 times daily (with meals) 20   Leona Tello Marce Li MD   Blood Glucose Monitoring Suppl (ONE TOUCH ULTRA 2) w/Device KIT USE AS DIRECTED DAILY *SUBSTITUTE AS NEEDED PER INSURANCE PREFERENCE* 12/9/19   Tyler Dodd MD   Lancets MISC Test 3-4 times daily 11/14/19   Martín Gomez MD       Current medications:    Current Outpatient Medications   Medication Sig Dispense Refill    metFORMIN (GLUCOPHAGE) 1000 MG tablet Take 1 tablet by mouth 2 times daily (with meals) 60 tablet 0    blood glucose test strips (ONETOUCH ULTRA) strip USE TO TEST BLOOD SUGAR 2-3 TIMES DAILY AND AS NEEDED FOR SYMPTOMS OF IRREGULAR BLOOD GLUCOSE *SUBSTITUTE AS NEEDED PER INSURANCE PREFERENCE* 100 each 5    bumetanide (BUMEX) 2 MG tablet Take 1 tablet by mouth 2 times daily 180 tablet 3    sacubitril-valsartan (ENTRESTO)  MG per tablet Take 1 tablet by mouth 2 times daily 180 tablet 3    spironolactone (ALDACTONE) 25 MG tablet Take 0.5 tablets by mouth daily 30 tablet 11    insulin lispro (HUMALOG) 100 UNIT/ML injection vial Inject 20 Units into the skin 3 times daily (before meals) 10 mL 10    LEVEMIR FLEXTOUCH 100 UNIT/ML injection pen Inject 40 Units into the skin 2 times daily 30 mL 10    rivaroxaban (XARELTO) 20 MG TABS tablet TAKE (1) TABLET BY MOUTH DAILY 30 tablet 10    allopurinol (ZYLOPRIM) 300 MG tablet Take 1 tablet by mouth daily 30 tablet 11    dofetilide (TIKOSYN) 250 MCG capsule Take 1 capsule by mouth every 12 hours 60 capsule 3    magnesium oxide (MAG-OX) 400 MG tablet Take 1 tablet by mouth daily 30 tablet 11    atorvastatin (LIPITOR) 40 MG tablet TAKE 1 TABLET BY MOUTH EVERY DAY 30 tablet 5    carvedilol (COREG) 25 MG tablet Take 1 tablet by mouth 2 times daily (with meals) 60 tablet 11    Blood Glucose Monitoring Suppl (ONE TOUCH ULTRA 2) w/Device KIT USE AS DIRECTED DAILY *SUBSTITUTE AS NEEDED PER INSURANCE PREFERENCE* 1 kit 0    Lancets MISC Test 3-4 times daily 300 each 3     No current facility-administered medications for this encounter. Allergies: Allergies   Allergen Reactions    Farxiga [Dapagliflozin] Other (See Comments)     Caused throat to swell       Problem List:    Patient Active Problem List   Diagnosis Code    Obstructive sleep apnea G47.33    Chronic gout M1A. 9XX0    CKD (chronic kidney disease) N18.9    Type 2 diabetes mellitus with complication, with long-term current use of insulin (HCC) E11.8, Z79.4    Hepatomegaly R16.0    Paroxysmal atrial fibrillation (HCC) I48.0    Essential hypertension I10    VHD (valvular heart disease) I38    Morbid obesity with BMI of 40.0-44.9, adult (HCC) E66.01, Z68.41    S/P ICD (internal cardiac defibrillator) procedure Z95.810    Ventricular arrhythmia I49.9    Nonischemic cardiomyopathy (HCC) I42.8    S/P left pulmonary artery pressure sensor implant placement Z95.9    Carotid disease, bilateral (HCC) I77.9    Chronic combined systolic and diastolic congestive heart failure (HCC) I50.42       Past Medical History:        Diagnosis Date    Acute CHF ([address] 11/29/2011    Acute CHF ([address] 12/26/2011    Acute idiopathic gout of right foot 8/13/2016    AF (atrial fibrillation) ([address] 02/2020    Anemia 11/29/2011    CAD (coronary artery disease)     Cerebral artery occlusion with cerebral infarction (HCC)     CKD (chronic kidney disease)     Gout     HTN (hypertension) 11/29/2011    Hyperlipidemia     Hypertension     Morbid obesity due to excess calories (HCC)     Nonischemic cardiomyopathy ([address] 11/29/2011    Nonischemic cardiomyopathy ([facility] 7/2013 7/2013- cardiac MRI revealed an LVEF of 20%    ARVIND (obstructive sleep apnea) 11/29/2011    S/P left heart catheterization by percutaneous approach 12-27/2011    Dr Brian Lombardo Systolic heart failure [facility]) 5/7/2012 5/7/12- cardiac catheterization revealed an LVEF of 10-15%, mitral regurgitation along with borderline prolapse of mitral valve    Type 2 diabetes mellitus with complication, with long-term current use of insulin ([facility] 8/22/2016    URI, acute 11/29/2011       Past Surgical History:        Procedure Laterality Date    CARDIAC CATHETERIZATION  08/01/2019    Dr Sisi Velasco  11/20/2018    UPGRADE S-ICD TO BIV ICD   ([employer])  [facility]     CARDIOVERSION  02/14/2020    Successful CV of AF to NSR      (Dr. Byron Dominguez)    ECHO COMPL W DOP COLOR FLOW  11/30/2011         ECHO COMPL W DOP COLOR FLOW  3/27/2012         INSERTABLE CARDIAC MONITOR  12/13/2018    [facility], Dr. Frankie Richter         Social History:    Social History     Tobacco Use    Smoking status: Never Smoker    Smokeless tobacco: Never Used   Substance Use Topics    Alcohol use: Not Currently                                Counseling given: Not Answered      Vital Signs (Current): There were no vitals filed for this visit.                                            BP Readings from Last 3 Encounters:   12/04/20 (!) 100/57   11/19/20 120/70   11/04/20 121/65       NPO Status:   > 8hrs                                                                               BMI:   Wt Readings from Last 3 Encounters:   12/03/20 (!) 320 lb (145.2 kg)   11/19/20 (!) 319 lb (144.7 kg)   11/04/20 (!) 314 lb (142.4 kg)     There is no height or weight on file to calculate BMI.    CBC:   Lab Results   Component Value Date    WBC 7.4 12/04/2020    RBC 4.52 12/04/2020    HGB 13.2 12/04/2020    HCT 39.8 12/04/2020    MCV 88.1 12/04/2020    RDW 14.5 12/04/2020     12/04/2020       CMP:   Lab Results   Component Value Date     12/04/2020    K 4.1 12/04/2020    CL 98 12/04/2020    CO2 22 12/04/2020    BUN 40 12/04/2020    CREATININE 1.6 12/04/2020    GFRAA 58 12/04/2020    LABGLOM 48 12/04/2020    GLUCOSE 346 12/04/2020    GLUCOSE 192 05/15/2012    PROT 9.1 05/21/2020    CALCIUM 9.4 12/04/2020    BILITOT 0.5 05/21/2020    ALKPHOS 176 05/21/2020    AST 37 05/21/2020    ALT 30 05/21/2020       POC Tests: No results for input(s): POCGLU, POCNA, POCK, POCCL, POCBUN, POCHEMO, POCHCT in the last 72 hours. Coags:   Lab Results   Component Value Date    PROTIME 14.4 12/12/2018    PROTIME 1.8 09/26/2016    INR 1.3 12/12/2018    APTT 36.4 01/11/2017       HCG (If Applicable): No results found for: PREGTESTUR, PREGSERUM, HCG, HCGQUANT     ABGs: No results found for: PHART, PO2ART, RUA5XKK, JGX8XIB, BEART, H6ZVOZRZ       Type & Screen (If Applicable):  No results found for: LABABO, [address]    Anesthesia Evaluation  Patient summary reviewed and Nursing notes reviewed no history of anesthetic complications:   Airway: Mallampati: III  TM distance: >3 FB   Neck ROM: full  Mouth opening: < 3 FB Dental: normal exam         Pulmonary:   (+) sleep apnea: on CPAP,  decreased breath sounds,            Patient did not smoke on day of surgery. Cardiovascular:  Exercise tolerance: good (>4 METS),   (+) hypertension: moderate, pacemaker: AICD, CAD: non-obstructive, dysrhythmias: atrial fibrillation, CHF:,         Rhythm: irregular  Rate: normal           Beta Blocker:  Dose within 24 Hrs         Neuro/Psych:               GI/Hepatic/Renal:   (+) liver disease:,           Endo/Other:    (+) DiabetesType II DM, , blood dyscrasia: anticoagulation therapy:., .                 Abdominal:           Vascular:                                          Anesthesia Plan      MAC     ASA 4       Induction: intravenous. Anesthetic plan and risks discussed with patient. Plan discussed with surgical team and CRNA.                   Alyssa Javier DO   12/15/2020

## 2020-12-15 NOTE — H&P
[address]                  [address] [facility]  [facility]                              HISTORY AND PHYSICAL    PATIENT NAME: Itzel Stubbs                      :        1980  MED REC NO:   [id number]                            ROOM:  ACCOUNT NO:   [de-identified]                           ADMIT DATE: 12/15/2020  PROVIDER:     Miguel Chase MD    HISTORY OF PRESENT ILLNESS:  This is a 49-year-old patient with a known  history of the following issues:  1. Hypertension. 2.  Sleep apnea. 3.  Severe LV dysfunction and heart failure. 4.  Single chamber ICD placed originally in .  5. Upgrade to his biventricular pacer ICD thereafter in 2018. 6.  Paroxysmal atrial fibrillation for which the patient is on  dofetilide therapy. The patient has done well over the last few months with no episodes of  atrial fibrillation since 2020 until recently. Since ,  the patient has been somewhat indiscreet with his diet and possibly has  not been using his CPAP regularly. Atrial flutter has recurred twice. He was hospitalized after he developed an episode of ventricular  tachycardia and received an ICD shock approximately two weeks before  this visit. Atrial fibrillation recurred and since it continued to be  persistent for the last several days. He was brought in today for  elective cardioversion. He does notice symptoms of palpitations with  physical activity when his rhythm has changed. No other cardiac symptoms currently noticed and syncopal episodes. No  ICD discharges since last hospitalization. MEDICATIONS:  List includes dofetilide, Entresto, and Coreg. REVIEW OF SYSTEMS:  Not significant for any other major noncardiac  issues. PHYSICAL EXAMINATION:  VITAL SIGNS:  Today, blood pressure here was 120/70, pulse was 70 to 90  and irregular. GENERAL:  There was no JVD or leg edema. HEENT:  There was no thyromegaly or carotid bruit. Pupils are equal,  reactive to light. SKIN:  Otherwise unremarkable. EXTREMITIES:  Otherwise unremarkable. RESPIRATORY SYSTEM:  Revealed a normal chest on inspection. Breath  sounds are equal.  No rales or rhonchi. CARDIOVASCULAR SYSTEM:  Revealed a laterally displaced PMI with a normal  S1 and S2 at the left sternal border and apex. No loud gallops or  murmurs noted. Brief interrogation of the ICD does show the patient to be in an atrial  flutter. ASSESSMENT:  A 36year-old patient with:  1. Severe nonischemic cardiomyopathy. 2.  Biventricular pacer ICD in situ. 3.  Paroxysmal atrial fibrillation/atrial flutter, on dofetilide  therapy. 4.  Congestive heart failure, functional class II NYHA classification. PLAN:  Proceed with cardioversion today. Discharge home upon recovery. To be followed up as outpatient.         Minal Hannah MD    D: 12/15/2020 10:43:39       T: 12/15/2020 12:27:49     [email or web address]  Job#: [id number]     Doc#: [id number]    CC:  Justen Honeycutt MD

## 2020-12-16 ENCOUNTER — HOSPITAL ENCOUNTER (OUTPATIENT)
Dept: OTHER | Age: 40
Setting detail: THERAPIES SERIES
Discharge: HOME | End: 2020-12-16
Payer: MEDICARE

## 2020-12-16 NOTE — PROCEDURES
[address]                  [address] [facility], [address] [place] [address]                                 PROCEDURE NOTE    PATIENT NAME: Ted Wilkins                      :        1980  MED REC NO:   [id number]                            ROOM:  ACCOUNT NO:   [de-identified]                           ADMIT DATE: 12/15/2020  PROVIDER:     Guillermina Nguyen MD    DATE OF PROCEDURE:  12/15/2020    NAME OF PROCEDURE:  Overdrive pace termination of atrial flutter. PREOPERATIVE DIAGNOSIS:  Atrial flutter. POSTOPERATIVE DIAGNOSIS:  Atrial flutter. :  Guillermina Nguyen MD    COMPLICATIONS:  None. INDICATIONS:  A 66-year-old patient with a history of congestive heart  failure, nonischemic cardiomyopathy, and a biventricular pacer ICD in  situ; was brought in for elective cardioversion today. DESCRIPTION OF PROCEDURE:  The patient was brought to the  electrophysiology area in the postabsorptive, nonsedated state. Before  an anesthesia could be administered, the patient was prepped and draped  in the usual sterile manner. I attempted overdrive pace termination and  was able to utilize this device to cardiovert the patient to sinus  rhythm, using overdrive pace termination as mentioned. His device was  now programmed to a lower rate of 70. In addition, _____ atrial  arrhythmias also programmed. The patient was recovered and discharged  home. CONCLUSIONS:  Successful overdrive pace termination of his atrial  flutter to sinus rhythm. PLAN:  Recovery in the hospital and discharge home to be followed up as  outpatient.         Veena Izaguirre MD    D: 12/15/2020 10:45:56       T: 12/15/2020 13:14:56     [email or web address]  Job#: [id number]     Doc#: [id number]    CC:  Guillermina Nguyen MD

## 2020-12-16 NOTE — PLAN OF CARE
Pt. Called and cancelled appt. For today, stated he is feeling fine and will call back to re schedule.

## 2020-12-17 ENCOUNTER — TELEPHONE (OUTPATIENT)
Dept: CARDIOLOGY CLINIC | Age: 40
End: 2020-12-17

## 2020-12-17 PROCEDURE — 93264 REM MNTR WRLS P-ART PRS SNR: CPT | Performed by: INTERNAL MEDICINE

## 2020-12-17 NOTE — TELEPHONE ENCOUNTER
Called to ask for mems send. No voice mailbox set up. Home and cell the same #  [address] Delaney she will have him send reading. He called office back: sent reading. Trending up on mems readings: inconsistant with sending readings. He says he felt very fatigued and tired with the a flutter prior to admission. Just didn't have energy to send readings. But he will . Sent when on phone. Providers: On recall list DR Eloy Felder  Primary cardiologist  mems team: DR Castillo/ Roxana Castañeda [facility]  CHF Clinic    DR Eugene [facility] for advanced HF. Early Feb 2021 next visit. Diuretics:  bumex 2mg twice daily   Aldactone 25mg  Take 1/2 tab daily       CHF:  8 5 2020 EF 25-30%  (no significant change form 7 31 2019)        · Was last in patient 12 3 to 12 4 2020  VT ICD shock (DR Lilia Mcpherson manages). Sustained monomorphic VT lead to ICD shock converted to SR.      · 12 15 2020 A Flutter  Elective cardioversion override pacing. Back in [address] is now at lower rate of 70  1 17 2021 to see DR Lilia Mcpherson. He self Cancelled 12 16 visit at [facility] \"was feeling fine\"   Urinating good. Urine color clear  No bp cuff. Denies dizzy/lightheaded. Can tell when a fib/flutter. Now feels good. Feels like sustaining sinus rhythm. Reviewed his CHF Clinic was for chf assessment and lab repeat. K+ was 5 (he normally runs low to mid 4's. He will see if can get ride and set visit. Cancelled due to no ride yesterday. CHF Clinic can assist with rides          · Plan:  Take bumex extra  2mg today and tomorrow  Oral   Or call chf clinic and set visit there today or tomorrow for assessment/ labs/ iv diuretics  · Call our office with update on which plan you did. CHF Clinic with IV bumex 2mg or taking oral bumex 2mg extra x 2 days. · Keep hydrated  · Send mems reading daily  · Follow with DR Lilia Mcpherson for a fib/flutter f/u .

## 2020-12-18 NOTE — TELEPHONE ENCOUNTER
No chf clinic visit noted in epic    Spoke with Benita Nguyen III   He did take extra 2mg bumex yesterday and today  Urinating like crazy last 2 days    Didn't weigh self but is sending mems reading . He says he will continue to send readings daily. He will take an extra 2mg tomorrow as your mems number is still elevated. Keep hydrated.    4:55 PM I left message for chf clinic to call Fabien Armendariz next week and coordinate f/u visit with him

## 2020-12-21 NOTE — TELEPHONE ENCOUNTER
Voided good with extra water pills / diuretic use. Walking today outside for exercise. Breathing is better, feels better. Will track PAD. Did set chf clinic visit  next week.

## 2020-12-28 ENCOUNTER — TELEPHONE (OUTPATIENT)
Dept: CARDIOLOGY CLINIC | Age: 40
End: 2020-12-28

## 2020-12-28 ENCOUNTER — HOSPITAL ENCOUNTER (OUTPATIENT)
Dept: OTHER | Age: 40
Setting detail: THERAPIES SERIES
Discharge: HOME | End: 2020-12-28
Payer: MEDICARE

## 2020-12-28 VITALS
SYSTOLIC BLOOD PRESSURE: 121 MMHG | OXYGEN SATURATION: 98 % | WEIGHT: 315 LBS | BODY MASS INDEX: 42.72 KG/M2 | HEART RATE: 74 BPM | DIASTOLIC BLOOD PRESSURE: 65 MMHG | RESPIRATION RATE: 20 BRPM

## 2020-12-28 LAB
ANION GAP SERPL CALCULATED.3IONS-SCNC: 14 MMOL/L (ref 7–16)
BUN BLDV-MCNC: 29 MG/DL (ref 6–20)
CALCIUM SERPL-MCNC: 9.8 MG/DL (ref 8.6–10.2)
CHLORIDE BLD-SCNC: 98 MMOL/L (ref 98–107)
CO2: 23 MMOL/L (ref 22–29)
CREAT SERPL-MCNC: 1.3 MG/DL (ref 0.7–1.2)
GFR AFRICAN AMERICAN: >60
GFR NON-AFRICAN AMERICAN: >60 ML/MIN/1.73
GLUCOSE BLD-MCNC: 184 MG/DL (ref 74–99)
POTASSIUM SERPL-SCNC: 3.9 MMOL/L (ref 3.5–5)
PRO-BNP: 433 PG/ML (ref 0–125)
SODIUM BLD-SCNC: 135 MMOL/L (ref 132–146)

## 2020-12-28 PROCEDURE — 2580000003 HC RX 258: Performed by: INTERNAL MEDICINE

## 2020-12-28 PROCEDURE — 99214 OFFICE O/P EST MOD 30 MIN: CPT

## 2020-12-28 PROCEDURE — 6360000002 HC RX W HCPCS: Performed by: INTERNAL MEDICINE

## 2020-12-28 PROCEDURE — 96374 THER/PROPH/DIAG INJ IV PUSH: CPT

## 2020-12-28 PROCEDURE — 36415 COLL VENOUS BLD VENIPUNCTURE: CPT

## 2020-12-28 PROCEDURE — 80048 BASIC METABOLIC PNL TOTAL CA: CPT

## 2020-12-28 PROCEDURE — 83880 ASSAY OF NATRIURETIC PEPTIDE: CPT

## 2020-12-28 RX ORDER — SODIUM CHLORIDE 0.9 % (FLUSH) 0.9 %
10 SYRINGE (ML) INJECTION 2 TIMES DAILY
Status: DISCONTINUED | OUTPATIENT
Start: 2020-12-28 | End: 2020-12-29 | Stop reason: HOSPADM

## 2020-12-28 RX ORDER — FUROSEMIDE 10 MG/ML
40 INJECTION INTRAMUSCULAR; INTRAVENOUS ONCE
Status: COMPLETED | OUTPATIENT
Start: 2020-12-28 | End: 2020-12-28

## 2020-12-28 RX ADMIN — Medication 10 ML: at 12:23

## 2020-12-28 RX ADMIN — FUROSEMIDE 40 MG: 10 INJECTION, SOLUTION INTRAMUSCULAR; INTRAVENOUS at 12:23

## 2020-12-28 NOTE — TELEPHONE ENCOUNTER
OLTON JOHNSON CALLED AND LEFT A MESSAGE STATING HE WAS HAVING CHEST PAIN AND PALPITATIONS. I CALLED BACK AND TOLD HIM THAT DR Brianna Rae IS NOT IN THIS WEEK AS HE IS IN [place]. I TOLD HIM THE SOONEST I COULD GET HIM IN WOULD BE WITH YASMEEN NIELSON NP ON 1/4, 1/5, 1/6/21. HE CHOSE 1/6/21 @ 1:00. I TOLD HIM THAT IF THE CHEST PAIN WORSENS TO GO TO THE ER RIGHT AWAY.   RLL

## 2021-01-06 ENCOUNTER — OFFICE VISIT (OUTPATIENT)
Dept: CARDIOLOGY CLINIC | Age: 41
End: 2021-01-06
Payer: MEDICARE

## 2021-01-06 VITALS
BODY MASS INDEX: 42.53 KG/M2 | SYSTOLIC BLOOD PRESSURE: 102 MMHG | DIASTOLIC BLOOD PRESSURE: 60 MMHG | WEIGHT: 314 LBS | RESPIRATION RATE: 20 BRPM | HEART RATE: 70 BPM | HEIGHT: 72 IN

## 2021-01-06 DIAGNOSIS — I48.0 PAROXYSMAL ATRIAL FIBRILLATION (HCC): Primary | ICD-10-CM

## 2021-01-06 PROCEDURE — 93000 ELECTROCARDIOGRAM COMPLETE: CPT | Performed by: INTERNAL MEDICINE

## 2021-01-06 PROCEDURE — 99214 OFFICE O/P EST MOD 30 MIN: CPT | Performed by: NURSE PRACTITIONER

## 2021-01-06 RX ORDER — NITROGLYCERIN 0.4 MG/1
0.4 TABLET SUBLINGUAL EVERY 5 MIN PRN
Qty: 25 TABLET | Refills: 2 | Status: ON HOLD
Start: 2021-01-06 | End: 2021-01-19 | Stop reason: HOSPADM

## 2021-01-06 RX ORDER — ISOSORBIDE DINITRATE 10 MG/1
10 TABLET ORAL 3 TIMES DAILY
Qty: 90 TABLET | Refills: 3 | Status: SHIPPED
Start: 2021-01-06 | End: 2021-02-04 | Stop reason: SDUPTHER

## 2021-01-06 NOTE — PROGRESS NOTES
[facility] PHYSICIAN CARDIOLOGY   OFFICE VISIT        PRIMARY CARE PHYSICIAN:      Kanu Case MD         ALLERGIES / SENSITIVITIES:        Allergies   Allergen Reactions    Farxiga [Dapagliflozin] Other (See Comments)     Caused throat to swell          REVIEWED MEDICATIONS:       Current Outpatient Medications   Medication Sig Dispense Refill    bumetanide (BUMEX) 2 MG tablet Take 1 tablet by mouth 2 times daily 180 tablet 3    sacubitril-valsartan (ENTRESTO)  MG per tablet Take 1 tablet by mouth 2 times daily 180 tablet 3    spironolactone (ALDACTONE) 25 MG tablet Take 0.5 tablets by mouth daily 30 tablet 11    insulin lispro (HUMALOG) 100 UNIT/ML injection vial Inject 20 Units into the skin 3 times daily (before meals) 10 mL 10    LEVEMIR FLEXTOUCH 100 UNIT/ML injection pen Inject 40 Units into the skin 2 times daily 30 mL 10    rivaroxaban (XARELTO) 20 MG TABS tablet TAKE (1) TABLET BY MOUTH DAILY 30 tablet 10    allopurinol (ZYLOPRIM) 300 MG tablet Take 1 tablet by mouth daily 30 tablet 11    dofetilide (TIKOSYN) 250 MCG capsule Take 1 capsule by mouth every 12 hours 60 capsule 3    magnesium oxide (MAG-OX) 400 MG tablet Take 1 tablet by mouth daily 30 tablet 11    atorvastatin (LIPITOR) 40 MG tablet TAKE 1 TABLET BY MOUTH EVERY DAY 30 tablet 5    carvedilol (COREG) 25 MG tablet Take 1 tablet by mouth 2 times daily (with meals) 60 tablet 11    metFORMIN (GLUCOPHAGE) 1000 MG tablet Take 1 tablet by mouth 2 times daily (with meals) (Patient not taking: Reported on 1/6/2021) 180 tablet 2    blood glucose test strips (ONETOUCH ULTRA) strip USE TO TEST BLOOD SUGAR 2-3 TIMES DAILY AND AS NEEDED FOR SYMPTOMS OF IRREGULAR BLOOD GLUCOSE *SUBSTITUTE AS NEEDED PER INSURANCE PREFERENCE* 100 each 5    Blood Glucose Monitoring Suppl (ONE TOUCH ULTRA 2) w/Device KIT USE AS DIRECTED DAILY *SUBSTITUTE AS NEEDED PER INSURANCE PREFERENCE* 1 kit 0    Lancets MISC Test 3-4 times daily 300 each 3     No current facility-administered medications for this visit. S: REASON FOR VISIT: Management of chest pain. He is followed here by Ashley Barba. he has a nonischemic cardiomyopathy and paroxysmal atrial fibrillation and biventricular ICD, congestive heart failure. He is followed by EP, Dr. Cipriano Ca also. He was seen in the emergency room on December for the following an ICD shock. ICD function was appropriate and no medications were adjusted by EP and he was discharged. He then had recurrent atrial flutter at the end of December and underwent a cardioversion which was successful. He follows at the CHF clinic and was seen on December 28 and was given IV Lasix. His breathing has improved and he denies any significant dyspnea with exertion or swelling of the lower extremities or orthopnea. He has been having chest pain for about 2 weeks. It does not occur with activity but he stopped walking 2 miles in his apartment building due to fears of bringing the chest pain on. Sometimes the chest pain occurs after eating or when he sitting in a chair. It is an achy feeling in the center of his chest which is nonradiating and there are no precipitating or relieving factors. He denies any dyspnea or diaphoresis with it lasts about an hour. He has no history of reflux or heartburn. He has no eructations nausea or vomiting or diarrhea. REVIEW OF SYSTEMS:       Constitutional: no fevers or chills or fatigue   HEENT: denies changes in hearing or vision, No difficulty swallowing   Endocrine: no weight changes   Musculoskeletal: no arthralgias or myalgias   Skin: denies rashes or itching or lesions   Heme/Lymph: denies bleeding   Heart: as above   Lungs: as above   GI: denies abdominal pain, nausea, vomiting, diarrhea, rectal bleeding, tarry stools   : denies hematuria, dysuria   Psych: denies mood change, anxiety, depression. Neuro: denies numbness, tingling, tremors.        CARDIOVASCULAR HISTORY: 1.      Acute on chronic systolic heart failure ([facility] [address] 03/14/2020       Priority: High    Nonischemic cardiomyopathy [facility])         Priority: High       Raven-Barr virus at age 32    Cardiac catheterization February and December of 2011:  Patent coronary      arteries.  EF 10% to 15%.  Patient reportedly did receive an ICD vest at  [facility] time, however, did not return for a regular followup.   2.    Echo March 2012:  EF 30% to 51%, stage 2 diastolic dysfunction, septal      hypokinesia, mild LVH, left atrium mildly-moderately dilated, mild MR.      RVSP of 25-30.  Trace PI.   3.    Cardiac MRI July 2013:  EF 20%   Echo 01/24/2016 [facility]):  LV severely dilated.  LA      moderately dilated.  Mild PHTN.  Mild TR.  Trace TR.  Pacer wire visible      in the right atrium and ventricle.  RA mildly dilated.  EF 10% to 15%.       Class 3 diastolic dysfunction.  RV moderately dilated.  RV systolic      function moderate-severely reduced.  Mild-moderate MR.  Radha Cooley.   Echo March 2012 (Dr. Ramona Moy):  LVEF 30% to 35% and enlarged LV       S/P left pulmonary artery pressure sensor implant placement         Priority: Medium    S/P ICD (internal cardiac defibrillator) procedure 11/20/2018       Priority: Medium       Status post single chamber ICD implant, Medtronic Evera [id number],      September 30, 2013, for nonischemic cardiomyopathy (single chamber)  Biventricular pacemaker/ICD 2018 which was an upgrade from a single-chamber pacemaker with QRS duration more than 150 ms, Medtronic Leblanc MRI Quad CRT-D SureScan    Carotid disease, bilateral ([facility] [address] 03/14/2020       Priority: Low         Bilateral carotid duplex January 2016, reportedly showed no      hemodynamically significant stenosis with 0% to 49% narrowing of both      carotid arteries  Reported history of ischemic left middle cerebral artery stroke      (further details unknown)       Ventricular arrhythmia 08/01/2019 with sustained ventricular tachycardia on December 4 status post ICD shock    Morbid obesity with BMI of 40.0-44.9, adult ([facility] [address] 08/22/2018    Paroxysmal atrial fibrillation ([facility] [address] with a cardioversion February 14, 2020, recurrent atrial flutter and status post cardioversion on December 15, 2020, on dofetilide   01/20/2017      Essential hypertension 01/20/2017    VHD (valvular heart disease) 01/20/2017    Hepatomegaly 12/01/2016    Type 2 diabetes mellitus with complication, with long-term current use of insulin ([facility] [address] 08/22/2016    Chronic gout 08/13/2016    CKD (chronic kidney disease)      Obstructive sleep apnea 11/29/2011      Hyperlipidemia  Non-smoker  Allergy to Mayela Less  Cardiac catheterization August 1, 2019     Coronary artery disease:  a. Left main:  No significant disease. b.  LAD:  Up to 40% to 50% proximal/mid vessel narrowing and 30% to 40%  distal vessel disease. Around 50% hazy proximal narrowing of a long,  but small caliber diagonal branch.  c.  LCX:  Diffuse atherosclerosis, but with no more than 30%  angiographic luminal narrowing.  d.  RCA:  A large-dominant vessel with around 30% proximal and mid to  distal vessel narrowing. 2.  Mildly elevated left ventricular end-diastolic pressure, consistent  with LV diastolic dysfunction.     2D echo August 5, 2020   Summary   Left ventricle dilated at 7.1cm. Severe global hypokinesis, LV EF visually estimated about 25-30%. Mild left ventricular concentric hypertrophy noted. Stage II diastolic dysfunction. Left atrium is enlarged. Interatrial septum not well visualized but appears intact. Normal right ventricle size and function. Pacer/ICD lead in RV. No mitral valve prolapse. There is trace aortic regurgitation. There is trace tricuspid regurgitation, RVSP 30mmHg. Normal aortic root size. Trace of pericardial effusion. Pericardium appears normal.   No intra cardiac mass or thrombus.    Compared to prior echo from 7/31/19 - No significant changes noted. Lexiscan stress test July 2020 at [place] in [place]          Family History   Problem Relation Age of Onset    Cancer Mother     No Known Problems Father          O:  COMPLETE PHYSICAL EXAM:       /60   Pulse 70   Resp 20   Ht 6' (1.829 m)   Wt (!) 314 lb (142.4 kg)   BMI 42.59 kg/m²       General:   in no acute distress. Well-nourished well-developed   Skin                  Warm and dry, no rashes   Head & Neck:  No JVD. No carotid bruits. Mucous membranes moist.   Chest:   No deformities. Symmetrical and nontender. No respiratory distress ICD left upper chest site unremarkable,   Lungs:   Clear to auscultation bilaterally. Heart:  Normal S1 and S2. No S3 or S4. No Murmur. No gallops no rubs   Abdomen: Soft without organomegaly or masses. Nontender, Bowel sound     normoactive   Extremities:  No edema of lower extremities . No cyanosis and moves all extremities   Neuro:  Alert & orient x 3 no focal deficits     REVIEW OF DIAGNOSTIC TESTS:     EKG today AV pacing    Lab Results   Component Value Date     12/28/2020     12/15/2020     12/04/2020    K 3.9 12/28/2020    K 5.0 12/15/2020    K 4.1 12/04/2020    K 4.3 12/03/2020    K 4.0 11/19/2020    K 5.2 03/17/2020    CL 98 12/28/2020    CL 97 12/15/2020    CL 98 12/04/2020    CO2 23 12/28/2020    CO2 24 12/15/2020    CO2 22 12/04/2020    BUN 29 12/28/2020    BUN 30 12/15/2020    BUN 40 12/04/2020    CREATININE 1.3 12/28/2020    CREATININE 1.4 12/15/2020    CREATININE 1.6 12/04/2020         Lab Results   Component Value Date    PROBNP 433 (H) 12/28/2020    PROBNP 1,837 (H) 12/03/2020    PROBNP 261 (H) 11/04/2020         ASSESSMENT / DIAGNOSIS:   1. Mixed cardiomyopathy systolic heart failure status post biventricular ICD, he appears compensated today  2. Coronary artery disease/was mild by cardiac catheterization in August 2019 with a stress test done at [facility] in 7/2020, report pending.   Chest pain occurs at rest, and sometimes after meals but not with activity. However there is some characteristics of angina. 3. Ventricular arrhythmias and paroxysmal atrial fibrillation. On dofetilide and status post cardioversion with recurrent atrial flutter December 15, 2020 cardioversion was successful, maintaining sinus rhythm and on oral anticoagulation  4. Hypertension, controlled  5. Hyperlipidemia, on a statin  6. Diabetes, controlled  7. Obstructive sleep apnea  8. Obesity  9. Chronic kidney disease  10. Status post CardioMEMS  11. History of CVA  12. Chronic gout  13. Allergies to [place]     TREATMENT PLAN: Discussed with   1. Isosorbide 10 mg 3 times a day  2. Nitroglycerin sublingual 0.4 mg as needed and I did discuss the details of how to take this medication  3. Consider Ranexa  4. Return to the office in 1 month and consider left heart cath  5. Obtain stress test report  6. Follow-up with Dr. Trinity Goetz  7. Instructed to go to the emergency room if the chest pain does not resolve after 3 nitroglycerin      The patient's current medication list, allergies, problem list and results of all previously ordered tests were reviewed at today's visit      James Souza, APRN - CNP        [address] [address] [address]  [facility] [address] [phone]

## 2021-01-11 ENCOUNTER — HOSPITAL ENCOUNTER (OUTPATIENT)
Dept: OTHER | Age: 41
Setting detail: THERAPIES SERIES
Discharge: HOME | End: 2021-01-11
Payer: MEDICARE

## 2021-01-12 DIAGNOSIS — I50.22 CHRONIC SYSTOLIC HEART FAILURE (HCC): Primary | ICD-10-CM

## 2021-01-18 PROCEDURE — 99236 HOSP IP/OBS SAME DATE HI 85: CPT | Performed by: FAMILY MEDICINE

## 2021-01-19 ENCOUNTER — APPOINTMENT (OUTPATIENT)
Dept: GENERAL RADIOLOGY | Age: 41
End: 2021-01-19
Payer: MEDICARE

## 2021-01-19 ENCOUNTER — HOSPITAL ENCOUNTER (OUTPATIENT)
Age: 41
Setting detail: OBSERVATION
Discharge: HOME | End: 2021-01-19
Attending: EMERGENCY MEDICINE | Admitting: FAMILY MEDICINE
Payer: MEDICARE

## 2021-01-19 ENCOUNTER — APPOINTMENT (OUTPATIENT)
Dept: NUCLEAR MEDICINE | Age: 41
End: 2021-01-19
Payer: MEDICARE

## 2021-01-19 ENCOUNTER — APPOINTMENT (OUTPATIENT)
Dept: NON INVASIVE DIAGNOSTICS | Age: 41
End: 2021-01-19
Payer: MEDICARE

## 2021-01-19 VITALS
WEIGHT: 315 LBS | BODY MASS INDEX: 42.66 KG/M2 | DIASTOLIC BLOOD PRESSURE: 88 MMHG | OXYGEN SATURATION: 98 % | TEMPERATURE: 96.7 F | HEART RATE: 74 BPM | SYSTOLIC BLOOD PRESSURE: 125 MMHG | HEIGHT: 72 IN | RESPIRATION RATE: 18 BRPM

## 2021-01-19 DIAGNOSIS — R07.9 CHEST PAIN, UNSPECIFIED TYPE: Primary | ICD-10-CM

## 2021-01-19 DIAGNOSIS — Z79.4 TYPE 2 DIABETES MELLITUS WITH COMPLICATION, WITH LONG-TERM CURRENT USE OF INSULIN (HCC): ICD-10-CM

## 2021-01-19 DIAGNOSIS — E11.8 TYPE 2 DIABETES MELLITUS WITH COMPLICATION, WITH LONG-TERM CURRENT USE OF INSULIN (HCC): ICD-10-CM

## 2021-01-19 LAB
ALBUMIN SERPL-MCNC: 3.8 G/DL (ref 3.5–5.2)
ALBUMIN SERPL-MCNC: 4.5 G/DL (ref 3.5–5.2)
ALP BLD-CCNC: 115 U/L (ref 40–129)
ALP BLD-CCNC: 127 U/L (ref 40–129)
ALT SERPL-CCNC: 13 U/L (ref 0–40)
ALT SERPL-CCNC: 16 U/L (ref 0–40)
AMPHETAMINE SCREEN, URINE: NOT DETECTED
ANION GAP SERPL CALCULATED.3IONS-SCNC: 14 MMOL/L (ref 7–16)
ANION GAP SERPL CALCULATED.3IONS-SCNC: 17 MMOL/L (ref 7–16)
AST SERPL-CCNC: 14 U/L (ref 0–39)
AST SERPL-CCNC: 14 U/L (ref 0–39)
BARBITURATE SCREEN URINE: NOT DETECTED
BASOPHILS ABSOLUTE: 0.04 E9/L (ref 0–0.2)
BASOPHILS RELATIVE PERCENT: 0.3 % (ref 0–2)
BENZODIAZEPINE SCREEN, URINE: NOT DETECTED
BILIRUB SERPL-MCNC: 0.4 MG/DL (ref 0–1.2)
BILIRUB SERPL-MCNC: 0.5 MG/DL (ref 0–1.2)
BUN BLDV-MCNC: 31 MG/DL (ref 6–20)
BUN BLDV-MCNC: 33 MG/DL (ref 6–20)
CALCIUM SERPL-MCNC: 9 MG/DL (ref 8.6–10.2)
CALCIUM SERPL-MCNC: 9.8 MG/DL (ref 8.6–10.2)
CANNABINOID SCREEN URINE: NOT DETECTED
CHLORIDE BLD-SCNC: 96 MMOL/L (ref 98–107)
CHLORIDE BLD-SCNC: 97 MMOL/L (ref 98–107)
CO2: 22 MMOL/L (ref 22–29)
CO2: 23 MMOL/L (ref 22–29)
COCAINE METABOLITE SCREEN URINE: NOT DETECTED
CREAT SERPL-MCNC: 1.5 MG/DL (ref 0.7–1.2)
CREAT SERPL-MCNC: 1.6 MG/DL (ref 0.7–1.2)
EKG ATRIAL RATE: 78 BPM
EKG P AXIS: 105 DEGREES
EKG P-R INTERVAL: 136 MS
EKG Q-T INTERVAL: 508 MS
EKG QRS DURATION: 214 MS
EKG QTC CALCULATION (BAZETT): 579 MS
EKG R AXIS: 180 DEGREES
EKG T AXIS: -19 DEGREES
EKG VENTRICULAR RATE: 78 BPM
EOSINOPHILS ABSOLUTE: 0.22 E9/L (ref 0.05–0.5)
EOSINOPHILS RELATIVE PERCENT: 1.9 % (ref 0–6)
FENTANYL SCREEN, URINE: NOT DETECTED
GFR AFRICAN AMERICAN: 58
GFR AFRICAN AMERICAN: >60
GFR NON-AFRICAN AMERICAN: 48 ML/MIN/1.73
GFR NON-AFRICAN AMERICAN: 52 ML/MIN/1.73
GLUCOSE BLD-MCNC: 162 MG/DL (ref 74–99)
GLUCOSE BLD-MCNC: 164 MG/DL (ref 74–99)
HBA1C MFR BLD: 9.3 % (ref 4–5.6)
HCT VFR BLD CALC: 36.7 % (ref 37–54)
HEMOGLOBIN: 12 G/DL (ref 12.5–16.5)
IMMATURE GRANULOCYTES #: 0.05 E9/L
IMMATURE GRANULOCYTES %: 0.4 % (ref 0–5)
INR BLD: 1.8
LV EF: 31 %
LVEF MODALITY: NORMAL
LYMPHOCYTES ABSOLUTE: 2.22 E9/L (ref 1.5–4)
LYMPHOCYTES RELATIVE PERCENT: 19.3 % (ref 20–42)
Lab: NORMAL
MAGNESIUM: 1.1 MG/DL (ref 1.6–2.6)
MCH RBC QN AUTO: 29.3 PG (ref 26–35)
MCHC RBC AUTO-ENTMCNC: 32.7 % (ref 32–34.5)
MCV RBC AUTO: 89.5 FL (ref 80–99.9)
METHADONE SCREEN, URINE: NOT DETECTED
MONOCYTES ABSOLUTE: 0.91 E9/L (ref 0.1–0.95)
MONOCYTES RELATIVE PERCENT: 7.9 % (ref 2–12)
NEUTROPHILS ABSOLUTE: 8.07 E9/L (ref 1.8–7.3)
NEUTROPHILS RELATIVE PERCENT: 70.2 % (ref 43–80)
OPIATE SCREEN URINE: NOT DETECTED
OXYCODONE URINE: NOT DETECTED
PDW BLD-RTO: 14.6 FL (ref 11.5–15)
PHENCYCLIDINE SCREEN URINE: NOT DETECTED
PLATELET # BLD: 253 E9/L (ref 130–450)
PMV BLD AUTO: 9.8 FL (ref 7–12)
POTASSIUM REFLEX MAGNESIUM: 3.8 MMOL/L (ref 3.5–5)
POTASSIUM SERPL-SCNC: 3.7 MMOL/L (ref 3.5–5)
PRO-BNP: 362 PG/ML (ref 0–125)
PROTHROMBIN TIME: 21 SEC (ref 9.3–12.4)
RBC # BLD: 4.1 E12/L (ref 3.8–5.8)
SODIUM BLD-SCNC: 134 MMOL/L (ref 132–146)
SODIUM BLD-SCNC: 135 MMOL/L (ref 132–146)
TOTAL PROTEIN: 7.1 G/DL (ref 6.4–8.3)
TOTAL PROTEIN: 8.2 G/DL (ref 6.4–8.3)
TROPONIN: 0.01 NG/ML (ref 0–0.03)
TROPONIN: 0.02 NG/ML (ref 0–0.03)
WBC # BLD: 11.5 E9/L (ref 4.5–11.5)

## 2021-01-19 PROCEDURE — 3430000000 HC RX DIAGNOSTIC RADIOPHARMACEUTICAL: Performed by: RADIOLOGY

## 2021-01-19 PROCEDURE — G0378 HOSPITAL OBSERVATION PER HR: HCPCS

## 2021-01-19 PROCEDURE — 83880 ASSAY OF NATRIURETIC PEPTIDE: CPT

## 2021-01-19 PROCEDURE — 99214 OFFICE O/P EST MOD 30 MIN: CPT | Performed by: INTERNAL MEDICINE

## 2021-01-19 PROCEDURE — 78452 HT MUSCLE IMAGE SPECT MULT: CPT | Performed by: INTERNAL MEDICINE

## 2021-01-19 PROCEDURE — 6370000000 HC RX 637 (ALT 250 FOR IP): Performed by: STUDENT IN AN ORGANIZED HEALTH CARE EDUCATION/TRAINING PROGRAM

## 2021-01-19 PROCEDURE — 83735 ASSAY OF MAGNESIUM: CPT

## 2021-01-19 PROCEDURE — 93017 CV STRESS TEST TRACING ONLY: CPT

## 2021-01-19 PROCEDURE — 93010 ELECTROCARDIOGRAM REPORT: CPT | Performed by: INTERNAL MEDICINE

## 2021-01-19 PROCEDURE — A9500 TC99M SESTAMIBI: HCPCS | Performed by: RADIOLOGY

## 2021-01-19 PROCEDURE — 6360000002 HC RX W HCPCS: Performed by: NURSE PRACTITIONER

## 2021-01-19 PROCEDURE — 84484 ASSAY OF TROPONIN QUANT: CPT

## 2021-01-19 PROCEDURE — 71045 X-RAY EXAM CHEST 1 VIEW: CPT

## 2021-01-19 PROCEDURE — 93016 CV STRESS TEST SUPVJ ONLY: CPT | Performed by: INTERNAL MEDICINE

## 2021-01-19 PROCEDURE — 78452 HT MUSCLE IMAGE SPECT MULT: CPT

## 2021-01-19 PROCEDURE — 6360000002 HC RX W HCPCS: Performed by: FAMILY MEDICINE

## 2021-01-19 PROCEDURE — 2580000003 HC RX 258: Performed by: STUDENT IN AN ORGANIZED HEALTH CARE EDUCATION/TRAINING PROGRAM

## 2021-01-19 PROCEDURE — 80307 DRUG TEST PRSMV CHEM ANLYZR: CPT

## 2021-01-19 PROCEDURE — 93005 ELECTROCARDIOGRAM TRACING: CPT | Performed by: EMERGENCY MEDICINE

## 2021-01-19 PROCEDURE — 71046 X-RAY EXAM CHEST 2 VIEWS: CPT

## 2021-01-19 PROCEDURE — 93018 CV STRESS TEST I&R ONLY: CPT | Performed by: INTERNAL MEDICINE

## 2021-01-19 PROCEDURE — 85610 PROTHROMBIN TIME: CPT

## 2021-01-19 PROCEDURE — 99285 EMERGENCY DEPT VISIT HI MDM: CPT

## 2021-01-19 PROCEDURE — 83036 HEMOGLOBIN GLYCOSYLATED A1C: CPT

## 2021-01-19 PROCEDURE — 96365 THER/PROPH/DIAG IV INF INIT: CPT

## 2021-01-19 PROCEDURE — 36415 COLL VENOUS BLD VENIPUNCTURE: CPT

## 2021-01-19 PROCEDURE — 80053 COMPREHEN METABOLIC PANEL: CPT

## 2021-01-19 PROCEDURE — 85025 COMPLETE CBC W/AUTO DIFF WBC: CPT

## 2021-01-19 RX ORDER — ATORVASTATIN CALCIUM 40 MG/1
40 TABLET, FILM COATED ORAL DAILY
COMMUNITY
End: 2021-08-04 | Stop reason: SDUPTHER

## 2021-01-19 RX ORDER — ACETAMINOPHEN 650 MG/1
650 SUPPOSITORY RECTAL EVERY 6 HOURS PRN
Status: DISCONTINUED | OUTPATIENT
Start: 2021-01-19 | End: 2021-01-19 | Stop reason: HOSPADM

## 2021-01-19 RX ORDER — ACETAMINOPHEN 325 MG/1
650 TABLET ORAL EVERY 6 HOURS PRN
Status: DISCONTINUED | OUTPATIENT
Start: 2021-01-19 | End: 2021-01-19 | Stop reason: HOSPADM

## 2021-01-19 RX ORDER — ASPIRIN 81 MG/1
324 TABLET, CHEWABLE ORAL ONCE
Status: COMPLETED | OUTPATIENT
Start: 2021-01-19 | End: 2021-01-19

## 2021-01-19 RX ORDER — CARVEDILOL 6.25 MG/1
25 TABLET ORAL 2 TIMES DAILY WITH MEALS
Status: DISCONTINUED | OUTPATIENT
Start: 2021-01-19 | End: 2021-01-19 | Stop reason: HOSPADM

## 2021-01-19 RX ORDER — SODIUM CHLORIDE 0.9 % (FLUSH) 0.9 %
10 SYRINGE (ML) INJECTION PRN
Status: DISCONTINUED | OUTPATIENT
Start: 2021-01-19 | End: 2021-01-19 | Stop reason: HOSPADM

## 2021-01-19 RX ORDER — SPIRONOLACTONE 25 MG/1
12.5 TABLET ORAL DAILY
Status: DISCONTINUED | OUTPATIENT
Start: 2021-01-19 | End: 2021-01-19 | Stop reason: HOSPADM

## 2021-01-19 RX ORDER — SODIUM CHLORIDE 0.9 % (FLUSH) 0.9 %
10 SYRINGE (ML) INJECTION EVERY 12 HOURS SCHEDULED
Status: DISCONTINUED | OUTPATIENT
Start: 2021-01-19 | End: 2021-01-19 | Stop reason: HOSPADM

## 2021-01-19 RX ORDER — NITROGLYCERIN 0.4 MG/1
TABLET SUBLINGUAL
Qty: 25 TABLET | Refills: 3 | Status: SHIPPED | OUTPATIENT
Start: 2021-01-19 | End: 2021-01-19

## 2021-01-19 RX ORDER — BLOOD SUGAR DIAGNOSTIC
STRIP MISCELLANEOUS
Qty: 100 EACH | Refills: 5 | Status: SHIPPED | OUTPATIENT
Start: 2021-01-19 | End: 2021-02-04

## 2021-01-19 RX ORDER — NITROGLYCERIN 0.4 MG/1
0.4 TABLET SUBLINGUAL EVERY 5 MIN PRN
Status: DISCONTINUED | OUTPATIENT
Start: 2021-01-19 | End: 2021-01-19 | Stop reason: HOSPADM

## 2021-01-19 RX ORDER — DOFETILIDE 0.25 MG/1
250 CAPSULE ORAL EVERY 12 HOURS SCHEDULED
Status: DISCONTINUED | OUTPATIENT
Start: 2021-01-19 | End: 2021-01-19

## 2021-01-19 RX ORDER — POTASSIUM CHLORIDE 7.45 MG/ML
10 INJECTION INTRAVENOUS PRN
Status: DISCONTINUED | OUTPATIENT
Start: 2021-01-19 | End: 2021-01-19 | Stop reason: HOSPADM

## 2021-01-19 RX ORDER — DEXTROSE MONOHYDRATE 50 MG/ML
100 INJECTION, SOLUTION INTRAVENOUS PRN
Status: DISCONTINUED | OUTPATIENT
Start: 2021-01-19 | End: 2021-01-19 | Stop reason: HOSPADM

## 2021-01-19 RX ORDER — MAGNESIUM SULFATE IN WATER 40 MG/ML
2 INJECTION, SOLUTION INTRAVENOUS ONCE
Status: COMPLETED | OUTPATIENT
Start: 2021-01-19 | End: 2021-01-19

## 2021-01-19 RX ORDER — DEXTROSE MONOHYDRATE 25 G/50ML
12.5 INJECTION, SOLUTION INTRAVENOUS PRN
Status: DISCONTINUED | OUTPATIENT
Start: 2021-01-19 | End: 2021-01-19 | Stop reason: HOSPADM

## 2021-01-19 RX ORDER — INSULIN GLARGINE 100 [IU]/ML
40 INJECTION, SOLUTION SUBCUTANEOUS 2 TIMES DAILY
Status: DISCONTINUED | OUTPATIENT
Start: 2021-01-19 | End: 2021-01-19 | Stop reason: HOSPADM

## 2021-01-19 RX ORDER — ISOSORBIDE DINITRATE 10 MG/1
10 TABLET ORAL 3 TIMES DAILY
Status: DISCONTINUED | OUTPATIENT
Start: 2021-01-19 | End: 2021-01-19 | Stop reason: HOSPADM

## 2021-01-19 RX ORDER — BLOOD SUGAR DIAGNOSTIC
STRIP MISCELLANEOUS
Qty: 100 EACH | Refills: 5 | Status: SHIPPED | OUTPATIENT
Start: 2021-01-19 | End: 2021-01-19

## 2021-01-19 RX ORDER — DOFETILIDE 0.25 MG/1
250 CAPSULE ORAL EVERY 12 HOURS SCHEDULED
Status: DISCONTINUED | OUTPATIENT
Start: 2021-01-19 | End: 2021-01-19 | Stop reason: HOSPADM

## 2021-01-19 RX ORDER — SODIUM CHLORIDE 9 MG/ML
INJECTION, SOLUTION INTRAVENOUS CONTINUOUS
Status: DISCONTINUED | OUTPATIENT
Start: 2021-01-19 | End: 2021-01-19 | Stop reason: HOSPADM

## 2021-01-19 RX ORDER — ATORVASTATIN CALCIUM 40 MG/1
40 TABLET, FILM COATED ORAL DAILY
Status: DISCONTINUED | OUTPATIENT
Start: 2021-01-19 | End: 2021-01-19 | Stop reason: HOSPADM

## 2021-01-19 RX ORDER — BUMETANIDE 1 MG/1
2 TABLET ORAL 2 TIMES DAILY
Status: DISCONTINUED | OUTPATIENT
Start: 2021-01-19 | End: 2021-01-19 | Stop reason: HOSPADM

## 2021-01-19 RX ORDER — NICOTINE POLACRILEX 4 MG
15 LOZENGE BUCCAL PRN
Status: DISCONTINUED | OUTPATIENT
Start: 2021-01-19 | End: 2021-01-19 | Stop reason: HOSPADM

## 2021-01-19 RX ORDER — POLYETHYLENE GLYCOL 3350 17 G/17G
17 POWDER, FOR SOLUTION ORAL DAILY PRN
Status: DISCONTINUED | OUTPATIENT
Start: 2021-01-19 | End: 2021-01-19 | Stop reason: HOSPADM

## 2021-01-19 RX ORDER — NITROGLYCERIN 0.4 MG/1
TABLET SUBLINGUAL
Qty: 25 TABLET | Refills: 3 | Status: SHIPPED | OUTPATIENT
Start: 2021-01-19 | End: 2021-02-22 | Stop reason: SDUPTHER

## 2021-01-19 RX ADMIN — MAGNESIUM SULFATE HEPTAHYDRATE 2 G: 40 INJECTION, SOLUTION INTRAVENOUS at 15:09

## 2021-01-19 RX ADMIN — ASPIRIN 324 MG: 81 TABLET, CHEWABLE ORAL at 01:58

## 2021-01-19 RX ADMIN — REGADENOSON 0.4 MG: 0.08 INJECTION, SOLUTION INTRAVENOUS at 12:40

## 2021-01-19 RX ADMIN — ISOSORBIDE DINITRATE 10 MG: 10 TABLET ORAL at 15:21

## 2021-01-19 RX ADMIN — DOFETILIDE 250 MCG: 0.25 CAPSULE ORAL at 15:08

## 2021-01-19 RX ADMIN — CARVEDILOL 25 MG: 6.25 TABLET, FILM COATED ORAL at 16:27

## 2021-01-19 RX ADMIN — Medication 11 MILLICURIE: at 11:24

## 2021-01-19 RX ADMIN — SODIUM CHLORIDE: 9 INJECTION, SOLUTION INTRAVENOUS at 05:03

## 2021-01-19 RX ADMIN — Medication 35 MILLICURIE: at 12:41

## 2021-01-19 ASSESSMENT — ENCOUNTER SYMPTOMS
RHINORRHEA: 0
PHOTOPHOBIA: 0
EYE REDNESS: 0
WHEEZING: 0
SORE THROAT: 0
SHORTNESS OF BREATH: 0
TROUBLE SWALLOWING: 0
NAUSEA: 0
VOMITING: 0
CONSTIPATION: 0
ABDOMINAL PAIN: 0
APNEA: 0
COUGH: 0
EYE PAIN: 0
BACK PAIN: 0
DIARRHEA: 0
CHEST TIGHTNESS: 0

## 2021-01-19 ASSESSMENT — PAIN SCALES - GENERAL
PAINLEVEL_OUTOF10: 0
PAINLEVEL_OUTOF10: 6

## 2021-01-19 ASSESSMENT — PAIN DESCRIPTION - DESCRIPTORS: DESCRIPTORS: SHARP

## 2021-01-19 ASSESSMENT — PAIN DESCRIPTION - PAIN TYPE: TYPE: ACUTE PAIN

## 2021-01-19 NOTE — ED NOTES
Sinan Merino from [place] made aware that patient is currently in nuclear med, if room becomes ready before patient returns to ED, transport will be made aware to take patient to floor from nuclear [facility]. sbar completed and faxed.       Ken Urena RN  01/19/21 [id number]

## 2021-01-19 NOTE — PROCEDURES
Following placement of an intravenous catheter 10 millicuries of technetium 99m sestamibi was administered followed by a saline flush. Approximately 45 minutes later resting SPECT images were obtained. After completion of resting images a regadenoson stress test was performed via a bolus injection of 0.4 milligrams of regadenason followed by a saline flush. Following regadenoson administration 30 millicuries of technetium 99m sestamibi was injected followed by repeat post stress SPECT imaging approximately 60 minutes post completion of administration. The patient experienced no anginal symptoms and remained hemodynamically stable during administration and no electrocardiographic changes were noted with ventricular pacing throughout test. Perfusion images pending.

## 2021-01-19 NOTE — H&P
[place] [facility] Resident Inpatient  History and Physical      CC: Chest Pain (left side chest pain, intermittent since 2130. pain does not radiate. denies n/v.  denies sob)      HPI: History obtained from patient. Kendra Rodriguez is a 36 y.o. male with a PMH of Afib (on dofetilide and AC with xarelto), non-ischemic cardiomyopathy with biventricular pacer ICD(s/p electrocardioversion 12/15), HTN, IDDM(A1C: 9.3) who presented to the ED for chest pain. Pain is left-sided and started at 11 PM evening prior to presentation. Symptoms are not associated with palpitations, dyspnea, abdominal pain, n/v.     He had a stress test done in July 2020 at another hospital, results are pending and patient has been following with Cardiology Dr. Willow Estrella and EP Dr. Erma Canada. Patient also had recent admission on 12/3/2021 for left-sided chest pain. ED Course: The patient remained unchanged. Labs Creatinine: 1.5, Troponin: .02, pro-BNP: 362  Imaging was CXR: minimal infiltrate from PNA or early right basal atelectasis  EKG: atrial sensed ventricular paced rhythm biventricular pacemaker detected  Patient was given aspirin  Pt admitted for Chest Pain evaluation      ED orders    PMH:  has a past medical history of Acute CHF ([address] Acute CHF ([address] Acute idiopathic gout of right foot, AF (atrial fibrillation) ([address] Anemia, CAD (coronary artery disease), Cerebral artery occlusion with cerebral infarction ([address] CKD (chronic kidney disease), Gout, HTN (hypertension), Hyperlipidemia, Hypertension, Morbid obesity due to excess calories ([address] Nonischemic cardiomyopathy ([address] Nonischemic cardiomyopathy ([address] ARVIND (obstructive sleep apnea), S/P left heart catheterization by percutaneous approach, Systolic heart failure ([address] Type 2 diabetes mellitus with complication, with long-term current use of insulin ([address] and URI, acute.     PSH:  has a past surgical history that includes ECHO Compl W Dop Color Flow (11/30/2011); ECHO Compl W Dop Color Flow (03/27/2012); Insertable Cardiac Monitor (12/13/2018); Cardioversion (02/14/2020); Cardiac defibrillator placement (11/20/2018); Cardiac catheterization (08/01/2019); pacemaker placement; and other surgical history (12/15/2020). FH: family history includes Cancer in his mother; No Known Problems in his father. Social:  reports that he has never smoked. He has never used smokeless tobacco. He reports previous alcohol use. He reports that he does not use drugs. Allergies: Allergies   Allergen Reactions    Farxiga [Dapagliflozin] Other (See Comments)     Caused throat to swell        Home Medications:   No current facility-administered medications on file prior to encounter.       Current Outpatient Medications on File Prior to Encounter   Medication Sig Dispense Refill    isosorbide dinitrate (ISORDIL) 10 MG tablet Take 1 tablet by mouth 3 times daily 90 tablet 3    nitroGLYCERIN (NITROSTAT) 0.4 MG SL tablet Place 1 tablet under the tongue every 5 minutes as needed for Chest pain 25 tablet 2    metFORMIN (GLUCOPHAGE) 1000 MG tablet Take 1 tablet by mouth 2 times daily (with meals) (Patient not taking: Reported on 1/6/2021) 180 tablet 2    blood glucose test strips (ONETOUCH ULTRA) strip USE TO TEST BLOOD SUGAR 2-3 TIMES DAILY AND AS NEEDED FOR SYMPTOMS OF IRREGULAR BLOOD GLUCOSE *SUBSTITUTE AS NEEDED PER INSURANCE PREFERENCE* 100 each 5    bumetanide (BUMEX) 2 MG tablet Take 1 tablet by mouth 2 times daily 180 tablet 3    sacubitril-valsartan (ENTRESTO)  MG per tablet Take 1 tablet by mouth 2 times daily 180 tablet 3    spironolactone (ALDACTONE) 25 MG tablet Take 0.5 tablets by mouth daily 30 tablet 11    insulin lispro (HUMALOG) 100 UNIT/ML injection vial Inject 20 Units into the skin 3 times daily (before meals) 10 mL 10    LEVEMIR FLEXTOUCH 100 UNIT/ML injection pen Inject 40 Units into the skin 2 times daily 30 mL 10    rivaroxaban (XARELTO) 20 MG TABS tablet TAKE (1) TABLET BY MOUTH DAILY 30 tablet 10    allopurinol (ZYLOPRIM) 300 MG tablet Take 1 tablet by mouth daily 30 tablet 11    dofetilide (TIKOSYN) 250 MCG capsule Take 1 capsule by mouth every 12 hours 60 capsule 3    magnesium oxide (MAG-OX) 400 MG tablet Take 1 tablet by mouth daily 30 tablet 11    atorvastatin (LIPITOR) 40 MG tablet TAKE 1 TABLET BY MOUTH EVERY DAY 30 tablet 5    carvedilol (COREG) 25 MG tablet Take 1 tablet by mouth 2 times daily (with meals) 60 tablet 11    Blood Glucose Monitoring Suppl (ONE TOUCH ULTRA 2) w/Device KIT USE AS DIRECTED DAILY *SUBSTITUTE AS NEEDED PER INSURANCE PREFERENCE* 1 kit 0    Lancets MISC Test 3-4 times daily 300 each 3       ROS:   Const: No fever, chills, night sweats, no recent unexplained weight gain/loss  HEENT: No blurred vision, double vision; no URI symptoms  Resp: No cough, no sputum, no pleuritic chest pain, no sob  Cardio: Chest pain, no exertional dyspnea, no PND, no orthopnea, no palpitation, no leg swelling. GI: No dysphagia, no reflux; no abdominal pain, no n/v; no c/d. No hematochezia    : No dysuria, no frequency, hesitancy; no hematuria  MSK: no joint pain, no myalgia, no change in ROM  Neuro: no focal weakness, no slurred speech, no double vision, no numbness or tingling in extremities  Endo: no heat/cold intolerance, no polyphagia, polydipsia or polyuria  Hem: no increased bleeding, no bruising, no lymphadenopathy  Skin: no skin changes  Psych: no depressed mood, no suicidal ideation    PE:  Blood pressure 91/70, pulse 70, temperature 98.2 °F (36.8 °C), temperature source Oral, resp. rate 14, height 6' (1.829 m), weight (!) 315 lb (142.9 kg), SpO2 97 %. General: Alert, cooperative, no acute distress. HEENT: Normocephalic, atraumatic. PERRLA, conjunctiva/corneas clear, EOM's intact, no pallor or icterus.    Neck: Supple, symmetrical, trachea midline, no JVD  Chest: No tenderness or deformity, full & symmetric excursion  Lung: Clear to auscultation bilaterally,  respirations unlabored. No rales/wheezing/rubs  Heart: RRR, S1 and S2 normal, no murmur, rub or gallop. DP pulses 2/4  Abdomen: SNTND, no masses, no organomegaly, no guarding, rebound or rigidity. Genital/Rectal: deferred  Extremities:  Extremities normal, atraumatic, no cyanosis or edema. Distal pulses equal bilaterally  Skin: Skin color, texture, turgor normal, no rashes or lesions  Musculoskeletal: No joint swelling, no muscle tenderness. Normal ROM in extremities.    Neurologic: Alert & Oriented;     Labs:   Results for orders placed or performed during the hospital encounter of 01/19/21   CBC auto differential   Result Value Ref Range    WBC 11.5 4.5 - 11.5 E9/L    RBC 4.10 3.80 - 5.80 E12/L    Hemoglobin 12.0 (L) 12.5 - 16.5 g/dL    Hematocrit 36.7 (L) 37.0 - 54.0 %    MCV 89.5 80.0 - 99.9 fL    MCH 29.3 26.0 - 35.0 pg    MCHC 32.7 32.0 - 34.5 %    RDW 14.6 11.5 - 15.0 fL    Platelets 530 521 - 234 E9/L    MPV 9.8 7.0 - 12.0 fL    Neutrophils % 70.2 43.0 - 80.0 %    Immature Granulocytes % 0.4 0.0 - 5.0 %    Lymphocytes % 19.3 (L) 20.0 - 42.0 %    Monocytes % 7.9 2.0 - 12.0 %    Eosinophils % 1.9 0.0 - 6.0 %    Basophils % 0.3 0.0 - 2.0 %    Neutrophils Absolute 8.07 (H) 1.80 - 7.30 E9/L    Immature Granulocytes # 0.05 E9/L    Lymphocytes Absolute 2.22 1.50 - 4.00 E9/L    Monocytes Absolute 0.91 0.10 - 0.95 E9/L    Eosinophils Absolute 0.22 0.05 - 0.50 E9/L    Basophils Absolute 0.04 0.00 - 0.20 E9/L   Comprehensive Metabolic Panel   Result Value Ref Range    Sodium 135 132 - 146 mmol/L    Potassium 3.7 3.5 - 5.0 mmol/L    Chloride 96 (L) 98 - 107 mmol/L    CO2 22 22 - 29 mmol/L    Anion Gap 17 (H) 7 - 16 mmol/L    Glucose 162 (H) 74 - 99 mg/dL    BUN 31 (H) 6 - 20 mg/dL    CREATININE 1.6 (H) 0.7 - 1.2 mg/dL    GFR Non-African American 48 >=60 mL/min/1.73    GFR African American 58     Calcium 9.8 8.6 - 10.2 mg/dL    Total Protein 8.2 6.4 - 8.3 g/dL    Alb 4.5 3.5 - 5.2

## 2021-01-19 NOTE — ED NOTES
RN taking over patient on 7WE given patient chart and patient belongings. Made aware of patient mag level and ordered mag sulfate.       Idris Grady RN  01/19/21 [id number]

## 2021-01-19 NOTE — ED NOTES
Medtronic defibrillator analyzed at this time at bedside      Xi Chamberlain, [place]  01/19/21 1007

## 2021-01-19 NOTE — CONSULTS
Today's Date: 1/19/2021  Patient Name: Thomas Stewart III  Date of admission: 1/19/2021  1:29 AM  Patient's age: 36 y.o., 1980  Admission Dx: Chest pain [R07.9]    Reason for Consult:  atrial fibrillation, cardiomyopathy and biventricular pacer ICD in situ  Requesting Physician: Alfredo Draper MD    CHIEF COMPLAINT: \"Thumping sensation\" in the chest and chest pain    History Obtained From:  patient    HISTORY OF PRESENT ILLNESS:      The patient is a 36 y.o.  male who presents to the emergency room for chest thumping and discomfort. He is well-known to me for a history of  Dilated cardiomyopathy  Mild coronary artery disease  Single-chamber ICD that was upgraded to a biventricular pacer ICD  Paroxysmal atrial arrhythmias, atrial fibrillation and flutter  Chronic therapy with dofetilide for the same  S/p cardioversions for atrial fibrillation in the past.  However most recent episodes have been overdrive paced terminated    He presents to the emergency room with complaint of thumping in his chest last night. [place] but when he came home he felt a thumping sensation in his chest which lasted for a few minutes and then subsided spontaneously. No exertional chest discomfort.   No symptoms of paroxysmal nocturnal dyspnea, orthopnea or leg edema  No recent episodes of syncope or near syncope  No recent ICD discharges      Past Medical History:   has a past medical history of Acute CHF ([address] Acute CHF ([address] Acute idiopathic gout of right foot, AF (atrial fibrillation) ([address] Anemia, CAD (coronary artery disease), Cerebral artery occlusion with cerebral infarction ([address] CKD (chronic kidney disease), Gout, HTN (hypertension), Hyperlipidemia, Hypertension, Morbid obesity due to excess calories ([address] Nonischemic cardiomyopathy ([address] Nonischemic cardiomyopathy ([address] ARVIND (obstructive sleep apnea), S/P left heart catheterization by percutaneous approach, Systolic heart failure ([facility] [address] Type 2 diabetes mellitus with complication, with long-term current use of insulin ([facility] [address] and URI, acute. See details of past medical history and the cardiology notes which is also copied below  1. BMI 42.7 on 1/18/2021  2. Lifelong non smoker  3. Hx HTN  4. HLD  5. T2DM insulin requiring with nephropathy  6. CKD  7. ARVIND attempting to be complaint with C-pap  8. Hx Gout  9. 2/2011 and 12/2011 LH: Normal coronaries  10. 2011 Declined ICD and did not maintain follow up  11. 3/2012 TTE Dr Raymon Kline: EF 30-35% and enlarged LV  12. 7/2013 Cardiac MRI: EF 20% with significant  right ventricular systolic dysfunction and evidence of myocarditis  13. 7/2013 Positive for Geoffry Furl  14. 10/1/2013 Medtronic single lead ICD placement Dr Violet Schmidt  15. 10/31/2013 CT Head-->New decreased attenuation in the left occipital lobe which is thought to likely represent nonacute ischemic change. 16. Chronic HFrEF with recurrent admissions for HF ( in 2013, 2016, 2017, 2018  17. Paroxsymal AF date on onset ? 18. [address] with Xarelto  19. Reported history of hepatomegaly  20. 1/2016 Bilateral carotid Dopplers: 0% to 49% narrowing of both carotid arteries  21. 1/2016 [facility]: EF 10-15%. Severely dilated LV. Moderately dilated LA. Mild PHTN. Mildly dilated RA. Moderately to severely reduced RVSF. Mild to moderate MR.  22. 7/30/2016 Syncopal episode presented to [facility] with normal device interrogation  23. 8/2016 appropriate ICD shock  24. 8/11/2016 villalobos placement due to anasarca  25. 8/2016 + UTI for enterobacter cloacae  26. NYHA FC IIIb ACC/AHA Stage C  27. 10/15/2018 SVT with ICD discharge  28. 11/2018 TTE EF 20%. 29. 11/20/2018 Upgrade to Medtronic Bi-V ICD  30. 12/13/2018 Cardio-Kim's implant  31. 7/2019 TTE: EF 20-25%. Moderately to severely dilated LA. Mild MR. Moderate TR. RVSP 45 mmHg. No pericardial effusions.    32. 7/30/2019 While exercising Sustained monomorphic VT that degenerated into ventricular fibrillation with antitachycardia pacing therapy delivered  during charge.  Successful ICD discharge lead to restoration of sinus rhythm. 33. 7/30/2019 read as old L occiptal CVA  29. 8/1/2019 [facility] Dr Chery Julien: Left main: 0%  stenosis. LAD: 40-50 %  stenosis Circumflex: 30 %   stenosis. RCA: Dominant.  30 %  stenosis. LV angio: not performed. 35. 2/14/2020 Elective cardioversion-->Successful cardioversion to sinus rhythm  36. 3/2020 Palpitations found to be in AF RVR and HFrEF  37. 3/14/2020 started on Tikosyn and converted to SR  38. 3/2020 Torsemide stopped and placed on Bumex 2 mg IV BID  39. 8/5/2020 TTE Dr Rubina Kelley: Left ventricle dilated at 7.1cm. Severe global hypokinesis, LV EF  estimated about 25-30%.  Mild CLVH. Allean Marvel II DD. Left atrium is enlarged. Interatrial septum not well visualized but appears intact. Normal right ventricle size and function. Pacer/ICD lead in RV. No mitral valve prolapse. There is trace aortic regurgitation. There is trace tricuspid regurgitation, RVSP 30mmHg. Normal aortic root size. Trace of pericardial effusion. Pericardium appears normal. No intra cardiac mass or thrombus. Compared to prior echo from 7/31/19 - No significant changes noted  40. L shoulder pain s/p PT 11/2020  41. 12/2020 AF RVR (converted to SR with ICD shock) and sustained VT and ICD shock evaluated by Dr Deirdre Conway  42. 12/15/2020 return to AFL s/p Overdrive pace termination of atrial flutter       Past Surgical History:   has a past surgical history that includes ECHO Compl W Dop Color Flow (11/30/2011); ECHO Compl W Dop Color Flow (03/27/2012); Insertable Cardiac Monitor (12/13/2018); Cardioversion (02/14/2020); Cardiac defibrillator placement (11/20/2018); Cardiac catheterization (08/01/2019); pacemaker placement; and other surgical history (12/15/2020). Home Medications:    Prior to Admission medications    Medication Sig Start Date End Date Taking?  Authorizing Provider   atorvastatin (LIPITOR) 40 MG tablet Take 40 mg by mouth daily   Yes Historical Provider, MD   metFORMIN (GLUCOPHAGE) 1000 MG tablet Take 1,000 mg by mouth 2 times daily (with meals)   Yes Historical Provider, MD   rivaroxaban (XARELTO) 20 MG TABS tablet Take 20 mg by mouth Daily with supper   Yes Historical Provider, MD   isosorbide dinitrate (ISORDIL) 10 MG tablet Take 1 tablet by mouth 3 times daily 1/6/21  Yes NATI Calvert - CNP   nitroGLYCERIN (NITROSTAT) 0.4 MG SL tablet Place 1 tablet under the tongue every 5 minutes as needed for Chest pain 1/6/21  Yes NATI Arreola - CNP   bumetanide (BUMEX) 2 MG tablet Take 1 tablet by mouth 2 times daily 8/31/20  Yes Micah Pillai MD   sacubitril-valsartan (ENTRESTO)  MG per tablet Take 1 tablet by mouth 2 times daily 8/31/20  Yes Micah Pillai MD   spironolactone (ALDACTONE) 25 MG tablet Take 0.5 tablets by mouth daily 8/28/20  Yes Manny Chapman MD   insulin lispro (HUMALOG) 100 UNIT/ML injection vial Inject 20 Units into the skin 3 times daily (before meals) 8/28/20  Yes Manny Chapman MD   LEVEMIR FLEXTOUCH 100 UNIT/ML injection pen Inject 40 Units into the skin 2 times daily 8/28/20  Yes Manny Chapman MD   allopurinol (ZYLOPRIM) 300 MG tablet Take 1 tablet by mouth daily 8/28/20  Yes Manny Chapman MD   dofetilide [facility]) 250 MCG capsule Take 1 capsule by mouth every 12 hours 8/28/20  Yes Manny Chapman MD   magnesium oxide (MAG-OX) 400 MG tablet Take 1 tablet by mouth daily 8/28/20  Yes Manny Chapman MD   carvedilol (COREG) 25 MG tablet Take 1 tablet by mouth 2 times daily (with meals) 8/28/20  Yes Micah Pillai MD       Allergies:  [place] [dapagliflozin]    Social History:   reports that he has never smoked. He has never used smokeless tobacco. He reports previous alcohol use. He reports that he does not use drugs. Family History: family history includes Cancer in his mother; No Known Problems in his father. No h/o sudden cardiac death. No for premature CAD    REVIEW OF SYSTEMS:    · Constitutional: there has been no unanticipated weight loss. There's been No change in energy level, No change in activity level. · Eyes: No visual changes or diplopia. No scleral icterus. · ENT: No Headaches, hearing loss or vertigo. No mouth sores or sore throat. · Cardiovascular: No exertional chest pain, No dyspnea on exertion, Yes palpitations or No loss of consciousness. No cough, hemoptysis, No pleuritic pain, or phlebitis. · Respiratory: No cough or wheezing, no sputum production. No hematemesis. · Gastrointestinal: No abdominal pain, appetite loss, blood in stools. No change in bowel or bladder habits. · Genitourinary: No dysuria, trouble voiding, or hematuria. · Musculoskeletal:  No gait disturbance, No weakness or joint complaints. · Integumentary: No rash or pruritis. · Neurological: No headache, diplopia, change in muscle strength, numbness or tingling. No change in gait, balance, coordination, mood, affect, memory, mentation, behavior. · Psychiatric: No anxiety, or depression. · Endocrine: No temperature intolerance. No excessive thirst, fluid intake, or urination. No tremor. · Hematologic/Lymphatic: No abnormal bruising or bleeding, blood clots or swollen lymph nodes. · Allergic/Immunologic: No nasal congestion or hives. PHYSICAL EXAM:      /66   Pulse 70   Temp 98.2 °F (36.8 °C)   Resp 20   Ht 6' (1.829 m)   Wt (!) 315 lb (142.9 kg)   SpO2 98%   BMI 42.72 kg/m²    Constitutional and General Appearance: alert, cooperative, no distress and appears stated age  HEENT: PERRL, no cervical lymphadenopathy. No masses palpable. Normal oral mucosa  Respiratory:  · Normal excursion and expansion without use of accessory muscles  · Resp Auscultation: Good respiratory effort. No for increased work of breathing.  On auscultation: clear to auscultation bilaterally  Cardiovascular:  · The apical impulse is laterally displaced  · Heart tones are normal. regular S1 and S2.  · Jugular venous pulsation Normal  · The carotid upstroke is normal in amplitude and contour without delay or bruit  · Peripheral pulses are symmetrical and full  Abdomen:  · No masses or tenderness  · Bowel sounds present  Extremities:  ·  No Cyanosis or Clubbing  ·  Lower extremity edema: No  · Skin: Warm and dry  Neurological:  · Alert and oriented. · Moves all extremities well  · No abnormalities of mood, affect, memory, mentation, or behavior are noted    DATA:    Diagnostics:    EKG: normal sinus rhythm, biventricular pacing  ECHO: reviewed. Ejection fraction: 25%  Stress Test: Obtained and results awaited  Cardiac Angiography: not obtained. Device Evaluation    Make/model Medtronic Claria  Mode DDD   P waves 4.1     mV     Impedance   361      ohms   Pacing threshold 0.75   [email or web address]    msec  R waves   9.5    MV     Impedance 456       ohms   Pacing threshold   0.75 [email or web address]    msec   LV pacing impedance 1140 LV 1 to LV 4 and a pacing threshold of 2.37 V at 1 ms    Pacing percentage  A     60.8%          V   92.2%  High voltage impedance   58 ohms  Battery longevity   2.3 years  Arrhythmias  2 episodes of atrial flutter, first on 1/15/2021 lasting for an hour and 50 minutes terminated with ATP  Last episode on 1/18/2021 lasting 22 minutes at 11:45 PM.  Spontaneously terminated      Evaluation  included   Overall device function is normal  All  device programmable settings were evaluated per above, along with iterative adjustments (capture thresholds) to assess and select the most appropriate final programming for consistent delivery off the appropriate therapy and to verify function of the device.     Labs:   CBC:   Recent Labs     01/19/21 0216   WBC 11.5   HGB 12.0*   HCT 36.7*        BMP:   Recent Labs     01/19/21 0216 01/19/21  0455    134   K 3.7 3.8   CO2 22 23   BUN 31* 33*   CREATININE 1.6* 1.5*   LABGLOM 48 52   GLUCOSE 162* 164*     BNP: No results for input(s): BNP in the last 72 hours. PT/INR:   Recent Labs     01/19/21 0216   PROTIME 21.0*   INR 1.8     APTT:No results for input(s): APTT in the last 72 hours. CARDIAC ENZYMES:  Recent Labs     01/19/21  0216 01/19/21  0455 01/19/21  1112   TROPONINI 0.02 0.02 0.01     FASTING LIPID PANEL:  Lab Results   Component Value Date    HDL 51 05/21/2020    LDLCALC 102 05/21/2020    TRIG 69 05/21/2020     LIVER PROFILE:  Recent Labs     01/19/21  0216 01/19/21  0455   AST 14 14   ALT 16 13   LABALBU 4.5 3.8       IMPRESSION:    Patient Active Problem List   Diagnosis    Obstructive sleep apnea    CKD (chronic kidney disease)    Type 2 diabetes mellitus with complication, with long-term current use of insulin (HCC)    Paroxysmal atrial fibrillation ([address]    Essential hypertension    VHD (valvular heart disease)    Morbid obesity with BMI of 40.0-44.9, adult ([address]    Ventricular arrhythmia    Nonischemic cardiomyopathy ([address]    S/P left pulmonary artery pressure sensor implant placement    Carotid disease, bilateral ([address]    Chronic combined systolic and diastolic congestive heart failure (HCC)    Atrial flutter (HCC)--self terminating or overdrive paced terminated episodes lasting for less than 2 hours in the last 3 days    Chest pain--patient undergoing stress test       RECOMMENDATIONS:    Cardiology work-up as in progress  No electrophysiology work-up or intervention needed  Follow-up in the office as outpatient    Discussed with patient and Nurse.     Melinda Solis MD,FACC

## 2021-01-19 NOTE — ED PROVIDER NOTES
[address]      Pt Name: Rufus Perez  MRN: [id number]  [place] 1980  Date of evaluation: 1/19/2021      CHIEF COMPLAINT       Chief Complaint   Patient presents with    Chest Pain     left side chest pain, intermittent since 2130. pain does not radiate. denies n/v.  denies sob        HPI  Rufus Perez is a 36 y.o. male with a history of CHF with ejection fraction of 25 to 30%, stage II diastolic failure, ICD, atrial fibrillation on Xarelto, CKD, obesity who presents to the emergency department with acute onset chest pain. Patient states that he was sitting when around 1130 last night he began to have left-sided chest pain. States at first it was intermittent and became more constant. Describes it as achy pain that does not radiate. He describes her symptoms as constant, mild to moderate, nothing seems to make them better or worse. He denies any associated shortness of breath, syncope, diaphoresis, nausea or vomiting. He states that the pain feels similar to previous chest pain he had several years ago. He was recently admitted for run of V. tach. Denies any cough, shortness of breath, fevers or chills. Except as noted above the remainder of the review of systems was reviewed and negative. Review of Systems   Constitutional: Negative for activity change, chills, diaphoresis, fatigue and fever. HENT: Negative for rhinorrhea, sore throat and trouble swallowing. Eyes: Negative for photophobia, pain and redness. Respiratory: Negative for apnea, cough, chest tightness, shortness of breath and wheezing. Cardiovascular: Positive for chest pain. Negative for palpitations and leg swelling. Gastrointestinal: Negative for abdominal pain, constipation, diarrhea, nausea and vomiting. Endocrine: Negative for polyuria. Genitourinary: Negative for difficulty urinating and dysuria. Musculoskeletal: Negative for back pain, neck pain and neck stiffness. Skin: Negative for pallor and rash. Neurological: Negative for dizziness, syncope, weakness, light-headedness and numbness. Psychiatric/Behavioral: Negative for confusion. The patient is not nervous/anxious. Physical Exam  Vitals signs and nursing note reviewed. Constitutional:       General: He is not in acute distress. Appearance: He is well-developed. He is obese. Comments: Awake and alert. Sitting in the gurney in no obvious distress. HENT:      Head: Normocephalic and atraumatic. Mouth/Throat:      Mouth: Mucous membranes are moist.      Pharynx: No oropharyngeal exudate. Eyes:      General: No scleral icterus. Pupils: Pupils are equal, round, and reactive to light. Neck:      Musculoskeletal: Normal range of motion and neck supple. Cardiovascular:      Rate and Rhythm: Normal rate and regular rhythm. Heart sounds: Normal heart sounds. No murmur. Comments: 2+ radial and dorsal pedis pulses bilaterally  Pulmonary:      Effort: Pulmonary effort is normal. No respiratory distress. Breath sounds: Normal breath sounds. No wheezing. Abdominal:      Palpations: Abdomen is soft. Tenderness: There is no abdominal tenderness. There is no guarding or rebound. Musculoskeletal: Normal range of motion. General: No tenderness or deformity. Right lower leg: No edema. Left lower leg: No edema. Skin:     General: Skin is warm and dry. Capillary Refill: Capillary refill takes less than 2 seconds. Findings: No rash. Neurological:      General: No focal deficit present. Mental Status: He is alert and oriented to person, place, and time. Cranial Nerves: No cranial nerve deficit. Sensory: No sensory deficit. Motor: No weakness or abnormal muscle tone.    Psychiatric:         Mood and Affect: Mood normal.         Behavior: Behavior normal. Procedures     MDM   This is a 77-year-old male with history of CHF with ejection fraction of 30%, stage II diastolic failure, ICD, coronary artery disease, atrial fibrillation on Xarelto who presents with acute onset left-sided chest pain. In the emergency department the patient is awake and alert, hemodynamically stable, afebrile and in no respiratory distress. EKG shows ventricularly paced rhythm. No acute STEMI. Initial troponin 0.02. Aspirin administered. Improvement in pain after aspirin. Given his significant risk factors heart score of at least moderate if not high risk. Discussed the case with Dr. BUCKNER [facility], who excepts patient for further evaluation in the hospital.  Metabolic panel showed normal electrolytes, creatinine 1.6 slightly worsened from baseline. BNP was 362 no signs of fluid overload on chest x-ray. No signs of pneumonia. He is on Xarelto and not likely pulmonary embolism. Patient admitted for further evaluation of his chest pain.                  --------------------------------------------- PAST HISTORY ---------------------------------------------  Past Medical History:  has a past medical history of Acute CHF ([address] Acute CHF ([address] Acute idiopathic gout of right foot, AF (atrial fibrillation) ([address] Anemia, CAD (coronary artery disease), Cerebral artery occlusion with cerebral infarction ([address] CKD (chronic kidney disease), Gout, HTN (hypertension), Hyperlipidemia, Hypertension, Morbid obesity due to excess calories ([address] Nonischemic cardiomyopathy ([address] Nonischemic cardiomyopathy ([address] ARVIND (obstructive sleep apnea), S/P left heart catheterization by percutaneous approach, Systolic heart failure ([address] Type 2 diabetes mellitus with complication, with long-term current use of insulin ([address] and URI, acute. Past Surgical History:  has a past surgical history that includes ECHO Compl W Dop Color Flow (11/30/2011); ECHO Compl W Dop Color Flow (03/27/2012);  Insertable Cardiac Monitor (12/13/2018); Cardioversion (02/14/2020); Cardiac defibrillator placement (11/20/2018); Cardiac catheterization (08/01/2019); pacemaker placement; and other surgical history (12/15/2020). Social History:  reports that he has never smoked. He has never used smokeless tobacco. He reports previous alcohol use. He reports that he does not use drugs. Family History: family history includes Cancer in his mother; No Known Problems in his father. The patients home medications have been reviewed.     Allergies: Farxiga [dapagliflozin]    -------------------------------------------------- RESULTS -------------------------------------------------    LABS:  Results for orders placed or performed during the hospital encounter of 01/19/21   CBC auto differential   Result Value Ref Range    WBC 11.5 4.5 - 11.5 E9/L    RBC 4.10 3.80 - 5.80 E12/L    Hemoglobin 12.0 (L) 12.5 - 16.5 g/dL    Hematocrit 36.7 (L) 37.0 - 54.0 %    MCV 89.5 80.0 - 99.9 fL    MCH 29.3 26.0 - 35.0 pg    MCHC 32.7 32.0 - 34.5 %    RDW 14.6 11.5 - 15.0 fL    Platelets 973 666 - 650 E9/L    MPV 9.8 7.0 - 12.0 fL    Neutrophils % 70.2 43.0 - 80.0 %    Immature Granulocytes % 0.4 0.0 - 5.0 %    Lymphocytes % 19.3 (L) 20.0 - 42.0 %    Monocytes % 7.9 2.0 - 12.0 %    Eosinophils % 1.9 0.0 - 6.0 %    Basophils % 0.3 0.0 - 2.0 %    Neutrophils Absolute 8.07 (H) 1.80 - 7.30 E9/L    Immature Granulocytes # 0.05 E9/L    Lymphocytes Absolute 2.22 1.50 - 4.00 E9/L    Monocytes Absolute 0.91 0.10 - 0.95 E9/L    Eosinophils Absolute 0.22 0.05 - 0.50 E9/L    Basophils Absolute 0.04 0.00 - 0.20 E9/L   Comprehensive Metabolic Panel   Result Value Ref Range    Sodium 135 132 - 146 mmol/L    Potassium 3.7 3.5 - 5.0 mmol/L    Chloride 96 (L) 98 - 107 mmol/L    CO2 22 22 - 29 mmol/L    Anion Gap 17 (H) 7 - 16 mmol/L    Glucose 162 (H) 74 - 99 mg/dL    BUN 31 (H) 6 - 20 mg/dL    CREATININE 1.6 (H) 0.7 - 1.2 mg/dL    GFR Non-African American 48 >=60 mL/min/1.73 GFR African American 58     Calcium 9.8 8.6 - 10.2 mg/dL    Total Protein 8.2 6.4 - 8.3 g/dL    Alb 4.5 3.5 - 5.2 g/dL    Total Bilirubin 0.5 0.0 - 1.2 mg/dL    Alkaline Phosphatase 127 40 - 129 U/L    ALT 16 0 - 40 U/L    AST 14 0 - 39 U/L   Troponin   Result Value Ref Range    Troponin 0.02 0.00 - 0.03 ng/mL   Protime-INR   Result Value Ref Range    Protime 21.0 (H) 9.3 - 12.4 sec    INR 1.8    Brain Natriuretic Peptide   Result Value Ref Range    Pro- (H) 0 - 125 pg/mL   EKG 12 Lead   Result Value Ref Range    Ventricular Rate 78 BPM    Atrial Rate 78 BPM    P-R Interval 136 ms    QRS Duration 214 ms    Q-T Interval 508 ms    QTc Calculation (Bazett) 579 ms    P Axis 105 degrees    R Axis 180 degrees    T Axis -19 degrees       RADIOLOGY:  XR CHEST PORTABLE   Final Result   Hazy opacification of the right mid to lower lung zone could be artifactual   but is concerning for developing infiltrate. No other significant change. PA and lateral views would be useful for further assessment. EKG: This EKG is signed and interpreted by me. Rate: 78bpm  Rhythm: Atrial states ventricular paced rhythm  Interpretation: Ventricular paced rhythm  Comparison: stable as compared to patient's most recent EKG      ------------------------- NURSING NOTES AND VITALS REVIEWED ---------------------------  Date / Time Roomed:  1/19/2021  1:29 AM  ED Bed Assignment:  14A/14A-14    The nursing notes within the ED encounter and vital signs as below have been reviewed.      Patient Vitals for the past 24 hrs:   BP Temp Pulse Resp SpO2 Height Weight   01/19/21 0255 (!) 100/58 -- 77 14 99 % -- --   01/19/21 0132 104/69 96.7 °F (35.9 °C) 82 16 97 % 6' (1.829 m) (!) 315 lb (142.9 kg)       Oxygen Saturation Interpretation: Normal    ------------------------------------------ PROGRESS NOTES ------------------------------------------    Counseling:  I have spoken with the patient and discussed todays results, in addition to providing specific details for the plan of care and counseling regarding the diagnosis and prognosis. Their questions are answered at this time and they are agreeable with the plan of admission.    --------------------------------- ADDITIONAL PROVIDER NOTES ---------------------------------  Consultations:  Spoke with Dr. Sher Zaragoza. Discussed case. They will admit the patient. This patient's ED course included: a personal history and physicial examination, re-evaluation prior to disposition, multiple bedside re-evaluations, cardiac monitoring and continuous pulse oximetry    This patient has remained hemodynamically stable during their ED course. Diagnosis:  1. Chest pain, unspecified type        Disposition:  Patient's disposition: Admit to telemetry  Patient's condition is stable. Dominic Posey DO  Resident  01/19/21 [id number]  ATTENDING PROVIDER ATTESTATION:     I have personally performed and/or participated in the history, exam, medical decision making, and procedures and agree with all pertinent clinical information. I have also reviewed and agree with the past medical, family and social history unless otherwise noted. I have discussed this patient in detail with the resident, and provided the instruction and education regarding chest pain. My findings/Plan: I was Primary provider for patient. Patient presenting here because of chest pain. Patient reports around 9 PM.  Patient reports some intermittent but the last episode lasted for over an hour. He states he does have arm pain but he states he also has history of bursitis. There is some shortness of breath he reports no abdominal pain. He reports no vomiting or diarrhea reports no leg pain or swelling. Patient does have history of pacemaker he is on Xarelto has not missed any dosages. Patient had heart cath in 2019 that showed some cardiac disease.   Patient heart and lung exam normal abdomen is soft and nontender patient has no noted edema patient has normal strength. Labs and EKG noted and reviewed EKG is paced rhythm. Patient's vital signs remained stable here in the emergency department. Patient made aware of findings and results and plan for admission we did speak to Dr. Kristy Florez and he will admit.   Resident will be notified       Josias Burnham MD  01/19/21 [id number] Morro Berry MD  01/19/21 0753

## 2021-01-19 NOTE — CONSULTS
Inpatient Cardiology Consultation      Reason for Consult:  Chest Pain    Consulting Physician: Dr Beltran Care    Requesting Physician:  Dr Seema Ortiz    Date of Consultation: 1/19/2021    HISTORY OF PRESENT ILLNESS: 37 yo AAm known to Dr Wai Maxwell last seen in office by Nickie DAMIAN on 1/6/2021. Also previously known to Dr Francheska Yuen and follows with heart failure clinic. PMH: Poor health literacy, NICMP, ICD in 2013 upgraded to BI-V ICD in 2018, history of appropriate VT ICD shocks, AF/AFL s/p DCCV, OAC with Xarelto, HTN, HLD, Insulin requiring DM (poorly controlled), ARVIND complaint with C-pap,  Chronic HFrEF s/p Cardio-Mem's in 12/2018, mild CAD on [facility] in 2019, and CVA. Was at [facility] 1/18/2021 with is aunt (won 40.00). After coming home from [facility] sat down took his evening medications and then developed CP radiating across chest with no associated symptoms. Took an extra Isordil (thought that was what he was to do with CP after his office visit 1/6/2021). He never received a prescription for Nitroglycerin SL (per patient). CP lasted approximately 30 minutes then resolved. Then heart felt like its beating really hard but no CP. BP upon arrival 104/69 HR 80's Paced on  Monitor. Current BP 85/51 HR 70's. Denies any recent shocks from his ICD. Na 134, K + 3.8, Bun/Cr 31/1.6-->33/1.5 (Bun/Cr 29/1.3 on 12/28/2020), p- ( 433 on 12/28/2020), troponin 0.02 x 2, HgbA1c 9.3, LFT's WNL, Hgb 12.0. Placed on NSS at 100/hr also received  mg, Bumex, insulin, Xarelto ans metformin were held. Apparent s/s of CHF, and currently denies any CP. Please note: past medical records were reviewed per electronic medical record (EMR) - see detailed reports under Past Medical/ Surgical History. Past Medical/Surgical History:    1. BMI 42.7 on 1/18/2021  2. Lifelong non smoker  3. Hx HTN  4. HLD  5. T2DM insulin requiring with nephropathy  6. CKD  7. ARVIND attempting to be complaint with C-pap  8.  Hx Gout  9. 2/2011 and 12/2011 [facility]: Normal coronaries  10. 2011 Declined ICD and did not maintain follow up  11. 3/2012 TTE Dr Arpita Brice: EF 30-35% and enlarged LV  12. 7/2013 Cardiac MRI: EF 20% with significant  right ventricular systolic dysfunction and evidence of myocarditis  13. 7/2013 Positive for Ady Drew  14. 10/1/2013 Medtronic single lead ICD placement Dr Steve Morel  15. 10/31/2013 CT Head-->New decreased attenuation in the left occipital lobe which is thought to likely represent nonacute ischemic change. 16. Chronic HFrEF with recurrent admissions for HF ( in 2013, 2016, 2017, 2018  17. Paroxsymal AF date on onset ? 18. [address] with Xarelto  19. Reported history of hepatomegaly  20. 1/2016 Bilateral carotid Dopplers: 0% to 49% narrowing of both carotid arteries  21. 1/2016 TTE [facility]: EF 10-15%. Severely dilated LV. Moderately dilated LA. Mild PHTN. Mildly dilated RA. Moderately to severely reduced RVSF. Mild to moderate MR.  22. 7/30/2016 Syncopal episode presented to [facility] with normal device interrogation  23. 8/2016 appropriate ICD shock  24. 8/11/2016 villalobos placement due to anasarca  25. 8/2016 + UTI for enterobacter cloacae  26. NYHA FC IIIb ACC/AHA Stage C  27. 10/15/2018 SVT with ICD discharge  28. 11/2018 TTE EF 20%. 29. 11/20/2018 Upgrade to Medtronic Bi-V ICD  30. 12/13/2018 Cardio-Kim's implant  31. 7/2019 TTE: EF 20-25%. Moderately to severely dilated LA. Mild MR. Moderate TR. RVSP 45 mmHg. No pericardial effusions. 32. 7/30/2019 While exercising Sustained monomorphic VT that degenerated into ventricular fibrillation with antitachycardia pacing therapy delivered  during charge. Successful ICD discharge lead to restoration of sinus rhythm. 35. 7/30/2019 read as old L occiptal CVA  29. 8/1/2019 [facility] Dr Nanda Muñoz: Left main: 0%  stenosis. LAD: 40-50 %  stenosis Circumflex: 30 %   stenosis. RCA: Dominant. 30 %  stenosis. LV angio: not performed.   35. 2/14/2020 Elective cardioversion-->Successful daily 8/31/20  Yes Raymundo Alvarez MD   spironolactone (ALDACTONE) 25 MG tablet Take 0.5 tablets by mouth daily 8/28/20  Yes Trip Reeves MD   insulin lispro (HUMALOG) 100 UNIT/ML injection vial Inject 20 Units into the skin 3 times daily (before meals) 8/28/20  Yes Trip Reeves MD   LEVEMIR FLEXTOUCH 100 UNIT/ML injection pen Inject 40 Units into the skin 2 times daily 8/28/20  Yes Trip Reeves MD   allopurinol (ZYLOPRIM) 300 MG tablet Take 1 tablet by mouth daily 8/28/20  Yes Trip Reeves MD   dofetilide [facility]) 250 MCG capsule Take 1 capsule by mouth every 12 hours 8/28/20  Yes Trip Reeves MD   magnesium oxide (MAG-OX) 400 MG tablet Take 1 tablet by mouth daily 8/28/20  Yes Trip Reeves MD   carvedilol (COREG) 25 MG tablet Take 1 tablet by mouth 2 times daily (with meals) 8/28/20  Yes Raymundo Alvarez MD       Current Medications:    Current Facility-Administered Medications: atorvastatin (LIPITOR) tablet 40 mg, 40 mg, Oral, Daily  [Held by provider] bumetanide (BUMEX) tablet 2 mg, 2 mg, Oral, BID  carvedilol (COREG) tablet 25 mg, 25 mg, Oral, BID WC  dofetilide (TIKOSYN) capsule 250 mcg, 250 mcg, Oral, 2 times per day  [Held by provider] insulin lispro (HUMALOG) injection vial 20 Units, 20 Units, Subcutaneous, TID AC  isosorbide dinitrate (ISORDIL) tablet 10 mg, 10 mg, Oral, TID  [Held by provider] insulin detemir (LEVEMIR) injection pen 40 Units, 40 Units, Subcutaneous, BID  magnesium oxide (MAG-OX) tablet 400 mg, 400 mg, Oral, Daily  [Held by provider] metFORMIN (GLUCOPHAGE) tablet 1,000 mg, 1,000 mg, Oral, BID   nitroGLYCERIN (NITROSTAT) SL tablet 0.4 mg, 0.4 mg, Sublingual, Q5 Min PRN  [Held by provider] rivaroxaban (XARELTO) tablet 20 mg, 20 mg, Oral, Daily  sacubitril-valsartan (ENTRESTO)  MG per tablet 1 tablet, 1 tablet, Oral, BID  spironolactone (ALDACTONE) tablet 12.5 mg, 12.5 mg, Oral, Daily  sodium chloride flush 0.9 % injection 10 mL, 10 mL, Intravenous, 2 times per day  sodium chloride flush 0.9 % injection 10 mL, 10 mL, Intravenous, PRN  polyethylene glycol (GLYCOLAX) packet 17 g, 17 g, Oral, Daily PRN  acetaminophen (TYLENOL) tablet 650 mg, 650 mg, Oral, Q6H PRN **OR** acetaminophen (TYLENOL) suppository 650 mg, 650 mg, Rectal, Q6H PRN  potassium chloride 10 mEq/100 mL IVPB (Peripheral Line), 10 mEq, Intravenous, PRN  0.9 % sodium chloride infusion, , Intravenous, Continuous  glucose (GLUTOSE) 40 % oral gel 15 g, 15 g, Oral, PRN  dextrose 50 % IV solution, 12.5 g, Intravenous, PRN  glucagon (rDNA) injection 1 mg, 1 mg, Intramuscular, PRN  dextrose 5 % solution, 100 mL/hr, Intravenous, PRN  regadenoson (LEXISCAN) injection 0.4 mg, 0.4 mg, Intravenous, ONCE PRN    Allergies:  Farxiga [dapagliflozin] throat swelled    Social History:    Lifelong non smoker  ETOH: rarely  Illicit Drugs: Denies  Caffeine: Denies  Activity: Lives in 2nd floor apartment (elevator). Does not drive, on disability. Family History: Mother  at the age of 48 from complications of stomach cancer. + HTN  Father alive age 72 with HLD no reported CAD or PPM/ICD    Sister alive no DM or CAD  He has no children    REVIEW OF SYSTEMS:     · Constitutional: Denies fatigue, fevers, chills or night sweats  · Eyes: Denies visual changes or drainage  · ENT: Denies headaches or hearing loss. No mouth sores or sore throat. No epistaxis   · Cardiovascular: + heart beating hard. Denies chest pain, pressure or palpitations. No lower extremity swelling. · Respiratory: Denies CLAYTON, cough, orthopnea or PND. No hemoptysis   · Gastrointestinal: Denies hematemesis or anorexia. No hematochezia or melena    · Genitourinary: Denies urgency, dysuria or hematuria. · Musculoskeletal: Denies gait disturbance, weakness or joint complaints  · Integumentary: Denies rash, hives or pruritis   · Neurological: Denies dizziness, headaches or seizures.  No numbness or tingling  · Psychiatric: Denies anxiety or depression. · Endocrine: Denies temperature intolerance. No recent weight change. .  · Hematologic/Lymphatic: Denies abnormal bruising or bleeding. No swollen lymph nodes    PHYSICAL EXAM:   /66   Pulse 70   Temp 98.2 °F (36.8 °C) (Oral)   Resp 20   Ht 6' (1.829 m)   Wt (!) 315 lb (142.9 kg)   SpO2 98%   BMI 42.72 kg/m²   CONST:  Well developed, obese AAM who appears of stated age. Awake, alert and cooperative. No apparent distress. HEENT:   Head- Normocephalic, atraumatic   Eyes- Conjunctivae pink, anicteric  Throat- Oral mucosa pink and moist  Neck-  No stridor, trachea midline, no jugular venous distention. No carotid bruit. CHEST: Chest symmetrical and non-tender to palpation. No accessory muscle use or intercostal retractions. L chest ICD in-situ. RESPIRATORY: Lung sounds - clear throughout fields   CARDIOVASCULAR:     Heart Ausculation- Regular rate and rhythm, no murmur. No s3, s4 or rub   PV: No lower extremity edema. No varicosities. Pedal pulses palpable, no clubbing or cyanosis   ABDOMEN: Soft, obese, non-tender to light palpation. Bowel sounds present. No palpable masses; no abdominal bruit  MS: Good muscle strength and tone. No atrophy or abnormal movements. : Deferred  SKIN: Warm and dry no statis dermatitis or ulcers   NEURO / PSYCH: Oriented to person, place and time. Speech clear and appropriate. Follows all commands. Pleasant affect     DATA:    ECG as per Dr Dave Juan interpretation  Tele strips: V-paced    Diagnostic:    CXR 1/19/2021: Hazy opacification of the right mid to lower lung zone could be artifactual but is concerning for developing infiltrate.  No other significant change. PA and lateral views would be useful for further assessment    2 View CXR 1/19/2021: Minimal right basal atelectasis or early infiltrate from pneumonia.  Continued follow-up recommended      Labs:   CBC:   Recent Labs     01/19/21  0216   WBC 11.5   HGB 12.0*   HCT 36.7*        BMP: Recent Labs     01/19/21 0216 01/19/21  0455    134   K 3.7 3.8   CO2 22 23   BUN 31* 33*   CREATININE 1.6* 1.5*   LABGLOM 48 52   CALCIUM 9.8 9.0     HgA1c:   Lab Results   Component Value Date    LABA1C 9.3 (H) 01/19/2021     proBNP:   Recent Labs     01/19/21 0216   PROBNP 362*     PT/INR:   Recent Labs     01/19/21 0216   PROTIME 21.0*   INR 1.8     CARDIAC ENZYMES:  Recent Labs     01/19/21 0216 01/19/21  0455   TROPONINI 0.02 0.02     FASTING LIPID PANEL:  Lab Results   Component Value Date    CHOL 167 05/21/2020    HDL 51 05/21/2020    LDLCALC 102 05/21/2020    TRIG 69 05/21/2020     LIVER PROFILE:  Recent Labs     01/19/21 0216 01/19/21  0455   AST 14 14   ALT 16 13   LABALBU 4.5 3.8   A&P per Dr Rosa Lopes  Electronically signed by Nahomy Leach. SANJUANA Worley on 1/19/2021 at 11:15 AM     ______________________________________________________________________________  I independently interviewed and examined the patient. I have reviewed the above documentation completed by the TRESSA. Please see my additional contributions to the HPI, physical exam, and assessment / medical decision making. HPI, ROS, PMH, PSH, [address], [place], and medications independently reviewed (agree; see above documentation)    History of Present Illness:  Currently with no chest pain, respiratory distress, or palpitations. Paced rhythm on EKG and telemetry.     Review of Systems:   Cardiac: As per HPI  General: No fever, chills  Pulmonary: As per HPI  HEENT: No visual disturbances, difficult swallowing  GI: No nausea, vomiting  : No dysuria, hematuria  Endocrine: No thyroid disease, +DM  Musculoskeletal: SILVA x 4, no focal motor deficits  Skin: Intact, no rashes  Neuro: No headache, seizures  Psych: Currently with no depression, anxiety    Physical Exam:  /88   Pulse 74   Temp 96.7 °F (35.9 °C) (Temporal)   Resp 18   Ht 6' (1.829 m)   Wt (!) 315 lb (142.9 kg)   SpO2 98%   BMI 42.72 kg/m²   Wt Readings from Last 3 Encounters: 01/19/21 (!) 315 lb (142.9 kg)   01/06/21 (!) 314 lb (142.4 kg)   12/28/20 (!) 315 lb (142.9 kg)     Appearance: Awake, alert, no acute respiratory distress  Skin: Intact, no rash  Head: Normocephalic, atraumatic  Eyes: EOMI, no conjunctival erythema  ENMT: No pharyngeal erythema, MMM, no rhinorrhea  Neck: Supple, no elevated JVP, no carotid bruits  Lungs: Clear to auscultation bilaterally. No wheezes, rales, or rhonchi.   Cardiac: Regular rate and rhythm, +S1S2, no murmurs apparent  Abdomen: Soft, nontender, +bowel sounds  Extremities: Moves all extremities x 4, no lower extremity edema  Neurologic: No focal motor deficits apparent, normal mood and affect  Peripheral Pulses: Intact posterior tibial pulses bilaterally    Laboratory Tests:  Recent Labs     01/19/21  0216 01/19/21  0455    134   K 3.7 3.8   CL 96* 97*   CO2 22 23   BUN 31* 33*   CREATININE 1.6* 1.5*   GLUCOSE 162* 164*   CALCIUM 9.8 9.0     Lab Results   Component Value Date    MG 1.1 01/19/2021     Recent Labs     01/19/21  0216 01/19/21  0455   ALKPHOS 127 115   ALT 16 13   AST 14 14   PROT 8.2 7.1   BILITOT 0.5 0.4   LABALBU 4.5 3.8     Recent Labs     01/19/21 0216   WBC 11.5   RBC 4.10   HGB 12.0*   HCT 36.7*   MCV 89.5   MCH 29.3   MCHC 32.7   RDW 14.6      MPV 9.8     Lab Results   Component Value Date    CKTOTAL 186 08/14/2016    CKMB 2.1 11/25/2013    TROPONINI 0.01 01/19/2021    TROPONINI 0.02 01/19/2021    TROPONINI 0.02 01/19/2021     Lab Results   Component Value Date    TSH 1.270 02/24/2020     Lab Results   Component Value Date    LABA1C 9.3 (H) 01/19/2021     No results found for: EAG  Lab Results   Component Value Date    CHOL 167 05/21/2020    CHOL 128 02/24/2020    CHOL 191 06/17/2019     Lab Results   Component Value Date    TRIG 69 05/21/2020    TRIG 73 02/24/2020    TRIG 169 (H) 06/17/2019     Lab Results   Component Value Date    HDL 51 05/21/2020    HDL 42 02/24/2020    HDL 48 06/17/2019     Lab Results

## 2021-01-21 RX ORDER — DOFETILIDE 0.25 MG/1
250 CAPSULE ORAL EVERY 12 HOURS SCHEDULED
Qty: 60 CAPSULE | Refills: 3 | Status: SHIPPED
Start: 2021-01-21 | End: 2021-05-06

## 2021-01-22 ENCOUNTER — TELEPHONE (OUTPATIENT)
Dept: CARDIOLOGY CLINIC | Age: 41
End: 2021-01-22

## 2021-01-22 NOTE — TELEPHONE ENCOUNTER
CardioMEM update:    Greg Record was discharged from the hospital on 1/19/2021 for complaints of chest pain following a nonischemic stress. He denies any return of chest pain since discharge from the hospital.     He has not laid on his cardioMEMS pillow in several days. He denies any shortness of breath, orthopnea, pnd or lower extremity edema. He will lay on cardiomems pillow today and we will continue to monitor.

## 2021-01-25 ENCOUNTER — OFFICE VISIT (OUTPATIENT)
Dept: FAMILY MEDICINE CLINIC | Age: 41
End: 2021-01-25
Payer: MEDICARE

## 2021-01-25 VITALS
DIASTOLIC BLOOD PRESSURE: 69 MMHG | BODY MASS INDEX: 42.66 KG/M2 | SYSTOLIC BLOOD PRESSURE: 101 MMHG | TEMPERATURE: 97.6 F | WEIGHT: 315 LBS | OXYGEN SATURATION: 100 % | HEART RATE: 66 BPM | RESPIRATION RATE: 18 BRPM | HEIGHT: 72 IN

## 2021-01-25 DIAGNOSIS — E11.8 TYPE 2 DIABETES MELLITUS WITH COMPLICATION, WITH LONG-TERM CURRENT USE OF INSULIN (HCC): Chronic | ICD-10-CM

## 2021-01-25 DIAGNOSIS — M75.42 IMPINGEMENT SYNDROME OF SHOULDER REGION, LEFT: ICD-10-CM

## 2021-01-25 DIAGNOSIS — I65.23 BILATERAL CAROTID ARTERY STENOSIS: ICD-10-CM

## 2021-01-25 DIAGNOSIS — I10 ESSENTIAL HYPERTENSION: ICD-10-CM

## 2021-01-25 DIAGNOSIS — N18.9 CHRONIC KIDNEY DISEASE, UNSPECIFIED CKD STAGE: ICD-10-CM

## 2021-01-25 DIAGNOSIS — E66.01 MORBID OBESITY WITH BMI OF 40.0-44.9, ADULT (HCC): ICD-10-CM

## 2021-01-25 DIAGNOSIS — Z79.4 TYPE 2 DIABETES MELLITUS WITH COMPLICATION, WITH LONG-TERM CURRENT USE OF INSULIN (HCC): Chronic | ICD-10-CM

## 2021-01-25 DIAGNOSIS — Z11.59 NEED FOR HEPATITIS C SCREENING TEST: ICD-10-CM

## 2021-01-25 DIAGNOSIS — I48.0 PAROXYSMAL ATRIAL FIBRILLATION (HCC): Primary | ICD-10-CM

## 2021-01-25 DIAGNOSIS — I42.8 NONISCHEMIC CARDIOMYOPATHY (HCC): ICD-10-CM

## 2021-01-25 DIAGNOSIS — I50.42 CHRONIC COMBINED SYSTOLIC AND DIASTOLIC CONGESTIVE HEART FAILURE (HCC): ICD-10-CM

## 2021-01-25 PROBLEM — I48.92 ATRIAL FLUTTER (HCC): Status: RESOLVED | Noted: 2020-12-15 | Resolved: 2021-01-25

## 2021-01-25 LAB
CREATININE URINE: 60 MG/DL (ref 40–278)
MICROALBUMIN UR-MCNC: <12 MG/L
MICROALBUMIN/CREAT UR-RTO: ABNORMAL (ref 0–30)

## 2021-01-25 PROCEDURE — 99214 OFFICE O/P EST MOD 30 MIN: CPT | Performed by: FAMILY MEDICINE

## 2021-01-25 PROCEDURE — 99212 OFFICE O/P EST SF 10 MIN: CPT | Performed by: FAMILY MEDICINE

## 2021-01-25 ASSESSMENT — PATIENT HEALTH QUESTIONNAIRE - PHQ9
SUM OF ALL RESPONSES TO PHQ9 QUESTIONS 1 & 2: 0
1. LITTLE INTEREST OR PLEASURE IN DOING THINGS: 0
SUM OF ALL RESPONSES TO PHQ QUESTIONS 1-9: 0
2. FEELING DOWN, DEPRESSED OR HOPELESS: 0

## 2021-01-25 NOTE — PROGRESS NOTES
[facility]  FAMILY MEDICINE RESIDENCY PROGRAM   OFFICE PROGRESS NOTE  DATE OF VISIT : 1/25/2021         Chief Complaint :   Chief Complaint   Patient presents with    Follow-Up from Hospital     irregular heart beat     Diabetes       HPI:   Zulma Florian III comes to clinic today for     F/U of chronic problem(s) and new or recent complaint of Left shoulder pain     Chronic problems reviewed today include:     Hypertension, Diabetes mellitus, Congestive Heart Failure, Chronic kidney disease and Nonischemic cardiomyopathy    Current status of this/these condition(s):  stable    Tolerating meds: Yes    Additional history: Old and is back on all of his previous medications after release from the hospital for acute chest pain. He was seen by cardiology and underwent a nuclear scan and stress test.  These did show significant deficits in wall motion and some fixed defects in a portion of the cardiac muscle, but these changes were not felt to be new. Cardiology felt that he could be discharged to home for outpatient follow-up. Since then, he has had no further chest pains. He denies any increase in shortness of breath or swelling. He is feeling generally well. He does not feel palpitations at this time. He continues to have a lot of left shoulder pain, particularly with abduction and elevation. He has been going to physical therapy, but it has not helped a lot. He is interested in this time for possible injection. Objective:    VS:  Blood pressure 101/69, pulse 66, temperature 97.6 °F (36.4 °C), temperature source Temporal, resp. rate 18, height 6' (1.829 m), weight (!) 315 lb (142.9 kg), SpO2 100 %. General Appearance: Well developed, awake, alert, oriented, no acute distress  Chest wall/Lung: Clear to auscultation bilaterally,  respirations unlabored. No rhonchi/wheezing/rales  Heart[de-identified]  Irregular rate and rhythm, S1and S2 muffled, no murmur, rub or gallop.   Extremities:   Left shoulder with minimal tenderness in the posterior and lateral aspect. Painful range of motion, with abduction and elevation. Normal strength and sensation distally. Skin: Skin color, texture, turgor normal, no rashes or lesions  Foot: Normal pulses, no skin changes, normal sensation by monofilament. The right great toe demonstrates minor ingrown nail with a small amount of discharge. It is not tender or particularly swollen. Musculoskeletal: ROM grossly normal in all joints of extremities, no obvious joint swelling. Neurologic:    Alert, oriented. Normal gait and coordination   No focal neurologic deficits noted. Psychiatric: has a normal mood and affect. Behavior is normal.     I independently reviewed the following information:  lab reports and radiology reports    1. Paroxysmal atrial fibrillation (HCC)  2. Essential hypertension  -     Basic Metabolic Panel; Future  3. [facility]mic [facility]  4. Bilateral carotid artery stenosis  5. Chronic combined systolic and diastolic congestive heart failure ([facility]  6. Type 2 diabetes mellitus with complication, with long-term current use of insulin ([facility])  -     Microalbumin / Creatinine Urine Ratio; Future  -      DIABETES FOOT EXAM  7. Morbid obesity with BMI of 40.0-44.9, adult ([facility]  8. Chronic kidney disease, unspecified CKD stage  9. Need for hepatitis C screening test  -     Hepatitis C Antibody; Future  10. Impingement syndrome of shoulder region, left      Additional Plan: I reviewed all records from his recent hospitalization. He seems to be doing quite well from the standpoint of his chest pain and dyspnea. However, he is currently having an irregular rhythm. The rate is normal, and I believe that he can wait until his appointment later this week with cardiology. It is been very difficult to continue him in a normal sinus rhythm even with a pacemaker. I gave him instructions in care of his toenails.   He clearly cuts them back too far and this is likely the reason for his ingrown nail. Since it is not painful to him at this time he would prefer to seek care from a podiatrist.  I am obtaining laboratory testing on him. He is to make sure that he keeps his appointment with his cardiologist that is upcoming this week. He should also continue to watch his diet carefully and monitor his sugars. Further recommendations will be based on results of his testing. He was asked to schedule an appointment to have an injection into his left shoulder. He understands the risks and potential complications of this procedure. He can have this as soon as next week if he is able to schedule the appointment. On this date 01/25/21 I have spent 34 minutes reviewing previous notes, test results and face to face with the patient discussing the diagnosis and importance of compliance with the treatment plan as well as documenting on the day of the visit. RTO in in 3 month(s) or sooner for any persistent, new, or worsening symptoms. Please see Patient Instructions for further counseling and information given. Advised to be adherent to the treatment plans discussed today, and please call with any questions or concerns, letting the office know of any reasons that the plans may not be followed. The risks of untreated conditions include worsening illness, injury, disability, and possibly, death. Please call if symptoms change in any way, worsen, or fail to completely resolve, as this could necessitate a change to treatment plans. Patient and/or caregiver expressed understanding. Indications and proper use of medication(s) reviewed. Potential side-effects and risks of medication(s) also explained. Patient and/or caregiver was instructed to call if any new symptoms develop prior to next visit. Health risk factors discussed and addressed.     Signed by : Jessy Walden M.D.

## 2021-01-28 ENCOUNTER — TELEPHONE (OUTPATIENT)
Dept: CARDIOLOGY CLINIC | Age: 41
End: 2021-01-28

## 2021-01-28 NOTE — TELEPHONE ENCOUNTER
Just spoke with pt who said he was waiting on his ride to come. I told him it's been over an hour and I'd reschedule the appointment. He said transportation will be an issue next week too so I made him a phone visit.

## 2021-02-04 ENCOUNTER — VIRTUAL VISIT (OUTPATIENT)
Dept: CARDIOLOGY CLINIC | Age: 41
End: 2021-02-04
Payer: MEDICAID

## 2021-02-04 DIAGNOSIS — Z95.810 S/P ICD (INTERNAL CARDIAC DEFIBRILLATOR) PROCEDURE: ICD-10-CM

## 2021-02-04 DIAGNOSIS — G47.33 OBSTRUCTIVE SLEEP APNEA: ICD-10-CM

## 2021-02-04 DIAGNOSIS — I10 ESSENTIAL HYPERTENSION: ICD-10-CM

## 2021-02-04 DIAGNOSIS — I48.0 PAROXYSMAL ATRIAL FIBRILLATION (HCC): ICD-10-CM

## 2021-02-04 DIAGNOSIS — R07.9 CHEST PAIN, UNSPECIFIED TYPE: ICD-10-CM

## 2021-02-04 DIAGNOSIS — Z95.9 S/P LEFT PULMONARY ARTERY PRESSURE SENSOR IMPLANT PLACEMENT: ICD-10-CM

## 2021-02-04 DIAGNOSIS — I42.8 NONISCHEMIC CARDIOMYOPATHY (HCC): Primary | ICD-10-CM

## 2021-02-04 DIAGNOSIS — E66.01 MORBID OBESITY DUE TO EXCESS CALORIES (HCC): ICD-10-CM

## 2021-02-04 PROCEDURE — 99443 PR PHYS/QHP TELEPHONE EVALUATION 21-30 MIN: CPT | Performed by: NURSE PRACTITIONER

## 2021-02-04 RX ORDER — ISOSORBIDE DINITRATE 10 MG/1
20 TABLET ORAL 3 TIMES DAILY
Qty: 180 TABLET | Refills: 3 | Status: SHIPPED
Start: 2021-02-04 | End: 2021-04-27

## 2021-02-04 NOTE — PROGRESS NOTES
[facility]   TELEHEALTH EVALUATION -- Audio/Visual (During JQPIO-52 public health emergency)      Reason for Visit: Heart failure      Primary Cardiologist: Dr. Ash Golden  EP Cardiologist: Dr. Nichole Gastelum  [facility] Cardiologist: Dr. Jeannette Collins [facility])        History of Present Illness:     Mr. Arash Burrows is a 36year old  male with a PMHx of long standing nonischemic dilated cardiomyopathy that was dx at age 27, chronic HFrEF, cardioMEMS in situ, CRT-D in situ, PAF, T2DM, iron deficiency, obesity, ARVIND. He was seen in office by Edie Holland on 1/6/2021 with complaints of intermittent chest pain, at that time he was initiated on isosorbide and given a prescription for sublingual nitroglycerin. He presented to the emergency room on 1/19/2021 with ongoing complaints of chest pain and was evaluated by cardiology, troponin <0.01 x 3, no acute ischemic EKG changes, and he underwent NM stress that was nonischemic. He was discharged home with subjective improvement. He has been intermittently laying on CardioMEMS pillow (once maybe twice weekly) he denies any increased shortness of breath, orthopnea, PND, abdominal bloating or lower extremity edema. He was seen by Dr. Jeannette Collins and within the last week and was placed on a diet to promote weight loss. Since hospitalization he reports that he continues to intermittently have chest discomfort that occurs when laying down at night. No association to activity. States that the discomfort will start under his right arm and then radiate into the center of his chest.  He has noted decreased frequency since initiation of isosorbide 1 month ago, however continues to intermittently have discomfort. He denies any recent ICD shocks, dizziness, lightheadedness, falls, near syncope or syncope. He intermittently uses his CPAP. He has good urinary response to oral bumetanide with clear yellow urine production and stable weight around 308 LB. Patient Active Problem List    Diagnosis Date Noted    BMI 40.0-44.9, adult ([facility] 02/02/2021    Chest pain 01/19/2021    Chronic combined systolic and diastolic congestive heart failure ([facility] 03/18/2020    Carotid disease, bilateral ([facility] 03/14/2020       Bilateral carotid duplex January 2016, reportedly showed no      hemodynamically significant stenosis with 0% to 49% narrowing of both      carotid arteries  Reported history of ischemic left middle cerebral artery stroke      (further details unknown)      Ventricular arrhythmia 08/01/2019    Nonischemic cardiomyopathy ([facility]      Raven-Willson virus at age 32    Cardiac catheterization February and December of 2011:  Patent coronary      arteries. EF 10% to 15%. Patient reportedly did receive an ICD vest at      that time, however, did not return for a regular followup. 2.    Echo March 2012:  EF 30% to 80%, stage 2 diastolic dysfunction, septal      hypokinesia, mild LVH, left atrium mildly-moderately dilated, mild MR.      RVSP of 25-30. Trace PI.   3.    Cardiac MRI July 2013:  EF 20%   Echo 01/24/2016 [facility]):  LV severely dilated. LA      moderately dilated. Mild PHTN. Mild TR. Trace TR. Pacer wire visible      in the right atrium and ventricle. RA mildly dilated. EF 10% to 15%. Class 3 diastolic dysfunction. RV moderately dilated. RV systolic      function moderate-severely reduced.   Mild-moderate MR.   15.   Echo March 2012 (Dr. Arpita Brice):  LVEF 30% to 35% and enlarged LV      S/P left pulmonary artery pressure sensor implant placement     S/P ICD (internal cardiac defibrillator) procedure 11/20/2018     Status post single chamber ICD implant, Medtronic Evera [id number],      September 30, 2013, for nonischemic cardiomyopathy (single chamber)      Morbid obesity due to excess calories ([facility] 08/22/2018    Paroxysmal atrial fibrillation ([facility] 01/20/2017    Essential hypertension 01/20/2017  VHD (valvular heart disease) 01/20/2017    Hepatomegaly 12/01/2016    Type 2 diabetes mellitus with complication, with long-term current use of insulin ([facility] 08/22/2016    Chronic gout 08/13/2016    CKD (chronic kidney disease)     Obstructive sleep apnea 11/29/2011     Past Medical/Surgical History:    1. BMI 42.7 on 1/18/2021  2. Lifelong non smoker  3. Hx HTN  4. HLD  5. T2DM insulin requiring with nephropathy  6. CKD  7. ARVIND attempting to be complaint with C-pap  8. Hx Gout  9. 2/2011 and 12/2011 [facility]: Normal coronaries  10. 2011 Declined ICD and did not maintain follow up  11. 3/2012 TTE Dr Geovani Norwood: EF 30-35% and enlarged LV  12. 7/2013 Cardiac MRI: EF 20% with significant  right ventricular systolic dysfunction and evidence of myocarditis  13. 7/2013 Positive for Jackson Due  14. 10/1/2013 Medtronic single lead ICD placement Dr Colletta Sevin  15. 10/31/2013 CT Head-->New decreased attenuation in the left occipital lobe which is thought to likely represent nonacute ischemic change. 16. Chronic HFrEF with recurrent admissions for HF ( in 2013, 2016, 2017, 2018  17. Paroxsymal AF date on onset ? 18. [facility] [place]  19. Reported history of hepatomegaly  20. 1/2016 Bilateral carotid Dopplers: 0% to 49% narrowing of both carotid arteries  21. 1/2016 TTE [facility]: EF 10-15%. Severely dilated LV. Moderately dilated LA. Mild PHTN. Mildly dilated RA. Moderately to severely reduced RVSF. Mild to moderate MR.  22. 7/30/2016 Syncopal episode presented to [facility] with normal device interrogation  23. 8/2016 appropriate ICD shock  24. 8/11/2016 villalobos placement due to anasarca  25. 8/2016 + UTI for enterobacter cloacae  26. NYHA FC IIIb ACC/AHA Stage C  27. 10/15/2018 SVT with ICD discharge  28. 11/2018 TTE EF 20%. 29. 11/20/2018 Upgrade to Medtronic Bi-V ICD  30. 12/13/2018 Cardio-Kim's implant  31. 7/2019 TTE: EF 20-25%. Moderately to severely dilated LA. Mild MR. Moderate TR. RVSP 45 mmHg. No pericardial effusions. 32. 2019 While exercising Sustained monomorphic VT that degenerated into ventricular fibrillation with antitachycardia pacing therapy delivered  during charge.  Successful ICD discharge lead to restoration of sinus rhythm. 33. 2019 read as old L occiptal CVA  29. 2019 [facility] Dr Agueda Torres: Left main: 0%  stenosis. LAD: 40-50 %  stenosis Circumflex: 30 %   stenosis. RCA: Dominant.  30 %  stenosis. LV angio: not performed. 35. 2020 Elective cardioversion-->Successful cardioversion to sinus rhythm  36. 3/2020 Palpitations found to be in AF RVR and HFrEF  37. 3/14/2020 started on Tikosyn and converted to SR  38. 3/2020 Torsemide stopped and placed on Bumex 2 mg IV BID  39. 2020 TTE Dr Micki Lux: Left ventricle dilated at 7.1cm. Severe global hypokinesis, LV EF  estimated about 25-30%.  Mild CLVH. Terie Belling II DD. Left atrium is enlarged. Interatrial septum not well visualized but appears intact. Normal right ventricle size and function. Pacer/ICD lead in RV. No mitral valve prolapse. There is trace aortic regurgitation. There is trace tricuspid regurgitation, RVSP 30mmHg. Normal aortic root size. Trace of pericardial effusion. Pericardium appears normal. No intra cardiac mass or thrombus. Compared to prior echo from 19 - No significant changes noted  40. L shoulder pain s/p PT 2020  41. 2020 AF RVR (converted to SR with ICD shock) and sustained VT and ICD shock evaluated by Dr Katie Rondon  42. 12/15/2020 return to AFL s/p Overdrive pace termination of atrial flutter       Social History:    Lifelong non smoker  ETOH: rarely  Illicit Drugs: Denies  Caffeine: Denies  Activity: Lives in 2nd floor apartment (elevator). Does not drive, on disability.      Family History:    Mother  at the age of 48 from complications of stomach cancer. + HTN  Father alive age 72 with HLD no reported CAD or PPM/ICD    Sister alive no DM or CAD  He has no children      Past Medical History:   Diagnosis Date  Acute CHF ([address] 11/29/2011    Acute CHF ([address] 12/26/2011    Acute idiopathic gout of right foot 8/13/2016    AF (atrial fibrillation) ([address] 02/2020    Anemia 11/29/2011    CAD (coronary artery disease)     Cerebral artery occlusion with cerebral infarction (HCC)     CKD (chronic kidney disease)     Gout     HTN (hypertension) 11/29/2011    Hyperlipidemia     Hypertension     Morbid obesity due to excess calories ([address]     Nonischemic cardiomyopathy ([address] 11/29/2011    Nonischemic cardiomyopathy ([address] 7/2013 7/2013- cardiac MRI revealed an LVEF of 20%    ARVIND (obstructive sleep apnea) 11/29/2011    S/P left heart catheterization by percutaneous approach 12-27/2011    Dr Lizzie Olivares Systolic heart failure [facility]) 5/7/2012 5/7/12- cardiac catheterization revealed an LVEF of 10-15%, mitral regurgitation along with borderline prolapse of mitral valve    Type 2 diabetes mellitus with complication, with long-term current use of insulin ([address] 8/22/2016    URI, acute 11/29/2011       Past Surgical History:   Procedure Laterality Date    CARDIAC CATHETERIZATION  08/01/2019    Dr Gloria Marcos  11/20/2018    UPGRADE S-ICD TO BIV ICD   ([employer])  [address]     CARDIOVERSION  02/14/2020    Successful CV of AF to NSR      (Dr. Mykel Duarte)    ECHO COMPL W DOP COLOR FLOW  11/30/2011         ECHO COMPL W DOP COLOR FLOW  03/27/2012         INSERTABLE CARDIAC MONITOR  12/13/2018    cardioMIGUELINA luke, Dr. Calli Mendoza HISTORY  12/15/2020    SUCCESSFUL OVERDRIVE PACE TERMINATION OF AF VIA ICD    ( [address])    PACEMAKER PLACEMENT         Allergies   Allergen Reactions    [place] [Dapagliflozin] Other (See Comments)     Caused throat to swell       Outpatient Medications Marked as Taking for the 2/4/21 encounter (Virtual Visit) with NATI Asif - CNP   Medication Sig Dispense Refill Skin: Intact, no rashes  Neuro/Psych: No headache or seizures        Weights: Wt Readings from Last 3 Encounters:   02/02/21 (!) 307 lb 3.2 oz (139.3 kg)   01/25/21 (!) 315 lb (142.9 kg)   01/19/21 (!) 315 lb (142.9 kg)     Physical Examination:     Patient-Reported Vitals 2/4/2021   Patient-Reported Weight 308lb   Patient-Reported Height 72in   Patient-Reported Systolic 995   Patient-Reported Diastolic 60        Unable to complete PE given telephone encounter, patient speaking in full sentences in no apparent acute distress. All the following diagnostics were personally reviewed and interpreted by me. LAB DATA:     1/19/2021 04:55   Sodium 134   Potassium 3.8   Chloride 97 (L)   CO2 23   BUN 33 (H)   Creatinine 1.5 (H)   Anion Gap 14   GFR Non- 52   GFR African American >60   Glucose 164 (H)   Calcium 9.0   Total Protein 7.1   Troponin 0.02   Albumin 3.8   Alk Phos 115   ALT 13   AST 14   Bilirubin 0.4   Hemoglobin A1C 9.3 (H)         DIAGNOSTICS:    CXR (1/19/2021)  FINDINGS:  Cardiac silhouette remains enlarged likely due to cardiomegaly.  EKG leads  overlie the chest.  Left-sided cardiac pacemaker.  No pneumothorax.  No  effusion. Lori Spoon is some minimal atelectasis or early infiltrate in the right  lower lobe base.  No other consolidation or effusion. Impression:  Minimal right basal atelectasis or early infiltrate from pneumonia. Continued follow-up recommended.         CARDIAC TESTING:    Echocardiogram (8/11/2016, Dr. Nat Suarez)   Summary   Left ventricular chamber moderately dilated at 7.3cm.   Severe global hypokinesis, mild abnormal septal motion, EF estimated about 20%.   Stage 3 diastolic dysfunction.   Left atrium is enlarged.   Increased LA volume index.   Interatrial septum not well visualized but appears intact.   Normal right ventricle structure and function.   Pacer/ICD lead in RV.   No mitral valve prolapse.   There is mild mitral regurgitation.  Normal aortic valve structure and function.   Normal tricuspid valve structure.   There is mild tricuspid regurgitation, RVSP 35mmHg.   Normal aortic root and ascending aorta.   No evidence of pericardial effusion. Pericardium appears normal.   The inferior vena cava diameter is normal with decreased respiratory variation.   No intracardiac mass. Echocardiogram (10/15/2018, Dr. Elizabeth Calderon)   Summary   Severely dilated left ventricle.   Abnormal (paradoxical) motion consistent with conduction abnormality.   Severely reduced left ventricular systolic function. EF 15-20%   There is doppler evidence of stage III diastolic dysfunction.   The left atrium is severely dilated.   Right ventricle global systolic function is reduced.    Mild to moderate mitral regurgitation is present.   Mild tricuspid regurgitation.  Margaretann Paige is a trivial circumferential pericardial effusion noted. RHC with cardioMEMS implant (12/2018)  PRESSURES:  RA 12/15, 11.  RV 67/8, 14.  PA 65/27, 43. PCW 22. CARDIAC OUTPUT:  4.3 L/min. CARDIAC INDEX:  1.7 L/min/m2. PULSE OXIMETRY:  100%, (on 2 L nasal cannula). Pulmonary artery, oxygen saturation 56%. CONCLUSIONS:  1.  Moderate-to-severe pulmonary hypertension. 2.  Low pulmonary artery oxygen saturation and low cardiac output and cardiac index consistent with LV dysfunction. 3.  Successful placement of CardioMEMS heart failure monitoring device in the left pulmonary artery. TTE (7/31/2019)   Summary   Severely dilated left ventricle. Ejection fraction is visually estimated at 20-25%. However there is a   definite improvement in overall LV systolic function since previous study from 2018. Diffuse global hypokinesis   The left atrium is moderate-severely dilated. Structurally normal mitral valve. Mild mitral regurgitation is present. Increased E point septal separation noted,suggesting decreased LV cardiac output. Aortic valve opens well. The aortic valve appears mildly sclerotic. Normal tricuspid valve structure and function. Moderate tricuspid regurgitation. RVSP is 45 mmHg. No evidence of pericardial effusion. [facility] (8/2019, Dr. Chery Julien)  Findings:  Left main: 0%  stenosis  LAD: 40-50 %  stenosis  Circumflex: 30 %   stenosis  RCA: Dominant. 30 %  stenosis  LV angio: not performed  Hemodynamics:  LV: 89 mmHg. EDP: 16 No gradient across AV. Ao: 81/67 ( 69 ). Post op diagnosis:  Mild CAD involving the LAD  PLAN:  Medical therapy / risk factor modification as per the advanced HF / Pulmonary hypertension team    NM Stress (1/19/2021)  Impression: The myocardial perfusion imaging was equivocal with fixed inferior and  apical perfusion abnormalities in the face of ventricular pacing and  artifact. No reversible perfusion abnormalities were identified. Overall left ventricular systolic function was moderately impaired  with a dilated left ventricular cavity and abnormal septal motion the  face of ventricular pacing with additional generalized hypokinesis. Intermediate risk pharmacologic myocardial perfusion imaging study. CardioMEMS readings          ASSESSMENT:  1. Chronic HFrEF  2. ACC stage C, NYHA class III  3. Euvolemic per patient report  4. Nonischemic dilated cardiomyopathy, likely burn out hypertensive due to noncompliance  5. LVEF 15-20% > 20-25%, LVEDD 75 >69  6. Normal coronaries (2012) and only mild disease (7/2019). Nonischemic stress 1/2021.   7. CRT-D placed 11/21/2018 - follows with Dr. Deirdre Conway  8. RV dysfunction  9. Mild-moderate MR and Mild TR  10. PAF, on Tikosyn. Anticoagulated with Xarelto  11. T2DM  12. HLD - on statin therapy  13. Iron deficiency  14. Morbid obesity  15. ARVIND - reports compliance with CPAP  16. CKD  17. Flat affect      PLAN:  1. Increase isosorbide dinitrate to 20 mg three times daily    2.  Continue rest of current cardiac medications 3. Will scheduled follow up at CHF clinic in one week for evaluation    4. Please lay on cardioMEMS pillow daily     5. Please make sure you are wearing your CPAP nightly    6. Recently placed on diet per Dr. Sandra Valdez ( [facility] cardiologist)    7. Follow up with Dr. Jane Guerrero in 1-2 months    8. Weigh yourself daily    -Stay Hydrated    -Diet should sodium restricted to 2 grams    -Again watch your daily weight trends and if you gain water weight please follow below instructions.    -If you gain 3-5 pounds in 2-3 days OR notice that you are retaining fluid in anyway just like you did before then take an extra dose of your water pill (bumetanide/Bumex) every day until you lose the weight or feel better.     -If you notice that you have taken more than 2 extra doses in 1 week then please call and let us know. -If at any time you feel that you are retaining fluid, your medications are not working, or you feel ill in anyway, then please call us for either same day appointment or the next day, and for instructions. Our goal is to keep you out of the emergency room and the hospital and we have ways to do it. You just need to call us in a timely manner.     -If you become sick for other reasons, and notice that you are not urinating as much, the urine is very dark, you have significant diarrhea or vomiting, then please DO NOT take your water pill and CALL US immediately. Nabil DAMIAN-CNP  [facility] Cardiology. TELEPHONE VISIT    Consent:  He and/or health care decision maker is aware that that he may receive a bill for this telephone service, depending on his insurance coverage, and has provided verbal consent to proceed: Yes      Documentation:  I communicated with the patient and/or health care decision maker about Heart Failure.    Details of this discussion including any medical advice provided: See above I affirm this is a Patient Initiated Episode with a Patient who has not had a related appointment within my department in the past 7 days or scheduled within the next 24 hours.         Patient's location: home address in [facility]  Physician  location other address in [facility]   Other people involved in call : Susannah Collins ( [facility]) cardiology office           Total Time: minutes: 21-30 minutes

## 2021-02-04 NOTE — PATIENT INSTRUCTIONS
1. Increase isosorbide dinitrate to 20 mg three times daily    2. Continue rest of current cardiac medications    3. Will scheduled follow up at CHF clinic in one week for evaluation    4. Please lay on cardioMEMS pillow daily     5. Please make sure you are wearing your CPAP nightly    6. Recently placed on diet per Dr. Eli Porter ( [facility] cardiologist) - Good luck!!    7. Follow up with Dr. Jessica Villeda in 1-2 months    8. Weigh yourself daily    -Stay Hydrated    -Diet should sodium restricted to 2 grams    -Again watch your daily weight trends and if you gain water weight please follow below instructions.    -If you gain 3-5 pounds in 2-3 days OR notice that you are retaining fluid in anyway just like you did before then take an extra dose of your water pill (bumetanide/Bumex) every day until you lose the weight or feel better.     -If you notice that you have taken more than 2 extra doses in 1 week then please call and let us know. -If at any time you feel that you are retaining fluid, your medications are not working, or you feel ill in anyway, then please call us for either same day appointment or the next day, and for instructions. Our goal is to keep you out of the emergency room and the hospital and we have ways to do it. You just need to call us in a timely manner.     -If you become sick for other reasons, and notice that you are not urinating as much, the urine is very dark, you have significant diarrhea or vomiting, then please DO NOT take your water pill and CALL US immediately.

## 2021-02-11 ENCOUNTER — TELEPHONE (OUTPATIENT)
Dept: CARDIOLOGY CLINIC | Age: 41
End: 2021-02-11

## 2021-02-11 NOTE — TELEPHONE ENCOUNTER
Josephine Tai III , 1980 , 36 y.o. , [phone] (home)      CardioMems update: 2/11/2021     PAD:  Goal: 21   Current trends: today 34. Trending up again on PAD. Providers:  Cardiology: DR Shira Arndt  EP : DR Veronique Rodriguez  CRT-D  Advanced HF cardiology: DR Kyleigh Diaz [facility])   cardiomems team: Dr Matthieu Hassan CNP  PCP: DR Camden Hoffmann  CHF Clinic use  Sleep medicine: DR BELEN No  ( CPAP)  1 4 2012            Diuretic Protocol:  Bumex 2mg twice daily          CHF:   HFrEF  10 15 2018 EF 15-20%      Coreg 25mg BID  tikosyn 250mcg BID/ xarelto 20mg Daily  Isordil 20mg TID  Nitro prn  entresto 97-103mg BID  spironolactone 12.5mg daily    Doesn't feel bad. No new c/o. PLAN:   · He Sends mems readings every other day. Will send reading today and again tomorrow to see effect of extra bumex  · Keep hydrated  · Take an extra 2mg bumex today  · Has not been tested in years for CPAP. End of month to see PCP DR Valentine Perez. Does receive new parts to CPAP from company in [place]. He will f/u with DR Valentine Perez for possible needs of repeat sleep study.

## 2021-02-12 ENCOUNTER — HOSPITAL ENCOUNTER (OUTPATIENT)
Dept: OTHER | Age: 41
Setting detail: THERAPIES SERIES
Discharge: HOME | End: 2021-02-12
Payer: COMMERCIAL

## 2021-02-12 VITALS
RESPIRATION RATE: 16 BRPM | HEART RATE: 68 BPM | BODY MASS INDEX: 42.71 KG/M2 | DIASTOLIC BLOOD PRESSURE: 72 MMHG | SYSTOLIC BLOOD PRESSURE: 152 MMHG | WEIGHT: 314.9 LBS

## 2021-02-12 LAB
ANION GAP SERPL CALCULATED.3IONS-SCNC: 12 MMOL/L (ref 7–16)
BUN BLDV-MCNC: 32 MG/DL (ref 6–20)
CALCIUM SERPL-MCNC: 9.6 MG/DL (ref 8.6–10.2)
CHLORIDE BLD-SCNC: 100 MMOL/L (ref 98–107)
CO2: 20 MMOL/L (ref 22–29)
CREAT SERPL-MCNC: 1.3 MG/DL (ref 0.7–1.2)
GFR AFRICAN AMERICAN: >60
GFR NON-AFRICAN AMERICAN: >60 ML/MIN/1.73
GLUCOSE BLD-MCNC: 159 MG/DL (ref 74–99)
POTASSIUM SERPL-SCNC: 3.8 MMOL/L (ref 3.5–5)
PRO-BNP: 348 PG/ML (ref 0–125)
SODIUM BLD-SCNC: 132 MMOL/L (ref 132–146)

## 2021-02-12 PROCEDURE — 80048 BASIC METABOLIC PNL TOTAL CA: CPT

## 2021-02-12 PROCEDURE — 36415 COLL VENOUS BLD VENIPUNCTURE: CPT

## 2021-02-12 PROCEDURE — 2580000003 HC RX 258: Performed by: INTERNAL MEDICINE

## 2021-02-12 PROCEDURE — 99204 OFFICE O/P NEW MOD 45 MIN: CPT

## 2021-02-12 PROCEDURE — 83880 ASSAY OF NATRIURETIC PEPTIDE: CPT

## 2021-02-12 PROCEDURE — 6360000002 HC RX W HCPCS: Performed by: INTERNAL MEDICINE

## 2021-02-12 PROCEDURE — 96374 THER/PROPH/DIAG INJ IV PUSH: CPT

## 2021-02-12 RX ORDER — FUROSEMIDE 10 MG/ML
40 INJECTION INTRAMUSCULAR; INTRAVENOUS ONCE
Status: COMPLETED | OUTPATIENT
Start: 2021-02-12 | End: 2021-02-12

## 2021-02-12 RX ORDER — SODIUM CHLORIDE 0.9 % (FLUSH) 0.9 %
10 SYRINGE (ML) INJECTION PRN
Status: DISCONTINUED | OUTPATIENT
Start: 2021-02-12 | End: 2021-02-13 | Stop reason: HOSPADM

## 2021-02-12 RX ADMIN — Medication 10 ML: at 10:06

## 2021-02-12 RX ADMIN — FUROSEMIDE 40 MG: 10 INJECTION, SOLUTION INTRAMUSCULAR; INTRAVENOUS at 10:06

## 2021-02-12 NOTE — PROGRESS NOTES
Called requesting appointment for 10 lb. Weight gain in 1 week. Has not been here since December 2020. Lasix iv 40 mg given. In no apparent distress. Labs obtained.  Follow up here on Monday Feb. 15.

## 2021-02-15 ENCOUNTER — HOSPITAL ENCOUNTER (OUTPATIENT)
Dept: OTHER | Age: 41
Setting detail: THERAPIES SERIES
Discharge: HOME | End: 2021-02-15
Payer: COMMERCIAL

## 2021-02-15 RX ORDER — FUROSEMIDE 10 MG/ML
40 INJECTION INTRAMUSCULAR; INTRAVENOUS ONCE
Status: DISCONTINUED | OUTPATIENT
Start: 2021-02-15 | End: 2021-02-18 | Stop reason: HOSPADM

## 2021-02-15 RX ORDER — SODIUM CHLORIDE 0.9 % (FLUSH) 0.9 %
10 SYRINGE (ML) INJECTION PRN
Status: DISCONTINUED | OUTPATIENT
Start: 2021-02-15 | End: 2021-02-18 | Stop reason: HOSPADM

## 2021-02-18 ENCOUNTER — TELEPHONE (OUTPATIENT)
Dept: CARDIOLOGY CLINIC | Age: 41
End: 2021-02-18

## 2021-02-18 NOTE — TELEPHONE ENCOUNTER
CardioMEMS update       CardioMEMS TEAM: Dr. Jaiden Craven, Felicia Baker APRN-CNP, Jarret Hidalgo RN  CARDIOLOGIST: Dr. Meenu Umanzor; ADHF: Dr. Liz Montelongo [facility]); EP: Dr. Ruddy Mondragon PAD: 21        CURRENT DIURETIC REGIMEN:  Bumex 2 mg twice daily  Spironolactone 12.5 mg daily       ASSESSMENT:  He has not laid on CardioMEMS pillow for last two days. He received 40 mg IV Lasix in the heart failure infusion clinic on 2/12/2021 and reports significant diuresis following that. His urine remains clear yellow. He was scheduled to return on 2/15/2021 but cancelled due to weather concerns. This week, his weight has been stable at 208-209. He continues to sleep on three pillows and denies orthopnea or PND. He denies abdominal bloating or lower extremity edema. PLAN:  Send CardioMEMS reading today   Continue same oral diuretics as he has a scheduled appointment in the HF infusion clinic tomorrow for possible IV diuresis  Continue to monitor labs via HF infusion clinic  Stay hydrated  Avoid added salt  He was asked to contact our office with questions or concerns, and to continue to send daily MEMS readings.        CARDIOMEMS READINGS:

## 2021-02-19 ENCOUNTER — HOSPITAL ENCOUNTER (OUTPATIENT)
Dept: OTHER | Age: 41
Setting detail: THERAPIES SERIES
Discharge: HOME | End: 2021-02-19
Payer: COMMERCIAL

## 2021-02-19 VITALS
HEART RATE: 68 BPM | WEIGHT: 315 LBS | RESPIRATION RATE: 18 BRPM | DIASTOLIC BLOOD PRESSURE: 67 MMHG | SYSTOLIC BLOOD PRESSURE: 125 MMHG | BODY MASS INDEX: 42.86 KG/M2

## 2021-02-19 LAB
ANION GAP SERPL CALCULATED.3IONS-SCNC: 13 MMOL/L (ref 7–16)
BUN BLDV-MCNC: 33 MG/DL (ref 6–20)
CALCIUM SERPL-MCNC: 9.3 MG/DL (ref 8.6–10.2)
CHLORIDE BLD-SCNC: 101 MMOL/L (ref 98–107)
CO2: 22 MMOL/L (ref 22–29)
CREAT SERPL-MCNC: 1.3 MG/DL (ref 0.7–1.2)
CREATININE URINE: 155 MG/DL (ref 40–278)
GFR AFRICAN AMERICAN: >60
GFR NON-AFRICAN AMERICAN: >60 ML/MIN/1.73
GLUCOSE BLD-MCNC: 183 MG/DL (ref 74–99)
MICROALBUMIN UR-MCNC: 21.9 MG/L
MICROALBUMIN/CREAT UR-RTO: 14.1 (ref 0–30)
POTASSIUM SERPL-SCNC: 4 MMOL/L (ref 3.5–5)
PRO-BNP: 411 PG/ML (ref 0–125)
SODIUM BLD-SCNC: 136 MMOL/L (ref 132–146)

## 2021-02-19 PROCEDURE — 2580000003 HC RX 258: Performed by: INTERNAL MEDICINE

## 2021-02-19 PROCEDURE — 83880 ASSAY OF NATRIURETIC PEPTIDE: CPT

## 2021-02-19 PROCEDURE — 82570 ASSAY OF URINE CREATININE: CPT

## 2021-02-19 PROCEDURE — 96374 THER/PROPH/DIAG INJ IV PUSH: CPT

## 2021-02-19 PROCEDURE — 86803 HEPATITIS C AB TEST: CPT

## 2021-02-19 PROCEDURE — 99214 OFFICE O/P EST MOD 30 MIN: CPT

## 2021-02-19 PROCEDURE — 80048 BASIC METABOLIC PNL TOTAL CA: CPT

## 2021-02-19 PROCEDURE — 6360000002 HC RX W HCPCS: Performed by: INTERNAL MEDICINE

## 2021-02-19 PROCEDURE — 82044 UR ALBUMIN SEMIQUANTITATIVE: CPT

## 2021-02-19 PROCEDURE — 36415 COLL VENOUS BLD VENIPUNCTURE: CPT

## 2021-02-19 RX ORDER — SODIUM CHLORIDE 0.9 % (FLUSH) 0.9 %
10 SYRINGE (ML) INJECTION PRN
Status: DISCONTINUED | OUTPATIENT
Start: 2021-02-19 | End: 2021-02-20 | Stop reason: HOSPADM

## 2021-02-19 RX ORDER — FUROSEMIDE 10 MG/ML
40 INJECTION INTRAMUSCULAR; INTRAVENOUS ONCE
Status: COMPLETED | OUTPATIENT
Start: 2021-02-19 | End: 2021-02-19

## 2021-02-19 RX ADMIN — FUROSEMIDE 40 MG: 10 INJECTION, SOLUTION INTRAMUSCULAR; INTRAVENOUS at 09:47

## 2021-02-19 RX ADMIN — Medication 10 ML: at 09:47

## 2021-02-19 NOTE — PROGRESS NOTES
Current weight :316lb  Previous weight:314lb on 2-12    Compliance with low sodium diet: Not yesterday. Tries most of the time. Medication changes: None    Response to oral diuretic: Good  Color of urine: Light yellow    Shortness of Breath : Yes with exertion  Edema: None in legs. C/O abdominal bloating. Keeping Hydrated: Yes    Dizziness or lightheadedness: Denies    Lasix 40 mg IVP given and labs obtained.

## 2021-02-20 PROCEDURE — 93264 REM MNTR WRLS P-ART PRS SNR: CPT | Performed by: INTERNAL MEDICINE

## 2021-02-22 ENCOUNTER — OFFICE VISIT (OUTPATIENT)
Dept: FAMILY MEDICINE CLINIC | Age: 41
End: 2021-02-22
Payer: COMMERCIAL

## 2021-02-22 VITALS
WEIGHT: 314 LBS | HEART RATE: 74 BPM | OXYGEN SATURATION: 97 % | TEMPERATURE: 98.1 F | RESPIRATION RATE: 16 BRPM | BODY MASS INDEX: 42.53 KG/M2 | DIASTOLIC BLOOD PRESSURE: 77 MMHG | HEIGHT: 72 IN | SYSTOLIC BLOOD PRESSURE: 111 MMHG

## 2021-02-22 DIAGNOSIS — M75.42 IMPINGEMENT SYNDROME OF SHOULDER REGION, LEFT: Primary | ICD-10-CM

## 2021-02-22 LAB — HEPATITIS C ANTIBODY INTERPRETATION: NORMAL

## 2021-02-22 PROCEDURE — 20610 DRAIN/INJ JOINT/BURSA W/O US: CPT | Performed by: FAMILY MEDICINE

## 2021-02-22 PROCEDURE — 99212 OFFICE O/P EST SF 10 MIN: CPT | Performed by: FAMILY MEDICINE

## 2021-02-22 PROCEDURE — 6360000002 HC RX W HCPCS

## 2021-02-22 RX ORDER — BLOOD SUGAR DIAGNOSTIC
STRIP MISCELLANEOUS
COMMUNITY
Start: 2021-02-15 | End: 2021-10-29

## 2021-02-22 RX ORDER — TRIAMCINOLONE ACETONIDE 40 MG/ML
40 INJECTION, SUSPENSION INTRA-ARTICULAR; INTRAMUSCULAR ONCE
Status: COMPLETED | OUTPATIENT
Start: 2021-02-22 | End: 2021-02-22

## 2021-02-22 RX ORDER — NITROGLYCERIN 0.4 MG/1
TABLET SUBLINGUAL
Qty: 25 TABLET | Refills: 3 | Status: SHIPPED
Start: 2021-02-22 | End: 2021-09-23 | Stop reason: SDUPTHER

## 2021-02-22 RX ORDER — LIDOCAINE HYDROCHLORIDE 10 MG/ML
4 INJECTION, SOLUTION INFILTRATION; PERINEURAL ONCE
Status: COMPLETED | OUTPATIENT
Start: 2021-02-22 | End: 2021-02-22

## 2021-02-22 RX ADMIN — LIDOCAINE HYDROCHLORIDE 4 ML: 10 INJECTION, SOLUTION INFILTRATION; PERINEURAL at 16:40

## 2021-02-22 RX ADMIN — TRIAMCINOLONE ACETONIDE 40 MG: 40 INJECTION, SUSPENSION INTRA-ARTICULAR; INTRAMUSCULAR at 16:43

## 2021-02-22 NOTE — PATIENT INSTRUCTIONS
Patient Education        Shoulder Bursitis: Exercises  Introduction  Here are some examples of exercises for you to try. The exercises may be suggested for a condition or for rehabilitation. Start each exercise slowly. Ease off the exercises if you start to have pain. You will be told when to start these exercises and which ones will work best for you. How to do the exercises  Posterior stretching exercise   1. Hold the elbow of your injured arm with your other hand. 2. Use your hand to pull your injured arm gently up and across your body. You will feel a gentle stretch across the back of your injured shoulder. 3. Hold for at least 15 to 30 seconds. Then slowly lower your arm. 4. Repeat 2 to 4 times. Up-the-back stretch   Your doctor or physical therapist may want you to wait to do this stretch until you have regained most of your range of motion and strength. You can do this stretch in different ways. Hold any of these stretches for at least 15 to 30 seconds. Repeat them 2 to 4 times. 1. Light stretch: Put your hand in your back pocket. Let it rest there to stretch your shoulder. 2. Moderate stretch: With your other hand, hold your injured arm (palm outward) behind your back by the wrist. Pull your arm up gently to stretch your shoulder. 3. Advanced stretch: Put a towel over your other shoulder. Put the hand of your injured arm behind your back. Now hold the back end of the towel. With the other hand, hold the front end of the towel in front of your body. Pull gently on the front end of the towel. This will bring your hand farther up your back to stretch your shoulder. Overhead stretch   1. Standing about an arm's length away, grasp onto a solid surface. You could use a countertop, a doorknob, or the back of a sturdy chair. 2. With your knees slightly bent, bend forward with your arms straight. Lower your upper body, and let your shoulders stretch.   3. As your shoulders are able to stretch farther, you may need to take a step or two backward. 4. Hold for at least 15 to 30 seconds. Then stand up and relax. If you had stepped back during your stretch, step forward so you can keep your hands on the solid surface. 5. Repeat 2 to 4 times. Shoulder flexion (lying down)   To make a wand for this exercise, use a piece of PVC pipe or a broom handle with the broom removed. Make the wand about a foot wider than your shoulders. 1. Lie on your back, holding a wand with both hands. Your palms should face down as you hold the wand. 2. Keeping your elbows straight, slowly raise your arms over your head. Raise them until you feel a stretch in your shoulders, upper back, and chest.  3. Hold for 15 to 30 seconds. 4. Repeat 2 to 4 times. Shoulder rotation (lying down)   To make a wand for this exercise, use a piece of PVC pipe or a broom handle with the broom removed. Make the wand about a foot wider than your shoulders. 1. Lie on your back. Hold a wand with both hands with your elbows bent and palms up. 2. Keep your elbows close to your body, and move the wand across your body toward the sore arm. 3. Hold for 8 to 12 seconds. 4. Repeat 2 to 4 times. Shoulder blade squeeze   1. Stand with your arms at your sides, and squeeze your shoulder blades together. Do not raise your shoulders up as you squeeze. 2. Hold 6 seconds. 3. Repeat 8 to 12 times. Shoulder flexor and extensor exercise   These are isometric exercises. That means you contract your muscles without actually moving. 1. Push forward (flex): Stand facing a wall or doorjamb, about 6 inches or less back. Hold your injured arm against your body. Make a closed fist with your thumb on top. Then gently push your hand forward into the wall with about 25% to 50% of your strength. Don't let your body move backward as you push. Hold for about 6 seconds. Relax for a few seconds. Repeat 8 to 12 times.   2. Push backward (extend): Stand with your back flat against a wall. Your upper arm should be against the wall, with your elbow bent 90 degrees (your hand straight ahead). Push your elbow gently back against the wall with about 25% to 50% of your strength. Don't let your body move forward as you push. Hold for about 6 seconds. Relax for a few seconds. Repeat 8 to 12 times. Scapular exercise: Wall push-ups   This exercise is best done with your fingers somewhat turned out, rather than straight up and down. 1. Stand facing a wall, about 12 inches to 18 inches away. 2. Place your hands on the wall at shoulder height. 3. Slowly bend your elbows and bring your face to the wall. Keep your back and hips straight. 4. Push back to where you started. 5. Repeat 8 to 12 times. 6. When you can do this exercise against a wall comfortably, you can try it against a counter. You can then slowly progress to the end of a couch, then to a sturdy chair, and finally to the floor. Scapular exercise: Retraction   For this exercise, you will need elastic exercise material, such as surgical tubing or Thera-Band. 1. Put the band around a solid object at about waist level. (A bedpost will work well.) Each hand should hold an end of the band. 2. With your elbows at your sides and bent to 90 degrees, pull the band back. Your shoulder blades should move toward each other. Then move your arms back where you started. 3. Repeat 8 to 12 times. 4. If you have good range of motion in your shoulders, try this exercise with your arms lifted out to the sides. Keep your elbows at a 90-degree angle. Raise the elastic band up to about shoulder level. Pull the band back to move your shoulder blades toward each other. Then move your arms back where you started. Internal rotator strengthening exercise   1. Start by tying a piece of elastic exercise material to a doorknob. You can use surgical tubing or Thera-Band. 2. Stand or sit with your shoulder relaxed and your elbow bent 90 degrees.  Your upper arm should rest comfortably against your side. Squeeze a rolled towel between your elbow and your body for comfort. This will help keep your arm at your side. 3. Hold one end of the elastic band in the hand of the painful arm. 4. Slowly rotate your forearm toward your body until it touches your belly. Slowly move it back to where you started. 5. Keep your elbow and upper arm firmly tucked against the towel roll or at your side. 6. Repeat 8 to 12 times. External rotator strengthening exercise   1. Start by tying a piece of elastic exercise material to a doorknob. You can use surgical tubing or Thera-Band. (You may also hold one end of the band in each hand.)  2. Stand or sit with your shoulder relaxed and your elbow bent 90 degrees. Your upper arm should rest comfortably against your side. Squeeze a rolled towel between your elbow and your body for comfort. This will help keep your arm at your side. 3. Hold one end of the elastic band with the hand of the painful arm. 4. Start with your forearm across your belly. Slowly rotate the forearm out away from your body. Keep your elbow and upper arm tucked against the towel roll or the side of your body until you begin to feel tightness in your shoulder. Slowly move your arm back to where you started. 5. Repeat 8 to 12 times. Follow-up care is a key part of your treatment and safety. Be sure to make and go to all appointments, and call your doctor if you are having problems. It's also a good idea to know your test results and keep a list of the medicines you take. Where can you learn more? Go to https://[employer]. org and sign in to your [employer] account. Enter N658 in the Jacked box to learn more about \"Shoulder Bursitis: Exercises. \"     If you do not have an account, please click on the \"Sign Up Now\" link. Current as of: March 2, 2020               Content Version: 12.6  © 4407-1947 [employer], [facility].    Care instructions adapted under license by [facility] ([facility]). If you have questions about a medical condition or this instruction, always ask your healthcare professional. [address] any warranty or liability for your use of this information.

## 2021-02-27 DIAGNOSIS — I50.22 CHRONIC SYSTOLIC HEART FAILURE (HCC): ICD-10-CM

## 2021-02-27 RX ORDER — SACUBITRIL AND VALSARTAN 97; 103 MG/1; MG/1
1 TABLET, FILM COATED ORAL 2 TIMES DAILY
Qty: 180 TABLET | Refills: 3 | Status: SHIPPED
Start: 2021-02-27 | End: 2021-09-23 | Stop reason: SDUPTHER

## 2021-02-27 NOTE — PROGRESS NOTES
Alexys sent fax. PA expiring soon on entresto    E-scribed new script and requested computerized PA initiation .   DR Jessica Villeda

## 2021-03-03 ENCOUNTER — HOSPITAL ENCOUNTER (OUTPATIENT)
Dept: OTHER | Age: 41
Setting detail: THERAPIES SERIES
Discharge: HOME | End: 2021-03-03
Payer: COMMERCIAL

## 2021-03-03 VITALS
HEART RATE: 70 BPM | SYSTOLIC BLOOD PRESSURE: 100 MMHG | RESPIRATION RATE: 18 BRPM | BODY MASS INDEX: 41.77 KG/M2 | DIASTOLIC BLOOD PRESSURE: 72 MMHG | WEIGHT: 308 LBS

## 2021-03-03 LAB
ANION GAP SERPL CALCULATED.3IONS-SCNC: 14 MMOL/L (ref 7–16)
BUN BLDV-MCNC: 40 MG/DL (ref 6–20)
CALCIUM SERPL-MCNC: 10 MG/DL (ref 8.6–10.2)
CHLORIDE BLD-SCNC: 100 MMOL/L (ref 98–107)
CO2: 20 MMOL/L (ref 22–29)
CREAT SERPL-MCNC: 1.4 MG/DL (ref 0.7–1.2)
GFR AFRICAN AMERICAN: >60
GFR NON-AFRICAN AMERICAN: 56 ML/MIN/1.73
GLUCOSE BLD-MCNC: 119 MG/DL (ref 74–99)
POTASSIUM SERPL-SCNC: 3.8 MMOL/L (ref 3.5–5)
PRO-BNP: 387 PG/ML (ref 0–125)
SODIUM BLD-SCNC: 134 MMOL/L (ref 132–146)

## 2021-03-03 PROCEDURE — 99214 OFFICE O/P EST MOD 30 MIN: CPT

## 2021-03-03 PROCEDURE — 80048 BASIC METABOLIC PNL TOTAL CA: CPT

## 2021-03-03 PROCEDURE — 83880 ASSAY OF NATRIURETIC PEPTIDE: CPT

## 2021-03-03 PROCEDURE — 36415 COLL VENOUS BLD VENIPUNCTURE: CPT

## 2021-03-03 NOTE — PROGRESS NOTES
Current weight :308lb  Previous weight:316lb on 2-19    Compliance with low sodium diet:Yes    Medication changes: None    Response to oral diuretic: Good  Color of urine:Light yellow    Shortness of Breath : With exertion  Edema: None    Keeping Hydrated: Yes    Dizziness or lightheadedness:  Denies     Weight down 8 lbs  No c/o. Labs obtained.

## 2021-03-10 ENCOUNTER — HOSPITAL ENCOUNTER (OUTPATIENT)
Dept: PHYSICAL THERAPY | Age: 41
Setting detail: THERAPIES SERIES
Discharge: HOME | End: 2021-03-10

## 2021-03-10 DIAGNOSIS — I50.22 CHRONIC SYSTOLIC HEART FAILURE (HCC): Primary | ICD-10-CM

## 2021-03-10 NOTE — PROGRESS NOTES
[facility]                Phone: [phone]   Fax: [phone]      Physical Therapy  [facility]         Date:  3/10/2021    Patient Name:  Katelynn Duncan    :  1980  MRN: [id number]      DIAGNOSIS:  L shoulder bursitis   REFERRING PHYSICIAN:  Harsha Dolan  PHYSICAL THERAPIST:  Latanya Canseco DPT      ATTENDANCE:  Patient has attended 6 of 7 scheduled treatments from 20   to 12/3/20. TREATMENTS RECEIVED:  Therapeutic exercise, PROM/AROM, stretching, strengthening, functional activities, postural awareness/positioning training, HEP, modalities. INITIAL STATUS:  · c/o pain 9/10 with AROM/ PROM L shoulder  · c/o significant pain with PROM of L shoulder into ER and end range flexion                         · Sensation:  intermittent tingling to L shoulder only   · L shoulder                                     AROM                                     PROM                                       Flexion            100 °                                        110 °                                       Abduction        90 °                                          95 °                                       IR                     L lateral back pocket               NT                                      ER                   L ear                                        NT              · Strength:   L shoulder 4-/5 throughout except shoulder ext 5/5      FINAL STATUS:  · c/o pain 9/10 with AROM/ PROM L shoulder  · Standing AROM R shoulder  flex 110°  abd 95°   ER/IR  lateral base of skull/ midline S1-2  · Supine PROM  flexion 130°  abduction 100    · Strength L shoulder WAR 3+/5 for flexion, abduction, ER 4-/5 for IR  · Sitting posture FAIR+  · Independent with HEP    GOALS:  1 out of 5 Long Term Goals were obtained. LONG TERM GOALS OBTAINED/NOT OBTAINED:   1.   Pt will report at least 90% reduction of L shoulder pain with daily activities   NOT ACHIEVED  2. Pt will increase L shoulder strength to at least 4+/5 throughout   NOT ACHIEVED  3. Pt will increase L shoulder AROM to at least:                160 ° flexion                 160 ° abduction                  80  ° IR/ ER   PROGRESSED  4. Pt will improve sitting posture from FAIR to GOOD  PROGRESSED  5. Pt will be independent with HEP  ACHIEVED       PATIENT EDUCATION/INSTRUCTIONS:  Patient instructed on and provided copy of HEP          Thank you for the opportunity to work with your patient. If you have questions or comments, please feel free to contact me by phone or fax.       Electronically Signed by: Malathi Santo DPT  [id number]     3/10/2021

## 2021-03-16 DIAGNOSIS — I50.22 CHRONIC SYSTOLIC HEART FAILURE (HCC): ICD-10-CM

## 2021-03-16 RX ORDER — BUMETANIDE 2 MG/1
2 TABLET ORAL 2 TIMES DAILY
Qty: 60 TABLET | Refills: 3 | Status: SHIPPED
Start: 2021-03-16 | End: 2021-06-23

## 2021-03-23 ENCOUNTER — TELEPHONE (OUTPATIENT)
Dept: CARDIOLOGY CLINIC | Age: 41
End: 2021-03-23

## 2021-03-23 NOTE — TELEPHONE ENCOUNTER
Spoke with Thomas Stewart III yesterday,   Send mems daily for better HF management    PAD has been running high  Goal 21.  28-26-26-23  Better # today closer to goal PAD 23    [place] good no new c/o

## 2021-03-24 ENCOUNTER — PATIENT MESSAGE (OUTPATIENT)
Dept: FAMILY MEDICINE CLINIC | Age: 41
End: 2021-03-24

## 2021-03-24 DIAGNOSIS — E11.8 TYPE 2 DIABETES MELLITUS WITH COMPLICATION, WITH LONG-TERM CURRENT USE OF INSULIN (HCC): Primary | ICD-10-CM

## 2021-03-24 DIAGNOSIS — Z79.4 TYPE 2 DIABETES MELLITUS WITH COMPLICATION, WITH LONG-TERM CURRENT USE OF INSULIN (HCC): Primary | ICD-10-CM

## 2021-03-24 NOTE — TELEPHONE ENCOUNTER
[facility] and placed a referral to Diabetes Education. Thank you!

## 2021-03-24 NOTE — TELEPHONE ENCOUNTER
From: Sayda Moses III  To: Teddy Ferguson MD  Sent: 3/24/2021 3:08 PM EDT  Subject: Non-Urgent Medical Question    Hey Doctor Jeffy Clark   Is there any way I can get diabetic classes?  Can you recommend a class or two

## 2021-03-26 RX ORDER — RIVAROXABAN 20 MG/1
TABLET, FILM COATED ORAL
Qty: 30 TABLET | Refills: 10 | Status: SHIPPED
Start: 2021-03-26 | End: 2022-02-22

## 2021-03-26 NOTE — TELEPHONE ENCOUNTER
Last Appointment:  2/22/2021  Future Appointments   Date Time Provider Clemente Youngisti   3/27/2021  3:50 PM [facility] COVID IMM, COVID-19 [address] [facility]   3/29/2021  2:00 PM MD Concepción Garces [facility]   4/7/2021 12:15 PM [facility] CHF ROOM 3 [facility] CHF Monica Or

## 2021-03-27 ENCOUNTER — IMMUNIZATION (OUTPATIENT)
Dept: PRIMARY CARE CLINIC | Age: 41
End: 2021-03-27
Payer: COMMERCIAL

## 2021-03-27 PROCEDURE — 91300 COVID-19, PFIZER VACCINE 30MCG/0.3ML DOSE: CPT | Performed by: INTERNAL MEDICINE

## 2021-03-27 PROCEDURE — 0001A COVID-19, PFIZER VACCINE 30MCG/0.3ML DOSE: CPT | Performed by: INTERNAL MEDICINE

## 2021-03-29 ENCOUNTER — OFFICE VISIT (OUTPATIENT)
Dept: FAMILY MEDICINE CLINIC | Age: 41
End: 2021-03-29
Payer: COMMERCIAL

## 2021-03-29 VITALS
HEIGHT: 72 IN | HEART RATE: 76 BPM | TEMPERATURE: 98.5 F | OXYGEN SATURATION: 96 % | DIASTOLIC BLOOD PRESSURE: 62 MMHG | BODY MASS INDEX: 40.9 KG/M2 | RESPIRATION RATE: 18 BRPM | WEIGHT: 302 LBS | SYSTOLIC BLOOD PRESSURE: 100 MMHG

## 2021-03-29 DIAGNOSIS — Z79.4 TYPE 2 DIABETES MELLITUS WITH COMPLICATION, WITH LONG-TERM CURRENT USE OF INSULIN (HCC): Chronic | ICD-10-CM

## 2021-03-29 DIAGNOSIS — E66.01 MORBID OBESITY DUE TO EXCESS CALORIES (HCC): ICD-10-CM

## 2021-03-29 DIAGNOSIS — G47.33 OBSTRUCTIVE SLEEP APNEA: Chronic | ICD-10-CM

## 2021-03-29 DIAGNOSIS — N18.9 CHRONIC KIDNEY DISEASE, UNSPECIFIED CKD STAGE: ICD-10-CM

## 2021-03-29 DIAGNOSIS — I48.0 PAROXYSMAL ATRIAL FIBRILLATION (HCC): Primary | ICD-10-CM

## 2021-03-29 DIAGNOSIS — E11.8 TYPE 2 DIABETES MELLITUS WITH COMPLICATION, WITH LONG-TERM CURRENT USE OF INSULIN (HCC): Chronic | ICD-10-CM

## 2021-03-29 DIAGNOSIS — M1A.29X0 DRUG-INDUCED CHRONIC GOUT OF MULTIPLE SITES WITHOUT TOPHUS: ICD-10-CM

## 2021-03-29 DIAGNOSIS — I10 ESSENTIAL HYPERTENSION: ICD-10-CM

## 2021-03-29 DIAGNOSIS — I50.42 CHRONIC COMBINED SYSTOLIC AND DIASTOLIC CONGESTIVE HEART FAILURE (HCC): ICD-10-CM

## 2021-03-29 PROCEDURE — 99214 OFFICE O/P EST MOD 30 MIN: CPT | Performed by: FAMILY MEDICINE

## 2021-03-29 PROCEDURE — G8427 DOCREV CUR MEDS BY ELIG CLIN: HCPCS | Performed by: FAMILY MEDICINE

## 2021-03-29 PROCEDURE — 1036F TOBACCO NON-USER: CPT | Performed by: FAMILY MEDICINE

## 2021-03-29 PROCEDURE — G8417 CALC BMI ABV UP PARAM F/U: HCPCS | Performed by: FAMILY MEDICINE

## 2021-03-29 PROCEDURE — G8482 FLU IMMUNIZE ORDER/ADMIN: HCPCS | Performed by: FAMILY MEDICINE

## 2021-03-29 PROCEDURE — 99212 OFFICE O/P EST SF 10 MIN: CPT | Performed by: FAMILY MEDICINE

## 2021-03-29 PROCEDURE — 3046F HEMOGLOBIN A1C LEVEL >9.0%: CPT | Performed by: FAMILY MEDICINE

## 2021-03-29 PROCEDURE — 2022F DILAT RTA XM EVC RTNOPTHY: CPT | Performed by: FAMILY MEDICINE

## 2021-03-29 RX ORDER — BLOOD-GLUCOSE TRANSMITTER
EACH MISCELLANEOUS
Qty: 1 EACH | Refills: 0 | Status: SHIPPED | OUTPATIENT
Start: 2021-03-29

## 2021-03-29 NOTE — PROGRESS NOTES
[facility]  FAMILY MEDICINE RESIDENCY PROGRAM   OFFICE PROGRESS NOTE  DATE OF VISIT : 3/30/2021         Chief Complaint :   Chief Complaint   Patient presents with    Chronic Kidney Disease    Shoulder Pain     left       HPI:   Arcelia Montero comes to clinic today for     F/U of chronic problem(s)     Chronic problems reviewed today include:     Diabetes mellitus, Obesity, Congestive Heart Failure, Chronic kidney disease and atrial fibrillation, gout    Current status of this/these condition(s):  stable    Tolerating meds: Yes    Additional history: Will start going to classes for diabetes and exercising and has lost weight. Recently saw cardiology at [facility]. Patient states that they discussed heart transplant or aortic assist device. However, patient states that he feels well and is walking 3 miles a day without chest pain or dyspnea. He has been monitoring his sugars twice daily and states that they run from 140- 170 or so. He denies GI/ symptoms, chest pain or CLAYTON. Sleeps well with CPAP - ARVIND controlled. No obvious bleeding     Objective:    VS:  Blood pressure 100/62, pulse 76, temperature 98.5 °F (36.9 °C), temperature source Oral, resp. rate 18, height 6' (1.829 m), weight (!) 302 lb (137 kg), SpO2 96 %. General Appearance: Well developed, awake, alert, oriented, no acute distress  HEENT: Normocephalic,atraumatic. PERRL, EOM's intact, EAC without erythema or swelling, no pallor or icterus. Neck: Supple, symmetrical, trachea midline. No JVD. Thyroid smooth, not enlarged. Chest wall/Lung: Clear to auscultation bilaterally,  respirations unlabored. No rhonchi/wheezing/rales  Heart[de-identified]  Irregular rate and rhythm, S1and S2 normal, distant sounds. Abdomen: Soft, nontender. Normal BS, no hepatosplenomegaly or mass  Extremities:  Extremities normal, atraumatic, no cyanosis. 1+ edema.   Skin: Skin color, texture, turgor normal, no rashes or lesions  Musculoskeletal: ROM grossly normal in all joints of extremities, no obvious joint swelling. Neurologic:    Alert, oriented. Normal gait and coordination   No focal neurologic deficits noted. Psychiatric: has a normal mood and affect. Behavior is normal.     I independently reviewed the following information:  lab reports and referral letter/letters    1. Paroxysmal atrial fibrillation (HCC)  2. Essential hypertension  3. Chronic combined systolic and diastolic congestive heart failure ([facility] [address]  4. Obstructive sleep apnea  5. Type 2 diabetes mellitus with complication, with long-term current use of insulin ([facility] [address]  6. Chronic kidney disease, unspecified CKD stage  7. 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 Morbid obesity due to excess calories [facility])      Additional Plan:  I will see if we are able to get him CGM device. He will continue with all of his current medications and stick with the diet and exercise. He will attend diabetic classes. He will keep appointment with CHF clinic and have routine lab drawn there. Would want lipids and A1C in next few months    On this date 3/29/2021 I have spent 32 minutes reviewing previous notes, test results and face to face with the patient discussing the diagnosis and importance of compliance with the treatment plan as well as documenting on the day of the visit. RTO in in 3 month(s) or sooner for any persistent, new, or worsening symptoms. Please see Patient Instructions for further counseling and information given. Advised to be adherent to the treatment plans discussed today, and please call with any questions or concerns, letting the office know of any reasons that the plans may not be followed. The risks of untreated conditions include worsening illness, injury, disability, and possibly, death. Please call if symptoms change in any way, worsen, or fail to completely resolve, as this could necessitate a change to treatment plans.  Patient and/or caregiver expressed understanding. Indications and proper use of medication(s) reviewed. Potential side-effects and risks of medication(s) also explained. Patient and/or caregiver was instructed to call if any new symptoms develop prior to next visit. Health risk factors discussed and addressed.     Signed by : Marialuisa Bullock M.D.

## 2021-04-07 ENCOUNTER — HOSPITAL ENCOUNTER (OUTPATIENT)
Dept: OTHER | Age: 41
Setting detail: THERAPIES SERIES
Discharge: HOME | End: 2021-04-07
Payer: COMMERCIAL

## 2021-04-07 VITALS
BODY MASS INDEX: 41.09 KG/M2 | DIASTOLIC BLOOD PRESSURE: 67 MMHG | HEART RATE: 71 BPM | RESPIRATION RATE: 18 BRPM | SYSTOLIC BLOOD PRESSURE: 113 MMHG | WEIGHT: 303 LBS

## 2021-04-07 LAB
ANION GAP SERPL CALCULATED.3IONS-SCNC: 15 MMOL/L (ref 7–16)
BUN BLDV-MCNC: 33 MG/DL (ref 6–20)
CALCIUM SERPL-MCNC: 9.7 MG/DL (ref 8.6–10.2)
CHLORIDE BLD-SCNC: 101 MMOL/L (ref 98–107)
CO2: 22 MMOL/L (ref 22–29)
CREAT SERPL-MCNC: 1.6 MG/DL (ref 0.7–1.2)
GFR AFRICAN AMERICAN: 58
GFR NON-AFRICAN AMERICAN: 48 ML/MIN/1.73
GLUCOSE BLD-MCNC: 108 MG/DL (ref 74–99)
POTASSIUM SERPL-SCNC: 4.5 MMOL/L (ref 3.5–5)
PRO-BNP: 469 PG/ML (ref 0–125)
SODIUM BLD-SCNC: 138 MMOL/L (ref 132–146)

## 2021-04-07 PROCEDURE — 83880 ASSAY OF NATRIURETIC PEPTIDE: CPT

## 2021-04-07 PROCEDURE — 99214 OFFICE O/P EST MOD 30 MIN: CPT

## 2021-04-07 PROCEDURE — 80048 BASIC METABOLIC PNL TOTAL CA: CPT

## 2021-04-07 PROCEDURE — 36415 COLL VENOUS BLD VENIPUNCTURE: CPT

## 2021-04-07 NOTE — PROGRESS NOTES
[facility]   CHF Clinic       [place] III   1980  [phone]       Referring Doctor: Angeli Kaur  Cardiologist: Lorre Severs  Nephrologist: n/a      History of Present Illness:     Grand kelvin MONTESINOS is a 36 y.o. male with a history of HFrEF, most recent EF 31% on 1/19/2021. Patient Story:    He does not  have dyspnea with exertion, shortness of breath, or decline in overall functional capacity. He does not have orthopnea, PND, nocturnal cough or hemoptysis. He does not have abdominal distention or bloating, early satiety, anorexia/change in appetite. He does has a good urinary response to their oral diuretic. He does not have  lower extremity edema. He denies lightheadedness, dizziness. He denies palpitations, syncope or near syncope. He does not complain of chest pain, pressure, discomfort. Allergies   Allergen Reactions    [place] [Dapagliflozin] Other (See Comments)     Caused throat to swell         No outpatient medications have been marked as taking for the 4/7/21 encounter [facility] Encounter) with [facility] CHF ROOM 3.           Guideline directed medical:  ARNI/ACE I/ARB: Yes  Beta blocker: Yes  Aldosterone antagonist:  Yes        Physical Examination:     /67   Pulse 71   Resp 18   Wt (!) 303 lb (137.4 kg)   BMI 41.09 kg/m²     Assessment  Charting Type: Shift assessment                   Respiratory  Respiratory Pattern: Regular  Respiratory Depth: Normal  Respiratory Quality/Effort: Unlabored  Chest Assessment: Chest expansion symmetrical  L Breath Sounds: Clear, Diminished  R Breath Sounds: Clear, Diminished              Cardiac  Cardiac Rhythm: Normal sinus rhythm, Ventricular Paced    Rhythm Interpretation  Pulse: 71         Gastrointestinal  Abdominal (WDL): Exceptions to WDL  Abdomen Inspection: Distended, Soft  RUQ Bowel Sounds: Active  LUQ Bowel Sounds: Active  RLQ Bowel Sounds: Active  LLQ Bowel Sounds:  Active          Bowel Sounds  RUQ Bowel Sounds: Active  LUQ Bowel Sounds: Active  RLQ Bowel Sounds: Active  LLQ Bowel Sounds: Active    Peripheral Vascular  Peripheral Vascular (WDL): Within Defined Limits  RLE Edema: None  LLE Edema: None                                                 Pulse: 71                   LAB DATA:    BNP  No results for input(s): BNP in the last 72 hours. proBNP  No results for input(s): PROBNP in the last 72 hours. BMP  No results for input(s): NA, K, CL, CO2, BUN, CREATININE, GLUCOSE, CALCIUM in the last 72 hours. WEIGHTS:  Wt Readings from Last 3 Encounters:   04/07/21 (!) 303 lb (137.4 kg)   03/29/21 (!) 302 lb (137 kg)   03/03/21 (!) 308 lb (139.7 kg)         TELEMETRY:  Cardiac Regularity: Regular  Cardiac Rhythm/Interpretation: NSR        ASSESSMENT:  Terence Ohs III is evolemic with stable weights     Interventions completed this visit:  IV diuretics given no  Lab work obtained yes, BNP/BMP   Reviewed continue current medications that patient as prescribed answered any questions   Educated on signs and symptoms of HF  Educated on low sodium diet    PLAN:  Scheduled to follow up in CHF clinic on  May 5, 2021. Given clinic phone number and aware of signs and symptoms to call with any HF change in symptoms.

## 2021-04-19 NOTE — PROGRESS NOTES
levels;  -List diabetes medication taken, action & side effects 1 [] All     []  []   4/19/21 Metformin twice daily, Levemir 40 units AM & PM, lispro 20 u with meals. Insulin/Injectables/glucagon  -Name appropriate injection sites; proper storage; supplies needed;  1   []       Demonstrate proper technique NA []      Monitoring blood glucose, interpreting and using results:   -Identify the purpose of testing   -Identify recommended & personal blood glucose targets & HgbA1C target levels  -State the Importance of logging blood glucose levels for pattern recognition;   -State benefits of reading/using pt generated health data  -Verbalize safe lancet disposal 3 [] All     []  []    []  []  []   .    -Demonstrate proper testing technique NA []      Incorporating physical activity into lifestyle:   -State effect of exercise on blood glucose levels;   -State benefits of regular exercise;   -Define safety considerations/food choices if needed.  -Describe contraindications/maintenance of activity. 3 [] All     []  []    []  []   4/19/21 Walks 3 miles a day. Incorporating nutritional management into lifestyle:   -Describe effect of type, amount & timing of food on blood glucose  -Describe methods for preparing and planning healthy meals  -Correctly read food labels  -Name 3 foods high in Carbohydrate 1 [] All       []    []    []  []      -Plan a carbohydrate-controlled meal based on individualized meal plan  -Demonstrate CHO counting/portion control  1 []  []      Developing strategies for problem solving to promote health/change behavior. -Identify 7 self-care behaviors; Personal health risk factors; Benefits, challenges & strategies for behavioral change and set an individualized goal selection.  1       []      []Nutrition  []Monitoring  []Exercise  []Medication  []Other     Identified Barriers to learning/adherence to self management plan:    None  []  other    Instruction Method:  Power Point Presentation , Handouts and Return demonstration     Supporting Education Materials/Equipment Provided: Self-management manual and Nutritional Packet   []Romansh materials       [] services     []Other:      Encounter Type Date Attended Start Time End Time Comments No Show Dates   Assessment 4/19/21 0521 [id number]   BY PHONE NO CHARGE    Session 1         Session 2        1:1 DSMES          In person Follow-up         Gestational Diabetes         Individual MNT        Meter Instrx        Insulin Instrx           Additional Comments: [] Pt seen individually due to Covid-19 Safety precautions and no group session available.         Date:   Follow-up goal attainment based on patients initial DSMES goal    Dr Notified by [] EMR []Fax        []Post class Hgb A1C  []Medication compliance   []Plate method/meal plan compliance   []Able to state the number of Carbohydrate servings eaten at B,L,D   []Testing blood glucose as prescribed by PCP   []Exercise Routine   []Other:   []Other:     []Patient lost to follow-up  Dr Notified by []EMR []Fax     Personal Support Plan:      [] Keep all scheduled doctor appointments   [] Make and keep appointments with specialists (foot, eye, dentist) as recommended   [] Consult my pharmacist about all new medications or to ask any medication questions   [] Get tested for sleep apnea   [] Seek help for:   [] Make an appointment with:   [] Attend smoking cessation classes or call 1-800-QUIT-NOW  [] Attend Diabetes Support Group   [] Use diabetes magazines, books, or credible web-sites like the ADA for more information  [] Increase exercise at home or join an exercise program:   [] Other:

## 2021-04-20 DIAGNOSIS — I50.22 CHRONIC SYSTOLIC HEART FAILURE (HCC): Primary | ICD-10-CM

## 2021-04-20 PROCEDURE — 93264 REM MNTR WRLS P-ART PRS SNR: CPT | Performed by: INTERNAL MEDICINE

## 2021-04-21 ENCOUNTER — HOSPITAL ENCOUNTER (OUTPATIENT)
Dept: DIABETES SERVICES | Age: 41
Setting detail: THERAPIES SERIES
Discharge: HOME | End: 2021-04-21
Payer: COMMERCIAL

## 2021-04-21 PROCEDURE — G0109 DIAB MANAGE TRN IND/GROUP: HCPCS

## 2021-04-21 NOTE — PROGRESS NOTES
Diabetes Self-Management Education Record    Participant Name: Kimberli Munson  Referring Provider: Yamilex Steen MD  Assessment/Evaluation Ratings:  1=Needs Instruction   4=Demonstrates Understanding/Competency  2=Needs Review   NC=Not Covered    3=Comprehends Key Points  N/A=Not Applicable  Topics/Learning Objectives Pre-session Assess Date:  Instructor initials/date  [facility] 4/19/21 Instr. Date    Instructor initials/date  4/21/21 KMS Follow-up Post- session Eval Comments   Diabetes disease process & Treatment process:   -Define type of diabetes in simple terms.  - Describe the ABCs of  diabetes management  -Identify own type of diabetes  -Identify lifestyle changes/treatment options  -other:  1 [x] All     []  []  []  []  []  4 4/21/21 KMS  Type 2 DM x years   Developing strategies for Healthy coping/psychosocial issues:    -Describe feelings about living with diabetes  -Identify coping strategies and sources of stress  -Identify support needed & support network available  -Complete PAID-5 Diabetes questionnaire 1 [x] All     []  []  []    []  [address] [place] [address] III completed a Diabetes Self- Management Education Assessment on 4/21/21. Part of our assessment is having the patient complete the PAID (Problem Areas in Diabetes Scale)-5 survey. This tool  measures diabetes-related emotional distress a patient may be feeling. Allison Garrett III scored: 9   A total score of >8 indicates possible diabetes related emotional distress, which warrants further assessment and a referral to mental health professional for psychological support and treatment.            Prevention, detection & treatment of Chronic complications:    -Identify the prevention, detection and treatment for complications including immunizations, preventive eye, foot, dental and renal exams as indicated per the participant's duration of diabetes and health status.  -Define the natural course of diabetes and the relationship of blood glucose levels to long term complications of diabetes. 1 [x] All     []            []  4    Prevention, detection & treatment of acute complications:    -State the causes,signs & symptoms of hyper & hypoglycemia, and prevention & treatment strategies.   -Describe sick day guidelines  DKA /indications for ketone testing &  when to call physician  1 [x] All     []      []    4              -Identify severe weather/situation crisis  & diabetes supplies management 1 []      Using medications safely:   -State effects of diabetes medicines on blood glucose levels;  -List diabetes medication taken, action & side effects 2 [x] All     []  []  4 4/19/21 Metformin 1000 mg twice daily, Levemir 40 units AM & PM, lispro 20 u with meals (usually skips lunch and does not take if not eating). Insulin/Injectables/glucagon  -Name appropriate injection sites; proper storage; supplies needed;  2   [x]  4     Demonstrate proper technique NA []      Monitoring blood glucose, interpreting and using results:   -Identify the purpose of testing   -Identify recommended & personal blood glucose targets & HgbA1C target levels  -State the Importance of logging blood glucose levels for pattern recognition;   -State benefits of reading/using pt generated health data  -Verbalize safe lancet disposal 3 [x] All     []  []    []  []  []  4 4/21/21 KMS  Monitors 2 to 3 times daily. Awaiting script for Dexcom or FreeStyle Tung 2 CGM to be approved by insurance. A1C 9.3%   -Demonstrate proper testing technique NA []      Incorporating physical activity into lifestyle:   -State effect of exercise on blood glucose levels;   -State benefits of regular exercise;   -Define safety considerations/food choices if needed.  -Describe contraindications/maintenance of activity. 3 [x] All     []  []    []  []  4 4/19/21   Walks 3 miles a day.    Incorporating nutritional management into lifestyle:   -Describe effect of type, amount & timing of food on blood glucose  -Describe methods for preparing and planning healthy meals  -Correctly read food labels  -Name 3 foods high in Carbohydrate 1 [] All       []    []    []  []      -Plan a carbohydrate-controlled meal based on individualized meal plan  -Demonstrate CHO counting/portion control  1 []  []      Developing strategies for problem solving to promote health/change behavior. -Identify 7 self-care behaviors; Personal health risk factors; Benefits, challenges & strategies for behavioral change and set an individualized goal selection. 1   []      []Nutrition  []Monitoring  []Exercise  []Medication  []Other     Identified Barriers to learning/adherence to self management plan:    None  []  other    Instruction Method:  Power Point Presentation , Handouts and Return demonstration     Supporting Education Materials/Equipment Provided: Self-management manual and Nutritional Packet   []Faroese materials       [] services     []Other:      Encounter Type Date Attended Start Time End Time Comments No Show Dates   Assessment 4/19/21 [id number]   BY PHONE NO CHARGE    Session 1 4/21/21 KMS 0900 1100      Session 2        1:1 DSMES          In person Follow-up         Gestational Diabetes         Individual MNT        Meter Instrx        Insulin Instrx           Additional Comments: [x] Pt attended group classes. PCP notified via EMR.        Date:   Follow-up goal attainment based on patients initial DSMES goal    Dr Notified by [] EMR []Fax        []Post class Hgb A1C  []Medication compliance   []Plate method/meal plan compliance   []Able to state the number of Carbohydrate servings eaten at B,L,D   []Testing blood glucose as prescribed by PCP   []Exercise Routine   []Other:   []Other:     []Patient lost to follow-up  Dr Malathi Evans by []EMR []Fax     Personal Support Plan:      [x] Keep all scheduled doctor appointments   [] Make and keep appointments with specialists (foot, eye, dentist) as recommended   [] Consult my pharmacist about all new medications or to ask any medication questions   [] Get tested for sleep apnea   [] Seek help for:   [] Make an appointment with:   [] Attend smoking cessation classes or call 1-800-QUIT-NOW  [] Attend Diabetes Support Group   [] Use diabetes magazines, books, or credible web-sites like the ADA for more information  [] Increase exercise at home or join an exercise program:   [] Other:

## 2021-04-21 NOTE — LETTER
[facility])- Diabetes Education Department Initial Diabetes Education Letter    2021       Re:     Guy eNss         :  1980  Dear Dr. Emigdio Avery: Thank you for referring your patient, Guy Ness, for diabetes education. Socrates Alcocer III has completed their comprehensive education plan today which included the topics below:    Nursing/Medical [x]      Nutrition [x]  [x] Diabetes disease process & treatment   [x] Diabetes medication use and safety   [x] Self-monitoring blood glucose/interpreting results  [x] Prevention, detection and treatment of acute complications  [x] Prevention, detection and treatment of chronic complications  [x] Developing strategies to address psychosocial issues    [x] Nutritional management: basic principles   [x] Carbohydrate counting, plate method, label reading  [x] Benefits of/ways to incorporate physical activity  [x] Problem solving and preparing for crisis situations  [] Diabetes supply management  [x] Goal setting and behavior modification         PAID -5 (Problem Areas in Diabetes) Survey Results  9  A total score of 8 or greater indicates possible diabetes related emotional distress which warrants further assessment. [x] [address] and Crisis Resource information provided. Comments:     PATIENT SELECTED GOAL:   []  I will follow my meal plan and measure my carbohydrate foods. []  I will increase my activity to:  [x]  I will check my blood glucose as ordered by my doctor. []  I will take my medications at the correct times as ordered by my doctor. [x]  Other:I will look for patterns in my blood glucose levels.     DIABETES SELF-MANAGEMENT SUPPORT PLAN/REFERRALS (patient identified):  [x] Keep my scheduled visits with my doctor   [] Make and keep appointments with specialists (foot, eye, dentist) as recommended  [] Consult my pharmacist with all new medications and/or any medication questions  [] Get tested for sleep apnea  [] Seek help for:   [] Make an appointment with:   [] Attend smoking cessation classes or call help-line (9-996-QUIT-NOW; [phone])  [] Attend a diabetes support group  [] Use diabetes magazines, books, or the American Diabetes Association website (www.diabetes. org) for more information    [] Start or increase exercising at home or join a program:  [] Other: There will be a follow-up in 3 months to evaluate the attainment of their chosen goal, and self identified support plan and you will be notified of their progress. Thank you for referring this patient to our program.  Please do not hesitate to call if you have any questions at [phone] [facility] or [facility]) or [phone] ([address] [place] [address]).         Sincerely,    Eladia Barros RN, BSN, [address] [facility] [address] [facility] Diabetes Education Department  American Diabetes Association Recognized DSMES Program

## 2021-04-21 NOTE — LETTER
[facility]) Diabetes Education    Dear Dr. Marleni Roldan:     Tito Ulloa III completed a Diabetes Self- Management Education Assessment on 4/21/21. Part of our assessment is having the patient complete the PAID (Problem Areas in Diabetes Scale)-5 survey. This tool measures diabetes-related emotional distress a patient may be feeling. Tito Ulloa III scored: 9     A total score of >8 indicates possible diabetes related emotional distress, which warrants further assessment and a possible referral to a mental health professional for psychological support and treatment. This letter is for your records.     Thank you,    Jose Ann, RN, BSN, [facility]

## 2021-04-22 ENCOUNTER — HOSPITAL ENCOUNTER (OUTPATIENT)
Dept: DIABETES SERVICES | Age: 41
Setting detail: THERAPIES SERIES
Discharge: HOME | End: 2021-04-22
Payer: COMMERCIAL

## 2021-04-22 PROCEDURE — G0109 DIAB MANAGE TRN IND/GROUP: HCPCS | Performed by: DIETITIAN, REGISTERED

## 2021-04-22 NOTE — PROGRESS NOTES
Diabetes Self-Management Education Record    Participant Name: Rodrigue Marcos  Referring Provider: Jaxon Catrer MD  Assessment/Evaluation Ratings:  1=Needs Instruction   4=Demonstrates Understanding/Competency  2=Needs Review   NC=Not Covered    3=Comprehends Key Points  N/A=Not Applicable  Topics/Learning Objectives Pre-session Assess Date:  Instructor initials/date  [facility] 4/19/21 Instr. Date    Instructors initials/date  4/21/21 [facility]  4/22/21 [facility] Follow-up Post- session Eval Comments   Diabetes disease process & Treatment process:   -Define type of diabetes in simple terms.  - Describe the ABCs of  diabetes management  -Identify own type of diabetes  -Identify lifestyle changes/treatment options  -other:  1 [x] All     []  []  []  []  []  4 4/21/21 KMS  Type 2 DM x years   Developing strategies for Healthy coping/psychosocial issues:    -Describe feelings about living with diabetes  -Identify coping strategies and sources of stress  -Identify support needed & support network available  -Complete PAID-5 Diabetes questionnaire 1 [x] All     []  []  []    []  [address] III completed a Diabetes Self- Management Education Assessment on 4/21/21. Part of our assessment is having the patient complete the PAID (Problem Areas in Diabetes Scale)-5 survey. This tool  measures diabetes-related emotional distress a patient may be feeling. Dom Saucedo III scored: 9   A total score of >8 indicates possible diabetes related emotional distress, which warrants further assessment and a referral to mental health professional for psychological support and treatment.            Prevention, detection & treatment of Chronic complications:    -Identify the prevention, detection and treatment for complications including immunizations, preventive eye, foot, dental and renal exams as indicated per the participant's duration of diabetes and health status.  -Define the natural course of diabetes and the relationship of blood glucose levels to long term complications of diabetes. 1 [x] All     []            []  4    Prevention, detection & treatment of acute complications:    -State the causes,signs & symptoms of hyper & hypoglycemia, and prevention & treatment strategies.   -Describe sick day guidelines  DKA /indications for ketone testing &  when to call physician  1 [x] All     []      []    4              -Identify severe weather/situation crisis  & diabetes supplies management 1 []      Using medications safely:   -State effects of diabetes medicines on blood glucose levels;  -List diabetes medication taken, action & side effects 2 [x] All     []  []  4 4/19/21 Metformin 1000 mg twice daily, Levemir 40 units AM & PM, lispro 20 u with meals (usually skips lunch and does not take if not eating). Insulin/Injectables/glucagon  -Name appropriate injection sites; proper storage; supplies needed;  2   [x]  4     Demonstrate proper technique NA []      Monitoring blood glucose, interpreting and using results:   -Identify the purpose of testing   -Identify recommended & personal blood glucose targets & HgbA1C target levels  -State the Importance of logging blood glucose levels for pattern recognition;   -State benefits of reading/using pt generated health data  -Verbalize safe lancet disposal 3 [x] All     []  []    []  []  []  4 4/21/21 KMS  Monitors 2 to 3 times daily. Awaiting script for Dexcom or FreeStyle Tung 2 CGM to be approved by insurance. A1C 9.3%   -Demonstrate proper testing technique NA []      Incorporating physical activity into lifestyle:   -State effect of exercise on blood glucose levels;   -State benefits of regular exercise;   -Define safety considerations/food choices if needed.  -Describe contraindications/maintenance of activity. 3 [x] All     []  []    []  []  4 4/19/21   Walks 3 miles a day.    Incorporating nutritional management into lifestyle:   -Describe effect of type, amount & timing of food on blood glucose  -Describe methods for preparing and planning healthy meals  -Correctly read food labels  -Name 3 foods high in Carbohydrate 1 [x] All       []    []    []  []  4 4/22/21 Pt attended Session 2. Participated in group activities on Diabetes Plate Method and healthy food choices. Demonstrated understanding of carb counting using food lists with carbohydrate serving sizes. Read CHO content of food correctly on nutrition facts using various food labels . Questions answered. Followed along and took notes.   -Plan a carbohydrate-controlled meal based on individualized meal plan  -Demonstrate CHO counting/portion control  1 []  []      Developing strategies for problem solving to promote health/change behavior. -Identify 7 self-care behaviors; Personal health risk factors; Benefits, challenges & strategies for behavioral change and set an individualized goal selection. 1   [x]  4    []Nutrition  []Monitoring  []Exercise  [x]Medication  [x]Other: Problem solving      Identified Barriers to learning/adherence to self management plan:    None  []  other    Instruction Method:  Power Point Presentation , Handouts and Return demonstration     Supporting Education Materials/Equipment Provided: Self-management manual and Nutritional Packet   []Kyrgyz materials       [] services     []Other:      Encounter Type Date Attended Start Time End Time Comments No Show Dates   Assessment 4/19/21 [id number]   BY PHONE NO CHARGE    Session 1 4/21/21 KMS 0900 1100      Session 2 4/22/21 763 [facility] 900 1100     1:1 DSMES          In person Follow-up         Gestational Diabetes         Individual MNT        Meter Instrx        Insulin Instrx           Additional Comments: [x] Pt attended group classes. PCP notified via EMR.        Date:   Follow-up goal attainment based on patients initial DSMES goal    Dr Notified by [] EMR []Fax        []Post class Hgb A1C  []Medication compliance   []Plate method/meal plan compliance   []Able to state the number of Carbohydrate servings eaten at B,L,D   [x]Testing blood glucose as prescribed by PCP by testing 3 times a day. []Exercise Routine   [x]Other:I will look for patterns in my blood glucose log.    []Other:     []Patient lost to follow-up  Dr Notified by []EMR []Fax     Personal Support Plan:      [x] Keep all scheduled doctor appointments   [] Make and keep appointments with specialists (foot, eye, dentist) as recommended   [] Consult my pharmacist about all new medications or to ask any medication questions   [] Get tested for sleep apnea   [] Seek help for:   [] Make an appointment with:   [] Attend smoking cessation classes or call 1-800-QUIT-NOW  [] Attend Diabetes Support Group   [] Use diabetes magazines, books, or credible web-sites like the ADA for more information  [] Increase exercise at home or join an exercise program:   [] Other:

## 2021-04-22 NOTE — LETTER
[facility]) - Diabetes Education    2021     Re:     Mona Sood  :  1980    Dear Dr. Dorcas Madrigal,     Thank you for referring your patient, Mona Sood, to Diabetes Education Medical Nutrition Therapy. Your patient was here on 2021, and the following were addressed:        [x] Nutrition as Related to Diabetes    [x] Carbohydrate Counting     [x] Free Food List    [x] Combination Foods    [] Fast Foods    [] Weighing and measuring    [x] Meal Planning    [] Using Food Labels - Label Reading  [] Diabetes Education Class Schedule    [x] Diet Instruction       [x]  Diet instructed provided on: 2200 calorie carbohydrate controlled meal plan:  9 servings fat, 8 oz lean protein, 16 carbohydrate choices/day: 5 choices breakfast, 3 choices lunch, 2 choices pm snack, 5 choices dinner, 1 choices HS snack. Upon completion of this session, the following evaluation of your patients progress was made:    ASSESSMENT:  [x]  verbalizes understanding of diet principles  [x]  understands the relationship between diet and health  [x]  identifies proper food choices  [x]  identifies needed diet changes  [x]  expresses intentions to comply with diet guidelines  []  able to provide correct answers when asked    GOAL:  [x]  to follow individual meal plan  []  to weigh and measure food portions  []  to call with further questions  [x]   attended diabetes education class sessions 1 and/or 2 -  and 2021    PATIENT EDUCATION MATERIALS PROVIDED:  [x]  plate carb counting meal plan  []  low fat guidelines  []  carb counting sheet  []  low sodium guidelines  []  Other:             Patient is encouraged to call with further questions or call and re-schedule other sessions within a year from original date. Thank you for referring this patient to our program.  Please do not hesitate to call if you have any questions at ( (YONATAN or VANESSA) or [phone] ([address] [place] [address]).         Sincerely, Registered Dietitian Nutritionists:          []  GIORGI Yee          []   Cynthia SAMSON   []  GIORGI Rajput  []   GIORGI Garner [facility]           Diabetes

## 2021-04-23 PROCEDURE — 93264 REM MNTR WRLS P-ART PRS SNR: CPT | Performed by: INTERNAL MEDICINE

## 2021-04-26 RX ORDER — SPIRONOLACTONE 25 MG/1
TABLET ORAL
Qty: 15 TABLET | Refills: 10 | Status: SHIPPED
Start: 2021-04-26 | End: 2022-02-22

## 2021-04-26 RX ORDER — INSULIN DETEMIR 100 [IU]/ML
40 INJECTION, SOLUTION SUBCUTANEOUS 2 TIMES DAILY
Qty: 30 ML | Refills: 10 | Status: SHIPPED
Start: 2021-04-26 | End: 2022-04-29

## 2021-04-27 ENCOUNTER — IMMUNIZATION (OUTPATIENT)
Dept: PRIMARY CARE CLINIC | Age: 41
End: 2021-04-27
Payer: COMMERCIAL

## 2021-04-27 PROCEDURE — 0002A COVID-19, PFIZER VACCINE 30MCG/0.3ML DOSE: CPT | Performed by: NURSE PRACTITIONER

## 2021-04-27 PROCEDURE — 91300 COVID-19, PFIZER VACCINE 30MCG/0.3ML DOSE: CPT | Performed by: NURSE PRACTITIONER

## 2021-04-27 RX ORDER — ISOSORBIDE DINITRATE 10 MG/1
TABLET ORAL
Qty: 180 TABLET | Refills: 3 | Status: SHIPPED
Start: 2021-04-27 | End: 2021-09-03

## 2021-04-28 ENCOUNTER — HOSPITAL ENCOUNTER (OUTPATIENT)
Dept: DIABETES SERVICES | Age: 41
Setting detail: THERAPIES SERIES
Discharge: HOME | End: 2021-04-28
Payer: COMMERCIAL

## 2021-04-28 PROCEDURE — 97802 MEDICAL NUTRITION INDIV IN: CPT

## 2021-04-28 NOTE — PROGRESS NOTES
Diabetes Self-Management Education Record    Participant Name: Aliza Santo  Referring Provider: Juan Carlos Chung MD  Assessment/Evaluation Ratings:  1=Needs Instruction   4=Demonstrates Understanding/Competency  2=Needs Review   NC=Not Covered    3=Comprehends Key Points  N/A=Not Applicable  Topics/Learning Objectives Pre-session Assess Date:  Instructor initials/date  [facility] 4/19/21 Instr. Date    Instructors initials/date  4/21/21 [facility]  4/22/21 [facility] Follow-up Post- session Eval Comments   Diabetes disease process & Treatment process:   -Define type of diabetes in simple terms.  - Describe the ABCs of  diabetes management  -Identify own type of diabetes  -Identify lifestyle changes/treatment options  -other:  1 [x] All     []  []  []  []  []  4 4/21/21 KMS  Type 2 DM x years   Developing strategies for Healthy coping/psychosocial issues:    -Describe feelings about living with diabetes  -Identify coping strategies and sources of stress  -Identify support needed & support network available  -Complete PAID-5 Diabetes questionnaire 1 [x] All     []  []  []    []  [address] completed a Diabetes Self- Management Education Assessment on 4/21/21. Part of our assessment is having the patient complete the PAID (Problem Areas in Diabetes Scale)-5 survey. This tool  measures diabetes-related emotional distress a patient may be feeling. Andres Arreola III scored: 9   A total score of >8 indicates possible diabetes related emotional distress, which warrants further assessment and a referral to mental health professional for psychological support and treatment.            Prevention, detection & treatment of Chronic complications:    -Identify the prevention, detection and treatment for complications including immunizations, preventive eye, foot, dental and renal exams as indicated per the participant's duration of diabetes and health status.  -Define the natural course of diabetes and the relationship of blood glucose  -Describe methods for preparing and planning healthy meals  -Correctly read food labels  -Name 3 foods high in Carbohydrate 1 [x] All       []    []    []  []  4 4/22/21 Pt attended Session 2. Participated in group activities on Diabetes Plate Method and healthy food choices. Demonstrated understanding of carb counting using food lists with carbohydrate serving sizes. Read CHO content of food correctly on nutrition facts using various food labels . Questions answered. Followed along and took notes.   -Plan a carbohydrate-controlled meal based on individualized meal plan  -Demonstrate CHO counting/portion control  1 []  []  4 4/28/21 CS Instructed patient on 2200 calorie carbohydrate controlled meal plan: 9 servings fat, 8 oz lean protein, 16 carbohydrate choices/day: 5 choices breakfast, 3 choices lunch 2 choices pm snack, 5 choices dinner, 1 choice HS snack. Pt able to plan menu items using meal planning food lists. Developing strategies for problem solving to promote health/change behavior. -Identify 7 self-care behaviors; Personal health risk factors; Benefits, challenges & strategies for behavioral change and set an individualized goal selection.  1   [x]  4    []Nutrition  []Monitoring  []Exercise  [x]Medication  [x]Other: Problem solving      Identified Barriers to learning/adherence to self management plan:    None  []  other    Instruction Method:  Power Point Presentation , Handouts and Return demonstration     Supporting Education Materials/Equipment Provided: Self-management manual and Nutritional Packet   []Ecuadorean materials       [] services     []Other:      Encounter Type Date Attended Start Time End Time Comments No Show Dates   Assessment 4/19/21 [id number]   BY PHONE NO CHARGE    Session 1 4/21/21 KMS 0900 1100      Session 2 4/22/21 3 [facility] 900 1100     1:1 DSMES          In person Follow-up         Gestational Diabetes         Individual MNT 4/28/21  [id number] In person Meter Instrx        Insulin Instrx           Additional Comments: [x] Pt attended group classes. PCP notified via EMR. Date:   Follow-up goal attainment based on patients initial DSMES goal     Notified by [] EMR []Fax        []Post class Hgb A1C  []Medication compliance   []Plate method/meal plan compliance   []Able to state the number of Carbohydrate servings eaten at B,L,D   [x]Testing blood glucose as prescribed by PCP by testing 3 times a day. []Exercise Routine   [x]Other:I will look for patterns in my blood glucose log.    []Other:     []Patient lost to follow-up  Dr Notified by []EMR []Fax     Personal Support Plan:      [x] Keep all scheduled doctor appointments   [] Make and keep appointments with specialists (foot, eye, dentist) as recommended   [] Consult my pharmacist about all new medications or to ask any medication questions   [] Get tested for sleep apnea   [] Seek help for:   [] Make an appointment with:   [] Attend smoking cessation classes or call 1-800-QUIT-NOW  [] Attend Diabetes Support Group   [] Use diabetes magazines, books, or credible web-sites like the ADA for more information  [] Increase exercise at home or join an exercise program:   [] Other:

## 2021-05-04 RX ORDER — ALLOPURINOL 300 MG/1
300 TABLET ORAL DAILY
Qty: 30 TABLET | Refills: 11 | Status: SHIPPED
Start: 2021-05-04 | End: 2022-04-29

## 2021-05-04 NOTE — TELEPHONE ENCOUNTER
Last Appointment:  3/29/2021  Future Appointments   Date Time Provider Clemente Davis   5/5/2021 12:15 PM [facility] CHF ROOM 3 SE CHF [facility]   5/17/2021  1:40 PM Glendia Moorman, MD [facility]

## 2021-05-05 ENCOUNTER — HOSPITAL ENCOUNTER (OUTPATIENT)
Dept: OTHER | Age: 41
Setting detail: THERAPIES SERIES
Discharge: HOME | End: 2021-05-05
Payer: COMMERCIAL

## 2021-05-05 VITALS
SYSTOLIC BLOOD PRESSURE: 115 MMHG | BODY MASS INDEX: 41.09 KG/M2 | RESPIRATION RATE: 18 BRPM | DIASTOLIC BLOOD PRESSURE: 67 MMHG | HEART RATE: 76 BPM | WEIGHT: 303 LBS

## 2021-05-05 LAB
ANION GAP SERPL CALCULATED.3IONS-SCNC: 11 MMOL/L (ref 7–16)
BUN BLDV-MCNC: 22 MG/DL (ref 6–20)
CALCIUM SERPL-MCNC: 10.1 MG/DL (ref 8.6–10.2)
CHLORIDE BLD-SCNC: 103 MMOL/L (ref 98–107)
CO2: 23 MMOL/L (ref 22–29)
CREAT SERPL-MCNC: 1.5 MG/DL (ref 0.7–1.2)
GFR AFRICAN AMERICAN: >60
GFR NON-AFRICAN AMERICAN: 52 ML/MIN/1.73
GLUCOSE BLD-MCNC: 124 MG/DL (ref 74–99)
POTASSIUM SERPL-SCNC: 3.9 MMOL/L (ref 3.5–5)
PRO-BNP: 429 PG/ML (ref 0–125)
SODIUM BLD-SCNC: 137 MMOL/L (ref 132–146)

## 2021-05-05 PROCEDURE — 83880 ASSAY OF NATRIURETIC PEPTIDE: CPT

## 2021-05-05 PROCEDURE — 36415 COLL VENOUS BLD VENIPUNCTURE: CPT

## 2021-05-05 PROCEDURE — 80048 BASIC METABOLIC PNL TOTAL CA: CPT

## 2021-05-05 PROCEDURE — 99214 OFFICE O/P EST MOD 30 MIN: CPT

## 2021-05-05 NOTE — PROGRESS NOTES
[facility]   CHF Clinic       [place]   1980  [phone]       Referring Doctor: Jos Perales  Cardiologist: Ángel Rey  Nephrologist: n/a      History of Present Illness:     [place] MONTESINOS is a 36 y.o. male with a history of HFrEF, most recent EF 31% on 1/19/2021. Patient Story:    He does have dyspnea with exertion, shortness of breath, or decline in overall functional capacity. He does not have orthopnea, PND, nocturnal cough or hemoptysis. He does not have abdominal distention or bloating, early satiety, anorexia/change in appetite. He does has a good urinary response to their oral diuretic. He does not have  lower extremity edema. He denies lightheadedness, dizziness. He denies palpitations, syncope or near syncope. He sometimes complains of chest pain and takes nitro, but is currently out. Patient instructed to call physician with any further chest pain and to reorder more nitro per physician. Allergies   Allergen Reactions    [place] [Dapagliflozin] Other (See Comments)     Caused throat to swell         No outpatient medications have been marked as taking for the 5/5/21 encounter [facility] Encounter) with [facility] CHF ROOM 3.           Guideline directed medical:  ARNI/ACE I/ARB: Yes  Beta blocker:   Yes  Aldosterone antagonist:  Yes        Physical Examination:     /67   Pulse 76   Resp 18   Wt (!) 303 lb (137.4 kg)   BMI 41.09 kg/m²     Assessment  Charting Type: Shift assessment                   Respiratory  Respiratory Pattern: Regular  Respiratory Depth: Normal  Respiratory Quality/Effort: Unlabored  Chest Assessment: Chest expansion symmetrical  L Breath Sounds: Clear, Diminished  R Breath Sounds: Clear, Diminished              Cardiac  Cardiac Rhythm: Normal sinus rhythm, Ventricular Paced    Rhythm Interpretation  Pulse: 76         Gastrointestinal  Abdominal (WDL): Exceptions to WDL  Abdomen Inspection: Distended, Soft  RUQ Bowel Sounds: Active  LUQ Bowel Sounds: Active  RLQ Bowel Sounds: Active  LLQ Bowel Sounds: Active          Bowel Sounds  RUQ Bowel Sounds: Active  LUQ Bowel Sounds: Active  RLQ Bowel Sounds: Active  LLQ Bowel Sounds: Active    Peripheral Vascular  Peripheral Vascular (WDL): Within Defined Limits  RLE Edema: None  LLE Edema: None                                                 Pulse: 76                   LAB DATA:    BNP  No results for input(s): BNP in the last 72 hours. proBNP  No results for input(s): PROBNP in the last 72 hours. BMP  No results for input(s): NA, K, CL, CO2, BUN, CREATININE, GLUCOSE, CALCIUM in the last 72 hours. WEIGHTS:  Wt Readings from Last 3 Encounters:   05/05/21 (!) 303 lb (137.4 kg)   04/07/21 (!) 303 lb (137.4 kg)   03/29/21 (!) 302 lb (137 kg)         TELEMETRY:  Cardiac Regularity: Regular  Cardiac Rhythm/Interpretation: NSR        ASSESSMENT:  Deniz Shore III is evolemic with stable weights     Interventions completed this visit:  IV diuretics given no  Lab work obtained yes, BNP/BMP   Reviewed continue current medications that patient as prescribed answered any questions   Educated on signs and symptoms of HF  Educated on low sodium diet    PLAN:  Scheduled to follow up in CHF clinic on June 9, 2021   Given clinic phone number and aware of signs and symptoms to call with any HF change in symptoms.

## 2021-05-06 RX ORDER — DOFETILIDE 0.25 MG/1
250 CAPSULE ORAL EVERY 12 HOURS SCHEDULED
Qty: 60 CAPSULE | Refills: 3 | Status: SHIPPED
Start: 2021-05-06 | End: 2021-07-30

## 2021-05-17 ENCOUNTER — OFFICE VISIT (OUTPATIENT)
Dept: FAMILY MEDICINE CLINIC | Age: 41
End: 2021-05-17
Payer: COMMERCIAL

## 2021-05-17 VITALS
SYSTOLIC BLOOD PRESSURE: 120 MMHG | WEIGHT: 299 LBS | HEART RATE: 70 BPM | BODY MASS INDEX: 40.5 KG/M2 | TEMPERATURE: 98.1 F | HEIGHT: 72 IN | DIASTOLIC BLOOD PRESSURE: 67 MMHG | OXYGEN SATURATION: 98 %

## 2021-05-17 DIAGNOSIS — E66.01 MORBID OBESITY WITH BMI OF 40.0-44.9, ADULT (HCC): ICD-10-CM

## 2021-05-17 DIAGNOSIS — E11.8 TYPE 2 DIABETES MELLITUS WITH COMPLICATION, WITH LONG-TERM CURRENT USE OF INSULIN (HCC): ICD-10-CM

## 2021-05-17 DIAGNOSIS — I10 ESSENTIAL HYPERTENSION: Primary | ICD-10-CM

## 2021-05-17 DIAGNOSIS — N18.9 CHRONIC KIDNEY DISEASE, UNSPECIFIED CKD STAGE: ICD-10-CM

## 2021-05-17 DIAGNOSIS — Z79.4 TYPE 2 DIABETES MELLITUS WITH COMPLICATION, WITH LONG-TERM CURRENT USE OF INSULIN (HCC): ICD-10-CM

## 2021-05-17 DIAGNOSIS — I10 ESSENTIAL HYPERTENSION: ICD-10-CM

## 2021-05-17 DIAGNOSIS — I50.42 CHRONIC COMBINED SYSTOLIC AND DIASTOLIC CONGESTIVE HEART FAILURE (HCC): ICD-10-CM

## 2021-05-17 LAB
CHOLESTEROL, TOTAL: 142 MG/DL (ref 0–199)
HBA1C MFR BLD: 7.2 % (ref 4–5.6)
HDLC SERPL-MCNC: 46 MG/DL
LDL CHOLESTEROL CALCULATED: 74 MG/DL (ref 0–99)
TRIGL SERPL-MCNC: 112 MG/DL (ref 0–149)
VLDLC SERPL CALC-MCNC: 22 MG/DL

## 2021-05-17 PROCEDURE — 90739 HEPB VACC 2/4 DOSE ADULT IM: CPT

## 2021-05-17 PROCEDURE — G8417 CALC BMI ABV UP PARAM F/U: HCPCS | Performed by: FAMILY MEDICINE

## 2021-05-17 PROCEDURE — 6360000002 HC RX W HCPCS

## 2021-05-17 PROCEDURE — 36415 COLL VENOUS BLD VENIPUNCTURE: CPT | Performed by: FAMILY MEDICINE

## 2021-05-17 PROCEDURE — G0010 ADMIN HEPATITIS B VACCINE: HCPCS

## 2021-05-17 PROCEDURE — 2022F DILAT RTA XM EVC RTNOPTHY: CPT | Performed by: FAMILY MEDICINE

## 2021-05-17 PROCEDURE — 1036F TOBACCO NON-USER: CPT | Performed by: FAMILY MEDICINE

## 2021-05-17 PROCEDURE — G8427 DOCREV CUR MEDS BY ELIG CLIN: HCPCS | Performed by: FAMILY MEDICINE

## 2021-05-17 PROCEDURE — 3046F HEMOGLOBIN A1C LEVEL >9.0%: CPT | Performed by: FAMILY MEDICINE

## 2021-05-17 PROCEDURE — 99212 OFFICE O/P EST SF 10 MIN: CPT | Performed by: FAMILY MEDICINE

## 2021-05-17 PROCEDURE — 99214 OFFICE O/P EST MOD 30 MIN: CPT | Performed by: FAMILY MEDICINE

## 2021-05-17 NOTE — PROGRESS NOTES
I was notified that my comment that he was checking sugars four times daily and adjusting insulin based on those readings was not adequate for insurance. I should have noted that he was also taking insulin at least 4 times daily and using the sliding scale as documented.

## 2021-05-17 NOTE — PATIENT INSTRUCTIONS
Sugar     Humalog    <130      5  130-180     7  180-220     9  220-270     11  270-320     13  >320      15      If over 300 for 24 hours, call office

## 2021-05-17 NOTE — PROGRESS NOTES
[facility]   OFFICE PROGRESS NOTE  DATE OF VISIT : 5/17/2021         Chief Complaint :   Chief Complaint   Patient presents with    Check-Up       HPI:   Helena Espinoza comes to clinic today for     F/U of chronic problem(s)     Chronic problems reviewed today include:     Hyperlipidemia, Diabetes mellitus, Obesity, Congestive Heart Failure and Chronic kidney disease    Current status of this/these condition(s):  stable    Tolerating meds: Yes    Additional history: Ernesto Rankin was scheduled so that we could document his diabetic issues and control so that he could obtain a CGM device. However, he states that he doesn't think that he wants one now. He is doing well, and has been monitoring his sugars four times daily and adjusting insulin accordingly. He is attending CHF clinic and going for diabetic education. He still has some problems understanding insulin adjustment based on his readings. He has no hypoglycemia, and tries to maintain some consistency with meals. He has recorded all of his readings over the past month. Objective:    VS:  Blood pressure 120/67, pulse 70, temperature 98.1 °F (36.7 °C), temperature source Temporal, height 6' (1.829 m), weight 299 lb (135.6 kg), SpO2 98 %. General Appearance: Well developed, awake, alert, oriented, no acute distress  No further exam is performed         Psychiatric: has a normal mood and affect. Behavior is normal.     I independently reviewed the following information:  office notes, referral letter/letters and CHF clinic, diabetic education notes, and his home sugar readings. They demonstrate fairly good control, with notable exceptions of readings above 200 on rare occasions. 1. Essential hypertension  -     Lipid Panel; Future  2. Chronic combined systolic and diastolic congestive heart failure ([address]  3. Chronic kidney disease, unspecified CKD stage  4.  Morbid obesity with BMI of 40.0-44.9, adult ([facility]  5. Type 2 diabetes mellitus with complication, with long-term current use of insulin (HCC)  -     Lipid Panel; Future  -     Hemoglobin A1C; Future      Additional Plan:  Labs obtained. After review of his recordings , we came up with the following sliding scale. Sugar     Humalog    <130      5  130-180     7  180-220     9  220-270     11  270-320     13  >320      15      If over 300 for 24 hours, call office    He will continue all other meds, and return in 3 months. We will give him his Hep B shot, and he was advised to make eye appointment. On this date 5/17/2021 I have spent 32 minutes reviewing previous notes, test results and face to face with the patient discussing the diagnosis and importance of compliance with the treatment plan as well as documenting on the day of the visit. RTO in in 3 month(s) or sooner for any persistent, new, or worsening symptoms. Please see Patient Instructions for further counseling and information given. Advised to be adherent to the treatment plans discussed today, and please call with any questions or concerns, letting the office know of any reasons that the plans may not be followed. The risks of untreated conditions include worsening illness, injury, disability, and possibly, death. Please call if symptoms change in any way, worsen, or fail to completely resolve, as this could necessitate a change to treatment plans. Patient and/or caregiver expressed understanding. Indications and proper use of medication(s) reviewed. Potential side-effects and risks of medication(s) also explained. Patient and/or caregiver was instructed to call if any new symptoms develop prior to next visit. Health risk factors discussed and addressed.     Signed by : Rio Chris MD, MJODI.

## 2021-05-20 DIAGNOSIS — I50.22 CHRONIC SYSTOLIC HEART FAILURE (HCC): Primary | ICD-10-CM

## 2021-05-25 PROCEDURE — 93264 REM MNTR WRLS P-ART PRS SNR: CPT | Performed by: INTERNAL MEDICINE

## 2021-05-28 NOTE — TELEPHONE ENCOUNTER
Last Appointment:  5/17/2021  Future Appointments   Date Time Provider Clemente Davis   6/9/2021 12:15 PM [facility] ROOM 3 [facility] Adrianna Polanco

## 2021-05-30 RX ORDER — MAGNESIUM OXIDE 400 MG/1
400 TABLET ORAL DAILY
Qty: 30 TABLET | Refills: 11 | Status: SHIPPED
Start: 2021-05-30 | End: 2022-05-25

## 2021-06-03 ENCOUNTER — TELEPHONE (OUTPATIENT)
Dept: CARDIOLOGY CLINIC | Age: 41
End: 2021-06-03

## 2021-06-03 NOTE — TELEPHONE ENCOUNTER
CardioMems cellular adapter replacement:    Received new cardiomems pillow today. 2 days turn around time to receive. Good send today. No s/s chf. PLAN  Send mems daily   Completed the upgrade process for cardiomems to 4G pillow.

## 2021-06-04 ENCOUNTER — TELEPHONE (OUTPATIENT)
Dept: ADMINISTRATIVE | Age: 41
End: 2021-06-04

## 2021-06-04 NOTE — TELEPHONE ENCOUNTER
Pt calling to schedule his f/u with Dr. Raul Parkinson. Last saw Dillon Olson on: 2/18/21 - it is not clear when he needs to be seen again.

## 2021-06-09 ENCOUNTER — HOSPITAL ENCOUNTER (OUTPATIENT)
Dept: OTHER | Age: 41
Setting detail: THERAPIES SERIES
Discharge: HOME | End: 2021-06-09
Payer: COMMERCIAL

## 2021-06-14 ENCOUNTER — TELEPHONE (OUTPATIENT)
Dept: CARDIOLOGY CLINIC | Age: 41
End: 2021-06-14

## 2021-06-14 NOTE — TELEPHONE ENCOUNTER
Called [place] , no mems readings sent since 6-8-21. He sent reading while on phone. At [facility] 21. Denies s/s chf but says few days ago was fast heart rates, (he will call DR Alba Bal his EP manages a fib. Can do device interrogation ) says he feels back to normal now.       Victor Manuel German RN

## 2021-06-22 NOTE — PROGRESS NOTES
up.  11. 3/2012 TTE Dr Garcia Self: EF 30-35% and enlarged LV.  12. 7/2013 Cardiac MRI: EF 20% with significant right ventricular systolic dysfunction and evidence of myocarditis. 13. 7/2013 Positive for Bufloresh Kitchen. 14. 10/1/2013 Medtronic single lead ICD placement Dr Donaldo Mcgill. 15. 10/31/2013 CT Head --> New decreased attenuation in the left occipital lobe which is thought to likely represent nonacute ischemic change. 16. Chronic HFrEF with recurrent admissions for HF. 17. Paroxsymal AF date on onset ? 18. [address] with Xarelto. 19. Reported history of hepatomegaly. 20. 1/2016 Bilateral carotid Dopplers: 0% to 49% narrowing of both carotid arteries. 21. 1/2016 TTE [facility]: EF 10-15%. Severely dilated LV. Moderately dilated LA. Mild PHTN. Mildly dilated RA. Moderately to severely reduced RVSF. Mild to moderate MR.  22. 7/30/2016 Syncopal episode presented to [facility] 70 with normal device interrogation. 23. 8/2016 appropriate ICD shock reported. 24. 8/11/2016 villalobos placement due to anasarca. 25. 8/2016 + UTI for enterobacter cloacae. 26. 10/15/2018 SVT with ICD discharge. 27. 11/2018 TTE EF 20%. 28. 11/20/2018 Upgrade to Medtronic Bi-V ICD. 29. 12/13/2018 Cardio-Mems implant. 30. 7/2019 TTE: EF 20-25%. Moderately to severely dilated LA. Mild MR. Moderate TR. RVSP 45 mmHg. No pericardial effusions. 31. 7/30/2019 While exercising Sustained monomorphic VT that degenerated into ventricular fibrillation with antitachycardia pacing therapy delivered  during charge. Successful ICD discharge lead to restoration of sinus rhythm. 28. 7/30/2019 read as old L occiptal CVA  35. 8/1/2019 [facility] Dr Pacheco: Left main: 0%  stenosis.  LAD: 40-50 %  stenosis Circumflex: 30 % stenosis.  RCA: Dominant.  30 %  stenosis. LV angio: not performed. 34. 2/14/2020 Elective cardioversion --> Successful cardioversion to sinus rhythm.   35. 3/2020 Palpitations found to be in AF RVR and HFrEF.  36. 3/14/2020 started on Tikosyn and converted to SR. Follows with Dr. Avery Dillon. 40. 8/5/2020 TTE Dr Driscoll: Left ventricle dilated at 7.1cm. Severe global hypokinesis, LV EF  estimated 25-30%.  Mild CLVH. . Stage II DD. Left atrium is enlarged. Interatrial septum not well visualized but appears intact. Normal right ventricle size and function. Pacer/ICD lead in RV. No mitral valve prolapse. There is trace aortic regurgitation. There is trace tricuspid regurgitation, RVSP 30mmHg. Normal aortic root size. Trace of pericardial effusion. Pericardium appears normal. No intra cardiac mass or thrombus. Compared to prior echo from 7/31/19 - No significant changes noted  38. 12/2020 AF RVR (converted to SR with ICD shock) and sustained VT and ICD shock evaluated by Dr Avery Dillon. 44. 12/15/2020 return to AFL s/p overdrive pace termination of atrial flutter.        CARDIAC TESTING:  Echocardiogram (8/11/2016, Dr. Darrion Angel)   Summary   Left ventricular chamber moderately dilated at 7.3cm.   Severe global hypokinesis, mild abnormal septal motion, EF estimated about 20%.   Stage 3 diastolic dysfunction.   Left atrium is enlarged.   Increased LA volume index.   Interatrial septum not well visualized but appears intact.   Normal right ventricle structure and function.   Pacer/ICD lead in RV.   No mitral valve prolapse.   There is mild mitral regurgitation.   Normal aortic valve structure and function.   Normal tricuspid valve structure.   There is mild tricuspid regurgitation, RVSP 35mmHg.   Normal aortic root and ascending aorta.   No evidence of pericardial effusion. Pericardium appears normal.   The inferior vena cava diameter is normal with decreased respiratory variation.   No intracardiac mass.     Echocardiogram (10/15/2018, Dr. Anthony Wright)   Summary   Severely dilated left ventricle.   Abnormal (paradoxical) motion consistent with conduction abnormality.   Severely reduced left ventricular systolic function.  EF 15-20%   There is doppler evidence of stage III diastolic dysfunction.   The left atrium is severely dilated.   Right ventricle global systolic function is reduced.    Mild to moderate mitral regurgitation is present.   Mild tricuspid regurgitation.  Wilfredo Luke is a trivial circumferential pericardial effusion noted.     RHC with cardioMEMS implant (12/2018)  PRESSURES:  RA 12/15, 11.  RV 67/8, 14.  PA 65/27, 43. PCW 22. CARDIAC OUTPUT:  4.3 L/min. CARDIAC INDEX:  1.7 L/min/m2. PULSE OXIMETRY:  100%, (on 2 L nasal cannula). Pulmonary artery, oxygen saturation 56%. CONCLUSIONS:  1.  Moderate-to-severe pulmonary hypertension. 2.  Low pulmonary artery oxygen saturation and low cardiac output and cardiac index consistent with LV dysfunction. 3.  Successful placement of CardioMEMS heart failure monitoring device in the left pulmonary artery.     TTE (7/31/2019)   Summary   Severely dilated left ventricle.   Ejection fraction is visually estimated at 20-25%. However there is a   definite improvement in overall LV systolic function since previous study from 2018.   Diffuse global hypokinesis   The left atrium is moderate-severely dilated.   Structurally normal mitral valve.   Mild mitral regurgitation is present.   Increased E point septal separation noted,suggesting decreased LV cardiac output.   Aortic valve opens well.   The aortic valve appears mildly sclerotic.   Normal tricuspid valve structure and function.   Moderate tricuspid regurgitation.  RVSP is 45 mmHg.   No evidence of pericardial effusion.     [facility] (8/2019, Dr. Shala Espinoza)  Findings:  Left main: 0%  stenosis  LAD: 40-50 %  stenosis  Circumflex: 30 %   stenosis  RCA: Dominant.  30 %  stenosis  LV angio: not performed  Hemodynamics:  LV: 89 mmHg. EDP: 16 No gradient across AV. Ao: 81/67 ( 69 ). Post op diagnosis:  Mild CAD involving the LAD  PLAN:  Medical therapy / risk factor modification as per the advanced HF / Pulmonary hypertension team     NM Stress (1/19/2021)  Impression:   The myocardial perfusion imaging was equivocal with fixed inferior and  apical perfusion abnormalities in the face of ventricular pacing and  artifact. No reversible perfusion abnormalities were identified. Overall left ventricular systolic function was moderately impaired  with a dilated left ventricular cavity and abnormal septal motion the  face of ventricular pacing with additional generalized hypokinesis. Intermediate risk pharmacologic myocardial perfusion imaging study.        SOCIAL HISTORY:    Lifelong non smoker  ETOH: rarely  Illicit Drugs: Denies  Caffeine: Denies  Activity: Lives in 2nd floor apartment (elevator). Does not drive, on disability.      FAMILY HISTORY:   Mother  at the age of 48 from complications of stomach cancer. + HTN  Father alive age 72 with HLD no reported CAD or PPM/ICD    Sister alive no DM or CAD  He has no children      PAST SURGICAL HISTORY:    Past Surgical History:   Procedure Laterality Date    CARDIAC CATHETERIZATION  2019    Dr Barby Knutson  2018    UPGRADE S-ICD TO BIV ICD   ([employer]TR[employer])  [address] [facility]     CARDIOVERSION  2020    Successful CV of AF to NSR      (Dr. Edison Javier)    ECHO COMPL W DOP COLOR FLOW  2011         ECHO COMPL W DOP COLOR FLOW  2012         INSERTABLE CARDIAC MONITOR  2018    cardioMIGUELINA luke, Dr. Arnel Saucedo  12/15/2020    SUCCESSFUL OVERDRIVE PACE TERMINATION OF AF VIA ICD    ( [address] [place])    PACEMAKER PLACEMENT         HOME MEDICATIONS:  Prior to Admission medications    Medication Sig Start Date End Date Taking? Authorizing Provider   bumetanide (BUMEX) 2 MG tablet TAKE ONE (1) TABLET BY MOUTH TWICE DAILY *SEND IN VIALS, NOT IN PILL PACKING.  DISCONTINUE THE DEMADEX SCRIPT* 21  Yes Colin Crowe MD   magnesium oxide (MAG-OX) 400 MG tablet Take 1 tablet by mouth daily 21  Yes Sonny Decker MD   dofetilide (TIKOSYN) 250 MCG capsule TAKE 1 CAPSULE BY MOUTH EVERY 12 HOURS  21 Yes Becka Aguilar MD   allopurinol (ZYLOPRIM) 300 MG tablet Take 1 tablet by mouth daily 5/4/21  Yes Becka Aguilar MD   isosorbide dinitrate (ISORDIL) 10 MG tablet TAKE TWO (2) TABLETS BY MOUTH THREE TIMES A DAY  Patient taking differently: Take 10 mg by mouth 3 times daily  4/27/21  Yes Mariama Gomez MD   spironolactone (ALDACTONE) 25 MG tablet TAKE 1/2 TABLET BY MOUTH DAILY WITH LUNCH  Patient taking differently: Take 25 mg by mouth daily  4/26/21  Yes Becka Aguilar MD   LEVEMIR FLEXTOUCH 100 UNIT/ML injection pen INJECT 40 UNITS INTO THE SKIN 2 TIMES DAILY 4/26/21  Yes Becka Aguilar MD   XARELTO 20 MG TABS tablet TAKE 1 TABLET BY MOUTH DAILY 3/26/21  Yes Becka Aguilar MD   nitroGLYCERIN (NITROSTAT) 0.4 MG SL tablet up to max of 3 total doses.  If no relief after 1 dose, call 911. 2/22/21  Yes Becka Aguilar MD   atorvastatin (LIPITOR) 40 MG tablet Take 40 mg by mouth daily   Yes Historical Provider, MD   metFORMIN (GLUCOPHAGE) 1000 MG tablet Take 1,000 mg by mouth 2 times daily (with meals)   Yes Historical Provider, MD   insulin lispro (HUMALOG) 100 UNIT/ML injection vial Inject 20 Units into the skin 3 times daily (before meals) 8/28/20  Yes Becka Aguilar MD   carvedilol (COREG) 25 MG tablet Take 1 tablet by mouth 2 times daily (with meals) 8/28/20  Yes Mariama Gomez MD   dapagliflozin (FARXIGA) 10 MG tablet Take 10 mg by mouth every morning  Patient not taking: Reported on 7/1/2021    Historical Provider, MD   Continuous Blood Gluc Transmit (DEXCOM G6 TRANSMITTER) MISC USE AS DIRECTED 3/29/21   Becka Aguilar MD   sacubitril-valsartan (ENTRESTO)  MG per tablet Take 1 tablet by mouth 2 times daily  Patient not taking: Reported on 7/1/2021 2/27/21   Mariama Gomez MD   [facility] ULTRA strip  2/15/21   Historical Provider, MD   rivaroxaban (XARELTO) 20 MG TABS tablet Take 20 mg by mouth Daily with supper    Historical Provider, MD   magnesium oxide (MAG-OX) 400 MG tablet Take 1 tablet by mouth daily 8/28/20   Brenda Norton MD       CURRENT MEDICATIONS:      Current Outpatient Medications:     bumetanide (BUMEX) 2 MG tablet, TAKE ONE (1) TABLET BY MOUTH TWICE DAILY *SEND IN VIALS, NOT IN PILL PACKING. DISCONTINUE THE DEMADEX SCRIPT*, Disp: 60 tablet, Rfl: 3    magnesium oxide (MAG-OX) 400 MG tablet, Take 1 tablet by mouth daily, Disp: 30 tablet, Rfl: 11    dofetilide (TIKOSYN) 250 MCG capsule, TAKE 1 CAPSULE BY MOUTH EVERY 12 HOURS , Disp: 60 capsule, Rfl: 3    allopurinol (ZYLOPRIM) 300 MG tablet, Take 1 tablet by mouth daily, Disp: 30 tablet, Rfl: 11    isosorbide dinitrate (ISORDIL) 10 MG tablet, TAKE TWO (2) TABLETS BY MOUTH THREE TIMES A DAY (Patient taking differently: Take 10 mg by mouth 3 times daily ), Disp: 180 tablet, Rfl: 3    spironolactone (ALDACTONE) 25 MG tablet, TAKE 1/2 TABLET BY MOUTH DAILY WITH LUNCH (Patient taking differently: Take 25 mg by mouth daily ), Disp: 15 tablet, Rfl: 10    LEVEMIR FLEXTOUCH 100 UNIT/ML injection pen, INJECT 40 UNITS INTO THE SKIN 2 TIMES DAILY, Disp: 30 mL, Rfl: 10    XARELTO 20 MG TABS tablet, TAKE 1 TABLET BY MOUTH DAILY, Disp: 30 tablet, Rfl: 10    nitroGLYCERIN (NITROSTAT) 0.4 MG SL tablet, up to max of 3 total doses.  If no relief after 1 dose, call 911., Disp: 25 tablet, Rfl: 3    atorvastatin (LIPITOR) 40 MG tablet, Take 40 mg by mouth daily, Disp: , Rfl:     metFORMIN (GLUCOPHAGE) 1000 MG tablet, Take 1,000 mg by mouth 2 times daily (with meals), Disp: , Rfl:     insulin lispro (HUMALOG) 100 UNIT/ML injection vial, Inject 20 Units into the skin 3 times daily (before meals), Disp: 10 mL, Rfl: 10    carvedilol (COREG) 25 MG tablet, Take 1 tablet by mouth 2 times daily (with meals), Disp: 60 tablet, Rfl: 11    dapagliflozin (FARXIGA) 10 MG tablet, Take 10 mg by mouth every morning (Patient not taking: Reported on 7/1/2021), Disp: , Rfl:     Continuous Blood Gluc Transmit (DEXCOM G6 TRANSMITTER) MISC, USE AS DIRECTED, Disp: 1 each, Rfl: 0    sacubitril-valsartan (ENTRESTO)  MG per tablet, Take 1 tablet by mouth 2 times daily (Patient not taking: Reported on 7/1/2021), Disp: 180 tablet, Rfl: 3    ONETOUCH ULTRA strip, , Disp: , Rfl:       ALLERGIES:  [place] [dapagliflozin]      REVIEW OF SYSTEMS:     · Constitutional: +3lb weight loss. Denies fevers, chills, night sweats, and generalized fatigue. · HEENT: Denies headaches, nose bleeds, rhinorrhea, sore throat. Denies blurred vision. Denies dysphagia, odynophagia. · Musculoskeletal: Denies falls, pain to BLE with ambulation. Denies muscle weakness. · Neurological: Denies dizziness and lightheadedness, numbness and tingling. Denies focal neurological deficits. · Cardiovascular: +chest pain. Denies palpitations, diaphoresis, near syncope, syncope. Denies PND, orthopnea, peripheral edema. · Respiratory: Denies shortness of breath at rest or with exertion. Denies cough, hemoptysis. · Gastrointestinal: Denies abdominal pain, nausea/vomiting, diarrhea and constipation, black/bloody, and tarry stools. · Genitourinary: Denies dysuria and hematuria. · Hematologic: Denies excessive bruising or bleeding. · Endocrine: Denies excessive thirst. Denies intolerance to hot and cold. PHYSICAL EXAM:   BP 98/64 (Site: Right Upper Arm, Position: Sitting)   Pulse 70   Resp 18   Ht 6' (1.829 m)   Wt (!) 300 lb 12.8 oz (136.4 kg)   BMI 40.80 kg/m²   CONST:  Well developed, morbidly obese AAM who appears stated age. Awake, alert, cooperative, no apparent distress. HEENT:   Head- Normocephalic, atraumatic. Eyes- Conjunctivae pink, anicteric. Neck-  No stridor, trachea midline, no apparent jugular venous distention. CHEST: Chest symmetrical and non-tender to palpation. No accessory muscle use or intercostal retractions. RESPIRATORY: Lung sounds - clear throughout fields. Diminished. CARDIOVASCULAR:     No noted carotid bruit.   Heart Ausculation- Regular rate and rhythm, no apparent murmur. PV: No lower extremity edema. Pedal pulses palpable, no clubbing or cyanosis. ABDOMEN: Soft, non-tender to light palpation. Bowel sounds present. MS: Good muscle strength and tone. No atrophy or abnormal movements. : Deferred  SKIN: Warm and dry. No statis dermatitis or ulcers. NEURO / PSYCH: Oriented to person, place and time. Speech clear and appropriate. Follows all commands. Pleasant affect. DATA:    EKG 7/1/2021:  As noted Cardiology tab        Labs:   HgA1c:   Lab Results   Component Value Date    LABA1C 7.2 (H) 05/17/2021     FASTING LIPID PANEL:  Lab Results   Component Value Date    CHOL 142 05/17/2021    HDL 46 05/17/2021    LDLCALC 74 05/17/2021    TRIG 112 05/17/2021         ASSESSMENT:  1. Atypical chest pain, likely GI in etiology. 2. Chronic HFrEF. Appears euvolemic today on exam. ACC stage C, NYHA class III. 3. Nonischemic dilated cardiomyopathy, likely burn out hypertensive due to noncompliance  4. LVEF 15-20% --> 20-25%, LVEDD 75 >69  5. Normal coronaries (2012) and only mild disease (7/2019) on cardiac catheterization. Nonischemic stress 1/2021. 6. CRT-D placed 11/21/2018 - follows with Dr. Sunil Manuel. 7. RV dysfunction. 8. Mild-moderate MR and Mild TR.  9. PAF, on Tikosyn. Anticoagulated with Xarelto. 10. T2DM. 11. HLD - on statin therapy. Follows with PCP. 12. Iron deficiency. 13. Morbid obesity. 14. ARVIND - reports compliance with CPAP. 15. CKD. 16. Flat affect.           PLAN:   1. Patient wishes to attempt over the counter TUMS before trying prescribed PPI. 2. I advised him to try TUMS when he has the chest discomfort after meals, and to call the office next week to assess response. If no relief with Tums, can send in Prilosec or Protonix prescription at that time.   3. If patient still has no relief with PPI addition, will likely need GI referral.  4. Chest discomfort is likely non-cardiac in etiology due to only mild coronary artery disease noted

## 2021-06-23 DIAGNOSIS — I50.22 CHRONIC SYSTOLIC HEART FAILURE (HCC): ICD-10-CM

## 2021-06-23 RX ORDER — BUMETANIDE 2 MG/1
TABLET ORAL
Qty: 60 TABLET | Refills: 3 | Status: SHIPPED
Start: 2021-06-23 | End: 2021-09-23 | Stop reason: SDUPTHER

## 2021-06-29 PROCEDURE — 93264 REM MNTR WRLS P-ART PRS SNR: CPT | Performed by: INTERNAL MEDICINE

## 2021-06-30 DIAGNOSIS — I50.22 CHRONIC SYSTOLIC HEART FAILURE (HCC): Primary | ICD-10-CM

## 2021-07-01 ENCOUNTER — OFFICE VISIT (OUTPATIENT)
Dept: CARDIOLOGY CLINIC | Age: 41
End: 2021-07-01

## 2021-07-01 VITALS
DIASTOLIC BLOOD PRESSURE: 64 MMHG | RESPIRATION RATE: 18 BRPM | HEIGHT: 72 IN | BODY MASS INDEX: 40.74 KG/M2 | WEIGHT: 300.8 LBS | HEART RATE: 70 BPM | SYSTOLIC BLOOD PRESSURE: 98 MMHG

## 2021-07-01 DIAGNOSIS — R07.9 CHEST PAIN, UNSPECIFIED TYPE: Primary | ICD-10-CM

## 2021-07-01 PROCEDURE — G8417 CALC BMI ABV UP PARAM F/U: HCPCS | Performed by: PHYSICIAN ASSISTANT

## 2021-07-01 PROCEDURE — 93000 ELECTROCARDIOGRAM COMPLETE: CPT | Performed by: INTERNAL MEDICINE

## 2021-07-01 PROCEDURE — 99213 OFFICE O/P EST LOW 20 MIN: CPT | Performed by: PHYSICIAN ASSISTANT

## 2021-07-01 PROCEDURE — G8427 DOCREV CUR MEDS BY ELIG CLIN: HCPCS | Performed by: PHYSICIAN ASSISTANT

## 2021-07-01 PROCEDURE — 1036F TOBACCO NON-USER: CPT | Performed by: PHYSICIAN ASSISTANT

## 2021-07-14 ENCOUNTER — OFFICE VISIT (OUTPATIENT)
Dept: FAMILY MEDICINE CLINIC | Age: 41
End: 2021-07-14
Payer: COMMERCIAL

## 2021-07-14 VITALS
HEART RATE: 72 BPM | SYSTOLIC BLOOD PRESSURE: 101 MMHG | DIASTOLIC BLOOD PRESSURE: 61 MMHG | BODY MASS INDEX: 40.63 KG/M2 | HEIGHT: 72 IN | WEIGHT: 300 LBS | TEMPERATURE: 98.1 F | OXYGEN SATURATION: 95 %

## 2021-07-14 DIAGNOSIS — R42 DIZZINESS: Primary | ICD-10-CM

## 2021-07-14 DIAGNOSIS — I10 ESSENTIAL HYPERTENSION: ICD-10-CM

## 2021-07-14 DIAGNOSIS — Z79.4 TYPE 2 DIABETES MELLITUS WITH COMPLICATION, WITH LONG-TERM CURRENT USE OF INSULIN (HCC): ICD-10-CM

## 2021-07-14 DIAGNOSIS — E11.8 TYPE 2 DIABETES MELLITUS WITH COMPLICATION, WITH LONG-TERM CURRENT USE OF INSULIN (HCC): ICD-10-CM

## 2021-07-14 LAB
CHP ED QC CHECK: NORMAL
GLUCOSE BLD-MCNC: 148 MG/DL

## 2021-07-14 PROCEDURE — 99212 OFFICE O/P EST SF 10 MIN: CPT | Performed by: FAMILY MEDICINE

## 2021-07-14 PROCEDURE — G8427 DOCREV CUR MEDS BY ELIG CLIN: HCPCS | Performed by: FAMILY MEDICINE

## 2021-07-14 PROCEDURE — 1036F TOBACCO NON-USER: CPT | Performed by: FAMILY MEDICINE

## 2021-07-14 PROCEDURE — 99213 OFFICE O/P EST LOW 20 MIN: CPT | Performed by: FAMILY MEDICINE

## 2021-07-14 PROCEDURE — 3051F HG A1C>EQUAL 7.0%<8.0%: CPT | Performed by: FAMILY MEDICINE

## 2021-07-14 PROCEDURE — 82962 GLUCOSE BLOOD TEST: CPT | Performed by: FAMILY MEDICINE

## 2021-07-14 PROCEDURE — G8417 CALC BMI ABV UP PARAM F/U: HCPCS | Performed by: FAMILY MEDICINE

## 2021-07-14 PROCEDURE — 2022F DILAT RTA XM EVC RTNOPTHY: CPT | Performed by: FAMILY MEDICINE

## 2021-07-14 RX ORDER — BLOOD PRESSURE TEST KIT
1 KIT MISCELLANEOUS DAILY
Qty: 1 KIT | Refills: 0 | Status: SHIPPED | OUTPATIENT
Start: 2021-07-14

## 2021-07-14 ASSESSMENT — ENCOUNTER SYMPTOMS
SHORTNESS OF BREATH: 0
SINUS PRESSURE: 0
SINUS PAIN: 0
CHEST TIGHTNESS: 0
PHOTOPHOBIA: 0
VOMITING: 0
BACK PAIN: 0
NAUSEA: 0
DIARRHEA: 0
CONSTIPATION: 0
ABDOMINAL PAIN: 0

## 2021-07-14 NOTE — PROGRESS NOTES
S: 36 y.o. male presents today for:   Dizziness x 1 week. Extensive medical history. Dysequilibrium when he walks. Palpitations from PAF. Worse in afternoon after he takes cardiac meds. O: VS: /61 (Site: Left Upper Arm, Position: Sitting, Cuff Size: Large Adult)   Pulse 72   Temp 98.1 °F (36.7 °C) (Temporal)   Ht 6' (1.829 m)   Wt 300 lb (136.1 kg)   SpO2 95%   BMI 40.69 kg/m²   AAO/NAD, appropriate affect for mood  CV:  RRR  Resp: CTAB  Ext- DPP-B, no clubbing, cyanosis, edema    Impression/Plan:   1) dizziness- list meds prior to dizziness, monitor BP and FBS when symptomatic      Attending Physician Statement  I have discussed the case, including pertinent history and exam findings with the resident. I agree with the documented assessment and plan.       Stacia Greene, DO

## 2021-07-14 NOTE — PROGRESS NOTES
CC:  Dizziness    HPI:  36 y.o. male with pmh of DM II (last HbA1C: 7.2), HTN, Paroxysmal A fib (on xarelto), CHF ( on Entresto, Bumex, Isordil, and Coreg) who presents with dizziness of the past week. Describes his dizziness as a kind of disequilibrium and feels that when it happens, he has this feeling that he may fall if he takes another step. Usually has noticed symptoms in the afternoon after taking cardiac medications , but does note that sometimes he feels the disequilibrium randomly. Denies any recent sickness, sick contacts, hearing loss, tinnitus. States he is compliant on all his cardiac and diabetic medication. DM II management  - last A1C: 7.2  - on levemir 40 units BID, metformin 1000 mg BID, premeal lispro  20 units TID  - checks B.S. three times a day  ; lately running a little high ; 200-300s due to change in diet  - sugar level this     History of Paroxysmal Atrial Fibrillation  On Xarelto at home  Has pace maker and defibrillator  Will be seeing cardiology tomorrow and will discuss his meds there. No other complaints at this time.      Patient Active Problem List    Diagnosis Date Noted    BMI 40.0-44.9, adult ([address] 02/02/2021    Chest pain 01/19/2021    Chronic combined systolic and diastolic congestive heart failure ([address] 03/18/2020    Carotid disease, bilateral ([address] 03/14/2020    Ventricular arrhythmia 08/01/2019    Nonischemic cardiomyopathy ([address]     S/P left pulmonary artery pressure sensor implant placement     S/P ICD (internal cardiac defibrillator) procedure 11/20/2018    Morbid obesity due to excess calories ([address] 08/22/2018    Paroxysmal atrial fibrillation ([address] 01/20/2017    Essential hypertension 01/20/2017    VHD (valvular heart disease) 01/20/2017    Hepatomegaly 12/01/2016    Type 2 diabetes mellitus with complication, with long-term current use of insulin ([address] 08/22/2016    Chronic gout 08/13/2016    CKD (chronic kidney disease)     Obstructive sleep apnea 11/29/2011       Past Medical History:   Diagnosis Date    Acute CHF ([address] 11/29/2011    Acute CHF ([address] 12/26/2011    Acute idiopathic gout of right foot 8/13/2016    AF (atrial fibrillation) ([address] 02/2020    Anemia 11/29/2011    CAD (coronary artery disease)     Cerebral artery occlusion with cerebral infarction ([address]     CKD (chronic kidney disease)     Gout     HTN (hypertension) 11/29/2011    Hyperlipidemia     Hypertension     Morbid obesity due to excess calories ([address]     Nonischemic cardiomyopathy ([address] 11/29/2011    Nonischemic cardiomyopathy ([address] 7/2013 7/2013- cardiac MRI revealed an LVEF of 20%    ARVIND (obstructive sleep apnea) 11/29/2011    S/P left heart catheterization by percutaneous approach 12-27/2011    Dr Anthony Luna Systolic heart failure [facility]) 5/7/2012 5/7/12- cardiac catheterization revealed an LVEF of 10-15%, mitral regurgitation along with borderline prolapse of mitral valve    Type 2 diabetes mellitus with complication, with long-term current use of insulin ([address] 8/22/2016    URI, acute 11/29/2011       Current Outpatient Medications on File Prior to Visit   Medication Sig Dispense Refill    dapagliflozin (FARXIGA) 10 MG tablet Take 10 mg by mouth every morning (Patient not taking: Reported on 7/1/2021)      bumetanide (BUMEX) 2 MG tablet TAKE ONE (1) TABLET BY MOUTH TWICE DAILY *SEND IN VIALS, NOT IN PILL PACKING.  DISCONTINUE THE DEMADEX SCRIPT* 60 tablet 3    magnesium oxide (MAG-OX) 400 MG tablet Take 1 tablet by mouth daily 30 tablet 11    dofetilide (TIKOSYN) 250 MCG capsule TAKE 1 CAPSULE BY MOUTH EVERY 12 HOURS  60 capsule 3    allopurinol (ZYLOPRIM) 300 MG tablet Take 1 tablet by mouth daily 30 tablet 11    isosorbide dinitrate (ISORDIL) 10 MG tablet TAKE TWO (2) TABLETS BY MOUTH THREE TIMES A DAY (Patient taking differently: Take 10 mg by mouth 3 times daily ) 180 tablet 3    spironolactone (ALDACTONE) 25 MG tablet TAKE 1/2 TABLET BY MOUTH DAILY WITH LUNCH (Patient taking differently: Take 25 mg by mouth daily ) 15 tablet 10    LEVEMIR FLEXTOUCH 100 UNIT/ML injection pen INJECT 40 UNITS INTO THE SKIN 2 TIMES DAILY 30 mL 10    Continuous Blood Gluc Transmit (DEXCOM G6 TRANSMITTER) MISC USE AS DIRECTED 1 each 0    XARELTO 20 MG TABS tablet TAKE 1 TABLET BY MOUTH DAILY 30 tablet 10    sacubitril-valsartan (ENTRESTO)  MG per tablet Take 1 tablet by mouth 2 times daily (Patient not taking: Reported on 7/1/2021) 180 tablet 3    ONETOUCH ULTRA strip       nitroGLYCERIN (NITROSTAT) 0.4 MG SL tablet up to max of 3 total doses. If no relief after 1 dose, call 911. 25 tablet 3    atorvastatin (LIPITOR) 40 MG tablet Take 40 mg by mouth daily      metFORMIN (GLUCOPHAGE) 1000 MG tablet Take 1,000 mg by mouth 2 times daily (with meals)      [DISCONTINUED] rivaroxaban (XARELTO) 20 MG TABS tablet Take 20 mg by mouth Daily with supper      insulin lispro (HUMALOG) 100 UNIT/ML injection vial Inject 20 Units into the skin 3 times daily (before meals) 10 mL 10    carvedilol (COREG) 25 MG tablet Take 1 tablet by mouth 2 times daily (with meals) 60 tablet 11    [DISCONTINUED] magnesium oxide (MAG-OX) 400 MG tablet Take 1 tablet by mouth daily 30 tablet 11     No current facility-administered medications on file prior to visit.        Allergies   Allergen Reactions    Farxiga [Dapagliflozin] Other (See Comments)     Caused throat to swell       Family History   Problem Relation Age of Onset    Cancer Mother     No Known Problems Father        Past Surgical History:   Procedure Laterality Date    CARDIAC CATHETERIZATION  08/01/2019    Dr Alfonzo Brown  11/20/2018    UPGRADE S-ICD TO BIV ICD   ([employer])  [facility]     CARDIOVERSION  02/14/2020    Successful CV of AF to NSR      (Dr. Donaldo Mcgill)    ECHO COMPL W DOP COLOR FLOW  11/30/2011         ECHO COMPL W DOP COLOR FLOW  03/27/2012         INSERTABLE CARDIAC MONITOR  12/13/2018    [facility], Dr. Vivian Razo  12/15/2020    SUCCESSFUL OVERDRIVE PACE TERMINATION OF AF VIA ICD    (DR. Bhargavi Paredes)    PACEMAKER PLACEMENT         Social History     Tobacco Use    Smoking status: Never Smoker    Smokeless tobacco: Never Used   Vaping Use    Vaping Use: Never used   Substance Use Topics    Alcohol use: Not Currently    Drug use: Never       ROS:    Review of Systems   Constitutional: Negative for activity change, appetite change and unexpected weight change. HENT: Negative for sinus pressure and sinus pain. Eyes: Negative for photophobia and visual disturbance. Respiratory: Negative for chest tightness and shortness of breath. Cardiovascular: Positive for palpitations. Negative for chest pain and leg swelling. Gastrointestinal: Negative for abdominal pain, constipation, diarrhea, nausea and vomiting. Genitourinary: Negative for difficulty urinating. Musculoskeletal: Negative for back pain and myalgias. Neurological: Positive for dizziness and weakness. Negative for tremors, syncope, light-headedness, numbness and headaches. Psychiatric/Behavioral: Positive for sleep disturbance. The patient is not nervous/anxious. Objective:    VS:  Blood pressure 101/61, pulse 72, temperature 98.1 °F (36.7 °C), temperature source Temporal, height 6' (1.829 m), weight 300 lb (136.1 kg), SpO2 95 %. Physical Exam  Constitutional:       General: He is not in acute distress. Appearance: He is obese. He is not ill-appearing. HENT:      Head: Normocephalic and atraumatic. Eyes:      General:         Right eye: No discharge. Left eye: No discharge. Extraocular Movements: Extraocular movements intact. Conjunctiva/sclera: Conjunctivae normal.      Pupils: Pupils are equal, round, and reactive to light. Cardiovascular:      Rate and Rhythm: Normal rate and regular rhythm.       Comments: Patient has pacemaker and defibrillator  Pulmonary:      Effort: Pulmonary effort is normal. No respiratory distress. Breath sounds: Normal breath sounds. No stridor. No wheezing or rhonchi. Abdominal:      General: Bowel sounds are normal.      Palpations: Abdomen is soft. Comments: Distension secondary to abdominal girth   Musculoskeletal:         General: No swelling or tenderness. Normal range of motion. Skin:     General: Skin is warm and dry. Coloration: Skin is not jaundiced or pale. Neurological:      General: No focal deficit present. Mental Status: He is alert and oriented to person, place, and time. Mental status is at baseline. Comments: [place] hallpike negative  No nystagmus noted   Psychiatric:         Mood and Affect: Mood normal.         Behavior: Behavior normal.         Thought Content: Thought content normal.         Judgment: Judgment normal.       Assessment/ Plan:  1. Dizziness  - possibly due to polypharmacy   - patient following up with cardiologist tomorrow  - patient instructed to keep diary/note of medications and times that bring on dizziness for next appointment, also asked to keep track of Blood sugars and blood pressure at time of episodes. 2. Type 2 diabetes mellitus with complication, with long-term current use of insulin (HCC)  - repeat POCT glucose today   - continue current medication regimen    3. Essential hypertension  - script for BP cuff given today  - continue current meds    RTO 1-2 weeks or sooner prn for any persistent, new, or worsening symptoms. Please see Patient Instructions for further counseling and information given. Advised to please be adherent to the treatment plans discussed today, and please call with any questions or concerns, letting the office know of any reasons that the plans may not be followed. The risks of untreated conditions include worsening illness, injury, disability, and possibly, death.  Please call if symptoms change in any way, worsen, or fail to completely resolve, as this could necessitate a change to treatment plans. Patient and/or caregiver expressed understanding. Indications and proper use of medication(s) reviewed. Potential side-effects and risks of medication(s) also explained. Patient and/or caregiver was instructed to call if any new symptoms develop prior to next visit. Health risk factors discussed and addressed.      Electronically signed by ERWIN Goins @  PGY-2 on 7/14/2021 at 1:31 PM  This case was discussed with attending physician: Dr. Jaren Sue

## 2021-07-21 DIAGNOSIS — I50.22 CHRONIC SYSTOLIC HEART FAILURE (HCC): Primary | ICD-10-CM

## 2021-07-30 PROCEDURE — 93264 REM MNTR WRLS P-ART PRS SNR: CPT | Performed by: INTERNAL MEDICINE

## 2021-07-30 RX ORDER — DOFETILIDE 0.25 MG/1
CAPSULE ORAL
Qty: 60 CAPSULE | Refills: 3 | Status: SHIPPED
Start: 2021-07-30 | End: 2021-11-16

## 2021-08-02 ENCOUNTER — FOLLOWUP TELEPHONE ENCOUNTER (OUTPATIENT)
Dept: DIABETES SERVICES | Age: 41
End: 2021-08-02

## 2021-08-02 NOTE — LETTER
[facility] Follow-up letter    Name: Marvel Alcantar  :   1980    Date:  2021                  Dear Dr. Abdirashid Watson; Thank you for referring Marvel Alcantar to [facility]. Minnie Hughesenrrique MONTESINOS had completed their comprehensive education plan which included the following topics: Disease Process, Nutrition, Exercise, Glucose Monitoring, Acute and Chronic Complication, Behavioral and Lifestyle Change/Coping and goal setting. We follow-up with our participants after 3 months to monitor their progress on their selected goal.      This letter is to notify you that we were unable to reach your patient for follow-up and to evaluate the outcome of their chosen goal monitoring and problem solving.       Please contact us if you need any further assistance with this patient at:     [facility] or [address] [facility] Locations: [place] Location: [facility]) Diabetes Education Department  American Diabetes Association Recognized DSMES Program

## 2021-08-02 NOTE — PROGRESS NOTES
glucose levels to long term complications of diabetes. 1 [x] All     []            []  4    Prevention, detection & treatment of acute complications:    -State the causes,signs & symptoms of hyper & hypoglycemia, and prevention & treatment strategies.   -Describe sick day guidelines  DKA /indications for ketone testing &  when to call physician  1 [x] All     []      []    4              -Identify severe weather/situation crisis  & diabetes supplies management 1 []      Using medications safely:   -State effects of diabetes medicines on blood glucose levels;  -List diabetes medication taken, action & side effects 2 [x] All     []  []  4 4/19/21 Metformin 1000 mg twice daily, Levemir 40 units AM & PM, lispro 20 u with meals (usually skips lunch and does not take if not eating). Insulin/Injectables/glucagon  -Name appropriate injection sites; proper storage; supplies needed;  2   [x]  4     Demonstrate proper technique NA []      Monitoring blood glucose, interpreting and using results:   -Identify the purpose of testing   -Identify recommended & personal blood glucose targets & HgbA1C target levels  -State the Importance of logging blood glucose levels for pattern recognition;   -State benefits of reading/using pt generated health data  -Verbalize safe lancet disposal 3 [x] All     []  []    []  []  []  4 4/21/21 KMS  Monitors 2 to 3 times daily. Awaiting script for Dexcom or FreeStyle Tung 2 CGM to be approved by insurance. A1C 9.3%   -Demonstrate proper testing technique NA []      Incorporating physical activity into lifestyle:   -State effect of exercise on blood glucose levels;   -State benefits of regular exercise;   -Define safety considerations/food choices if needed.  -Describe contraindications/maintenance of activity. 3 [x] All     []  []    []  []  4 4/19/21   Walks 3 miles a day.    Incorporating nutritional management into lifestyle:   -Describe effect of type, amount & timing of food on blood glucose  -Describe methods for preparing and planning healthy meals  -Correctly read food labels  -Name 3 foods high in Carbohydrate 1 [x] All       []    []    []  []  4 4/22/21 Pt attended Session 2. Participated in group activities on Diabetes Plate Method and healthy food choices. Demonstrated understanding of carb counting using food lists with carbohydrate serving sizes. Read CHO content of food correctly on nutrition facts using various food labels . Questions answered. Followed along and took notes.   -Plan a carbohydrate-controlled meal based on individualized meal plan  -Demonstrate CHO counting/portion control  1 []  []  4 4/28/21 CS Instructed patient on 2200 calorie carbohydrate controlled meal plan: 9 servings fat, 8 oz lean protein, 16 carbohydrate choices/day: 5 choices breakfast, 3 choices lunch 2 choices pm snack, 5 choices dinner, 1 choice HS snack. Pt able to plan menu items using meal planning food lists. Developing strategies for problem solving to promote health/change behavior. -Identify 7 self-care behaviors; Personal health risk factors; Benefits, challenges & strategies for behavioral change and set an individualized goal selection.  1   [x]  4    []Nutrition  []Monitoring  []Exercise  [x]Medication  [x]Other: Problem solving      Identified Barriers to learning/adherence to self management plan:    None  []  other    Instruction Method:  Power Point Presentation , Handouts and Return demonstration     Supporting Education Materials/Equipment Provided: Self-management manual and Nutritional Packet   []Citizen of [place] materials       [] services     []Other:      Encounter Type Date Attended Start Time End Time Comments No Show Dates   Assessment 4/19/21 [id number]   BY PHONE NO CHARGE    Session 1 4/21/21 KMS 0900 1100      Session 2 4/22/21 3 [facility] 900 1100     1:1 DSMES          In person Follow-up         Gestational Diabetes         Individual MNT 4/28/21  [id number] In person Meter Instrx        Insulin Instrx           Additional Comments: [x] Pt attended group classes. PCP notified via EMR. Date:   Follow-up goal attainment based on patients initial DSMES goal     Notified by [] EMR []Fax        []Post class Hgb A1C  []Medication compliance   []Plate method/meal plan compliance   []Able to state the number of Carbohydrate servings eaten at B,L,D   [x]Testing blood glucose as prescribed by PCP by testing 3 times a day. []Exercise Routine   [x]Other:I will look for patterns in my blood glucose log.    []Other:     [x]Patient lost to follow-up  7/13/2021  Notified by [x]EMR []Fax     Personal Support Plan:      [x] Keep all scheduled doctor appointments   [] Make and keep appointments with specialists (foot, eye, dentist) as recommended   [] Consult my pharmacist about all new medications or to ask any medication questions   [] Get tested for sleep apnea   [] Seek help for:   [] Make an appointment with:   [] Attend smoking cessation classes or call 1-800-QUIT-NOW  [] Attend Diabetes Support Group   [] Use diabetes magazines, books, or credible web-sites like the ADA for more information  [] Increase exercise at home or join an exercise program:   [] Other:

## 2021-08-04 RX ORDER — ATORVASTATIN CALCIUM 40 MG/1
40 TABLET, FILM COATED ORAL DAILY
Qty: 30 TABLET | Refills: 2 | Status: SHIPPED
Start: 2021-08-04 | End: 2021-09-23 | Stop reason: SDUPTHER

## 2021-08-04 NOTE — TELEPHONE ENCOUNTER
Last Appointment:  5/17/2021  Future Appointments   Date Time Provider Clemente Davis   8/12/2021 12:00 PM [facility] CHF ROOM 1 STEPHANIE CHF Dayron How

## 2021-08-12 ENCOUNTER — HOSPITAL ENCOUNTER (OUTPATIENT)
Dept: OTHER | Age: 41
Setting detail: THERAPIES SERIES
Discharge: HOME | End: 2021-08-12
Payer: COMMERCIAL

## 2021-08-12 VITALS
SYSTOLIC BLOOD PRESSURE: 112 MMHG | HEART RATE: 70 BPM | OXYGEN SATURATION: 98 % | DIASTOLIC BLOOD PRESSURE: 65 MMHG | BODY MASS INDEX: 40.42 KG/M2 | RESPIRATION RATE: 18 BRPM | WEIGHT: 298 LBS

## 2021-08-12 LAB
ANION GAP SERPL CALCULATED.3IONS-SCNC: 13 MMOL/L (ref 7–16)
BUN BLDV-MCNC: 65 MG/DL (ref 6–20)
CALCIUM SERPL-MCNC: 10.5 MG/DL (ref 8.6–10.2)
CHLORIDE BLD-SCNC: 101 MMOL/L (ref 98–107)
CO2: 21 MMOL/L (ref 22–29)
CREAT SERPL-MCNC: 2 MG/DL (ref 0.7–1.2)
GFR AFRICAN AMERICAN: 45
GFR NON-AFRICAN AMERICAN: 37 ML/MIN/1.73
GLUCOSE BLD-MCNC: 144 MG/DL (ref 74–99)
POTASSIUM SERPL-SCNC: 4.5 MMOL/L (ref 3.5–5)
PRO-BNP: 867 PG/ML (ref 0–125)
SODIUM BLD-SCNC: 135 MMOL/L (ref 132–146)

## 2021-08-12 PROCEDURE — 99214 OFFICE O/P EST MOD 30 MIN: CPT

## 2021-08-12 PROCEDURE — 36415 COLL VENOUS BLD VENIPUNCTURE: CPT

## 2021-08-12 PROCEDURE — 80048 BASIC METABOLIC PNL TOTAL CA: CPT

## 2021-08-12 PROCEDURE — 83880 ASSAY OF NATRIURETIC PEPTIDE: CPT

## 2021-08-12 NOTE — PROGRESS NOTES
[facility]   CHF Clinic       [place]   1980  [phone]       Referring Doctor: Corey Desouza  Cardiologist: Bertin Dodge  Nephrologist: n/a      History of Present Illness:     Grand kelvin MONTESINOS is a 39 y.o. male with a history of HFrEF, most recent EF 31% on 1/19/2021. Patient Story:    He does NOT  have dyspnea with exertion, shortness of breath, or decline in overall functional capacity. He does not have orthopnea, PND, nocturnal cough or hemoptysis. He does not have abdominal distention or bloating, early satiety, anorexia/change in appetite. He does has a good urinary response to their oral diuretic. He does not have  lower extremity edema. He DOES HAVE SOME ightheadedness, dizziness. He denies palpitations, syncope or near syncope. He sometimes complains of chest pain and takes nitro, but is currently out. Patient instructed to call physician with any further chest pain and to reorder more nitro per physician. Allergies   Allergen Reactions    Jose Cloud [Dapagliflozin] Other (See Comments)     Caused throat to swell         No outpatient medications have been marked as taking for the 8/12/21 encounter [facility] Encounter) with [facility] CHF ROOM 1.           Guideline directed medical:  ARNI/ACE I/ARB: Yes  Beta blocker:   Yes  Aldosterone antagonist:  Yes        Physical Examination:     /65   Pulse 70   Resp 18   Wt 298 lb (135.2 kg)   SpO2 98%   BMI 40.42 kg/m²     Assessment  Charting Type: Shift assessment                   Respiratory  Respiratory Pattern: Regular  Respiratory Depth: Normal  Respiratory Quality/Effort: Unlabored  Chest Assessment: Chest expansion symmetrical  L Breath Sounds: Clear, Diminished  R Breath Sounds: Clear, Diminished              Cardiac  Cardiac Rhythm: Normal sinus rhythm, Ventricular Paced    Rhythm Interpretation  Pulse: 70         Gastrointestinal  Abdominal (WDL): Exceptions to WDL  Abdomen Inspection: Distended, Soft  RUQ Bowel Sounds: Active  LUQ Bowel Sounds: Active  RLQ Bowel Sounds: Active  LLQ Bowel Sounds: Active          Bowel Sounds  RUQ Bowel Sounds: Active  LUQ Bowel Sounds: Active  RLQ Bowel Sounds: Active  LLQ Bowel Sounds: Active    Peripheral Vascular  Peripheral Vascular (WDL): Within Defined Limits  RLE Edema: None  LLE Edema: None                                                 Pulse: 70                   LAB DATA:    BNP  No results for input(s): BNP in the last 72 hours. proBNP  No results for input(s): PROBNP in the last 72 hours. BMP  No results for input(s): NA, K, CL, CO2, BUN, CREATININE, GLUCOSE, CALCIUM in the last 72 hours. WEIGHTS:  Wt Readings from Last 3 Encounters:   08/12/21 298 lb (135.2 kg)   07/14/21 300 lb (136.1 kg)   07/01/21 (!) 300 lb 12.8 oz (136.4 kg)         TELEMETRY:  Cardiac Regularity: Regular  Cardiac Rhythm/Interpretation: NSR        ASSESSMENT:  Minnie Jeffersonpatricia III is evolemic with WEIGHT LOSS     Interventions completed this visit:  IV diuretics given no  Lab work obtained yes, BNP/BMP   Reviewed continue current medications that patient as prescribed answered any questions   Educated on signs and symptoms of HF  Educated on low sodium diet    PLAN:  Scheduled to follow up in CHF clinic on SEPT. 16, 2021   Given clinic phone number and aware of signs and symptoms to call with any HF change in symptoms.

## 2021-08-31 PROCEDURE — 93264 REM MNTR WRLS P-ART PRS SNR: CPT | Performed by: INTERNAL MEDICINE

## 2021-09-02 DIAGNOSIS — I50.22 CHRONIC SYSTOLIC HEART FAILURE (HCC): ICD-10-CM

## 2021-09-02 RX ORDER — CARVEDILOL 25 MG/1
TABLET ORAL
Qty: 60 TABLET | Refills: 11 | Status: SHIPPED
Start: 2021-09-02 | End: 2022-08-23

## 2021-09-03 RX ORDER — ISOSORBIDE DINITRATE 10 MG/1
TABLET ORAL
Qty: 180 TABLET | Refills: 3 | Status: SHIPPED
Start: 2021-09-03 | End: 2021-12-23 | Stop reason: SDUPTHER

## 2021-09-16 ENCOUNTER — HOSPITAL ENCOUNTER (OUTPATIENT)
Dept: OTHER | Age: 41
Setting detail: THERAPIES SERIES
Discharge: HOME | End: 2021-09-16
Payer: COMMERCIAL

## 2021-09-16 VITALS
DIASTOLIC BLOOD PRESSURE: 62 MMHG | WEIGHT: 298 LBS | BODY MASS INDEX: 40.42 KG/M2 | HEART RATE: 70 BPM | RESPIRATION RATE: 18 BRPM | SYSTOLIC BLOOD PRESSURE: 105 MMHG | OXYGEN SATURATION: 98 %

## 2021-09-16 LAB
ANION GAP SERPL CALCULATED.3IONS-SCNC: 17 MMOL/L (ref 7–16)
BUN BLDV-MCNC: 34 MG/DL (ref 6–20)
CALCIUM SERPL-MCNC: 9.8 MG/DL (ref 8.6–10.2)
CHLORIDE BLD-SCNC: 99 MMOL/L (ref 98–107)
CO2: 22 MMOL/L (ref 22–29)
CREAT SERPL-MCNC: 1.8 MG/DL (ref 0.7–1.2)
GFR AFRICAN AMERICAN: 51
GFR NON-AFRICAN AMERICAN: 42 ML/MIN/1.73
GLUCOSE BLD-MCNC: 166 MG/DL (ref 74–99)
POTASSIUM SERPL-SCNC: 3.8 MMOL/L (ref 3.5–5)
PRO-BNP: 412 PG/ML (ref 0–125)
SODIUM BLD-SCNC: 138 MMOL/L (ref 132–146)

## 2021-09-16 PROCEDURE — 99214 OFFICE O/P EST MOD 30 MIN: CPT

## 2021-09-16 PROCEDURE — 83880 ASSAY OF NATRIURETIC PEPTIDE: CPT

## 2021-09-16 PROCEDURE — 36415 COLL VENOUS BLD VENIPUNCTURE: CPT

## 2021-09-16 PROCEDURE — 80048 BASIC METABOLIC PNL TOTAL CA: CPT

## 2021-09-16 NOTE — PROGRESS NOTES
Active  LUQ Bowel Sounds: Active  RLQ Bowel Sounds: Active  LLQ Bowel Sounds: Active          Bowel Sounds  RUQ Bowel Sounds: Active  LUQ Bowel Sounds: Active  RLQ Bowel Sounds: Active  LLQ Bowel Sounds: Active    Peripheral Vascular  Peripheral Vascular (WDL): Within Defined Limits  RLE Edema: None  LLE Edema: None                                                 Pulse: 70                   LAB DATA:    BNP  No results for input(s): BNP in the last 72 hours. proBNP  No results for input(s): PROBNP in the last 72 hours. BMP  No results for input(s): NA, K, CL, CO2, BUN, CREATININE, GLUCOSE, CALCIUM in the last 72 hours. WEIGHTS:  Wt Readings from Last 3 Encounters:   09/16/21 298 lb (135.2 kg)   08/12/21 298 lb (135.2 kg)   07/14/21 300 lb (136.1 kg)         TELEMETRY:  Cardiac Regularity: Regular  Cardiac Rhythm/Interpretation: NSR        ASSESSMENT:  Eri Hanson III is evolemic with stable weight    Interventions completed this visit:  IV diuretics given no  Lab work obtained yes, BNP/BMP   Reviewed continue current medications that patient as prescribed answered any questions   Educated on signs and symptoms of HF  Educated on low sodium diet    PLAN:  Scheduled to follow up in CHF clinic on Nov 10, 2021   Given clinic phone number and aware of signs and symptoms to call with any HF change in symptoms.

## 2021-09-23 ENCOUNTER — OFFICE VISIT (OUTPATIENT)
Dept: FAMILY MEDICINE CLINIC | Age: 41
End: 2021-09-23
Payer: COMMERCIAL

## 2021-09-23 VITALS
TEMPERATURE: 98.2 F | HEART RATE: 71 BPM | OXYGEN SATURATION: 98 % | SYSTOLIC BLOOD PRESSURE: 100 MMHG | DIASTOLIC BLOOD PRESSURE: 62 MMHG | BODY MASS INDEX: 39.68 KG/M2 | WEIGHT: 293 LBS | HEIGHT: 72 IN

## 2021-09-23 DIAGNOSIS — E66.01 MORBID OBESITY DUE TO EXCESS CALORIES (HCC): ICD-10-CM

## 2021-09-23 DIAGNOSIS — I50.42 CHRONIC COMBINED SYSTOLIC AND DIASTOLIC CONGESTIVE HEART FAILURE (HCC): ICD-10-CM

## 2021-09-23 DIAGNOSIS — Z79.4 TYPE 2 DIABETES MELLITUS WITH COMPLICATION, WITH LONG-TERM CURRENT USE OF INSULIN (HCC): Chronic | ICD-10-CM

## 2021-09-23 DIAGNOSIS — I10 ESSENTIAL HYPERTENSION: ICD-10-CM

## 2021-09-23 DIAGNOSIS — N18.9 CHRONIC KIDNEY DISEASE, UNSPECIFIED CKD STAGE: ICD-10-CM

## 2021-09-23 DIAGNOSIS — I48.0 PAROXYSMAL ATRIAL FIBRILLATION (HCC): Primary | ICD-10-CM

## 2021-09-23 DIAGNOSIS — I50.22 CHRONIC SYSTOLIC HEART FAILURE (HCC): ICD-10-CM

## 2021-09-23 DIAGNOSIS — E11.8 TYPE 2 DIABETES MELLITUS WITH COMPLICATION, WITH LONG-TERM CURRENT USE OF INSULIN (HCC): Chronic | ICD-10-CM

## 2021-09-23 PROBLEM — R07.9 CHEST PAIN: Status: RESOLVED | Noted: 2021-01-19 | Resolved: 2021-09-23

## 2021-09-23 PROCEDURE — 3051F HG A1C>EQUAL 7.0%<8.0%: CPT | Performed by: FAMILY MEDICINE

## 2021-09-23 PROCEDURE — G8427 DOCREV CUR MEDS BY ELIG CLIN: HCPCS | Performed by: FAMILY MEDICINE

## 2021-09-23 PROCEDURE — G8417 CALC BMI ABV UP PARAM F/U: HCPCS | Performed by: FAMILY MEDICINE

## 2021-09-23 PROCEDURE — 2022F DILAT RTA XM EVC RTNOPTHY: CPT | Performed by: FAMILY MEDICINE

## 2021-09-23 PROCEDURE — 36415 COLL VENOUS BLD VENIPUNCTURE: CPT | Performed by: FAMILY MEDICINE

## 2021-09-23 PROCEDURE — 99214 OFFICE O/P EST MOD 30 MIN: CPT | Performed by: FAMILY MEDICINE

## 2021-09-23 PROCEDURE — 99212 OFFICE O/P EST SF 10 MIN: CPT | Performed by: FAMILY MEDICINE

## 2021-09-23 PROCEDURE — 1036F TOBACCO NON-USER: CPT | Performed by: FAMILY MEDICINE

## 2021-09-23 RX ORDER — NITROGLYCERIN 0.4 MG/1
TABLET SUBLINGUAL
Qty: 25 TABLET | Refills: 3 | Status: SHIPPED
Start: 2021-09-23 | End: 2022-01-31

## 2021-09-23 RX ORDER — ATORVASTATIN CALCIUM 40 MG/1
40 TABLET, FILM COATED ORAL DAILY
Qty: 30 TABLET | Refills: 2 | Status: SHIPPED
Start: 2021-09-23 | End: 2022-01-31

## 2021-09-23 RX ORDER — BUMETANIDE 2 MG/1
TABLET ORAL
Qty: 60 TABLET | Refills: 3 | Status: SHIPPED
Start: 2021-09-23 | End: 2022-02-22

## 2021-09-23 RX ORDER — SACUBITRIL AND VALSARTAN 97; 103 MG/1; MG/1
1 TABLET, FILM COATED ORAL 2 TIMES DAILY
Qty: 180 TABLET | Refills: 3 | Status: SHIPPED | OUTPATIENT
Start: 2021-09-23

## 2021-09-23 SDOH — ECONOMIC STABILITY: FOOD INSECURITY: WITHIN THE PAST 12 MONTHS, THE FOOD YOU BOUGHT JUST DIDN'T LAST AND YOU DIDN'T HAVE MONEY TO GET MORE.: NEVER TRUE

## 2021-09-23 SDOH — ECONOMIC STABILITY: FOOD INSECURITY: WITHIN THE PAST 12 MONTHS, YOU WORRIED THAT YOUR FOOD WOULD RUN OUT BEFORE YOU GOT MONEY TO BUY MORE.: NEVER TRUE

## 2021-09-23 ASSESSMENT — SOCIAL DETERMINANTS OF HEALTH (SDOH): HOW HARD IS IT FOR YOU TO PAY FOR THE VERY BASICS LIKE FOOD, HOUSING, MEDICAL CARE, AND HEATING?: NOT HARD AT ALL

## 2021-09-23 NOTE — PROGRESS NOTES
[facility]  FAMILY MEDICINE RESIDENCY PROGRAM   OFFICE PROGRESS NOTE  DATE OF VISIT : 9/23/2021         Chief Complaint :   Chief Complaint   Patient presents with    Check-Up       HPI:   Lauren Moreno comes to clinic today for     F/U of chronic problem(s) and new or recent complaint of Dizziness     Chronic problems reviewed today include:     Hypertension, Diabetes mellitus, Obesity, Congestive Heart Failure, Chronic kidney disease, Atrial fibrillation and Gout    Current status of this/these condition(s):  stable    Tolerating meds: Yes and having some dizziness which may be secondary to medications    Additional history: Mr. German Pearce is here for follow-up on his chronic health problems. Over the past month or 2 he has been having dizziness occurring mostly in the morning. This occurs 1 to 2 hours after he takes his morning medications. He will note this mostly with changes in position, and it is relieved by sitting or lying down. It tends to go away on its own, and only rarely occurs at other times of the day. He does take all of his medications in the morning with breakfast as well as late in the day with dinner. He has been checking his sugars and they have been quite good. They are generally in the low 100s and he has not had any hypoglycemic events. He continues on the same dose of insulin and has no problems with that. He does follow at the CHF clinic and has been doing very well in that regard. He has not required IV diuretics and he has not had any significant edema. He has been watching his diet and has been losing weight. He has not had any gout recurrence for some time. He has had no shortness of breath, chest pain or noticeable palpitations. He has been compliant with all of his medications.      Objective:    Vitals: /62   Pulse 71   Temp 98.2 °F (36.8 °C) (Temporal)   Ht 6' (1.829 m)   Wt 293 lb (132.9 kg)   SpO2 98%   BMI 39.74 kg/m²   General Appearance: Well developed, awake, alert, oriented, and in NAD  Neck: Supple, symmetrical, trachea midline. No JVD. Thyroid smooth, not enlarged. Chest wall/Lung: Clear to auscultation bilaterally,  respirations unlabored. No rhonchi/wheezing/rales  Heart: Irregular rate and rhythm, S1and S2 normal, GII/VI ANDREA. Abdomen: Soft, nontender. Normal BS, no hepatosplenomegaly or mass  Extremities:  Extremities normal, atraumatic, no cyanosis. negative edema. Skin: Skin color, texture, turgor normal, no rashes or lesions  Musculokeletal: ROM grossly normal in all joints of extremities, no obvious joint swelling. Psychiatric: has a normal mood and affect. Behavior is normal.     I independently reviewed the following information:  lab reports and CHF clinic notes    1. Paroxysmal atrial fibrillation (HCC)  2. Essential hypertension  -     Comprehensive Metabolic Panel; Future  3. Chronic combined systolic and diastolic congestive heart failure ([address]  4. Type 2 diabetes mellitus with complication, with long-term current use of insulin (HCC)  -     atorvastatin (LIPITOR) 40 MG tablet; Take 1 tablet by mouth daily, Disp-30 tablet, R-2Normal  -     metFORMIN (GLUCOPHAGE) 1000 MG tablet; Take 1 tablet by mouth 2 times daily (with meals), Disp-60 tablet, R-1Normal  -     CBC Auto Differential; Future  -     Hemoglobin A1C; Future  5. Chronic kidney disease, unspecified CKD stage  -     Uric Acid; Future  6. Chronic systolic heart failure (HCC)  -     atorvastatin (LIPITOR) 40 MG tablet; Take 1 tablet by mouth daily, Disp-30 tablet, R-2Normal  -     bumetanide (BUMEX) 2 MG tablet; TAKE ONE (1) TABLET BY MOUTH TWICE DAILY. Do not send in [address] , Disp-60 tablet, R-3Normal  -     sacubitril-valsartan (ENTRESTO)  MG per tablet; Take 1 tablet by mouth 2 times daily, Disp-180 tablet, R-3May pill pack this medication now. Maintenance doseNormal  -     nitroGLYCERIN (NITROSTAT) 0.4 MG SL tablet; up to max of 3 total doses. If no relief after 1 dose, call 911., Disp-25 tablet, R-3Normal  7. Morbid obesity due to excess calories ([address]  -     [facility]      Additional Plan:   I refilled all of his medicines today. He is doing exceptionally well with the exception of his intermittent dizziness. I suspect that this is related to his medication. I have suggested that he split up his medicines a little bit in the morning. He can take an initial group of medicines with breakfast, and then the rest of his morning medicines several hours later. I recommended splitting the Entresto, Bumex, and Coreg in particular. My concern with this is that he has not always been able to be very compliant with his medications, and he currently receives them in a pill pack. If he forgets to take his second morning doses, he would be better off taking them altogether. If his dizziness persists despite these attempts, I think he needs to call cardiology with these concerns. I would consider cutting the dose of Entresto as a first step. He is doing extremely well with all the rest of his medical problems today. I will obtain some laboratory testing on him today and let him know these results. In the meantime he will continue what he is doing. He has been watching his diet and losing weight, and is interested in going to bariatrics for consideration of surgery. I did go ahead and make a referral today. RTO in in 3 month(s) or sooner for any persistent, new, or worsening symptoms. Please see Patient Instructions for further counseling and information given. Advised to be adherent to the treatment plans discussed today, and please call with any questions or concerns, letting the office know of any reasons that the plans may not be followed. The risks of untreated conditions include worsening illness, injury, disability, and possibly, death.  Please call if symptoms change in any way, worsen, or fail to completely resolve, as this could necessitate a change to treatment plans. Patient and/or caregiver expressed understanding. Indications and proper use of medication(s) reviewed. Potential side-effects and risks of medication(s) also explained. Patient and/or caregiver was instructed to call if any new symptoms develop prior to next visit. Health risk factors discussed and addressed.     Signed by : Slime Moreno MD, M.D.

## 2021-09-24 DIAGNOSIS — N18.9 CHRONIC KIDNEY DISEASE, UNSPECIFIED CKD STAGE: Primary | ICD-10-CM

## 2021-09-24 DIAGNOSIS — I50.42 CHRONIC COMBINED SYSTOLIC AND DIASTOLIC CONGESTIVE HEART FAILURE (HCC): ICD-10-CM

## 2021-10-01 PROCEDURE — 93264 REM MNTR WRLS P-ART PRS SNR: CPT | Performed by: INTERNAL MEDICINE

## 2021-10-05 ENCOUNTER — NURSE ONLY (OUTPATIENT)
Dept: FAMILY MEDICINE CLINIC | Age: 41
End: 2021-10-05
Payer: COMMERCIAL

## 2021-10-05 DIAGNOSIS — I50.42 CHRONIC COMBINED SYSTOLIC AND DIASTOLIC CONGESTIVE HEART FAILURE (HCC): ICD-10-CM

## 2021-10-05 PROCEDURE — 36415 COLL VENOUS BLD VENIPUNCTURE: CPT | Performed by: FAMILY MEDICINE

## 2021-10-08 DIAGNOSIS — I50.22 CHRONIC SYSTOLIC HEART FAILURE (HCC): Primary | ICD-10-CM

## 2021-10-29 RX ORDER — BLOOD SUGAR DIAGNOSTIC
STRIP MISCELLANEOUS
Qty: 100 EACH | Refills: 5 | Status: SHIPPED
Start: 2021-10-29 | End: 2022-04-29

## 2021-10-29 NOTE — TELEPHONE ENCOUNTER
Last Appointment:  Visit date not found  Future Appointments   Date Time Provider Clemente Holmani   11/3/2021 12:20 PM Geovanni Broderick MD [place]   11/4/2021 11:45 AM Jeff Potter MD Surg Weight [facility]   11/10/2021 12:15 PM [facility] ROOM 1 [facility]   12/13/2021  2:00 PM MD Traci Pitts [facility]

## 2021-11-03 ENCOUNTER — TELEPHONE (OUTPATIENT)
Dept: CARDIOLOGY CLINIC | Age: 41
End: 2021-11-03

## 2021-11-03 NOTE — TELEPHONE ENCOUNTER
From my stand point he he cleared for ED medication, but I have to discontinue his Isosorbide and s/l Nitro - He had normal cath few yrs ago. He also needs clearance form Dr Seamus Ling due to his CMP    Let me know.

## 2021-11-03 NOTE — TELEPHONE ENCOUNTER
Patient is asking for cardiac clearance for erectile dysfunction medication. Pt is on both nitro PRN and Isordil.  Please advise

## 2021-11-04 ENCOUNTER — TELEPHONE (OUTPATIENT)
Dept: BARIATRICS/WEIGHT MGMT | Age: 41
End: 2021-11-04

## 2021-11-04 ENCOUNTER — INITIAL CONSULT (OUTPATIENT)
Dept: BARIATRICS/WEIGHT MGMT | Age: 41
End: 2021-11-04
Payer: COMMERCIAL

## 2021-11-04 VITALS
BODY MASS INDEX: 39.68 KG/M2 | HEIGHT: 72 IN | RESPIRATION RATE: 20 BRPM | HEART RATE: 70 BPM | DIASTOLIC BLOOD PRESSURE: 64 MMHG | WEIGHT: 293 LBS | TEMPERATURE: 97.3 F | SYSTOLIC BLOOD PRESSURE: 101 MMHG

## 2021-11-04 DIAGNOSIS — K21.9 GASTROESOPHAGEAL REFLUX DISEASE WITHOUT ESOPHAGITIS: ICD-10-CM

## 2021-11-04 DIAGNOSIS — K30 INDIGESTION: Primary | ICD-10-CM

## 2021-11-04 DIAGNOSIS — E66.01 MORBID OBESITY DUE TO EXCESS CALORIES (HCC): ICD-10-CM

## 2021-11-04 PROCEDURE — G8484 FLU IMMUNIZE NO ADMIN: HCPCS | Performed by: SURGERY

## 2021-11-04 PROCEDURE — G8417 CALC BMI ABV UP PARAM F/U: HCPCS | Performed by: SURGERY

## 2021-11-04 PROCEDURE — 99202 OFFICE O/P NEW SF 15 MIN: CPT

## 2021-11-04 PROCEDURE — 93264 REM MNTR WRLS P-ART PRS SNR: CPT | Performed by: INTERNAL MEDICINE

## 2021-11-04 PROCEDURE — G8427 DOCREV CUR MEDS BY ELIG CLIN: HCPCS | Performed by: SURGERY

## 2021-11-04 PROCEDURE — 99214 OFFICE O/P EST MOD 30 MIN: CPT | Performed by: SURGERY

## 2021-11-04 RX ORDER — SODIUM CHLORIDE, SODIUM LACTATE, POTASSIUM CHLORIDE, CALCIUM CHLORIDE 600; 310; 30; 20 MG/100ML; MG/100ML; MG/100ML; MG/100ML
INJECTION, SOLUTION INTRAVENOUS CONTINUOUS
Status: CANCELLED | OUTPATIENT
Start: 2021-11-04

## 2021-11-04 NOTE — TELEPHONE ENCOUNTER
Prior Authorization Form  DEMOGRAPHICS:    Patient Name:  Jason Tyson III  Patient :  1980            Insurance:  Payor: Kanika Ernandez / Plan: [address] / Product Type: *No Product type* /   Insurance ID Number:    Payor/Plan Subscr  Sex Relation Sub.  Ins. ID Effective Group Num   1. Shahab Hilton 44 III 1980 Male Self [id number]                                    [id number]         DIAGNOSIS & PROCEDURE:    Procedure/Operation: EGD           CPT Code: 00414    Diagnosis:  Ladi Piedra    ICD10 Code: K21.9    [facility]  [facility]    Surgeon:  Ashley Garcia INFORMATION:    Date: 21    Time:               Anesthesia:  MAC/TIVA                                                       Status:  Outpatient        Special Comments:         Electronically signed by Steven Lowe MA on 2021 at 12:30 PM

## 2021-11-04 NOTE — PROGRESS NOTES
CV of AF to NSR      (Dr. Manuela Shaffer)    ECHO COMPL W DOP COLOR FLOW  11/30/2011         ECHO COMPL W DOP COLOR FLOW  03/27/2012         INSERTABLE CARDIAC MONITOR  12/13/2018    [facility], [facility], Dr. Thuy Hendricks  12/15/2020    SUCCESSFUL OVERDRIVE PACE TERMINATION OF AF VIA ICD    (DR. Donald Goetz)    PACEMAKER PLACEMENT        Allergies: [address] [facility] Medications  Prior to Visit Medications    Medication Sig Taking? Authorizing Provider   ONETOUCH ULTRA strip USE TO TEST BLOOD SUGAR 2 TO 3 TIMES DAILY AND AS NEEDED FOR SYMPTOMS OF IRREGULAR BLOOD GLUCOSE Yes Torri Marquez MD   atorvastatin (LIPITOR) 40 MG tablet Take 1 tablet by mouth daily Yes Torri Marquez MD   metFORMIN (GLUCOPHAGE) 1000 MG tablet Take 1 tablet by mouth 2 times daily (with meals) Yes Torri Marquez MD   bumetanide (BUMEX) 2 MG tablet TAKE ONE (1) TABLET BY MOUTH TWICE DAILY. Do not send in [address]. Yes Torri Marquez MD   sacubitril-valsartan (ENTRESTO)  MG per tablet Take 1 tablet by mouth 2 times daily Yes Torri Marquez MD   nitroGLYCERIN (NITROSTAT) 0.4 MG SL tablet up to max of 3 total doses. If no relief after 1 dose, call 911.  Yes Torri Marquez MD   isosorbide dinitrate (ISORDIL) 10 MG tablet TAKE TWO (2) TABLETS BY MOUTH THREE TIMES PER DAY Yes Ash Quiroga MD   carvedilol (COREG) 25 MG tablet TAKE ONE (1) TABLET BY MOUTH TWICE DAILY WITH MEALS Yes Ash Quiroga MD   dofetilide (TIKOSYN) 250 MCG capsule TAKE 1 CAPSULE BY MOUTH EVERY TWELVE HOURS  Yes Torri Marquez MD   insulin lispro (HUMALOG) 100 UNIT/ML injection vial INJECT 20 UNITS SUBCUTANEOUSLY THREE TIMES A DAY BEFORE MEALS Yes Torri Marquez MD   Blood Pressure KIT 1 kit by Does not apply route daily Yes Romana Lucky, MD   dapagliflozin (FARXIGA) 10 MG tablet Take 10 mg by mouth every morning  Yes Historical Provider, MD   magnesium oxide (MAG-OX) 400 MG tablet Take 1 tablet by mouth daily Yes Torri Marquez MD allopurinol (ZYLOPRIM) 300 MG tablet Take 1 tablet by mouth daily Yes Tank Dale MD   spironolactone (ALDACTONE) 25 MG tablet TAKE 1/2 TABLET BY MOUTH DAILY WITH LUNCH  Patient taking differently: Take 25 mg by mouth daily  Yes Tank Dale MD   LEVEMIR FLEXTOUCH 100 UNIT/ML injection pen INJECT 40 UNITS INTO THE SKIN 2 TIMES DAILY Yes Tank Dale MD   Continuous Blood Gluc Transmit (DEXCOM G6 TRANSMITTER) MISC USE AS DIRECTED Yes Tank Dale MD   XARELTO 20 MG TABS tablet TAKE 1 TABLET BY MOUTH DAILY Yes Tank Dale MD   rivaroxaban (XARELTO) 20 MG TABS tablet Take 20 mg by mouth Daily with supper  Historical Provider, MD   magnesium oxide (MAG-OX) 400 MG tablet Take 1 tablet by mouth daily  Tank Dale MD     Social History:   TOBACCO:   reports that he has never smoked. He has never used smokeless tobacco.  All smokers must join the free smoking cessation program and stop smoking for 3 months before having any Bariatric surgery. ETOH:    reports previous alcohol use. Family History   Problem Relation Age of Onset    Cancer Mother     No Known Problems Father      Review of Systems:  Psychiatric: denies depression and anxiety  Respiratory: negative  Cardiovascular: a fib, never had an MI, heart cath was [address] [address] Gastrointestinal: gerd  Musculoskeletal:negative, gout  All others reviewed, negative    Physical Exam:   VITALS: Blood pressure 101/64, pulse 70, temperature 97.3 °F (36.3 °C), temperature source Temporal, resp. rate 20, height 5' 11.5\" (1.816 m), weight 293 lb (132.9 kg).   General appearance: alert, appears stated age and cooperative, does ambulate easily  Head: Normocephalic, without obvious abnormality, atraumatic  Eyes: PERRL  Ears/mouth/throat:  Ears clear, mouth normal, throat no redness  Neck: no adenopathy, no JVD, supple, symmetrical, trachea midline and thyroid not enlarged  Lungs: clear to auscultation bilaterally  Heart: regular rate and rhythm  Abdomen: soft, non-tender; bowel sounds normal; no masses,  no organomegaly  Extremities: extremities normal, atraumatic, no cyanosis or edema  Skin: no open wounds    Assessment:  Morbid obesity with inability to keep the weight off with diet and exercise. He is interested in Laparoscopic Vasu-en- Y Gastric Bypass or Sleeve Gastrectomy. We discussed that our goal is to ameliorate the medical problems and not to obtain a specific body mass index. He understands the risks and benefits, and wishes to proceed with the evaluation. Plan:  Psych evaluation, GB US, medical and cardiac clearance. These patients often have vitamin deficiencies so I will do screening labs for malnutrition. I will do an upper endoscopy to check for hiatal hernias, ulcers and H. Pylori while we wait for insurance approval for the surgery.     Physician Signature: Electronically signed by Dr. Florin Cortez MD    Send copy of H&P to PCP, Blayne Haley MD

## 2021-11-04 NOTE — PATIENT INSTRUCTIONS
What is the next step to proceed with weight loss surgery? Please be aware that any co-pays or deductibles may be requested prior to testing and / or procedures. You will need to schedule a psychological evaluation for weight loss surgery. Patients will be required to complete all psychological testing as required by the mental health provider. Patients must also follow all of the provider's recommendations before weight loss surgery can be scheduled. The evaluation must be done a standard way for weight loss surgery. We strongly recommend that you contact one of our preferred providers listed below to arrange this:      Génesis Khoury, [facility]  [address] [place], [place]   [phone]    [facility] and [address]   [phone]    Dr. Justine Mccrary, PhD    [facility]. [place], [place]    [phone]      You will also need to plan on attending a 2 hour nutrition class at the [facility] prior to your surgery. We will schedule this for you when we schedule your surgery. Please remember to have your labs drawn 10 days prior to your first scheduled dietary appointment. Please remember, that while we will submit your case to insurance for surgery authorization, it is your responsibility to know if your plan covers weight loss surgery and keep up-to-date with changes to your insurance coverage. We will do everything possible to help you get approved for weight loss surgery, but cannot guarantee an approval.     Please note that you will not be submitted to your insurance company until all pre-operative testing requirements are met. \

## 2021-11-10 ENCOUNTER — HOSPITAL ENCOUNTER (OUTPATIENT)
Dept: OTHER | Age: 41
Setting detail: THERAPIES SERIES
Discharge: HOME | End: 2021-11-10
Payer: COMMERCIAL

## 2021-11-10 VITALS
DIASTOLIC BLOOD PRESSURE: 58 MMHG | BODY MASS INDEX: 39.61 KG/M2 | RESPIRATION RATE: 18 BRPM | OXYGEN SATURATION: 98 % | WEIGHT: 288 LBS | SYSTOLIC BLOOD PRESSURE: 105 MMHG | HEART RATE: 70 BPM

## 2021-11-10 LAB
ANION GAP SERPL CALCULATED.3IONS-SCNC: 16 MMOL/L (ref 7–16)
BUN BLDV-MCNC: 62 MG/DL (ref 6–20)
CALCIUM SERPL-MCNC: 10.5 MG/DL (ref 8.6–10.2)
CHLORIDE BLD-SCNC: 100 MMOL/L (ref 98–107)
CO2: 19 MMOL/L (ref 22–29)
CREAT SERPL-MCNC: 2.4 MG/DL (ref 0.7–1.2)
GFR AFRICAN AMERICAN: 36
GFR NON-AFRICAN AMERICAN: 30 ML/MIN/1.73
GLUCOSE BLD-MCNC: 129 MG/DL (ref 74–99)
POTASSIUM SERPL-SCNC: 4.1 MMOL/L (ref 3.5–5)
PRO-BNP: 519 PG/ML (ref 0–125)
SODIUM BLD-SCNC: 135 MMOL/L (ref 132–146)

## 2021-11-10 PROCEDURE — 36415 COLL VENOUS BLD VENIPUNCTURE: CPT

## 2021-11-10 PROCEDURE — 80048 BASIC METABOLIC PNL TOTAL CA: CPT

## 2021-11-10 PROCEDURE — 83880 ASSAY OF NATRIURETIC PEPTIDE: CPT

## 2021-11-10 PROCEDURE — 99204 OFFICE O/P NEW MOD 45 MIN: CPT

## 2021-11-10 NOTE — PROGRESS NOTES
[facility]   CHF Clinic       [place] III   1980  [phone]       Referring Doctor: Greg Fong  Cardiologist: Clarisa Elizondo  Nephrologist: n/a      History of Present Illness:     Grand kelvin MONTESINOS is a 39 y.o. male with a history of HFrEF, most recent EF 31% on 1/19/2021. Patient Story:    He does not  have dyspnea with exertion, shortness of breath, or decline in overall functional capacity. He does not have orthopnea, PND, nocturnal cough or hemoptysis. He does not have abdominal distention or bloating, early satiety, anorexia/change in appetite. He does has a good urinary response to their oral diuretic. He does not have  lower extremity edema. He denies lightheadedness, dizziness. He denies palpitations, syncope or near syncope. He does not complain of chest pain, pressure, discomfort. Allergies   Allergen Reactions    Farxiga [Dapagliflozin] Other (See Comments)     Caused throat to swell         Outpatient Medications Marked as Taking for the 11/10/21 encounter [facility] HOSPITAL Encounter) with [facility] CHF ROOM 1   Medication Sig Dispense Refill    atorvastatin (LIPITOR) 40 MG tablet Take 1 tablet by mouth daily 30 tablet 2    metFORMIN (GLUCOPHAGE) 1000 MG tablet Take 1 tablet by mouth 2 times daily (with meals) 60 tablet 1    bumetanide (BUMEX) 2 MG tablet TAKE ONE (1) TABLET BY MOUTH TWICE DAILY. Do not send in [address] 60 tablet 3    sacubitril-valsartan (ENTRESTO)  MG per tablet Take 1 tablet by mouth 2 times daily 180 tablet 3    nitroGLYCERIN (NITROSTAT) 0.4 MG SL tablet up to max of 3 total doses.  If no relief after 1 dose, call 911. 25 tablet 3    isosorbide dinitrate (ISORDIL) 10 MG tablet TAKE TWO (2) TABLETS BY MOUTH THREE TIMES PER  tablet 3    carvedilol (COREG) 25 MG tablet TAKE ONE (1) TABLET BY MOUTH TWICE DAILY WITH MEALS 60 tablet 11    dofetilide (TIKOSYN) 250 MCG capsule TAKE 1 CAPSULE BY MOUTH EVERY TWELVE HOURS  60 capsule 3    insulin lispro (HUMALOG) 100 UNIT/ML injection vial INJECT 20 UNITS SUBCUTANEOUSLY THREE TIMES A DAY BEFORE MEALS 10 mL 10    dapagliflozin (FARXIGA) 10 MG tablet Take 10 mg by mouth every morning       magnesium oxide (MAG-OX) 400 MG tablet Take 1 tablet by mouth daily 30 tablet 11    allopurinol (ZYLOPRIM) 300 MG tablet Take 1 tablet by mouth daily 30 tablet 11    spironolactone (ALDACTONE) 25 MG tablet TAKE 1/2 TABLET BY MOUTH DAILY WITH LUNCH (Patient taking differently: Take 25 mg by mouth daily ) 15 tablet 10    LEVEMIR FLEXTOUCH 100 UNIT/ML injection pen INJECT 40 UNITS INTO THE SKIN 2 TIMES DAILY 30 mL 10    XARELTO 20 MG TABS tablet TAKE 1 TABLET BY MOUTH DAILY 30 tablet 10           Guideline directed medical:  ARNI/ACE I/ARB: Yes  Beta blocker: Yes  Aldosterone antagonist:  Yes        Physical Examination:     BP (!) 105/58   Pulse 70   Resp 18   Wt 288 lb (130.6 kg)   BMI 39.61 kg/m²     Assessment  Charting Type: Shift assessment                   Respiratory  Respiratory Pattern: Regular  Respiratory Depth: Normal  Respiratory Quality/Effort: Unlabored  Chest Assessment: Chest expansion symmetrical  L Breath Sounds: Clear, Diminished  R Breath Sounds: Clear, Diminished              Cardiac  Cardiac Rhythm: Sinus rhythm    Rhythm Interpretation  Pulse: 70         Gastrointestinal  Abdominal (WDL): Exceptions to WDL  Abdomen Inspection: Distended, Soft  RUQ Bowel Sounds: Active  LUQ Bowel Sounds: Active  RLQ Bowel Sounds: Active  LLQ Bowel Sounds: Active          Bowel Sounds  RUQ Bowel Sounds: Active  LUQ Bowel Sounds: Active  RLQ Bowel Sounds: Active  LLQ Bowel Sounds: Active    Peripheral Vascular  Peripheral Vascular (WDL): Within Defined Limits  RLE Edema: None  LLE Edema: None                                                 Pulse: 70                   LAB DATA:    BNP  No results for input(s): BNP in the last 72 hours.     proBNP  No results for input(s): PROBNP in the last 72 hours.    BMP  No results for input(s): NA, K, CL, CO2, BUN, CREATININE, GLUCOSE, CALCIUM in the last 72 hours. WEIGHTS:  Wt Readings from Last 3 Encounters:   11/10/21 288 lb (130.6 kg)   11/04/21 293 lb (132.9 kg)   09/23/21 293 lb (132.9 kg)         TELEMETRY:  Cardiac Regularity: Regular  Cardiac Rhythm/Interpretation: NSR        ASSESSMENT:  Ismael Siegel III is evolemic with stable weights     Interventions completed this visit:  IV diuretics given no  Lab work obtained yes, BNP/BMP   Reviewed continue current medications that patient as prescribed answered any questions   Educated on signs and symptoms of HF  Educated on low sodium diet    PLAN:  Scheduled to follow up in CHF clinic on  Geovanny 10, 2022, 2021. Given clinic phone number and aware of signs and symptoms to call with any HF change in symptoms. No current complaints.  O2 sat 98% RA

## 2021-11-16 RX ORDER — DOFETILIDE 0.25 MG/1
CAPSULE ORAL
Qty: 60 CAPSULE | Refills: 3 | Status: SHIPPED
Start: 2021-11-16 | End: 2022-03-10 | Stop reason: SDUPTHER

## 2021-11-16 NOTE — TELEPHONE ENCOUNTER
Last Appointment:  9/23/2021  Future Appointments   Date Time Provider Clemente Holmani   11/29/2021 11:00 AM [address]   12/9/2021  1:30 PM Alec Strickland MD Surg [facility]   12/9/2021  2:30 PM Joyce Garzon MS, RD, LD Surg [facility]   12/13/2021  2:00 PM [facility]

## 2021-12-09 ENCOUNTER — TELEPHONE (OUTPATIENT)
Dept: BARIATRICS/WEIGHT MGMT | Age: 41
End: 2021-12-09

## 2021-12-09 NOTE — TELEPHONE ENCOUNTER
Pt was scheduled for his initial nutrition appt but did not show. Called and spoke with pt. He said he believed he had already cancelled the appt. Pt explained that he has successfully lost 25 lbs on his own with diet changes and he is not currently planning on proceeding with WLS. Told pt that is fine and please feel free to call in the future if he wishes to proceed.

## 2021-12-15 ENCOUNTER — OFFICE VISIT (OUTPATIENT)
Dept: FAMILY MEDICINE CLINIC | Age: 41
End: 2021-12-15
Payer: COMMERCIAL

## 2021-12-15 VITALS
SYSTOLIC BLOOD PRESSURE: 118 MMHG | DIASTOLIC BLOOD PRESSURE: 74 MMHG | BODY MASS INDEX: 39.55 KG/M2 | RESPIRATION RATE: 18 BRPM | HEIGHT: 72 IN | HEART RATE: 71 BPM | WEIGHT: 292 LBS | TEMPERATURE: 97.4 F | OXYGEN SATURATION: 96 %

## 2021-12-15 DIAGNOSIS — Z79.4 TYPE 2 DIABETES MELLITUS WITH COMPLICATION, WITH LONG-TERM CURRENT USE OF INSULIN (HCC): Primary | ICD-10-CM

## 2021-12-15 DIAGNOSIS — N18.9 CHRONIC KIDNEY DISEASE, UNSPECIFIED CKD STAGE: ICD-10-CM

## 2021-12-15 DIAGNOSIS — I10 ESSENTIAL HYPERTENSION: ICD-10-CM

## 2021-12-15 DIAGNOSIS — I48.0 PAROXYSMAL ATRIAL FIBRILLATION (HCC): ICD-10-CM

## 2021-12-15 DIAGNOSIS — E11.8 TYPE 2 DIABETES MELLITUS WITH COMPLICATION, WITH LONG-TERM CURRENT USE OF INSULIN (HCC): Primary | ICD-10-CM

## 2021-12-15 LAB
ANION GAP SERPL CALCULATED.3IONS-SCNC: 18 MMOL/L (ref 7–16)
BUN BLDV-MCNC: 40 MG/DL (ref 6–20)
CALCIUM SERPL-MCNC: 10 MG/DL (ref 8.6–10.2)
CHLORIDE BLD-SCNC: 103 MMOL/L (ref 98–107)
CO2: 19 MMOL/L (ref 22–29)
CREAT SERPL-MCNC: 1.9 MG/DL (ref 0.7–1.2)
GFR AFRICAN AMERICAN: 47
GFR NON-AFRICAN AMERICAN: 39 ML/MIN/1.73
GLUCOSE BLD-MCNC: 121 MG/DL (ref 74–99)
HBA1C MFR BLD: 6.5 %
POTASSIUM SERPL-SCNC: 4.1 MMOL/L (ref 3.5–5)
SODIUM BLD-SCNC: 140 MMOL/L (ref 132–146)

## 2021-12-15 PROCEDURE — G8417 CALC BMI ABV UP PARAM F/U: HCPCS | Performed by: FAMILY MEDICINE

## 2021-12-15 PROCEDURE — 99213 OFFICE O/P EST LOW 20 MIN: CPT | Performed by: FAMILY MEDICINE

## 2021-12-15 PROCEDURE — 99212 OFFICE O/P EST SF 10 MIN: CPT | Performed by: FAMILY MEDICINE

## 2021-12-15 PROCEDURE — 1036F TOBACCO NON-USER: CPT | Performed by: FAMILY MEDICINE

## 2021-12-15 PROCEDURE — G8484 FLU IMMUNIZE NO ADMIN: HCPCS | Performed by: FAMILY MEDICINE

## 2021-12-15 PROCEDURE — 83036 HEMOGLOBIN GLYCOSYLATED A1C: CPT | Performed by: FAMILY MEDICINE

## 2021-12-15 PROCEDURE — 2022F DILAT RTA XM EVC RTNOPTHY: CPT | Performed by: FAMILY MEDICINE

## 2021-12-15 PROCEDURE — 36415 COLL VENOUS BLD VENIPUNCTURE: CPT | Performed by: FAMILY MEDICINE

## 2021-12-15 PROCEDURE — G8427 DOCREV CUR MEDS BY ELIG CLIN: HCPCS | Performed by: FAMILY MEDICINE

## 2021-12-15 PROCEDURE — 3044F HG A1C LEVEL LT 7.0%: CPT | Performed by: FAMILY MEDICINE

## 2021-12-15 ASSESSMENT — ENCOUNTER SYMPTOMS
CONSTIPATION: 0
VOMITING: 0
WHEEZING: 0
ABDOMINAL PAIN: 0
SHORTNESS OF BREATH: 0
NAUSEA: 0
COUGH: 0
DIARRHEA: 0

## 2021-12-15 ASSESSMENT — LIFESTYLE VARIABLES: HOW OFTEN DO YOU HAVE A DRINK CONTAINING ALCOHOL: NEVER

## 2021-12-15 NOTE — PROGRESS NOTES
[address] [place] [address]  FAMILY MEDICINE RESIDENCY PROGRAM  DATE OF VISIT : 12/15/2021    Patient : Nav Rivas   Age : 39 y.o.  : 1980   MRN : [id number]   ______________________________________________________________________    Chief Complaint :   Chief Complaint   Patient presents with    Diabetes       HPI : Nav Rivas is 39 y.o. male who presented to the clinic today for new to provider encounter. HTN/CHF: Bumex 2mg, Coreg 25mg, Entresto BID, Aldactone 25mg. A.Fib: Xarelto  20mg, Dofetilide 250mcg. HLD: Lipitor 40mg. IDDM: Farxiga 10mg, Metformin 1000mg BID, Levemir 40U. Last A1C 6.5 (). Usually BG at home <130. CKD: Last Creatinine 2.4. Making good urine and denies any  symptoms. I reviewed the patient's past medications, allergies and past medical history during this visit.     Past Medical History :        Past Medical History:   Diagnosis Date    Acute CHF ([address] 2011    Acute CHF ([address] 2011    Acute idiopathic gout of right foot 2016    AF (atrial fibrillation) ([address] 2020    Anemia 2011    CAD (coronary artery disease)     Cerebral artery occlusion with cerebral infarction ([address]     CKD (chronic kidney disease)     Gout     HTN (hypertension) 2011    Hyperlipidemia     Hypertension     Morbid obesity due to excess calories ([address]     Nonischemic cardiomyopathy ([address] 2011    Nonischemic cardiomyopathy ([address] 2013- cardiac MRI revealed an LVEF of 20%    ARVIND (obstructive sleep apnea) 2011    S/P left heart catheterization by percutaneous approach     Dr Zoltan Camilo Systolic heart failure [facility]) 2012- cardiac catheterization revealed an LVEF of 10-15%, mitral regurgitation along with borderline prolapse of mitral valve    Type 2 diabetes mellitus with complication, with long-term current use of insulin ([facility] 2016    URI, acute 2011     Past Surgical History:   Procedure Laterality Date    CARDIAC CATHETERIZATION  08/01/2019    Dr Gina Engel  11/20/2018    UPGRADE S-ICD TO BIV ICD   ([employer])  [facility]     CARDIOVERSION  02/14/2020    Successful CV of AF to NSR      (Dr. Sean Cespedes)    ECHO COMPL W DOP COLOR FLOW  11/30/2011         ECHO COMPL W DOP COLOR FLOW  03/27/2012         INSERTABLE CARDIAC MONITOR  12/13/2018    cardioti, MIGUELINA, Dr. Rashad Vieira  12/15/2020    SUCCESSFUL OVERDRIVE PACE TERMINATION OF AF VIA ICD    ( [address])    PACEMAKER PLACEMENT         Social History :  Social History     Tobacco History     Smoking Status  Never Smoker    Smokeless Tobacco Use  Never Used          Alcohol History     Alcohol Use Status  Not Currently          Drug Use     Drug Use Status  Never          Sexual Activity     Sexually Active  Yes Partners  Female                 Allergies : Allergies   Allergen Reactions    Farxiga [Dapagliflozin] Other (See Comments)     Caused throat to swell       Medication List :    Current Outpatient Medications   Medication Sig Dispense Refill    dofetilide (TIKOSYN) 250 MCG capsule TAKE 1 CAPSULE BY MOUTH EVERY TWELVE HOURS 60 capsule 3    ONETOUCH ULTRA strip USE TO TEST BLOOD SUGAR 2 TO 3 TIMES DAILY AND AS NEEDED FOR SYMPTOMS OF IRREGULAR BLOOD GLUCOSE 100 each 5    atorvastatin (LIPITOR) 40 MG tablet Take 1 tablet by mouth daily 30 tablet 2    metFORMIN (GLUCOPHAGE) 1000 MG tablet Take 1 tablet by mouth 2 times daily (with meals) 60 tablet 1    bumetanide (BUMEX) 2 MG tablet TAKE ONE (1) TABLET BY MOUTH TWICE DAILY. Do not send in [address]. 60 tablet 3    sacubitril-valsartan (ENTRESTO)  MG per tablet Take 1 tablet by mouth 2 times daily 180 tablet 3    nitroGLYCERIN (NITROSTAT) 0.4 MG SL tablet up to max of 3 total doses.  If no relief after 1 dose, call 911. 25 tablet 3    isosorbide dinitrate (ISORDIL) 10 MG tablet TAKE TWO (2) TABLETS BY MOUTH THREE TIMES PER  tablet 3    carvedilol (COREG) 25 MG tablet TAKE ONE (1) TABLET BY MOUTH TWICE DAILY WITH MEALS 60 tablet 11    insulin lispro (HUMALOG) 100 UNIT/ML injection vial INJECT 20 UNITS SUBCUTANEOUSLY THREE TIMES A DAY BEFORE MEALS 10 mL 10    Blood Pressure KIT 1 kit by Does not apply route daily 1 kit 0    magnesium oxide (MAG-OX) 400 MG tablet Take 1 tablet by mouth daily 30 tablet 11    allopurinol (ZYLOPRIM) 300 MG tablet Take 1 tablet by mouth daily 30 tablet 11    spironolactone (ALDACTONE) 25 MG tablet TAKE 1/2 TABLET BY MOUTH DAILY WITH LUNCH (Patient taking differently: Take 25 mg by mouth daily ) 15 tablet 10    LEVEMIR FLEXTOUCH 100 UNIT/ML injection pen INJECT 40 UNITS INTO THE SKIN 2 TIMES DAILY 30 mL 10    Continuous Blood Gluc Transmit (DEXCOM G6 TRANSMITTER) MISC USE AS DIRECTED 1 each 0    XARELTO 20 MG TABS tablet TAKE 1 TABLET BY MOUTH DAILY 30 tablet 10     No current facility-administered medications for this visit. Review of Systems :  Review of Systems   Constitutional: Negative for chills, fatigue and fever. Respiratory: Negative for cough, shortness of breath and wheezing. Cardiovascular: Negative for chest pain, palpitations and leg swelling. Gastrointestinal: Negative for abdominal pain, constipation, diarrhea, nausea and vomiting. Neurological: Negative for dizziness, weakness, light-headedness, numbness and headaches.     ______________________________________________________________________    Physical Exam :    Vitals: /74 (Site: Right Upper Arm, Position: Sitting, Cuff Size: Medium Adult)   Pulse 71   Temp 97.4 °F (36.3 °C) (Temporal)   Resp 18   Ht 6' (1.829 m)   Wt 292 lb (132.5 kg)   SpO2 96%   BMI 39.60 kg/m²   Physical Exam  Vitals reviewed. Constitutional:       General: He is not in acute distress. Appearance: Normal appearance. Cardiovascular:      Rate and Rhythm: Normal rate and regular rhythm.       Pulses: Normal pulses. Heart sounds: Normal heart sounds. No murmur heard. Pulmonary:      Effort: Pulmonary effort is normal. No respiratory distress. Breath sounds: Normal breath sounds. No wheezing. Abdominal:      General: Bowel sounds are normal. There is no distension. Palpations: Abdomen is soft. Tenderness: There is no abdominal tenderness. Musculoskeletal:      Right lower leg: No edema. Left lower leg: No edema. Neurological:      Mental Status: He is alert.       ___________________    Assessment & Plan :    1. Type 2 diabetes mellitus with complication, with long-term current use of insulin (HCC)  - A1C today 6.5  - continue present mgmt  - POCT glycosylated hemoglobin (Hb A1C)    2. Paroxysmal atrial fibrillation (HCC)  - stable  - continue present mgmt    3. Essential hypertension  - stable  - continue present mgmt    4. Chronic kidney disease, unspecified CKD stage  - Repeat BMP today  - if creatinine continues to be elevated consider decreasing Bumex to every other day and referral to nephrology.  - BASIC METABOLIC PANEL; Future      Educational materials and/or home exercises printed for patient's review and were included in patient instructions on his/her After Visit Summary and given to patient at the end of visit. Counseled regarding above diagnosis, including possible risks and complications,  especially if left uncontrolled. Counseled regarding the possible side effects, risks, benefits and alternatives to treatment; patient and/or guardian verbalizes understanding, agrees, feels comfortable with and wishes to proceed with above treatment plan. Advised patient to call with any new medication issues, and read all Rx info from pharmacy to assure aware of all possible risks and side effects of medication before taking. Reviewed age and gender appropriate health screening exams and vaccinations.   Advised patient regarding importance of keeping up with recommended health maintenance and to schedule as soon as possible if overdue, as this is important in assessing for undiagnosed pathology, especially cancer, as well as protecting against potentially harmful/life threatening disease. Patient and/or guardian verbalizes understanding and agrees with above counseling, assessment and plan. All questions answered    Additional plan and future considerations:   RTO in 3mos    Return to Office: Return in about 3 months (around 3/15/2022) for DM.     Chance You MD   Case discussed with Dr. Bright Thompson

## 2021-12-15 NOTE — PROGRESS NOTES
39 y.o. male who presented to the clinic today for new to provider encounter.      HTN/CHF: Bumex 2mg, Coreg 25mg, Entresto BID, Aldactone 25mg.      A. Fib: Xarelto  20mg, Dofetilide 250mcg.      HLD: Lipitor 40mg.      IDDM: Farxiga 10mg, Metformin 1000mg BID, Levemir 40U. Last A1C 6.5 (9/23). Usually BG at home <130.      CKD: Last Creatinine 2.4. Making good urine and denies any  symptoms. Blood pressure 118/74, pulse 71, temperature 97.4 °F (36.3 °C), temperature source Temporal, resp. rate 18, height 6' (1.829 m), weight 292 lb (132.5 kg), SpO2 96 %. HEENT WNL     Heart regular    Lungs clear    abd non-tender      No edema    Pulses intact       A&P:  HTN/CHF: Continue present management, consider decreasing Bumex to every other day if creatinine continues to elevate  A. fib: Continue Xarelto and dofetilide  HLD: Continue present management  IDDM: A1c today 6.5, continue present management  CKD: Repeat BMP today, if creatinine continues to be elevated consider decreasing Bumex to every other day and referral to nephrology. RTO in 3 months or sooner if indicated by labs. Attending Physician Statement  I have discussed the case, including pertinent history and exam findings with the resident. I agree with the documented assessment and plan.

## 2021-12-22 DIAGNOSIS — E11.8 TYPE 2 DIABETES MELLITUS WITH COMPLICATION, WITH LONG-TERM CURRENT USE OF INSULIN (HCC): Chronic | ICD-10-CM

## 2021-12-22 DIAGNOSIS — Z79.4 TYPE 2 DIABETES MELLITUS WITH COMPLICATION, WITH LONG-TERM CURRENT USE OF INSULIN (HCC): Chronic | ICD-10-CM

## 2021-12-22 NOTE — TELEPHONE ENCOUNTER
Last Appointment:  9/23/2021  Future Appointments   Date Time Provider Clemente Davis   3/14/2022  1:00 PM Amisha Clark MD [facility]

## 2021-12-23 RX ORDER — ISOSORBIDE DINITRATE 10 MG/1
TABLET ORAL
Qty: 180 TABLET | Refills: 3 | Status: SHIPPED
Start: 2021-12-23 | End: 2022-04-29

## 2022-01-04 DIAGNOSIS — I50.22 CHRONIC SYSTOLIC HEART FAILURE (HCC): Primary | ICD-10-CM

## 2022-01-06 ENCOUNTER — APPOINTMENT (OUTPATIENT)
Dept: OTHER | Age: 42
End: 2022-01-06
Payer: COMMERCIAL

## 2022-01-10 ENCOUNTER — HOSPITAL ENCOUNTER (OUTPATIENT)
Dept: OTHER | Age: 42
Setting detail: THERAPIES SERIES
Discharge: HOME | End: 2022-01-10
Payer: COMMERCIAL

## 2022-01-10 VITALS
OXYGEN SATURATION: 99 % | SYSTOLIC BLOOD PRESSURE: 105 MMHG | RESPIRATION RATE: 18 BRPM | DIASTOLIC BLOOD PRESSURE: 52 MMHG | BODY MASS INDEX: 39.87 KG/M2 | HEART RATE: 70 BPM | WEIGHT: 294 LBS

## 2022-01-10 LAB
ANION GAP SERPL CALCULATED.3IONS-SCNC: 14 MMOL/L (ref 7–16)
BUN BLDV-MCNC: 48 MG/DL (ref 6–20)
CALCIUM SERPL-MCNC: 9.5 MG/DL (ref 8.6–10.2)
CHLORIDE BLD-SCNC: 102 MMOL/L (ref 98–107)
CO2: 21 MMOL/L (ref 22–29)
CREAT SERPL-MCNC: 2 MG/DL (ref 0.7–1.2)
GFR AFRICAN AMERICAN: 45
GFR NON-AFRICAN AMERICAN: 37 ML/MIN/1.73
GLUCOSE BLD-MCNC: 119 MG/DL (ref 74–99)
POTASSIUM SERPL-SCNC: 4.3 MMOL/L (ref 3.5–5)
PRO-BNP: 693 PG/ML (ref 0–125)
SODIUM BLD-SCNC: 137 MMOL/L (ref 132–146)

## 2022-01-10 PROCEDURE — 80048 BASIC METABOLIC PNL TOTAL CA: CPT

## 2022-01-10 PROCEDURE — 36415 COLL VENOUS BLD VENIPUNCTURE: CPT

## 2022-01-10 PROCEDURE — 83880 ASSAY OF NATRIURETIC PEPTIDE: CPT

## 2022-01-10 PROCEDURE — 99214 OFFICE O/P EST MOD 30 MIN: CPT

## 2022-01-10 NOTE — PROGRESS NOTES
[facility]   CHF Clinic       [place]   1980  [phone]       Referring Doctor: Rosemary Habermann  Cardiologist: Cecil Edwards  Nephrologist: n/a      History of Present Illness:     Grand kelvin MONTESINOS is a 39 y.o. male with a history of HFrEF, most recent EF 31% on 1/19/2021. Patient Story:    He does NOT  have dyspnea with exertion, shortness of breath, or decline in overall functional capacity. He does not have orthopnea, PND, nocturnal cough or hemoptysis. He does not have abdominal distention or bloating, early satiety, anorexia/change in appetite. He does has a good urinary response to his oral diuretic. He does not have  lower extremity edema. He denies  lightheadedness, dizziness. Said he hasn't had it in awhile. He denies palpitations, syncope or near syncope. He sometimes complains of chest pain and takes nitro with relief. States his physician is aware. Allergies   Allergen Reactions    Farxiga [Dapagliflozin] Other (See Comments)     Caused throat to swell         Outpatient Medications Marked as Taking for the 1/10/22 encounter [facility] Encounter) with [facility] CHF ROOM 1   Medication Sig Dispense Refill    isosorbide dinitrate (ISORDIL) 10 MG tablet TAKE TWO (2) TABLETS BY MOUTH THREE TIMES PER  tablet 3    metFORMIN (GLUCOPHAGE) 1000 MG tablet TAKE 1 TABLET BY MOUTH TWICE DAILY (WITH MEALS) 60 tablet 1    dofetilide (TIKOSYN) 250 MCG capsule TAKE 1 CAPSULE BY MOUTH EVERY TWELVE HOURS 60 capsule 3    ONETOUCH ULTRA strip USE TO TEST BLOOD SUGAR 2 TO 3 TIMES DAILY AND AS NEEDED FOR SYMPTOMS OF IRREGULAR BLOOD GLUCOSE 100 each 5    atorvastatin (LIPITOR) 40 MG tablet Take 1 tablet by mouth daily 30 tablet 2    bumetanide (BUMEX) 2 MG tablet TAKE ONE (1) TABLET BY MOUTH TWICE DAILY. Do not send in [address]  60 tablet 3    sacubitril-valsartan (ENTRESTO)  MG per tablet Take 1 tablet by mouth 2 times daily 180 tablet 3    nitroGLYCERIN (NITROSTAT) 0.4 MG SL tablet up to max of 3 total doses. If no relief after 1 dose, call 911. 25 tablet 3    carvedilol (COREG) 25 MG tablet TAKE ONE (1) TABLET BY MOUTH TWICE DAILY WITH MEALS 60 tablet 11    insulin lispro (HUMALOG) 100 UNIT/ML injection vial INJECT 20 UNITS SUBCUTANEOUSLY THREE TIMES A DAY BEFORE MEALS 10 mL 10    Blood Pressure KIT 1 kit by Does not apply route daily 1 kit 0    magnesium oxide (MAG-OX) 400 MG tablet Take 1 tablet by mouth daily 30 tablet 11    allopurinol (ZYLOPRIM) 300 MG tablet Take 1 tablet by mouth daily 30 tablet 11    spironolactone (ALDACTONE) 25 MG tablet TAKE 1/2 TABLET BY MOUTH DAILY WITH LUNCH (Patient taking differently: Take 25 mg by mouth daily ) 15 tablet 10    LEVEMIR FLEXTOUCH 100 UNIT/ML injection pen INJECT 40 UNITS INTO THE SKIN 2 TIMES DAILY 30 mL 10    Continuous Blood Gluc Transmit (DEXCOM G6 TRANSMITTER) MISC USE AS DIRECTED 1 each 0    XARELTO 20 MG TABS tablet TAKE 1 TABLET BY MOUTH DAILY 30 tablet 10           Guideline directed medical:  ARNI/ACE I/ARB: Yes  Beta blocker: Yes  Aldosterone antagonist:  Yes        Physical Examination:     BP (!) 105/52   Pulse 70   Resp 18   Wt 294 lb (133.4 kg)   SpO2 99%   BMI 39.87 kg/m²     Assessment  Charting Type: Shift assessment                   Respiratory  Respiratory Pattern: Regular  Respiratory Depth: Normal  Respiratory Quality/Effort: Unlabored  Chest Assessment: Chest expansion symmetrical  L Breath Sounds: Clear,Diminished  R Breath Sounds: Clear,Diminished              Cardiac  Cardiac Rhythm: Sinus rhythm    Rhythm Interpretation  Pulse: 70         Gastrointestinal  Abdominal (WDL): Exceptions to WDL  Abdomen Inspection: Distended,Soft  RUQ Bowel Sounds: Active  LUQ Bowel Sounds: Active  RLQ Bowel Sounds: Active  LLQ Bowel Sounds: Active          Bowel Sounds  RUQ Bowel Sounds: Active  LUQ Bowel Sounds: Active  RLQ Bowel Sounds: Active  LLQ Bowel Sounds:  Active    Peripheral Vascular  Peripheral Vascular (WDL): Within Defined Limits  RLE Edema: None  LLE Edema: None                        Psychosocial  Psychosocial (WDL): Within Defined Limits                        Pulse: 70                   LAB DATA:    BNP  No results for input(s): BNP in the last 72 hours. proBNP  No results for input(s): PROBNP in the last 72 hours. BMP  No results for input(s): NA, K, CL, CO2, BUN, CREATININE, GLUCOSE, CALCIUM in the last 72 hours. WEIGHTS:  Wt Readings from Last 3 Encounters:   01/10/22 294 lb (133.4 kg)   12/15/21 292 lb (132.5 kg)   11/10/21 288 lb (130.6 kg)         TELEMETRY:  Cardiac Regularity: Regular  Cardiac Rhythm/Interpretation: NSR        ASSESSMENT:  Momo Kumar III is evolemic with stable weight    Interventions completed this visit:  IV diuretics given: no  Lab work obtained: yes, BMP/BNP   Reviewed continue current medications that patient as prescribed answered any questions   Educated on signs and symptoms of HF  Educated on low sodium diet    PLAN:  Future Appointments   Date Time Provider Clemente Davis   3/7/2022  1:00 PM [facility] CHF ROOM 1 [facility]   3/14/2022  1:00 PM Dolly Perkins MD [address]         Given clinic phone number and aware of signs and symptoms to call with any HF change in symptoms.

## 2022-01-22 ENCOUNTER — APPOINTMENT (OUTPATIENT)
Dept: GENERAL RADIOLOGY | Age: 42
End: 2022-01-22
Payer: COMMERCIAL

## 2022-01-22 ENCOUNTER — HOSPITAL ENCOUNTER (EMERGENCY)
Age: 42
Discharge: HOME | End: 2022-01-22
Attending: EMERGENCY MEDICINE
Payer: COMMERCIAL

## 2022-01-22 VITALS
SYSTOLIC BLOOD PRESSURE: 111 MMHG | WEIGHT: 294 LBS | RESPIRATION RATE: 18 BRPM | BODY MASS INDEX: 39.82 KG/M2 | DIASTOLIC BLOOD PRESSURE: 79 MMHG | TEMPERATURE: 98 F | HEART RATE: 105 BPM | OXYGEN SATURATION: 96 % | HEIGHT: 72 IN

## 2022-01-22 DIAGNOSIS — R42 DIZZINESS: Primary | ICD-10-CM

## 2022-01-22 LAB
ALBUMIN SERPL-MCNC: 4.9 G/DL (ref 3.5–5.2)
ALP BLD-CCNC: 108 U/L (ref 40–129)
ALT SERPL-CCNC: 10 U/L (ref 0–40)
ANION GAP SERPL CALCULATED.3IONS-SCNC: 17 MMOL/L (ref 7–16)
AST SERPL-CCNC: 14 U/L (ref 0–39)
BASOPHILS ABSOLUTE: 0.03 E9/L (ref 0–0.2)
BASOPHILS RELATIVE PERCENT: 0.3 % (ref 0–2)
BILIRUB SERPL-MCNC: 0.4 MG/DL (ref 0–1.2)
BUN BLDV-MCNC: 58 MG/DL (ref 6–20)
CALCIUM SERPL-MCNC: 10.5 MG/DL (ref 8.6–10.2)
CHLORIDE BLD-SCNC: 95 MMOL/L (ref 98–107)
CO2: 23 MMOL/L (ref 22–29)
CREAT SERPL-MCNC: 2.4 MG/DL (ref 0.7–1.2)
EOSINOPHILS ABSOLUTE: 0.3 E9/L (ref 0.05–0.5)
EOSINOPHILS RELATIVE PERCENT: 3.2 % (ref 0–6)
GFR AFRICAN AMERICAN: 36
GFR NON-AFRICAN AMERICAN: 30 ML/MIN/1.73
GLUCOSE BLD-MCNC: 152 MG/DL (ref 74–99)
HCT VFR BLD CALC: 37.9 % (ref 37–54)
HEMOGLOBIN: 12.3 G/DL (ref 12.5–16.5)
IMMATURE GRANULOCYTES #: 0.03 E9/L
IMMATURE GRANULOCYTES %: 0.3 % (ref 0–5)
LYMPHOCYTES ABSOLUTE: 2.25 E9/L (ref 1.5–4)
LYMPHOCYTES RELATIVE PERCENT: 24 % (ref 20–42)
MCH RBC QN AUTO: 29.6 PG (ref 26–35)
MCHC RBC AUTO-ENTMCNC: 32.5 % (ref 32–34.5)
MCV RBC AUTO: 91.3 FL (ref 80–99.9)
MONOCYTES ABSOLUTE: 0.83 E9/L (ref 0.1–0.95)
MONOCYTES RELATIVE PERCENT: 8.8 % (ref 2–12)
NEUTROPHILS ABSOLUTE: 5.94 E9/L (ref 1.8–7.3)
NEUTROPHILS RELATIVE PERCENT: 63.4 % (ref 43–80)
PDW BLD-RTO: 14.8 FL (ref 11.5–15)
PLATELET # BLD: 261 E9/L (ref 130–450)
PMV BLD AUTO: 9.3 FL (ref 7–12)
POTASSIUM REFLEX MAGNESIUM: 4 MMOL/L (ref 3.5–5)
RBC # BLD: 4.15 E12/L (ref 3.8–5.8)
REASON FOR REJECTION: NORMAL
REJECTED TEST: NORMAL
SODIUM BLD-SCNC: 135 MMOL/L (ref 132–146)
TOTAL PROTEIN: 8.8 G/DL (ref 6.4–8.3)
TROPONIN, HIGH SENSITIVITY: 54 NG/L (ref 0–11)
TROPONIN, HIGH SENSITIVITY: 60 NG/L (ref 0–11)
WBC # BLD: 9.4 E9/L (ref 4.5–11.5)

## 2022-01-22 PROCEDURE — 93005 ELECTROCARDIOGRAM TRACING: CPT | Performed by: STUDENT IN AN ORGANIZED HEALTH CARE EDUCATION/TRAINING PROGRAM

## 2022-01-22 PROCEDURE — 99285 EMERGENCY DEPT VISIT HI MDM: CPT

## 2022-01-22 PROCEDURE — 85025 COMPLETE CBC W/AUTO DIFF WBC: CPT

## 2022-01-22 PROCEDURE — 36415 COLL VENOUS BLD VENIPUNCTURE: CPT

## 2022-01-22 PROCEDURE — 84484 ASSAY OF TROPONIN QUANT: CPT

## 2022-01-22 PROCEDURE — 71045 X-RAY EXAM CHEST 1 VIEW: CPT

## 2022-01-22 PROCEDURE — 80053 COMPREHEN METABOLIC PANEL: CPT

## 2022-01-22 PROCEDURE — 2580000003 HC RX 258: Performed by: STUDENT IN AN ORGANIZED HEALTH CARE EDUCATION/TRAINING PROGRAM

## 2022-01-22 RX ORDER — 0.9 % SODIUM CHLORIDE 0.9 %
1000 INTRAVENOUS SOLUTION INTRAVENOUS ONCE
Status: COMPLETED | OUTPATIENT
Start: 2022-01-22 | End: 2022-01-22

## 2022-01-22 RX ADMIN — SODIUM CHLORIDE 1000 ML: 9 INJECTION, SOLUTION INTRAVENOUS at 04:54

## 2022-01-22 ASSESSMENT — ENCOUNTER SYMPTOMS
COUGH: 0
EYE DISCHARGE: 0
SHORTNESS OF BREATH: 0
SINUS PRESSURE: 0
EYE PAIN: 0
NAUSEA: 0
DIARRHEA: 0
EYE REDNESS: 0
VOMITING: 0
BACK PAIN: 0
SORE THROAT: 0
WHEEZING: 0
ABDOMINAL PAIN: 0

## 2022-01-22 NOTE — ED PROVIDER NOTES
Patient presents with dizziness. Patient states he was woken up out of his sleep with palpitations. He describes the palpitations as his heart beating too fast. He states that he attempted to take a nitro to improve his symptoms but that afterwards he noticed he was dizzy. Patient states that he believes the nitro made him dizzy but that he wanted to be sure that his heart is alright. Patient does have a defibrillator implanted but states it has not gone off. Patient other wise denies any headaches, chest pain, shortness of breath, nausea, or vomiting. The history is provided by the patient. No  was used. Review of Systems   Constitutional: Negative for chills and fever. HENT: Negative for ear pain, sinus pressure and sore throat. Eyes: Negative for pain, discharge and redness. Respiratory: Negative for cough, shortness of breath and wheezing. Cardiovascular: Positive for chest pain. Gastrointestinal: Negative for abdominal pain, diarrhea, nausea and vomiting. Genitourinary: Negative for dysuria and frequency. Musculoskeletal: Negative for arthralgias and back pain. Skin: Negative for rash and wound. Neurological: Positive for dizziness. Negative for weakness and headaches. Hematological: Negative for adenopathy. All other systems reviewed and are negative. Physical Exam  Vitals and nursing note reviewed. Constitutional:       General: He is not in acute distress. Appearance: He is well-developed. He is obese. HENT:      Head: Normocephalic and atraumatic. Eyes:      Conjunctiva/sclera: Conjunctivae normal.   Cardiovascular:      Rate and Rhythm: Normal rate and regular rhythm. Heart sounds: Normal heart sounds. No murmur heard. Pulmonary:      Effort: Pulmonary effort is normal. No respiratory distress. Breath sounds: Normal breath sounds. No wheezing or rales.    Abdominal:      General: Bowel sounds are normal.      Palpations: Abdomen is soft. Tenderness: There is no abdominal tenderness. There is no guarding or rebound. Musculoskeletal:         General: No tenderness or deformity. Cervical back: Normal range of motion and neck supple. Skin:     General: Skin is warm and dry. Neurological:      Mental Status: He is alert and oriented to person, place, and time. Cranial Nerves: No cranial nerve deficit. Coordination: Coordination normal.          Procedures     MDM  Number of Diagnoses or Management Options  Dizziness  Diagnosis management comments: Patient presents with dizziness after taking nitro. He is currently asymptomatic. Troponins were 60 and 54. No obvious signs of ischemia on EKG but patient did have a change in his pacing. Pacer was interrogated which did not show any recent shocks. CMP did show patient's baseline CKD and CBC showed patient's baseline anemia. Patient was a little tachycardic and fluids were given. CXR did not show any obvious cardiopulmonary processes. At this point there does not appear to be any emergent processes ongoing. Symptoms are likely secondary to patient taking nitro inappropriately. Patient was informed of results and plan and was agreeable. Patient will be discharged with instructions to follow with their PCP for further evaluation and care. Amount and/or Complexity of Data Reviewed  Clinical lab tests: reviewed  Tests in the radiology section of CPT®: reviewed  Decide to obtain previous medical records or to obtain history from someone other than the patient: yes         ED Course as of 01/22/22 1523   Sat Jan 22, 2022   0353 EKG shows a ventricularly paced rhythm at a ventricular rate of 111 bpm.  There is a left axis deviation consistent with pacing. There are no obvious ST elevations. New compared to previous EKG on 7/1/2021 where patient was AV paced.   As interpreted by myself the ED physician [BB]      ED Course User Index  [BB] Carmon Goltz, DO ED Course as of 01/22/22 1523   Sat Jan 22, 2022   0353 EKG shows a ventricularly paced rhythm at a ventricular rate of 111 bpm.  There is a left axis deviation consistent with pacing. There are no obvious ST elevations. New compared to previous EKG on 7/1/2021 where patient was AV paced. As interpreted by myself the ED physician [BB]      ED Course User Index  [BB] Marie Guerin, DO       --------------------------------------------- PAST HISTORY ---------------------------------------------  Past Medical History:  has a past medical history of Acute CHF ([address] Acute CHF ([address] Acute idiopathic gout of right foot, AF (atrial fibrillation) ([address] Anemia, CAD (coronary artery disease), Cerebral artery occlusion with cerebral infarction ([address] CKD (chronic kidney disease), Gout, HTN (hypertension), Hyperlipidemia, Hypertension, Morbid obesity due to excess calories ([address] Nonischemic cardiomyopathy ([address] Nonischemic cardiomyopathy ([address] ARVIND (obstructive sleep apnea), S/P left heart catheterization by percutaneous approach, Systolic heart failure ([address] Type 2 diabetes mellitus with complication, with long-term current use of insulin ([address] and URI, acute. Past Surgical History:  has a past surgical history that includes ECHO Compl W Dop Color Flow (11/30/2011); ECHO Compl W Dop Color Flow (03/27/2012); Insertable Cardiac Monitor (12/13/2018); Cardioversion (02/14/2020); Cardiac defibrillator placement (11/20/2018); Cardiac catheterization (08/01/2019); pacemaker placement; and other surgical history (12/15/2020). Social History:  reports that he has never smoked. He has never used smokeless tobacco. He reports previous alcohol use. He reports that he does not use drugs. Family History: family history includes Cancer in his mother; No Known Problems in his father. The patients home medications have been reviewed.     Allergies: [place] [dapagliflozin]    -------------------------------------------------- RESULTS -------------------------------------------------  Labs:  Results for orders placed or performed during the hospital encounter of 01/22/22   CBC Auto Differential   Result Value Ref Range    WBC 9.4 4.5 - 11.5 E9/L    RBC 4.15 3.80 - 5.80 E12/L    Hemoglobin 12.3 (L) 12.5 - 16.5 g/dL    Hematocrit 37.9 37.0 - 54.0 %    MCV 91.3 80.0 - 99.9 fL    MCH 29.6 26.0 - 35.0 pg    MCHC 32.5 32.0 - 34.5 %    RDW 14.8 11.5 - 15.0 fL    Platelets 840 815 - 001 E9/L    MPV 9.3 7.0 - 12.0 fL    Neutrophils % 63.4 43.0 - 80.0 %    Immature Granulocytes % 0.3 0.0 - 5.0 %    Lymphocytes % 24.0 20.0 - 42.0 %    Monocytes % 8.8 2.0 - 12.0 %    Eosinophils % 3.2 0.0 - 6.0 %    Basophils % 0.3 0.0 - 2.0 %    Neutrophils Absolute 5.94 1.80 - 7.30 E9/L    Immature Granulocytes # 0.03 E9/L    Lymphocytes Absolute 2.25 1.50 - 4.00 E9/L    Monocytes Absolute 0.83 0.10 - 0.95 E9/L    Eosinophils Absolute 0.30 0.05 - 0.50 E9/L    Basophils Absolute 0.03 0.00 - 0.20 E9/L   Comprehensive Metabolic Panel w/ Reflex to MG   Result Value Ref Range    Sodium 135 132 - 146 mmol/L    Potassium reflex Magnesium 4.0 3.5 - 5.0 mmol/L    Chloride 95 (L) 98 - 107 mmol/L    CO2 23 22 - 29 mmol/L    Anion Gap 17 (H) 7 - 16 mmol/L    Glucose 152 (H) 74 - 99 mg/dL    BUN 58 (H) 6 - 20 mg/dL    CREATININE 2.4 (H) 0.7 - 1.2 mg/dL    GFR Non-African American 30 >=60 mL/min/1.73    GFR African American 36     Calcium 10.5 (H) 8.6 - 10.2 mg/dL    Total Protein 8.8 (H) 6.4 - 8.3 g/dL    Albumin 4.9 3.5 - 5.2 g/dL    Total Bilirubin 0.4 0.0 - 1.2 mg/dL    Alkaline Phosphatase 108 40 - 129 U/L    ALT 10 0 - 40 U/L    AST 14 0 - 39 U/L   Troponin   Result Value Ref Range    Troponin, High Sensitivity 60 (H) 0 - 11 ng/L   SPECIMEN REJECTION   Result Value Ref Range    Rejected Test TRP     Reason for Rejection see below    Troponin   Result Value Ref Range    Troponin, High Sensitivity 54 (H) 0 - 11 ng/L       Radiology:  XR CHEST PORTABLE   Final Result   Bibasilar atelectasis.             ------------------------- NURSING NOTES AND VITALS REVIEWED ---------------------------  Date / Time Roomed:  1/22/2022  3:19 AM  ED Bed Assignment:  08/08    The nursing notes within the ED encounter and vital signs as below have been reviewed. /79   Pulse 105   Temp 98 °F (36.7 °C)   Resp 18   Ht 6' (1.829 m)   Wt 294 lb (133.4 kg)   SpO2 96%   BMI 39.87 kg/m²   Oxygen Saturation Interpretation: Normal      ------------------------------------------ PROGRESS NOTES ------------------------------------------  3:22 PM EST  I have spoken with the patient and discussed todays results, in addition to providing specific details for the plan of care and counseling regarding the diagnosis and prognosis. Their questions are answered at this time and they are agreeable with the plan. I discussed at length with them reasons for immediate return here for re evaluation. They will followup with their and the on call primary care physician and orthopedic physician by calling their office tomorrow and on Monday.      --------------------------------- ADDITIONAL PROVIDER NOTES ---------------------------------  At this time the patient is without objective evidence of an acute process requiring hospitalization or inpatient management. They have remained hemodynamically stable throughout their entire ED visit and are stable for discharge with outpatient follow-up. The plan has been discussed in detail and they are aware of the specific conditions for emergent return, as well as the importance of follow-up. Discharge Medication List as of 1/22/2022  9:08 AM          Diagnosis:  1. Dizziness        Disposition:  Patient's disposition: Discharge to home  Patient's condition is stable.     Patient was seen and evaluated by both myself and Radha Hollingsworth, [facility]  Resident  01/22/22 [id number]

## 2022-01-22 NOTE — ED NOTES
Pt alert and oriented x4. Speech clear. Respirations easy/unlabored. Skin warm/dry. Appropriate color. No signs of acute distress noted. Pt/family teaching provided; verbalized understanding. Pt stable for discharge.        Marla Medrano RN  01/22/22 [id number]

## 2022-01-24 LAB
EKG ATRIAL RATE: 60 BPM
EKG Q-T INTERVAL: 420 MS
EKG QRS DURATION: 180 MS
EKG QTC CALCULATION (BAZETT): 571 MS
EKG R AXIS: -12 DEGREES
EKG T AXIS: 152 DEGREES
EKG VENTRICULAR RATE: 111 BPM

## 2022-01-24 PROCEDURE — 93010 ELECTROCARDIOGRAM REPORT: CPT | Performed by: INTERNAL MEDICINE

## 2022-01-25 PROCEDURE — 93264 REM MNTR WRLS P-ART PRS SNR: CPT | Performed by: INTERNAL MEDICINE

## 2022-02-16 DIAGNOSIS — I50.22 CHRONIC SYSTOLIC HEART FAILURE (HCC): Primary | ICD-10-CM

## 2022-02-21 DIAGNOSIS — I50.22 CHRONIC SYSTOLIC HEART FAILURE (HCC): ICD-10-CM

## 2022-02-22 DIAGNOSIS — I50.22 CHRONIC SYSTOLIC HEART FAILURE (HCC): ICD-10-CM

## 2022-02-22 DIAGNOSIS — E11.8 TYPE 2 DIABETES MELLITUS WITH COMPLICATION, WITH LONG-TERM CURRENT USE OF INSULIN (HCC): Chronic | ICD-10-CM

## 2022-02-22 DIAGNOSIS — Z79.4 TYPE 2 DIABETES MELLITUS WITH COMPLICATION, WITH LONG-TERM CURRENT USE OF INSULIN (HCC): Chronic | ICD-10-CM

## 2022-02-22 RX ORDER — BUMETANIDE 2 MG/1
TABLET ORAL
Qty: 180 TABLET | Refills: 1 | Status: ON HOLD
Start: 2022-02-22 | End: 2022-04-28 | Stop reason: SDUPTHER

## 2022-02-22 RX ORDER — NITROGLYCERIN 0.4 MG/1
TABLET SUBLINGUAL
Qty: 25 TABLET | Refills: 0 | Status: SHIPPED
Start: 2022-02-22 | End: 2022-04-01

## 2022-02-22 RX ORDER — RIVAROXABAN 20 MG/1
TABLET, FILM COATED ORAL
Qty: 30 TABLET | Refills: 0 | Status: SHIPPED
Start: 2022-02-22 | End: 2022-04-01

## 2022-02-22 RX ORDER — SPIRONOLACTONE 25 MG/1
12.5 TABLET ORAL DAILY
Qty: 45 TABLET | Refills: 1 | Status: SHIPPED
Start: 2022-02-22 | End: 2022-09-07

## 2022-02-22 NOTE — TELEPHONE ENCOUNTER
Last Appointment:  9/23/2021  Future Appointments   Date Time Provider Clemente Davis   3/7/2022  1:00 PM [facility] CHF ROOM 1 [facility]   3/14/2022  1:00 PM MD Jaziel Pastor Call [facility]

## 2022-02-22 NOTE — TELEPHONE ENCOUNTER
Please call patient. I have refilled this medication, but patient must establish with new physician before the next refill. Thank you.

## 2022-02-22 NOTE — TELEPHONE ENCOUNTER
Last Appointment:  Visit date not found  Future Appointments   Date Time Provider Clemente Davis   3/7/2022  1:00 PM [facility] CHF ROOM 1 [facility]F [facility]   3/14/2022  1:00 PM MD Yonis Tomlinson [facility]

## 2022-02-22 NOTE — TELEPHONE ENCOUNTER
Last Appointment:  9/23/2021  Future Appointments   Date Time Provider Clemente Davis   3/7/2022  1:00 PM [facility] CHF ROOM 1 [facility]F [facility]   3/14/2022  1:00 PM Carlus Dandy, MD Ronette Gerold [facility]

## 2022-02-28 PROCEDURE — 93264 REM MNTR WRLS P-ART PRS SNR: CPT | Performed by: INTERNAL MEDICINE

## 2022-03-07 ENCOUNTER — HOSPITAL ENCOUNTER (OUTPATIENT)
Dept: OTHER | Age: 42
Setting detail: THERAPIES SERIES
Discharge: HOME | End: 2022-03-07
Payer: MEDICARE

## 2022-03-07 VITALS
DIASTOLIC BLOOD PRESSURE: 64 MMHG | BODY MASS INDEX: 39.33 KG/M2 | SYSTOLIC BLOOD PRESSURE: 103 MMHG | RESPIRATION RATE: 18 BRPM | OXYGEN SATURATION: 98 % | WEIGHT: 290 LBS | HEART RATE: 84 BPM

## 2022-03-07 LAB
ANION GAP SERPL CALCULATED.3IONS-SCNC: 13 MMOL/L (ref 7–16)
BUN BLDV-MCNC: 64 MG/DL (ref 6–20)
CALCIUM SERPL-MCNC: 10.5 MG/DL (ref 8.6–10.2)
CHLORIDE BLD-SCNC: 98 MMOL/L (ref 98–107)
CO2: 22 MMOL/L (ref 22–29)
CREAT SERPL-MCNC: 2.7 MG/DL (ref 0.7–1.2)
EKG ATRIAL RATE: 220 BPM
EKG Q-T INTERVAL: 452 MS
EKG QRS DURATION: 200 MS
EKG QTC CALCULATION (BAZETT): 558 MS
EKG R AXIS: -155 DEGREES
EKG T AXIS: 19 DEGREES
EKG VENTRICULAR RATE: 92 BPM
GFR AFRICAN AMERICAN: 32
GFR NON-AFRICAN AMERICAN: 26 ML/MIN/1.73
GLUCOSE BLD-MCNC: 160 MG/DL (ref 74–99)
POTASSIUM SERPL-SCNC: 4.5 MMOL/L (ref 3.5–5)
PRO-BNP: 1383 PG/ML (ref 0–125)
SODIUM BLD-SCNC: 133 MMOL/L (ref 132–146)

## 2022-03-07 PROCEDURE — 93005 ELECTROCARDIOGRAM TRACING: CPT | Performed by: INTERNAL MEDICINE

## 2022-03-07 PROCEDURE — 80048 BASIC METABOLIC PNL TOTAL CA: CPT

## 2022-03-07 PROCEDURE — 83880 ASSAY OF NATRIURETIC PEPTIDE: CPT

## 2022-03-07 PROCEDURE — 99214 OFFICE O/P EST MOD 30 MIN: CPT

## 2022-03-07 PROCEDURE — 36415 COLL VENOUS BLD VENIPUNCTURE: CPT

## 2022-03-07 NOTE — PROGRESS NOTES
[facility]   CHF Clinic       [place]   1980  [phone]       Referring Doctor: Ktaie Long  Cardiologist: Esteban Stewart  Nephrologist: n/a      History of Present Illness:     Grand kelvin MONTESINOS is a 39 y.o. male with a history of HFrEF, most recent EF 31% on 1/19/2021. Patient Story:    He does NOT  have dyspnea with exertion, shortness of breath, or decline in overall functional capacity. He does not have orthopnea, PND, nocturnal cough or hemoptysis. He does not have abdominal distention or bloating, early satiety, anorexia/change in appetite. He does has a good urinary response to his oral diuretic. He does not have  lower extremity edema. He denies  lightheadedness, dizziness. Said he hasn't had it in awhile. He denies palpitations, syncope or near syncope. He sometimes complains of chest pain and takes nitro with relief. States his physician is aware. Allergies   Allergen Reactions    Farxiga [Dapagliflozin] Other (See Comments)     Caused throat to swell         Outpatient Medications Marked as Taking for the 3/7/22 encounter [facility] Encounter) with [facility] CHF ROOM 1   Medication Sig Dispense Refill    XARELTO 20 MG TABS tablet TAKE 1 TABLET BY MOUTH DAILY 30 tablet 0    bumetanide (BUMEX) 2 MG tablet TAKE 1 TABLET BY MOUTH TWICE DAILY *DO NOT SEND IN PILLPACK* 180 tablet 1    spironolactone (ALDACTONE) 25 MG tablet Take 0.5 tablets by mouth daily 45 tablet 1    metFORMIN (GLUCOPHAGE) 1000 MG tablet TAKE ONE (1) TABLET BY MOUTH TWICE DAILY WITH MEALS 180 tablet 1    nitroGLYCERIN (NITROSTAT) 0.4 MG SL tablet DISSOLVE 1 TABLET UNDER THE TONGUE AS NEEDED FOR CHEST PAIN EVERY 5 MINUTES UP TO 3 TIMES.  IF NO RELIEF CALL 911. 25 tablet 0    atorvastatin (LIPITOR) 40 MG tablet TAKE 1 TABLET BY MOUTH DAILY 30 tablet 2    isosorbide dinitrate (ISORDIL) 10 MG tablet TAKE TWO (2) TABLETS BY MOUTH THREE TIMES PER  tablet 3    dofetilide (TIKOSYN) 250 MCG capsule TAKE 1 CAPSULE BY MOUTH EVERY TWELVE HOURS 60 capsule 3    ONETOUCH ULTRA strip USE TO TEST BLOOD SUGAR 2 TO 3 TIMES DAILY AND AS NEEDED FOR SYMPTOMS OF IRREGULAR BLOOD GLUCOSE 100 each 5    sacubitril-valsartan (ENTRESTO)  MG per tablet Take 1 tablet by mouth 2 times daily 180 tablet 3    carvedilol (COREG) 25 MG tablet TAKE ONE (1) TABLET BY MOUTH TWICE DAILY WITH MEALS 60 tablet 11    insulin lispro (HUMALOG) 100 UNIT/ML injection vial INJECT 20 UNITS SUBCUTANEOUSLY THREE TIMES A DAY BEFORE MEALS 10 mL 10    Blood Pressure KIT 1 kit by Does not apply route daily 1 kit 0    magnesium oxide (MAG-OX) 400 MG tablet Take 1 tablet by mouth daily 30 tablet 11    allopurinol (ZYLOPRIM) 300 MG tablet Take 1 tablet by mouth daily 30 tablet 11    LEVEMIR FLEXTOUCH 100 UNIT/ML injection pen INJECT 40 UNITS INTO THE SKIN 2 TIMES DAILY 30 mL 10    Continuous Blood Gluc Transmit (DEXCOM G6 TRANSMITTER) MISC USE AS DIRECTED 1 each 0           Guideline directed medical:  ARNI/ACE I/ARB: Yes  Beta blocker: Yes  Aldosterone antagonist:  Yes        Physical Examination:     /64   Pulse 84   Resp 18   Wt 290 lb (131.5 kg)   SpO2 98%   BMI 39.33 kg/m²     Assessment  Charting Type: Shift assessment    Neurological  Level of Consciousness: Alert (0)              Respiratory  Respiratory Pattern: Regular  Respiratory Depth: Normal  Respiratory Quality/Effort: Unlabored  Chest Assessment: Chest expansion symmetrical  L Breath Sounds: Clear,Diminished  R Breath Sounds: Clear,Diminished              Cardiac  Cardiac Rhythm: Ventricular paced    Rhythm Interpretation  Pulse: 84         Gastrointestinal  Abdominal (WDL): Exceptions to WDL  Abdomen Inspection: Distended,Soft  RUQ Bowel Sounds: Active  LUQ Bowel Sounds: Active  RLQ Bowel Sounds: Active  LLQ Bowel Sounds: Active          Bowel Sounds  RUQ Bowel Sounds: Active  LUQ Bowel Sounds: Active  RLQ Bowel Sounds:  Active  LLQ Bowel Sounds: Active    Peripheral Vascular  Peripheral Vascular (WDL): Within Defined Limits  RLE Edema: None  LLE Edema: None                        Psychosocial  Psychosocial (WDL): Within Defined Limits                        Pulse: 84                   LAB DATA:    BNP  No results for input(s): BNP in the last 72 hours. proBNP  No results for input(s): PROBNP in the last 72 hours. BMP  No results for input(s): NA, K, CL, CO2, BUN, CREATININE, GLUCOSE, CALCIUM in the last 72 hours. WEIGHTS:  Wt Readings from Last 3 Encounters:   03/07/22 290 lb (131.5 kg)   01/22/22 294 lb (133.4 kg)   01/10/22 294 lb (133.4 kg)         TELEMETRY:  Cardiac Regularity: Regular  Cardiac Rhythm/Interpretation: V Paced ? AFib underlying  * 12 lead obtained and faxed to Dr Vincent Rush office        ASSESSMENT:  Karel Officer III is evolemic with stable weight    Interventions completed this visit:  IV diuretics given: no  Lab work obtained: yes, BMP/BNP   Reviewed continue current medications that patient as prescribed answered any questions   Educated on signs and symptoms of HF  Educated on low sodium diet    PLAN:  Future Appointments   Date Time Provider Clemente Davis   3/14/2022  1:00 PM MD Andi Antunez [facility]   5/9/2022  1:00 PM [facility] CHF ROOM 1  CHF Elian Springer         Given clinic phone number and aware of signs and symptoms to call with any HF change in symptoms.

## 2022-03-07 NOTE — PROGRESS NOTES
Labs and flow sheet faxed to Dr. Martina Michele, confirmation received. Wendy Bun from Dr. Edouard [facility] office notified of fax and notified of elevated BUN and Creatinine   And to please inform Martin Hoyt of same, Wendy Bun confirmed above.

## 2022-03-09 ENCOUNTER — TELEPHONE (OUTPATIENT)
Dept: CARDIAC CATH/INVASIVE PROCEDURES | Age: 42
End: 2022-03-09

## 2022-03-09 NOTE — TELEPHONE ENCOUNTER
Remined patient of scheduled procedure on  3/10   Instructions given and COVID questionnaire completed.

## 2022-03-10 ENCOUNTER — ANESTHESIA EVENT (OUTPATIENT)
Dept: CARDIAC CATH/INVASIVE PROCEDURES | Age: 42
End: 2022-03-10

## 2022-03-10 ENCOUNTER — ANESTHESIA (OUTPATIENT)
Dept: CARDIAC CATH/INVASIVE PROCEDURES | Age: 42
End: 2022-03-10

## 2022-03-10 ENCOUNTER — HOSPITAL ENCOUNTER (OUTPATIENT)
Dept: CARDIAC CATH/INVASIVE PROCEDURES | Age: 42
Discharge: HOME | End: 2022-03-10
Payer: MEDICARE

## 2022-03-10 VITALS
OXYGEN SATURATION: 98 % | RESPIRATION RATE: 22 BRPM | SYSTOLIC BLOOD PRESSURE: 91 MMHG | DIASTOLIC BLOOD PRESSURE: 66 MMHG

## 2022-03-10 VITALS
HEIGHT: 72 IN | DIASTOLIC BLOOD PRESSURE: 69 MMHG | BODY MASS INDEX: 39.28 KG/M2 | HEART RATE: 74 BPM | SYSTOLIC BLOOD PRESSURE: 102 MMHG | TEMPERATURE: 97.6 F | RESPIRATION RATE: 25 BRPM | OXYGEN SATURATION: 100 % | WEIGHT: 290 LBS

## 2022-03-10 LAB
ANION GAP SERPL CALCULATED.3IONS-SCNC: 13 MMOL/L (ref 7–16)
BASOPHILS ABSOLUTE: 0.03 E9/L (ref 0–0.2)
BASOPHILS RELATIVE PERCENT: 0.4 % (ref 0–2)
BUN BLDV-MCNC: 66 MG/DL (ref 6–20)
CALCIUM SERPL-MCNC: 10.3 MG/DL (ref 8.6–10.2)
CHLORIDE BLD-SCNC: 97 MMOL/L (ref 98–107)
CO2: 24 MMOL/L (ref 22–29)
CREAT SERPL-MCNC: 2.7 MG/DL (ref 0.7–1.2)
EOSINOPHILS ABSOLUTE: 0.15 E9/L (ref 0.05–0.5)
EOSINOPHILS RELATIVE PERCENT: 1.8 % (ref 0–6)
GFR AFRICAN AMERICAN: 32
GFR NON-AFRICAN AMERICAN: 26 ML/MIN/1.73
GLUCOSE BLD-MCNC: 126 MG/DL (ref 74–99)
HCT VFR BLD CALC: 35.2 % (ref 37–54)
HEMOGLOBIN: 11.4 G/DL (ref 12.5–16.5)
IMMATURE GRANULOCYTES #: 0.04 E9/L
IMMATURE GRANULOCYTES %: 0.5 % (ref 0–5)
LYMPHOCYTES ABSOLUTE: 1.98 E9/L (ref 1.5–4)
LYMPHOCYTES RELATIVE PERCENT: 23.8 % (ref 20–42)
MAGNESIUM: 2.1 MG/DL (ref 1.6–2.6)
MCH RBC QN AUTO: 29.4 PG (ref 26–35)
MCHC RBC AUTO-ENTMCNC: 32.4 % (ref 32–34.5)
MCV RBC AUTO: 90.7 FL (ref 80–99.9)
MONOCYTES ABSOLUTE: 0.8 E9/L (ref 0.1–0.95)
MONOCYTES RELATIVE PERCENT: 9.6 % (ref 2–12)
NEUTROPHILS ABSOLUTE: 5.33 E9/L (ref 1.8–7.3)
NEUTROPHILS RELATIVE PERCENT: 63.9 % (ref 43–80)
PDW BLD-RTO: 14.6 FL (ref 11.5–15)
PLATELET # BLD: 242 E9/L (ref 130–450)
PMV BLD AUTO: 10 FL (ref 7–12)
POTASSIUM SERPL-SCNC: 4.6 MMOL/L (ref 3.5–5)
RBC # BLD: 3.88 E12/L (ref 3.8–5.8)
SODIUM BLD-SCNC: 134 MMOL/L (ref 132–146)
WBC # BLD: 8.3 E9/L (ref 4.5–11.5)

## 2022-03-10 PROCEDURE — 92960 CARDIOVERSION ELECTRIC EXT: CPT

## 2022-03-10 PROCEDURE — 3700000000 HC ANESTHESIA ATTENDED CARE

## 2022-03-10 PROCEDURE — 6360000002 HC RX W HCPCS: Performed by: NURSE ANESTHETIST, CERTIFIED REGISTERED

## 2022-03-10 PROCEDURE — 2580000003 HC RX 258: Performed by: NURSE ANESTHETIST, CERTIFIED REGISTERED

## 2022-03-10 PROCEDURE — 3700000001 HC ADD 15 MINUTES (ANESTHESIA)

## 2022-03-10 PROCEDURE — 80048 BASIC METABOLIC PNL TOTAL CA: CPT

## 2022-03-10 PROCEDURE — 36415 COLL VENOUS BLD VENIPUNCTURE: CPT

## 2022-03-10 PROCEDURE — 85025 COMPLETE CBC W/AUTO DIFF WBC: CPT

## 2022-03-10 PROCEDURE — 83735 ASSAY OF MAGNESIUM: CPT

## 2022-03-10 PROCEDURE — 2709999900 HC NON-CHARGEABLE SUPPLY

## 2022-03-10 RX ORDER — DOFETILIDE 0.25 MG/1
250 CAPSULE ORAL DAILY
Qty: 60 CAPSULE | Refills: 3 | Status: ON HOLD | OUTPATIENT
Start: 2022-03-10 | End: 2022-04-28 | Stop reason: HOSPADM

## 2022-03-10 RX ORDER — DOFETILIDE 0.25 MG/1
250 CAPSULE ORAL ONCE
Status: DISCONTINUED | OUTPATIENT
Start: 2022-03-10 | End: 2022-03-10

## 2022-03-10 RX ORDER — SODIUM CHLORIDE 9 MG/ML
INJECTION, SOLUTION INTRAVENOUS CONTINUOUS PRN
Status: DISCONTINUED | OUTPATIENT
Start: 2022-03-10 | End: 2022-03-10 | Stop reason: SDUPTHER

## 2022-03-10 RX ORDER — PROPOFOL 10 MG/ML
INJECTION, EMULSION INTRAVENOUS PRN
Status: DISCONTINUED | OUTPATIENT
Start: 2022-03-10 | End: 2022-03-10 | Stop reason: SDUPTHER

## 2022-03-10 RX ADMIN — SODIUM CHLORIDE: 9 INJECTION, SOLUTION INTRAVENOUS at 12:39

## 2022-03-10 RX ADMIN — PROPOFOL 40 MG: 10 INJECTION, EMULSION INTRAVENOUS at 12:45

## 2022-03-10 RX ADMIN — PROPOFOL 60 MG: 10 INJECTION, EMULSION INTRAVENOUS at 12:41

## 2022-03-10 NOTE — ANESTHESIA PRE PROCEDURE
Department of Anesthesiology  Preprocedure Note       Name:  Kimberli Munson   Age:  39 y.o.  :  1980                                          MRN:  [id number]         Date:  3/10/2022      Surgeon: Rosemary Habermann    Procedure: CARDIOVERSION WITH ANESTHESIA    Medications prior to admission:   Prior to Admission medications    Medication Sig Start Date End Date Taking? Authorizing Provider   XARELTO 20 MG TABS tablet TAKE 1 TABLET BY MOUTH DAILY 22   Yamilex Steen MD   bumetanide (BUMEX) 2 MG tablet TAKE 1 TABLET BY MOUTH TWICE DAILY *[facility] 22   Mo Anderson MD   spironolactone (ALDACTONE) 25 MG tablet Take 0.5 tablets by mouth daily 22   Mo Anderson MD   metFORMIN (GLUCOPHAGE) 1000 MG tablet TAKE ONE (1) TABLET BY MOUTH TWICE DAILY WITH MEALS 22   Mo Anderson MD   nitroGLYCERIN (NITROSTAT) 0.4 MG SL tablet DISSOLVE 1 TABLET UNDER THE TONGUE AS NEEDED FOR CHEST PAIN EVERY 5 MINUTES UP TO 3 TIMES.  IF NO RELIEF CALL 911. 22   Mo Anderson MD   atorvastatin (LIPITOR) 40 MG tablet TAKE 1 TABLET BY MOUTH DAILY 22   Sirisha Vogel DO   isosorbide dinitrate (ISORDIL) 10 MG tablet TAKE TWO (2) TABLETS BY MOUTH THREE TIMES PER DAY 21   Ran Gonzalez MD   dofetilide (TIKOSYN) 250 MCG capsule TAKE 1 CAPSULE BY MOUTH EVERY TWELVE HOURS 21   Yamilex Steen MD   [facility] ULTRA strip USE TO TEST BLOOD SUGAR 2 TO 3 TIMES DAILY AND AS NEEDED FOR SYMPTOMS OF IRREGULAR BLOOD GLUCOSE 10/29/21   Yamilex Steen MD   sacubitril-valsartan (ENTRESTO)  MG per tablet Take 1 tablet by mouth 2 times daily 21   Yamilex Steen MD   carvedilol (COREG) 25 MG tablet TAKE ONE (1) TABLET BY MOUTH TWICE DAILY WITH MEALS 21   Ran Gonzalez MD   insulin lispro (HUMALOG) 100 UNIT/ML injection vial INJECT 20 UNITS SUBCUTANEOUSLY THREE TIMES A DAY BEFORE MEALS 21   Yamilex Steen MD   Blood Pressure KIT 1 kit by Does not apply route daily 7/14/21   Nicol Coleman MD   magnesium oxide (MAG-OX) 400 MG tablet Take 1 tablet by mouth daily 5/30/21   Yamilex Steen MD   allopurinol (ZYLOPRIM) 300 MG tablet Take 1 tablet by mouth daily 5/4/21   Yamilex Steen MD   LEVEMIR FLEXTOUCH 100 UNIT/ML injection pen INJECT 40 UNITS INTO THE SKIN 2 TIMES DAILY 4/26/21   Yamilex Steen MD   Continuous Blood Gluc Transmit (DEXCOM G6 TRANSMITTER) MISC USE AS DIRECTED 3/29/21   Yamilex Steen MD   rivaroxaban (XARELTO) 20 MG TABS tablet Take 20 mg by mouth Daily with supper    Historical Provider, MD   magnesium oxide (MAG-OX) 400 MG tablet Take 1 tablet by mouth daily 8/28/20   Yamilex Steen MD       Current medications:    Current Outpatient Medications   Medication Sig Dispense Refill    XARELTO 20 MG TABS tablet TAKE 1 TABLET BY MOUTH DAILY 30 tablet 0    bumetanide (BUMEX) 2 MG tablet TAKE 1 TABLET BY MOUTH TWICE DAILY *DO NOT SEND IN PILLPACK* 180 tablet 1    spironolactone (ALDACTONE) 25 MG tablet Take 0.5 tablets by mouth daily 45 tablet 1    metFORMIN (GLUCOPHAGE) 1000 MG tablet TAKE ONE (1) TABLET BY MOUTH TWICE DAILY WITH MEALS 180 tablet 1    nitroGLYCERIN (NITROSTAT) 0.4 MG SL tablet DISSOLVE 1 TABLET UNDER THE TONGUE AS NEEDED FOR CHEST PAIN EVERY 5 MINUTES UP TO 3 TIMES.  IF NO RELIEF CALL 911. 25 tablet 0    atorvastatin (LIPITOR) 40 MG tablet TAKE 1 TABLET BY MOUTH DAILY 30 tablet 2    isosorbide dinitrate (ISORDIL) 10 MG tablet TAKE TWO (2) TABLETS BY MOUTH THREE TIMES PER  tablet 3    dofetilide (TIKOSYN) 250 MCG capsule TAKE 1 CAPSULE BY MOUTH EVERY TWELVE HOURS 60 capsule 3    ONETOUCH ULTRA strip USE TO TEST BLOOD SUGAR 2 TO 3 TIMES DAILY AND AS NEEDED FOR SYMPTOMS OF IRREGULAR BLOOD GLUCOSE 100 each 5    sacubitril-valsartan (ENTRESTO)  MG per tablet Take 1 tablet by mouth 2 times daily 180 tablet 3    carvedilol (COREG) 25 MG tablet TAKE ONE (1) TABLET BY MOUTH TWICE DAILY WITH MEALS 60 tablet 11    Hyperlipidemia     Hypertension     Morbid obesity due to excess calories ([address]     Nonischemic cardiomyopathy ([address] 11/29/2011    Nonischemic cardiomyopathy ([address] 7/2013 7/2013- cardiac MRI revealed an LVEF of 20%    ARVIND (obstructive sleep apnea) 11/29/2011    S/P left heart catheterization by percutaneous approach 12-27/2011    Dr Jalyn Myers Systolic heart failure [facility]) 5/7/2012 5/7/12- cardiac catheterization revealed an LVEF of 10-15%, mitral regurgitation along with borderline prolapse of mitral valve    Type 2 diabetes mellitus with complication, with long-term current use of insulin ([facility] 8/22/2016    URI, acute 11/29/2011       Past Surgical History:        Procedure Laterality Date    CARDIAC CATHETERIZATION  08/01/2019    Dr Justin Melendez  11/20/2018    UPGRADE S-ICD TO BIV ICD   ([employer])  [address]     CARDIOVERSION  02/14/2020    Successful CV of AF to NSR      (Dr. Jorje Gatica)    ECHO COMPL W DOP COLOR FLOW  11/30/2011         ECHO COMPL W DOP COLOR FLOW  03/27/2012         INSERTABLE CARDIAC MONITOR  12/13/2018    [facility], Dr. Pop Smith  12/15/2020    SUCCESSFUL OVERDRIVE PACE TERMINATION OF AF VIA ICD    ( [address])    PACEMAKER PLACEMENT         Social History:    Social History     Tobacco Use    Smoking status: Never Smoker    Smokeless tobacco: Never Used   Substance Use Topics    Alcohol use: Not Currently                                Counseling given: Not Answered      Vital Signs (Current): There were no vitals filed for this visit.                                            BP Readings from Last 3 Encounters:   03/10/22 106/77   03/07/22 103/64   01/22/22 111/79       NPO Status:   > 8hrs                                                                               BMI:   Wt Readings from Last 3 Encounters:   03/10/22 290 lb (131.5 kg)   03/07/22 290 lb (131.5 kg)   01/22/22 294 lb (133.4 kg)     There is no height or weight on file to calculate BMI.    CBC:   Lab Results   Component Value Date    WBC 9.4 01/22/2022    RBC 4.15 01/22/2022    HGB 12.3 01/22/2022    HCT 37.9 01/22/2022    MCV 91.3 01/22/2022    RDW 14.8 01/22/2022     01/22/2022       CMP:   Lab Results   Component Value Date     03/07/2022    K 4.5 03/07/2022    K 4.0 01/22/2022    CL 98 03/07/2022    CO2 22 03/07/2022    BUN 64 03/07/2022    CREATININE 2.7 03/07/2022    GFRAA 32 03/07/2022    LABGLOM 26 03/07/2022    GLUCOSE 160 03/07/2022    GLUCOSE 192 05/15/2012    PROT 8.8 01/22/2022    CALCIUM 10.5 03/07/2022    BILITOT 0.4 01/22/2022    ALKPHOS 108 01/22/2022    AST 14 01/22/2022    ALT 10 01/22/2022       POC Tests: No results for input(s): POCGLU, POCNA, POCK, POCCL, POCBUN, POCHEMO, POCHCT in the last 72 hours.     Coags:   Lab Results   Component Value Date    PROTIME 21.0 01/19/2021    PROTIME 1.8 09/26/2016    INR 1.8 01/19/2021    APTT 36.4 01/11/2017       HCG (If Applicable): No results found for: PREGTESTUR, PREGSERUM, HCG, HCGQUANT     ABGs: No results found for: PHART, PO2ART, ZOF3GGV, TXX3JVV, BEART, Z0BMILSO       Type & Screen (If Applicable):  No results found for: LABABO, [address]    EKG 12 Lead  Order: [id number]   Status: Final result     Visible to patient: Yes (not seen)     Next appt: 03/14/2022 at 01:00 PM in Family Medicine Lindsay Escamilla MD)     0 Result Notes    Component Ref Range & Units 3/7/22 1315 1/22/22 0332 1/19/21 0138 12/3/20 2222 3/17/20 0812 3/16/20 2107 3/16/20 0801   Ventricular Rate BPM 92  111  78  81  64  67  88    Atrial Rate   60  78  81  64  67  88    QRS Duration ms 200  180  214  228  150  200  210    Q-T Interval ms 452  420  508  516  554  570  524    QTc Calculation (Bazett) ms 558  571  579  599  571  602  634    R Axis degrees -155  -12  180  161  140  161  152    T Axis degrees 19  152  -19  -39  -22  -22  -[address] [facility]             Narrative & Impression    Atrial fibrillation  Ventricular-paced rhythm  Biventricular pacemaker detected  Abnormal ECG  When compared with ECG of 22-JAN-2022 03:32,  Significant changes have occurred  Confirmed by María Singh ([id number]) on 3/7/2022 1:38:30 PM           ECHO 8/5/20  Summary   Left ventricle dilated at 7.1cm. Severe global hypokinesis, LV EF visually estimated about 25-30%. Mild left ventricular concentric hypertrophy noted. Stage II diastolic dysfunction. Left atrium is enlarged. Interatrial septum not well visualized but appears intact. Normal right ventricle size and function. Pacer/ICD lead in RV. No mitral valve prolapse. There is trace aortic regurgitation. There is trace tricuspid regurgitation, RVSP 30mmHg. Normal aortic root size. Trace of pericardial effusion. Pericardium appears normal.   No intra cardiac mass or thrombus. Compared to prior echo from 7/31/19 - No significant changes noted. Anesthesia Evaluation  Patient summary reviewed and Nursing notes reviewed no history of anesthetic complications:   Airway: Mallampati: III  TM distance: >3 FB   Neck ROM: full  Mouth opening: < 3 FB Dental: normal exam         Pulmonary:   (+) sleep apnea: on CPAP,  decreased breath sounds,            Patient did not smoke on day of surgery.                  Cardiovascular:  Exercise tolerance: good (>4 METS),   (+) hypertension: moderate, pacemaker: AICD, CAD: non-obstructive, dysrhythmias: atrial fibrillation, CHF: systolic, hyperlipidemia      ECG reviewed  Rhythm: irregular  Rate: normal  Echocardiogram reviewed  Stress test reviewed       Beta Blocker:  Dose within 24 Hrs      ROS comment: Nonischemic cardiomyopathy     Neuro/Psych:   (+) CVA:,             GI/Hepatic/Renal:   (+) liver disease:, renal disease: CRI,           Endo/Other:    (+) DiabetesType II DM, , blood dyscrasia: anticoagulation therapy:., . Abdominal:             Vascular:   + PVD, aortic or cerebral ( Carotid disease, bilateral ), .       Other Findings:             Anesthesia Plan      MAC     ASA 4       Induction: intravenous. Anesthetic plan and risks discussed with patient. Plan discussed with attending.                   NATI Orozco - CRNA   3/10/2022

## 2022-03-10 NOTE — PROCEDURES
[address]                  [address] [facility],  [facility]                                 PROCEDURE NOTE    PATIENT NAME: Khoa Thompson                      :        1980  MED REC NO:   [id number]                            ROOM:  ACCOUNT NO:   [de-identified]                           ADMIT DATE: 03/10/2022  PROVIDER:     Natacha Fischer MD    DATE OF PROCEDURE:  03/10/2022    NAME OF THE PROCEDURE:  Direct current cardioversion. PREPROCEDURE DIAGNOSIS:  Atrial fibrillation. POSTPROCEDURE DIAGNOSIS:  Atrial fibrillation. :  Natacha Fischer MD    COMPLICATIONS:  None. INDICATIONS:  A 44-year-old patient brought in for elective  cardioversion for persistent atrial fibrillation. He has a history of  dilated cardiomyopathy and a biventricular pacer ICD in situ. PROCEDURE:  The patient was brought to electrophysiology area in the  postabsorptive, nonsedated state. After the usual noninvasive blood  pressure and heart rate monitoring were instituted, the patient was  sedated with IV propofol by Anesthesia. A 35-joule shock through his  device was not successful in restoring sinus rhythm but a 360-joule  external shock through anterior-posterior patches restored sinus rhythm  immediately. The patient was awakened, recovered and discharged home. CONCLUSION:  Successful cardioversion to sinus rhythm. PLAN:  Adjust dose of dofetilide based on renal function.         Melvin Sauer MD    D: 03/10/2022 13:16:52       T: 03/10/2022 13:19:39     [email or web address]  Job#: [id number]     Doc#: [id number]    CC:  Natacha Fischer MD

## 2022-03-10 NOTE — H&P
[address]                  [address] [facility],  [facility]                              HISTORY AND PHYSICAL    PATIENT NAME: Anthony Omer                      :        1980  MED REC NO:   [id number]                            ROOM:  ACCOUNT NO:   [de-identified]                           ADMIT DATE: 03/10/2022  PROVIDER:     Peyton Klein MD    HISTORY OF PRESENT ILLNESS:  The patient is a 59-year-old patient, known  to me for more than about 10 years with a known history of severe LV  dysfunction. In fact he was first diagnosed to have cardiomyopathy in  . Subsequently, he underwent a single chamber ICD implant in   for primary prevention. His device was upgraded to a biventricular  pacer ICD in 2018. LVEF has been in the 25% to 30% range since then. The patient is on appropriate guideline-directed medical therapy  including Entresto, Aldactone, Bumex, Xarelto and carvedilol for his LV  dysfunction. He is followed in the congestive heart failure clinic  here. Recently, his renal function has worsened and serum creatinine  has increased from 2 to 2.7. He was also found to have atrial  fibrillation approximately a week ago and therefore was brought in for  urgent cardioversion. He is on dofetilide therapy for the same. The  patient has been complaining of shortness of breath especially at night  for the last several days. There is no history of any other cardiac symptoms currently. No chest  pain. No syncope or near syncope. No recent ICD discharges. CURRENT MEDICATIONS:  Include carvedilol, Entresto, Aldactone, Bumex,  Xarelto as well as his insulin and dofetilide 250 mcg twice a day. REVIEW OF SYSTEMS:  Not significant for any major noncardiac issues. PHYSICAL EXAMINATION:  VITAL SIGNS:  On examination today blood pressure here was 110/70, pulse  was 100 and irregular.   GENERAL:  Examination revealed no JVD or leg edema.  HEENT:  Examination revealed no thyromegaly. Pupils are equal.  AND EXTREMITIES:  Otherwise unremarkable. RESPIRATORY SYSTEM:  Exam revealed a normal chest on inspection. Breath  sounds are equal.  No rales or rhonchi. CARDIOVASCULAR SYSTEM:  Examination revealed a laterally displaced PMI  with a normal S1 and S2 at the left sternal border and the apex. The patient's ICD was interrogated and it shows a longevity of 1.6 years  with persistent atrial fibrillation for the past 7 to 10 days. ASSESSMENT:  A 39year-old patient with,  1. Severe dilated cardiomyopathy. 2.  Biventricular pacer ICD in situ. 3.  Congestive heart failure, functional class II to III NYHA  classification. 4.  Chronic renal insufficiency which has worsened recently. 5.  Biventricular pacer ICD in situ which is functioning appropriately. 6.  Persistent atrial fibrillation for the past 10 days. RECOMMENDATIONS:  1. Proceed with cardioversion today. 2.  Adjust dose of dofetilide before discharge based on renal function.         Yossi Malin MD    D: 03/10/2022 13:15:30       T: 03/10/2022 13:18:00     [email or web address]  Job#: [id number]     Doc#: [id number]    CC:

## 2022-03-10 NOTE — ANESTHESIA POSTPROCEDURE EVALUATION
Department of Anesthesiology  Postprocedure Note    Patient: Jessica Blair  MRN: [id number]  YOB: 1980  Date of evaluation: 3/10/2022  Time:  1:07 PM     Procedure Summary     Date: 03/10/22 Room / Location: [facility] CATH LAB    Anesthesia Start: 4478 Anesthesia Stop: 1251    Procedure: CARDIOVERSION WITH ANESTHESIA Diagnosis:       Chronic systolic (congestive) heart failure      Unspecified atrial fibrillation    Scheduled Providers:  Responsible Provider: Shannon Jacobsen MD    Anesthesia Type: MAC ASA Status: 4          Anesthesia Type: MAC    George Phase I:      George Phase II:      Last vitals: Reviewed and per EMR flowsheets.        Anesthesia Post Evaluation    Patient location during evaluation: PACU  Patient participation: complete - patient participated  Level of consciousness: awake  Pain score: 0  Airway patency: patent  Nausea & Vomiting: no nausea  Complications: no  Cardiovascular status: hemodynamically stable  Respiratory status: acceptable  Hydration status: stable

## 2022-03-11 DIAGNOSIS — I50.22 CHRONIC SYSTOLIC HEART FAILURE (HCC): Primary | ICD-10-CM

## 2022-03-14 ENCOUNTER — OFFICE VISIT (OUTPATIENT)
Dept: FAMILY MEDICINE CLINIC | Age: 42
End: 2022-03-14
Payer: MEDICARE

## 2022-03-14 VITALS
BODY MASS INDEX: 39.68 KG/M2 | OXYGEN SATURATION: 100 % | WEIGHT: 293 LBS | HEIGHT: 72 IN | RESPIRATION RATE: 18 BRPM | TEMPERATURE: 98.8 F | HEART RATE: 69 BPM | DIASTOLIC BLOOD PRESSURE: 62 MMHG | SYSTOLIC BLOOD PRESSURE: 118 MMHG

## 2022-03-14 DIAGNOSIS — Z79.4 TYPE 2 DIABETES MELLITUS WITH COMPLICATION, WITH LONG-TERM CURRENT USE OF INSULIN (HCC): Primary | ICD-10-CM

## 2022-03-14 DIAGNOSIS — E66.01 SEVERE OBESITY (BMI 35.0-39.9) WITH COMORBIDITY (HCC): ICD-10-CM

## 2022-03-14 DIAGNOSIS — N17.9 AKI (ACUTE KIDNEY INJURY) (HCC): ICD-10-CM

## 2022-03-14 DIAGNOSIS — I50.22 CHRONIC SYSTOLIC HEART FAILURE (HCC): ICD-10-CM

## 2022-03-14 DIAGNOSIS — E11.8 TYPE 2 DIABETES MELLITUS WITH COMPLICATION, WITH LONG-TERM CURRENT USE OF INSULIN (HCC): Primary | ICD-10-CM

## 2022-03-14 DIAGNOSIS — I10 ESSENTIAL HYPERTENSION: ICD-10-CM

## 2022-03-14 DIAGNOSIS — Z79.4 TYPE 2 DIABETES MELLITUS WITH COMPLICATION, WITH LONG-TERM CURRENT USE OF INSULIN (HCC): ICD-10-CM

## 2022-03-14 DIAGNOSIS — I48.0 PAROXYSMAL ATRIAL FIBRILLATION (HCC): ICD-10-CM

## 2022-03-14 DIAGNOSIS — E11.8 TYPE 2 DIABETES MELLITUS WITH COMPLICATION, WITH LONG-TERM CURRENT USE OF INSULIN (HCC): ICD-10-CM

## 2022-03-14 LAB
ANION GAP SERPL CALCULATED.3IONS-SCNC: 14 MMOL/L (ref 7–16)
BUN BLDV-MCNC: 42 MG/DL (ref 6–20)
CALCIUM SERPL-MCNC: 9.7 MG/DL (ref 8.6–10.2)
CHLORIDE BLD-SCNC: 102 MMOL/L (ref 98–107)
CO2: 22 MMOL/L (ref 22–29)
CREAT SERPL-MCNC: 2.2 MG/DL (ref 0.7–1.2)
CREATININE URINE: 158 MG/DL (ref 40–278)
GFR AFRICAN AMERICAN: 40
GFR NON-AFRICAN AMERICAN: 33 ML/MIN/1.73
GLUCOSE BLD-MCNC: 108 MG/DL (ref 74–99)
HBA1C MFR BLD: 6.2 %
MICROALBUMIN UR-MCNC: <12 MG/L
MICROALBUMIN/CREAT UR-RTO: ABNORMAL (ref 0–30)
POTASSIUM SERPL-SCNC: 4.7 MMOL/L (ref 3.5–5)
SODIUM BLD-SCNC: 138 MMOL/L (ref 132–146)

## 2022-03-14 PROCEDURE — 99214 OFFICE O/P EST MOD 30 MIN: CPT | Performed by: FAMILY MEDICINE

## 2022-03-14 PROCEDURE — 36415 COLL VENOUS BLD VENIPUNCTURE: CPT | Performed by: FAMILY MEDICINE

## 2022-03-14 PROCEDURE — 83036 HEMOGLOBIN GLYCOSYLATED A1C: CPT | Performed by: FAMILY MEDICINE

## 2022-03-14 PROCEDURE — 99212 OFFICE O/P EST SF 10 MIN: CPT | Performed by: FAMILY MEDICINE

## 2022-03-14 ASSESSMENT — ENCOUNTER SYMPTOMS
WHEEZING: 0
COUGH: 0
CONSTIPATION: 0
DIARRHEA: 0
NAUSEA: 0
SHORTNESS OF BREATH: 0
ABDOMINAL PAIN: 0
VOMITING: 0

## 2022-03-14 ASSESSMENT — PATIENT HEALTH QUESTIONNAIRE - PHQ9
SUM OF ALL RESPONSES TO PHQ QUESTIONS 1-9: 0
1. LITTLE INTEREST OR PLEASURE IN DOING THINGS: 0
SUM OF ALL RESPONSES TO PHQ9 QUESTIONS 1 & 2: 0
2. FEELING DOWN, DEPRESSED OR HOPELESS: 0
SUM OF ALL RESPONSES TO PHQ QUESTIONS 1-9: 0

## 2022-03-14 NOTE — PROGRESS NOTES
[address] [place] [address]  FAMILY MEDICINE RESIDENCY PROGRAM  DATE OF VISIT : 3/14/2022    Patient : Rell Leone   Age : 39 y.o.  : 1980   MRN : [id number]   ______________________________________________________________________    Chief Complaint :   Chief Complaint   Patient presents with    3 Month Follow-Up     diabetes A1C       HPI : Barbara Erickson III is 39 y.o. male who presented to the clinic today for DM FU. IDDM: Farxiga 10mg, Metformin 1000mg BID, Levemir 40U, Humalog 20U with meals. Last A1C 6.5 (12/15). Usually BG at home <130. HTN/CHF: Bumex 2mg, Coreg 25mg, Entresto BID, Aldactone 25mg.      A. Fib: Xarelto  20mg, Dofetilide 250mcg BID- Dofetilide now once daily following cardioversion. Recently had cardioversion on 3/10/22 by Dr. Rafa Conte for persistent A. Fib >10 days. No chest pain SOB, palpitations, diaphoresis, lightheadedness since procedure.      HLD: Lipitor 40mg. CKD: Last Creatinine 2.7. Making good urine and denies any  symptoms. I reviewed the patient's past medications, allergies and past medical history during this visit.     Past Medical History :        Past Medical History:   Diagnosis Date    Acute CHF ([address] 2011    Acute CHF ([address] 2011    Acute idiopathic gout of right foot 2016    AF (atrial fibrillation) ([address] 2020    Anemia 2011    CAD (coronary artery disease)     Cerebral artery occlusion with cerebral infarction ([address]     CKD (chronic kidney disease)     Gout     HTN (hypertension) 2011    Hyperlipidemia     Hypertension     Morbid obesity due to excess calories ([address]     Nonischemic cardiomyopathy ([address] 2011    Nonischemic cardiomyopathy ([address] 2013- cardiac MRI revealed an LVEF of 20%    ARVIND (obstructive sleep apnea) 2011    S/P left heart catheterization by percutaneous approach     Dr Jaren Matt Systolic heart failure [facility]) 2012- cardiac catheterization revealed an LVEF of 10-15%, mitral regurgitation along with borderline prolapse of mitral valve    Type 2 diabetes mellitus with complication, with long-term current use of insulin ([facility] 8/22/2016    URI, acute 11/29/2011     Past Surgical History:   Procedure Laterality Date    CARDIAC CATHETERIZATION  08/01/2019    Dr Rosalino Donahue  11/20/2018    UPGRADE S-ICD TO BIV ICD   ([employer])  RAHEJA     CARDIOVERSION  02/14/2020    Successful CV of AF to NSR      (Dr. Tami Wild)   Karlene Field  03/10/2022    Dr. Tami Wild. Marquis Lara Bermudez V-paced / SR    ECHO COMPL W DOP COLOR FLOW  11/30/2011         ECHO COMPL W DOP COLOR FLOW  03/27/2012         INSERTABLE CARDIAC MONITOR  12/13/2018    cardioMIGUELINA luke, Dr. Cullen French  12/15/2020    SUCCESSFUL OVERDRIVE PACE TERMINATION OF AF VIA ICD    (DR. Eduardo Plunkett)    PACEMAKER PLACEMENT         Social History :  Social History     Tobacco History     Smoking Status  Never Smoker    Smokeless Tobacco Use  Never Used          Alcohol History     Alcohol Use Status  Not Currently          Drug Use     Drug Use Status  Never          Sexual Activity     Sexually Active  Yes Partners  Female                 Allergies :    Allergies   Allergen Reactions    Farxiga [Dapagliflozin] Other (See Comments)     Caused throat to swell       Medication List :    Current Outpatient Medications   Medication Sig Dispense Refill    dofetilide (TIKOSYN) 250 MCG capsule Take 1 capsule by mouth daily 60 capsule 3    XARELTO 20 MG TABS tablet TAKE 1 TABLET BY MOUTH DAILY 30 tablet 0    bumetanide (BUMEX) 2 MG tablet TAKE 1 TABLET BY MOUTH TWICE DAILY *DO NOT SEND IN PILLPACK* 180 tablet 1    spironolactone (ALDACTONE) 25 MG tablet Take 0.5 tablets by mouth daily 45 tablet 1    metFORMIN (GLUCOPHAGE) 1000 MG tablet TAKE ONE (1) TABLET BY MOUTH TWICE DAILY WITH MEALS 180 tablet 1    nitroGLYCERIN (NITROSTAT) 0.4 MG SL tablet DISSOLVE 1 TABLET UNDER THE TONGUE AS NEEDED FOR CHEST PAIN EVERY 5 MINUTES UP TO 3 TIMES. IF NO RELIEF CALL 911. 25 tablet 0    atorvastatin (LIPITOR) 40 MG tablet TAKE 1 TABLET BY MOUTH DAILY 30 tablet 2    isosorbide dinitrate (ISORDIL) 10 MG tablet TAKE TWO (2) TABLETS BY MOUTH THREE TIMES PER  tablet 3    ONETOUCH ULTRA strip USE TO TEST BLOOD SUGAR 2 TO 3 TIMES DAILY AND AS NEEDED FOR SYMPTOMS OF IRREGULAR BLOOD GLUCOSE 100 each 5    sacubitril-valsartan (ENTRESTO)  MG per tablet Take 1 tablet by mouth 2 times daily 180 tablet 3    carvedilol (COREG) 25 MG tablet TAKE ONE (1) TABLET BY MOUTH TWICE DAILY WITH MEALS 60 tablet 11    insulin lispro (HUMALOG) 100 UNIT/ML injection vial INJECT 20 UNITS SUBCUTANEOUSLY THREE TIMES A DAY BEFORE MEALS 10 mL 10    Blood Pressure KIT 1 kit by Does not apply route daily 1 kit 0    magnesium oxide (MAG-OX) 400 MG tablet Take 1 tablet by mouth daily 30 tablet 11    allopurinol (ZYLOPRIM) 300 MG tablet Take 1 tablet by mouth daily 30 tablet 11    LEVEMIR FLEXTOUCH 100 UNIT/ML injection pen INJECT 40 UNITS INTO THE SKIN 2 TIMES DAILY 30 mL 10    Continuous Blood Gluc Transmit (DEXCOM G6 TRANSMITTER) MISC USE AS DIRECTED 1 each 0     No current facility-administered medications for this visit. Review of Systems :  Review of Systems   Constitutional: Negative for chills, fatigue and fever. Respiratory: Negative for cough, shortness of breath and wheezing. Cardiovascular: Negative for chest pain, palpitations and leg swelling. Gastrointestinal: Negative for abdominal pain, constipation, diarrhea, nausea and vomiting.    Neurological: Negative for dizziness, weakness, light-headedness, numbness and headaches.     ______________________________________________________________________    Physical Exam :    Vitals: /62 (Site: Left Upper Arm, Position: Sitting, Cuff Size: Large Adult)   Pulse 69   Temp 98.8 °F (37.1 °C) (Temporal)   Resp 18   Ht 6' (1.829 m) Wt 293 lb (132.9 kg)   SpO2 100%   BMI 39.74 kg/m²   Physical Exam  Vitals reviewed. Constitutional:       General: He is not in acute distress. Appearance: Normal appearance. Cardiovascular:      Rate and Rhythm: Normal rate and regular rhythm. Pulses: Normal pulses. Heart sounds: Normal heart sounds. No murmur heard. Pulmonary:      Effort: Pulmonary effort is normal. No respiratory distress. Breath sounds: Normal breath sounds. No wheezing. Abdominal:      General: Bowel sounds are normal. There is no distension. Palpations: Abdomen is soft. Tenderness: There is no abdominal tenderness. Musculoskeletal:      Right lower leg: No edema. Left lower leg: No edema. Neurological:      Mental Status: He is alert.         ___________________    Assessment & Plan :    1. Type 2 diabetes mellitus with complication, with long-term current use of insulin (HCC)  - A1C today 6.2  - decrease Humalog from 20U TID to 15U with meals  - continue remaining meds  - MICROALBUMIN / CREATININE URINE RATIO; Future  -  DIABETES FOOT EXAM  - POCT glycosylated hemoglobin (Hb A1C)    2. Essential hypertension  - stable  - continue present mgmt    3. Paroxysmal atrial fibrillation (HCC)  - stable  - RRR today  - continue present mgmt    4. Chronic systolic heart failure (HCC)  - stable  - continue present mgmt    5. TAIWO (acute kidney injury) ([facility] [address] [facility]  - Baseline 2-2.4  - last Cr 2.7  - Basic Metabolic Panel; Future      Educational materials and/or home exercises printed for patient's review and were included in patient instructions on his/her After Visit Summary and given to patient at the end of visit. Counseled regarding above diagnosis, including possible risks and complications,  especially if left uncontrolled.      Counseled regarding the possible side effects, risks, benefits and alternatives to treatment; patient and/or guardian verbalizes understanding, agrees, feels comfortable with and wishes to proceed with above treatment plan. Advised patient to call with any new medication issues, and read all Rx info from pharmacy to assure aware of all possible risks and side effects of medication before taking. Reviewed age and gender appropriate health screening exams and vaccinations. Advised patient regarding importance of keeping up with recommended health maintenance and to schedule as soon as possible if overdue, as this is important in assessing for undiagnosed pathology, especially cancer, as well as protecting against potentially harmful/life threatening disease. Patient and/or guardian verbalizes understanding and agrees with above counseling, assessment and plan. All questions answered    Additional plan and future considerations:   RTO in 1-3mos based on labs    Return to Office: No follow-ups on file.     Carmine Marquis MD   Case discussed with Dr. Cade Doctor

## 2022-03-14 NOTE — PROGRESS NOTES
S: 39 y.o. male presents today for 3 Month Follow-Up (diabetes A1C)      HTN: Bumex 2mg, Coreg 25mg, Entresto BID, Aldactone 25mg  HLD: Lipitor 40mg. HFrEF:   A.Fib: Xarelto  20mg, Dofetilide 250mcg daily- cardioversion on 3/10/22 by Dr. Saumya Fountain  IDDM: [place] 10mg, Metformin 1000mg BID, Levemir 40U. SA 20U TID with meals Last A1C 6.2  CKD: baseline Creatinine 2-2.4    O: VS: /62 (Site: Left Upper Arm, Position: Sitting, Cuff Size: Large Adult)   Pulse 69   Temp 98.8 °F (37.1 °C) (Temporal)   Resp 18   Ht 6' (1.829 m)   Wt 293 lb (132.9 kg)   SpO2 100%   BMI 39.74 kg/m²   AAO/NAD, appropriate affect for mood  CV:  RRR, no murmur  Resp: CTAB  Abdomen: SNTND  Ext; no edema    Assessment/Plan:   1) HTN - stable, cpm  2) HLD - stable cpm  3) DMII - decrease SA insulin to 15U tid with meals  4) afib - stable, cpm  5) HFrEF - stable, cpm  6) TAIWO on ckd - repeat labs  7) HM as ordered  RTO: 1-2 month f/u    Attending Physician Statement  I have discussed the case, including pertinent history and exam findings with the resident. I agree with the documented assessment and plan.       Electronically signed by Suhas Martinez MD on 3/14/2022 at 1:57 PM

## 2022-03-22 ENCOUNTER — HOSPITAL ENCOUNTER (OUTPATIENT)
Dept: OTHER | Age: 42
Setting detail: THERAPIES SERIES
Discharge: HOME | End: 2022-03-22
Payer: MEDICARE

## 2022-03-22 VITALS
BODY MASS INDEX: 38.79 KG/M2 | HEART RATE: 70 BPM | DIASTOLIC BLOOD PRESSURE: 66 MMHG | SYSTOLIC BLOOD PRESSURE: 108 MMHG | WEIGHT: 286 LBS | OXYGEN SATURATION: 99 % | RESPIRATION RATE: 18 BRPM

## 2022-03-22 LAB
ANION GAP SERPL CALCULATED.3IONS-SCNC: 14 MMOL/L (ref 7–16)
BUN BLDV-MCNC: 52 MG/DL (ref 6–20)
CALCIUM SERPL-MCNC: 10.7 MG/DL (ref 8.6–10.2)
CHLORIDE BLD-SCNC: 99 MMOL/L (ref 98–107)
CO2: 21 MMOL/L (ref 22–29)
CREAT SERPL-MCNC: 2.3 MG/DL (ref 0.7–1.2)
GFR AFRICAN AMERICAN: 38
GFR NON-AFRICAN AMERICAN: 31 ML/MIN/1.73
GLUCOSE BLD-MCNC: 116 MG/DL (ref 74–99)
POTASSIUM SERPL-SCNC: 4.6 MMOL/L (ref 3.5–5)
PRO-BNP: 530 PG/ML (ref 0–125)
SODIUM BLD-SCNC: 134 MMOL/L (ref 132–146)

## 2022-03-22 PROCEDURE — 36415 COLL VENOUS BLD VENIPUNCTURE: CPT

## 2022-03-22 PROCEDURE — 99214 OFFICE O/P EST MOD 30 MIN: CPT

## 2022-03-22 PROCEDURE — 80048 BASIC METABOLIC PNL TOTAL CA: CPT

## 2022-03-22 PROCEDURE — 83880 ASSAY OF NATRIURETIC PEPTIDE: CPT

## 2022-03-22 NOTE — PROGRESS NOTES
[facility]   CHF Clinic       [place]   1980  [phone]       Referring Doctor: Neymar Tristan  Cardiologist: Sara Ferguson  Nephrologist: n/a      History of Present Illness:     Grand kelvin MONTESINOS is a 39 y.o. male with a history of HFrEF, most recent EF 31% on 1/19/2021. Patient Story:    He does NOT  have dyspnea with exertion, shortness of breath, or decline in overall functional capacity. He does not have orthopnea, PND, nocturnal cough or hemoptysis. He does not have abdominal distention or bloating, early satiety, anorexia/change in appetite. He does has a good urinary response to his oral diuretic. He does not have  lower extremity edema. He admits to  lightheadedness, dizziness in the am after taking medications. He denies  syncope or near syncope. He denies chest pain or palpitations. Allergies   Allergen Reactions    Farxiga [Dapagliflozin] Other (See Comments)     Caused throat to swell         Prior to Visit Medications    Medication Sig Taking? Authorizing Provider   dofetilide (TIKOSYN) 250 MCG capsule Take 1 capsule by mouth daily  Refugio Bernstein MD   XARELTO 20 MG TABS tablet TAKE 1 TABLET BY MOUTH DAILY  Cipriano Patel MD   bumetanide (BUMEX) 2 MG tablet TAKE 1 TABLET BY MOUTH TWICE DAILY *DO NOT SEND IN Maurizio Gomez MD   spironolactone (ALDACTONE) 25 MG tablet Take 0.5 tablets by mouth daily  Gypsy Schmidt MD   metFORMIN (GLUCOPHAGE) 1000 MG tablet TAKE ONE (1) TABLET BY MOUTH TWICE DAILY WITH MEALS  Gypsy Schmidt MD   nitroGLYCERIN (NITROSTAT) 0.4 MG SL tablet DISSOLVE 1 TABLET UNDER THE TONGUE AS NEEDED FOR CHEST PAIN EVERY 5 MINUTES UP TO 3 TIMES. IF NO RELIEF CALL 911.   Gypsy Schmidt MD   atorvastatin (LIPITOR) 40 MG tablet TAKE 1 TABLET BY MOUTH DAILY  Lavon Plascencia DO   isosorbide dinitrate (ISORDIL) 10 MG tablet TAKE TWO (2) TABLETS BY MOUTH THREE TIMES PER DAY  Satinder New MD ONETOUCH ULTRA strip USE TO TEST BLOOD SUGAR 2 TO 3 TIMES DAILY AND AS NEEDED FOR SYMPTOMS OF IRREGULAR BLOOD GLUCOSE  Bryant Joyner MD   sacubitril-valsartan (ENTRESTO)  MG per tablet Take 1 tablet by mouth 2 times daily  Bryant Joyner MD   carvedilol (COREG) 25 MG tablet TAKE ONE (1) TABLET BY MOUTH TWICE DAILY WITH MEALS  Bryanna Qureshi MD   insulin lispro (HUMALOG) 100 UNIT/ML injection vial INJECT 20 UNITS SUBCUTANEOUSLY THREE TIMES A DAY BEFORE MEALS  Bryant Joyner MD   Blood Pressure KIT 1 kit by Does not apply route daily  Titi Torres MD   magnesium oxide (MAG-OX) 400 MG tablet Take 1 tablet by mouth daily  Bryant Joyner MD   allopurinol (ZYLOPRIM) 300 MG tablet Take 1 tablet by mouth daily  Bryant Joyner MD   LEVEMIR FLEXTOUCH 100 UNIT/ML injection pen INJECT 40 UNITS INTO THE SKIN 2 TIMES DAILY  Bryant Joyner MD   Continuous Blood Gluc Transmit (DEXCOM G6 TRANSMITTER) MISC USE AS DIRECTED  Bryant Joyner MD   rivaroxaban (XARELTO) 20 MG TABS tablet Take 20 mg by mouth Daily with supper  Historical Provider, MD   magnesium oxide (MAG-OX) 400 MG tablet Take 1 tablet by mouth daily  Bryant Joyner MD             Guideline directed medical:  ARNI/ACE I/ARB: Yes  Beta blocker:   Yes  Aldosterone antagonist:  Yes        Physical Examination:     /66   Pulse 70   Resp 18   Wt 286 lb (129.7 kg)   SpO2 99%   BMI 38.79 kg/m²     Assessment  Charting Type: Shift assessment    Neurological  Level of Consciousness: Alert (0)              Respiratory  Respiratory Pattern: Regular  Respiratory Depth: Normal  Respiratory Quality/Effort: Unlabored  Chest Assessment: Chest expansion symmetrical  L Breath Sounds: Clear,Diminished  R Breath Sounds: Clear,Diminished              Cardiac  Cardiac Rhythm: Sinus rhythm,Ventricular paced    Rhythm Interpretation  Pulse: 70         Gastrointestinal  Abdominal (WDL): Exceptions to WDL  Abdomen Inspection: Distended,Soft  RUQ Bowel Sounds: Active  LUQ Bowel Sounds: Active  RLQ Bowel Sounds: Active  LLQ Bowel Sounds: Active          Bowel Sounds  RUQ Bowel Sounds: Active  LUQ Bowel Sounds: Active  RLQ Bowel Sounds: Active  LLQ Bowel Sounds: Active    Peripheral Vascular  Peripheral Vascular (WDL): Within Defined Limits  RLE Edema: None  LLE Edema: None                        Psychosocial  Psychosocial (WDL): Within Defined Limits                        Pulse: 70                   LAB DATA:    BNP  No results for input(s): BNP in the last 72 hours. proBNP  No results for input(s): PROBNP in the last 72 hours. BMP  No results for input(s): NA, K, CL, CO2, BUN, CREATININE, GLUCOSE, CALCIUM in the last 72 hours. WEIGHTS:  Wt Readings from Last 3 Encounters:   03/22/22 286 lb (129.7 kg)   03/14/22 293 lb (132.9 kg)   03/10/22 290 lb (131.5 kg)         TELEMETRY:  Cardiac Regularity: Regular  Cardiac Rhythm/Interpretation: NSR        ASSESSMENT:  Allison Garrett III is evolemic with stable weight    Interventions completed this visit:  IV diuretics given: no  Lab work obtained: yes, BMP/BNP   Reviewed continue current medications that patient as prescribed answered any questions   Educated on signs and symptoms of HF  Educated on low sodium diet    PLAN:  Future Appointments   Date Time Provider Clemente Davis   5/2/2022 11:30 AM Ran Gonzalez MD [facility]   5/9/2022  1:00 PM [facility] ROOM [facility]   6/14/2022  1:00 PM Mo Anderson MD [address]         Given clinic phone number and aware of signs and symptoms to call with any HF change in symptoms.

## 2022-03-31 DIAGNOSIS — I50.22 CHRONIC SYSTOLIC HEART FAILURE (HCC): ICD-10-CM

## 2022-03-31 NOTE — TELEPHONE ENCOUNTER
Last Appointment:  9/23/2021  Future Appointments   Date Time Provider Clemente Susan   5/2/2022 11:30 AM Merline Strickland MD [facility] CARDIO [facility]   5/9/2022  1:00 PM [facility] CHF ROOM 1 [facility]   6/14/2022  1:00 PM Myah Lopes MD [facility]

## 2022-03-31 NOTE — TELEPHONE ENCOUNTER
Last Appointment:  9/23/2021  Future Appointments   Date Time Provider Clemente Susan   5/2/2022 11:30 AM [facility]   5/9/2022  1:00 PM [facility] CHF ROOM 1 [facility]   6/14/2022  1:00 PM Myah Lopes MD [facility]

## 2022-03-31 NOTE — TELEPHONE ENCOUNTER
Last Appointment:  3/14/2022  Future Appointments   Date Time Provider Clemente Holmani   5[facility]2/2022 11:30 AM [facility]   5/9/2022  1:00 PM [facility] CHF ROOM 1 [facility]F [facility]   6/14/2022  1:00 PM Myah Lopes MD [facility]

## 2022-04-01 PROCEDURE — 93264 REM MNTR WRLS P-ART PRS SNR: CPT | Performed by: INTERNAL MEDICINE

## 2022-04-01 RX ORDER — RIVAROXABAN 20 MG/1
TABLET, FILM COATED ORAL
Qty: 30 TABLET | Refills: 10 | Status: ON HOLD
Start: 2022-04-01 | End: 2022-04-28 | Stop reason: HOSPADM

## 2022-04-01 RX ORDER — NITROGLYCERIN 0.4 MG/1
TABLET SUBLINGUAL
Qty: 25 TABLET | Refills: 10 | Status: SHIPPED | OUTPATIENT
Start: 2022-04-01

## 2022-04-26 ENCOUNTER — HOSPITAL ENCOUNTER (OUTPATIENT)
Dept: CARDIAC CATH/INVASIVE PROCEDURES | Age: 42
Setting detail: OBSERVATION
Discharge: HOME | End: 2022-04-28
Attending: INTERNAL MEDICINE | Admitting: INTERNAL MEDICINE
Payer: MEDICARE

## 2022-04-26 ENCOUNTER — ANESTHESIA EVENT (OUTPATIENT)
Dept: CARDIAC CATH/INVASIVE PROCEDURES | Age: 42
End: 2022-04-26

## 2022-04-26 ENCOUNTER — ANESTHESIA (OUTPATIENT)
Dept: CARDIAC CATH/INVASIVE PROCEDURES | Age: 42
End: 2022-04-26

## 2022-04-26 VITALS
SYSTOLIC BLOOD PRESSURE: 105 MMHG | RESPIRATION RATE: 16 BRPM | OXYGEN SATURATION: 100 % | DIASTOLIC BLOOD PRESSURE: 61 MMHG

## 2022-04-26 DIAGNOSIS — I50.22 CHRONIC SYSTOLIC HEART FAILURE (HCC): ICD-10-CM

## 2022-04-26 DIAGNOSIS — I48.20 CHRONIC ATRIAL FIBRILLATION (HCC): ICD-10-CM

## 2022-04-26 PROBLEM — I48.91 ATRIAL FIBRILLATION (HCC): Status: ACTIVE | Noted: 2022-04-26

## 2022-04-26 LAB
ANION GAP SERPL CALCULATED.3IONS-SCNC: 16 MMOL/L (ref 7–16)
BUN BLDV-MCNC: 91 MG/DL (ref 6–20)
CALCIUM SERPL-MCNC: 10.1 MG/DL (ref 8.6–10.2)
CHLORIDE BLD-SCNC: 99 MMOL/L (ref 98–107)
CO2: 20 MMOL/L (ref 22–29)
CREAT SERPL-MCNC: 3.3 MG/DL (ref 0.7–1.2)
GFR AFRICAN AMERICAN: 25
GFR NON-AFRICAN AMERICAN: 21 ML/MIN/1.73
GLUCOSE BLD-MCNC: 105 MG/DL (ref 74–99)
HCT VFR BLD CALC: 35.3 % (ref 37–54)
HEMOGLOBIN: 11.4 G/DL (ref 12.5–16.5)
MCH RBC QN AUTO: 29.3 PG (ref 26–35)
MCHC RBC AUTO-ENTMCNC: 32.3 % (ref 32–34.5)
MCV RBC AUTO: 90.7 FL (ref 80–99.9)
METER GLUCOSE: 121 MG/DL (ref 74–99)
METER GLUCOSE: 97 MG/DL (ref 74–99)
PDW BLD-RTO: 14.7 FL (ref 11.5–15)
PLATELET # BLD: 250 E9/L (ref 130–450)
PMV BLD AUTO: 9.5 FL (ref 7–12)
POTASSIUM SERPL-SCNC: 4.6 MMOL/L (ref 3.5–5)
RBC # BLD: 3.89 E12/L (ref 3.8–5.8)
SODIUM BLD-SCNC: 135 MMOL/L (ref 132–146)
WBC # BLD: 8 E9/L (ref 4.5–11.5)

## 2022-04-26 PROCEDURE — 3700000001 HC ADD 15 MINUTES (ANESTHESIA)

## 2022-04-26 PROCEDURE — 36415 COLL VENOUS BLD VENIPUNCTURE: CPT

## 2022-04-26 PROCEDURE — 93005 ELECTROCARDIOGRAM TRACING: CPT

## 2022-04-26 PROCEDURE — 2580000003 HC RX 258

## 2022-04-26 PROCEDURE — 2580000003 HC RX 258: Performed by: INTERNAL MEDICINE

## 2022-04-26 PROCEDURE — 82962 GLUCOSE BLOOD TEST: CPT

## 2022-04-26 PROCEDURE — 2709999900 HC NON-CHARGEABLE SUPPLY

## 2022-04-26 PROCEDURE — 92960 CARDIOVERSION ELECTRIC EXT: CPT

## 2022-04-26 PROCEDURE — 85027 COMPLETE CBC AUTOMATED: CPT

## 2022-04-26 PROCEDURE — G0379 DIRECT REFER HOSPITAL OBSERV: HCPCS

## 2022-04-26 PROCEDURE — 80048 BASIC METABOLIC PNL TOTAL CA: CPT

## 2022-04-26 PROCEDURE — G0378 HOSPITAL OBSERVATION PER HR: HCPCS

## 2022-04-26 PROCEDURE — 6360000002 HC RX W HCPCS

## 2022-04-26 PROCEDURE — 3700000000 HC ANESTHESIA ATTENDED CARE

## 2022-04-26 RX ORDER — ATORVASTATIN CALCIUM 40 MG/1
40 TABLET, FILM COATED ORAL NIGHTLY
Status: DISCONTINUED | OUTPATIENT
Start: 2022-04-27 | End: 2022-04-28 | Stop reason: HOSPADM

## 2022-04-26 RX ORDER — SODIUM CHLORIDE 0.9 % (FLUSH) 0.9 %
5-40 SYRINGE (ML) INJECTION PRN
Status: DISCONTINUED | OUTPATIENT
Start: 2022-04-26 | End: 2022-04-28 | Stop reason: HOSPADM

## 2022-04-26 RX ORDER — NICOTINE POLACRILEX 4 MG
15 LOZENGE BUCCAL PRN
Status: DISCONTINUED | OUTPATIENT
Start: 2022-04-26 | End: 2022-04-28 | Stop reason: HOSPADM

## 2022-04-26 RX ORDER — LANOLIN ALCOHOL/MO/W.PET/CERES
400 CREAM (GRAM) TOPICAL DAILY
Status: DISCONTINUED | OUTPATIENT
Start: 2022-04-27 | End: 2022-04-28 | Stop reason: HOSPADM

## 2022-04-26 RX ORDER — SODIUM CHLORIDE 9 MG/ML
INJECTION, SOLUTION INTRAVENOUS PRN
Status: DISCONTINUED | OUTPATIENT
Start: 2022-04-26 | End: 2022-04-28 | Stop reason: HOSPADM

## 2022-04-26 RX ORDER — ALLOPURINOL 300 MG/1
300 TABLET ORAL DAILY
Status: DISCONTINUED | OUTPATIENT
Start: 2022-04-27 | End: 2022-04-28 | Stop reason: HOSPADM

## 2022-04-26 RX ORDER — POLYETHYLENE GLYCOL 3350 17 G/17G
17 POWDER, FOR SOLUTION ORAL DAILY PRN
Status: DISCONTINUED | OUTPATIENT
Start: 2022-04-26 | End: 2022-04-28 | Stop reason: HOSPADM

## 2022-04-26 RX ORDER — INSULIN LISPRO 100 [IU]/ML
20 INJECTION, SOLUTION INTRAVENOUS; SUBCUTANEOUS
Status: DISCONTINUED | OUTPATIENT
Start: 2022-04-26 | End: 2022-04-28 | Stop reason: HOSPADM

## 2022-04-26 RX ORDER — ACETAMINOPHEN 650 MG/1
650 SUPPOSITORY RECTAL EVERY 6 HOURS PRN
Status: DISCONTINUED | OUTPATIENT
Start: 2022-04-26 | End: 2022-04-28 | Stop reason: HOSPADM

## 2022-04-26 RX ORDER — DEXTROSE MONOHYDRATE 25 G/50ML
12.5 INJECTION, SOLUTION INTRAVENOUS PRN
Status: DISCONTINUED | OUTPATIENT
Start: 2022-04-26 | End: 2022-04-28 | Stop reason: HOSPADM

## 2022-04-26 RX ORDER — SODIUM CHLORIDE 0.9 % (FLUSH) 0.9 %
5-40 SYRINGE (ML) INJECTION EVERY 12 HOURS SCHEDULED
Status: DISCONTINUED | OUTPATIENT
Start: 2022-04-26 | End: 2022-04-28 | Stop reason: HOSPADM

## 2022-04-26 RX ORDER — DEXTROSE MONOHYDRATE 50 MG/ML
100 INJECTION, SOLUTION INTRAVENOUS PRN
Status: DISCONTINUED | OUTPATIENT
Start: 2022-04-26 | End: 2022-04-28 | Stop reason: HOSPADM

## 2022-04-26 RX ORDER — ACETAMINOPHEN 325 MG/1
650 TABLET ORAL EVERY 6 HOURS PRN
Status: DISCONTINUED | OUTPATIENT
Start: 2022-04-26 | End: 2022-04-28 | Stop reason: HOSPADM

## 2022-04-26 RX ORDER — PROPOFOL 10 MG/ML
INJECTION, EMULSION INTRAVENOUS PRN
Status: DISCONTINUED | OUTPATIENT
Start: 2022-04-26 | End: 2022-04-26 | Stop reason: SDUPTHER

## 2022-04-26 RX ORDER — NITROGLYCERIN 0.4 MG/1
0.4 TABLET SUBLINGUAL EVERY 5 MIN PRN
Status: DISCONTINUED | OUTPATIENT
Start: 2022-04-26 | End: 2022-04-28 | Stop reason: HOSPADM

## 2022-04-26 RX ORDER — INSULIN GLARGINE-YFGN 100 [IU]/ML
40 INJECTION, SOLUTION SUBCUTANEOUS 2 TIMES DAILY
Status: DISCONTINUED | OUTPATIENT
Start: 2022-04-26 | End: 2022-04-28 | Stop reason: HOSPADM

## 2022-04-26 RX ORDER — SODIUM CHLORIDE 9 MG/ML
INJECTION, SOLUTION INTRAVENOUS CONTINUOUS PRN
Status: DISCONTINUED | OUTPATIENT
Start: 2022-04-26 | End: 2022-04-26 | Stop reason: SDUPTHER

## 2022-04-26 RX ORDER — CARVEDILOL 25 MG/1
25 TABLET ORAL 2 TIMES DAILY WITH MEALS
Status: DISCONTINUED | OUTPATIENT
Start: 2022-04-26 | End: 2022-04-28 | Stop reason: HOSPADM

## 2022-04-26 RX ORDER — ISOSORBIDE DINITRATE 10 MG/1
20 TABLET ORAL 3 TIMES DAILY
Status: DISCONTINUED | OUTPATIENT
Start: 2022-04-27 | End: 2022-04-28 | Stop reason: HOSPADM

## 2022-04-26 RX ADMIN — SODIUM CHLORIDE: 9 INJECTION, SOLUTION INTRAVENOUS at 13:46

## 2022-04-26 RX ADMIN — Medication 10 ML: at 21:03

## 2022-04-26 RX ADMIN — PROPOFOL 90 MG: 10 INJECTION, EMULSION INTRAVENOUS at 14:17

## 2022-04-26 ASSESSMENT — LIFESTYLE VARIABLES
HOW OFTEN DO YOU HAVE A DRINK CONTAINING ALCOHOL: 2-4 TIMES A MONTH
HOW MANY STANDARD DRINKS CONTAINING ALCOHOL DO YOU HAVE ON A TYPICAL DAY: 1 OR 2

## 2022-04-26 ASSESSMENT — PAIN SCALES - GENERAL
PAINLEVEL_OUTOF10: 0

## 2022-04-26 NOTE — PROGRESS NOTES
RENAL DOSE ADJUSTMENT MADE PER P/T PROTOCOL    PREVIOUS ORDER:  Metformin 1000mg BID    Estimated Creatinine Clearance: 41 mL/min (A) (based on SCr of 3.3 mg/dL (H)). Recent Labs     04/26/22  1300   BUN 91*   CREATININE 3.3*        NEW RENALLY ADJUSTED ORDER:  Metformin ha been held due to GFR <30.     Shala Alvarenga, [facility]  4/26/2022 5:13 PM

## 2022-04-26 NOTE — ANESTHESIA PRE PROCEDURE
Department of Anesthesiology  Preprocedure Note       Name:  Claudia Hansen   Age:  39 y.o.  :  1980                                          MRN:  [id number]         Date:  2022      Surgeon: Chadd Shelby    Procedure: CARDIOVERSION WITH ANESTHESIA    Medications prior to admission:   Prior to Admission medications    Medication Sig Start Date End Date Taking? Authorizing Provider   nitroGLYCERIN (NITROSTAT) 0.4 MG SL tablet DISSOLVE 1 TABLET UNDER THE TONGUE AS NEEDED FOR CHEST PAIN EVERY 5 MINUTES UP TO 3 TIMES.  IF NO RELIEF CALL 911. 22   Queenie Martin MD   XARELTO 20 MG TABS tablet TAKE 1 TABLET BY MOUTH DAILY 22   Queenie Martin MD   insulin lispro (HUMALOG) 100 UNIT/ML injection vial INJECT 20 UNITS SUBCUTANEOUSLY THREE TIMES A DAY BEFORE MEALS 22   Queenie Martin MD   dofetilide [facility]) 250 MCG capsule Take 1 capsule by mouth daily 3/10/22   Miguel Chase MD   bumetanide (BUMEX) 2 MG tablet TAKE 1 TABLET BY MOUTH TWICE DAILY *[facility] 22   Queenie Martin MD   spironolactone (ALDACTONE) 25 MG tablet Take 0.5 tablets by mouth daily 22   Queenie Martin MD   metFORMIN (GLUCOPHAGE) 1000 MG tablet TAKE ONE (1) TABLET BY MOUTH TWICE DAILY WITH MEALS 22   Queenie Martin MD   atorvastatin (LIPITOR) 40 MG tablet TAKE 1 TABLET BY MOUTH DAILY 22   Franklyn Handley DO   isosorbide dinitrate (ISORDIL) 10 MG tablet TAKE TWO (2) TABLETS BY MOUTH THREE TIMES PER DAY 21   Stacie Gomez MD   ONETOUCH ULTRA strip USE TO TEST BLOOD SUGAR 2 TO 3 TIMES DAILY AND AS NEEDED FOR SYMPTOMS OF IRREGULAR BLOOD GLUCOSE 10/29/21   Dominic Aiken MD   sacubitril-valsartan (ENTRESTO)  MG per tablet Take 1 tablet by mouth 2 times daily 21   Dominic Aiken MD   carvedilol (COREG) 25 MG tablet TAKE ONE (1) TABLET BY MOUTH TWICE DAILY WITH MEALS 21   Stacie Gomez MD   Blood Pressure KIT 1 kit by Does not apply route daily 7/14/21   [facility] [place]   magnesium oxide (MAG-OX) 400 MG tablet Take 1 tablet by mouth daily 5/30/21   Noreen Chris MD   allopurinol (ZYLOPRIM) 300 MG tablet Take 1 tablet by mouth daily 5/4/21   Noreen Chris MD   LEVEMIR FLEXTOUCH 100 UNIT/ML injection pen INJECT 40 UNITS INTO THE SKIN 2 TIMES DAILY 4/26/21   Noreen Chris MD   Continuous Blood Gluc Transmit (DEXCOM G6 TRANSMITTER) MISC USE AS DIRECTED 3/29/21   Noreen Chris MD   rivaroxaban (XARELTO) 20 MG TABS tablet Take 20 mg by mouth Daily with supper    Historical Provider, MD   magnesium oxide (MAG-OX) 400 MG tablet Take 1 tablet by mouth daily 8/28/20   Noreen Chris MD       Current medications:    Current Outpatient Medications   Medication Sig Dispense Refill    nitroGLYCERIN (NITROSTAT) 0.4 MG SL tablet DISSOLVE 1 TABLET UNDER THE TONGUE AS NEEDED FOR CHEST PAIN EVERY 5 MINUTES UP TO 3 TIMES.  IF NO RELIEF CALL 911. 25 tablet 10    XARELTO 20 MG TABS tablet TAKE 1 TABLET BY MOUTH DAILY 30 tablet 10    insulin lispro (HUMALOG) 100 UNIT/ML injection vial INJECT 20 UNITS SUBCUTANEOUSLY THREE TIMES A DAY BEFORE MEALS 10 mL 10    dofetilide (TIKOSYN) 250 MCG capsule Take 1 capsule by mouth daily 60 capsule 3    bumetanide (BUMEX) 2 MG tablet TAKE 1 TABLET BY MOUTH TWICE DAILY *DO NOT SEND IN PILLPACK* 180 tablet 1    spironolactone (ALDACTONE) 25 MG tablet Take 0.5 tablets by mouth daily 45 tablet 1    metFORMIN (GLUCOPHAGE) 1000 MG tablet TAKE ONE (1) TABLET BY MOUTH TWICE DAILY WITH MEALS 180 tablet 1    atorvastatin (LIPITOR) 40 MG tablet TAKE 1 TABLET BY MOUTH DAILY 30 tablet 2    isosorbide dinitrate (ISORDIL) 10 MG tablet TAKE TWO (2) TABLETS BY MOUTH THREE TIMES PER  tablet 3    ONETOUCH ULTRA strip USE TO TEST BLOOD SUGAR 2 TO 3 TIMES DAILY AND AS NEEDED FOR SYMPTOMS OF IRREGULAR BLOOD GLUCOSE 100 each 5    sacubitril-valsartan (ENTRESTO)  MG per tablet Take 1 tablet by mouth 2 times daily 180 tablet 3    carvedilol (COREG) 25 MG tablet TAKE ONE (1) TABLET BY MOUTH TWICE DAILY WITH MEALS 60 tablet 11    Blood Pressure KIT 1 kit by Does not apply route daily 1 kit 0    magnesium oxide (MAG-OX) 400 MG tablet Take 1 tablet by mouth daily 30 tablet 11    allopurinol (ZYLOPRIM) 300 MG tablet Take 1 tablet by mouth daily 30 tablet 11    LEVEMIR FLEXTOUCH 100 UNIT/ML injection pen INJECT 40 UNITS INTO THE SKIN 2 TIMES DAILY 30 mL 10    Continuous Blood Gluc Transmit (DEXCOM G6 TRANSMITTER) MISC USE AS DIRECTED 1 each 0     No current facility-administered medications for this visit. Allergies: Allergies   Allergen Reactions    Farxiga [Dapagliflozin] Other (See Comments)     Caused throat to swell       Problem List:    Patient Active Problem List   Diagnosis Code    Obstructive sleep apnea G47.33    Chronic gout M1A. 9XX0    CKD (chronic kidney disease) N18.9    Type 2 diabetes mellitus with complication, with long-term current use of insulin (HCC) E11.8, Z79.4    Hepatomegaly R16.0    Paroxysmal atrial fibrillation (HCC) I48.0    Essential hypertension I10    VHD (valvular heart disease) I38    Morbid obesity due to excess calories (HCC) E66.01    S/P ICD (internal cardiac defibrillator) procedure Z95.810    Ventricular arrhythmia I49.9    Nonischemic cardiomyopathy (HCC) I42.8    S/P left pulmonary artery pressure sensor implant placement Z95.9    Carotid disease, bilateral (HCC) I77.9    Chronic combined systolic and diastolic congestive heart failure (HCC) I50.42       Past Medical History:        Diagnosis Date    Acute CHF ([facility] 11/29/2011    Acute CHF ([address] 12/26/2011    Acute idiopathic gout of right foot 8/13/2016    AF (atrial fibrillation) ([facility] 02/2020    Anemia 11/29/2011    CAD (coronary artery disease)     Cerebral artery occlusion with cerebral infarction (HCC)     CKD (chronic kidney disease)     Gout     HTN (hypertension) 11/29/2011    Hyperlipidemia  Hypertension     Morbid obesity due to excess calories ([address]     Nonischemic cardiomyopathy ([address] 11/29/2011    Nonischemic cardiomyopathy ([address] 7/2013 7/2013- cardiac MRI revealed an LVEF of 20%    ARVIND (obstructive sleep apnea) 11/29/2011    S/P left heart catheterization by percutaneous approach 12-27/2011    Dr Haylee Montenegro Systolic heart failure [facility]) 5/7/2012 5/7/12- cardiac catheterization revealed an LVEF of 10-15%, mitral regurgitation along with borderline prolapse of mitral valve    Type 2 diabetes mellitus with complication, with long-term current use of insulin ([facility] [address] 8/22/2016    URI, acute 11/29/2011       Past Surgical History:        Procedure Laterality Date    CARDIAC CATHETERIZATION  08/01/2019    Dr Barby Knutson  11/20/2018    UPGRADE S-ICD TO BIV ICD   ([employer])  [address]     CARDIOVERSION  02/14/2020    Successful CV of AF to NSR      (Dr. Edison Javier)   Eugene Rashid  03/10/2022    Dr. Edison Javier. Shaaron Money Shaaron Money V-paced / SR    ECHO COMPL W DOP COLOR FLOW  11/30/2011         ECHO COMPL W DOP COLOR FLOW  03/27/2012         INSERTABLE CARDIAC MONITOR  12/13/2018    card[facility] luke, Dr. Arnel Saucedo  12/15/2020    SUCCESSFUL OVERDRIVE PACE TERMINATION OF AF VIA ICD    ( [address])    PACEMAKER PLACEMENT         Social History:    Social History     Tobacco Use    Smoking status: Never Smoker    Smokeless tobacco: Never Used   Substance Use Topics    Alcohol use: Not Currently                                Counseling given: Not Answered      Vital Signs (Current): There were no vitals filed for this visit.                                            BP Readings from Last 3 Encounters:   03/22/22 108/66   03/14/22 118/62   03/10/22 102/69       NPO Status:   > 8hrs                                                                               BMI:   Wt Readings from Last 3 Encounters:   03/22/22 286 lb (129.7 kg)   03/14/22 293 lb (132.9 kg) 03/10/22 290 lb (131.5 kg)     There is no height or weight on file to calculate BMI.    CBC:   Lab Results   Component Value Date    WBC 8.3 03/10/2022    RBC 3.88 03/10/2022    HGB 11.4 03/10/2022    HCT 35.2 03/10/2022    MCV 90.7 03/10/2022    RDW 14.6 03/10/2022     03/10/2022       CMP:   Lab Results   Component Value Date     03/22/2022    K 4.6 03/22/2022    K 4.0 01/22/2022    CL 99 03/22/2022    CO2 21 03/22/2022    BUN 52 03/22/2022    CREATININE 2.3 03/22/2022    GFRAA 38 03/22/2022    LABGLOM 31 03/22/2022    GLUCOSE 116 03/22/2022    GLUCOSE 192 05/15/2012    PROT 8.8 01/22/2022    CALCIUM 10.7 03/22/2022    BILITOT 0.4 01/22/2022    ALKPHOS 108 01/22/2022    AST 14 01/22/2022    ALT 10 01/22/2022       POC Tests: No results for input(s): POCGLU, POCNA, POCK, POCCL, POCBUN, POCHEMO, POCHCT in the last 72 hours.     Coags:   Lab Results   Component Value Date    PROTIME 21.0 01/19/2021    PROTIME 1.8 09/26/2016    INR 1.8 01/19/2021    APTT 36.4 01/11/2017       HCG (If Applicable): No results found for: PREGTESTUR, PREGSERUM, HCG, HCGQUANT     ABGs: No results found for: PHART, PO2ART, CHA2QSV, GGZ5PMF, BEART, I8FWAWXI       Type & Screen (If Applicable):  No results found for: LABABO, LABRH    EKG: 3/7/22  Ventricular Rate BPM 92  111  78  81  64  67  88    Atrial Rate   60  78  81  64  67  88    QRS Duration ms 200  180  214  228  150  200  210    Q-T Interval ms 452  420  508  516  554  570  524    QTc Calculation (Bazett) ms 558  571  579  599  571  602  634    R Axis degrees -155  -12  180  161  140  161  152    T Axis degrees 19  152  -19  -39  -22  -22  -316 [facility]             Narrative & Impression    Atrial fibrillation  Ventricular-paced rhythm  Biventricular pacemaker detected  Abnormal ECG  When compared with ECG of 22-JAN-2022 03:32,  Significant changes have occurred  Confirmed by Jennifer Marinelli ([id number]) on 3/7/2022 1:38:30 PM             ECHO 8/5/20  Summary   Left ventricle dilated at 7.1cm. Severe global hypokinesis, LV EF visually estimated about 25-30%. Mild left ventricular concentric hypertrophy noted. Stage II diastolic dysfunction. Left atrium is enlarged. Interatrial septum not well visualized but appears intact. Normal right ventricle size and function. Pacer/ICD lead in RV. No mitral valve prolapse. There is trace aortic regurgitation. There is trace tricuspid regurgitation, RVSP 30mmHg. Normal aortic root size. Trace of pericardial effusion. Pericardium appears normal.   No intra cardiac mass or thrombus. Compared to prior echo from 7/31/19 - No significant changes noted.     Anesthesia Evaluation  Patient summary reviewed and Nursing notes reviewed no history of anesthetic complications:   Airway: Mallampati: III  TM distance: >3 FB   Neck ROM: full  Mouth opening: < 3 FB Dental: normal exam         Pulmonary:   (+) sleep apnea: on CPAP,  decreased breath sounds,                             Cardiovascular:  Exercise tolerance: good (>4 METS),   (+) hypertension: moderate, pacemaker (ICD placed 2018): AICD, CAD: non-obstructive, dysrhythmias: atrial fibrillation, CHF: systolic, hyperlipidemia      ECG reviewed  Rhythm: irregular  Rate: normal  Echocardiogram reviewed  Stress test reviewed       Beta Blocker:  Dose within 24 Hrs      ROS comment: Nonischemic cardiomyopathy    On Tikosyn    ICD fired this past Friday, pt alerted to come to hospital per Dr. Zavaleta Manual     Neuro/Psych:   (+) CVA:,             GI/Hepatic/Renal:   (+) liver disease:, renal disease: CRI,           Endo/Other:    (+) Diabetes (glu 120 this AM per patient)Type II DM, using insulin, blood dyscrasia (on Xarelto- pt denies missed doses in last month): anticoagulation therapy and anemia:., .                  ROS comment: gout Abdominal:             Vascular:   + PVD, aortic or cerebral ( Carotid disease, bilateral ), .       Other Findings:             Anesthesia Plan      MAC     ASA 4       Induction: intravenous. Anesthetic plan and risks discussed with patient. Plan discussed with attending.                   Kerri Rojas   4/26/2022

## 2022-04-26 NOTE — PATIENT CARE CONFERENCE
[facility] Quality Flow/Interdisciplinary Rounds Progress Note        Quality Flow Rounds held on April 26, 2022    Disciplines Attending:  Bedside Nurse, ,  and Nursing Unit Leadership    Riccardo Hunter III was admitted on 4/26/2022  3:24 PM    Anticipated Discharge Date:       Disposition:    James Score:  James Scale Score: 23    Readmission Risk              Risk of Unplanned Readmission:  0           Discussed patient goal for the day, patient clinical progression, and barriers to discharge.   The following Goal(s) of the Day/Commitment(s) have been identified:  Diagnostics - Report Results      Jessie Vo RN  April 26, 2022

## 2022-04-26 NOTE — H&P
Date:   4/26/2022  Patient name: Padmini Duncan III  Date of admission:  4/26/2022 12:06 PM  MRN:   [id number]  YOB: 1980    Reason for Admission: HFr EF, persistent atrial fibrillation, acute renal insufficiency    CHIEF COMPLAINT: Irregular heartbeat and palpitations    History Obtained From:  patient    HISTORY OF PRESENT ILLNESS:                The patient is a 39 y.o.  male who is admitted to the hospital with acute renal insufficiency. He is well-known to me for a history of nonischemic cardiomyopathy diagnosed more than 15 years ago. He underwent his first ICD implant in 2013 and was upgraded to a biventricular pacer ICD in 2018. He was placed on dofetilide therapy for paroxysmal atrial fibrillation approximately 2 years ago. The patient is followed regularly in the congestive heart failure clinic locally and was last seen there on March 22. He was last cardioverted by me a month ago and developed atrial fibrillation again approximately 10 days ago. He received an ICD discharge for AF with rapid ventricular rate 3 days ago but this did not convert him to sinus rhythm. I therefore brought him electively for cardioversion. His renal function has deteriorated in the last several months with marked increase in his BUN/creatinine noted today. He was therefore hospitalized post cardioversion to monitor renal function, modify dose of dofetilide or change antiarrhythmic drug to amiodarone. He denies any symptoms of paroxysmal nocturnal dyspnea, orthopnea or leg edema at present.   No syncope or near syncope  Last ICD discharge was 3 days ago for AF with RVR      Past Medical History:   has a past medical history of Acute CHF ([address] Acute CHF ([address] Acute idiopathic gout of right foot, AF (atrial fibrillation) ([address] Anemia, CAD (coronary artery disease), Cerebral artery occlusion with cerebral infarction ([address] CKD (chronic kidney disease), Gout, HTN (hypertension), Hyperlipidemia, Hypertension, Morbid obesity due to excess calories ([facility] Nonischemic cardiomyopathy ([facility] Nonischemic cardiomyopathy ([facility] [address] [facility] ARVIND (obstructive sleep apnea), S/P left heart catheterization by percutaneous approach, Systolic heart failure ([facility] [address] [facility] Type 2 diabetes mellitus with complication, with long-term current use of insulin ([facility] [address] [facility] and URI, acute. Past Medical/Surgical History:    1. BMI 42.7 on 1/18/2021  2. Lifelong non smoker  3. Hx HTN  4. HLD  5. T2DM insulin requiring with nephropathy  6. CKD  7. ARVIND attempting to be complaint with C-pap  8. Hx Gout  9. 2/2011 and 12/2011 LHC: Normal coronaries  10. 2011 Declined ICD and did not maintain follow up  11. 3/2012 TTE Dr Jovanni Jonas: EF 30-35% and enlarged LV  12. 7/2013 Cardiac MRI: EF 20% with significant  right ventricular systolic dysfunction and evidence of myocarditis  13. 7/2013 Positive for Sylvie Lambert  14. 10/1/2013 Medtronic single lead ICD placement Dr Bruner Flatten  15. 10/31/2013 CT Head-->New decreased attenuation in the left occipital lobe which is thought to likely represent nonacute ischemic change. 16. Chronic HFrEF with recurrent admissions for HF ( in 2013, 2016, 2017, 2018  17. Paroxsymal AF   18. [address] with Xarelto  19. Reported history of hepatomegaly  20. 1/2016 Bilateral carotid Dopplers: 0% to 49% narrowing of both carotid arteries  21. 1/2016 TTE [facility]: EF 10-15%. Severely dilated LV. Moderately dilated LA. Mild PHTN. Mildly dilated RA. Moderately to severely reduced RVSF. Mild to moderate MR.  22. 7/30/2016 Syncopal episode presented to [facility] with normal device interrogation  23. 8/2016 appropriate ICD shock  24. 8/11/2016 villalobos placement due to anasarca  25. 8/2016 + UTI for enterobacter cloacae  26. NYHA FC IIIb ACC/AHA Stage C  27. 10/15/2018 SVT with ICD discharge  28. 11/2018 TTE EF 20%. 29. 11/20/2018 Upgrade to Medtronic Bi-V ICD  30. 12/13/2018 Cardio-Kim's implant  31. 7/2019 TTE: EF 20-25%.  Moderately to severely dilated LA. Mild MR. Moderate TR. RVSP 45 mmHg. No pericardial effusions. 32. 7/30/2019 While exercising Sustained monomorphic VT that degenerated into ventricular fibrillation with antitachycardia pacing therapy delivered  during charge.  Successful ICD discharge lead to restoration of sinus rhythm. 33. 7/30/2019 read as old L occiptal CVA  29. 8/1/2019 [facility] Dr Pacheco: Left main: 0%  stenosis.  LAD: 40-50 %  stenosis Circumflex: 30 %   stenosis.  RCA: Dominant.  30 %  stenosis. LV angio: not performed. 35. 2/14/2020 Elective cardioversion-->Successful cardioversion to sinus rhythm  36. 3/2020 Palpitations found to be in AF RVR and HFrEF  37. 3/14/2020 started on Tikosyn and converted to SR  38. 3/2020 Torsemide stopped and placed on Bumex 2 mg IV BID  39. 8/5/2020 TTE Dr Sarah Ashford ventricle dilated at 7.1cm. Severe global hypokinesis, LV EF  estimated about 25-30%.  Mild CLVH. . Stage II DD. Left atrium is enlarged. Interatrial septum not well visualized but appears intact. Normal right ventricle size and function. Pacer/ICD lead in RV. No mitral valve prolapse. There is trace aortic regurgitation. There is trace tricuspid regurgitation, RVSP 30mmHg. Normal aortic root size. Trace of pericardial effusion. Pericardium appears normal. No intra cardiac mass or thrombus. Compared to prior echo from 7/31/19 - No significant changes noted  40. L shoulder pain s/p PT 11/2020  41. 12/2020 AF RVR (converted to SR with ICD shock) and sustained VT and ICD shock evaluated by Dr Giuseppe Willard  42. 12/15/2020 return to AFL s/p Overdrive pace termination of atrial flutter      Past Surgical History:   has a past surgical history that includes ECHO Compl W Dop Color Flow (11/30/2011); ECHO Compl W Dop Color Flow (03/27/2012); Insertable Cardiac Monitor (12/13/2018); Cardioversion (02/14/2020); Cardiac defibrillator placement (11/20/2018);  Cardiac catheterization (08/01/2019); pacemaker placement; other surgical history (12/15/2020); and Cardioversion (03/10/2022). Home Medications:    Prior to Admission medications    Medication Sig Start Date End Date Taking? Authorizing Provider   nitroGLYCERIN (NITROSTAT) 0.4 MG SL tablet DISSOLVE 1 TABLET UNDER THE TONGUE AS NEEDED FOR CHEST PAIN EVERY 5 MINUTES UP TO 3 TIMES.  IF NO RELIEF CALL 911. 4/1/22   Dominic Marinelli MD   XARELTO 20 MG TABS tablet TAKE 1 TABLET BY MOUTH DAILY 4/1/22   Dominic Marinelli MD   insulin lispro (HUMALOG) 100 UNIT/ML injection vial INJECT 20 UNITS SUBCUTANEOUSLY THREE TIMES A DAY BEFORE MEALS 4/1/22   Dominic Marinelli MD   dofetilide [facility]) 250 MCG capsule Take 1 capsule by mouth daily 3/10/22   Gloria Chester MD   bumetanide (BUMEX) 2 MG tablet TAKE 1 TABLET BY MOUTH TWICE DAILY *[facility] 2/22/22   Dominic Marinelli MD   spironolactone (ALDACTONE) 25 MG tablet Take 0.5 tablets by mouth daily 2/22/22   Dominic Marinelli MD   metFORMIN (GLUCOPHAGE) 1000 MG tablet TAKE ONE (1) TABLET BY MOUTH TWICE DAILY WITH MEALS 2/22/22   Dominic Marinelli MD   atorvastatin (LIPITOR) 40 MG tablet TAKE 1 TABLET BY MOUTH DAILY 1/31/22   Bonilla Painter DO   isosorbide dinitrate (ISORDIL) 10 MG tablet TAKE TWO (2) TABLETS BY MOUTH THREE TIMES PER DAY 12/23/21   Kimmie Grimm MD   ONETOUCH ULTRA strip USE TO TEST BLOOD SUGAR 2 TO 3 TIMES DAILY AND AS NEEDED FOR SYMPTOMS OF IRREGULAR BLOOD GLUCOSE 10/29/21   Santi Jaffe MD   sacubitril-valsartan (ENTRESTO)  MG per tablet Take 1 tablet by mouth 2 times daily 9/23/21   Santi Jaffe MD   carvedilol (COREG) 25 MG tablet TAKE ONE (1) TABLET BY MOUTH TWICE DAILY WITH MEALS 9/2/21   Kimmie Grimm MD   Blood Pressure KIT 1 kit by Does not apply route daily 7/14/21   Yesi Doan MD   magnesium oxide (MAG-OX) 400 MG tablet Take 1 tablet by mouth daily 5/30/21   Santi Jaffe MD   allopurinol (ZYLOPRIM) 300 MG tablet Take 1 tablet by mouth daily 5/4/21   Santi Jaffe MD   [facility] FLEXTOUCH 100 UNIT/ML injection pen INJECT 40 UNITS INTO THE SKIN 2 TIMES DAILY 4/26/21   Levy Cohen MD   Continuous Blood Gluc Transmit (DEXCOM G6 TRANSMITTER) MISC USE AS DIRECTED 3/29/21   Levy Cohen MD   rivaroxaban (XARELTO) 20 MG TABS tablet Take 20 mg by mouth Daily with supper    Historical Provider, MD   magnesium oxide (MAG-OX) 400 MG tablet Take 1 tablet by mouth daily 8/28/20   Levy Cohen MD       Allergies:  Linn Love [dapagliflozin]    Social History:   reports that he has never smoked. He has never used smokeless tobacco. He reports previous alcohol use. He reports that he does not use drugs. Family History: family history includes Cancer in his mother; No Known Problems in his father. No for premature CAD. No for h/o sudden cardiac death    REVIEW OF SYSTEMS:    · Constitutional: there has been no unanticipated weight loss. There's been No change in energy level, No change in activity level. · Eyes: No visual changes or diplopia. No scleral icterus. · ENT: No Headaches, hearing loss or vertigo. No mouth sores or sore throat. · Cardiovascular: No chest pain, No dyspnea on exertion, Yes palpitations or No loss of consciousness. No cough, hemoptysis, No pleuritic pain, or phlebitis. · Respiratory: No cough or wheezing, no sputum production. No hematemesis. · Gastrointestinal: No abdominal pain, appetite loss, blood in stools. No change in bowel or bladder habits. · Genitourinary: No dysuria, trouble voiding, or hematuria. · Musculoskeletal:  No gait disturbance, No weakness or joint complaints. · Integumentary: No rash or pruritis. · Neurological: No headache, diplopia, change in muscle strength, numbness or tingling. No change in gait, balance, coordination, mood, affect, memory, mentation, behavior. · Psychiatric: No anxiety, or depression. · Endocrine: No temperature intolerance. No excessive thirst, fluid intake, or urination. No tremor.   · Hematologic/Lymphatic: No abnormal bruising or bleeding, blood clots or swollen lymph nodes. · Allergic/Immunologic: No nasal congestion or hives. PHYSICAL EXAM:    Physical Examination:    There were no vitals taken for this visit. Constitutional and General Appearance: alert, cooperative, no distress and appears stated age  [de-identified]: PERRL, no cervical lymphadenopathy. No masses palpable. Normal oral mucosa  Respiratory:  · Normal excursion and expansion without use of accessory muscles  · Resp Auscultation: Good respiratory effort. No for increased work of breathing. On auscultation: clear to auscultation bilaterally  Cardiovascular:  · The apical impulse is laterally displaced  · Heart tones are  normal. irregular S1 and S2, no gallops. · Jugular venous pulsation Normal  · The carotid upstroke is normal in amplitude and contour without delay or bruit  · Peripheral pulses are symmetrical and full   Abdomen:  · No masses or tenderness  · Bowel sounds present  Extremities:  ·  No Cyanosis or Clubbing  ·  Lower extremity edema: No  ·  Skin: Warm and dry  Neurological:  · Alert and oriented. · Moves all extremities well  · No abnormalities of mood, affect, memory, mentation, or behavior are noted    DATA:    Diagnostics:    EKG: AV pacing. ECHO: reviewed. Ejection fraction: 20%  Stress Test: 2021  FINDINGS: The overall quality of the study was fair due to to obesity   and the patient's left arm remaining at his side both on the post   regadenoson and and resting images   Left ventricular cavity size was noted to be dilated both on the post   regadenoson and resting images. Rotational analog analysis demonstrated no evidence of motion artifact   with attenuation predominantly from the patient's left upper extremity   remaining at his side during imaging.    The gated SPECT stress imaging in the short, vertical long, and   horizontal long axis demonstrated moderately diminished tracer uptake   within the inferior and apical segments on the post regadenoson and   resting images with no change of tracer uptake between the image sets. Gated SPECT left ventricular ejection fraction was calculated to be   31%, with abnormal septal motion the face of ventricular pacing and   generalized hypokinesis of remaining myocardial segments without   additional regional wall motion abnormalities.       Impression   The myocardial perfusion imaging was equivocal with fixed inferior and   apical perfusion abnormalities in the face of ventricular pacing and   artifact. No reversible perfusion abnormalities were identified. Overall left ventricular systolic function was moderately impaired   with a dilated left ventricular cavity and abnormal septal motion the   face of ventricular pacing with additional generalized hypokinesis. Intermediate risk pharmacologic myocardial perfusion imaging study.           Cardiac Angiography: not obtained. Labs:   CBC:   Recent Labs     04/26/22  1300   WBC 8.0   HGB 11.4*   HCT 35.3*        BMP:   Recent Labs     04/26/22  1300      K 4.6   CO2 20*   BUN 91*   CREATININE 3.3*   LABGLOM 21   GLUCOSE 105*     BNP: No results for input(s): BNP in the last 72 hours. PT/INR: No results for input(s): PROTIME, INR in the last 72 hours. APTT:No results for input(s): APTT in the last 72 hours. CARDIAC ENZYMES:No results for input(s): CKTOTAL, CKMB, CKMBINDEX, TROPONINI in the last 72 hours. FASTING LIPID PANEL:  Lab Results   Component Value Date    HDL 46 05/17/2021    LDLCALC 74 05/17/2021    TRIG 112 05/17/2021     LIVER PROFILE:No results for input(s): AST, ALT, LABALBU in the last 72 hours.       IMPRESSION:    Patient Active Problem List   Diagnosis    Obstructive sleep apnea  Obesity      CKD (chronic kidney disease)--with acute worsening noted today, BUN/creatinine of 91 and 3.3      Type 2 diabetes mellitus with complication, with long-term current use of insulin (HCC)      Paroxysmal atrial fibrillation (HCC)--post cardioversion today. Monitor renal function and adjust dose of dofetilide versus switch to amiodarone therapy to maintain sinus rhythm      Essential hypertension      VHD (valvular heart disease)      Morbid obesity due to excess calories (HCC)      Ventricular arrhythmia      Nonischemic cardiomyopathy (HCC)      Carotid disease, bilateral ([address]      Chronic combined systolic and diastolic congestive heart failure (HCC)--currently well compensated   Acute renal insufficiency as noted above--May be related to atrial fibrillation with rapid ventricular rate and loss of biventricular pacing efficacy. RECOMMENDATIONS:    Hold diuretics and Entresto as well as Aldactone today  Hold dofetilide today  BMP, BNP in a.m. Resume medications as tolerated by blood pressure and blood work tomorrow    Discussed with patient and family and nursing.     Mark Ballesteros MD, [facility]

## 2022-04-26 NOTE — ANESTHESIA POSTPROCEDURE EVALUATION
Department of Anesthesiology  Postprocedure Note    Patient: Josh Olvera  MRN: [id number]  YOB: 1980  Date of evaluation: 4/26/2022  Time:  2:30 PM     Procedure Summary     Date: 04/26/22 Room / Location: [facility]    Anesthesia Start: 0861 Anesthesia Stop:     Procedure: CARDIOVERSION WITH ANESTHESIA Diagnosis:       Chronic systolic (congestive) heart failure      Unspecified atrial fibrillation    Scheduled Providers:  Responsible Provider: Mady Rodriguez MD    Anesthesia Type: MAC ASA Status: 4          Anesthesia Type: No value filed. George Phase I:      George Phase II:      Last vitals: Reviewed and per EMR flowsheets.        Anesthesia Post Evaluation    Patient location during evaluation: PACU  Patient participation: complete - patient participated  Level of consciousness: awake  Pain score: 0  Airway patency: patent  Nausea & Vomiting: no nausea  Complications: no  Cardiovascular status: hemodynamically stable  Respiratory status: acceptable  Hydration status: stable

## 2022-04-27 LAB
ANION GAP SERPL CALCULATED.3IONS-SCNC: 13 MMOL/L (ref 7–16)
BUN BLDV-MCNC: 88 MG/DL (ref 6–20)
CALCIUM SERPL-MCNC: 9.2 MG/DL (ref 8.6–10.2)
CHLORIDE BLD-SCNC: 103 MMOL/L (ref 98–107)
CHOLESTEROL, TOTAL: 112 MG/DL (ref 0–199)
CO2: 18 MMOL/L (ref 22–29)
CREAT SERPL-MCNC: 3 MG/DL (ref 0.7–1.2)
GFR AFRICAN AMERICAN: 28
GFR NON-AFRICAN AMERICAN: 23 ML/MIN/1.73
GLUCOSE BLD-MCNC: 138 MG/DL (ref 74–99)
HDLC SERPL-MCNC: 38 MG/DL
LDL CHOLESTEROL CALCULATED: 56 MG/DL (ref 0–99)
MAGNESIUM: 2.4 MG/DL (ref 1.6–2.6)
METER GLUCOSE: 194 MG/DL (ref 74–99)
METER GLUCOSE: 210 MG/DL (ref 74–99)
METER GLUCOSE: 64 MG/DL (ref 74–99)
METER GLUCOSE: 77 MG/DL (ref 74–99)
POTASSIUM SERPL-SCNC: 3.8 MMOL/L (ref 3.5–5)
PRO-BNP: 680 PG/ML (ref 0–125)
SODIUM BLD-SCNC: 134 MMOL/L (ref 132–146)
TRIGL SERPL-MCNC: 90 MG/DL (ref 0–149)
TSH SERPL DL<=0.05 MIU/L-ACNC: 1.97 UIU/ML (ref 0.27–4.2)
VLDLC SERPL CALC-MCNC: 18 MG/DL

## 2022-04-27 PROCEDURE — 36415 COLL VENOUS BLD VENIPUNCTURE: CPT

## 2022-04-27 PROCEDURE — G0378 HOSPITAL OBSERVATION PER HR: HCPCS

## 2022-04-27 PROCEDURE — 83735 ASSAY OF MAGNESIUM: CPT

## 2022-04-27 PROCEDURE — 6370000000 HC RX 637 (ALT 250 FOR IP): Performed by: INTERNAL MEDICINE

## 2022-04-27 PROCEDURE — 80061 LIPID PANEL: CPT

## 2022-04-27 PROCEDURE — 94660 CPAP INITIATION&MGMT: CPT

## 2022-04-27 PROCEDURE — 2580000003 HC RX 258: Performed by: INTERNAL MEDICINE

## 2022-04-27 PROCEDURE — 83880 ASSAY OF NATRIURETIC PEPTIDE: CPT

## 2022-04-27 PROCEDURE — 80048 BASIC METABOLIC PNL TOTAL CA: CPT

## 2022-04-27 PROCEDURE — S5553 INSULIN LONG ACTING 5 U: HCPCS | Performed by: INTERNAL MEDICINE

## 2022-04-27 PROCEDURE — 82962 GLUCOSE BLOOD TEST: CPT

## 2022-04-27 PROCEDURE — 84443 ASSAY THYROID STIM HORMONE: CPT

## 2022-04-27 RX ORDER — DOFETILIDE 0.12 MG/1
125 CAPSULE ORAL EVERY 12 HOURS SCHEDULED
Status: DISCONTINUED | OUTPATIENT
Start: 2022-04-27 | End: 2022-04-28 | Stop reason: HOSPADM

## 2022-04-27 RX ADMIN — ISOSORBIDE DINITRATE 20 MG: 10 TABLET ORAL at 08:37

## 2022-04-27 RX ADMIN — Medication 10 ML: at 20:38

## 2022-04-27 RX ADMIN — Medication 10 ML: at 09:57

## 2022-04-27 RX ADMIN — RIVAROXABAN 15 MG: 15 TABLET, FILM COATED ORAL at 18:00

## 2022-04-27 RX ADMIN — Medication 400 MG: at 09:00

## 2022-04-27 RX ADMIN — INSULIN GLARGINE-YFGN 40 UNITS: 100 INJECTION, SOLUTION SUBCUTANEOUS at 08:00

## 2022-04-27 RX ADMIN — INSULIN LISPRO 20 UNITS: 100 INJECTION, SOLUTION INTRAVENOUS; SUBCUTANEOUS at 08:00

## 2022-04-27 RX ADMIN — ALLOPURINOL 300 MG: 300 TABLET ORAL at 08:37

## 2022-04-27 RX ADMIN — ATORVASTATIN CALCIUM 40 MG: 40 TABLET, FILM COATED ORAL at 20:37

## 2022-04-27 RX ADMIN — INSULIN LISPRO 20 UNITS: 100 INJECTION, SOLUTION INTRAVENOUS; SUBCUTANEOUS at 12:35

## 2022-04-27 RX ADMIN — CARVEDILOL 25 MG: 25 TABLET, FILM COATED ORAL at 17:30

## 2022-04-27 RX ADMIN — CARVEDILOL 25 MG: 25 TABLET, FILM COATED ORAL at 08:37

## 2022-04-27 ASSESSMENT — PAIN SCALES - GENERAL
PAINLEVEL_OUTOF10: 0

## 2022-04-27 NOTE — PROGRESS NOTES
Electrophysiology progress note         Date:  4/27/2022  Patient: Gardner Gosselin III  Admission:  4/26/2022  3:24 PM  Admit DX: Chronic systolic (congestive) heart failure [I50.22]  Unspecified atrial fibrillation [I48.91]  Atrial fibrillation ([address] [I48.91]  Age:  39 y.o., 1980     LOS: 0 days     Reason for evaluation:   atrial fibrillation, cardiomyopathy, CHF and TAIWO      SUBJECTIVE:    The patient was seen and examined. Notes and labs reviewed. There were no complications over night. Patient's cardiac review of systems: negative. The patient is generally feeling unchanged. OBJECTIVE:    Telemetry: Sinus rhythm with biv pacing  BP (!) 95/59   Pulse 73   Temp 96.8 °F (36 °C) (Temporal)   Resp 14   Ht 6' (1.829 m)   Wt 288 lb 9.6 oz (130.9 kg)   SpO2 100%   BMI 39.14 kg/m²     Intake/Output Summary (Last 24 hours) at 4/27/2022 1958  Last data filed at 4/27/2022 7088  Gross per 24 hour   Intake 60 ml   Output 800 ml   Net -740 ml       EXAM:   CONSTITUTIONAL:  awake, alert, cooperative, no apparent distress, and appears stated age. HEENT: Normal jugular venous pulsations,  Head is atraumatic, normocephalic. Eyes and oral mucosa are normal.  LUNGS: Good respiratory effort. No for increased work of breathing. On auscultation: clear to auscultation bilaterally  CARDIOVASCULAR:regular rate and rhythm, normal S1 and S2, no S3 or S4, and no murmur or rub noted. ABDOMEN: Soft, nontender, nondistended. SKIN: Warm and dry. EXTREMITIES: No lower extremity edema. Motor movement grossly intact. No cyanosis or clubbing.     Current Inpatient Medications:   allopurinol  300 mg Oral Daily    atorvastatin  40 mg Oral Nightly    carvedilol  25 mg Oral BID WC    insulin lispro  20 Units SubCUTAneous TID     [Held by provider] isosorbide dinitrate  20 mg Oral TID    magnesium oxide  400 mg Oral Daily    rivaroxaban  15 mg Oral Daily with breakfast    sodium chloride flush  5-40 mL IntraVENous 2 times per day    insulin glargine  40 Units SubCUTAneous BID    [Held by provider] metFORMIN  1,000 mg Oral BID WC       IV Infusions (if any):   dextrose      sodium chloride         Diagnostics:    EKG: normal sinus rhythm, biv pacing  ECHO: reviewed. Ejection fraction: 30%  Stress Test: not obtained. Cardiac Angiography: not obtained. Labs:   CBC:   Recent Labs     04/26/22  1300   WBC 8.0   HGB 11.4*   HCT 35.3*        BMP:   Recent Labs     04/26/22  1300 04/27/22  0546    134   K 4.6 3.8   CO2 20* 18*   BUN 91* 88*   CREATININE 3.3* 3.0*   LABGLOM 21 23   GLUCOSE 105* 138*     BNP: No results for input(s): BNP in the last 72 hours. PT/INR: No results for input(s): PROTIME, INR in the last 72 hours. APTT:No results for input(s): APTT in the last 72 hours. CARDIAC ENZYMES:No results for input(s): CKTOTAL, CKMB, CKMBINDEX, TROPONINI in the last 72 hours. FASTING LIPID PANEL:  Lab Results   Component Value Date    HDL 38 04/27/2022    LDLCALC 56 04/27/2022    TRIG 90 04/27/2022     LIVER PROFILE:No results for input(s): AST, ALT, LABALBU in the last 72 hours. ASSESSMENT:    Patient Active Problem List   Diagnosis    Obstructive sleep apnea--on treatment      Chronic gout      CKD (chronic kidney disease)--with acute worsening.   GFR dropped from 45 to 23 prior to cardioversion      Type 2 diabetes mellitus with complication, with long-term current use of insulin (HCC)      Paroxysmal atrial fibrillation (HCC)--now post cardioversion      Essential hypertension      Morbid obesity due to excess calories ([address]      Nonischemic cardiomyopathy ([address]      Chronic combined systolic and diastolic congestive heart failure (HCC)   Acute kidney injury--possibly as a result of AF with rapid ventricular rate    PLAN:    Resume dofetilide to 125 mcg twice a day tonight  Resume McDonald use of CPAP tonight  Continue to monitor renal function  Further recommendations/plan will depend on results of blood work tomorrow      Please see orders. Discussed with patient and nursing.     Peyton Rubi MD, [facility] [place]

## 2022-04-27 NOTE — CONSULTS
Patient currently admitted with diagnosis of Systolic heart failure. Patient was sitting up in the chair alert and oriented during the consultation. He was engaged and asked appropriate questions throughout the education session. He is agreeable to continued self monitoring and following a low sodium diet more strictly. Scheduling with the CHF clinic, Cardiology, and EP (6/8/22 @ 1:45 PM) Yes. Jennifer Medrano states he  Is in the process of establishing care with a new PCP, and has been advised to call to schedule his new patient appointment upon hospital discharge. Future Appointments   Date Time Provider Clemente Davis   5/10/2022  1:00 PM [facility] CHF ROOM 1 [facility]   5/31/2022 11:30 AM Kimmie Grimm MD [facility]   6/14/2022  1:00 PM Dominic Marinelli MD [facility]            We reviewed the introduction to Heart Failure, the HF zones, signs and symptoms to report on day 1 of onset, medications, medication compliance, the importance of obtaining daily weights, following a low sodium diet, reading food labels for the sodium content, keeping physician appointments, and smoking cessation. We discussed writing / tracking daily weights on a calendar / log because a 5 pound gain in 1 week can sneak up if you are not tracking it. Contributing risk factors for Heart Failure are identified as none. I advised patient they can reduce the risk for Heart Failure exacerbations by modifying / controlling the risk factors. We discussed self-managed care which includes the following:  to take medications as prescribed, report any intolerable side effects of medications to the cardiologist / doctor, do not just stop taking the medication; follow a cardiac heart healthy / low sodium diet; weigh yourself daily, exercise regularly- per doctor recommendation and not to smoke or use an excess amount of alcohol.         We discussed calling the cardiologist / doctor with a weight gain of 3 pounds in one day or a total of 5 pounds or more in one week. Also, if you should have a significant weight loss of 3# or more in one day to call the doctor, they may need to decrease or hold the diuretic dose. On days you feels nauseated and not eating / drinking, having emesis or diarrhea,  informed to call the cardiologist  / doctor, they may need to decrease or hold diuretic to avoid dehydration. I stressed the importance of informing their cardiologist the first day of onset of any of the signs and symptoms in the \"Yellow Zone\" of the HF Zones. Patient verbalizes understanding. Greater than 30 minutes was spent educating patient. The Heart Failure Booklet given to the patient with additional patient education addressing:  · What is Heart Failure? · Things You Can Do to Live Well with HF  · How to Take Your Medications  · How to Eat Less Salt  · Owen its Salt? · Exercising Well with Heart Failure  · Signs and symptoms of HF to report  · Weight Yourself Each Day  · Home Self Management- activity, weight tracking, taking medications as prescribed, meals /diet planning (sodium and fluid restriction), how to read food labels, keeping physician follow ups, smoking cessation, follow the Heart Failure Zones  · The Heart Failure zones  · Every Dose Every Day      Instructed  to call 911 if you have any of the following symptoms:    ·    Struggling to breathe unrelieved with rest,  ·    Having chest pain  ·    Have confusion or cant think clearly      The patient is ordered:  Diet: ADULT DIET; Regular;  Low Sodium (2 gm)   Sodium controlled diet Yes  Fluid restriction daily ordered (fluid restriction recommended if patient is hyponatremic and/or diuretic is initiated or increased) No  FR:   Daily Weights:   Patient Vitals for the past 96 hrs (Last 3 readings):   Weight   04/27/22 0234 288 lb 9.6 oz (130.9 kg)   04/26/22 1700 288 lb 6.4 oz (130.8 kg)     I/O:     Intake/Output Summary (Last 24 hours) at 4/27/2022 1204  Last data filed at 4/27/2022 2078  Gross per 24 hour   Intake 400 ml   Output 800 ml   Net -400 ml            Neha Lemus, RN BSN, RN  Heart Failure Navigator        CONGESTIVE HEART FAILURE (CHF) AHA GUIDELINES  (Must be completed for Primary Diagnosis CHF or History of CHF)    Discharge Plan:  I placed the Heart Failure Home Instructions in patient's discharge instructions. Per Heart Failure GWTG, the patient should have a follow-up appointment made within 7 days of discharge.     New Diagnosis No    ECHO Results most recent:  Lab Results   Component Value Date    LVEF 31 01/19/2021    LVEFMODE Echo 07/31/2019                                        Social History     Tobacco Use   Smoking Status Never Smoker   Smokeless Tobacco Never Used        Immunization History   Administered Date(s) Administered    COVID-19, Pfizer Purple top, DILUTE for use, 12+ yrs, 30mcg/0.3mL dose 03/27/2021, 04/27/2021    Hepatitis B Adult (Engerix-B) 09/09/2019, 11/14/2019, 05/17/2021    Influenza, Quadv, IM, (6 mo and older Fluzone, Flulaval, Fluarix and 3 yrs and older Afluria) 09/26/2016    Influenza, Quadv, IM, PF (6 mo and older Fluzone, Flulaval, Fluarix, and 3 yrs and older Afluria) 10/31/2018, 11/14/2019, 11/19/2020    Pneumococcal Polysaccharide (Sztaszhev02) 09/26/2016    Tdap (Boostrix, Adacel) 09/09/2019          Angiotensin-Converting-Enzyme (ACE) inhibitor ordered:  [] Yes  [x] No (specify contraindication):    [] Contraindicated  [] Hypotensive patient who was at immediate risk of cardiogenic shock  [] Hospitalized patient who experienced marked azotemia  [] Other Contraindications  [] Not Eligible  [] Not Tolerant  [] Patient Reason  [] System Reason  [] Other Reason    Angiotensin II receptor blockers (ARB) ordered:  [] Yes  [x] No (specify contraindication):    [] Contraindicated  [] Hypotensive patient who was at immediate risk of cardiogenic shock  [] Hospitalized patient who experienced marked azotemia  [] Other Contraindications    ARNI - Angiotensin Receptor Neprilysin Inhibitor ordered:  [x] Yes Currently on hold due to TAIWO  [] No (specify contraindication):    [] ACE inhibitor use within the prior 36 hours  [] Allergy  [] Hyperkalemia  [] Hypotension  [] Renal dysfunction defined as creatinine > 2.5 mg/dL in men or > 2.0 mg/dL in women  [] Other Contraindications  [] Not Eligible  [] Not Tolerant  [] Patient Reason  []System Reason  []Other Reason      Beta Blocker (Carvedilol, Metoprolol Succinate, or Bisoprolol) ordered:    [x] Yes Coreg  [] No (specify contraindication):    [] Contraindicated  [] Asthma  [] Fluid Overload  [] Low Blood Pressure  [] Patient recently treated with an intravenous positive inotropic agent  [] Other Contraindications  [] Not Eligible  [] Not Tolerant  [] Patient Reason  [] System Reason    SGLT2 Inhibitor ordered:  [] Yes  [x] No (specify contraindication):    [] Contraindicated  [] Patient currently on dialysis  [] Ketoacidosis  [] Known hypersensitivity to the medication  [] Type I diabetes (not approved for use in patients with Type I diabetes due to increased risk of ketoacidosis)  [] Other Contraindications  [] Not Eligible  [] Not Tolerant  [] Patient Reason  [x] System Reason  [] Other Reason    Aldosterone Antagonist ordered:  [x] Yes currently on hold due to TAIWO  [] No (specify contraindication):    [] Contraindicated  [] Allergy due to aldosterone receptor antagonist  [] Hyperkalemia  [] Renal dysfunction defined as creatinine >2.5 mg/dL in men or >2.0 mg/dL in women.   [] Other contraindications  [] Not Eligible  [] Not Tolerant  [] Patient Reason  [] System Reason  [] Other Reason

## 2022-04-27 NOTE — PROCEDURES
[address]                  [address] [facility] [place]  [facility]                                 PROCEDURE NOTE    PATIENT NAME: Jovanni Herrera                      :        1980  MED REC NO:   [id number]                            ROOM:       [facility]  ACCOUNT NO:   [de-identified]                           ADMIT DATE: 2022  PROVIDER:     Coleen Smallwood MD    DATE OF PROCEDURE:  2022    PROCEDURE:  Direct current cardioversion. PREPROCEDURE DIAGNOSIS:  Atrial fibrillation. POSTPROCEDURE DIAGNOSIS:  Atrial fibrillation. :  Coleen Smallwood MD    COMPLICATIONS:  None. HISTORY:  A 77-year-old patient with a known history of severe  nonischemic cardiomyopathy and a biventricular pacer ICD in situ. The  patient was noted to be in persistent atrial fibrillation for the past 7  to 10 days and therefore was brought in today for elective  cardioversion. He has been on low dose dofetilide. PROCEDURE:  The patient was brought to electrophysiology area in the  postabsorptive, nonsedated state. After the usual noninvasive blood  pressure and heart rate monitoring were instituted, the patient was  sedated with IV propofol by Anesthesia. A 300-joule synchronized shock  was delivered through anterior-posterior patches, restoring sinus rhythm  successfully. CONCLUSION:  Successful cardioversion to sinus rhythm. PLAN:  1. The patient would be hospitalized in view of his worsening renal  function with a marked increase in BUN and creatinine noted today. Since his heart failure symptoms are well-compensated currently, I will  withhold any further diuretics as well as Entresto and Aldactone today. Dofetilide will also be held since the patient has already taken 250 mcg  earlier today. 2.  Further recommendations and medications will depend on the recovery  of his renal function overnight.         Juana Prater MD    D: 2022 15:20:41 T:  04/26/2022 23:05:25     [email or web address]_[email or web address]  Job#: [id number]     Doc#: [id number]    CC:

## 2022-04-27 NOTE — PROGRESS NOTES
Nutrition Education        Counseled patient on cardiac/diabetic diet. Provided educational handouts and sample meal plans.           Merced Zavala RD, LD  Contact Number: [phone]

## 2022-04-28 VITALS
HEART RATE: 72 BPM | WEIGHT: 288.6 LBS | RESPIRATION RATE: 21 BRPM | OXYGEN SATURATION: 100 % | DIASTOLIC BLOOD PRESSURE: 85 MMHG | TEMPERATURE: 96.8 F | SYSTOLIC BLOOD PRESSURE: 115 MMHG | HEIGHT: 72 IN | BODY MASS INDEX: 39.09 KG/M2

## 2022-04-28 PROBLEM — N17.9 AKI (ACUTE KIDNEY INJURY) (HCC): Status: ACTIVE | Noted: 2022-04-28

## 2022-04-28 LAB
ANION GAP SERPL CALCULATED.3IONS-SCNC: 15 MMOL/L (ref 7–16)
BUN BLDV-MCNC: 77 MG/DL (ref 6–20)
CALCIUM SERPL-MCNC: 9.5 MG/DL (ref 8.6–10.2)
CHLORIDE BLD-SCNC: 104 MMOL/L (ref 98–107)
CO2: 19 MMOL/L (ref 22–29)
CREAT SERPL-MCNC: 2.6 MG/DL (ref 0.7–1.2)
GFR AFRICAN AMERICAN: 33
GFR NON-AFRICAN AMERICAN: 27 ML/MIN/1.73
GLUCOSE BLD-MCNC: 93 MG/DL (ref 74–99)
MAGNESIUM: 2.6 MG/DL (ref 1.6–2.6)
METER GLUCOSE: 141 MG/DL (ref 74–99)
METER GLUCOSE: 98 MG/DL (ref 74–99)
POTASSIUM SERPL-SCNC: 4.2 MMOL/L (ref 3.5–5)
PRO-BNP: 725 PG/ML (ref 0–125)
SODIUM BLD-SCNC: 138 MMOL/L (ref 132–146)

## 2022-04-28 PROCEDURE — 36415 COLL VENOUS BLD VENIPUNCTURE: CPT

## 2022-04-28 PROCEDURE — 83735 ASSAY OF MAGNESIUM: CPT

## 2022-04-28 PROCEDURE — 6370000000 HC RX 637 (ALT 250 FOR IP): Performed by: INTERNAL MEDICINE

## 2022-04-28 PROCEDURE — 2580000003 HC RX 258: Performed by: INTERNAL MEDICINE

## 2022-04-28 PROCEDURE — 94660 CPAP INITIATION&MGMT: CPT

## 2022-04-28 PROCEDURE — G0378 HOSPITAL OBSERVATION PER HR: HCPCS

## 2022-04-28 PROCEDURE — 80048 BASIC METABOLIC PNL TOTAL CA: CPT

## 2022-04-28 PROCEDURE — 83880 ASSAY OF NATRIURETIC PEPTIDE: CPT

## 2022-04-28 PROCEDURE — 82962 GLUCOSE BLOOD TEST: CPT

## 2022-04-28 RX ORDER — DOFETILIDE 0.12 MG/1
125 CAPSULE ORAL EVERY 12 HOURS SCHEDULED
Qty: 60 CAPSULE | Refills: 3 | Status: SHIPPED | OUTPATIENT
Start: 2022-04-28 | End: 2022-05-26 | Stop reason: SDUPTHER

## 2022-04-28 RX ORDER — BUMETANIDE 2 MG/1
TABLET ORAL
Qty: 180 TABLET | Refills: 1 | Status: SHIPPED | OUTPATIENT
Start: 2022-04-28 | End: 2022-07-07 | Stop reason: DRUGHIGH

## 2022-04-28 RX ADMIN — DOFETILIDE 125 MCG: 0.12 CAPSULE ORAL at 08:45

## 2022-04-28 RX ADMIN — Medication 400 MG: at 08:45

## 2022-04-28 RX ADMIN — Medication 10 ML: at 08:46

## 2022-04-28 RX ADMIN — METFORMIN HYDROCHLORIDE 1000 MG: 1000 TABLET ORAL at 08:46

## 2022-04-28 RX ADMIN — ALLOPURINOL 300 MG: 300 TABLET ORAL at 08:46

## 2022-04-28 RX ADMIN — SACUBITRIL AND VALSARTAN 1 TABLET: 97; 103 TABLET, FILM COATED ORAL at 11:59

## 2022-04-28 RX ADMIN — RIVAROXABAN 15 MG: 15 TABLET, FILM COATED ORAL at 08:45

## 2022-04-28 RX ADMIN — DOFETILIDE 125 MCG: 0.12 CAPSULE ORAL at 00:18

## 2022-04-28 RX ADMIN — CARVEDILOL 25 MG: 25 TABLET, FILM COATED ORAL at 08:45

## 2022-04-28 ASSESSMENT — PAIN SCALES - GENERAL
PAINLEVEL_OUTOF10: 0

## 2022-04-28 NOTE — PROGRESS NOTES
Date: 4/27/2022    Time: 9:20 PM    Patient Placed On BIPAP/CPAP/ Non-Invasive Ventilation? Yes    If no must comment. Facial area red/color change? No           If YES are Blister/Lesion present? No   If yes must notify nursing staff  BIPAP/CPAP skin barrier?   Yes    Skin barrier type:mepilexlite       Comments:        Lolly Holder RCP

## 2022-04-28 NOTE — PROGRESS NOTES
Monitor off, cleaned, and replaced in rack. Discharge instructions reviewed all questions answered.  Paper scripts given

## 2022-04-28 NOTE — PATIENT CARE CONFERENCE
P Quality Flow/Interdisciplinary Rounds Progress Note        Quality Flow Rounds held on April 28, 2022    Disciplines Attending:  Bedside Nurse, ,  and Nursing Unit Leadership    Valery Dorantes III was admitted on 4/26/2022  3:24 PM    Anticipated Discharge Date:       Disposition:    James Score:  James Scale Score: 22    Readmission Risk              Risk of Unplanned Readmission:  0           Discussed patient goal for the day, patient clinical progression, and barriers to discharge.   The following Goal(s) of the Day/Commitment(s) have been identified:  Labs - Report Results      Olivia Radford RN  April 28, 2022

## 2022-04-28 NOTE — TELEPHONE ENCOUNTER
Last Appointment:  9/23/2021  Future Appointments   Date Time Provider Clemente Davis   5/10/2022  1:00 PM [facility] CHF ROOM 1 [facility]F [facility]   5/31/2022 11:30 AM Nicholas Lund MD [facility]   6/14/2022  1:00 PM [facility]

## 2022-04-28 NOTE — PROGRESS NOTES
Electrophysiology progress note         Date:  4/28/2022  Patient: Gary Median III  Admission:  4/26/2022  3:24 PM  Admit DX: Chronic systolic (congestive) heart failure [I50.22]  Unspecified atrial fibrillation [I48.91]  Atrial fibrillation ([facility] [address] [I48.91]  Age:  39 y.o., 1980     LOS: 0 days     Reason for evaluation:   atrial fibrillation, cardiomyopathy and TAIWO      SUBJECTIVE:    The patient was seen and examined. Notes and labs reviewed. There were no complications over night. Patient's cardiac review of systems: negative. The patient is generally feeling rapidly improving. OBJECTIVE:    Telemetry: Sinus rhythm with biv pacing  /85   Pulse 72   Temp 96.8 °F (36 °C) (Temporal)   Resp 21   Ht 6' (1.829 m)   Wt 288 lb 9.6 oz (130.9 kg)   SpO2 100%   BMI 39.14 kg/m²     Intake/Output Summary (Last 24 hours) at 4/28/2022 1342  Last data filed at 4/28/2022 1225  Gross per 24 hour   Intake 240 ml   Output 2300 ml   Net -2060 ml       EXAM:   CONSTITUTIONAL:  awake, alert, cooperative, no apparent distress, and appears stated age. HEENT: Normal jugular venous pulsations,  Head is atraumatic, normocephalic. Eyes and oral mucosa are normal.  LUNGS: Good respiratory effort. No for increased work of breathing. On auscultation: clear to auscultation bilaterally  CARDIOVASCULAR:   regular rate and rhythm, normal S1 and S2, no S3   ABDOMEN: Soft, nontender, nondistended. SKIN: Warm and dry. EXTREMITIES: No lower extremity edema. Motor movement grossly intact. No cyanosis or clubbing.     Current Inpatient Medications:   sacubitril-valsartan  1 tablet Oral BID    dofetilide  125 mcg Oral 2 times per day    allopurinol  300 mg Oral Daily    atorvastatin  40 mg Oral Nightly    carvedilol  25 mg Oral BID     insulin lispro  20 Units SubCUTAneous TID     [Held by provider] isosorbide dinitrate  20 mg Oral TID    magnesium oxide  400 mg Oral Daily    rivaroxaban  15 mg Oral Daily with breakfast    sodium chloride flush  5-40 mL IntraVENous 2 times per day    insulin glargine  40 Units SubCUTAneous BID    metFORMIN  1,000 mg Oral BID WC       IV Infusions (if any):   dextrose      sodium chloride         Diagnostics:    EKG: normal sinus rhythm, biv pacing  ECHO: reviewed. Ejection fraction: 25%  Stress Test: not obtained. Cardiac Angiography: not obtained. Labs:   CBC:   Recent Labs     04/26/22  1300   WBC 8.0   HGB 11.4*   HCT 35.3*        BMP:   Recent Labs     04/27/22  0546 04/28/22  0620    138   K 3.8 4.2   CO2 18* 19*   BUN 88* 77*   CREATININE 3.0* 2.6*   LABGLOM 23 27   GLUCOSE 138* 93     BNP: No results for input(s): BNP in the last 72 hours. PT/INR: No results for input(s): PROTIME, INR in the last 72 hours. APTT:No results for input(s): APTT in the last 72 hours. CARDIAC ENZYMES:No results for input(s): CKTOTAL, CKMB, CKMBINDEX, TROPONINI in the last 72 hours. FASTING LIPID PANEL:  Lab Results   Component Value Date    HDL 38 04/27/2022    LDLCALC 56 04/27/2022    TRIG 90 04/27/2022     LIVER PROFILE:No results for input(s): AST, ALT, LABALBU in the last 72 hours. ASSESSMENT:    Patient Active Problem List   Diagnosis    Obstructive sleep apnea--on treatment      CKD (chronic kidney disease)--with acute worsening possibly as a result of AF with rapid ventricular rate for the last 10 days. Kidney indices have improved in the last 48 hours.  Type 2 diabetes mellitus with complication, with long-term current use of insulin (HCC)      Paroxysmal atrial fibrillation (HCC)--back in sinus rhythm and on dofetilide again.   Dose adjusted to renal function      Essential hypertension      VHD (valvular heart disease)      Morbid obesity due to excess calories (HCC)      Nonischemic cardiomyopathy (HCC)      Carotid disease, bilateral (HCC)      Chronic combined systolic and diastolic congestive heart failure (HCC)--currently compensated despite withholding diuretics for the last 2 days         PLAN:    Discharged home on dofetilide 125 mcg twice a day  Dose of diuretic reduced to once daily  Other medications kept unchanged  Patient discharged home in stable condition to be followed up in the office in 2 to 3 weeks  CHF clinic contacted and the patient will be seen there for clinical evaluation and blood work next week      Please see orders. Discussed with patient and nursing.     Gerhardt Sandifer, MD, [facility]

## 2022-04-29 DIAGNOSIS — I50.22 CHRONIC SYSTOLIC HEART FAILURE (HCC): ICD-10-CM

## 2022-04-29 DIAGNOSIS — E11.8 TYPE 2 DIABETES MELLITUS WITH COMPLICATION, WITH LONG-TERM CURRENT USE OF INSULIN (HCC): Chronic | ICD-10-CM

## 2022-04-29 DIAGNOSIS — Z79.4 TYPE 2 DIABETES MELLITUS WITH COMPLICATION, WITH LONG-TERM CURRENT USE OF INSULIN (HCC): Chronic | ICD-10-CM

## 2022-04-29 RX ORDER — ISOSORBIDE DINITRATE 10 MG/1
TABLET ORAL
Qty: 180 TABLET | Refills: 10 | Status: SHIPPED | OUTPATIENT
Start: 2022-04-29

## 2022-04-29 RX ORDER — INSULIN DETEMIR 100 [IU]/ML
INJECTION, SOLUTION SUBCUTANEOUS
Qty: 30 ML | Refills: 10 | Status: SHIPPED | OUTPATIENT
Start: 2022-04-29

## 2022-04-29 RX ORDER — BLOOD SUGAR DIAGNOSTIC
STRIP MISCELLANEOUS
Qty: 100 STRIP | Refills: 2 | Status: SHIPPED
Start: 2022-04-29 | End: 2022-07-27

## 2022-04-29 RX ORDER — ALLOPURINOL 300 MG/1
300 TABLET ORAL DAILY
Qty: 30 TABLET | Refills: 10 | Status: SHIPPED | OUTPATIENT
Start: 2022-04-29

## 2022-04-29 RX ORDER — ATORVASTATIN CALCIUM 40 MG/1
40 TABLET, FILM COATED ORAL DAILY
Qty: 30 TABLET | Refills: 0 | Status: SHIPPED
Start: 2022-04-29 | End: 2022-05-25

## 2022-04-29 NOTE — TELEPHONE ENCOUNTER
Last Appointment:  Visit date not found  Future Appointments   Date Time Provider Clemente Davis   5/3/2022  7:45 AM [facility] CHF ROOM 1 [facility]   5/10/2022  1:00 PM [facility] CHF ROOM 1 [facility]   5/17/2022  9:15 AM [facility] CHF ROOM 1 [facility]   5/24/2022  9:00 AM [facility] CHF ROOM 1 [facility]   5/31/2022 11:30 AM Kishore Arcos MD [facility]   6/14/2022  1:00 PM MD [facility]

## 2022-04-29 NOTE — TELEPHONE ENCOUNTER
Last Appointment:  9/23/2021  Future Appointments   Date Time Provider Clemente Youngisti   5/3/2022  7:45 AM [facility] CHF ROOM 1 [facility]   5/10/2022  1:00 PM [facility] CHF ROOM 1 [facility]   5/17/2022  9:15 AM [facility] CHF ROOM 1 [facility]   5/24/2022  9:00 AM [facility] CHF ROOM 1 [facility]   5/31/2022 11:30 AM Matt Phelan MD [facility]   6/14/2022  1:00 [facility]

## 2022-05-03 ENCOUNTER — HOSPITAL ENCOUNTER (OUTPATIENT)
Dept: OTHER | Age: 42
Setting detail: THERAPIES SERIES
Discharge: HOME | End: 2022-05-03
Payer: MEDICARE

## 2022-05-03 VITALS
HEART RATE: 70 BPM | BODY MASS INDEX: 40.55 KG/M2 | DIASTOLIC BLOOD PRESSURE: 59 MMHG | WEIGHT: 299 LBS | OXYGEN SATURATION: 99 % | SYSTOLIC BLOOD PRESSURE: 117 MMHG | RESPIRATION RATE: 20 BRPM

## 2022-05-03 LAB
ANION GAP SERPL CALCULATED.3IONS-SCNC: 9 MMOL/L (ref 7–16)
BUN BLDV-MCNC: 36 MG/DL (ref 6–20)
CALCIUM SERPL-MCNC: 9.5 MG/DL (ref 8.6–10.2)
CHLORIDE BLD-SCNC: 111 MMOL/L (ref 98–107)
CO2: 19 MMOL/L (ref 22–29)
CREAT SERPL-MCNC: 1.8 MG/DL (ref 0.7–1.2)
EKG ATRIAL RATE: 71 BPM
EKG P AXIS: 98 DEGREES
EKG P-R INTERVAL: 152 MS
EKG Q-T INTERVAL: 460 MS
EKG QRS DURATION: 222 MS
EKG QTC CALCULATION (BAZETT): 500 MS
EKG R AXIS: 166 DEGREES
EKG T AXIS: -25 DEGREES
EKG VENTRICULAR RATE: 71 BPM
GFR AFRICAN AMERICAN: 50
GFR NON-AFRICAN AMERICAN: 42 ML/MIN/1.73
GLUCOSE BLD-MCNC: 140 MG/DL (ref 74–99)
POTASSIUM SERPL-SCNC: 4.6 MMOL/L (ref 3.5–5)
PRO-BNP: 946 PG/ML (ref 0–125)
SODIUM BLD-SCNC: 139 MMOL/L (ref 132–146)

## 2022-05-03 PROCEDURE — 83880 ASSAY OF NATRIURETIC PEPTIDE: CPT

## 2022-05-03 PROCEDURE — 99214 OFFICE O/P EST MOD 30 MIN: CPT

## 2022-05-03 PROCEDURE — 6360000002 HC RX W HCPCS: Performed by: INTERNAL MEDICINE

## 2022-05-03 PROCEDURE — 96374 THER/PROPH/DIAG INJ IV PUSH: CPT

## 2022-05-03 PROCEDURE — 2580000003 HC RX 258: Performed by: INTERNAL MEDICINE

## 2022-05-03 PROCEDURE — 80048 BASIC METABOLIC PNL TOTAL CA: CPT

## 2022-05-03 PROCEDURE — 36415 COLL VENOUS BLD VENIPUNCTURE: CPT

## 2022-05-03 RX ORDER — FUROSEMIDE 10 MG/ML
40 INJECTION INTRAMUSCULAR; INTRAVENOUS ONCE
Status: COMPLETED | OUTPATIENT
Start: 2022-05-03 | End: 2022-05-03

## 2022-05-03 RX ORDER — SODIUM CHLORIDE 0.9 % (FLUSH) 0.9 %
10 SYRINGE (ML) INJECTION PRN
Status: DISCONTINUED | OUTPATIENT
Start: 2022-05-03 | End: 2022-05-04 | Stop reason: HOSPADM

## 2022-05-03 RX ADMIN — Medication 10 ML: at 10:06

## 2022-05-03 RX ADMIN — FUROSEMIDE 40 MG: 10 INJECTION, SOLUTION INTRAMUSCULAR; INTRAVENOUS at 10:06

## 2022-05-03 NOTE — PROGRESS NOTES
Orders received from Dr Franc Mead after obtaining lab work:  Give lasix 40 mg IVP today  Increase Tikosyn to 250 mg BID, and to increase Bumex to 2mg QOD alternating with Bumex 2mg daily. See patient next week and obtain BMP/BNP.

## 2022-05-03 NOTE — PROGRESS NOTES
[facility]   CHF Clinic       [place] III   1980  [phone]       Referring Doctor: Angie Valle  Cardiologist: Clearance Fulling  Nephrologist: n/a      History of Present Illness:     Grand kelvin MONTESINOS is a 39 y.o. male with a history of HFrEF, most recent EF 31% on 1/19/2021. Patient Story:  Patient cardioverted on April 26, 2022 and has felt more energy and sleeping better. Patient's weight is up 13 pounds since CHF clinic visit on 3/22/22. Patient denies eating salty foods or fast food. Diuretic decreased to once a day on 4/26/22 d/t elevated creatine. He does NOT  have dyspnea with exertion, shortness of breath, or decline in overall functional capacity. He does not have orthopnea, PND, nocturnal cough or hemoptysis. He does not have abdominal distention or bloating, early satiety, anorexia/change in appetite. He does has a good urinary response to his oral diuretic. He does not have  lower extremity edema. He admits to  lightheadedness, dizziness in the am after taking medications. He denies  syncope or near syncope. He denies chest pain or palpitations. Allergies   Allergen Reactions    Farxiga [Dapagliflozin] Other (See Comments)     Caused throat to swell         Prior to Visit Medications    Medication Sig Taking? Authorizing Provider   nitroGLYCERIN (NITROSTAT) 0.4 MG SL tablet DISSOLVE 1 TABLET UNDER THE TONGUE AS NEEDED FOR CHEST PAIN EVERY 5 MINUTES UP TO 3 TIMES. IF NO RELIEF CALL 911.   Ranjeet Lyles MD   ONETOUCH ULTRA strip USE TO TEST BLOOD SUGAR 2-3 TIMES DAILY AND AS NEEDED FOR SYMPTOMS OF IRREGULAR BLOOD GLUCOSE  Andrea Berman MD   LEVEMIR FLEXTOUCH 100 UNIT/ML injection pen INJECT 40 Barbara Gambino MD   allopurinol (ZYLOPRIM) 300 MG tablet TAKE 1 TABLET BY MOUTH DAILY  Andrea Berman MD   atorvastatin (LIPITOR) 40 MG tablet TAKE 1 TABLET BY MOUTH DAILY  Andrea Berman MD   isosorbide dinitrate (ISORDIL) 10 MG tablet TAKE TWO (2) TABLETS BY MOUTH THREE TIMES A DAY  Merline Strickland MD   dofetilide (TIKOSYN) 125 MCG capsule Take 1 capsule by mouth every 12 hours  Patient taking differently: Take 250 mcg by mouth every 12 hours Increased 5-3  Gerhardt Sandifer, MD   rivaroxaban (XARELTO) 15 MG TABS tablet Take 1 tablet by mouth daily (with breakfast)  Gerhardt Sandifer, MD   bumetanide (BUMEX) 2 MG tablet TAKE 1 TABLET BY MOUTH daily  Patient taking differently: Every other day take twice daily alternating with daily  Gerhardt Sandifer, MD   insulin lispro (HUMALOG) 100 UNIT/ML injection vial INJECT 20 UNITS SUBCUTANEOUSLY THREE TIMES A DAY BEFORE MEALS  Myah Lopes MD   spironolactone (ALDACTONE) 25 MG tablet Take 0.5 tablets by mouth daily  Myah Lopes MD   metFORMIN (GLUCOPHAGE) 1000 MG tablet TAKE ONE (1) TABLET BY MOUTH TWICE DAILY WITH MEALS  Myah Lopes MD   sacubitril-valsartan (ENTRESTO)  MG per tablet Take 1 tablet by mouth 2 times daily  Orlin Bruce MD   carvedilol (COREG) 25 MG tablet TAKE ONE (1) TABLET BY MOUTH TWICE DAILY WITH MEALS  Merline Strickland MD   Blood Pressure KIT 1 kit by Does not apply route daily  Jose Rafael Espinoza MD   magnesium oxide (MAG-OX) 400 MG tablet Take 1 tablet by mouth daily  Orlin Bruce MD   Continuous Blood Gluc Transmit (DEXCOM G6 TRANSMITTER) MISC USE AS DIRECTED  Orlin Bruce MD   rivaroxaban (XARELTO) 20 MG TABS tablet Take 20 mg by mouth Daily with supper  Historical Provider, MD   magnesium oxide (MAG-OX) 400 MG tablet Take 1 tablet by mouth daily  Orlin Bruce MD             Guideline directed medical:  ARNI/ACE I/ARB: Yes  Beta blocker:   Yes  Aldosterone antagonist:  Yes        Physical Examination:     BP (!) 117/59   Pulse 70   Resp 20   Wt 299 lb (135.6 kg)   SpO2 99%   BMI 40.55 kg/m²     Assessment  Charting Type: Shift assessment    Neurological  Level of Consciousness: Alert (0)  Orientation Level: Oriented X4  Cognition: Appropriate judgement,Appropriate safety awareness,Appropriate attention/concentration,Appropriate for developmental age,Follows commands  Speech: Clear         HEENT (Head, Ears, Eyes, Nose, & Throat)  HEENT (WDL): Exceptions to WDL  Right Eye: Glasses  Left Eye: Glasses    Respiratory  Respiratory Pattern: Regular  Respiratory Depth: Normal  Respiratory Quality/Effort: Unlabored  Chest Assessment: Chest expansion symmetrical  L Breath Sounds: Clear  R Breath Sounds: Clear              Cardiac  Cardiac Regularity: Regular  Cardiac Rhythm: AV paced    Rhythm Interpretation  Pulse: 70    Cardiac Monitor  Telemetry Monitor Alarm Parameters: on    Gastrointestinal  Abdominal (WDL): Within Defined Limits               Peripheral Vascular  Peripheral Vascular (WDL): Exceptions to WDL  Edema: Other (Comment)  Edema Generalized:  (trace edema to ankles)                   Genitourinary  Genitourinary (WDL): Within Defined Limits  Suprapubic Tenderness: No  Dysuria (Pain/Burning w/Urination): No    Psychosocial  Psychosocial (WDL): Within Defined Limits                        Pulse: 70                   LAB DATA:    BNP  No results for input(s): BNP in the last 72 hours.     proBNP  Recent Labs     05/03/22  0900   PROBNP 946*       BMP  Recent Labs     05/03/22  0900      K 4.6   *   CO2 19*   BUN 36*   CREATININE 1.8*   GLUCOSE 140*   CALCIUM 9.5         WEIGHTS:  Wt Readings from Last 3 Encounters:   05/03/22 299 lb (135.6 kg)   04/27/22 288 lb 9.6 oz (130.9 kg)   03/22/22 286 lb (129.7 kg)         TELEMETRY:  Cardiac Regularity: Regular  Cardiac Rhythm/Interpretation: NSR        ASSESSMENT:  Lucia Patton III is evolemic with stable weight    Interventions completed this visit:  IV diuretics given: yes lasix  Lab work obtained: yes, BMP/BNP   Reviewed continue current medications that patient as prescribed answered any questions   Educated on signs and symptoms of HF  Educated on low sodium diet    PLAN:  Future Appointments   Date Time Provider Clemente Davis   5/10/2022  1:00 PM [facility] CHF ROOM 1 SEYZ [facility]   5/17/2022  9:15 AM [facility] CHF ROOM 1 [facility]Z [facility]   5/24/2022  9:00 AM [facility] CHF ROOM 1 [facility]   5/31/2022 11:30 AM Yue Honeycutt MD [facility]   6/14/2022  1:00 PM [facility]         Given clinic phone number and aware of signs and symptoms to call with any HF change in symptoms.

## 2022-05-03 NOTE — PROGRESS NOTES
Patient instructed ( Verbal and written ) on medication changes per Dr Mony Grande orders. Demonstrates understanding.

## 2022-05-04 ENCOUNTER — TELEPHONE (OUTPATIENT)
Dept: CARDIOLOGY CLINIC | Age: 42
End: 2022-05-04

## 2022-05-04 NOTE — TELEPHONE ENCOUNTER
Hs not been sending cardiomems reads. 4/20 last reading sent. Then 5/3 (day of chf clinic visit sent reading)   PAD goal 21     Reading was PAD 34 yesterday    · Was given IVD and oral med changes made per DR Ana Maria Denson (see clinic visit)  · 4 26 afib  cardioverted    · Will trends mems as Lucia Patton III sends      · Follows with DR [employer] advanced HF in [facility] also. Scribe Attestation (For Scribes USE Only)... Attending Attestation (For Attendings USE Only).../Scribe Attestation (For Scribes USE Only)...

## 2022-05-09 ENCOUNTER — HOSPITAL ENCOUNTER (OUTPATIENT)
Dept: OTHER | Age: 42
Setting detail: THERAPIES SERIES
End: 2022-05-09
Payer: MEDICARE

## 2022-05-09 NOTE — TELEPHONE ENCOUNTER
Last mems 5/7 22 back down to 20 PAD   Goal 21.   Sent reading reminder 5/9/2022 for daily mems sends  [address] [place] Lorne Jordan (dr Ana Maria Denson with EP made med changes last chf visit)    Future Appointments   Date Time Provider Clemente Davis   5/10/2022  1:00 PM [facility] CHF ROOM 1 [facility]   5/17/2022  9:15 AM [facility] CHF ROOM 1 [facility]   5/24/2022  9:00 AM [facility] CHF ROOM 1 [facility]   5/31/2022 11:30 AM Jose Krishnamurthy MD [facility]   6/14/2022  1:00 PM Roena Oppenheim, MD Jeanene Romano [facility]

## 2022-05-10 ENCOUNTER — HOSPITAL ENCOUNTER (OUTPATIENT)
Dept: OTHER | Age: 42
Setting detail: THERAPIES SERIES
Discharge: HOME | End: 2022-05-10
Payer: MEDICARE

## 2022-05-10 VITALS
RESPIRATION RATE: 18 BRPM | HEART RATE: 70 BPM | BODY MASS INDEX: 40.35 KG/M2 | DIASTOLIC BLOOD PRESSURE: 64 MMHG | OXYGEN SATURATION: 98 % | SYSTOLIC BLOOD PRESSURE: 117 MMHG | WEIGHT: 297.5 LBS

## 2022-05-10 LAB
ANION GAP SERPL CALCULATED.3IONS-SCNC: 13 MMOL/L (ref 7–16)
BUN BLDV-MCNC: 40 MG/DL (ref 6–20)
CALCIUM SERPL-MCNC: 10 MG/DL (ref 8.6–10.2)
CHLORIDE BLD-SCNC: 102 MMOL/L (ref 98–107)
CO2: 21 MMOL/L (ref 22–29)
CREAT SERPL-MCNC: 2 MG/DL (ref 0.7–1.2)
GFR AFRICAN AMERICAN: 45
GFR NON-AFRICAN AMERICAN: 37 ML/MIN/1.73
GLUCOSE BLD-MCNC: 100 MG/DL (ref 74–99)
POTASSIUM SERPL-SCNC: 4.6 MMOL/L (ref 3.5–5)
PRO-BNP: 634 PG/ML (ref 0–125)
SODIUM BLD-SCNC: 136 MMOL/L (ref 132–146)

## 2022-05-10 PROCEDURE — 2580000003 HC RX 258: Performed by: INTERNAL MEDICINE

## 2022-05-10 PROCEDURE — 80048 BASIC METABOLIC PNL TOTAL CA: CPT

## 2022-05-10 PROCEDURE — 96374 THER/PROPH/DIAG INJ IV PUSH: CPT

## 2022-05-10 PROCEDURE — 36415 COLL VENOUS BLD VENIPUNCTURE: CPT

## 2022-05-10 PROCEDURE — 83880 ASSAY OF NATRIURETIC PEPTIDE: CPT

## 2022-05-10 PROCEDURE — 6360000002 HC RX W HCPCS: Performed by: INTERNAL MEDICINE

## 2022-05-10 PROCEDURE — 99214 OFFICE O/P EST MOD 30 MIN: CPT

## 2022-05-10 RX ORDER — FUROSEMIDE 10 MG/ML
40 INJECTION INTRAMUSCULAR; INTRAVENOUS ONCE
Status: COMPLETED | OUTPATIENT
Start: 2022-05-10 | End: 2022-05-10

## 2022-05-10 RX ORDER — SODIUM CHLORIDE 0.9 % (FLUSH) 0.9 %
10 SYRINGE (ML) INJECTION ONCE
Status: COMPLETED | OUTPATIENT
Start: 2022-05-10 | End: 2022-05-10

## 2022-05-10 RX ADMIN — FUROSEMIDE 40 MG: 10 INJECTION, SOLUTION INTRAMUSCULAR; INTRAVENOUS at 12:38

## 2022-05-10 RX ADMIN — Medication 10 ML: at 12:38

## 2022-05-10 NOTE — PROGRESS NOTES
[facility]   CHF Clinic       [place] III   1980  [phone]       Referring Doctor: Luis Larkin  Cardiologist: Roberta Turner  Nephrologist: n/a      History of Present Illness:     Grand kelvin MONTESINOS is a 39 y.o. male with a history of HFrEF, most recent EF 31% on 1/19/2021. Patient Story:  Patient cardioverted on April 26, 2022 and has felt more energy and sleeping better. Patient denies eating salty foods or fast food. He does NOT  have dyspnea with exertion, shortness of breath, or decline in overall functional capacity. He does not have orthopnea, PND, nocturnal cough or hemoptysis. He does not have abdominal distention or bloating, early satiety, anorexia/change in appetite. He does  a good urinary response to his oral diuretic. He does not have  lower extremity edema. He does not have lightheadedness, dizziness. He denies  syncope or near syncope. He denies chest pain or palpitations. Allergies   Allergen Reactions    Farxiga [Dapagliflozin] Other (See Comments)     Caused throat to swell         Prior to Visit Medications    Medication Sig Taking? Authorizing Provider   nitroGLYCERIN (NITROSTAT) 0.4 MG SL tablet DISSOLVE 1 TABLET UNDER THE TONGUE AS NEEDED FOR CHEST PAIN EVERY 5 MINUTES UP TO 3 TIMES. IF NO RELIEF CALL 911.   Myah Lopes MD   ONETOUCH ULTRA strip USE TO TEST BLOOD SUGAR 2-3 TIMES DAILY AND AS NEEDED FOR SYMPTOMS OF IRREGULAR BLOOD GLUCOSE  Fred Nieto MD   LEVEMIR FLEXTOUCH 100 UNIT/ML injection pen INJECT 40 Iglesia Fuchs MD   allopurinol (ZYLOPRIM) 300 MG tablet TAKE 1 TABLET BY MOUTH DAILY  Fred Nieto MD   atorvastatin (LIPITOR) 40 MG tablet TAKE 1 TABLET BY MOUTH DAILY  Fred Nieto MD   isosorbide dinitrate (ISORDIL) 10 MG tablet TAKE TWO (2) TABLETS BY MOUTH THREE TIMES A DAY  Merline Strickland MD   dofetilide (TIKOSYN) 125 MCG capsule Take 1 capsule by mouth every 12 hours  Patient taking differently: Take 250 mcg by mouth every 12 hours Increased 5-3  Guillermina Nguyen MD   rivaroxaban (XARELTO) 15 MG TABS tablet Take 1 tablet by mouth daily (with breakfast)  Guillermina Nguyen MD   bumetanide (BUMEX) 2 MG tablet TAKE 1 TABLET BY MOUTH daily  Patient taking differently: Every other day take twice daily alternating with daily  Guillermina Nguyen MD   insulin lispro (HUMALOG) 100 UNIT/ML injection vial INJECT 20 UNITS SUBCUTANEOUSLY THREE TIMES A DAY BEFORE MEALS  Jenn Solo MD   spironolactone (ALDACTONE) 25 MG tablet Take 0.5 tablets by mouth daily  Jenn Solo MD   metFORMIN (GLUCOPHAGE) 1000 MG tablet TAKE ONE (1) TABLET BY MOUTH TWICE DAILY WITH MEALS  Jenn Solo MD   sacubitril-valsartan (ENTRESTO)  MG per tablet Take 1 tablet by mouth 2 times daily  Zeke Diamond MD   carvedilol (COREG) 25 MG tablet TAKE ONE (1) TABLET BY MOUTH TWICE DAILY WITH MEALS  Teresa Rogers MD   Blood Pressure KIT 1 kit by Does not apply route daily  Christine Odell MD   magnesium oxide (MAG-OX) 400 MG tablet Take 1 tablet by mouth daily  Zeke Diamond MD   Continuous Blood Gluc Transmit (DEXCOM G6 TRANSMITTER) MISC USE AS DIRECTED  Zeke Diamond MD   rivaroxaban (XARELTO) 20 MG TABS tablet Take 20 mg by mouth Daily with supper  Historical Provider, MD   magnesium oxide (MAG-OX) 400 MG tablet Take 1 tablet by mouth daily  Zeke Diamond MD             Guideline directed medical:  ARNI/ACE I/ARB: Yes  Beta blocker:   Yes  Aldosterone antagonist:  Yes        Physical Examination:     /64   Pulse 70   Resp 18   Wt 297 lb 8 oz (134.9 kg)   SpO2 98%   BMI 40.35 kg/m²     Assessment  Charting Type: Shift assessment    Neurological  Level of Consciousness: Alert (0)  Orientation Level: Oriented X4  Cognition: Appropriate judgement,Appropriate safety awareness,Appropriate attention/concentration,Appropriate for developmental age,Follows commands  Speech: Clear         HEENT (Head, Ears, Eyes, Nose, & Throat)  HEENT (WDL): Exceptions to WDL  Right Eye: Glasses  Left Eye: Glasses    Respiratory  Respiratory Pattern: Regular  Respiratory Depth: Normal  Respiratory Quality/Effort: Unlabored  Chest Assessment: Chest expansion symmetrical  L Breath Sounds: Clear  R Breath Sounds: Clear              Cardiac  Cardiac Regularity: Regular  Cardiac Rhythm: AV paced    Rhythm Interpretation  Pulse: 70    Cardiac Monitor  Telemetry Monitor Alarm Parameters: on    Gastrointestinal  Abdominal (WDL): Within Defined Limits               Peripheral Vascular  Peripheral Vascular (WDL): Within Defined Limits                   Genitourinary  Genitourinary (WDL): Within Defined Limits  Suprapubic Tenderness: No  Dysuria (Pain/Burning w/Urination): No    Psychosocial  Psychosocial (WDL): Within Defined Limits                        Pulse: 70                   LAB DATA:    BNP  No results for input(s): BNP in the last 72 hours. proBNP  No results for input(s): PROBNP in the last 72 hours. BMP  No results for input(s): NA, K, CL, CO2, BUN, CREATININE, GLUCOSE, CALCIUM in the last 72 hours. WEIGHTS:  Wt Readings from Last 3 Encounters:   05/10/22 297 lb 8 oz (134.9 kg)   05/03/22 299 lb (135.6 kg)   04/27/22 288 lb 9.6 oz (130.9 kg)         TELEMETRY:  Cardiac Regularity: Regular  Cardiac Rhythm/Interpretation: NSR        ASSESSMENT:  Gardner Gosselin III is evolemic with 2 lb weight loss but still up from 286lb on 3/22/22.     Interventions completed this visit:  IV diuretics given: yes lasix  Lab work obtained: yes, BMP/BNP   Reviewed continue current medications that patient as prescribed answered any questions   Educated on signs and symptoms of HF  Educated on low sodium diet    PLAN:  Future Appointments   Date Time Provider Clemente Davis   5/10/2022  1:00 PM [facility] ROOM 1 [facility]   5/17/2022  9:15 AM [facility] ROOM 1 [facility]   5/24/2022  9:00 AM [facility] CHF ROOM 1 [facility]   5/31/2022 11:30 AM Matt Phelan MD [facility] CARDIO [facility]   6/14/2022  1:00 PM Jacqui Zambrano MD Jaziel Call [facility]         Given clinic phone number and aware of signs and symptoms to call with any HF change in symptoms.

## 2022-05-12 DIAGNOSIS — I50.22 CHRONIC SYSTOLIC HEART FAILURE (HCC): Primary | ICD-10-CM

## 2022-05-17 ENCOUNTER — HOSPITAL ENCOUNTER (OUTPATIENT)
Dept: OTHER | Age: 42
Setting detail: THERAPIES SERIES
Discharge: HOME | End: 2022-05-17
Payer: MEDICARE

## 2022-05-17 VITALS
WEIGHT: 299 LBS | OXYGEN SATURATION: 99 % | RESPIRATION RATE: 18 BRPM | HEART RATE: 70 BPM | BODY MASS INDEX: 40.55 KG/M2 | SYSTOLIC BLOOD PRESSURE: 115 MMHG | DIASTOLIC BLOOD PRESSURE: 65 MMHG

## 2022-05-17 LAB
ANION GAP SERPL CALCULATED.3IONS-SCNC: 15 MMOL/L (ref 7–16)
BUN BLDV-MCNC: 51 MG/DL (ref 6–20)
CALCIUM SERPL-MCNC: 9.7 MG/DL (ref 8.6–10.2)
CHLORIDE BLD-SCNC: 99 MMOL/L (ref 98–107)
CO2: 19 MMOL/L (ref 22–29)
CREAT SERPL-MCNC: 2.1 MG/DL (ref 0.7–1.2)
GFR AFRICAN AMERICAN: 42
GFR NON-AFRICAN AMERICAN: 35 ML/MIN/1.73
GLUCOSE BLD-MCNC: 132 MG/DL (ref 74–99)
POTASSIUM SERPL-SCNC: 4.3 MMOL/L (ref 3.5–5)
PRO-BNP: 511 PG/ML (ref 0–125)
SODIUM BLD-SCNC: 133 MMOL/L (ref 132–146)

## 2022-05-17 PROCEDURE — 2580000003 HC RX 258: Performed by: INTERNAL MEDICINE

## 2022-05-17 PROCEDURE — 96374 THER/PROPH/DIAG INJ IV PUSH: CPT

## 2022-05-17 PROCEDURE — 36415 COLL VENOUS BLD VENIPUNCTURE: CPT

## 2022-05-17 PROCEDURE — 6360000002 HC RX W HCPCS: Performed by: INTERNAL MEDICINE

## 2022-05-17 PROCEDURE — 83880 ASSAY OF NATRIURETIC PEPTIDE: CPT

## 2022-05-17 PROCEDURE — 80048 BASIC METABOLIC PNL TOTAL CA: CPT

## 2022-05-17 PROCEDURE — 99214 OFFICE O/P EST MOD 30 MIN: CPT

## 2022-05-17 RX ORDER — FUROSEMIDE 10 MG/ML
40 INJECTION INTRAMUSCULAR; INTRAVENOUS ONCE
Status: COMPLETED | OUTPATIENT
Start: 2022-05-17 | End: 2022-05-17

## 2022-05-17 RX ORDER — SODIUM CHLORIDE 0.9 % (FLUSH) 0.9 %
10 SYRINGE (ML) INJECTION PRN
Status: DISCONTINUED | OUTPATIENT
Start: 2022-05-17 | End: 2022-05-18 | Stop reason: HOSPADM

## 2022-05-17 RX ADMIN — Medication 10 ML: at 09:00

## 2022-05-17 RX ADMIN — FUROSEMIDE 40 MG: 10 INJECTION, SOLUTION INTRAMUSCULAR; INTRAVENOUS at 09:00

## 2022-05-17 NOTE — PROGRESS NOTES
[facility]   CHF Clinic       [place] III   1980  [phone]       Referring Doctor: Leandra Luo  Cardiologist: Nicole Daugherty  Nephrologist: n/a      History of Present Illness:     Grand kelvin MONTESINOS is a 39 y.o. male with a history of HFrEF, most recent EF 31% on 1/19/2021. Patient Story:  Patient cardioverted on April 26, 2022 and has felt more energy and sleeping better. Patient denies eating salty foods or fast food. He does NOT  have dyspnea with exertion, shortness of breath, or decline in overall functional capacity. He does not have orthopnea, PND, nocturnal cough or hemoptysis. He does have some abdominal distention or bloating, early satiety, anorexia/change in appetite. He does  a good urinary response to his oral diuretic. He does not have  lower extremity edema. He does not have lightheadedness, dizziness. He denies  syncope or near syncope. He denies chest pain or palpitations. Allergies   Allergen Reactions    Farxiga [Dapagliflozin] Other (See Comments)     Caused throat to swell         Prior to Visit Medications    Medication Sig Taking? Authorizing Provider   nitroGLYCERIN (NITROSTAT) 0.4 MG SL tablet DISSOLVE 1 TABLET UNDER THE TONGUE AS NEEDED FOR CHEST PAIN EVERY 5 MINUTES UP TO 3 TIMES. IF NO RELIEF CALL 911.   Carlus Dandy, MD   ONETOUCH ULTRA strip USE TO TEST BLOOD SUGAR 2-3 TIMES DAILY AND AS NEEDED FOR SYMPTOMS OF IRREGULAR BLOOD GLUCOSE  Nicol Heart MD   LEVEMIR FLEXTOUCH 100 UNIT/ML injection pen INJECT 40 Selena Carbajal MD   allopurinol (ZYLOPRIM) 300 MG tablet TAKE 1 TABLET BY MOUTH DAILY  Nicol Heart MD   atorvastatin (LIPITOR) 40 MG tablet TAKE 1 TABLET BY MOUTH DAILY  Nicol Heart MD   isosorbide dinitrate (ISORDIL) 10 MG tablet TAKE TWO (2) TABLETS BY MOUTH THREE TIMES A DAY  Levar Lemon MD   dofetilide (TIKOSYN) 125 MCG capsule Take 1 capsule by mouth every 12 hours  Patient taking differently: Take 250 mcg by mouth every 12 hours Increased 5-3  Kristin Rivas MD   rivaroxaban (XARELTO) 15 MG TABS tablet Take 1 tablet by mouth daily (with breakfast)  Kristin Rivas MD   bumetanide (BUMEX) 2 MG tablet TAKE 1 TABLET BY MOUTH daily  Patient taking differently: Every other day take twice daily alternating with daily  Kristin Rivas MD   insulin lispro (HUMALOG) 100 UNIT/ML injection vial INJECT 20 UNITS SUBCUTANEOUSLY THREE TIMES A DAY BEFORE MEALS  Micheline Corrigan MD   spironolactone (ALDACTONE) 25 MG tablet Take 0.5 tablets by mouth daily  Micheline Corrigan MD   metFORMIN (GLUCOPHAGE) 1000 MG tablet TAKE ONE (1) TABLET BY MOUTH TWICE DAILY WITH MEALS  Micheline Corrigan MD   sacubitril-valsartan (ENTRESTO)  MG per tablet Take 1 tablet by mouth 2 times daily  Yola Escoto MD   carvedilol (COREG) 25 MG tablet TAKE ONE (1) TABLET BY MOUTH TWICE DAILY WITH MEALS  Cortez Villa MD   Blood Pressure KIT 1 kit by Does not apply route daily  Aimee Ross MD   magnesium oxide (MAG-OX) 400 MG tablet Take 1 tablet by mouth daily  Yola Escoto MD   Continuous Blood Gluc Transmit (DEXCOM G6 TRANSMITTER) MISC USE AS DIRECTED  Yola Escoto MD   rivaroxaban (XARELTO) 20 MG TABS tablet Take 20 mg by mouth Daily with supper  Historical Provider, MD   magnesium oxide (MAG-OX) 400 MG tablet Take 1 tablet by mouth daily  Yola Escoto MD             Guideline directed medical:  ARNI/ACE I/ARB: Yes  Beta blocker:   Yes  Aldosterone antagonist:  Yes        Physical Examination:     /65   Pulse 70   Resp 18   Wt 299 lb (135.6 kg)   SpO2 99%   BMI 40.55 kg/m²     Assessment  Charting Type: Shift assessment    Neurological  Level of Consciousness: Alert (0)  Orientation Level: Oriented X4  Cognition: Appropriate judgement,Appropriate safety awareness,Appropriate attention/concentration,Appropriate for developmental age,Follows commands  Speech: Clear         HEENT (Head, 1:00 PM [facility] Simms [facility]         Given clinic phone number and aware of signs and symptoms to call with any HF change in symptoms.

## 2022-05-24 ENCOUNTER — HOSPITAL ENCOUNTER (OUTPATIENT)
Dept: OTHER | Age: 42
Setting detail: THERAPIES SERIES
Discharge: HOME | End: 2022-05-24
Payer: MEDICARE

## 2022-05-24 VITALS
RESPIRATION RATE: 18 BRPM | OXYGEN SATURATION: 99 % | BODY MASS INDEX: 40.28 KG/M2 | DIASTOLIC BLOOD PRESSURE: 74 MMHG | WEIGHT: 297 LBS | SYSTOLIC BLOOD PRESSURE: 123 MMHG | HEART RATE: 69 BPM

## 2022-05-24 LAB
ANION GAP SERPL CALCULATED.3IONS-SCNC: 11 MMOL/L (ref 7–16)
BUN BLDV-MCNC: 37 MG/DL (ref 6–20)
CALCIUM SERPL-MCNC: 9.5 MG/DL (ref 8.6–10.2)
CHLORIDE BLD-SCNC: 106 MMOL/L (ref 98–107)
CO2: 18 MMOL/L (ref 22–29)
CREAT SERPL-MCNC: 1.8 MG/DL (ref 0.7–1.2)
GFR AFRICAN AMERICAN: 50
GFR NON-AFRICAN AMERICAN: 42 ML/MIN/1.73
GLUCOSE BLD-MCNC: 110 MG/DL (ref 74–99)
POTASSIUM SERPL-SCNC: 4.5 MMOL/L (ref 3.5–5)
PRO-BNP: 717 PG/ML (ref 0–125)
SODIUM BLD-SCNC: 135 MMOL/L (ref 132–146)

## 2022-05-24 PROCEDURE — 99214 OFFICE O/P EST MOD 30 MIN: CPT

## 2022-05-24 PROCEDURE — 2580000003 HC RX 258: Performed by: INTERNAL MEDICINE

## 2022-05-24 PROCEDURE — 36415 COLL VENOUS BLD VENIPUNCTURE: CPT

## 2022-05-24 PROCEDURE — 96374 THER/PROPH/DIAG INJ IV PUSH: CPT

## 2022-05-24 PROCEDURE — 80048 BASIC METABOLIC PNL TOTAL CA: CPT

## 2022-05-24 PROCEDURE — 83880 ASSAY OF NATRIURETIC PEPTIDE: CPT

## 2022-05-24 PROCEDURE — 6360000002 HC RX W HCPCS: Performed by: INTERNAL MEDICINE

## 2022-05-24 RX ORDER — FUROSEMIDE 10 MG/ML
40 INJECTION INTRAMUSCULAR; INTRAVENOUS ONCE
Status: COMPLETED | OUTPATIENT
Start: 2022-05-24 | End: 2022-05-24

## 2022-05-24 RX ORDER — SODIUM CHLORIDE 0.9 % (FLUSH) 0.9 %
10 SYRINGE (ML) INJECTION 2 TIMES DAILY
Status: DISCONTINUED | OUTPATIENT
Start: 2022-05-24 | End: 2022-05-25 | Stop reason: HOSPADM

## 2022-05-24 RX ADMIN — FUROSEMIDE 40 MG: 10 INJECTION, SOLUTION INTRAMUSCULAR; INTRAVENOUS at 08:47

## 2022-05-24 RX ADMIN — Medication 10 ML: at 08:48

## 2022-05-24 NOTE — PROGRESS NOTES
[facility]   CHF Clinic       [place] III   1980  [phone]       Referring Doctor: Franc Mead  Cardiologist: Karen Aquino  Nephrologist: n/a      History of Present Illness:     Grand kelvin MONTESINOS is a 39 y.o. male with a history of HFrEF, most recent EF 31% on 1/19/2021. Patient Story:  Patient cardioverted on April 26, 2022 and has felt more energy and sleeping better. Patient denies eating salty foods or fast food. He does NOT  have dyspnea with exertion, shortness of breath, or decline in overall functional capacity. He does not have orthopnea, PND, nocturnal cough or hemoptysis. He does have some abdominal distention or bloating, early satiety, anorexia/change in appetite. He does  a good urinary response to his oral diuretic. He does not have  lower extremity edema. He does not have lightheadedness, dizziness. He denies  syncope or near syncope. He denies chest pain or palpitations. Allergies   Allergen Reactions    Farxiga [Dapagliflozin] Other (See Comments)     Caused throat to swell         Prior to Visit Medications    Medication Sig Taking? Authorizing Provider   nitroGLYCERIN (NITROSTAT) 0.4 MG SL tablet DISSOLVE 1 TABLET UNDER THE TONGUE AS NEEDED FOR CHEST PAIN EVERY 5 MINUTES UP TO 3 TIMES. IF NO RELIEF CALL 911.   Patient not taking: Reported on 5/24/2022  Veronica Simms MD   [facility] ULTRA strip USE TO TEST BLOOD SUGAR 2-3 TIMES DAILY AND AS NEEDED FOR SYMPTOMS OF IRREGULAR BLOOD GLUCOSE  Gemma Benitez MD   LEVEMIR FLEXTOUCH 100 UNIT/ML injection pen INJECT 40 Ghanshyam Martin MD   allopurinol (ZYLOPRIM) 300 MG tablet TAKE 1 TABLET BY MOUTH DAILY  Gemma Benitez MD   atorvastatin (LIPITOR) 40 MG tablet TAKE 1 TABLET BY MOUTH DAILY  Gemma Benitez MD   isosorbide dinitrate (ISORDIL) 10 MG tablet TAKE TWO (2) TABLETS BY MOUTH THREE TIMES A DAY  Raven Torres MD   dofetilide (TIKOSYN) 125 MCG capsule Take 1 capsule by mouth every 12 hours  Patient taking differently: Take 250 mcg by mouth every 12 hours Increased 5-3  Varghese Wu MD   rivaroxaban (XARELTO) 15 MG TABS tablet Take 1 tablet by mouth daily (with breakfast)  Varghese Wu MD   bumetanide (BUMEX) 2 MG tablet TAKE 1 TABLET BY MOUTH daily  Patient taking differently: Every other day take twice daily alternating with daily  Varghese Wu MD   insulin lispro (HUMALOG) 100 UNIT/ML injection vial INJECT 20 UNITS SUBCUTANEOUSLY THREE TIMES A DAY BEFORE MEALS  Yoselin Madsen MD   spironolactone (ALDACTONE) 25 MG tablet Take 0.5 tablets by mouth daily  Yoselin Madsen MD   metFORMIN (GLUCOPHAGE) 1000 MG tablet TAKE ONE (1) TABLET BY MOUTH TWICE DAILY WITH MEALS  Yoselin Madsen MD   sacubitril-valsartan (ENTRESTO)  MG per tablet Take 1 tablet by mouth 2 times daily  Hodan Butt MD   carvedilol (COREG) 25 MG tablet TAKE ONE (1) TABLET BY MOUTH TWICE DAILY WITH MEALS  Brandi Barker MD   Blood Pressure KIT 1 kit by Does not apply route daily  Nicholas Mulligan MD   magnesium oxide (MAG-OX) 400 MG tablet Take 1 tablet by mouth daily  Hodan Butt MD   Continuous Blood Gluc Transmit (DEXCOM G6 TRANSMITTER) MISC USE AS DIRECTED  Hodan Butt MD   rivaroxaban (XARELTO) 20 MG TABS tablet Take 20 mg by mouth Daily with supper  Historical Provider, MD   magnesium oxide (MAG-OX) 400 MG tablet Take 1 tablet by mouth daily  Hodan Butt MD             Guideline directed medical:  ARNI/ACE I/ARB: Yes  Beta blocker:   Yes  Aldosterone antagonist:  Yes        Physical Examination:     /74   Pulse 69   Resp 18   Wt 297 lb (134.7 kg)   SpO2 99%   BMI 40.28 kg/m²     Assessment  Charting Type: Shift assessment    Neurological  Level of Consciousness: Alert (0)  Orientation Level: Oriented X4  Cognition: Appropriate judgement,Appropriate safety awareness,Appropriate attention/concentration,Appropriate for developmental age,Follows commands  Speech: Clear         HEENT (Head, Ears, Eyes, Nose, & Throat)  HEENT (WDL): Exceptions to WDL  Right Eye: Glasses  Left Eye: Glasses    Respiratory  Respiratory Pattern: Regular  Respiratory Depth: Normal  Respiratory Quality/Effort: Unlabored  Chest Assessment: Chest expansion symmetrical  L Breath Sounds: Clear  R Breath Sounds: Clear              Cardiac  Cardiac Regularity: Regular  Cardiac Rhythm: AV paced    Rhythm Interpretation  Pulse: 69    Cardiac Monitor  Telemetry Monitor Alarm Parameters: on    Gastrointestinal  Abdominal (WDL): Exceptions to WDL  Abdomen Inspection: Distended,Soft  RUQ Bowel Sounds: Active  LUQ Bowel Sounds: Active  RLQ Bowel Sounds: Active  LLQ Bowel Sounds: Active          Bowel Sounds  RUQ Bowel Sounds: Active  LUQ Bowel Sounds: Active  RLQ Bowel Sounds: Active  LLQ Bowel Sounds: Active    Peripheral Vascular  Peripheral Vascular (WDL): Within Defined Limits  Edema: None                   Genitourinary  Genitourinary (WDL): Within Defined Limits  Suprapubic Tenderness: No  Dysuria (Pain/Burning w/Urination): No    Psychosocial  Psychosocial (WDL): Within Defined Limits                        Pulse: 69                   LAB DATA:    BNP  No results for input(s): BNP in the last 72 hours. proBNP  No results for input(s): PROBNP in the last 72 hours. BMP  No results for input(s): NA, K, CL, CO2, BUN, CREATININE, GLUCOSE, CALCIUM in the last 72 hours.       WEIGHTS:  Wt Readings from Last 3 Encounters:   05/24/22 297 lb (134.7 kg)   05/17/22 299 lb (135.6 kg)   05/10/22 297 lb 8 oz (134.9 kg)         TELEMETRY:  Cardiac Regularity: Regular  Cardiac Rhythm/Interpretation: NSR/PACED        ASSESSMENT:  Chris Dumas III's weight is STABLE      Interventions completed this visit:  IV diuretics given: yes lasix  Lab work obtained: yes, BMP/BNP   Reviewed continue current medications that patient as prescribed answered any questions   Educated on signs and symptoms of HF  Educated on low sodium diet    PLAN:  Future Appointments   Date Time Provider Clemente Davis   5/24/2022  9:00 AM [facility] CHF ROOM 1 [facility]   5/31/2022 11:30 AM Alcon Villalpando MD [facility]   6/7/2022  1:30 PM [facility] CHF ROOM 1 [facility]F [facility]   6/8/2022  1:45 PM MD BRENTON [facility]   6/14/2022  1:00 PM MD Fay Villaseñor [facility]   7/28/2022  1:15 PM [facility]ED[facility]       PT ADMITS AFTER RECEIVING IV DIURETIC HE FEELS BETTER, ADMITS TO FEELING A \"BIT BLOATED\". PT CONFIRMED WILL CALL FOR EARLIER APPT. IF NEEDED. Given clinic phone number and aware of signs and symptoms to call with any HF change in symptoms.

## 2022-05-25 DIAGNOSIS — I50.22 CHRONIC SYSTOLIC HEART FAILURE (HCC): ICD-10-CM

## 2022-05-25 DIAGNOSIS — E11.8 TYPE 2 DIABETES MELLITUS WITH COMPLICATION, WITH LONG-TERM CURRENT USE OF INSULIN (HCC): Chronic | ICD-10-CM

## 2022-05-25 DIAGNOSIS — Z79.4 TYPE 2 DIABETES MELLITUS WITH COMPLICATION, WITH LONG-TERM CURRENT USE OF INSULIN (HCC): Chronic | ICD-10-CM

## 2022-05-25 RX ORDER — MAGNESIUM OXIDE 400 MG/1
400 TABLET ORAL DAILY
Qty: 30 TABLET | Refills: 10 | Status: SHIPPED | OUTPATIENT
Start: 2022-05-25

## 2022-05-25 RX ORDER — ATORVASTATIN CALCIUM 40 MG/1
40 TABLET, FILM COATED ORAL DAILY
Qty: 90 TABLET | Refills: 1 | Status: SHIPPED | OUTPATIENT
Start: 2022-05-25

## 2022-05-26 ENCOUNTER — TELEPHONE (OUTPATIENT)
Dept: NON INVASIVE DIAGNOSTICS | Age: 42
End: 2022-05-26

## 2022-05-26 RX ORDER — DOFETILIDE 0.25 MG/1
250 CAPSULE ORAL EVERY 12 HOURS SCHEDULED
Qty: 180 CAPSULE | Refills: 3 | Status: SHIPPED
Start: 2022-05-26 | End: 2022-07-07 | Stop reason: DRUGHIGH

## 2022-05-31 ENCOUNTER — OFFICE VISIT (OUTPATIENT)
Dept: CARDIOLOGY CLINIC | Age: 42
End: 2022-05-31
Payer: MEDICARE

## 2022-05-31 VITALS
HEIGHT: 72 IN | DIASTOLIC BLOOD PRESSURE: 60 MMHG | SYSTOLIC BLOOD PRESSURE: 90 MMHG | HEART RATE: 70 BPM | RESPIRATION RATE: 18 BRPM | WEIGHT: 293 LBS | BODY MASS INDEX: 39.68 KG/M2

## 2022-05-31 DIAGNOSIS — I42.8 NICM (NONISCHEMIC CARDIOMYOPATHY) (HCC): ICD-10-CM

## 2022-05-31 DIAGNOSIS — I10 ESSENTIAL HYPERTENSION: ICD-10-CM

## 2022-05-31 DIAGNOSIS — I48.0 PAROXYSMAL ATRIAL FIBRILLATION (HCC): Primary | ICD-10-CM

## 2022-05-31 DIAGNOSIS — G47.33 OSA (OBSTRUCTIVE SLEEP APNEA): ICD-10-CM

## 2022-05-31 PROCEDURE — 93000 ELECTROCARDIOGRAM COMPLETE: CPT | Performed by: INTERNAL MEDICINE

## 2022-05-31 PROCEDURE — 99214 OFFICE O/P EST MOD 30 MIN: CPT | Performed by: INTERNAL MEDICINE

## 2022-05-31 NOTE — PROGRESS NOTES
OFFICE VISIT     PRIMARY CARE PHYSICIAN:      Stanley Stewart MD       ALLERGIES / SENSITIVITIES:        Allergies   Allergen Reactions    Farxiga [Dapagliflozin] Other (See Comments)     Caused throat to swell          REVIEWED MEDICATIONS:        Current Outpatient Medications:     dofetilide (TIKOSYN) 250 mcg capsule, Take 1 capsule by mouth every 12 hours Increased 5-3, Disp: 180 capsule, Rfl: 3    rivaroxaban (XARELTO) 15 MG TABS tablet, Take 1 tablet by mouth daily (with breakfast), Disp: 90 tablet, Rfl: 3    magnesium oxide (MAG-OX) 400 MG tablet, TAKE 1 TABLET BY MOUTH DAILY, Disp: 30 tablet, Rfl: 10    atorvastatin (LIPITOR) 40 MG tablet, TAKE 1 TABLET BY MOUTH DAILY, Disp: 90 tablet, Rfl: 1    LEVEMIR FLEXTOUCH 100 UNIT/ML injection pen, INJECT 40 UNITS SUBCUTANEOUSLY TWICE DAILY, Disp: 30 mL, Rfl: 10    allopurinol (ZYLOPRIM) 300 MG tablet, TAKE 1 TABLET BY MOUTH DAILY, Disp: 30 tablet, Rfl: 10    isosorbide dinitrate (ISORDIL) 10 MG tablet, TAKE TWO (2) TABLETS BY MOUTH THREE TIMES A DAY, Disp: 180 tablet, Rfl: 10    bumetanide (BUMEX) 2 MG tablet, TAKE 1 TABLET BY MOUTH daily (Patient taking differently: Every other day take twice daily alternating with daily), Disp: 180 tablet, Rfl: 1    insulin lispro (HUMALOG) 100 UNIT/ML injection vial, INJECT 20 UNITS SUBCUTANEOUSLY THREE TIMES A DAY BEFORE MEALS, Disp: 10 mL, Rfl: 10    nitroGLYCERIN (NITROSTAT) 0.4 MG SL tablet, DISSOLVE 1 TABLET UNDER THE TONGUE AS NEEDED FOR CHEST PAIN EVERY 5 MINUTES UP TO 3 TIMES. IF NO RELIEF CALL 911.  (Patient not taking: Reported on 5/24/2022), Disp: 25 tablet, Rfl: 10    spironolactone (ALDACTONE) 25 MG tablet, Take 0.5 tablets by mouth daily, Disp: 45 tablet, Rfl: 1    metFORMIN (GLUCOPHAGE) 1000 MG tablet, TAKE ONE (1) TABLET BY MOUTH TWICE DAILY WITH MEALS, Disp: 180 tablet, Rfl: 1    sacubitril-valsartan (ENTRESTO)  MG per tablet, Take 1 tablet by mouth 2 times daily, Disp: 180 tablet, Rfl: 3   carvedilol (COREG) 25 MG tablet, TAKE ONE (1) TABLET BY MOUTH TWICE DAILY WITH MEALS, Disp: 60 tablet, Rfl: 11    ONETOUCH ULTRA strip, USE TO TEST BLOOD SUGAR 2-3 TIMES DAILY AND AS NEEDED FOR SYMPTOMS OF IRREGULAR BLOOD GLUCOSE, Disp: 100 strip, Rfl: 2    Blood Pressure KIT, 1 kit by Does not apply route daily, Disp: 1 kit, Rfl: 0    Continuous Blood Gluc Transmit (DEXCOM G6 TRANSMITTER) MISC, USE AS DIRECTED, Disp: 1 each, Rfl: 0      S: REASON FOR VISIT:       Chief Complaint   Patient presents with    Congestive Heart Failure     10 mo -no new c/o was told by Dr Chadd Shelby he needs to get on a heart transplant list    Atrial Fibrillation          History of Present Illness:       Office Visit for follow up of NICMP, PAF, VHD   39year old with NICMP, VHD, PAF came for f/u visit   Seen by our TRESSA in 7/2021   Had DCCV for his A fib in April 2022   Seen by Dr Fernando Lovett recently, told him Alek sidhu needs to get on Heart transplant list\"   No hospitalizations or surgeries since last visit   Patient is compliant with all medications   Curt any exertional chest pain or short of breath   No palpitations, dizzy or syncope.    Active at home   Try to watch diet          Past Medical History:   Diagnosis Date    Acute CHF ([address] 11/29/2011    Acute CHF ([address] 12/26/2011    Acute idiopathic gout of right foot 8/13/2016    AF (atrial fibrillation) ([address] 02/2020    Anemia 11/29/2011    CAD (coronary artery disease)     Cerebral artery occlusion with cerebral infarction ([address]     CKD (chronic kidney disease)     Gout     HTN (hypertension) 11/29/2011    Hyperlipidemia     Hypertension     Morbid obesity due to excess calories ([address]     Nonischemic cardiomyopathy ([address] 11/29/2011    Nonischemic cardiomyopathy ([address] 7/2013 7/2013- cardiac MRI revealed an LVEF of 20%    ARVIND (obstructive sleep apnea) 11/29/2011    S/P left heart catheterization by percutaneous approach 12-27/2011    Dr Mini Barahona [facility] heart failure [facility]) 5/7/2012 5/7/12- cardiac catheterization revealed an LVEF of 10-15%, mitral regurgitation along with borderline prolapse of mitral valve    Type 2 diabetes mellitus with complication, with long-term current use of insulin ([address] 8/22/2016    URI, acute 11/29/2011            Past Surgical History:   Procedure Laterality Date    CARDIAC CATHETERIZATION  08/01/2019    Dr Alexis Dunlap  11/20/2018    UPGRADE S-ICD TO BIV ICD   ([employer])  RAHEJA     CARDIOVERSION  02/14/2020    Successful CV of AF to NSR      (Dr. Traci Brink)   Susan Dennison  03/10/2022    Dr. Traci Brink. Crystal Harriet Crystal Harriet V-paced / SR    ECHO COMPL W DOP COLOR FLOW  11/30/2011         ECHO COMPL W DOP COLOR FLOW  03/27/2012         INSERTABLE CARDIAC MONITOR  12/13/2018    cardiomems, [place]IGUELINA, Dr. Aram Flaherty  12/15/2020    SUCCESSFUL OVERDRIVE PACE TERMINATION OF AF VIA ICD    (DR. Colleen Mistry)    PACEMAKER PLACEMENT            Family History   Problem Relation Age of Onset   Gardenia Welsh Cancer Mother     No Known Problems Father           Social History     Tobacco Use    Smoking status: Never Smoker    Smokeless tobacco: Never Used   Substance Use Topics    Alcohol use: Not Currently         Review of Systems:     Constitutional: negative for fever and chills, or significant weight loss  HEENT: negative for acute visual symptoms or auditory problems, no dysphagia  Respiratory: negative for cough, wheezing, or hemoptysis  Cardiovascular: negative for chest pain, palpitations, and dyspnea  Gastrointestinal: negative for abdominal pain, diarrhea, nausea and vomiting  Endocrine: Negative for polyuria and polydyspsia  Genitourinary: negative for dysuria and hematuria  Derm: negative for rash and skin lesion(s)  Neurological: negative for tingling, numbness, weakness, seizures  Endocrine: negative for polydipsia and polyuria  Musculoskeletal: negative for pain or tenderness  Psychiatric: negative for anxiety, depression, or suicidal ideations         O:  COMPLETE PHYSICAL EXAM:       BP 90/60   Pulse 70   Resp 18   Ht 6' (1.829 m)   Wt 293 lb (132.9 kg)   BMI 39.74 kg/m²       General:   Patient alert, comfortable, no distress. Appears stated age. HEENT:    Pupils equal, no icterus; Tongue moist.   Neck:              No masses, Thyroid not palpable. No elevated JVD, No carotid bruit. Chest:   Normal configuration, non tender. ICD site Mylene List on left. Lungs:   Clear to auscultation bilaterally except few scattered rhonchi. Cardiovascular:  Regular rhythm, 2/6 systolic murmur, No S3, no palpable thrills. Abdomen:  Soft, Bowel sounds normal, Obese, can not appreciate any pulsatile abdominal aorta. Extremities:  No edema. Distal pulses palpable. No cyanosis. Skin:   Good turgor, warm and dry, no cyanosis. Musculoskeletal: No joint swelling or deformity. Neuro:   Cranial nerves grossly intact; No focal neurologic deficit. Psych:   Alert, good mood and effect. REVIEW OF DIAGNOSTIC TESTS:        Electrocardiogram:  Reviewed    NM Stress (1/19/2021)  Impression: The myocardial perfusion imaging was equivocal with fixed inferior and  apical perfusion abnormalities in the face of ventricular pacing and  artifact. No reversible perfusion abnormalities were identified. Overall left ventricular systolic function was moderately impaired  with a dilated left ventricular cavity and abnormal septal motion the  face of ventricular pacing with additional generalized hypokinesis. Intermediate risk pharmacologic myocardial perfusion imaging study. 2D Echo: 8/2020   Summary   Left ventricle dilated at 7.1cm. Severe global hypokinesis, LV EF visually estimated about 25-30%. Mild left ventricular concentric hypertrophy noted. Stage II diastolic dysfunction. Left atrium is enlarged. Interatrial septum not well visualized but appears intact. Normal right ventricle size and function. Pacer/ICD lead in RV.    No mitral valve prolapse. There is trace aortic regurgitation. There is trace tricuspid regurgitation, RVSP 30mmHg. Normal aortic root size. Trace of pericardial effusion. Pericardium appears normal.   No intra cardiac mass or thrombus. Compared to prior echo from 7/31/19 - No significant changes noted. [facility] (8/2019, Dr. Lorenz Heart)  Findings:  Left main: 0%  stenosis  LAD: 40-50 %  stenosis  Circumflex: 30 %   stenosis  RCA: Dominant.  30 %  stenosis  LV angio: not performed  Hemodynamics:  LV: 89 mmHg. EDP: 16 No gradient across AV. Ao: 81/67 ( 69 ). Post op diagnosis:  Mild CAD involving the LAD  PLAN:  Medical therapy / risk factor modification as per the advanced HF / Pulmonary hypertension team     [facility] [address] with cardioMEMS implant (12/2018)  PRESSURES:  RA 12/15, 11.  RV 67/8, 14.  PA 65/27, 43. PCW 22. CARDIAC OUTPUT:  4.3 L/min. CARDIAC INDEX:  1.7 L/min/m2. PULSE OXIMETRY:  100%, (on 2 L nasal cannula). Pulmonary artery, oxygen saturation 56%. CONCLUSIONS:  1.  Moderate-to-severe pulmonary hypertension. 2.  Low pulmonary artery oxygen saturation and low cardiac output and cardiac index consistent with LV dysfunction. 3.  Successful placement of CardioMEMS heart failure monitoring device in the left pulmonary artery. ASSESSMENT / PLAN:    Maria T Klein was seen today for congestive heart failure and atrial fibrillation. Diagnoses and all orders for this visit:    NICM (nonischemic cardiomyopathy) ([facility] - EF 25-30% Echo 8/2020. Continue BB, Entresto, Spironolactone. Consider adding Jardiance. Refer to [facility] to evaluate he is eligible for Heart transplant.           -     EKG 12 Lead  -     External Referral To Heart Failure Clinic-[facility]     Low BP  - Asymptomatic, Monitor BP    S/p ICD -10/1/2013-Medtr[employer] single lead. S/p CRT-D upgrade 11/21/2018 - Follows with Dr Waqar SOLOMON    Paroxysmal atrial fibrillation [facility]) - s/p DCCV 4/2022.  Follows with Dr Waqar Bridges - On Tikosyn and Xarelto for [address] [place] [address]  - EKG 12 Lead    Atrial Flutter  - s/p Overdrive pace termination 12/15/2020    CAD - Mild to moderate CAD by [facility] August 2019 - Continue BB, Statin. Not on Aspirin due to Xarelto    VHD - Mild-moderate MR and Mild TR. Essential hypertension - Monitor BP    ARVIND (obstructive sleep apnea) - uses CPAP    HLD - On statin therapy. Follows with PCP. Non-Morbid obesity - BMI 39.7 - Diet, exercise and weight loss discussed. Preventive Cardiology: Low cholesterol diet, regular exercise as tolerate, and gradual weight loss discussed. Above recommendations discussed. The patient's current medication list, allergies, problem list and results of prior tests (as available) were reviewed at today's visit   All questions answered about cardiac diagnoses and cardiac medications. Continue current medications. Monitor BP and heart rates. Compliance with medications and f/u with all physicians discussed. Risk factor modification based on risk profile discussed. Call if any exertional chest pain, short of breath, dizzy or palpitations. Follow up in 4-6 months or earlier if needed.          [facility]  [address] [facility], [address] [place] [address]  [phone]

## 2022-05-31 NOTE — PATIENT INSTRUCTIONS
Monitor BP, call if top number <90  You will get a phone call from [facility] for an appointment at Heart failure clinic  Call for heart racing

## 2022-06-01 ENCOUNTER — TELEPHONE (OUTPATIENT)
Dept: CARDIOLOGY CLINIC | Age: 42
End: 2022-06-01

## 2022-06-07 ENCOUNTER — HOSPITAL ENCOUNTER (OUTPATIENT)
Dept: OTHER | Age: 42
Setting detail: THERAPIES SERIES
Discharge: HOME | End: 2022-06-07
Payer: MEDICARE

## 2022-06-07 VITALS
DIASTOLIC BLOOD PRESSURE: 58 MMHG | OXYGEN SATURATION: 100 % | HEART RATE: 70 BPM | RESPIRATION RATE: 18 BRPM | SYSTOLIC BLOOD PRESSURE: 97 MMHG

## 2022-06-07 LAB
ANION GAP SERPL CALCULATED.3IONS-SCNC: 14 MMOL/L (ref 7–16)
BUN BLDV-MCNC: 65 MG/DL (ref 6–20)
CALCIUM SERPL-MCNC: 8.9 MG/DL (ref 8.6–10.2)
CHLORIDE BLD-SCNC: 104 MMOL/L (ref 98–107)
CO2: 19 MMOL/L (ref 22–29)
CREAT SERPL-MCNC: 2.6 MG/DL (ref 0.7–1.2)
GFR AFRICAN AMERICAN: 33
GFR NON-AFRICAN AMERICAN: 27 ML/MIN/1.73
GLUCOSE BLD-MCNC: 112 MG/DL (ref 74–99)
POTASSIUM SERPL-SCNC: 5 MMOL/L (ref 3.5–5)
PRO-BNP: 447 PG/ML (ref 0–125)
SODIUM BLD-SCNC: 137 MMOL/L (ref 132–146)

## 2022-06-07 PROCEDURE — 99214 OFFICE O/P EST MOD 30 MIN: CPT

## 2022-06-07 PROCEDURE — 80048 BASIC METABOLIC PNL TOTAL CA: CPT

## 2022-06-07 PROCEDURE — 83880 ASSAY OF NATRIURETIC PEPTIDE: CPT

## 2022-06-07 PROCEDURE — 36415 COLL VENOUS BLD VENIPUNCTURE: CPT

## 2022-06-07 NOTE — PROGRESS NOTES
Labs and flow sheet faxed to Dr. Jc Quigley office, confirmation received. Le from Dr. Jc Quigley office notified of fax and notified to please inform Dr. Andrés Corral of elevated creatinine, Madisyn Galan confirmed above.

## 2022-06-07 NOTE — PROGRESS NOTES
[facility]   CHF Clinic       [place] III   1980  [phone]       Referring Doctor: Angie Valle  Cardiologist: Clearance Fulling  Nephrologist: n/a      History of Present Illness:     Grand kelvin MONTESINOS is a 39 y.o. male with a history of HFrEF, most recent EF 31% on 1/19/2021. Patient Story:  Patient had a recent follow up appointment with Emma Berg and Renay Oh. No medication adjustments were made. Per patient he stated that DR. Angie Valle said he needs to be on transplant list. Patient has a Cardiomems device and compliant with laying on his pillow and reporting his readings. He does NOT  have dyspnea with exertion, shortness of breath, or decline in overall functional capacity. He does not have orthopnea, PND, nocturnal cough or hemoptysis. He does have some abdominal distention or bloating, early satiety, anorexia/change in appetite. He does  a good urinary response to his oral diuretic. He does not have  lower extremity edema. He does not have lightheadedness, dizziness. He denies  syncope or near syncope. He denies chest pain or palpitations. Allergies   Allergen Reactions    Farxiga [Dapagliflozin] Other (See Comments)     Caused throat to swell         Prior to Visit Medications    Medication Sig Taking? Authorizing Provider   nitroGLYCERIN (NITROSTAT) 0.4 MG SL tablet DISSOLVE 1 TABLET UNDER THE TONGUE AS NEEDED FOR CHEST PAIN EVERY 5 MINUTES UP TO 3 TIMES. IF NO RELIEF CALL 911.   Patient not taking: Reported on 5/24/2022  Ranjeet Lyles MD   [facility]) 250 mcg capsule Take 1 capsule by mouth every 12 hours Increased 5-3  Morris Cannon MD   rivaroxaban (XARELTO) 15 MG TABS tablet Take 1 tablet by mouth daily (with breakfast)  Morris Cannon MD   magnesium oxide (MAG-OX) 400 MG tablet TAKE 1 TABLET BY MOUTH DAILY  Ranjeet Lyles MD   atorvastatin (LIPITOR) 40 MG tablet TAKE 1 TABLET BY MOUTH DAILY  Ranjeet Lyles MD   [facility] ULTRA strip USE TO TEST BLOOD SUGAR 2-3 TIMES DAILY AND AS NEEDED FOR SYMPTOMS OF IRREGULAR BLOOD GLUCOSE  Andrea Berman MD   LEVEMIR FLEXTOUCH 100 UNIT/ML injection pen INJECT 40 UNITS SUBCUTANEOUSLY TWICE DAILY  Andrea Berman MD   allopurinol (ZYLOPRIM) 300 MG tablet TAKE 1 TABLET BY MOUTH DAILY  Andrea Berman MD   isosorbide dinitrate (ISORDIL) 10 MG tablet TAKE TWO (2) TABLETS BY MOUTH THREE TIMES A DAY  Thuy Burrell MD   bumetanide (BUMEX) 2 MG tablet TAKE 1 TABLET BY MOUTH daily  Patient taking differently: Every other day take twice daily alternating with daily  Morris Cannon MD   insulin lispro (HUMALOG) 100 UNIT/ML injection vial INJECT 20 UNITS SUBCUTANEOUSLY THREE TIMES A DAY BEFORE MEALS  Ranjeet Lyles MD   spironolactone (ALDACTONE) 25 MG tablet Take 0.5 tablets by mouth daily  Ranjeet Lyles MD   metFORMIN (GLUCOPHAGE) 1000 MG tablet TAKE ONE (1) TABLET BY MOUTH TWICE DAILY WITH MEALS  Ranjeet Lyles MD   sacubitril-valsartan (ENTRESTO)  MG per tablet Take 1 tablet by mouth 2 times daily  Diane Iverson MD   carvedilol (COREG) 25 MG tablet TAKE ONE (1) TABLET BY MOUTH TWICE DAILY WITH MEALS  Thuy Burrell MD   Blood Pressure KIT 1 kit by Does not apply route daily  Rahat Ellis MD   Continuous Blood Gluc Transmit (DEXCOM G6 TRANSMITTER) MISC USE AS DIRECTED  Diane Iverson MD   rivaroxaban (XARELTO) 20 MG TABS tablet Take 20 mg by mouth Daily with supper  Historical Provider, MD   magnesium oxide (MAG-OX) 400 MG tablet Take 1 tablet by mouth daily  Diane Iverson MD             Guideline directed medical:  ARNI/ACE I/ARB: Yes  Beta blocker:   Yes  Aldosterone antagonist:  Yes        Physical Examination:     BP (!) 97/58   Pulse 70   Resp 18   SpO2 100%     Assessment  Charting Type: Shift assessment    Neurological  Level of Consciousness: Alert (0)  Orientation Level: Oriented X4  Cognition: Appropriate judgement,Appropriate safety awareness,Appropriate attention/concentration,Appropriate for developmental age,Follows commands  Speech: Clear         HEENT (Head, Ears, Eyes, Nose, & Throat)  HEENT (WDL): Exceptions to WDL  Right Eye: Glasses  Left Eye: Glasses    Respiratory  Respiratory Pattern: Regular  Respiratory Depth: Normal  Respiratory Quality/Effort: Unlabored  Chest Assessment: Chest expansion symmetrical  L Breath Sounds: Clear  R Breath Sounds: Clear              Cardiac  Cardiac Regularity: Regular  Cardiac Rhythm: AV paced    Rhythm Interpretation  Heart Rate: 70    Cardiac Monitor  Telemetry Monitor Alarm Parameters: on    Gastrointestinal  Abdominal (WDL): Exceptions to WDL  Abdomen Inspection: Distended,Soft  RUQ Bowel Sounds: Active  LUQ Bowel Sounds: Active  RLQ Bowel Sounds: Active  LLQ Bowel Sounds: Active          Bowel Sounds  RUQ Bowel Sounds: Active  LUQ Bowel Sounds: Active  RLQ Bowel Sounds: Active  LLQ Bowel Sounds: Active    Peripheral Vascular  Peripheral Vascular (WDL): Within Defined Limits  Edema: None                   Genitourinary  Genitourinary (WDL): Within Defined Limits  Suprapubic Tenderness: No  Dysuria (Pain/Burning w/Urination): No    Psychosocial  Psychosocial (WDL): Within Defined Limits                        Heart Rate: 70                   LAB DATA:    BNP  No results for input(s): BNP in the last 72 hours. proBNP  Recent Labs     06/07/22  1210   PROBNP 447*       BMP  Recent Labs     06/07/22  1210      K 5.0      CO2 19*   BUN 65*   CREATININE 2.6*   GLUCOSE 112*   CALCIUM 8.9         WEIGHTS:  Wt Readings from Last 3 Encounters:   05/31/22 293 lb (132.9 kg)   05/24/22 297 lb (134.7 kg)   05/17/22 299 lb (135.6 kg)         TELEMETRY:  Cardiac Regularity: Regular  Cardiac Rhythm/Interpretation: NSR/PACED        ASSESSMENT:  [place] III's weight is stable, there is a 2 pound weight increase since last chf visit. Patient denies shortness of breath, abdominal bloating, or lower extremity edema. Interventions completed this visit:  IV diuretics given: no  Lab work obtained: yes, BMP/BNP   Reviewed continue current medications that patient as prescribed answered any questions   Educated on signs and symptoms of HF  Educated on low sodium diet    PLAN:  Future Appointments   Date Time Provider Clemente Youngisti   6/9/2022  1:15 PM Tab Edgar MD [facility]   6/14/2022  1:00 PM Zoë Richmond MD [facility]   7/5/2022  1:00 PM [facility] ROOM 1 [facility]   7/28/2022  1:15 PM SCHEDULE, MHYX MANDEEP DEVICE August Edwards   10/3/2022 11:30 AM Epi Ni MD [address] [place] [address]         Given clinic phone number and aware of signs and symptoms to call with any HF change in symptoms.

## 2022-06-07 NOTE — PLAN OF CARE
Problem: Chronic Conditions and Co-morbidities  Goal: Patient's chronic conditions and co-morbidity symptoms are monitored and maintained or improved  Outcome: Progressing     Problem: Discharge Planning  Goal: Discharge to home or other facility with appropriate resources  Outcome: Progressing   Continue plan of care

## 2022-06-21 ENCOUNTER — TELEPHONE (OUTPATIENT)
Dept: CARDIOLOGY CLINIC | Age: 42
End: 2022-06-21

## 2022-06-21 DIAGNOSIS — I50.42 CHRONIC COMBINED SYSTOLIC AND DIASTOLIC CONGESTIVE HEART FAILURE (HCC): Primary | ICD-10-CM

## 2022-06-21 NOTE — TELEPHONE ENCOUNTER
Liliana Marin [facility] [phone] (home)    He is waiting to establish care at [address] [place] [address] for advanced  HF work up-eval. They are to call him to set initial visit. Cardiomems   implant 12-   MARILIA has been elevated  Mid 30's on cardiomems consistently since [facility] March 2022. In 2021 running 20's to 30. On implant 12-13-22 MARILIA 42  And ran high 40's. Had been referred to Sarbjit Galindo at [facility]  7- by Natalia Hendricks CNP   CPET done 8-7-2020 by dr Shant Beckwtih at [facility]  Had been referred to [facility] bariatrics by DR Rell Tony. GDMT   (unalbe to tolerate SGLT2i  - throat swelled)     Allergies   Allergen Reactions   Theadora Sales [Dapagliflozin] Other (See Comments)     Caused throat to swell       Providers:  PCP- Dr Sarita Abbott  Cardiologist- Dr Khang Haq team- Dr Vielka Hickey and Natalia Hendricks CNP  In past saw Dr Rell Tony advanced HF [facility]    And seen by Dr. Pop Luz in [address] [place] [address] office. As of June 2022 to establish with [facility] advanced HF   EP- Freeman  (ICD upgrade 11/ 2018 BiV)      4/26 last cardioversion persistent afib  [facility]   (Dr Mosley [facility] inpatient 2019  Consult nephrology)     Diuretics:   bumex  2mg /4mg alternate every other day    Aldactone 12.5mg daily    HFrEF:   Nonischemic cardiomyopathy  EF 25-30%     8 5 2020 Echo            Compared to prior echo from 7/31/19 - No significant changes noted  7-31- 2019 Echo complete   Severely dilated left ventricle. Ejection fraction is visually estimated at 20-25%. However there is a   definite improvement in overall LV systolic function since previous study   from 2018. ·     PLAN:  [facility] establishment for advanced therapies eval  Please send daily CardioMems sends  Consider shared care CardioMems when established with [facility]  Send mems reading as soon as you get home today.   (6/7/22 CHF Clinic notified Dr Donaldo Mcgill and PCP of abnormal labs)      · I now called Dr Aidan Acevedo [phone] .      Meghana 10 2022   pcp office repeated labs  · Cr 2.47  · Bun 23  · K+  4.8  · C02 26                                 Based off last labs: PCP is Referring to nephrology. (patient preference for [facility] nephrology). Saw Dr Melissa Fregoso in patient few years ago, no outpatient f/u. And  Is to f/u in 3 months with PCP. (PCP office now aware of cardiology referral to advanced HF  [facility]) . Results for Christel Wong (MRN [id number]) as of 6/21/2022 11:18   5/17/2022 09:00 5/24/2022 08:45 6/7/2022 12:10   Sodium 133 135 137   Potassium 4.3 4.5 5.0   Chloride 99 106 104   CO2 19 (L) 18 (L) 19 (L)   BUN,BUNPL 51 (H) 37 (H) 65 (H)   Creatinine 2.1 (H) 1.8 (H) 2.6 (H)   Anion Gap 15 11 14   GFR Non- 35 42 27   GFR  42 50 33   GLUCOSE, FASTING, (H) 110 (H) 112 (H)   CALCIUM, SERUM, 944009 9.7 9.5 8.9   Pro- (H) 717 (H) 447 (H)       He will send mems reading when he gets back home. His PAD trend has been climbing last 3 readings.  No reading today sent.   21>25>27           Initial mems readings:

## 2022-06-22 ENCOUNTER — HOSPITAL ENCOUNTER (OUTPATIENT)
Age: 42
Discharge: HOME | End: 2022-06-22
Payer: MEDICARE

## 2022-06-22 DIAGNOSIS — I50.42 CHRONIC COMBINED SYSTOLIC AND DIASTOLIC CONGESTIVE HEART FAILURE (HCC): ICD-10-CM

## 2022-06-22 LAB
ANION GAP SERPL CALCULATED.3IONS-SCNC: 14 MMOL/L (ref 7–16)
BUN BLDV-MCNC: 63 MG/DL (ref 6–20)
CALCIUM SERPL-MCNC: 9.5 MG/DL (ref 8.6–10.2)
CHLORIDE BLD-SCNC: 100 MMOL/L (ref 98–107)
CO2: 20 MMOL/L (ref 22–29)
CREAT SERPL-MCNC: 2.6 MG/DL (ref 0.7–1.2)
GFR AFRICAN AMERICAN: 33
GFR NON-AFRICAN AMERICAN: 27 ML/MIN/1.73
GLUCOSE BLD-MCNC: 155 MG/DL (ref 74–99)
POTASSIUM SERPL-SCNC: 5.1 MMOL/L (ref 3.5–5)
SODIUM BLD-SCNC: 134 MMOL/L (ref 132–146)

## 2022-06-22 PROCEDURE — 80048 BASIC METABOLIC PNL TOTAL CA: CPT

## 2022-06-22 PROCEDURE — 36415 COLL VENOUS BLD VENIPUNCTURE: CPT

## 2022-06-22 NOTE — PROGRESS NOTES
Cardiac Electrophysiology Outpatient Progress Note    Delila Alpers III  1980  Date of Service: 6/22/2022  Referring Provider/PCP: Tammy Jasmine MD  Chief Complaint:   Follow-up visit        Patient Active Problem List    Diagnosis Date Noted    TAIWO (acute kidney injury) ([facility] 04/28/2022     Priority: Medium    Atrial fibrillation ([address] 04/26/2022     Priority: Medium    Chronic combined systolic and diastolic congestive heart failure ([address] 03/18/2020    Carotid disease, bilateral ([facility] 03/14/2020     Overview Note:       Bilateral carotid duplex January 2016, reportedly showed no      hemodynamically significant stenosis with 0% to 49% narrowing of both      carotid arteries  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 EF 10% to 15%. Patient reportedly did receive an ICD vest at      that time, however, did not return for a regular followup. 2.    Echo March 2012:  EF 30% to 30%, stage 2 diastolic dysfunction, septal      hypokinesia, mild LVH, left atrium mildly-moderately dilated, mild MR.      RVSP of 25-30. Trace PI.   3.    Cardiac MRI July 2013:  EF 20%   Echo 01/24/2016 [facility]):  LV severely dilated. LA      moderately dilated. Mild PHTN. Mild TR. Trace TR. Pacer wire visible      in the right atrium and ventricle. RA mildly dilated. EF 10% to 15%. Class 3 diastolic dysfunction. RV moderately dilated. RV systolic      function moderate-severely reduced.   Mild-moderate MR.   15.   Echo March 2012 (Dr. Parveen Cates):  LVEF 30% to 35% and enlarged LV      S/P left pulmonary artery pressure sensor implant placement     S/P ICD (internal cardiac defibrillator) procedure 11/20/2018     Overview Note:     Status post single chamber ICD implant, Medtronic Hayden [id number],      September 30, 2013, for nonischemic cardiomyopathy (single chamber)      Morbid obesity due to excess calories ([facility] 08/22/2018    Paroxysmal atrial fibrillation ([facility] 01/20/2017    Essential hypertension 01/20/2017    VHD (valvular heart disease) 01/20/2017    Hepatomegaly 12/01/2016    Type 2 diabetes mellitus with complication, with long-term current use of insulin (HCC) 08/22/2016    Chronic gout 08/13/2016    CKD (chronic kidney disease)     Obstructive sleep apnea 11/29/2011       Current Outpatient Medications   Medication Sig Dispense Refill    nitroGLYCERIN (NITROSTAT) 0.4 MG SL tablet DISSOLVE 1 TABLET UNDER THE TONGUE AS NEEDED FOR CHEST PAIN EVERY 5 MINUTES UP TO 3 TIMES. IF NO RELIEF CALL 911.  (Patient not taking: Reported on 5/24/2022) 25 tablet 10    dofetilide (TIKOSYN) 250 mcg capsule Take 1 capsule by mouth every 12 hours Increased 5-3 180 capsule 3    rivaroxaban (XARELTO) 15 MG TABS tablet Take 1 tablet by mouth daily (with breakfast) 90 tablet 3    magnesium oxide (MAG-OX) 400 MG tablet TAKE 1 TABLET BY MOUTH DAILY 30 tablet 10    atorvastatin (LIPITOR) 40 MG tablet TAKE 1 TABLET BY MOUTH DAILY 90 tablet 1    ONETOUCH ULTRA strip USE TO TEST BLOOD SUGAR 2-3 TIMES DAILY AND AS NEEDED FOR SYMPTOMS OF IRREGULAR BLOOD GLUCOSE 100 strip 2    LEVEMIR FLEXTOUCH 100 UNIT/ML injection pen INJECT 40 UNITS SUBCUTANEOUSLY TWICE DAILY 30 mL 10    allopurinol (ZYLOPRIM) 300 MG tablet TAKE 1 TABLET BY MOUTH DAILY 30 tablet 10    isosorbide dinitrate (ISORDIL) 10 MG tablet TAKE TWO (2) TABLETS BY MOUTH THREE TIMES A  tablet 10    bumetanide (BUMEX) 2 MG tablet TAKE 1 TABLET BY MOUTH daily (Patient taking differently: Every other day take twice daily alternating with daily) 180 tablet 1    insulin lispro (HUMALOG) 100 UNIT/ML injection vial INJECT 20 UNITS SUBCUTANEOUSLY THREE TIMES A DAY BEFORE MEALS 10 mL 10    spironolactone (ALDACTONE) 25 MG tablet Take 0.5 tablets by mouth daily 45 tablet 1    metFORMIN (GLUCOPHAGE) 1000 MG tablet TAKE ONE (1) TABLET BY MOUTH TWICE DAILY WITH MEALS 180 tablet 1    sacubitril-valsartan (ENTRESTO)  MG per tablet Take 1 tablet by mouth 2 times daily 180 tablet 3    carvedilol (COREG) 25 MG tablet TAKE ONE (1) TABLET BY MOUTH TWICE DAILY WITH MEALS 60 tablet 11    Blood Pressure KIT 1 kit by Does not apply route daily 1 kit 0    Continuous Blood Gluc Transmit (DEXCOM G6 TRANSMITTER) MISC USE AS DIRECTED 1 each 0     No current facility-administered medications for this visit. PMH    1. Hypertension, longstanding. 2. Sleep apnea. 3. Severe systolic left ventricular dysfunction and congestive heart failure. 4. Noncompliance with medical therapy in the past.   5. No significant coronary artery disease by angiography. 6. Left ventricular ejection fraction initially was 10% to 15%. He had a single-chamber implantable   cardioverter-defibrillator implanted in 2013 by Dr. Robina Gallo. 7. Upgrade of his device to a biventricular pacer implantable cardioverter-defibrillator by me in 2018. 8. Paroxysmal atrial fibrillation for which the patient has been on dofetilide therapy. Allergies   Allergen Reactions   Kristina Porter [Dapagliflozin] Other (See Comments)     Caused throat to swell       SUBJECTIVE: Ondina Ambriz III presents to the office today for a follow-up visit since his recent hospitalization. The patient had to be cardioverted twice in a short period of time earlier this year for persistent atrial fibrillation. Loss of biventricular pacing during AF leads to worsening of his heart failure symptoms and renal insufficiency. He maintains sinus rhythm now 6 weeks post cardioversion. Patient denies any new cardiac symptoms. No chest pain or shortness of breath. No symptoms of paroxysmal nocturnal dyspnea, orthopnea or leg edema.     ROS:   Constitutional: Negative for fever, activity change and appetite change. HENT: Negative for epistaxis, difficulty swallowing. Eyes: Negative for blurred vision or double vision. Respiratory: Negative for cough, chest tightness, shortness of breath and wheezing. Cardiovascular: Negative for chest pain, palpitations and leg swelling. Gastrointestinal: Negative for abdominal pain, heartburn, or blood in stool. Genitourinary: Negative for hematuria, burning or frequency. Musculoskeletal: Negative for myalgias, stiffness, or swelling. Skin: Negative for rash, pain, or lumps. Neurological: Negative for syncope, seizures, or headaches. Psychiatric/Behavioral: Negative for confusion and agitation. The patient is not nervous/anxious. PHYSICAL EXAM:  There were no vitals filed for this visit. Constitutional: Oriented to person, place, and time. Well-developed and cooperative. Head: Normocephalic and atraumatic. Eyes: Conjunctivae are normal. Pupils are equal, round, and reactive to light. Neck: No hepatojugular reflux and no JVD present. Carotid bruit is not present. Cardiovascular: Normally placed PMI, normal S1 and S2 with left sternal border and the apex, grade 3/6 systolic ejection murmur at the aortic area and left sternal border  Pulmonary/Chest: Effort normal and breath sounds normal. No respiratory distress. Abdominal: Soft. Normal appearance and bowel sounds are normal.    Musculoskeletal: Normal range of motion of all extremities, no muscle weakness. Neurological: Alert and oriented to person, place, and time. Gait normal.   Skin: Skin is warm and dry. No bruising, no ecchymosis and no rash noted. Extremity: No clubbing or cyanosis. No edema. Psychiatric: Normal mood and affect.  Thought content normal.     Pertinent Labs:  CBC:   WBC   Date Value Ref Range Status   04/26/2022 8.0 4.5 - 11.5 E9/L Final   03/10/2022 8.3 4.5 - 11.5 E9/L Final   01/22/2022 9.4 4.5 - 11.5 E9/L Final Hemoglobin   Date Value Ref Range Status   04/26/2022 11.4 (L) 12.5 - 16.5 g/dL Final   03/10/2022 11.4 (L) 12.5 - 16.5 g/dL Final   01/22/2022 12.3 (L) 12.5 - 16.5 g/dL Final     Hematocrit   Date Value Ref Range Status   04/26/2022 35.3 (L) 37.0 - 54.0 % Final   03/10/2022 35.2 (L) 37.0 - 54.0 % Final   01/22/2022 37.9 37.0 - 54.0 % Final     Platelets   Date Value Ref Range Status   04/26/2022 250 130 - 450 E9/L Final   03/10/2022 242 130 - 450 E9/L Final   01/22/2022 261 130 - 450 E9/L Final     BMP:   Lab Results   Component Value Date     06/22/2022    K 5.1 06/22/2022    K 4.0 01/22/2022     06/22/2022    CO2 20 06/22/2022    BUN 63 06/22/2022    CREATININE 2.6 06/22/2022    GLUCOSE 155 06/22/2022    GLUCOSE 192 05/15/2012    CALCIUM 9.5 06/22/2022      ABGs: No results found for: PHART, PO2ART, NCE2CXY  INR:   Lab Results   Component Value Date    INR 1.8 01/19/2021    INR 1.3 12/12/2018    INR 1.2 11/20/2018     BNP:   Lab Results   Component Value Date    BNP 3,903 (H) 11/25/2013     TSH:   Lab Results   Component Value Date    TSH 1.970 04/27/2022      Cardiac Injury Profile: Total CK   Date Value Ref Range Status   08/14/2016 186 20 - 200 U/L Final     CK-MB   Date Value Ref Range Status   11/25/2013 2.1 0.0 - 6.7 ng/mL Final     Troponin   Date Value Ref Range Status   01/19/2021 0.02 0.00 - 0.03 ng/mL Final     Comment:     TROPONIN T BLOOD LEVELS:         0.03 ng/mL     Upper Reference Limit  0.04 - 0.09 ng/mL     Possible myocardial injury      >= 0.10 ng/mL     Myocardial injury       Lipid Profile:   Lab Results   Component Value Date    TRIG 90 04/27/2022    HDL 38 04/27/2022    LDLCALC 56 04/27/2022    CHOL 112 04/27/2022      Hemoglobin A1C:   Lab Results   Component Value Date    LABA1C 6.2 03/14/2022         Pertinent Cardiac Testing:       ECG 6/22/2022: AV pacing,QTc <500 msec      Echocardiography August 2020    Conclusions      Summary   Left ventricle dilated at 7.1cm. Severe global hypokinesis, LV EF visually estimated about 25-30%. Mild left ventricular concentric hypertrophy noted. Stage II diastolic dysfunction. Left atrium is enlarged. Interatrial septum not well visualized but appears intact. Normal right ventricle size and function. Pacer/ICD lead in RV. No mitral valve prolapse. There is trace aortic regurgitation. There is trace tricuspid regurgitation, RVSP 30mmHg. Normal aortic root size. Trace of pericardial effusion. Pericardium appears normal.   No intra cardiac mass or thrombus. Compared to prior echo from 7/31/19 - No significant changes noted. Signature      ----------------------------------------------------------------   Electronically signed by Guanako Rothman MD(Interpreting   physician) on 08/06/2020 06:39 AM   ----------------------------------------------------------------      Stress test January 2021    Impression   The myocardial perfusion imaging was equivocal with fixed inferior and   apical perfusion abnormalities in the face of ventricular pacing and   artifact. No reversible perfusion abnormalities were identified. Overall left ventricular systolic function was moderately impaired   with a dilated left ventricular cavity and abnormal septal motion the   face of ventricular pacing with additional generalized hypokinesis. Intermediate risk pharmacologic myocardial perfusion imaging study.               I have independently reviewed all of the ECGs and rhythm strips per above    I have personally reviewed the laboratory, cardiac diagnostic and radiographic testing as outlined above:         Impression:    1. Severe nonischemic cardiomyopathy  2. Congestive heart failure, chronic, combined--functional class II, NYHA classification  3. Hypertension, longstanding by history  4. Biventricular pacer ICD in situ--appropriate function  5. Obesity  6. Renal insufficiency.   Recent increase in serum creatinine to 2.6  7. Paroxysmal atrial fibrillation. Patient on low-dose dofetilide. Recommendations:    1. Decrease Bumex to 2 mg daily instead of 2 alternating with 4 mg every other day  2. Decrease dofetilide 250 mcg daily  3. CHF clinic follow-up early next week with repeat blood work and clinical evaluation  4. Follow-up in my office in July as scheduled      Thank you for allowing me to participate in your patient's care.     Miguel Chase MD, [facility] [place]    Cardiac Electrophysiology  [facility]) Physicians  [facility] [place]: [place] [facility]

## 2022-06-23 ENCOUNTER — OFFICE VISIT (OUTPATIENT)
Dept: NON INVASIVE DIAGNOSTICS | Age: 42
End: 2022-06-23
Payer: MEDICARE

## 2022-06-23 VITALS
HEART RATE: 70 BPM | SYSTOLIC BLOOD PRESSURE: 90 MMHG | OXYGEN SATURATION: 98 % | WEIGHT: 297.6 LBS | HEIGHT: 72 IN | DIASTOLIC BLOOD PRESSURE: 60 MMHG | BODY MASS INDEX: 40.31 KG/M2 | RESPIRATION RATE: 20 BRPM

## 2022-06-23 DIAGNOSIS — I48.20 CHRONIC ATRIAL FIBRILLATION (HCC): Primary | ICD-10-CM

## 2022-06-23 PROCEDURE — 93000 ELECTROCARDIOGRAM COMPLETE: CPT | Performed by: INTERNAL MEDICINE

## 2022-06-23 PROCEDURE — 99213 OFFICE O/P EST LOW 20 MIN: CPT | Performed by: INTERNAL MEDICINE

## 2022-06-29 ENCOUNTER — HOSPITAL ENCOUNTER (OUTPATIENT)
Dept: OTHER | Age: 42
Setting detail: THERAPIES SERIES
Discharge: HOME | End: 2022-06-29
Payer: MEDICARE

## 2022-06-29 VITALS
HEART RATE: 70 BPM | OXYGEN SATURATION: 97 % | SYSTOLIC BLOOD PRESSURE: 105 MMHG | DIASTOLIC BLOOD PRESSURE: 59 MMHG | RESPIRATION RATE: 20 BRPM

## 2022-06-29 LAB
ANION GAP SERPL CALCULATED.3IONS-SCNC: 13 MMOL/L (ref 7–16)
BUN BLDV-MCNC: 28 MG/DL (ref 6–20)
CALCIUM SERPL-MCNC: 9.6 MG/DL (ref 8.6–10.2)
CHLORIDE BLD-SCNC: 104 MMOL/L (ref 98–107)
CO2: 18 MMOL/L (ref 22–29)
CREAT SERPL-MCNC: 1.8 MG/DL (ref 0.7–1.2)
GFR AFRICAN AMERICAN: 50
GFR NON-AFRICAN AMERICAN: 42 ML/MIN/1.73
GLUCOSE BLD-MCNC: 118 MG/DL (ref 74–99)
POTASSIUM SERPL-SCNC: 4.7 MMOL/L (ref 3.5–5)
PRO-BNP: 719 PG/ML (ref 0–125)
SODIUM BLD-SCNC: 135 MMOL/L (ref 132–146)

## 2022-06-29 PROCEDURE — 99214 OFFICE O/P EST MOD 30 MIN: CPT

## 2022-06-29 PROCEDURE — 83880 ASSAY OF NATRIURETIC PEPTIDE: CPT

## 2022-06-29 PROCEDURE — 80048 BASIC METABOLIC PNL TOTAL CA: CPT

## 2022-06-29 PROCEDURE — 36415 COLL VENOUS BLD VENIPUNCTURE: CPT

## 2022-06-29 NOTE — PROGRESS NOTES
[facility]   CHF Clinic       [place] III   1980  [phone]       Referring Doctor: Jose Horner  Cardiologist: Mai Stern  Nephrologist: n/a      History of Present Illness:     Grand kelvin MONTESINOS is a 39 y.o. male with a history of HFrEF, most recent EF 31% on 1/19/2021. Patient Story:  Patient has a Cardiomems device and compliant with laying on his pillow and reporting his readings. He does NOT  have dyspnea with exertion, shortness of breath, or decline in overall functional capacity. He does not have orthopnea, PND, nocturnal cough or hemoptysis. He does have some abdominal distention or bloating, early satiety, anorexia/change in appetite. He does  a good urinary response to his oral diuretic. He does not have  lower extremity edema. He does not have lightheadedness, dizziness. He denies  syncope or near syncope. He denies chest pain or palpitations. Patient had an appointment with Hector Mendez on 6/2322 and Hector Mendez changed bumex to 2mg po daily and dofetilide 250 mcg daily. Allergies   Allergen Reactions    Farxiga [Dapagliflozin] Other (See Comments)     Caused throat to swell         Prior to Visit Medications    Medication Sig Taking? Authorizing Provider   nitroGLYCERIN (NITROSTAT) 0.4 MG SL tablet DISSOLVE 1 TABLET UNDER THE TONGUE AS NEEDED FOR CHEST PAIN EVERY 5 MINUTES UP TO 3 TIMES. IF NO RELIEF CALL 911.   Patient not taking: Reported on 5/24/2022  Dominic Marinelli MD   [facility]) 250 mcg capsule Take 1 capsule by mouth every 12 hours Increased 5-3  Patient taking differently: Take 250 mcg by mouth daily   Gloria Chester MD   rivaroxaban (XARELTO) 15 MG TABS tablet Take 1 tablet by mouth daily (with breakfast)  Gloria Chester MD   magnesium oxide (MAG-OX) 400 MG tablet TAKE 1 TABLET BY MOUTH DAILY  Dominic Marinelli MD   atorvastatin (LIPITOR) 40 MG tablet TAKE 1 TABLET BY MOUTH DAILY  MD Khoa Mccoy strip USE TO TEST BLOOD SUGAR 2-3 TIMES DAILY AND AS NEEDED FOR SYMPTOMS OF IRREGULAR BLOOD GLUCOSE  Siri Mackenzie MD   LEVEMIR FLEXTOUCH 100 UNIT/ML injection pen INJECT 40 UNITS SUBCUTANEOUSLY TWICE DAILY  Siri Mackenzie MD   allopurinol (ZYLOPRIM) 300 MG tablet TAKE 1 TABLET BY MOUTH DAILY  Siri Mackenzie MD   isosorbide dinitrate (ISORDIL) 10 MG tablet TAKE TWO (2) TABLETS BY MOUTH THREE TIMES A DAY  Mirela Dey MD   bumetanide (BUMEX) 2 MG tablet TAKE 1 TABLET BY MOUTH daily  Patient taking differently: Every other day take twice daily alternating with daily  Coleen Smallwood MD   insulin lispro (HUMALOG) 100 UNIT/ML injection vial INJECT 20 UNITS SUBCUTANEOUSLY THREE TIMES A DAY BEFORE MEALS  Linn Dvais MD   spironolactone (ALDACTONE) 25 MG tablet Take 0.5 tablets by mouth daily  Linn Davis MD   metFORMIN (GLUCOPHAGE) 1000 MG tablet TAKE ONE (1) TABLET BY MOUTH TWICE DAILY WITH MEALS  Linn Davis MD   sacubitril-valsartan (ENTRESTO)  MG per tablet Take 1 tablet by mouth 2 times daily  Calli Knapp MD   carvedilol (COREG) 25 MG tablet TAKE ONE (1) TABLET BY MOUTH TWICE DAILY WITH MEALS  Mirela Dey MD   Blood Pressure KIT 1 kit by Does not apply route daily  Gurpreet Olson MD   Continuous Blood Gluc Transmit (DEXCOM G6 TRANSMITTER) MISC USE AS DIRECTED  Calli Knapp MD   rivaroxaban (XARELTO) 20 MG TABS tablet Take 20 mg by mouth Daily with supper  Historical Provider, MD   magnesium oxide (MAG-OX) 400 MG tablet Take 1 tablet by mouth daily  Calli Knapp MD             Guideline directed medical:  ARNI/ACE I/ARB: Yes  Beta blocker:   Yes  Aldosterone antagonist:  Yes        Physical Examination:     BP (!) 105/59   Pulse 70   Resp 20   SpO2 97%     Assessment  Charting Type: Shift assessment    Neurological  Level of Consciousness: Alert (0)  Orientation Level: Oriented X4  Cognition: Appropriate judgement,Appropriate safety awareness,Appropriate attention/concentration,Appropriate for developmental age,Follows commands  Speech: Clear         HEENT (Head, Ears, Eyes, Nose, & Throat)  HEENT (WDL): Exceptions to WDL  Right Eye: Glasses  Left Eye: Glasses    Respiratory  Respiratory Pattern: Regular  Respiratory Depth: Normal  Respiratory Quality/Effort: Unlabored  Chest Assessment: Chest expansion symmetrical  L Breath Sounds: Clear  R Breath Sounds: Clear              Cardiac  Cardiac Regularity: Regular  Cardiac Rhythm: AV paced    Rhythm Interpretation  Heart Rate: 70    Cardiac Monitor  Telemetry Monitor Alarm Parameters: on    Gastrointestinal  Abdominal (WDL): Exceptions to WDL  Abdomen Inspection: Distended,Soft  RUQ Bowel Sounds: Active  LUQ Bowel Sounds: Active  RLQ Bowel Sounds: Active  LLQ Bowel Sounds: Active          Bowel Sounds  RUQ Bowel Sounds: Active  LUQ Bowel Sounds: Active  RLQ Bowel Sounds: Active  LLQ Bowel Sounds: Active    Peripheral Vascular  Peripheral Vascular (WDL): Within Defined Limits  Edema: None                   Genitourinary  Genitourinary (WDL): Within Defined Limits  Suprapubic Tenderness: No  Dysuria (Pain/Burning w/Urination): No    Psychosocial  Psychosocial (WDL): Within Defined Limits                        Heart Rate: 70                   LAB DATA:    BNP  No results for input(s): BNP in the last 72 hours. proBNP  No results for input(s): PROBNP in the last 72 hours. BMP  No results for input(s): NA, K, CL, CO2, BUN, CREATININE, GLUCOSE, CALCIUM in the last 72 hours. WEIGHTS:  Wt Readings from Last 3 Encounters:   06/23/22 297 lb 9.6 oz (135 kg)   05/31/22 293 lb (132.9 kg)   05/24/22 297 lb (134.7 kg)         TELEMETRY:  Cardiac Regularity: Regular  Cardiac Rhythm/Interpretation: NSR/PACED        ASSESSMENT:  Eri Hanson III's weight is stable. Patient denies shortness of breath, abdominal bloating, or lower extremity edema.      Interventions completed this visit:  IV diuretics given: no  Lab work obtained: yes, BMP/BNP   Reviewed continue current medications that patient as prescribed answered any questions   Educated on signs and symptoms of HF  Educated on low sodium diet    PLAN:  Future Appointments   Date Time Provider Clemente Davis   6/29/2022 12:30 PM [facility] ROOM 1 [facility]   7/5/2022  1:00 PM [facility] CHF ROOM 1 [facility]   7/28/2022  1:15 PM SCHEDULE, MHYX WARREN DEVICE [facility]   10/3/2022 11:30 AM Miguel Scott MD [address] [place] [address]         Given clinic phone number and aware of signs and symptoms to call with any HF change in symptoms.

## 2022-07-01 ENCOUNTER — TELEPHONE (OUTPATIENT)
Dept: CARDIOLOGY CLINIC | Age: 42
End: 2022-07-01

## 2022-07-01 DIAGNOSIS — I50.22 CHRONIC SYSTOLIC HEART FAILURE (HCC): Primary | ICD-10-CM

## 2022-07-01 DIAGNOSIS — Z79.899 MEDICATION DOSE CHANGED: ICD-10-CM

## 2022-07-01 DIAGNOSIS — Z79.899 MEDICATION DOSE INCREASED: ICD-10-CM

## 2022-07-01 NOTE — TELEPHONE ENCOUNTER
Intake: I Spoke with patient  He did decrease bumex 2mg daily from 2/4mg schedule per Dr Otto Dawn (Cr bumped 2.6) also decreased his Tikosyn to 250mcg daily as instructed   6/26/22 office visit Dr Otto Dawn. Weight steady  No edema legs/abd  Trying to start back walking program. started last week. Breathing is good. Urine: clear color. Voiding good amount  Denies s/s chf.  6/29 CHF clinic completed : bun down to 28 and  cr down to 1.8,  K+ down to 4.7    7/5/22 next CHF Clinic visit   March 24,2021 saw Dr Curt Sainz last at [facility] advanced HF. CardioMems:  Implant 12-  MARILIA continued elevation 40's  Since 6/24/22 , corolating with increased PAD. Had been trending mid 30's in CardioMems consistently since [facility] March 2022  In 2021 running 20's -30. (On implant 12-  MARILIA 42 and ran high 40's then)    PAD:  Goal 21  Current trends:  25>28>27>28   Last reading 6/29 PAD 28 elevated. He normally trends low 20's to mid 20's. CHF:  HFrEF  Severe nonischemic cardiomyopathy  Echo 8-5-2020   EF 96-48% stage II diastolic dysfunction.     Diuretics:  Bumex 2mg daily as of 6/26/22  Aldactone 12.5mg daily    GDMT:  (unable to tolerate SGLT2i- throat swelling reported)  Allergies   Allergen Reactions   Rebbecca Hinders [place] [Dapagliflozin] Other (See Comments)     Caused throat to swell         Providers:  Cardiology: DR Agus Larsen team: Dr Eden Au / Vannesa Huerta CNP  EP : DR Otto Dawn  CRT-D  Advanced HF cardiology: DR Oscar Alcazar [facility])   Lost to follow up with Dr Oscar Alcazar  7-2022 Re-Establishing with [facility] Dr Gabi Hernandez advanced HF cardiologist  [phone]  Fax [phone]    DR Agusto Gambino sent referral June 2022  [address] team: Dr Dandre eMrcer CNP  PCP: DR Yuliya Guan  [address] [facility]  Sleep medicine: Dr Andi Oviedo  ( CPAP)  1 4 2012                   PLAN:  · Encouraged daily mems sends  · Consider shared care CardioMems once established with [facility]  · I called [facility] cardiology: they reached out to him 6/7/22 to set advanced HF visit for heart transplant. Patient was left message to call scheduling to set visit. Dr Adela Lagos office 11:08 AM  will call Pandora Leyden III and transfer him to scheduling to set visit asap.

## 2022-07-05 ENCOUNTER — HOSPITAL ENCOUNTER (OUTPATIENT)
Dept: OTHER | Age: 42
Setting detail: THERAPIES SERIES
Discharge: HOME | End: 2022-07-05
Payer: MEDICARE

## 2022-07-05 VITALS
OXYGEN SATURATION: 97 % | RESPIRATION RATE: 18 BRPM | DIASTOLIC BLOOD PRESSURE: 67 MMHG | HEART RATE: 70 BPM | SYSTOLIC BLOOD PRESSURE: 114 MMHG | BODY MASS INDEX: 41.08 KG/M2 | WEIGHT: 302.9 LBS

## 2022-07-05 LAB
ANION GAP SERPL CALCULATED.3IONS-SCNC: 11 MMOL/L (ref 7–16)
BUN BLDV-MCNC: 20 MG/DL (ref 6–20)
CALCIUM SERPL-MCNC: 10.3 MG/DL (ref 8.6–10.2)
CHLORIDE BLD-SCNC: 112 MMOL/L (ref 98–107)
CO2: 18 MMOL/L (ref 22–29)
CREAT SERPL-MCNC: 1.7 MG/DL (ref 0.7–1.2)
GFR AFRICAN AMERICAN: 54
GFR NON-AFRICAN AMERICAN: 44 ML/MIN/1.73
GLUCOSE BLD-MCNC: 109 MG/DL (ref 74–99)
POTASSIUM SERPL-SCNC: 4.8 MMOL/L (ref 3.5–5)
PRO-BNP: 1142 PG/ML (ref 0–125)
SODIUM BLD-SCNC: 141 MMOL/L (ref 132–146)

## 2022-07-05 PROCEDURE — 99214 OFFICE O/P EST MOD 30 MIN: CPT

## 2022-07-05 PROCEDURE — 36415 COLL VENOUS BLD VENIPUNCTURE: CPT

## 2022-07-05 PROCEDURE — 80048 BASIC METABOLIC PNL TOTAL CA: CPT

## 2022-07-05 PROCEDURE — 83880 ASSAY OF NATRIURETIC PEPTIDE: CPT

## 2022-07-05 NOTE — PROGRESS NOTES
[facility]   CHF Clinic       [place]   1980  [phone]       Referring Doctor: Iwona Mckinley  Cardiologist: Selma Current  Nephrologist: n/a      History of Present Illness:     Grand kelvin MONTESINOS is a 39 y.o. male with a history of HFrEF, most recent EF 31% on 1/19/2021. Patient Story:  Patient has a Cardiomems device and compliant with laying on his pillow and reporting his readings. He does not have dyspnea with exertion, shortness of breath, or decline in overall functional capacity. He does not have orthopnea, PND, nocturnal cough or hemoptysis. He does not have abdominal distention or bloating, early satiety, anorexia/change in appetite. He does have a good urinary response to his oral diuretic. He does not have lower extremity edema. He does not have lightheadedness, dizziness. He denies syncope or near syncope. He denies chest pain or palpitations. Allergies   Allergen Reactions    Farxiga [Dapagliflozin] Other (See Comments)     Caused throat to swell         Prior to Visit Medications    Medication Sig Taking? Authorizing Provider   nitroGLYCERIN (NITROSTAT) 0.4 MG SL tablet DISSOLVE 1 TABLET UNDER THE TONGUE AS NEEDED FOR CHEST PAIN EVERY 5 MINUTES UP TO 3 TIMES. IF NO RELIEF CALL 911.   Patient not taking: Reported on 5/24/2022  Alexandra Garvey MD   [facility]) 250 mcg capsule Take 1 capsule by mouth every 12 hours Increased 5-3  Patient taking differently: Take 250 mcg by mouth daily   Yaya Garsia MD   rivaroxaban (XARELTO) 15 MG TABS tablet Take 1 tablet by mouth daily (with breakfast)  Yaya Garsia MD   magnesium oxide (MAG-OX) 400 MG tablet TAKE 1 TABLET BY MOUTH DAILY  Alexandra Garvey MD   atorvastatin (LIPITOR) 40 MG tablet TAKE 1 TABLET BY MOUTH DAILY  Alexandra Garvey MD   ONETOUCH ULTRA strip USE TO TEST BLOOD SUGAR 2-3 Bejarano Mercury OF IRREGULAR BLOOD GLUCOSE  MD Margarita Oleary FLEXTOUCH 100 UNIT/ML injection pen INJECT 40 UNITS SUBCUTANEOUSLY TWICE DAILY  Tamiko Noriega, MD   allopurinol (ZYLOPRIM) 300 MG tablet TAKE 1 TABLET BY MOUTH DAILY  Tamiko Noriega MD   isosorbide dinitrate (ISORDIL) 10 MG tablet TAKE TWO (2) TABLETS BY MOUTH THREE TIMES A DAY  Rhonda Hunter MD   bumetanide (BUMEX) 2 MG tablet TAKE 1 TABLET BY MOUTH daily  Patient taking differently: 2 mg daily   Pinky Weiss MD   insulin lispro (HUMALOG) 100 UNIT/ML injection vial INJECT 20 UNITS SUBCUTANEOUSLY THREE TIMES A DAY BEFORE MEALS  Chance You MD   spironolactone (ALDACTONE) 25 MG tablet Take 0.5 tablets by mouth daily  Chance You MD   metFORMIN (GLUCOPHAGE) 1000 MG tablet TAKE ONE (1) TABLET BY MOUTH TWICE DAILY WITH MEALS  Chance You MD   sacubitril-valsartan (ENTRESTO)  MG per tablet Take 1 tablet by mouth 2 times daily  Etelvina Kerr MD   carvedilol (COREG) 25 MG tablet TAKE ONE (1) TABLET BY MOUTH TWICE DAILY WITH MEALS  Rhonda Hunter MD   Blood Pressure KIT 1 kit by Does not apply route daily  Fadumo Weaver MD   Continuous Blood Gluc Transmit (DEXCOM G6 TRANSMITTER) MISC USE AS DIRECTED  Etelvina Kerr MD   rivaroxaban (XARELTO) 20 MG TABS tablet Take 20 mg by mouth Daily with supper  Historical Provider, MD   magnesium oxide (MAG-OX) 400 MG tablet Take 1 tablet by mouth daily  Etelvina Kerr MD             Guideline directed medical:  ARNI/ACE I/ARB: Yes  Beta blocker:   Yes  Aldosterone antagonist:  Yes        Physical Examination:     /67   Pulse 70   Resp 18   Wt (!) 302 lb 14.4 oz (137.4 kg)   SpO2 97%   BMI 41.08 kg/m²     Assessment  Charting Type: Shift assessment (chf clinic)    Neurological  Level of Consciousness: Alert (0)  Orientation Level: Oriented X4         HEENT (Head, Ears, Eyes, Nose, & Throat)  HEENT (WDL): Within Defined Limits    Respiratory  Respiratory Pattern: Regular  Respiratory Depth: Normal  Respiratory Quality/Effort: Unlabored  Chest Assessment: Chest expansion symmetrical  L Breath Sounds: Clear  R Breath Sounds: Clear              Cardiac  Cardiac Rhythm: AV paced    Rhythm Interpretation  Heart Rate: 70         Gastrointestinal  Abdominal (WDL): Exceptions to WDL  Abdomen Inspection: Distended,Soft  RUQ Bowel Sounds: Active  LUQ Bowel Sounds: Active  RLQ Bowel Sounds: Active  LLQ Bowel Sounds: Active          Bowel Sounds  RUQ Bowel Sounds: Active  LUQ Bowel Sounds: Active  RLQ Bowel Sounds: Active  LLQ Bowel Sounds: Active    Peripheral Vascular  Peripheral Vascular (WDL): Within Defined Limits                   Genitourinary  Genitourinary (WDL): Within Defined Limits    Psychosocial  Psychosocial (WDL): Within Defined Limits                        Heart Rate: 70                   LAB DATA:    BNP  No results for input(s): BNP in the last 72 hours. proBNP  No results for input(s): PROBNP in the last 72 hours. BMP  No results for input(s): NA, K, CL, CO2, BUN, CREATININE, GLUCOSE, CALCIUM in the last 72 hours. WEIGHTS:  Wt Readings from Last 3 Encounters:   07/05/22 (!) 302 lb 14.4 oz (137.4 kg)   06/23/22 297 lb 9.6 oz (135 kg)   05/31/22 293 lb (132.9 kg)         TELEMETRY:  Cardiac Regularity: Regular  Cardiac Rhythm/Interpretation: paced        ASSESSMENT:  Jennifer Arnett III's weight is stable. Patient denies shortness of breath, abdominal bloating, or lower extremity edema. Has cardioMEMS. Currently in walking program at [facility].      Interventions completed this visit:  IV diuretics given: no  Lab work obtained: yes, BMP/BNP   Reviewed continue current medications that patient as prescribed answered any questions   Educated on signs and symptoms of HF  Educated on low sodium diet    PLAN:  Future Appointments   Date Time Provider Clemente Davis   7/18/2022 12:15 PM [facility] CHF ROOM 1 SEYZ [facility]   7/28/2022  1:15 PM SCHEDULE, VITA MILLER DEVICE ANGELA ELECTR [facility]   10/3/2022 11:30 AM Tara Blandon  [facility]         Given clinic phone number and aware of signs and symptoms to call with any HF change in symptoms.

## 2022-07-05 NOTE — TELEPHONE ENCOUNTER
Called patient, no mems send since 7/1/22. Says he only Gained 1lb.   302# today  Edema: says legs look good,    abd feels normal bloating- no worse than his usual.   No IVD at CHF Clinic visit today. Did draw labs at that visit. Increased probbnp noted. Says he did set apt with advanced HF @ [facility] Sept 21,2022 with Dr Adela Lagos.      PLAN:  He will send mems reading when he gets home today

## 2022-07-06 DIAGNOSIS — I50.22 CHRONIC SYSTOLIC HEART FAILURE (HCC): Primary | ICD-10-CM

## 2022-07-06 PROCEDURE — 93264 REM MNTR WRLS P-ART PRS SNR: CPT | Performed by: INTERNAL MEDICINE

## 2022-07-07 RX ORDER — BUMETANIDE 2 MG/1
2 TABLET ORAL 2 TIMES DAILY
Qty: 60 TABLET | Refills: 3 | Status: SHIPPED
Start: 2022-07-07 | End: 2022-07-07 | Stop reason: DRUGHIGH

## 2022-07-07 RX ORDER — DOFETILIDE 0.25 MG/1
250 CAPSULE ORAL 2 TIMES DAILY
Qty: 60 CAPSULE | Refills: 3 | Status: SHIPPED | OUTPATIENT
Start: 2022-07-07

## 2022-07-07 RX ORDER — BUMETANIDE 2 MG/1
TABLET ORAL
Qty: 45 TABLET | Refills: 3 | Status: SHIPPED
Start: 2022-07-07 | End: 2022-11-01 | Stop reason: SDUPTHER

## 2022-07-07 NOTE — TELEPHONE ENCOUNTER
Increase bumex to 2 mg twice daily   Follow up as scheduled in CHF clinic for follow up labs  Continue to lay on cardiomems daily

## 2022-07-07 NOTE — TELEPHONE ENCOUNTER
I have reviewed the provider Nai Kline CNP instructions with the patient, answering all questions to his satisfaction. He would like to utilize [facility] for rx.      Soren Richards RN

## 2022-07-07 NOTE — TELEPHONE ENCOUNTER
Called Mo at [facility]  And confirmed bumex and [employer] received and will be processed with correct doses.        Roscoe Mendiola RN

## 2022-07-07 NOTE — TELEPHONE ENCOUNTER
Dr Raffaele Webb reviewed case,   New orders obtained from her. · Change Bumex to 2mg/4mg alternating dosing. · Increase Tikosyn back to twice daily. 250mcg BID  · Keep f/u at CHF Clinic as set      I have reviewed the provider Dr Pati Opitz  instructions with the patient, answering all questions to his satisfaction. Escribed scripts to [facility]  prefers 30 day scripts).      Nataly Amador RN

## 2022-07-11 NOTE — TELEPHONE ENCOUNTER
7/11/2022   Reading reminder sent    Yesterday close to Goal PAD  Of 21  (Was at 20 )  Heart rate back in 70's.  And MARILIA down to 29   Will continue to trend              Future Appointments   Date Time Provider Clemente Davis   7/18/2022 12:15 PM [facility] CHF ROOM 1 SEYZ [facility]   7/28/2022  1:15 PM SCHEDULE, MHYX YTOWN DEVICE YTOWN ELECTR [facility]   10/3/2022 11:30 AM Nilay Humphries  [facility]

## 2022-07-18 ENCOUNTER — HOSPITAL ENCOUNTER (OUTPATIENT)
Dept: OTHER | Age: 42
Setting detail: THERAPIES SERIES
Discharge: HOME | End: 2022-07-18
Payer: MEDICARE

## 2022-07-18 VITALS
BODY MASS INDEX: 40.42 KG/M2 | OXYGEN SATURATION: 100 % | RESPIRATION RATE: 18 BRPM | HEART RATE: 70 BPM | DIASTOLIC BLOOD PRESSURE: 64 MMHG | WEIGHT: 298 LBS | SYSTOLIC BLOOD PRESSURE: 113 MMHG

## 2022-07-18 DIAGNOSIS — N18.30 STAGE 3 CHRONIC KIDNEY DISEASE, UNSPECIFIED WHETHER STAGE 3A OR 3B CKD (HCC): ICD-10-CM

## 2022-07-18 DIAGNOSIS — I50.42 CHF (CONGESTIVE HEART FAILURE), NYHA CLASS II, CHRONIC, COMBINED (HCC): Primary | ICD-10-CM

## 2022-07-18 LAB
ANION GAP SERPL CALCULATED.3IONS-SCNC: 13 MMOL/L (ref 7–16)
BUN BLDV-MCNC: 55 MG/DL (ref 6–20)
CALCIUM SERPL-MCNC: 9.7 MG/DL (ref 8.6–10.2)
CHLORIDE BLD-SCNC: 101 MMOL/L (ref 98–107)
CO2: 21 MMOL/L (ref 22–29)
CREAT SERPL-MCNC: 2.6 MG/DL (ref 0.7–1.2)
GFR AFRICAN AMERICAN: 33
GFR NON-AFRICAN AMERICAN: 27 ML/MIN/1.73
GLUCOSE BLD-MCNC: 143 MG/DL (ref 74–99)
POTASSIUM SERPL-SCNC: 4.4 MMOL/L (ref 3.5–5)
PRO-BNP: 536 PG/ML (ref 0–125)
SODIUM BLD-SCNC: 135 MMOL/L (ref 132–146)

## 2022-07-18 PROCEDURE — 83880 ASSAY OF NATRIURETIC PEPTIDE: CPT

## 2022-07-18 PROCEDURE — 36415 COLL VENOUS BLD VENIPUNCTURE: CPT

## 2022-07-18 PROCEDURE — 80048 BASIC METABOLIC PNL TOTAL CA: CPT

## 2022-07-18 PROCEDURE — 99214 OFFICE O/P EST MOD 30 MIN: CPT

## 2022-07-18 NOTE — PROGRESS NOTES
Voice message left at Dr. Yuval Klein office to please inform doctor of elevated creatinine level. Labs and flow sheet faxed to Dr. Yuval Klein office confirmation received.

## 2022-07-18 NOTE — PROGRESS NOTES
[facility]   CHF Clinic       [place]   1980  [phone]       Referring Doctor: Nicol Ayala  Cardiologist: Jose Angel Meza  Nephrologist: n/a      History of Present Illness:     Grand kelvin MONTESINOS is a 39 y.o. male with a history of HFrEF, most recent EF 31% on 1/19/2021. Patient Story:  He does NOT  have dyspnea with exertion, shortness of breath, or decline in overall functional capacity. He does not have orthopnea, PND, nocturnal cough or hemoptysis. He does have some abdominal distention or bloating, early satiety, anorexia/change in appetite. He does  a good urinary response to his oral diuretic. He does not have  lower extremity edema. He does not have lightheadedness, dizziness. He denies  syncope or near syncope. He denies chest pain or palpitations. Allergies   Allergen Reactions    Farxiga [Dapagliflozin] Other (See Comments)     Caused throat to swell         Prior to Visit Medications    Medication Sig Taking? Authorizing Provider   nitroGLYCERIN (NITROSTAT) 0.4 MG SL tablet DISSOLVE 1 TABLET UNDER THE TONGUE AS NEEDED FOR CHEST PAIN EVERY 5 MINUTES UP TO 3 TIMES. IF NO RELIEF CALL 911. Patient not taking: No sig reported  Trip Arreaga MD   dofetilide (TIKOSYN) 250 MCG capsule Take 1 capsule by mouth 2 times daily  Rashad Ma MD   bumetanide (BUMEX) 2 MG tablet 2mg every other day and  4mg on the other days. 2/4 alteration.   Rashad Ma MD   rivaroxaban (XARELTO) 15 MG TABS tablet Take 1 tablet by mouth daily (with breakfast)  Rashad Ma MD   magnesium oxide (MAG-OX) 400 MG tablet TAKE 1 TABLET BY MOUTH DAILY  Trip Arreaga MD   atorvastatin (LIPITOR) 40 MG tablet TAKE 1 TABLET BY MOUTH DAILY  Trip Arreaga MD   ONETOUCH ULTRA strip USE TO TEST BLOOD SUGAR 2-3 TIMES DAILY AND AS NEEDED FOR SYMPTOMS OF IRREGULAR BLOOD GLUCOSE  Justina Dumont MD   LEVEMIR FLEXTOUCH 100 UNIT/ML injection pen INJECT 40 UNITS Salome Zaman MD   allopurinol (ZYLOPRIM) 300 MG tablet TAKE 1 TABLET BY MOUTH DAILY  Ken Chandler MD   isosorbide dinitrate (ISORDIL) 10 MG tablet TAKE TWO (2) TABLETS BY MOUTH THREE TIMES A DAY  Luc Caballero MD   insulin lispro (HUMALOG) 100 UNIT/ML injection vial INJECT 20 UNITS SUBCUTANEOUSLY THREE TIMES A DAY BEFORE MEALS  Nataly Capps MD   spironolactone (ALDACTONE) 25 MG tablet Take 0.5 tablets by mouth daily  Nataly Capps MD   metFORMIN (GLUCOPHAGE) 1000 MG tablet TAKE ONE (1) TABLET BY MOUTH TWICE DAILY WITH MEALS  Nataly Capps MD   sacubitril-valsartan (ENTRESTO)  MG per tablet Take 1 tablet by mouth 2 times daily  Jass Crowe MD   carvedilol (COREG) 25 MG tablet TAKE ONE (1) TABLET BY MOUTH TWICE DAILY WITH MEALS  Luc Caballero MD   Blood Pressure KIT 1 kit by Does not apply route daily  Milton Cook MD   Continuous Blood Gluc Transmit (DEXCOM G6 TRANSMITTER) MISC USE AS DIRECTED  Jass Crowe MD   rivaroxaban (XARELTO) 20 MG TABS tablet Take 20 mg by mouth Daily with supper  Historical Provider, MD   magnesium oxide (MAG-OX) 400 MG tablet Take 1 tablet by mouth daily  Jass Crowe MD             Guideline directed medical:  ARNI/ACE I/ARB: Yes  Beta blocker:   Yes  Aldosterone antagonist:  Yes        Physical Examination:     /64   Pulse 70   Resp 18   Wt 298 lb (135.2 kg)   SpO2 100%   BMI 40.42 kg/m²     Assessment  Charting Type: Shift assessment (chf clinic)    Neurological  Level of Consciousness: Alert (0)  Orientation Level: Oriented X4         HEENT (Head, Ears, Eyes, Nose, & Throat)  HEENT (WDL): Within Defined Limits    Respiratory  Respiratory Pattern: Regular  Respiratory Depth: Normal  Respiratory Quality/Effort: Unlabored  Chest Assessment: Chest expansion symmetrical  L Breath Sounds: Clear  R Breath Sounds: Clear              Cardiac  Cardiac Rhythm: AV paced    Rhythm Interpretation  Heart Rate: 70         Gastrointestinal  Abdominal (WDL): Exceptions to WDL  Abdomen Inspection: Distended, Soft  RUQ Bowel Sounds: Active  LUQ Bowel Sounds: Active  RLQ Bowel Sounds: Active  LLQ Bowel Sounds: Active          Bowel Sounds  RUQ Bowel Sounds: Active  LUQ Bowel Sounds: Active  RLQ Bowel Sounds: Active  LLQ Bowel Sounds: Active    Peripheral Vascular  Peripheral Vascular (WDL): Within Defined Limits  RLE Edema: None  LLE Edema: None                   Genitourinary  Genitourinary (WDL): Within Defined Limits    Psychosocial  Psychosocial (WDL): Within Defined Limits                        Heart Rate: 70                   LAB DATA:    BNP  No results for input(s): BNP in the last 72 hours. proBNP  No results for input(s): PROBNP in the last 72 hours. BMP  No results for input(s): NA, K, CL, CO2, BUN, CREATININE, GLUCOSE, CALCIUM in the last 72 hours. WEIGHTS:  Wt Readings from Last 3 Encounters:   07/18/22 298 lb (135.2 kg)   07/05/22 (!) 302 lb 14.4 oz (137.4 kg)   06/23/22 297 lb 9.6 oz (135 kg)         TELEMETRY:  Cardiac Regularity: Regular  Cardiac Rhythm/Interpretation: NSR/PACED        ASSESSMENT:  Alex Wells III's weight is down 4lbs from his last visit on 7/5. Interventions completed this visit:  IV diuretics given: no  Lab work obtained: yes, BMP/BNP   Reviewed continue current medications that patient as prescribed answered any questions   Educated on signs and symptoms of HF  Educated on low sodium diet    PLAN:  Future Appointments   Date Time Provider Clemente Davis   7/18/2022 12:15 PM [facility] CHF ROOM 1 SEYZ [facility]   7/28/2022  1:15 PM SCHEDULE, VITA MILLER DEVICE ANGELA ELECTR [facility]   8/8/2022 10:45 AM [facility] CHF ROOM 1 SEYZ [facility]F [facility]   10/3/2022 11:30 AM MD ANGELA Martinez CARDIO [facility]       PT CONFIRMED WILL CALL FOR EARLIER APPT. IF NEEDED. Given clinic phone number and aware of signs and symptoms to call with any HF change in symptoms.

## 2022-07-20 DIAGNOSIS — E08.00 DIABETES MELLITUS DUE TO UNDERLYING CONDITION WITH HYPEROSMOLARITY WITHOUT COMA, WITHOUT LONG-TERM CURRENT USE OF INSULIN (HCC): Primary | ICD-10-CM

## 2022-07-22 ENCOUNTER — TELEPHONE (OUTPATIENT)
Dept: CARDIOLOGY CLINIC | Age: 42
End: 2022-07-22

## 2022-07-22 NOTE — TELEPHONE ENCOUNTER
No mems send since 7 19. Mems send request via [address] sent  3:38 PM I called and left VM to send mems reading daily and to call office update how feeling.    Pending call back

## 2022-07-25 ENCOUNTER — HOSPITAL ENCOUNTER (OUTPATIENT)
Age: 42
Discharge: HOME | End: 2022-07-25
Payer: MEDICARE

## 2022-07-25 DIAGNOSIS — I50.42 CHF (CONGESTIVE HEART FAILURE), NYHA CLASS II, CHRONIC, COMBINED (HCC): ICD-10-CM

## 2022-07-25 LAB
ANION GAP SERPL CALCULATED.3IONS-SCNC: 16 MMOL/L (ref 7–16)
BUN BLDV-MCNC: 47 MG/DL (ref 6–20)
CALCIUM SERPL-MCNC: 9.9 MG/DL (ref 8.6–10.2)
CHLORIDE BLD-SCNC: 105 MMOL/L (ref 98–107)
CO2: 16 MMOL/L (ref 22–29)
CREAT SERPL-MCNC: 2.4 MG/DL (ref 0.7–1.2)
GFR AFRICAN AMERICAN: 36
GFR NON-AFRICAN AMERICAN: 30 ML/MIN/1.73
GLUCOSE BLD-MCNC: 110 MG/DL (ref 74–99)
POTASSIUM SERPL-SCNC: 4.5 MMOL/L (ref 3.5–5)
PRO-BNP: 768 PG/ML (ref 0–125)
SODIUM BLD-SCNC: 137 MMOL/L (ref 132–146)

## 2022-07-25 PROCEDURE — 36415 COLL VENOUS BLD VENIPUNCTURE: CPT

## 2022-07-25 PROCEDURE — 83880 ASSAY OF NATRIURETIC PEPTIDE: CPT

## 2022-07-25 PROCEDURE — 80048 BASIC METABOLIC PNL TOTAL CA: CPT

## 2022-07-27 NOTE — PROGRESS NOTES
HISTORY:  Mr. Traci Chopra is a patient whose problem list includes the following:    (1) Longstanding history of hypertension. (2) Sleep apnea. (3) Severe LV dysfunction and hospitalization for congestive heart failure. (4) Noncompliance with medical therapy because of multiple reasons. (5) History of cardiac catheterization in 2011, which revealed no significant coronary artery disease. At that time, LVEF of 10% to 15% was noted. (6) The patient did get an ICD vest given to him then. (7) The patient did not return for regular follow up. Most recent echocardiogram by Dr. Katie Wright in March of 2012 revealed, again, LVEF of 30% to 35% and enlarged left ventricle. (8) Single chamber ICD implant Medtronic Evera SN [id number] on 9/30/13.  (9) Upgrade to a Medtronic [id number] Claria MRI Quad CRT-D SR# [id number], atrial lead is a Medtronic [id number] SR# [id number], LV lead is a Abbott [id number] KR#[id number] on 11-20-18. RV lead is a Medtronic [id number] SR#[id number] on 9-30-13. Vaccinated    BiV ICD INTERROGATION:  DDD   1. Battery voltage:  2.92 V     21 months (11-30)  2. Charge time:  9.4 seconds  3. Lead impedance: A=361 ohms    RV: 450 ohms   LV: 1064 ohms     HVB: 53 ohms  4. Amplitude:P waves: 2.5 mV       R-wave=7.6 mV  5. Pacing threshold:  A: 0.75 V @0.4 ms   RV= 0.75 V @ 0.4 ms   LV: 2.25 V @ 1.0 ms  (1-4). 6.  AP 76.1%     BiVP  96.4%   7. No episodes detected. Shekhar Stacy 8.Hemodynamic monitor: No indication of fluid retention. ASSESSMENT: BiV ICD function is appropriate. PLAN:  1. ICD check in 6 months   2. Carelink transmission 3 months. 3. Pt aware continuous home monitoring is strongly recommended. Alexis Farr M.D., F.A.C.C.

## 2022-07-28 ENCOUNTER — NURSE ONLY (OUTPATIENT)
Dept: NON INVASIVE DIAGNOSTICS | Age: 42
End: 2022-07-28
Payer: MEDICARE

## 2022-07-28 VITALS — DIASTOLIC BLOOD PRESSURE: 60 MMHG | SYSTOLIC BLOOD PRESSURE: 92 MMHG

## 2022-07-28 DIAGNOSIS — I50.42 CHRONIC COMBINED SYSTOLIC AND DIASTOLIC HEART FAILURE (HCC): ICD-10-CM

## 2022-07-28 DIAGNOSIS — I51.9 LEFT VENTRICULAR DYSFUNCTION: ICD-10-CM

## 2022-07-28 DIAGNOSIS — Z95.810 PRESENCE OF AUTOMATIC CARDIOVERTER/DEFIBRILLATOR (AICD): Primary | ICD-10-CM

## 2022-07-28 PROCEDURE — 93290 INTERROG DEV EVAL ICPMS IP: CPT | Performed by: INTERNAL MEDICINE

## 2022-07-28 PROCEDURE — 93284 PRGRMG EVAL IMPLANTABLE DFB: CPT | Performed by: INTERNAL MEDICINE

## 2022-08-05 ENCOUNTER — TELEPHONE (OUTPATIENT)
Dept: CARDIOLOGY CLINIC | Age: 42
End: 2022-08-05

## 2022-08-05 NOTE — TELEPHONE ENCOUNTER
Reviewed Mems #'s with patient    He feels good   Will send reading tomorrow. Goes next week chf clinic.    Has no c/o     Will continue to monitor    Future Appointments   Date Time Provider Clemente Davis   8/8/2022 10:45 AM [facility] CHF ROOM 1 [facility]   10/3/2022 11:30 AM Jennifer Castrejon MD [facility]   10/31/2022  8:10 AM SCHEDULE, [address]

## 2022-08-08 ENCOUNTER — HOSPITAL ENCOUNTER (OUTPATIENT)
Dept: OTHER | Age: 42
Setting detail: THERAPIES SERIES
Discharge: HOME | End: 2022-08-08
Payer: MEDICARE

## 2022-08-08 VITALS
BODY MASS INDEX: 40.82 KG/M2 | OXYGEN SATURATION: 98 % | DIASTOLIC BLOOD PRESSURE: 63 MMHG | SYSTOLIC BLOOD PRESSURE: 106 MMHG | HEART RATE: 70 BPM | WEIGHT: 301 LBS | RESPIRATION RATE: 18 BRPM

## 2022-08-08 LAB
ANION GAP SERPL CALCULATED.3IONS-SCNC: 14 MMOL/L (ref 7–16)
BUN BLDV-MCNC: 57 MG/DL (ref 6–20)
CALCIUM SERPL-MCNC: 9.9 MG/DL (ref 8.6–10.2)
CHLORIDE BLD-SCNC: 102 MMOL/L (ref 98–107)
CO2: 20 MMOL/L (ref 22–29)
CREAT SERPL-MCNC: 2.4 MG/DL (ref 0.7–1.2)
GFR AFRICAN AMERICAN: 36
GFR NON-AFRICAN AMERICAN: 30 ML/MIN/1.73
GLUCOSE BLD-MCNC: 117 MG/DL (ref 74–99)
POTASSIUM SERPL-SCNC: 4.4 MMOL/L (ref 3.5–5)
PRO-BNP: 692 PG/ML (ref 0–125)
SODIUM BLD-SCNC: 136 MMOL/L (ref 132–146)

## 2022-08-08 PROCEDURE — 80048 BASIC METABOLIC PNL TOTAL CA: CPT

## 2022-08-08 PROCEDURE — 99214 OFFICE O/P EST MOD 30 MIN: CPT

## 2022-08-08 PROCEDURE — 83880 ASSAY OF NATRIURETIC PEPTIDE: CPT

## 2022-08-08 NOTE — PROGRESS NOTES
[facility]   CHF Clinic       [place]   1980  [phone]       Referring Doctor: Yinka Mclain  Cardiologist: Keren Mariano  Nephrologist: n/a      History of Present Illness:     Grand kelvin MONTESINOS is a 43 y.o. male with a history of HFrEF, most recent EF 31% on 1/19/2021. Patient Story:  He does NOT  have dyspnea with exertion, shortness of breath, or decline in overall functional capacity. He does not have orthopnea, PND, nocturnal cough or hemoptysis. He does have some abdominal distention or bloating, early satiety, anorexia/change in appetite. He does  a good urinary response to his oral diuretic. He does not have  lower extremity edema. He does not have lightheadedness, dizziness. He denies  syncope or near syncope. He denies chest pain or palpitations. Allergies   Allergen Reactions    Farxiga [Dapagliflozin] Other (See Comments)     Caused throat to swell         Prior to Visit Medications    Medication Sig Taking? Authorizing Provider   nitroGLYCERIN (NITROSTAT) 0.4 MG SL tablet DISSOLVE 1 TABLET UNDER THE TONGUE AS NEEDED FOR CHEST PAIN EVERY 5 MINUTES UP TO 3 TIMES. IF NO RELIEF CALL 911. Patient not taking: No sig reported  Betito Gomez MD   dofetilide (TIKOSYN) 250 MCG capsule Take 1 capsule by mouth 2 times daily  Kaylin Bronson MD   bumetanide (BUMEX) 2 MG tablet 2mg every other day and  4mg on the other days. 2/4 alteration.   Kaylin Bronson MD   rivaroxaban (XARELTO) 15 MG TABS tablet Take 1 tablet by mouth daily (with breakfast)  Kaylin Bronson MD   magnesium oxide (MAG-OX) 400 MG tablet TAKE 1 TABLET BY MOUTH DAILY  Betito Gomez MD   atorvastatin (LIPITOR) 40 MG tablet TAKE 1 TABLET BY MOUTH DAILY  Betito Gomez MD   ONETOUCH ULTRA strip USE TO TEST BLOOD SUGAR 2-3 TIMES DAILY AND AS NEEDED FOR SYMPTOMS OF IRREGULAR BLOOD GLUCOSE  Antione Contreras MD   LEVEMIR FLEXTOUCH 100 UNIT/ML injection pen INJECT 40 UNITS Rosa Collier MD   allopurinol (ZYLOPRIM) 300 MG tablet TAKE 1 TABLET BY MOUTH DAILY  Nicole Cleary MD   isosorbide dinitrate (ISORDIL) 10 MG tablet TAKE TWO (2) TABLETS BY MOUTH THREE TIMES A DAY  Morgan Park MD   insulin lispro (HUMALOG) 100 UNIT/ML injection vial INJECT 20 UNITS SUBCUTANEOUSLY THREE TIMES A DAY BEFORE MEALS  Shadia Evans MD   spironolactone (ALDACTONE) 25 MG tablet Take 0.5 tablets by mouth daily  Shadia Evans MD   metFORMIN (GLUCOPHAGE) 1000 MG tablet TAKE ONE (1) TABLET BY MOUTH TWICE DAILY WITH MEALS  Shadia Evans MD   sacubitril-valsartan (ENTRESTO)  MG per tablet Take 1 tablet by mouth 2 times daily  Megan Freedman MD   carvedilol (COREG) 25 MG tablet TAKE ONE (1) TABLET BY MOUTH TWICE DAILY WITH MEALS  Morgan Park MD   Blood Pressure KIT 1 kit by Does not apply route daily  William Gimenez MD   Continuous Blood Gluc Transmit (DEXCOM G6 TRANSMITTER) MISC USE AS DIRECTED  Megan Freedman MD   rivaroxaban (XARELTO) 20 MG TABS tablet Take 20 mg by mouth Daily with supper  Historical Provider, MD   magnesium oxide (MAG-OX) 400 MG tablet Take 1 tablet by mouth daily  Megan Freedman MD             Guideline directed medical:  ARNI/ACE I/ARB: Yes  Beta blocker:   Yes  Aldosterone antagonist:  Yes        Physical Examination:     /63   Pulse 70   Resp 18   Wt (!) 301 lb (136.5 kg)   SpO2 98%   BMI 40.82 kg/m²     Assessment  Charting Type: Shift assessment (chf clinic)    Neurological  Level of Consciousness: Alert (0)  Orientation Level: Oriented X4         HEENT (Head, Ears, Eyes, Nose, & Throat)  HEENT (WDL): Within Defined Limits    Respiratory  Respiratory Pattern: Regular  Respiratory Depth: Normal  Respiratory Quality/Effort: Unlabored  Chest Assessment: Chest expansion symmetrical  L Breath Sounds: Clear  R Breath Sounds: Clear              Cardiac  Cardiac Rhythm: AV paced    Rhythm Interpretation  Heart Rate: 70         Gastrointestinal  Abdominal (WDL): Exceptions to WDL  Abdomen Inspection: Distended, Soft  RUQ Bowel Sounds: Active  LUQ Bowel Sounds: Active  RLQ Bowel Sounds: Active  LLQ Bowel Sounds: Active          Bowel Sounds  RUQ Bowel Sounds: Active  LUQ Bowel Sounds: Active  RLQ Bowel Sounds: Active  LLQ Bowel Sounds: Active    Peripheral Vascular  Peripheral Vascular (WDL): Within Defined Limits  RLE Edema: None  LLE Edema: None                   Genitourinary  Genitourinary (WDL): Within Defined Limits    Psychosocial  Psychosocial (WDL): Within Defined Limits                        Heart Rate: 70                   LAB DATA:    BNP  No results for input(s): BNP in the last 72 hours. proBNP  Recent Labs     08/08/22  1100   PROBNP 692*       BMP  Recent Labs     08/08/22  1100      K 4.4      CO2 20*   BUN 57*   CREATININE 2.4*   GLUCOSE 117*   CALCIUM 9.9         WEIGHTS:  Wt Readings from Last 3 Encounters:   08/08/22 (!) 301 lb (136.5 kg)   07/18/22 298 lb (135.2 kg)   07/05/22 (!) 302 lb 14.4 oz (137.4 kg)         TELEMETRY:  Cardiac Regularity: Regular  Cardiac Rhythm/Interpretation: NSR/PACED        ASSESSMENT:  Jennifer Arnett III's weight is stable. Has no c/o. Interventions completed this visit:  IV diuretics given: no  Lab work obtained: yes, BMP/BNP     Reviewed continue current medications that patient as prescribed answered any questions   Educated on signs and symptoms of HF  Educated on low sodium diet    PLAN:  Future Appointments   Date Time Provider Clemente Davis   8/29/2022 12:15 PM [facility] ROOM 1 [facility]   10/3/2022 11:30 AM Darek Boss MD [facility] CARDIO [facility]   10/31/2022  8[facility]10 [facility]       PT CONFIRMED WILL CALL FOR EARLIER APPT. IF NEEDED. Given clinic phone number and aware of signs and symptoms to call with any HF change in symptoms.

## 2022-08-19 DIAGNOSIS — Z79.4 TYPE 2 DIABETES MELLITUS WITH COMPLICATION, WITH LONG-TERM CURRENT USE OF INSULIN (HCC): Chronic | ICD-10-CM

## 2022-08-19 DIAGNOSIS — E11.8 TYPE 2 DIABETES MELLITUS WITH COMPLICATION, WITH LONG-TERM CURRENT USE OF INSULIN (HCC): Chronic | ICD-10-CM

## 2022-08-22 DIAGNOSIS — I50.42 CHF (CONGESTIVE HEART FAILURE), NYHA CLASS II, CHRONIC, COMBINED (HCC): Primary | ICD-10-CM

## 2022-08-22 PROCEDURE — 93264 REM MNTR WRLS P-ART PRS SNR: CPT | Performed by: INTERNAL MEDICINE

## 2022-08-22 NOTE — TELEPHONE ENCOUNTER
Last Appointment:  3/14/2022  Future Appointments   Date Time Provider Clemente Davis   8/29/2022 12:15 PM [facility] CHF ROOM 1 SE CHF [facility]   10/3/2022 11:30 AM Jessica Shi MD [facility]   10/31/2022  8:10 AM SCHEDULE, [address]

## 2022-08-23 DIAGNOSIS — I50.22 CHRONIC SYSTOLIC HEART FAILURE (HCC): ICD-10-CM

## 2022-08-23 RX ORDER — CARVEDILOL 25 MG/1
TABLET ORAL
Qty: 60 TABLET | Refills: 10 | Status: SHIPPED | OUTPATIENT
Start: 2022-08-23

## 2022-08-29 ENCOUNTER — HOSPITAL ENCOUNTER (OUTPATIENT)
Dept: OTHER | Age: 42
Setting detail: THERAPIES SERIES
Discharge: HOME | End: 2022-08-29
Payer: MEDICARE

## 2022-08-29 VITALS
DIASTOLIC BLOOD PRESSURE: 53 MMHG | RESPIRATION RATE: 18 BRPM | BODY MASS INDEX: 40.42 KG/M2 | HEART RATE: 70 BPM | WEIGHT: 298 LBS | OXYGEN SATURATION: 96 % | SYSTOLIC BLOOD PRESSURE: 90 MMHG

## 2022-08-29 LAB
ANION GAP SERPL CALCULATED.3IONS-SCNC: 14 MMOL/L (ref 7–16)
BUN BLDV-MCNC: 74 MG/DL (ref 6–20)
CALCIUM SERPL-MCNC: 9.9 MG/DL (ref 8.6–10.2)
CHLORIDE BLD-SCNC: 98 MMOL/L (ref 98–107)
CO2: 22 MMOL/L (ref 22–29)
CREAT SERPL-MCNC: 2.8 MG/DL (ref 0.7–1.2)
GFR AFRICAN AMERICAN: 30
GFR NON-AFRICAN AMERICAN: 25 ML/MIN/1.73
GLUCOSE BLD-MCNC: 117 MG/DL (ref 74–99)
POTASSIUM SERPL-SCNC: 4.6 MMOL/L (ref 3.5–5)
PRO-BNP: 475 PG/ML (ref 0–125)
SODIUM BLD-SCNC: 134 MMOL/L (ref 132–146)

## 2022-08-29 PROCEDURE — 99214 OFFICE O/P EST MOD 30 MIN: CPT

## 2022-08-29 PROCEDURE — 83880 ASSAY OF NATRIURETIC PEPTIDE: CPT

## 2022-08-29 PROCEDURE — 36415 COLL VENOUS BLD VENIPUNCTURE: CPT

## 2022-08-29 PROCEDURE — 80048 BASIC METABOLIC PNL TOTAL CA: CPT

## 2022-08-29 NOTE — PROGRESS NOTES
[facility]   CHF Clinic       [place]   1980  [phone]       Referring Doctor: Sarah Garcia  Cardiologist: Ankita Jordan  Nephrologist: n/a      History of Present Illness:     Grand kelvin MONTESINOS is a 43 y.o. male with a history of HFrEF, most recent EF 31% on 1/19/2021. Patient Story:  He does NOT  have dyspnea with exertion, shortness of breath, or decline in overall functional capacity. He does not have orthopnea, PND, nocturnal cough or hemoptysis. He denies  abdominal distention or bloating, early satiety, anorexia/change in appetite. He does  a good urinary response to his oral diuretic. He does not have  lower extremity edema. He does not have lightheadedness, dizziness. He denies  syncope or near syncope. He denies chest pain or palpitations. Allergies   Allergen Reactions    Farxiga [Dapagliflozin] Other (See Comments)     Caused throat to swell         Prior to Visit Medications    Medication Sig Taking? Authorizing Provider   nitroGLYCERIN (NITROSTAT) 0.4 MG SL tablet DISSOLVE 1 TABLET UNDER THE TONGUE AS NEEDED FOR CHEST PAIN EVERY 5 MINUTES UP TO 3 TIMES. IF NO RELIEF CALL 911. Patient not taking: No sig reported  Juarez Hebert MD   carvedilol (COREG) 25 MG tablet TAKE ONE (1) TABLET BY MOUTH TWICE DAILY WITH MEALS  Luda Knapp MD   dofetilide (TIKOSYN) 250 MCG capsule Take 1 capsule by mouth 2 times daily  Bayron Zaragoza MD   bumetanide (BUMEX) 2 MG tablet 2mg every other day and  4mg on the other days. 2/4 alteration.   Bayron Zaragoza MD   rivaroxaban (XARELTO) 15 MG TABS tablet Take 1 tablet by mouth daily (with breakfast)  Bayron Zaragoza MD   magnesium oxide (MAG-OX) 400 MG tablet TAKE 1 TABLET BY MOUTH DAILY  Juarez Hebert MD   atorvastatin (LIPITOR) 40 MG tablet TAKE 1 TABLET BY MOUTH DAILY  Juarez Hebert MD   ONETOUCH ULTRA strip USE TO TEST BLOOD SUGAR 2-3 TIMES DAILY AND AS NEEDED FOR SYMPTOMS OF IRREGULAR BLOOD GLUCOSE  Yeni Hook MD   LEVEMIR FLEXTOUCH 100 UNIT/ML injection pen INJECT 40 UNITS SUBCUTANEOUSLY TWICE DAILY  Yeni Hook MD   allopurinol (ZYLOPRIM) 300 MG tablet TAKE 1 TABLET BY MOUTH DAILY  Yeni Hook MD   isosorbide dinitrate (ISORDIL) 10 MG tablet TAKE TWO (2) TABLETS BY MOUTH THREE TIMES A DAY  Claribel Delgado MD   insulin lispro (HUMALOG) 100 UNIT/ML injection vial INJECT 20 UNITS SUBCUTANEOUSLY THREE TIMES A DAY BEFORE MEALS  Yamil March MD   spironolactone (ALDACTONE) 25 MG tablet Take 0.5 tablets by mouth daily  Yamil March MD   metFORMIN (GLUCOPHAGE) 1000 MG tablet TAKE ONE (1) TABLET BY MOUTH TWICE DAILY WITH MEALS  Yamil March MD   sacubitril-valsartan (ENTRESTO)  MG per tablet Take 1 tablet by mouth 2 times daily  Bayron Hector MD   Blood Pressure KIT 1 kit by Does not apply route daily  Nir Dubon MD   Continuous Blood Gluc Transmit (DEXCOM G6 TRANSMITTER) MISC USE AS DIRECTED  Bayron Hector MD   rivaroxaban (XARELTO) 20 MG TABS tablet Take 20 mg by mouth Daily with supper  Historical Provider, MD   magnesium oxide (MAG-OX) 400 MG tablet Take 1 tablet by mouth daily  Bayron Hector MD             Guideline directed medical:  ARNI/ACE I/ARB: Yes  Beta blocker:   Yes  Aldosterone antagonist:  Yes        Physical Examination:     BP (!) 90/53   Pulse 70   Resp 18   Wt 298 lb (135.2 kg)   SpO2 96%   BMI 40.42 kg/m²     Assessment  Charting Type: Shift assessment (chf clinic)    Neurological  Orientation Level: Oriented X4         HEENT (Head, Ears, Eyes, Nose, & Throat)  HEENT (WDL): Within Defined Limits    Respiratory  Respiratory Pattern: Regular  Respiratory Depth: Normal  Respiratory Quality/Effort: Unlabored  Chest Assessment: Chest expansion symmetrical  L Breath Sounds: Clear  R Breath Sounds: Clear              Cardiac  Cardiac Rhythm: AV paced    Rhythm Interpretation  Heart Rate: 70         Gastrointestinal  Abdominal (WDL): Exceptions to WDL  Abdomen Inspection: Distended, Soft  RUQ Bowel Sounds: Active  LUQ Bowel Sounds: Active  RLQ Bowel Sounds: Active  LLQ Bowel Sounds: Active          Bowel Sounds  RUQ Bowel Sounds: Active  LUQ Bowel Sounds: Active  RLQ Bowel Sounds: Active  LLQ Bowel Sounds: Active    Peripheral Vascular  Peripheral Vascular (WDL): Within Defined Limits  RLE Edema: None  LLE Edema: None                   Genitourinary  Genitourinary (WDL): Within Defined Limits    Psychosocial  Psychosocial (WDL): Within Defined Limits                        Heart Rate: 70                   LAB DATA:    BNP  No results for input(s): BNP in the last 72 hours. proBNP  No results for input(s): PROBNP in the last 72 hours. BMP  No results for input(s): NA, K, CL, CO2, BUN, CREATININE, GLUCOSE, CALCIUM in the last 72 hours. WEIGHTS:  Wt Readings from Last 3 Encounters:   08/29/22 298 lb (135.2 kg)   08/08/22 (!) 301 lb (136.5 kg)   07/18/22 298 lb (135.2 kg)         TELEMETRY:  Cardiac Regularity: Regular  Cardiac Rhythm/Interpretation: NSR/PACED        ASSESSMENT:  Stacy Granda III's weight is stable. Has no c/o. Interventions completed this visit:  IV diuretics given: no  Lab work obtained: yes, BMP/BNP     Reviewed continue current medications that patient as prescribed answered any questions   Educated on signs and symptoms of HF  Educated on low sodium diet    PLAN:  Future Appointments   Date Time Provider Clemente Davis   8/29/2022 12:15 PM [facility] ROOM 1 [facility] . [facility]   9/26/2022  1:00 PM [facility] CHF ROOM 1 [facility]   10/3/2022 11:30 AM Ash Quiroga MD [facility]   10/31/2022  8:10 AM [facility]       PT CONFIRMED WILL CALL FOR EARLIER APPT. IF NEEDED. Given clinic phone number and aware of signs and symptoms to call with any HF change in symptoms.

## 2022-09-07 RX ORDER — SPIRONOLACTONE 25 MG/1
TABLET ORAL
Qty: 15 TABLET | Refills: 10 | Status: SHIPPED | OUTPATIENT
Start: 2022-09-07

## 2022-09-07 RX ORDER — INSULIN LISPRO 100 [IU]/ML
INJECTION, SOLUTION INTRAVENOUS; SUBCUTANEOUS
Qty: 20 ML | Refills: 10 | Status: SHIPPED | OUTPATIENT
Start: 2022-09-07

## 2022-09-07 RX ORDER — BLOOD SUGAR DIAGNOSTIC
STRIP MISCELLANEOUS
Qty: 100 STRIP | Refills: 0 | Status: SHIPPED | OUTPATIENT
Start: 2022-09-07 | End: 2022-12-16

## 2022-09-09 ENCOUNTER — TELEPHONE (OUTPATIENT)
Dept: CARDIOLOGY CLINIC | Age: 42
End: 2022-09-09

## 2022-09-20 NOTE — TELEPHONE ENCOUNTER
PAD 17 today , usually 19-24 trends. HR back down to 72 today (was 104>106>72)    Says he is Covid + now.    He will make sure he hydrates better    Roz Obregon RN
Says feeling better regarding covid. still has stuffy nose though.    He will send mems reading (will be able to lay flat)  Denies any s/s chf.
ti.   Has ChF Clinic f/u set 9/26/22 and Dr Joanne Zavala 10-3-22

## 2022-09-23 PROCEDURE — 93264 REM MNTR WRLS P-ART PRS SNR: CPT | Performed by: INTERNAL MEDICINE

## 2022-09-26 ENCOUNTER — HOSPITAL ENCOUNTER (OUTPATIENT)
Dept: OTHER | Age: 42
Setting detail: THERAPIES SERIES
Discharge: HOME | End: 2022-09-26
Payer: MEDICARE

## 2022-09-26 VITALS
BODY MASS INDEX: 40.55 KG/M2 | HEART RATE: 70 BPM | SYSTOLIC BLOOD PRESSURE: 118 MMHG | RESPIRATION RATE: 18 BRPM | OXYGEN SATURATION: 99 % | WEIGHT: 299 LBS | DIASTOLIC BLOOD PRESSURE: 73 MMHG

## 2022-09-26 LAB
ANION GAP SERPL CALCULATED.3IONS-SCNC: 15 MMOL/L (ref 7–16)
BUN BLDV-MCNC: 63 MG/DL (ref 6–20)
CALCIUM SERPL-MCNC: 10.6 MG/DL (ref 8.6–10.2)
CHLORIDE BLD-SCNC: 102 MMOL/L (ref 98–107)
CO2: 18 MMOL/L (ref 22–29)
CREAT SERPL-MCNC: 2.2 MG/DL (ref 0.7–1.2)
GFR AFRICAN AMERICAN: 40
GFR NON-AFRICAN AMERICAN: 33 ML/MIN/1.73
GLUCOSE BLD-MCNC: 104 MG/DL (ref 74–99)
POTASSIUM SERPL-SCNC: 4.2 MMOL/L (ref 3.5–5)
PRO-BNP: 768 PG/ML (ref 0–125)
SODIUM BLD-SCNC: 135 MMOL/L (ref 132–146)

## 2022-09-26 PROCEDURE — 36415 COLL VENOUS BLD VENIPUNCTURE: CPT

## 2022-09-26 PROCEDURE — 99214 OFFICE O/P EST MOD 30 MIN: CPT

## 2022-09-26 PROCEDURE — 80048 BASIC METABOLIC PNL TOTAL CA: CPT

## 2022-09-26 PROCEDURE — 83880 ASSAY OF NATRIURETIC PEPTIDE: CPT

## 2022-09-26 NOTE — PROGRESS NOTES
[facility]   CHF Clinic       [place]   1980  [phone]       Referring Doctor: Dino Rabago  Cardiologist: Kalyan Tomas  Nephrologist: n/a      History of Present Illness:     [place] k[place] MONTESINOS is a 43 y.o. male with a history of HFrEF, most recent EF 31% on 1/19/2021. Patient Story:  He does NOT  have dyspnea with exertion, shortness of breath, or decline in overall functional capacity. He does not have orthopnea, PND, nocturnal cough or hemoptysis. He denies abdominal distention or bloating, early satiety, anorexia/change in appetite. He does have a good urinary response to his oral diuretic. He does not have  lower extremity edema. He does not have lightheadedness, dizziness. He denies  syncope or near syncope. He denies chest pain or palpitations. Allergies   Allergen Reactions    Farxiga [Dapagliflozin] Other (See Comments)     Caused throat to swell         Prior to Visit Medications    Medication Sig Taking? Authorizing Provider   nitroGLYCERIN (NITROSTAT) 0.4 MG SL tablet DISSOLVE 1 TABLET UNDER THE TONGUE AS NEEDED FOR CHEST PAIN EVERY 5 MINUTES UP TO 3 TIMES. IF NO RELIEF CALL 911. Patient not taking: No sig reported  Charles Sims MD   insulin lispro (HUMALOG) 100 UNIT/ML SOLN injection vial INJECT 20 UNITS SUBCUTANEOUSLY THREE TIMES A DAY BEFORE MEALS  Maged I Awadalla, MD   ONETOUCH ULTRA strip USE TO TEST BLOOD SUGAR 2 TO 3 TIMES DAILY AND AS NEEDED FOR SYMPTOMS OF IRREGULAR BLOOD SUGAR  Maged I Awadalla, MD   spironolactone (ALDACTONE) 25 MG tablet TAKE 1/2 TABLET BY MOUTH ONCE DAILY  Maged I Awadalla, MD   carvedilol (COREG) 25 MG tablet TAKE ONE (1) TABLET BY MOUTH TWICE DAILY WITH MEALS  Donte Yuan MD   dofetilide (TIKOSYN) 250 MCG capsule Take 1 capsule by mouth 2 times daily  Immanuel Hooker MD   bumetanide (BUMEX) 2 MG tablet 2mg every other day and  4mg on the other days. 2/4 alteration.   Immanuel Hooker MD   rivaroxaban (XARELTO) 15 MG TABS tablet Take 1 tablet by mouth daily (with breakfast)  Milagros Cristina MD   magnesium oxide (MAG-OX) 400 MG tablet TAKE 1 TABLET BY MOUTH DAILY  Haylie Anders MD   atorvastatin (LIPITOR) 40 MG tablet TAKE 1 TABLET BY MOUTH DAILY  Haylie Anders MD   LEVEMIR FLEXTOUCH 100 UNIT/ML injection pen INJECT 40 Stefano Teran MD   allopurinol (ZYLOPRIM) 300 MG tablet TAKE 1 TABLET BY MOUTH DAILY  Be Coronado MD   isosorbide dinitrate (ISORDIL) 10 MG tablet TAKE TWO (2) TABLETS BY MOUTH THREE TIMES A DAY  Darek Boss MD   metFORMIN (GLUCOPHAGE) 1000 MG tablet TAKE ONE (1) TABLET BY MOUTH TWICE DAILY WITH MEALS  Haylie Anders MD   sacubitril-valsartan (ENTRESTO)  MG per tablet Take 1 tablet by mouth 2 times daily  Laci Mclean MD   Blood Pressure KIT 1 kit by Does not apply route daily  Don Webb MD   Continuous Blood Gluc Transmit (DEXCOM G6 TRANSMITTER) MISC USE AS DIRECTED  Laci Mclean MD   rivaroxaban (XARELTO) 20 MG TABS tablet Take 20 mg by mouth Daily with supper  Historical Provider, MD   magnesium oxide (MAG-OX) 400 MG tablet Take 1 tablet by mouth daily  Laci Mclean MD             Guideline directed medical:  ARNI/ACE I/ARB: Yes  Beta blocker:   Yes  Aldosterone antagonist:  Yes        Physical Examination:     /73   Pulse 70   Resp 18   Wt 299 lb (135.6 kg)   SpO2 99%   BMI 40.55 kg/m²     Assessment  Charting Type: Shift assessment (chf clinic)    Neurological  Level of Consciousness: Alert (0)  Orientation Level: Oriented X4         HEENT (Head, Ears, Eyes, Nose, & Throat)  HEENT (WDL): Within Defined Limits  Right Eye: Glasses  Left Eye: Glasses    Respiratory  Respiratory Pattern: Regular  Respiratory Depth: Normal  Respiratory Quality/Effort: Unlabored  Chest Assessment: Chest expansion symmetrical  L Breath Sounds: Clear  R Breath Sounds: Clear    Breath Sounds  Right Upper Lobe: Clear  Right Middle Lobe: Clear  Right Lower Lobe: Clear, Diminished  Left Upper Lobe: Clear  Left Lower Lobe: Clear, Diminished         Cardiac  Cardiac Regularity: Regular  Cardiac Rhythm: AV paced    Rhythm Interpretation  Heart Rate: 70         Gastrointestinal  Abdominal (WDL): Exceptions to WDL  Abdomen Inspection: Distended, Soft               Peripheral Vascular  Peripheral Vascular (WDL): Within Defined Limits  RLE Edema: None  LLE Edema: None                   Genitourinary  Genitourinary (WDL): Within Defined Limits    Psychosocial  Psychosocial (WDL): Within Defined Limits                        Heart Rate: 70                   LAB DATA:    BNP  No results for input(s): BNP in the last 72 hours. proBNP  No results for input(s): PROBNP in the last 72 hours. BMP  No results for input(s): NA, K, CL, CO2, BUN, CREATININE, GLUCOSE, CALCIUM in the last 72 hours. WEIGHTS:  Wt Readings from Last 3 Encounters:   09/26/22 299 lb (135.6 kg)   08/29/22 298 lb (135.2 kg)   08/08/22 (!) 301 lb (136.5 kg)         TELEMETRY:  Cardiac Regularity: Regular  Cardiac Rhythm/Interpretation: NSR/PACED        ASSESSMENT:  Ismael Siegel III's weight is stable. Has no c/o. Denies SOB or discomfort. Interventions completed this visit:  IV diuretics given: no  Lab work obtained: yes, BMP/BNP     Reviewed continue current medications that patient as prescribed answered any questions   Educated on signs and symptoms of HF  Educated on low sodium diet    PLAN:  Future Appointments   Date Time Provider Clemente Davis   9/26/2022  1:00 PM [facility] ROOM 1 [facility] CHF . [facility]   10/3/2022 11:30 AM MD ANGELA Alegria CARDIO [facility]   10/25/2022  1:00 PM [facility] ROOM 1 YZ CHF . [facility]   10/31/2022  8:10 [facility]       PT CONFIRMED WILL CALL FOR EARLIER APPT. IF NEEDED. Given clinic phone number and aware of signs and symptoms to call with any HF change in symptoms.

## 2022-09-27 DIAGNOSIS — I50.22 CHRONIC SYSTOLIC HEART FAILURE (HCC): Primary | ICD-10-CM

## 2022-10-03 ENCOUNTER — OFFICE VISIT (OUTPATIENT)
Dept: CARDIOLOGY CLINIC | Age: 42
End: 2022-10-03
Payer: MEDICAID

## 2022-10-03 VITALS
SYSTOLIC BLOOD PRESSURE: 100 MMHG | BODY MASS INDEX: 41.07 KG/M2 | DIASTOLIC BLOOD PRESSURE: 64 MMHG | HEART RATE: 70 BPM | HEIGHT: 72 IN | RESPIRATION RATE: 18 BRPM | WEIGHT: 303.2 LBS

## 2022-10-03 DIAGNOSIS — I25.10 CORONARY ARTERY DISEASE INVOLVING NATIVE CORONARY ARTERY OF NATIVE HEART WITHOUT ANGINA PECTORIS: ICD-10-CM

## 2022-10-03 DIAGNOSIS — I42.8 NICM (NONISCHEMIC CARDIOMYOPATHY) (HCC): ICD-10-CM

## 2022-10-03 DIAGNOSIS — Z95.810 ICD (IMPLANTABLE CARDIOVERTER-DEFIBRILLATOR) IN PLACE: ICD-10-CM

## 2022-10-03 DIAGNOSIS — I48.0 PAROXYSMAL ATRIAL FIBRILLATION (HCC): Primary | ICD-10-CM

## 2022-10-03 PROCEDURE — G8427 DOCREV CUR MEDS BY ELIG CLIN: HCPCS | Performed by: INTERNAL MEDICINE

## 2022-10-03 PROCEDURE — 1036F TOBACCO NON-USER: CPT | Performed by: INTERNAL MEDICINE

## 2022-10-03 PROCEDURE — 99214 OFFICE O/P EST MOD 30 MIN: CPT | Performed by: INTERNAL MEDICINE

## 2022-10-03 PROCEDURE — G8484 FLU IMMUNIZE NO ADMIN: HCPCS | Performed by: INTERNAL MEDICINE

## 2022-10-03 PROCEDURE — G8417 CALC BMI ABV UP PARAM F/U: HCPCS | Performed by: INTERNAL MEDICINE

## 2022-10-03 PROCEDURE — 93000 ELECTROCARDIOGRAM COMPLETE: CPT | Performed by: INTERNAL MEDICINE

## 2022-10-03 NOTE — PROGRESS NOTES
OFFICE VISIT     PRIMARY CARE PHYSICIAN:      Maged Jacalyn Osgood, MD       ALLERGIES / SENSITIVITIES:        Allergies   Allergen Reactions    Farxiga [Dapagliflozin] Other (See Comments)     Caused throat to swell          REVIEWED MEDICATIONS:        Current Outpatient Medications:     insulin lispro (HUMALOG) 100 UNIT/ML SOLN injection vial, INJECT 20 UNITS SUBCUTANEOUSLY THREE TIMES A DAY BEFORE MEALS, Disp: 20 mL, Rfl: 10    ONETOUCH ULTRA strip, USE TO TEST BLOOD SUGAR 2 TO 3 TIMES DAILY AND AS NEEDED FOR SYMPTOMS OF IRREGULAR BLOOD SUGAR, Disp: 100 strip, Rfl: 0    spironolactone (ALDACTONE) 25 MG tablet, TAKE 1/2 TABLET BY MOUTH ONCE DAILY, Disp: 15 tablet, Rfl: 10    carvedilol (COREG) 25 MG tablet, TAKE ONE (1) TABLET BY MOUTH TWICE DAILY WITH MEALS, Disp: 60 tablet, Rfl: 10    dofetilide (TIKOSYN) 250 MCG capsule, Take 1 capsule by mouth 2 times daily, Disp: 60 capsule, Rfl: 3    bumetanide (BUMEX) 2 MG tablet, 2mg every other day and  4mg on the other days. 2/4 alteration. , Disp: 45 tablet, Rfl: 3    rivaroxaban (XARELTO) 15 MG TABS tablet, Take 1 tablet by mouth daily (with breakfast), Disp: 90 tablet, Rfl: 3    magnesium oxide (MAG-OX) 400 MG tablet, TAKE 1 TABLET BY MOUTH DAILY, Disp: 30 tablet, Rfl: 10    atorvastatin (LIPITOR) 40 MG tablet, TAKE 1 TABLET BY MOUTH DAILY, Disp: 90 tablet, Rfl: 1    LEVEMIR FLEXTOUCH 100 UNIT/ML injection pen, INJECT 40 UNITS SUBCUTANEOUSLY TWICE DAILY, Disp: 30 mL, Rfl: 10    allopurinol (ZYLOPRIM) 300 MG tablet, TAKE 1 TABLET BY MOUTH DAILY, Disp: 30 tablet, Rfl: 10    isosorbide dinitrate (ISORDIL) 10 MG tablet, TAKE TWO (2) TABLETS BY MOUTH THREE TIMES A DAY, Disp: 180 tablet, Rfl: 10    nitroGLYCERIN (NITROSTAT) 0.4 MG SL tablet, DISSOLVE 1 TABLET UNDER THE TONGUE AS NEEDED FOR CHEST PAIN EVERY 5 MINUTES UP TO 3 TIMES.  IF NO RELIEF CALL 911., Disp: 25 tablet, Rfl: 10    metFORMIN (GLUCOPHAGE) 1000 MG tablet, TAKE ONE (1) TABLET BY MOUTH TWICE DAILY WITH MEALS, Disp: 180 tablet, Rfl: 1    sacubitril-valsartan (ENTRESTO)  MG per tablet, Take 1 tablet by mouth 2 times daily, Disp: 180 tablet, Rfl: 3    Blood Pressure KIT, 1 kit by Does not apply route daily, Disp: 1 kit, Rfl: 0    Continuous Blood Gluc Transmit (DEXCOM G6 TRANSMITTER) MISC, USE AS DIRECTED, Disp: 1 each, Rfl: 0      S: REASON FOR VISIT:       Chief Complaint   Patient presents with    Atrial Fibrillation     4 MO OV. Patient has no cardiac complaints . History of Present Illness:       Office Visit for follow up of CAD, CMP, PAF      No hospitalizations or surgeries since last visit   Patient is compliant with all medications   Curt any exertional chest pain or short of breath   No palpitations, dizzy or syncope.    Active at home   Try to watch diet          Past Medical History:   Diagnosis Date    Acute CHF ([address] 11/29/2011    Acute CHF ([address] 12/26/2011    Acute idiopathic gout of right foot 8/13/2016    AF (atrial fibrillation) ([address] 02/2020    Anemia 11/29/2011    CAD (coronary artery disease)     Cerebral artery occlusion with cerebral infarction ([address]     CKD (chronic kidney disease)     Gout     HTN (hypertension) 11/29/2011    Hyperlipidemia     Hypertension     Morbid obesity due to excess calories ([address]     Nonischemic cardiomyopathy ([address] 11/29/2011    Nonischemic cardiomyopathy ([address] 7/2013 7/2013- cardiac MRI revealed an LVEF of 20%    ARVIND (obstructive sleep apnea) 11/29/2011    S/P left heart catheterization by percutaneous approach 12-27/2011    Dr Dickerson Flies    Systolic heart failure [facility]) 5/7/2012 5/7/12- cardiac catheterization revealed an LVEF of 10-15%, mitral regurgitation along with borderline prolapse of mitral valve    Type 2 diabetes mellitus with complication, with long-term current use of insulin ([address] 8/22/2016    URI, acute 11/29/2011            Past Surgical History:   Procedure Laterality Date    CARDIAC CATHETERIZATION  08/01/2019    Dr Chilango Renteria DEFIBRILLATOR PLACEMENT  11/20/2018    UPGRADE S-ICD TO BIV ICD   ([employer])  RAHEJA     CARDIOVERSION  02/14/2020    Successful CV of AF to NSR      (Dr. Brown Aranda)    Melanie Selvinfranky  03/10/2022    Dr. Brown Aranda. Moises Raman V-paced / SR    ECHO COMPL W DOP COLOR FLOW  11/30/2011         ECHO COMPL W DOP COLOR FLOW  03/27/2012         INSERTABLE CARDIAC MONITOR  12/13/2018    cardioMIGUELINA luke, Dr. James Mairnelli  12/15/2020    SUCCESSFUL OVERDRIVE PACE TERMINATION OF AF VIA ICD    (DR. Dayan Hunter)    PACEMAKER PLACEMENT            Family History   Problem Relation Age of Onset    Cancer Mother     No Known Problems Father           Social History     Tobacco Use    Smoking status: Never    Smokeless tobacco: Never   Substance Use Topics    Alcohol use: Not Currently         Review of Systems:    Constitutional: negative for fever and chills, or significant weight loss  HEENT: negative for acute visual symptoms or auditory problems, no dysphagia  Respiratory: negative for cough, wheezing, or hemoptysis  Cardiovascular: negative for chest pain, palpitations, and dyspnea  Gastrointestinal: negative for abdominal pain, diarrhea, melena, nausea and vomiting  Endocrine: Negative for polyuria and polydyspsia  Genitourinary: negative for dysuria and hematuria  Derm: negative for rash and skin lesion(s)  Neurological: negative for tingling, numbness, weakness, seizures  Endocrine: negative for polydipsia and polyuria  Musculoskeletal: negative for pain or tenderness  Psychiatric: negative for anxiety, depression, or suicidal ideations         O:  COMPLETE PHYSICAL EXAM:       /64   Pulse 70   Resp 18   Ht 6' (1.829 m)   Wt (!) 303 lb 3.2 oz (137.5 kg)   BMI 41.12 kg/m²       General:   Patient alert, comfortable, no distress. Appears stated age. HEENT:    Pupils equal, no icterus; Tongue moist.   Neck:              No masses, Thyroid not palpable. No elevated JVD, No carotid bruit.    Chest:   Normal configuration, non tender. ICD site OK   Lungs:   Clear to auscultation bilaterally except few scattered rhonchi. Cardiovascular:  Regular rhythm, 2/6 systolic murmur, No S3, no palpable thrills. Abdomen:  Soft, Bowel sounds normal, Obese, Can not pulsatile abdominal aorta   Extremities:  No edema. Distal pulses palpable. No cyanosis, no clubbing. Skin:   Good turgor, warm and dry, no cyanosis. Musculoskeletal: No joint swelling or deformity. Neuro:   Cranial nerves grossly intact; No focal neurologic deficit. Psych:   Alert, good mood and effect. REVIEW OF DIAGNOSTIC TESTS:        Electrocardiogram: A/V paced     Echo 8/5/2020   Summary   Left ventricle dilated at 7.1cm. Severe global hypokinesis, LV EF visually estimated about 25-30%. Mild left ventricular concentric hypertrophy noted. Stage II diastolic dysfunction. Left atrium is enlarged. Interatrial septum not well visualized but appears intact. Normal right ventricle size and function. Pacer/ICD lead in RV. No mitral valve prolapse. There is trace aortic regurgitation. There is trace tricuspid regurgitation, RVSP 30mmHg. Normal aortic root size. Trace of pericardial effusion. Pericardium appears normal.   No intra cardiac mass or thrombus. Compared to prior echo from 7/31/19 - No significant changes noted. ASSESSMENT / PLAN:    Xochitl Torres was seen today for atrial fibrillation. Diagnoses and all orders for this visit:    Paroxysmal atrial fibrillation ([address]  -     EKG 12 Lead    NICM (nonischemic cardiomyopathy) ([address]    Coronary artery disease involving native coronary artery of native heart without angina pectoris    VHD (valvular heart disease)    ICD (implantable cardioverter-defibrillator) in place       Preventive Cardiology: Low cholesterol diet, regular exercise as tolerate, and gradual weight loss discussed. Above recommendations discussed.    The patient's current medication list, allergies, problem list and results of prior tests (as available) were reviewed at today's visit   All questions answered about cardiac diagnoses and cardiac medications. Continue current medications. Monitor BP and heart rates. Compliance with medications and f/u with all physicians discussed. Risk factor modification based on risk profile discussed. Call if any exertional chest pain, short of breath, dizzy or palpitations. Follow up in 6-9 months or earlier if needed.          [facility]  [address] [place], [address] [place] [address]  [phone]

## 2022-10-06 ENCOUNTER — TELEPHONE (OUTPATIENT)
Dept: CARDIOLOGY CLINIC | Age: 42
End: 2022-10-06

## 2022-10-06 ENCOUNTER — TELEPHONE (OUTPATIENT)
Dept: OTHER | Age: 42
End: 2022-10-06

## 2022-10-06 DIAGNOSIS — Z95.810 ICD (IMPLANTABLE CARDIOVERTER-DEFIBRILLATOR) IN PLACE: ICD-10-CM

## 2022-10-06 DIAGNOSIS — Z59.82 TRANSPORTATION INSECURITY DUE TO LACK OF ACCESS TO VEHICLE: ICD-10-CM

## 2022-10-06 DIAGNOSIS — I42.8 NONISCHEMIC CARDIOMYOPATHY (HCC): ICD-10-CM

## 2022-10-06 DIAGNOSIS — I10 ESSENTIAL HYPERTENSION: ICD-10-CM

## 2022-10-06 DIAGNOSIS — I48.0 PAROXYSMAL ATRIAL FIBRILLATION (HCC): ICD-10-CM

## 2022-10-06 DIAGNOSIS — I42.8 NICM (NONISCHEMIC CARDIOMYOPATHY) (HCC): ICD-10-CM

## 2022-10-06 DIAGNOSIS — N18.30 STAGE 3 CHRONIC KIDNEY DISEASE, UNSPECIFIED WHETHER STAGE 3A OR 3B CKD (HCC): ICD-10-CM

## 2022-10-06 DIAGNOSIS — G47.33 OSA (OBSTRUCTIVE SLEEP APNEA): ICD-10-CM

## 2022-10-06 DIAGNOSIS — I50.42 CHF (CONGESTIVE HEART FAILURE), NYHA CLASS II, CHRONIC, COMBINED (HCC): Primary | ICD-10-CM

## 2022-10-06 SDOH — ECONOMIC STABILITY - TRANSPORTATION SECURITY: TRANSPORTATION INSECURITY: Z59.82

## 2022-10-06 SDOH — ECONOMIC STABILITY: FOOD INSECURITY: WITHIN THE PAST 12 MONTHS, THE FOOD YOU BOUGHT JUST DIDN'T LAST AND YOU DIDN'T HAVE MONEY TO GET MORE.: NEVER TRUE

## 2022-10-06 SDOH — ECONOMIC STABILITY: FOOD INSECURITY: WITHIN THE PAST 12 MONTHS, YOU WORRIED THAT YOUR FOOD WOULD RUN OUT BEFORE YOU GOT MONEY TO BUY MORE.: NEVER TRUE

## 2022-10-06 SDOH — ECONOMIC STABILITY: HOUSING INSECURITY
IN THE LAST 12 MONTHS, WAS THERE A TIME WHEN YOU DID NOT HAVE A STEADY PLACE TO SLEEP OR SLEPT IN A SHELTER (INCLUDING NOW)?: PATIENT REFUSED

## 2022-10-06 SDOH — ECONOMIC STABILITY: INCOME INSECURITY: IN THE LAST 12 MONTHS, WAS THERE A TIME WHEN YOU WERE NOT ABLE TO PAY THE MORTGAGE OR RENT ON TIME?: PATIENT REFUSED

## 2022-10-06 SDOH — ECONOMIC STABILITY: TRANSPORTATION INSECURITY
IN THE PAST 12 MONTHS, HAS THE LACK OF TRANSPORTATION KEPT YOU FROM MEDICAL APPOINTMENTS OR FROM GETTING MEDICATIONS?: YES

## 2022-10-06 NOTE — TELEPHONE ENCOUNTER
You  raina Just now (8:42 AM)      Hakan Harding MD  You 16 minutes ago (8:25 AM)     CY  [facility]     Hakan Harding MD

## 2022-10-06 NOTE — TELEPHONE ENCOUNTER
CHF CHW Initial Call  10/6/2022  12:05 PM    CHW received referral from Margaret Payan RN. Patient need: Transportation  Notes: CHW called patient to assist with transportation to CHF clinic appointment. Patient confirmed he needs transportation. Stated he is aware of transportation benefits available through insurance. He stated he has used insurance transportation in the past. However, he has recently filed a complaint. Transportation has failed to show up the last 3 times. Patient in agreement with plan and further assistance. [x] Agreed    [] Declined      12:09 PM  Submitted [employer] Scheduling Sheet to [place]. Waiting for transportation conformation. 1:45 PM  Patient was denied by [facility] Physician Transport Darcus Phlegm. Patient instructed to schedule with [employer]. 1:59 PM  Called [employer] ([phone], Donna ). Stated patient is not in the system. Unable to schedule transportation. 2:01 PM  Called patient with an update. Discussed other transportation options with patient. Patient stated he will continue to use transportation through his insurance ([phone]). However, he stated he does not count on their transportation since they are not reliable. CHW informed patient of the services and resources that can be provided to them. CHW instructed patient to call CHF CHW at [phone] for any future needs.     Electronically signed by Elizabeth Velazquez on 10/6/2022 at 2:11 PM

## 2022-10-06 NOTE — TELEPHONE ENCOUNTER
SPOKE [address] [place] [address] Drive III yesterday on mems his HR was 110. (Has been runing 70's to 80's)  says he didn't feel anything abnormal . He will send mems reading today. Does not have bp cuff. No way to self monitor vitals at home. Did not go to Dr Oneil Erickson  advanced hf specialist due to ride. He cannot get to [facility]. So he is not rescheduled. Says transportation wont take him that far. He says he went to [facility] in past and is agreeable to f/u their. Says he has transportation benefits to [place] and would be agreeable for f/u with advanced hf their. Placed referral to CHF CHW for transportation needs and follow through. Will send this to Dr Alfonzo Chavis CNP to review and advice on asdvanced HF f/u needs.            Nettie Crowe RN

## 2022-10-07 NOTE — TELEPHONE ENCOUNTER
Then he followed with Dr Delmy Khan last. And  then lost to fu was re-referred to Dr Sean Freeman. But cant go . So therefore is being sent back to Dr Helder Peres in [place] due to transporation limits.

## 2022-10-07 NOTE — TELEPHONE ENCOUNTER
Marylen Meals was already referred to [place] last year and was previously actively following with Dr. Zacarias Betts.

## 2022-10-07 NOTE — TELEPHONE ENCOUNTER
PAD elevated x 4 days. 2 episodes heart rate >100. MARILIA increasing  32>34>37       Bumex dosing : Simg every other day and  4mg on the other days. 2/4 alteration.   Spironolactone 25mg  1/2 tab daily     Seen by Dr Francis Cooley 10-3-22   Future Appointments   Date Time Provider Clemente Susan   10/25/2022  1:00 PM [facility] ROOM 1 SE[facility]   10/31/2022  8:10 AM SCHEDULE, VITA KAUFMAN [facility]       Latest Reference Range & Units 22 12:11 22 12:25   Sodium 132 - 146 mmol/L 134 135   Potassium 3.5 - 5.0 mmol/L 4.6 4.2   Chloride 98 - 107 mmol/L 98 102   CO2 22 - 29 mmol/L 22 18 (L)   BUN,BUNPL 6 - 20 mg/dL 74 (H) 63 (H)   Creatinine 0.7 - 1.2 mg/dL 2.8 (H) 2.2 (H)   Anion Gap 7 - 16 mmol/L 14 15   GFR Non- >=60 mL/min/1.73 25 33   GFR African American  30 40   Glucose, Random 74 - 99 mg/dL 117 (H) 104 (H)   CALCIUM, SERUM, 567731 8.6 - 10.2 mg/dL 9.9 10.6 (H)   Pro-BNP 0 - 125 pg/mL 475 (H) 768 (H)   (L): Data is abnormally low  (H): Data is abnormally high

## 2022-10-10 ENCOUNTER — TELEPHONE (OUTPATIENT)
Dept: OTHER | Age: 42
End: 2022-10-10

## 2022-10-12 NOTE — TELEPHONE ENCOUNTER
Follow up cardioMEMS:    PAD decreased from 26 > 18 however he last laid on the pillow 10/9/2022. Reminder sent to patient today to lay on pillow. Will continue to monitor.

## 2022-10-18 DIAGNOSIS — I50.22 CHRONIC SYSTOLIC HEART FAILURE (HCC): ICD-10-CM

## 2022-10-18 NOTE — TELEPHONE ENCOUNTER
HR remains now in the 70's on CardioMems lays  Yesterday HR 76. With PAD 19  60% compliant with CardioMems sends.    Continue to trend

## 2022-10-19 RX ORDER — SACUBITRIL AND VALSARTAN 97; 103 MG/1; MG/1
TABLET, FILM COATED ORAL
Qty: 60 TABLET | Refills: 10 | OUTPATIENT
Start: 2022-10-19

## 2022-10-20 ENCOUNTER — TELEPHONE (OUTPATIENT)
Dept: CARDIOLOGY CLINIC | Age: 42
End: 2022-10-20

## 2022-10-20 NOTE — TELEPHONE ENCOUNTER
CardioMems update:        PAD:  Goal: 21  Current trends: at goal PAD and HR in the 70's. PAD trend:  20>19>22>21  CardioMems Implant date: 12- On implant MARILIA 42, and ran high 40's then. (in 2021 ran  MARILIA's  20's to 30's). his MARILIA's were 42's in July 2022. Now trending high 20's to mid 30's  (he was covid + again in Sept 2022)  60% complaint with mems sends. Reading reminders called frequently. Providers:  Cardiomems team: DR AUSTEN Harry CNP, TY Abrams RN  Cardiologist: Dr. Mj Garcia  EP: Dr Sandhya Davison     - managing antiarrhythmics and diuretic (last cardioverted 4-26-22) see renal trends. Advanced HF: DR Jc Card (to re-establish 11/3/22)  CHW: Nilda Becket- assisting with transportation Oct 2022. [address]  PCP: Dr Thomas Sarabia (CPAP) 1/4/2012      Diuretic Protocol:  Bumex  2mg/4mg every other day alters  Spironolactone 25mg tab take 1/2 tab daily       GDMT:  Unable to tolerate SGLT2i- reported throat swelling        Allergies   Allergen Reactions    Farxiga [Dapagliflozin] Other (See Comments)       Caused throat to swell      Entresto 97-103mg BID   Coreg 25mg BID  Isordil  20mg TID  (Hydralazine was discontinued 11/1/2018)  Spironolactone 12.5mg Daily      CHF:   CHF:  HFrEF  Severe nonischemic cardiomyopathy  1-19-21 EF 31%   Echo 8-5-2020 EF 71-01% stage II diastolic dysfunction  4429 ICD singlechamber, 9-2018 S/P ICD Medtronic Evera [id number]   BiV  7-2013 Cardiac MRI EF 20%    cardiac cath: EF 10-15%        ASSESSMENT:    Cr trending 1.7 to 2.8 (Dr Leila Malin managing antiarrhythmics and diuretic dosing adjustments at [facility]) . He feels good. No new c/o. Taking meds as directed. Reviewed his trends are close to goal PAD's and HR's are good. PLAN:  Continue to follow cardiomems trends  He's in [place] now and is sending reading. Will send readings daily. (Reminder to send for today sent).

## 2022-10-24 DIAGNOSIS — I50.42 CHF (CONGESTIVE HEART FAILURE), NYHA CLASS II, CHRONIC, COMBINED (HCC): Primary | ICD-10-CM

## 2022-10-24 PROCEDURE — 93264 REM MNTR WRLS P-ART PRS SNR: CPT | Performed by: INTERNAL MEDICINE

## 2022-10-25 ENCOUNTER — HOSPITAL ENCOUNTER (OUTPATIENT)
Dept: OTHER | Age: 42
Discharge: HOME | End: 2022-10-25

## 2022-10-31 ENCOUNTER — HOSPITAL ENCOUNTER (OUTPATIENT)
Dept: OTHER | Age: 42
Setting detail: THERAPIES SERIES
Discharge: HOME | End: 2022-10-31
Payer: MEDICARE

## 2022-10-31 VITALS
WEIGHT: 295 LBS | SYSTOLIC BLOOD PRESSURE: 119 MMHG | BODY MASS INDEX: 40.01 KG/M2 | DIASTOLIC BLOOD PRESSURE: 72 MMHG | RESPIRATION RATE: 18 BRPM | OXYGEN SATURATION: 98 % | HEART RATE: 70 BPM

## 2022-10-31 LAB
ANION GAP SERPL CALCULATED.3IONS-SCNC: 14 MMOL/L (ref 7–16)
BUN BLDV-MCNC: 30 MG/DL (ref 6–20)
CALCIUM SERPL-MCNC: 10 MG/DL (ref 8.6–10.2)
CHLORIDE BLD-SCNC: 105 MMOL/L (ref 98–107)
CO2: 19 MMOL/L (ref 22–29)
CREAT SERPL-MCNC: 1.9 MG/DL (ref 0.7–1.2)
GFR SERPL CREATININE-BSD FRML MDRD: 45 ML/MIN/1.73
GLUCOSE BLD-MCNC: 87 MG/DL (ref 74–99)
POTASSIUM SERPL-SCNC: 4.1 MMOL/L (ref 3.5–5)
PRO-BNP: 1487 PG/ML (ref 0–125)
SODIUM BLD-SCNC: 138 MMOL/L (ref 132–146)

## 2022-10-31 PROCEDURE — 36415 COLL VENOUS BLD VENIPUNCTURE: CPT

## 2022-10-31 PROCEDURE — 99214 OFFICE O/P EST MOD 30 MIN: CPT

## 2022-10-31 PROCEDURE — 83880 ASSAY OF NATRIURETIC PEPTIDE: CPT

## 2022-10-31 PROCEDURE — 80048 BASIC METABOLIC PNL TOTAL CA: CPT

## 2022-10-31 NOTE — PROGRESS NOTES
[facility]   CHF Clinic       Grand rapids III   1980  [phone]       Referring Doctor: Agustin Goetz  Cardiologist: Dusty Kulkarni  Nephrologist: n/a  PCP: Kendra Harrell      History of Present Illness:     Grand kelvin MONTESINOS is a 43 y.o. male with a history of HFrEF, most recent EF 31% on 1/19/2021. Patient Story:  He does NOT  have dyspnea with exertion, shortness of breath, or decline in overall functional capacity. He does not have orthopnea, PND, nocturnal cough or hemoptysis. He denies  abdominal distention or bloating, early satiety, anorexia/change in appetite. He does  a good urinary response to his oral diuretic. He does not have  lower extremity edema. He does not have lightheadedness, dizziness. He denies  syncope or near syncope. He denies chest pain or palpitations. Allergies   Allergen Reactions    Farxiga [Dapagliflozin] Other (See Comments)     Caused throat to swell         Prior to Visit Medications    Medication Sig Taking? Authorizing Provider   insulin lispro (HUMALOG) 100 UNIT/ML SOLN injection vial INJECT 20 UNITS SUBCUTANEOUSLY THREE TIMES A DAY BEFORE MEALS  Maged I Awadalla, MD   ONETOUCH ULTRA strip USE TO TEST BLOOD SUGAR 2 TO 3 TIMES DAILY AND AS NEEDED FOR SYMPTOMS OF IRREGULAR BLOOD SUGAR  Maged I Awadalla, MD   spironolactone (ALDACTONE) 25 MG tablet TAKE 1/2 TABLET BY MOUTH ONCE DAILY  Maged I Awadalla, MD   carvedilol (COREG) 25 MG tablet TAKE ONE (1) TABLET BY MOUTH TWICE DAILY WITH MEALS  Kianna Allen MD   dofetilide (TIKOSYN) 250 MCG capsule Take 1 capsule by mouth 2 times daily  Angela Hampton MD   bumetanide (BUMEX) 2 MG tablet 2mg every other day and  4mg on the other days. 2/4 alteration.   Angela Hampton MD   rivaroxaban (XARELTO) 15 MG TABS tablet Take 1 tablet by mouth daily (with breakfast)  Angela Hampton MD   magnesium oxide (MAG-OX) 400 MG tablet TAKE 1 TABLET BY MOUTH DAILY  Davina Suggs MD   atorvastatin (LIPITOR) 40 MG tablet TAKE 1 TABLET BY MOUTH DAILY  Robina Kennedy MD   LEVEMIR FLEXTOUCH 100 UNIT/ML injection pen INJECT 40 Noemi Eddy MD   allopurinol (ZYLOPRIM) 300 MG tablet TAKE 1 TABLET BY MOUTH DAILY  Lesia Mederos MD   isosorbide dinitrate (ISORDIL) 10 MG tablet TAKE TWO (2) TABLETS BY MOUTH THREE TIMES A DAY  Daysi Miramontes MD   nitroGLYCERIN (NITROSTAT) 0.4 MG SL tablet DISSOLVE 1 TABLET UNDER THE TONGUE AS NEEDED FOR CHEST PAIN EVERY 5 MINUTES UP TO 3 TIMES. IF NO RELIEF CALL 911. Robina Kennedy MD   metFORMIN (GLUCOPHAGE) 1000 MG tablet TAKE ONE (1) TABLET BY MOUTH TWICE DAILY WITH MEALS  Robina Kennedy MD   sacubitril-valsartan (ENTRESTO)  MG per tablet Take 1 tablet by mouth 2 times daily  Fred Wilson MD   Blood Pressure KIT 1 kit by Does not apply route daily  Corinne Ordonez MD   Continuous Blood Gluc Transmit (DEXCOM G6 TRANSMITTER) MISC USE AS DIRECTED  Fred Wilson MD   rivaroxaban (XARELTO) 20 MG TABS tablet Take 20 mg by mouth Daily with supper  Historical Provider, MD   magnesium oxide (MAG-OX) 400 MG tablet Take 1 tablet by mouth daily  Fred Wilson MD             Guideline directed medical:  ARNI/ACE I/ARB: Yes  Beta blocker:   Yes  Aldosterone antagonist:  Yes        Physical Examination:     /72   Pulse 70   Resp 18   Wt 295 lb (133.8 kg)   SpO2 98%   BMI 40.01 kg/m²     Assessment  Charting Type: Shift assessment (chf clinic)    Neurological  Orientation Level: Oriented X4         HEENT (Head, Ears, Eyes, Nose, & Throat)  HEENT (WDL): Within Defined Limits  Right Eye: Glasses  Left Eye: Glasses    Respiratory  Respiratory Pattern: Regular  Respiratory Depth: Normal  Respiratory Quality/Effort: Unlabored  Chest Assessment: Chest expansion symmetrical  L Breath Sounds: Clear  R Breath Sounds: Clear    Breath Sounds  Right Upper Lobe: Clear  Right Middle Lobe: Clear  Right Lower Lobe: Clear, Diminished  Left Upper Lobe: Clear  Left Lower Lobe: Clear, Diminished         Cardiac  Cardiac Regularity: Regular  Cardiac Rhythm: AV paced    Rhythm Interpretation  Heart Rate: 70         Gastrointestinal  Abdominal (WDL): Exceptions to WDL  Abdomen Inspection: Distended, Soft               Peripheral Vascular  Peripheral Vascular (WDL): Within Defined Limits  RLE Edema: None  LLE Edema: None                   Genitourinary  Genitourinary (WDL): Within Defined Limits    Psychosocial  Psychosocial (WDL): Within Defined Limits                        Heart Rate: 70                   LAB DATA:    BNP  No results for input(s): BNP in the last 72 hours. proBNP  Recent Labs     10/31/22  1300   PROBNP 1,487*         BMP  Recent Labs     10/31/22  1300      K 4.1      CO2 19*   BUN 30*   CREATININE 1.9*   GLUCOSE 87   CALCIUM 10.0           WEIGHTS:  Wt Readings from Last 3 Encounters:   10/31/22 295 lb (133.8 kg)   10/03/22 (!) 303 lb 3.2 oz (137.5 kg)   09/26/22 299 lb (135.6 kg)         TELEMETRY:  Cardiac Regularity: Regular  Cardiac Rhythm/Interpretation: NSR/PACED        ASSESSMENT:  Satya Vitale III's weight is stable. Has no c/o. Interventions completed this visit:  IV diuretics given: no  Lab work obtained: yes, BMP/BNP     Reviewed continue current medications that patient as prescribed answered any questions   Educated on signs and symptoms of HF  Educated on low sodium diet    PLAN:  Future Appointments   Date Time Provider Clemente Davis   11/11/2022 12:15 PM [facility] ROOM 1 [facility]   11/28/2022 12:30 PM [facility] CHF ROOM 1 [facility]     Patient admits that he did not take his diuretic while on vacation in [place]. PT CONFIRMED WILL CALL FOR EARLIER APPT. IF NEEDED. Given clinic phone number and aware of signs and symptoms to call with any HF change in symptoms.

## 2022-11-01 ENCOUNTER — TELEPHONE (OUTPATIENT)
Dept: CARDIOLOGY CLINIC | Age: 42
End: 2022-11-01

## 2022-11-01 DIAGNOSIS — I50.22 CHRONIC SYSTOLIC HEART FAILURE (HCC): ICD-10-CM

## 2022-11-01 DIAGNOSIS — Z79.899 MEDICATION DOSE CHANGED: ICD-10-CM

## 2022-11-01 RX ORDER — BUMETANIDE 2 MG/1
TABLET ORAL
Qty: 45 TABLET | Refills: 3 | Status: SHIPPED | OUTPATIENT
Start: 2022-11-01

## 2022-11-01 NOTE — TELEPHONE ENCOUNTER
CardioMems update:      PAD:  Goal:21   Current trends:  Trends 19-25  Last 3 reads: 25>27>26  (MARILIA on implant 42 and ran high 40's then. . In  2021 ran 20's o 30's. In 2022 July running 30's. )        Providers:  Cardiomems team: DR AUSTEN Delaney CNP, TY Abrams RN  Cardiologist: Dr Mireya Jones  Advanced HF: Dr Elham Higgins (to re-establish)  [facility]  EP: DR Jaz Montalvo  (managing diuretics/ and antiarrhythmics. (Dofetilide)   PCP: Dr Sood Counts: Jude (CPAP ) 1/4/2012  CHF CHW: transportation coordination Oct 2022      Diuretic Protocol:  Bumex 2/4 mg alterating dosing every other day  Aldactone 12.5mg daily         GDMT:  Unable to tolerate SGLT2i- reported throat swelling        Allergies   Allergen Reactions    Farxiga [Dapagliflozin] Other (See Comments)       Caused throat to swell      Entresto 97-103mg BID   Coreg 25mg BID  Isordil  20mg TID  (Hydralazine was discontinued 11/1/2018)      CHF:     HFrEF  Severe nonischemic cardiomyopathy  1-19-21 EF 31%   Echo 8-5-2020 EF 61-97% stage II diastolic dysfunction  [facility] S/P ICD Medtronic Evera [id number]   BiV  7-2013 Cardiac MRI EF 20%    cardiac cath: EF 10-15%        ASSESSMENT:  Seen yesterday at [facility], weight loss 8 lbs from 10 3 to 10-31-22. and he had no c/o. Labs drawn. BUN trending down nicely. Cr improved. Although Pro-BNP increased by 719 and   10-30-22 and 10-31 his PAD was elevated ( 27>26   )     PLAN:  Monitor today his PAD Trend.        Future Appointments   Date Time Provider Clemente Davis   11/11/2022 12:15 PM [facility] CHF ROOM 1 [facility] [place]   11/28/2022 12:30 PM [facility] CHF ROOM 1 SEYZ CHF [address] [place] [address]

## 2022-11-03 DIAGNOSIS — I50.22 CHRONIC SYSTOLIC HEART FAILURE (HCC): ICD-10-CM

## 2022-11-04 RX ORDER — SACUBITRIL AND VALSARTAN 97; 103 MG/1; MG/1
TABLET, FILM COATED ORAL
Qty: 60 TABLET | Refills: 10 | OUTPATIENT
Start: 2022-11-04

## 2022-11-04 NOTE — TELEPHONE ENCOUNTER
Last Appointment:  9/23/2021  Future Appointments   Date Time Provider Clemente Davis   11/11/2022 12:15 PM [facility] CHF ROOM 1 [facility] [facility]   11/28/2022 12:30 PM [facility] CHF ROOM 1 SEYZ CHF Darryle Donate

## 2022-11-06 DIAGNOSIS — E11.8 TYPE 2 DIABETES MELLITUS WITH COMPLICATION, WITH LONG-TERM CURRENT USE OF INSULIN (HCC): Chronic | ICD-10-CM

## 2022-11-06 DIAGNOSIS — Z79.4 TYPE 2 DIABETES MELLITUS WITH COMPLICATION, WITH LONG-TERM CURRENT USE OF INSULIN (HCC): Chronic | ICD-10-CM

## 2022-11-06 DIAGNOSIS — I50.22 CHRONIC SYSTOLIC HEART FAILURE (HCC): ICD-10-CM

## 2022-11-07 RX ORDER — ATORVASTATIN CALCIUM 40 MG/1
40 TABLET, FILM COATED ORAL DAILY
Qty: 30 TABLET | Refills: 10 | OUTPATIENT
Start: 2022-11-07

## 2022-11-07 NOTE — TELEPHONE ENCOUNTER
Last Appointment:  3/14/2022  Future Appointments   Date Time Provider Clemente Davis   11/11/2022 12:15 PM [facility] CHF ROOM 1 [facility] [facility]   11/28/2022 12:30 PM [facility] CHF ROOM 1 YZ CHF Trevor Heads
09-Dec-2019

## 2022-11-08 ENCOUNTER — TELEPHONE (OUTPATIENT)
Dept: CARDIOLOGY CLINIC | Age: 42
End: 2022-11-08

## 2022-11-08 DIAGNOSIS — G47.33 OBSTRUCTIVE SLEEP APNEA: ICD-10-CM

## 2022-11-08 DIAGNOSIS — E66.01 MORBID OBESITY DUE TO EXCESS CALORIES (HCC): ICD-10-CM

## 2022-11-08 DIAGNOSIS — I50.22 CHRONIC SYSTOLIC HEART FAILURE (HCC): Primary | ICD-10-CM

## 2022-11-08 DIAGNOSIS — I10 ESSENTIAL HYPERTENSION: ICD-10-CM

## 2022-11-08 DIAGNOSIS — R06.09 EXERTIONAL DYSPNEA: ICD-10-CM

## 2022-11-08 NOTE — TELEPHONE ENCOUNTER
CardioMems update:  Elevated PAD trends since 10-29-22. HR 70's-80's (last 4 trends MARILIA in the 40's)    PAD:  Goal: 21   Current trends:23-29  Today's reading 28        Providers:  Cardiomems team: TY Guo CNP, RN  Cardiologist: Marina Barreto  Advanced HF: Dr Anita Couch)  Tami Saucedo, Marilyn Castillo MD    [address] [facility]    [address], [address] [place] [address]    [phone] (Work)    [phone] (Fax)    [facility]  EP: DR Chuy Duarte  (managing diuretics/ and antiarrhythmics. (Dofetilide)   PCP: Dr Tashi Alanis: Dr Divina Estevez (CPAP ) 1/4/2012   CHF CHW: transportation coordination Oct 2022- use insurance's transportation  Nephrology: Joselito Us    Diuretic Protocol:  Bumex 2/4 mg alterating dosing every other day  Aldactone 12.5mg daily         GDMT:  Unable to tolerate SGLT2i- reported throat swelling            Allergies   Allergen Reactions    Farxiga [Dapagliflozin] Other (See Comments)       Caused throat to swell      Entresto 97-103mg BID   Coreg 25mg BID  Isordil  20mg TID  (Hydralazine was discontinued 11/1/2018)        CHF:    HFrEF  Severe nonischemic cardiomyopathy  1-19-21 EF 31%   Echo 8-5-2020 EF 90-79% stage II diastolic dysfunction  9[facility] 9[facility]2018 S/P ICD Medtronic Evera [id number]   BiV  7-2013 Cardiac MRI EF 20%    cardiac cath: EF 10-15%          ASSESSMENT:  Had apt 11-3-22 set to re- establish with DR Mcdaniel. (Says he did re-establish. Wants to get bariatric surgery  and is going to utilize [facility] as locally bariatrics didn't move forward due to his heart issues). Feels good. Denies worsening s/s chf despite increased PAS/ PAD/MARILIA   Urine is lemonade to clear urine. Good volume  Using cpap nightly (only c/o is dry throat in am). Says it's been years since PAP titrated, but gets monthly supplies ([employer] [facility] [place] [facility])    PLAN:  Keep Us apt today (Nephrologist)  Send Cardiomems reading today.  Sent while on phone.  Lorin Smith CNP instructions:  Titration sleep study and sleep specialist consult. Take 4mg bumex daily for 3 days. ([id number]) then resume 2/4 bumex schedule every other day (today was a 2mg bumex day so he is increasing his Tuesday and Thursday dose to 4mg). At next CHF Clinic visit, provide bp cuff for home monitoring. Haresh Lovettmeet MONTESINOS 1980 [id number]     I have reviewed the provider Lorin Smith CNP instructions with the patient, answering all questions to his satisfaction. 8:58 AM spoke with Jemima Barclay at sleep lab. He will wait for sleep dr to see patient and send sleep study with notes from Dr Alba Diaz to set sleep study.

## 2022-11-11 ENCOUNTER — HOSPITAL ENCOUNTER (OUTPATIENT)
Dept: OTHER | Age: 42
Setting detail: THERAPIES SERIES
Discharge: HOME | End: 2022-11-11
Payer: MEDICARE

## 2022-11-11 VITALS
OXYGEN SATURATION: 96 % | BODY MASS INDEX: 40.14 KG/M2 | RESPIRATION RATE: 18 BRPM | HEART RATE: 69 BPM | DIASTOLIC BLOOD PRESSURE: 71 MMHG | WEIGHT: 296 LBS | SYSTOLIC BLOOD PRESSURE: 122 MMHG

## 2022-11-11 LAB
ANION GAP SERPL CALCULATED.3IONS-SCNC: 16 MMOL/L (ref 7–16)
BUN BLDV-MCNC: 37 MG/DL (ref 6–20)
CALCIUM SERPL-MCNC: 10.1 MG/DL (ref 8.6–10.2)
CHLORIDE BLD-SCNC: 100 MMOL/L (ref 98–107)
CO2: 20 MMOL/L (ref 22–29)
CREAT SERPL-MCNC: 2.3 MG/DL (ref 0.7–1.2)
GFR SERPL CREATININE-BSD FRML MDRD: 35 ML/MIN/1.73
GLUCOSE BLD-MCNC: 190 MG/DL (ref 74–99)
POTASSIUM SERPL-SCNC: 4 MMOL/L (ref 3.5–5)
PRO-BNP: 1888 PG/ML (ref 0–125)
SODIUM BLD-SCNC: 136 MMOL/L (ref 132–146)

## 2022-11-11 PROCEDURE — 99214 OFFICE O/P EST MOD 30 MIN: CPT

## 2022-11-11 PROCEDURE — 83880 ASSAY OF NATRIURETIC PEPTIDE: CPT

## 2022-11-11 PROCEDURE — 80048 BASIC METABOLIC PNL TOTAL CA: CPT

## 2022-11-11 NOTE — PROGRESS NOTES
[facility]   CHF Clinic       Grand rapids III   1980  [phone]       Referring Doctor: Kitty Phillips  Cardiologist: Paula Davis  Nephrologist: n/a  PCP: Pepper Gallus      History of Present Illness:     Grand kelvin MONTESINOS is a 43 y.o. male with a history of HFrEF, most recent EF 31% on 1/19/2021. Patient Story:  He does NOT  have dyspnea with exertion, shortness of breath, or decline in overall functional capacity. He does not have orthopnea, PND, nocturnal cough or hemoptysis. He denies  abdominal distention or bloating, early satiety, anorexia/change in appetite. He does  a good urinary response to his oral diuretic. He does not have  lower extremity edema. He does not have lightheadedness, dizziness. He denies  syncope or near syncope. He denies chest pain or palpitations. Allergies   Allergen Reactions    Farxiga [Dapagliflozin] Other (See Comments)     Caused throat to swell         Prior to Visit Medications    Medication Sig Taking? Authorizing Provider   bumetanide (BUMEX) 2 MG tablet 2mg every other day and  4mg on the other days. 2/4 alteration.   Edmundo Llanes MD   insulin lispro (HUMALOG) 100 UNIT/ML SOLN injection vial INJECT 20 UNITS SUBCUTANEOUSLY THREE TIMES A DAY BEFORE MEALS  Maged I Awadalla, MD   ONETOUCH ULTRA strip USE TO TEST BLOOD SUGAR 2 TO 3 TIMES DAILY AND AS NEEDED FOR SYMPTOMS OF IRREGULAR BLOOD SUGAR  Maged I Awadalla, MD   spironolactone (ALDACTONE) 25 MG tablet TAKE 1/2 TABLET BY MOUTH ONCE DAILY  Maged I Awadalla, MD   carvedilol (COREG) 25 MG tablet TAKE ONE (1) TABLET BY MOUTH TWICE DAILY WITH MEALS  Edmundo Llanes MD   dofetilide (TIKOSYN) 250 MCG capsule Take 1 capsule by mouth 2 times daily  Jt Cooley MD   rivaroxaban (XARELTO) 15 MG TABS tablet Take 1 tablet by mouth daily (with breakfast)  Jt Cooley MD   magnesium oxide (MAG-OX) 400 MG tablet TAKE 1 TABLET BY MOUTH DAILY  Rock Abbey MD   atorvastatin (LIPITOR) 40 MG tablet TAKE 1 TABLET BY MOUTH DAILY  Asif Alvarado MD   LEVEMIR FLEXTOUCH 100 UNIT/ML injection pen INJECT 40 Marioncimeet Mcgill MD   allopurinol (ZYLOPRIM) 300 MG tablet TAKE 1 TABLET BY MOUTH DAILY  Derek Green MD   isosorbide dinitrate (ISORDIL) 10 MG tablet TAKE TWO (2) TABLETS BY MOUTH THREE TIMES A DAY  Wanda Gomez MD   nitroGLYCERIN (NITROSTAT) 0.4 MG SL tablet DISSOLVE 1 TABLET UNDER THE TONGUE AS NEEDED FOR CHEST PAIN EVERY 5 MINUTES UP TO 3 TIMES. IF NO RELIEF CALL 911. Asif Alvarado MD   metFORMIN (GLUCOPHAGE) 1000 MG tablet TAKE ONE (1) TABLET BY MOUTH TWICE DAILY WITH MEALS  Asif Alvarado MD   sacubitril-valsartan (ENTRESTO)  MG per tablet Take 1 tablet by mouth 2 times daily  Ector Cesar MD   Blood Pressure KIT 1 kit by Does not apply route daily  Juancarlos Baltazar MD   Continuous Blood Gluc Transmit (DEXCOM G6 TRANSMITTER) MISC USE AS DIRECTED  Ector Cesar MD   rivaroxaban (XARELTO) 20 MG TABS tablet Take 20 mg by mouth Daily with supper  Historical Provider, MD   magnesium oxide (MAG-OX) 400 MG tablet Take 1 tablet by mouth daily  Ector Cesar MD             Guideline directed medical:  ARNI/ACE I/ARB: Yes  Beta blocker:   Yes  Aldosterone antagonist:  Yes        Physical Examination:     /71   Pulse 69   Resp 18   Wt 296 lb (134.3 kg)   SpO2 96%   BMI 40.14 kg/m²     Assessment  Charting Type: Shift assessment (chf clinic)    Neurological  Orientation Level: Oriented X4         HEENT (Head, Ears, Eyes, Nose, & Throat)  HEENT (WDL): Within Defined Limits  Right Eye: Glasses  Left Eye: Glasses    Respiratory  Respiratory Pattern: Regular  Respiratory Depth: Normal  Respiratory Quality/Effort: Unlabored  Chest Assessment: Chest expansion symmetrical  L Breath Sounds: Clear  R Breath Sounds: Clear    Breath Sounds  Right Upper Lobe: Clear  Right Middle Lobe: Clear  Right Lower Lobe: Clear  Left Upper Lobe: Clear  Left Lower Lobe: Clear         Cardiac  Cardiac Regularity: Regular  Cardiac Rhythm: AV paced    Rhythm Interpretation  Heart Rate: 69         Gastrointestinal  Abdominal (WDL): Exceptions to WDL  Abdomen Inspection: Distended, Soft               Peripheral Vascular  Peripheral Vascular (WDL): Within Defined Limits  RLE Edema: None  LLE Edema: None                   Genitourinary  Genitourinary (WDL): Within Defined Limits    Psychosocial  Psychosocial (WDL): Within Defined Limits                        Heart Rate: 69                   LAB DATA:    BNP  No results for input(s): BNP in the last 72 hours. proBNP  No results for input(s): PROBNP in the last 72 hours. BMP  No results for input(s): NA, K, CL, CO2, BUN, CREATININE, GLUCOSE, CALCIUM in the last 72 hours. WEIGHTS:  Wt Readings from Last 3 Encounters:   11/11/22 296 lb (134.3 kg)   10/31/22 295 lb (133.8 kg)   10/03/22 (!) 303 lb 3.2 oz (137.5 kg)         TELEMETRY:  Cardiac Regularity: Regular  Cardiac Rhythm/Interpretation: NSR/PACED        ASSESSMENT:  Bud Score III's weight is stable. Has no c/o. Patient received his BP cuff for home use. Interventions completed this visit:  IV diuretics given: no  Lab work obtained: yes, BMP/BNP     Reviewed continue current medications that patient as prescribed answered any questions   Educated on signs and symptoms of HF  Educated on low sodium diet    PLAN:  Future Appointments   Date Time Provider Clemente Davis   11/11/2022 12:15 PM [facility] CHF ROOM 1 [facility]   11/28/2022 12:30 PM [facility] CHF ROOM 1 [facility]   1/12/2023  2:00 PM DO FARIDEH [place] [facility]EP [facility]       PT CONFIRMED WILL CALL FOR EARLIER APPT. IF NEEDED. Given clinic phone number and aware of signs and symptoms to call with any HF change in symptoms.

## 2022-11-12 DIAGNOSIS — I50.42 CHRONIC COMBINED SYSTOLIC AND DIASTOLIC CONGESTIVE HEART FAILURE (HCC): Primary | ICD-10-CM

## 2022-11-14 NOTE — TELEPHONE ENCOUNTER
Says he voided from the extra diuretic (4mg bumex for 3 days then returned to 2/4 every other day scheduling). Did get bp cuff and is doing readings. Saw CHF Clinic 11/11. And per DR Kraig Rivero having repeat labs this week (inc bun/cr/pro-bnp )     He is going to send CardioMems reading when he gets home. This will be 3 days no send.

## 2022-11-15 DIAGNOSIS — E11.8 TYPE 2 DIABETES MELLITUS WITH COMPLICATION, WITH LONG-TERM CURRENT USE OF INSULIN (HCC): Chronic | ICD-10-CM

## 2022-11-15 DIAGNOSIS — I50.22 CHRONIC SYSTOLIC HEART FAILURE (HCC): ICD-10-CM

## 2022-11-15 DIAGNOSIS — Z79.4 TYPE 2 DIABETES MELLITUS WITH COMPLICATION, WITH LONG-TERM CURRENT USE OF INSULIN (HCC): Chronic | ICD-10-CM

## 2022-11-15 RX ORDER — ATORVASTATIN CALCIUM 40 MG/1
40 TABLET, FILM COATED ORAL DAILY
Qty: 30 TABLET | Refills: 10 | OUTPATIENT
Start: 2022-11-15

## 2022-11-23 ENCOUNTER — TELEPHONE (OUTPATIENT)
Dept: CARDIOLOGY CLINIC | Age: 42
End: 2022-11-23

## 2022-11-23 NOTE — TELEPHONE ENCOUNTER
Heart rate out of target threshold in [place] (cardiomems) 105  last send. has been trending mid 70's to 80  Last 3 reading PAD is elevated 30's and MARILIA elevated 40's to 50. [facility], He has been feeling his heart rate beating fast at times. He sent reading last night with fast heart rate 105. No other c/o. He will hang up with me and call Dr Asha Gaines office (she manages his rates). I will route her this message so she can review Mems also.     And will CC Eron Flaherty CNP with cardiomems team.

## 2022-11-25 ENCOUNTER — HOSPITAL ENCOUNTER (OUTPATIENT)
Dept: ULTRASOUND IMAGING | Age: 42
Discharge: HOME | End: 2022-11-27
Payer: MEDICARE

## 2022-11-25 DIAGNOSIS — I12.9 RENAL HYPERTENSION: ICD-10-CM

## 2022-11-25 DIAGNOSIS — N18.32 STAGE 3B CHRONIC KIDNEY DISEASE (HCC): ICD-10-CM

## 2022-11-25 DIAGNOSIS — N18.6 TYPE 2 DIABETES MELLITUS WITH ESRD (END-STAGE RENAL DISEASE) (HCC): ICD-10-CM

## 2022-11-25 DIAGNOSIS — E11.22 TYPE 2 DIABETES MELLITUS WITH ESRD (END-STAGE RENAL DISEASE) (HCC): ICD-10-CM

## 2022-11-25 DIAGNOSIS — I50.9 HEART FAILURE, UNSPECIFIED HF CHRONICITY, UNSPECIFIED HEART FAILURE TYPE (HCC): ICD-10-CM

## 2022-11-25 DIAGNOSIS — E78.5 HYPERLIPIDEMIA, UNSPECIFIED HYPERLIPIDEMIA TYPE: ICD-10-CM

## 2022-11-25 PROCEDURE — 76770 US EXAM ABDO BACK WALL COMP: CPT

## 2022-11-28 ENCOUNTER — HOSPITAL ENCOUNTER (OUTPATIENT)
Dept: OTHER | Age: 42
Setting detail: THERAPIES SERIES
Discharge: HOME | End: 2022-11-28
Payer: MEDICARE

## 2022-11-28 VITALS
SYSTOLIC BLOOD PRESSURE: 129 MMHG | HEART RATE: 64 BPM | WEIGHT: 302 LBS | DIASTOLIC BLOOD PRESSURE: 77 MMHG | RESPIRATION RATE: 18 BRPM | BODY MASS INDEX: 40.96 KG/M2 | OXYGEN SATURATION: 96 %

## 2022-11-28 DIAGNOSIS — I50.22 CHRONIC SYSTOLIC HEART FAILURE (HCC): Primary | ICD-10-CM

## 2022-11-28 LAB
ANION GAP SERPL CALCULATED.3IONS-SCNC: 15 MMOL/L (ref 7–16)
BUN BLDV-MCNC: 52 MG/DL (ref 6–20)
CALCIUM SERPL-MCNC: 10 MG/DL (ref 8.6–10.2)
CHLORIDE BLD-SCNC: 105 MMOL/L (ref 98–107)
CO2: 20 MMOL/L (ref 22–29)
CREAT SERPL-MCNC: 2.2 MG/DL (ref 0.7–1.2)
GFR SERPL CREATININE-BSD FRML MDRD: 37 ML/MIN/1.73
GLUCOSE BLD-MCNC: 118 MG/DL (ref 74–99)
POTASSIUM SERPL-SCNC: 4 MMOL/L (ref 3.5–5)
PRO-BNP: 774 PG/ML (ref 0–125)
SODIUM BLD-SCNC: 140 MMOL/L (ref 132–146)

## 2022-11-28 PROCEDURE — 83880 ASSAY OF NATRIURETIC PEPTIDE: CPT

## 2022-11-28 PROCEDURE — 36415 COLL VENOUS BLD VENIPUNCTURE: CPT

## 2022-11-28 PROCEDURE — 80048 BASIC METABOLIC PNL TOTAL CA: CPT

## 2022-11-28 PROCEDURE — 99214 OFFICE O/P EST MOD 30 MIN: CPT

## 2022-11-28 NOTE — TELEPHONE ENCOUNTER
Has CHF Clinic visit set for today with labs for Dr Luke Sanchez. . Yesterday day's HR 75 on [place] site. PAD 29, MARILIA 42.      Future Appointments   Date Time Provider Clemente Davis   11/28/2022 12:30 PM [facility] CHF ROOM 1 [facility]   1/12/2023  2:00 PM DO FARIDEH [place] SLEEP [facility]       Routing message 11 23 22 :    Opened by: NATI Ceja CNP         on Wed Nov 23, 2022  2:05 PM       Opened by: Jomar Lira MD                     on Wed Nov 23, 2022  5:15 PM

## 2022-11-28 NOTE — PLAN OF CARE
Problem: Chronic Conditions and Co-morbidities  Goal: Patient's chronic conditions and co-morbidity symptoms are monitored and maintained or improved  Flowsheets (Taken 11/28/2022 4839)  Care Plan - Patient's Chronic Conditions and Co-Morbidity Symptoms are Monitored and Maintained or Improved: Monitor and assess patient's chronic conditions and comorbid symptoms for stability, deterioration, or improvement

## 2022-11-28 NOTE — PROGRESS NOTES
[facility]   CHF Clinic       [place]   1980  [phone]       Referring Doctor: Jaz Montalvo  Cardiologist: Foster Ayala  Nephrologist: n/a  PCP: Shiva Guerin      History of Present Illness:     Grand kelvin MONTESINOS is a 43 y.o. male with a history of HFrEF, most recent EF 31% on 1/19/2021. Patient Story:  He does NOT have dyspnea with exertion, shortness of breath, or decline in overall functional capacity. He does not have orthopnea, PND, nocturnal cough or hemoptysis. He denies abdominal distention or bloating, early satiety, anorexia/change in appetite. He does have a good urinary response to his oral diuretic. He does not have  lower extremity edema. He does not have lightheadedness, dizziness. He denies syncope or near syncope. He denies chest pain or palpitations. Allergies   Allergen Reactions    Farxiga [Dapagliflozin] Other (See Comments)     Caused throat to swell         Prior to Visit Medications    Medication Sig Taking? Authorizing Provider   bumetanide (BUMEX) 2 MG tablet 2mg every other day and  4mg on the other days. 2/4 alteration.   Hakan Harding MD   insulin lispro (HUMALOG) 100 UNIT/ML SOLN injection vial INJECT 20 UNITS SUBCUTANEOUSLY THREE TIMES A DAY BEFORE MEALS  Maged I Awadalla, MD   ONETOUCH ULTRA strip USE TO TEST BLOOD SUGAR 2 TO 3 TIMES DAILY AND AS NEEDED FOR SYMPTOMS OF IRREGULAR BLOOD SUGAR  Maged I Awadalla, MD   spironolactone (ALDACTONE) 25 MG tablet TAKE 1/2 TABLET BY MOUTH ONCE DAILY  Maged I Awadalla, MD   carvedilol (COREG) 25 MG tablet TAKE ONE (1) TABLET BY MOUTH TWICE DAILY WITH MEALS  Hakan Harding MD   dofetilide (TIKOSYN) 250 MCG capsule Take 1 capsule by mouth 2 times daily  Rakesh Langford MD   rivaroxaban (XARELTO) 15 MG TABS tablet Take 1 tablet by mouth daily (with breakfast)  Rakesh Langford MD   magnesium oxide (MAG-OX) 400 MG tablet TAKE 1 TABLET BY MOUTH DAILY  Modesto Dumont MD   atorvastatin (LIPITOR) 40 MG tablet TAKE 1 TABLET BY MOUTH DAILY  Claribel Guerra MD   LEVEMIR FLEXTOUCH 100 UNIT/ML injection pen INJECT 40 Shauna Green MD   allopurinol (ZYLOPRIM) 300 MG tablet TAKE 1 TABLET BY MOUTH DAILY  Rafa Larsen MD   isosorbide dinitrate (ISORDIL) 10 MG tablet TAKE TWO (2) TABLETS BY MOUTH THREE TIMES A DAY  May Solorio MD   nitroGLYCERIN (NITROSTAT) 0.4 MG SL tablet DISSOLVE 1 TABLET UNDER THE TONGUE AS NEEDED FOR CHEST PAIN EVERY 5 MINUTES UP TO 3 TIMES. IF NO RELIEF CALL 911. Patient not taking: Reported on 11/28/2022  Claribel Guerra MD   metFORMIN (GLUCOPHAGE) 1000 MG tablet TAKE ONE (1) TABLET BY MOUTH TWICE DAILY WITH MEALS  Claribel Guerra MD   sacubitril-valsartan (ENTRESTO)  MG per tablet Take 1 tablet by mouth 2 times daily  Dipak Sanon MD   Blood Pressure KIT 1 kit by Does not apply route daily  Rosalinda Meza MD   Continuous Blood Gluc Transmit (DEXCOM G6 TRANSMITTER) MISC USE AS DIRECTED  Dipak Sanon MD   rivaroxaban (XARELTO) 20 MG TABS tablet Take 20 mg by mouth Daily with supper  Historical Provider, MD   magnesium oxide (MAG-OX) 400 MG tablet Take 1 tablet by mouth daily  Dipak Sanon MD             Guideline directed medical:  ARNI/ACE I/ARB: Yes  Beta blocker: Yes  Aldosterone antagonist:  Yes        Physical Examination:     /77 Comment: Simultaneous filing. User may not have seen previous data. Pulse 64 Comment: Simultaneous filing. User may not have seen previous data. Resp 18 Comment: Simultaneous filing. User may not have seen previous data. Wt (!) 302 lb (137 kg) Comment: Simultaneous filing. User may not have seen previous data. SpO2 96% Comment: Simultaneous filing. User may not have seen previous data.   BMI 40.96 kg/m²     Assessment  Charting Type: Shift assessment (chf clinic)    Neurological  Level of Consciousness: Alert (0)  Orientation Level: Oriented X4         HEENT (Head, Ears, Eyes, Nose, & Throat)  HEENT (WDL): Within Defined Limits  Right Eye: Glasses  Left Eye: Glasses    Respiratory  Respiratory Pattern: Regular  Respiratory Depth: Normal  Respiratory Quality/Effort: Unlabored  Chest Assessment: Chest expansion symmetrical  L Breath Sounds: Clear  R Breath Sounds: Clear    Breath Sounds  Right Upper Lobe: Clear  Right Middle Lobe: Clear  Right Lower Lobe: Clear  Left Upper Lobe: Clear  Left Lower Lobe: Clear         Cardiac  Cardiac Regularity: Regular  Cardiac Rhythm: AV paced    Rhythm Interpretation  Heart Rate: 64 (Simultaneous filing. User may not have seen previous data.)         Gastrointestinal  Abdominal (WDL): Exceptions to WDL  Abdomen Inspection: Distended, Soft               Peripheral Vascular  Peripheral Vascular (WDL): Within Defined Limits  RLE Edema: None  LLE Edema: None                   Genitourinary  Genitourinary (WDL): Within Defined Limits    Psychosocial  Psychosocial (WDL): Within Defined Limits                        Heart Rate: 64 (Simultaneous filing. User may not have seen previous data.)                   LAB DATA:    BNP  No results for input(s): BNP in the last 72 hours. proBNP  No results for input(s): PROBNP in the last 72 hours. BMP  No results for input(s): NA, K, CL, CO2, BUN, CREATININE, GLUCOSE, CALCIUM in the last 72 hours. WEIGHTS:  Wt Readings from Last 3 Encounters:   11/28/22 (!) 302 lb (137 kg)   11/11/22 296 lb (134.3 kg)   10/31/22 295 lb (133.8 kg)         TELEMETRY:  Cardiac Regularity: Regular  Cardiac Rhythm/Interpretation: NSR/PACED        ASSESSMENT:  Jose Agosto III's weight 300lbs, which is up 4lbs from previous appt 11/11/22 but states he was eating more over the Thanksgiving holiday. Denies SOB or discomfort. Patient received his BP cuff for home use.      Interventions completed this visit:  IV diuretics given: No  Lab work obtained: Yes, BMP/BNP     Reviewed continue current medications that patient as prescribed answered any questions   Educated on signs and symptoms of HF  Educated on low sodium diet    PLAN:  Future Appointments   Date Time Provider Clemente Davis   11/28/2022 12:30 PM [facility] ROOM 1 [facility]   12/27/2022 12:00 PM [facility] ROOM 1 [facility]   1/12/2023  2:00 PM DO FAR[facility]H [facility]       PT CONFIRMED WILL CALL FOR EARLIER APPT. IF NEEDED. Given clinic phone number and aware of signs and symptoms to call with any HF change in symptoms.

## 2022-12-06 DIAGNOSIS — E11.8 TYPE 2 DIABETES MELLITUS WITH COMPLICATION, WITH LONG-TERM CURRENT USE OF INSULIN (HCC): Chronic | ICD-10-CM

## 2022-12-06 DIAGNOSIS — Z79.4 TYPE 2 DIABETES MELLITUS WITH COMPLICATION, WITH LONG-TERM CURRENT USE OF INSULIN (HCC): Chronic | ICD-10-CM

## 2022-12-06 DIAGNOSIS — I50.22 CHRONIC SYSTOLIC HEART FAILURE (HCC): ICD-10-CM

## 2022-12-06 NOTE — TELEPHONE ENCOUNTER
11-25 had US retroperitoneal complete by Dr Dena Guerrero  11-28-22 last seen in [facility] labs done. 11-3-22  Re-established with Dr Lisa Pineda (advanced [facility]). Per patient to be worked up for gastric bypass at [facility]. 11/21/22 Dr Brandin Vo  working up for laparoscopic sleeve gastrectomy and laparoscopic liver biopsy. Brandin Vo DO (Surgeon)  [phone] (Work)  [phone] (Fax)    [phone] (home)  called Greyson Betancourt III 2:49 PM feels good. Making good urine. Clear in color. Denies SOB, or change to swelling. Self checks bp   100/74 today. Denies dizzy/lightheaded. He is following list of foods to eat/not eat from dr Nayeli Mcbride. Going to  protein shakes as directed. Weighs self. 294lbs today. He is going for outpatient labs for Dr Ezequiel Avila this week ([facility])  Going to start work up for gastric bypass. Patient was told to contact Dr Marybeth Marquez for clearance. Please advise. Will forward this to Dr Marybeth Marquez and Dr Lisa Pineda to review.     Future Appointments   Date Time Provider Clemente Davis   12/27/2022 12:00 PM [place] ROOM 1 [facility]   1/6/2023 10:00 PM [facility] BEDROOM 1 [facility]   1/12/2023  2:00 PM DO F[facility]IDEH [place] [facility]

## 2022-12-07 ENCOUNTER — HOSPITAL ENCOUNTER (OUTPATIENT)
Age: 42
Discharge: HOME | End: 2022-12-07
Payer: MEDICARE

## 2022-12-07 DIAGNOSIS — I50.42 CHRONIC COMBINED SYSTOLIC AND DIASTOLIC CONGESTIVE HEART FAILURE (HCC): ICD-10-CM

## 2022-12-07 LAB
ANION GAP SERPL CALCULATED.3IONS-SCNC: 18 MMOL/L (ref 7–16)
BUN BLDV-MCNC: 43 MG/DL (ref 6–20)
CALCIUM SERPL-MCNC: 10.4 MG/DL (ref 8.6–10.2)
CHLORIDE BLD-SCNC: 102 MMOL/L (ref 98–107)
CO2: 21 MMOL/L (ref 22–29)
CREAT SERPL-MCNC: 2.2 MG/DL (ref 0.7–1.2)
GFR SERPL CREATININE-BSD FRML MDRD: 37 ML/MIN/1.73
GLUCOSE BLD-MCNC: 111 MG/DL (ref 74–99)
POTASSIUM SERPL-SCNC: 4.1 MMOL/L (ref 3.5–5)
SODIUM BLD-SCNC: 141 MMOL/L (ref 132–146)

## 2022-12-07 PROCEDURE — 80048 BASIC METABOLIC PNL TOTAL CA: CPT

## 2022-12-07 PROCEDURE — 36415 COLL VENOUS BLD VENIPUNCTURE: CPT

## 2022-12-08 RX ORDER — ATORVASTATIN CALCIUM 40 MG/1
TABLET, FILM COATED ORAL
Qty: 30 TABLET | Refills: 10 | OUTPATIENT
Start: 2022-12-08

## 2022-12-08 NOTE — TELEPHONE ENCOUNTER
Last Appointment:  3/14/2022  Future Appointments   Date Time Provider Clemente Davis   12/27/2022 12:00 PM [facility] ROOM 1 [facility]   1/6/2023 10:00 PM [facility] BEDROOM 1 [facility]   1/12/2023  2:00 PM DO [facility]

## 2022-12-12 DIAGNOSIS — Z79.4 TYPE 2 DIABETES MELLITUS WITH COMPLICATION, WITH LONG-TERM CURRENT USE OF INSULIN (HCC): Chronic | ICD-10-CM

## 2022-12-12 DIAGNOSIS — I50.22 CHRONIC SYSTOLIC HEART FAILURE (HCC): ICD-10-CM

## 2022-12-12 DIAGNOSIS — E11.8 TYPE 2 DIABETES MELLITUS WITH COMPLICATION, WITH LONG-TERM CURRENT USE OF INSULIN (HCC): Chronic | ICD-10-CM

## 2022-12-13 RX ORDER — ATORVASTATIN CALCIUM 40 MG/1
TABLET, FILM COATED ORAL
Qty: 30 TABLET | Refills: 10 | OUTPATIENT
Start: 2022-12-13

## 2022-12-14 DIAGNOSIS — I50.22 CHRONIC SYSTOLIC HEART FAILURE (HCC): ICD-10-CM

## 2022-12-14 DIAGNOSIS — E11.8 TYPE 2 DIABETES MELLITUS WITH COMPLICATION, WITH LONG-TERM CURRENT USE OF INSULIN (HCC): Chronic | ICD-10-CM

## 2022-12-14 DIAGNOSIS — Z79.4 TYPE 2 DIABETES MELLITUS WITH COMPLICATION, WITH LONG-TERM CURRENT USE OF INSULIN (HCC): Chronic | ICD-10-CM

## 2022-12-14 NOTE — TELEPHONE ENCOUNTER
Last Appointment:  3/14/2022  Future Appointments   Date Time Provider Clemente Davis   12/27/2022 12:00 PM [facility] ROOM 1 [facility]   1/6/2023 10:00 PM [facility] BEDROOM 1 [facility]   1/12/2023  2:00 PM DO [facility]
DISPLAY PLAN FREE TEXT

## 2022-12-14 NOTE — TELEPHONE ENCOUNTER
Last Appointment:  9/23/2021  Future Appointments   Date Time Provider Clemente Davis   12/27/2022 12:00 PM [facility] ROOM 1 [facility]   1/6/2023 10:00 PM [facility] BEDROOM 1 [facility]   1/12/2023  2:00 PM DO [facility]

## 2022-12-27 ENCOUNTER — HOSPITAL ENCOUNTER (OUTPATIENT)
Dept: OTHER | Age: 42
Setting detail: THERAPIES SERIES
Discharge: HOME | End: 2022-12-27
Payer: MEDICARE

## 2022-12-27 VITALS
BODY MASS INDEX: 40.01 KG/M2 | DIASTOLIC BLOOD PRESSURE: 66 MMHG | HEART RATE: 70 BPM | RESPIRATION RATE: 18 BRPM | WEIGHT: 295 LBS | SYSTOLIC BLOOD PRESSURE: 117 MMHG | OXYGEN SATURATION: 98 %

## 2022-12-27 LAB
ANION GAP SERPL CALCULATED.3IONS-SCNC: 13 MMOL/L (ref 7–16)
BUN BLDV-MCNC: 65 MG/DL (ref 6–20)
CALCIUM SERPL-MCNC: 10.3 MG/DL (ref 8.6–10.2)
CHLORIDE BLD-SCNC: 103 MMOL/L (ref 98–107)
CO2: 19 MMOL/L (ref 22–29)
CREAT SERPL-MCNC: 2.5 MG/DL (ref 0.7–1.2)
GFR SERPL CREATININE-BSD FRML MDRD: 32 ML/MIN/1.73
GLUCOSE BLD-MCNC: 117 MG/DL (ref 74–99)
POTASSIUM SERPL-SCNC: 4.2 MMOL/L (ref 3.5–5)
PRO-BNP: 1254 PG/ML (ref 0–125)
SODIUM BLD-SCNC: 135 MMOL/L (ref 132–146)

## 2022-12-27 PROCEDURE — 99214 OFFICE O/P EST MOD 30 MIN: CPT

## 2022-12-27 PROCEDURE — 83880 ASSAY OF NATRIURETIC PEPTIDE: CPT

## 2022-12-27 PROCEDURE — 36415 COLL VENOUS BLD VENIPUNCTURE: CPT

## 2022-12-27 PROCEDURE — 80048 BASIC METABOLIC PNL TOTAL CA: CPT

## 2022-12-27 NOTE — PROGRESS NOTES
[facility] [address] [place] [address]   CHF Clinic       [place]   1980  [phone]       Referring Doctor: Asha Arnold  Cardiologist: Amauri New  Nephrologist: n/a  PCP: Harmony Clark      History of Present Illness:     Grand k[place] MONTESINOS is a 43 y.o. male with a history of HFrEF, most recent EF 31% on 1/19/2021. Patient Story:  He does NOT have dyspnea with exertion, shortness of breath, or decline in overall functional capacity. He does not have orthopnea, PND, nocturnal cough or hemoptysis. He denies abdominal distention or bloating, early satiety, anorexia/change in appetite. He does have a good urinary response to his oral diuretic. He does not have  lower extremity edema. He does not have lightheadedness, dizziness. He denies syncope or near syncope. He denies chest pain or palpitations. Allergies   Allergen Reactions    Farxiga [Dapagliflozin] Other (See Comments)     Caused throat to swell         Prior to Visit Medications    Medication Sig Taking? Authorizing Provider   bumetanide (BUMEX) 2 MG tablet 2mg every other day and  4mg on the other days. 2/4 alteration.   Gela Alarcon MD   insulin lispro (HUMALOG) 100 UNIT/ML SOLN injection vial INJECT 20 UNITS SUBCUTANEOUSLY THREE TIMES A DAY BEFORE MEALS  Maged I Awadalla, MD   ONETOUCH ULTRA strip USE TO TEST BLOOD SUGAR 2 TO 3 TIMES DAILY AND AS NEEDED FOR SYMPTOMS OF IRREGULAR BLOOD SUGAR  Maged I Awadalla, MD   spironolactone (ALDACTONE) 25 MG tablet TAKE 1/2 TABLET BY MOUTH ONCE DAILY  Maged I Awadalla, MD   carvedilol (COREG) 25 MG tablet TAKE ONE (1) TABLET BY MOUTH TWICE DAILY WITH MEALS  Gela Alarcon MD   dofetilide (TIKOSYN) 250 MCG capsule Take 1 capsule by mouth 2 times daily  June Officer, MD   rivaroxaban (XARELTO) 15 MG TABS tablet Take 1 tablet by mouth daily (with breakfast)  June Officer, MD   magnesium oxide (MAG-OX) 400 MG tablet TAKE 1 TABLET BY MOUTH DAILY  Yamile Delgado MD   atorvastatin (LIPITOR) 40 MG tablet TAKE 1 TABLET BY MOUTH DAILY  Mike Peoples MD   LEVEMIR FLEXTOUCH 100 UNIT/ML injection pen INJECT 40 Denis Pena MD   allopurinol (ZYLOPRIM) 300 MG tablet TAKE 1 TABLET BY MOUTH DAILY  Verona Connelly MD   isosorbide dinitrate (ISORDIL) 10 MG tablet TAKE TWO (2) TABLETS BY MOUTH THREE TIMES A DAY  Cassy Givens MD   nitroGLYCERIN (NITROSTAT) 0.4 MG SL tablet DISSOLVE 1 TABLET UNDER THE TONGUE AS NEEDED FOR CHEST PAIN EVERY 5 MINUTES UP TO 3 TIMES. IF NO RELIEF CALL 911. Patient not taking: Reported on 11/28/2022  Mike Peoples MD   metFORMIN (GLUCOPHAGE) 1000 MG tablet TAKE ONE (1) TABLET BY MOUTH TWICE DAILY WITH MEALS  Mike Peoples MD   sacubitril-valsartan (ENTRESTO)  MG per tablet Take 1 tablet by mouth 2 times daily  Piotr Boyle MD   Blood Pressure KIT 1 kit by Does not apply route daily  Lidia Cortés MD   Continuous Blood Gluc Transmit (DEXCOM G6 TRANSMITTER) MISC USE AS DIRECTED  Piotr Boyle MD   rivaroxaban (XARELTO) 20 MG TABS tablet Take 20 mg by mouth Daily with supper  Historical Provider, MD   magnesium oxide (MAG-OX) 400 MG tablet Take 1 tablet by mouth daily  Piotr Boyle MD             Guideline directed medical:  ARNI/ACE I/ARB: Yes  Beta blocker:   Yes  Aldosterone antagonist:  Yes        Physical Examination:     /66   Pulse 70   Resp 18   Wt 295 lb (133.8 kg)   SpO2 98%   BMI 40.01 kg/m²     Assessment  Charting Type: Shift assessment (chf clinic)    Neurological  Orientation Level: Oriented X4         HEENT (Head, Ears, Eyes, Nose, & Throat)  HEENT (WDL): Within Defined Limits    Respiratory  Respiratory Pattern: Regular  Respiratory Depth: Normal  Respiratory Quality/Effort: Unlabored  Chest Assessment: Chest expansion symmetrical  L Breath Sounds: Clear  R Breath Sounds: Clear    Breath Sounds  Right Upper Lobe: Clear  Right Middle Lobe: Clear  Right Lower Lobe: Clear  Left Upper Lobe: Clear  Left Lower Lobe: Clear         Cardiac  Cardiac Regularity: Regular  Cardiac Rhythm: AV paced    Rhythm Interpretation  Heart Rate: 70         Gastrointestinal  Abdominal (WDL): Exceptions to WDL  Abdomen Inspection: Distended, Soft               Peripheral Vascular  Peripheral Vascular (WDL): Within Defined Limits  RLE Edema: None  LLE Edema: None                   Genitourinary  Genitourinary (WDL): Within Defined Limits    Psychosocial  Psychosocial (WDL): Within Defined Limits                        Heart Rate: 70                   LAB DATA:    BNP  No results for input(s): BNP in the last 72 hours. proBNP  No results for input(s): PROBNP in the last 72 hours. BMP  No results for input(s): NA, K, CL, CO2, BUN, CREATININE, GLUCOSE, CALCIUM in the last 72 hours. WEIGHTS:  Wt Readings from Last 3 Encounters:   12/27/22 295 lb (133.8 kg)   11/28/22 (!) 302 lb (137 kg)   11/11/22 296 lb (134.3 kg)         TELEMETRY:  Cardiac Regularity: Regular  Cardiac Rhythm/Interpretation: NSR/PACED        ASSESSMENT:  Brook Obrien III's weight 295lb bs, which is down 5 lbs from previous appt 11/28/22/22   Denies SOB or discomfort. Patient received his BP cuff for home use. Interventions completed this visit:  IV diuretics given: No  Lab work obtained: Yes, BMP/BNP     Reviewed continue current medications that patient as prescribed answered any questions   Educated on signs and symptoms of HF  Educated on low sodium diet    PLAN:  Future Appointments   Date Time Provider Clemente Davis   1/6/2023 10:00 PM [facility] BEDROOM 1 [facility] [address]   1/12/2023  2:00 PM DO [facility] [place] [facility]   1/24/2023 12:00 PM [facility] CHF ROOM 1 [facility]       PT CONFIRMED WILL CALL FOR EARLIER APPT. IF NEEDED. Given clinic phone number and aware of signs and symptoms to call with any HF change in symptoms.

## 2022-12-28 ENCOUNTER — TELEPHONE (OUTPATIENT)
Dept: NON INVASIVE DIAGNOSTICS | Age: 42
End: 2022-12-28

## 2022-12-28 DIAGNOSIS — I50.22 CHRONIC SYSTOLIC HEART FAILURE (HCC): ICD-10-CM

## 2022-12-28 DIAGNOSIS — Z79.899 MEDICATION DOSE INCREASED: ICD-10-CM

## 2022-12-28 RX ORDER — ATORVASTATIN CALCIUM 40 MG/1
TABLET, FILM COATED ORAL
Qty: 30 TABLET | Refills: 10 | Status: SHIPPED | OUTPATIENT
Start: 2022-12-28

## 2022-12-28 NOTE — TELEPHONE ENCOUNTER
----- Message from Kelly Juarez MD sent at 12/27/2022  6:56 PM EST -----  Patient called  Dofetilide stopped    Kelly Juarez  ----- Message -----  From: Shoshana Grant  Sent: 12/27/2022   2:36 PM EST  To: Kelly Juarez MD    CHF clinic called regarding the creatinine going up. Not sure if you are following this.   You can call Jack Torres at [phone]

## 2022-12-28 NOTE — TELEPHONE ENCOUNTER
Confirmed with patient he is to stop the Dofetilide.   Dofetilide removed from med list  Patient verbalized understanding

## 2023-01-07 RX ORDER — SACUBITRIL AND VALSARTAN 97; 103 MG/1; MG/1
TABLET, FILM COATED ORAL
Qty: 60 TABLET | Refills: 10 | Status: SHIPPED | OUTPATIENT
Start: 2023-01-07

## 2023-01-09 DIAGNOSIS — I50.22 CHRONIC SYSTOLIC HEART FAILURE (HCC): Primary | ICD-10-CM

## 2023-01-12 ENCOUNTER — OFFICE VISIT (OUTPATIENT)
Dept: SLEEP MEDICINE | Age: 43
End: 2023-01-12
Payer: MEDICARE

## 2023-01-12 VITALS
HEIGHT: 72 IN | BODY MASS INDEX: 39.55 KG/M2 | HEART RATE: 72 BPM | WEIGHT: 292 LBS | DIASTOLIC BLOOD PRESSURE: 84 MMHG | RESPIRATION RATE: 12 BRPM | SYSTOLIC BLOOD PRESSURE: 119 MMHG | OXYGEN SATURATION: 97 %

## 2023-01-12 DIAGNOSIS — E66.01 SEVERE OBESITY (BMI 35.0-39.9) WITH COMORBIDITY (HCC): ICD-10-CM

## 2023-01-12 DIAGNOSIS — G47.33 OSA (OBSTRUCTIVE SLEEP APNEA): Primary | ICD-10-CM

## 2023-01-12 PROCEDURE — 3074F SYST BP LT 130 MM HG: CPT | Performed by: INTERNAL MEDICINE

## 2023-01-12 PROCEDURE — 99204 OFFICE O/P NEW MOD 45 MIN: CPT | Performed by: INTERNAL MEDICINE

## 2023-01-12 PROCEDURE — 3079F DIAST BP 80-89 MM HG: CPT | Performed by: INTERNAL MEDICINE

## 2023-01-12 ASSESSMENT — ENCOUNTER SYMPTOMS
EYES NEGATIVE: 1
ALLERGIC/IMMUNOLOGIC NEGATIVE: 1
RESPIRATORY NEGATIVE: 1
GASTROINTESTINAL NEGATIVE: 1

## 2023-01-12 ASSESSMENT — SLEEP AND FATIGUE QUESTIONNAIRES
HOW LIKELY ARE YOU TO NOD OFF OR FALL ASLEEP WHILE SITTING AND TALKING TO SOMEONE: 0
HOW LIKELY ARE YOU TO NOD OFF OR FALL ASLEEP IN A CAR, WHILE STOPPED FOR A FEW MINUTES IN TRAFFIC: 0
HOW LIKELY ARE YOU TO NOD OFF OR FALL ASLEEP WHEN YOU ARE A PASSENGER IN A CAR FOR AN HOUR WITHOUT A BREAK: 0
ESS TOTAL SCORE: 1
HOW LIKELY ARE YOU TO NOD OFF OR FALL ASLEEP WHILE SITTING AND READING: 0
HOW LIKELY ARE YOU TO NOD OFF OR FALL ASLEEP WHILE LYING DOWN TO REST IN THE AFTERNOON WHEN CIRCUMSTANCES PERMIT: 1
HOW LIKELY ARE YOU TO NOD OFF OR FALL ASLEEP WHILE WATCHING TV: 0
HOW LIKELY ARE YOU TO NOD OFF OR FALL ASLEEP WHILE SITTING QUIETLY AFTER LUNCH WITHOUT ALCOHOL: 0
HOW LIKELY ARE YOU TO NOD OFF OR FALL ASLEEP WHILE SITTING INACTIVE IN A PUBLIC PLACE: 0

## 2023-01-12 NOTE — PROGRESS NOTES
[facility] Sleep Medicine    Patient Name: Geovanni Michelle  Age: 43 y.o.   : 1980    Date of Visit: 23        HPI   Geovanni Michelle is a 43 y.o. male with  has a past medical history of Acute CHF ([address] (2011), Acute CHF ([address] (2011), Acute idiopathic gout of right foot (2016), AF (atrial fibrillation) ([address] (2020), Anemia (2011), CAD (coronary artery disease), Cerebral artery occlusion with cerebral infarction ([address] CKD (chronic kidney disease), Gout, HTN (hypertension) (2011), Hyperlipidemia, Hypertension, Morbid obesity due to excess calories ([address] Nonischemic cardiomyopathy ([address] (2011), Nonischemic cardiomyopathy ([address] (2013), ARVIND (obstructive sleep apnea) (2011), S/P left heart catheterization by percutaneous approach (55), Systolic heart failure ([address] (2012), Type 2 diabetes mellitus with complication, with long-term current use of insulin ([address] (2016), and URI, acute (2011). who presents as a new patient to Sleep Clinic, referred by Dr. Claudell Glasgow, for Sleep Apnea  .       STOP-BANG:  Snore- no   Tired- no  Observed apneas/gasping/choking- no   High blood pressure- yes  BMI > 35kg/sq m - yes  Age > 50 - no  Neck circumference > 40 cm - yes  Gender male - yes  Total score 4/8    Sleep Medicine 2023 10/3/2019 2019   Sitting and reading 0 0 -   Watching TV 0 2 -   Sitting, inactive in a public place (e.g. a theatre or a meeting) 0 0 -   As a passenger in a car for an hour without a break 0 0 -   Lying down to rest in the afternoon when circumstances permit 1 1 -   Sitting and talking to someone 0 0 -   Sitting quietly after a lunch without alcohol 0 0 -   In a car, while stopped for a few minutes in traffic 0 0 -   [place] Sleepiness Score 1 3 -   Neck circumference (Inches) - 16.25 16.5      Dx with ARVIND many years ago  Previously was following with Dr Silvia Krueger, now presenting here as his previous physician is retiring    Has been on CPAP for >10 years, initially tested due to his heart. States he never really snored or had witnessed apneas, was not excessively sleepy. Although he does not feel sleepy he does wish to continue with therapy for his health. He did have a hard time getting used to the CPAP initially    Sleep is being disrupted by waking up during the night with a dry mouth - this is year round, not just in the winter. He is filling his water chamber but it is running out during the night and he has to refill it. Sometimes he takes the mask off in the middle of the night because the dryness is so bothersome. He is waking up at night to drink water, which then makes him have to get up to use the restroom. Overall this is very bothersome and disruptive and negatively affecting his quality of sleep. Currently using a nasal mask but would like to get a full face     Pt does not report sleepiness while driving or accidents due to sleepiness. RLS: no  RBD: no  Hypnagogic/hypnopompic hallucinations: no  Sleep paralysis: no   Sleep walking/talking/eating: no         He is in the process of getting bariatric surgery. His surgeon is requesting clearance. No date set yet for the procedure. He has had surgeries in the past and been exposed to anesthetics without any adverse effects. He does not report any respiratory issues and has never smoked.       PMH:  Past Medical History:   Diagnosis Date    Acute CHF ([address] 11/29/2011    Acute CHF ([address] 12/26/2011    Acute idiopathic gout of right foot 8/13/2016    AF (atrial fibrillation) ([address] 02/2020    Anemia 11/29/2011    CAD (coronary artery disease)     Cerebral artery occlusion with cerebral infarction ([address]     CKD (chronic kidney disease)     Gout     HTN (hypertension) 11/29/2011    Hyperlipidemia     Hypertension     Morbid obesity due to excess calories ([address]     Nonischemic cardiomyopathy ([address] 11/29/2011    Nonischemic cardiomyopathy ([address] 7/2013 7/2013- cardiac MRI revealed an LVEF of 20%    ARVIND (obstructive sleep apnea) 11/29/2011    S/P left heart catheterization by percutaneous approach 12-27/2011    Dr Heather Reyes    Systolic heart failure [facility]) 5/7/2012 5/7/12- cardiac catheterization revealed an LVEF of 10-15%, mitral regurgitation along with borderline prolapse of mitral valve    Type 2 diabetes mellitus with complication, with long-term current use of insulin ([address] 8/22/2016    URI, acute 11/29/2011        PSH:  Past Surgical History:   Procedure Laterality Date    CARDIAC CATHETERIZATION  08/01/2019    Dr Laz Matos  11/20/2018    UPGRADE S-ICD TO BIV ICD   ([employer])  [facility]     CARDIOVERSION  02/14/2020    Successful CV of AF to NSR      (Dr. Franc Mead)    CARDIOVERSION  03/10/2022    Dr. Franc Mead. Cherl Spinner Cherl Spinner V-paced / SR    ECHO COMPL W DOP COLOR FLOW  11/30/2011         ECHO COMPL W DOP COLOR FLOW  03/27/2012         INSERTABLE CARDIAC MONITOR  12/13/2018    cardioMIGUELINA luke, Dr. Mandie Mckenzie  12/15/2020    SUCCESSFUL OVERDRIVE PACE TERMINATION OF AF VIA ICD    ( [facility])    PACEMAKER PLACEMENT            Soc Hx:  Social History     Tobacco Use    Smoking status: Never    Smokeless tobacco: Never   Vaping Use    Vaping Use: Never used   Substance Use Topics    Alcohol use: Not Currently    Drug use: Never        Fam Hx:  Family History   Problem Relation Age of Onset    Cancer Mother     No Known Problems Father         Current Outpatient Medications   Medication Sig Dispense Refill    ENTRESTO  MG per tablet TAKE 1 TABLET BY MOUTH TWICE DAILY *EMERGENCY REFILL* 60 tablet 10    atorvastatin (LIPITOR) 40 MG tablet TAKE 1 TABLET BY MOUTH DAILY *EMERGENCY REFILL* 30 tablet 10    bumetanide (BUMEX) 2 MG tablet 2mg every other day and  4mg on the other days. 2/4 alteration.  45 tablet 3    insulin lispro (HUMALOG) 100 UNIT/ML SOLN injection vial INJECT 20 UNITS SUBCUTANEOUSLY THREE TIMES A DAY BEFORE MEALS 20 mL 10 spironolactone (ALDACTONE) 25 MG tablet TAKE 1/2 TABLET BY MOUTH ONCE DAILY 15 tablet 10    carvedilol (COREG) 25 MG tablet TAKE ONE (1) TABLET BY MOUTH TWICE DAILY WITH MEALS 60 tablet 10    rivaroxaban (XARELTO) 15 MG TABS tablet Take 1 tablet by mouth daily (with breakfast) 90 tablet 3    magnesium oxide (MAG-OX) 400 MG tablet TAKE 1 TABLET BY MOUTH DAILY 30 tablet 10    LEVEMIR FLEXTOUCH 100 UNIT/ML injection pen INJECT 40 UNITS SUBCUTANEOUSLY TWICE DAILY 30 mL 10    allopurinol (ZYLOPRIM) 300 MG tablet TAKE 1 TABLET BY MOUTH DAILY 30 tablet 10    isosorbide dinitrate (ISORDIL) 10 MG tablet TAKE TWO (2) TABLETS BY MOUTH THREE TIMES A  tablet 10    nitroGLYCERIN (NITROSTAT) 0.4 MG SL tablet DISSOLVE 1 TABLET UNDER THE TONGUE AS NEEDED FOR CHEST PAIN EVERY 5 MINUTES UP TO 3 TIMES. IF NO RELIEF CALL 911. (Patient not taking: Reported on 1/18/2023) 25 tablet 10    metFORMIN (GLUCOPHAGE) 1000 MG tablet TAKE ONE (1) TABLET BY MOUTH TWICE DAILY WITH MEALS 180 tablet 1    Blood Pressure KIT 1 kit by Does not apply route daily 1 kit 0    Continuous Blood Gluc Transmit (DEXCOM G6 TRANSMITTER) MISC USE AS DIRECTED 1 each 0    ONETOUCH ULTRA strip USE TO TEST BLOOD SUGAR 2 TO 3 TIMES DAILY AND AS NEEDED FOR SYMPTOMS OF IRREGULAR BLOOD SUGAR 100 strip 0     No current facility-administered medications for this visit. Review of Systems  (Sleep ROS above)  Review of Systems   HENT: Negative. Eyes: Negative. Respiratory: Negative. Cardiovascular: Negative. Gastrointestinal: Negative. Endocrine: Negative. Genitourinary: Negative. Skin: Negative. Allergic/Immunologic: Negative. Neurological: Negative. Hematological: Negative. Psychiatric/Behavioral:  Positive for sleep disturbance.            Objective:   Physical Exam  /84 (Site: Left Upper Arm, Position: Sitting, Cuff Size: Large Adult)   Pulse 72   Resp 12   Ht 6' (1.829 m)   Wt 292 lb (132.5 kg)   SpO2 97%   BMI 39.60 kg/m²        Physical Exam  Vitals reviewed. Constitutional:       Appearance: He is not ill-appearing or diaphoretic. HENT:      Head: Atraumatic. Mouth/Throat:      Comments: MP 3  Eyes:      Extraocular Movements: Extraocular movements intact. Cardiovascular:      Rate and Rhythm: Normal rate and regular rhythm. Pulmonary:      Effort: Pulmonary effort is normal.      Breath sounds: Normal breath sounds. Musculoskeletal:         General: No signs of injury. Skin:     General: Skin is warm and dry. Neurological:      General: No focal deficit present. Mental Status: He is alert. Psychiatric:         Mood and Affect: Mood normal.           Sleep Medicine 1/12/2023 10/3/2019 8/7/2019   Sitting and reading 0 0 -   Watching TV 0 2 -   Sitting, inactive in a public place (e.g. a theatre or a meeting) 0 0 -   As a passenger in a car for an hour without a break 0 0 -   Lying down to rest in the afternoon when circumstances permit 1 1 -   Sitting and talking to someone 0 0 -   Sitting quietly after a lunch without alcohol 0 0 -   In a car, while stopped for a few minutes in traffic 0 0 -   [place] Sleepiness Score 1 3 -   Neck circumference (Inches) - 16.25 16.5         SLEEP STUDY HISTORY      10-2-2019 split  PSG  AHI 31, spO2 wilfredo 83%, hypoxemia 22 min, at 15 cm H2O AHI 0, SpO2 wilfredo 92%        PERTINENT LAB RESULTS  Ferritin   Date Value Ref Range Status   03/15/2020 248 ng/mL Final     Comment:     FERRITIN Reference Ranges:  Adult Males   20 - 60 yrars:    30 - 400 ng/mL  Adult females 17 - 60 years:    13 - 150 ng/mL  Adults greater than 60 years:   no established reference range  Pediatrics:                     no established reference range            Assessment:      Jennifer Medrano was seen today for sleep apnea.     Diagnoses and all orders for this visit:    ARVIND (obstructive sleep apnea)  -     Cancel: DME Order for CPAP as OP  -     DME Order for CPAP as OP    Severe obesity (BMI 35.0-39. 9) with comorbidity ([address]     Plan:      Known history of severe ARVIND. His CPAP is more than 11years old and eligible for replacement. Will send new orders for auto CPAP 5-15 cm H2O as he may need lower pressure after weight loss. Currently on CPAP 15 cm H2O. His ARVIND is currently very well controlled  at these settings. Compliance of at least 4 hours is 93% with residual AHI 1.5/hr. In terms of preoperative evaluation, he is compliant with therapy and excellent control of ARVIND on 15 cm H2O CPAP. From sleep medicine standpoint he should proceed with surgery with perioperative use of CPAP at current settings. Regarding the dry mouth advised dry mouth sprays or gels and keeping a humidifier in the bedroom as well. His CPAP humidifier is on the maximum setting. Will lower the temperature slightly to see if this helps with symptoms. Total encounter time 50 min   Return in about 3 months (around 4/12/2023).     Regina Yusuf DO  Sleep Medicine   [facility]/[place] Phone -[phone], option 2  Fax- [phone]

## 2023-01-13 ENCOUNTER — TELEPHONE (OUTPATIENT)
Dept: CARDIOLOGY CLINIC | Age: 43
End: 2023-01-13

## 2023-01-13 NOTE — TELEPHONE ENCOUNTER
Since Jan 1,2023 elevated PAD. Recent med change , stopped dofetilide (Dr Luis Larkin)- Managing Creatinine levels  and labs via chf clinic. 1/24/23 next chf clinic visit. Per patient, only 1 episode of heart fluttering since dofetilide stopped. He says he was to have labs by DR Luis Larkin weekly (but he went to do them and no orders are in the system)   Breathing is good, weight is down 10lbs. Edema -denies. Able to speak in full sentences without distress. Voiding good. Urine color is clear. Dr Luis Larkin :please advise patient/ and Lai Ibrahim of any lab orders needed.  He also called your office to set f/u and was unable to reach anyone \"only at office twice a week was the message he got when he called\"

## 2023-01-16 NOTE — TELEPHONE ENCOUNTER
Reading reminder sent (no mems sends) last 2 days. 1/14/23 PAD 30, PAS 51, MARILIA 39 Heart rate 111    Per patient just got a visit set with nephrology (they will mail him lab scripts)  1-31-23 Dr Magen hu. Spoke with Herb Burgos @ [address] add on 1-18 9:30am (per Ivyolramonita Drop CNP labs and assessment).  Patient agreeable to this visit  He will send mems daily  He will go to ER if rapid heart rate/ short of breath/ dizzy/diaphoretic etc.     PLAN:   Add on chf clinic f/u  Follow with nephrology as set  Send mems daily  Self monitor for s/s chf and rapid heart rates    Future Appointments   Date Time Provider Clemente Davis   1/18/2023  9:30 AM [facility] CHF ROOM 1 [place] [facility]   1/24/2023 12:00 PM [facility] CHF ROOM 1 [place] [facility]   2/3/2023  1:00 PM SCHEDULE, YX ANGELA DEVICE [place] ELECTR [facility]   2/6/2023 10:00 AM MD ANGELA Razo CARDIO [facility]   2/27/2023  9:00 PM SEB SLEEP LAB BEDROOM 1 DAVID SLEEP [facility]   4/6/2023  1:40 PM DO FARIDEH [place] SLEEP [facility]

## 2023-01-16 NOTE — TELEPHONE ENCOUNTER
Patient is scheduled for his device check on 2/3/2023.  Patient was advised to call Dr Us his kidney doctor for labs and follow up appointment.  Over all [place] states he is good cardiac alegria.

## 2023-01-18 ENCOUNTER — HOSPITAL ENCOUNTER (OUTPATIENT)
Dept: OTHER | Age: 43
Setting detail: THERAPIES SERIES
Discharge: HOME | End: 2023-01-18
Payer: MEDICARE

## 2023-01-18 VITALS
DIASTOLIC BLOOD PRESSURE: 80 MMHG | WEIGHT: 286 LBS | OXYGEN SATURATION: 98 % | HEART RATE: 72 BPM | BODY MASS INDEX: 38.79 KG/M2 | SYSTOLIC BLOOD PRESSURE: 126 MMHG | RESPIRATION RATE: 18 BRPM

## 2023-01-18 LAB
ALBUMIN SERPL-MCNC: 4.3 G/DL (ref 3.5–5.2)
ALP BLD-CCNC: 92 U/L (ref 40–129)
ALT SERPL-CCNC: 9 U/L (ref 0–40)
ANION GAP SERPL CALCULATED.3IONS-SCNC: 18 MMOL/L (ref 7–16)
AST SERPL-CCNC: 13 U/L (ref 0–39)
BASOPHILS ABSOLUTE: 0.03 E9/L (ref 0–0.2)
BASOPHILS RELATIVE PERCENT: 0.4 % (ref 0–2)
BILIRUB SERPL-MCNC: 0.3 MG/DL (ref 0–1.2)
BUN BLDV-MCNC: 79 MG/DL (ref 6–20)
CALCIUM SERPL-MCNC: 9.9 MG/DL (ref 8.6–10.2)
CHLORIDE BLD-SCNC: 100 MMOL/L (ref 98–107)
CHOLESTEROL, TOTAL: 127 MG/DL (ref 0–199)
CO2: 17 MMOL/L (ref 22–29)
CREAT SERPL-MCNC: 2.8 MG/DL (ref 0.7–1.2)
CREATININE URINE: 184 MG/DL (ref 40–278)
EOSINOPHILS ABSOLUTE: 0.38 E9/L (ref 0.05–0.5)
EOSINOPHILS RELATIVE PERCENT: 4.6 % (ref 0–6)
GFR SERPL CREATININE-BSD FRML MDRD: 28 ML/MIN/1.73
GLUCOSE BLD-MCNC: 140 MG/DL (ref 74–99)
HBA1C MFR BLD: 5.9 % (ref 4–5.6)
HCT VFR BLD CALC: 32.4 % (ref 37–54)
HDLC SERPL-MCNC: 43 MG/DL
HEMOGLOBIN: 10.9 G/DL (ref 12.5–16.5)
IMMATURE GRANULOCYTES #: 0.03 E9/L
IMMATURE GRANULOCYTES %: 0.4 % (ref 0–5)
LDL CHOLESTEROL CALCULATED: 67 MG/DL (ref 0–99)
LYMPHOCYTES ABSOLUTE: 1.55 E9/L (ref 1.5–4)
LYMPHOCYTES RELATIVE PERCENT: 18.7 % (ref 20–42)
MCH RBC QN AUTO: 28.8 PG (ref 26–35)
MCHC RBC AUTO-ENTMCNC: 33.6 % (ref 32–34.5)
MCV RBC AUTO: 85.5 FL (ref 80–99.9)
MICROALBUMIN UR-MCNC: 12.2 MG/L
MICROALBUMIN/CREAT UR-RTO: 6.6 (ref 0–30)
MONOCYTES ABSOLUTE: 0.78 E9/L (ref 0.1–0.95)
MONOCYTES RELATIVE PERCENT: 9.4 % (ref 2–12)
NEUTROPHILS ABSOLUTE: 5.51 E9/L (ref 1.8–7.3)
NEUTROPHILS RELATIVE PERCENT: 66.5 % (ref 43–80)
PARATHYROID HORMONE INTACT: 89 PG/ML (ref 15–65)
PDW BLD-RTO: 14.4 FL (ref 11.5–15)
PHOSPHORUS: 4 MG/DL (ref 2.5–4.5)
PLATELET # BLD: 302 E9/L (ref 130–450)
PMV BLD AUTO: 9.8 FL (ref 7–12)
POTASSIUM SERPL-SCNC: 3.8 MMOL/L (ref 3.5–5)
PRO-BNP: 831 PG/ML (ref 0–125)
RBC # BLD: 3.79 E12/L (ref 3.8–5.8)
SODIUM BLD-SCNC: 135 MMOL/L (ref 132–146)
TOTAL PROTEIN: 8 G/DL (ref 6.4–8.3)
TRIGL SERPL-MCNC: 87 MG/DL (ref 0–149)
VITAMIN D 25-HYDROXY: 18 NG/ML (ref 30–100)
VLDLC SERPL CALC-MCNC: 17 MG/DL
WBC # BLD: 8.3 E9/L (ref 4.5–11.5)

## 2023-01-18 PROCEDURE — 80053 COMPREHEN METABOLIC PANEL: CPT

## 2023-01-18 PROCEDURE — 80061 LIPID PANEL: CPT

## 2023-01-18 PROCEDURE — 84100 ASSAY OF PHOSPHORUS: CPT

## 2023-01-18 PROCEDURE — 83036 HEMOGLOBIN GLYCOSYLATED A1C: CPT

## 2023-01-18 PROCEDURE — 83880 ASSAY OF NATRIURETIC PEPTIDE: CPT

## 2023-01-18 PROCEDURE — 83970 ASSAY OF PARATHORMONE: CPT

## 2023-01-18 PROCEDURE — 82570 ASSAY OF URINE CREATININE: CPT

## 2023-01-18 PROCEDURE — 82044 UR ALBUMIN SEMIQUANTITATIVE: CPT

## 2023-01-18 PROCEDURE — 99214 OFFICE O/P EST MOD 30 MIN: CPT

## 2023-01-18 PROCEDURE — 82306 VITAMIN D 25 HYDROXY: CPT

## 2023-01-18 PROCEDURE — 36415 COLL VENOUS BLD VENIPUNCTURE: CPT

## 2023-01-18 PROCEDURE — 85025 COMPLETE CBC W/AUTO DIFF WBC: CPT

## 2023-01-18 NOTE — PROGRESS NOTES
Labs, flow sheet, medication sheet faxed to Dr. Ferrer Later office, confirmation received. Vannessa from Dr. Ferrer Later office notified of fax and to please inform Dr. Laura Jones of elevated creatinine and to review medications and advise pt of any changes. Vannessa confirmed above.

## 2023-01-18 NOTE — PROGRESS NOTES
[facility]   CHF Clinic       [place]   1980  [phone]       Referring Doctor: Jose Horner  Cardiologist: Mai Stern  Nephrologist: n/a  PCP: Rehan Henson      History of Present Illness:     Grand kelvin MONTESINOS is a 43 y.o. male with a history of HFrEF, most recent EF 31% on 1/19/2021. Patient Story:  He does NOT have dyspnea with exertion, shortness of breath, or decline in overall functional capacity. He does not have orthopnea, PND, nocturnal cough or hemoptysis. He denies abdominal distention or bloating, early satiety, anorexia/change in appetite. He does have a good urinary response to his oral diuretic. He does not have  lower extremity edema. He does not have lightheadedness, dizziness. He denies syncope or near syncope. He denies chest pain or palpitations. Allergies   Allergen Reactions    Farxiga [Dapagliflozin] Other (See Comments)     Caused throat to swell         Prior to Visit Medications    Medication Sig Taking? Authorizing Provider   ENTRESTO  MG per tablet TAKE 1 TABLET BY MOUTH TWICE DAILY *EMERGENCY REFILL*  Maged I Awadalla, MD   atorvastatin (LIPITOR) 40 MG tablet TAKE 1 TABLET BY MOUTH DAILY *EMERGENCY REFILL*  Maged I Awadalla, MD   bumetanide (BUMEX) 2 MG tablet 2mg every other day and  4mg on the other days. 2/4 alteration.   Kimmie Grimm MD   insulin lispro (HUMALOG) 100 UNIT/ML SOLN injection vial INJECT 20 UNITS SUBCUTANEOUSLY THREE TIMES A DAY BEFORE MEALS  Maged I Awadalla, MD   ONETOUCH ULTRA strip USE TO TEST BLOOD SUGAR 2 TO 3 TIMES DAILY AND AS NEEDED FOR SYMPTOMS OF IRREGULAR BLOOD SUGAR  Maged I Awadalla, MD   spironolactone (ALDACTONE) 25 MG tablet TAKE 1/2 TABLET BY MOUTH ONCE DAILY  Maged I Awadalla, MD   carvedilol (COREG) 25 MG tablet TAKE ONE (1) TABLET BY MOUTH TWICE DAILY WITH MEALS  Kimmie Grimm MD   rivaroxaban (XARELTO) 15 MG TABS tablet Take 1 tablet by mouth daily (with breakfast)  Davis Moy Raquel Narvaez MD   magnesium oxide (MAG-OX) 400 MG tablet TAKE 1 TABLET BY MOUTH DAILY  Pepito Montemayor MD   LEVEMIR FLEXTOUCH 100 UNIT/ML injection pen INJECT 40 Marito Lima MD   allopurinol (ZYLOPRIM) 300 MG tablet TAKE 1 TABLET BY MOUTH DAILY  Jackelyn Santos MD   isosorbide dinitrate (ISORDIL) 10 MG tablet TAKE TWO (2) TABLETS BY MOUTH THREE TIMES A DAY  Guanako Rothman MD   nitroGLYCERIN (NITROSTAT) 0.4 MG SL tablet DISSOLVE 1 TABLET UNDER THE TONGUE AS NEEDED FOR CHEST PAIN EVERY 5 MINUTES UP TO 3 TIMES. IF NO RELIEF CALL 911. Patient not taking: Reported on 1/18/2023  Pepito Montemayor MD   metFORMIN (GLUCOPHAGE) 1000 MG tablet TAKE ONE (1) TABLET BY MOUTH TWICE DAILY WITH MEALS  Pepito Montemayor MD   Blood Pressure KIT 1 kit by Does not apply route daily  Hemant Morgan MD   Continuous Blood Gluc Transmit (DEXCOM G6 TRANSMITTER) MISC USE AS DIRECTED  Arvind Tomlinson MD   rivaroxaban (XARELTO) 20 MG TABS tablet Take 20 mg by mouth Daily with supper  Historical Provider, MD   magnesium oxide (MAG-OX) 400 MG tablet Take 1 tablet by mouth daily  Arvind Tomlinson MD             Guideline directed medical:  ARNI/ACE I/ARB: Yes  Beta blocker:   Yes  Aldosterone antagonist:  Yes        Physical Examination:     /80   Pulse 72   Resp 18   Wt 286 lb (129.7 kg)   SpO2 98%   BMI 38.79 kg/m²     Assessment  Charting Type: Shift assessment (chf clinic)    Neurological  Orientation Level: Oriented X4         HEENT (Head, Ears, Eyes, Nose, & Throat)  HEENT (WDL): Within Defined Limits    Respiratory  Respiratory Pattern: Regular  Respiratory Depth: Normal  Respiratory Quality/Effort: Unlabored  Chest Assessment: Chest expansion symmetrical  L Breath Sounds: Clear  R Breath Sounds: Clear    Breath Sounds  Right Upper Lobe: Clear  Right Middle Lobe: Clear  Right Lower Lobe: Clear  Left Upper Lobe: Clear  Left Lower Lobe: Clear         Cardiac  Cardiac Regularity: Regular  Cardiac Rhythm: AV paced    Rhythm Interpretation  Heart Rate: 72         Gastrointestinal  Abdominal (WDL): Exceptions to WDL  Abdomen Inspection: Soft               Peripheral Vascular  Peripheral Vascular (WDL): Within Defined Limits  RLE Edema: None  LLE Edema: None                   Genitourinary  Genitourinary (WDL): Within Defined Limits    Psychosocial  Psychosocial (WDL): Within Defined Limits                        Heart Rate: 72                   LAB DATA:    BNP  No results for input(s): BNP in the last 72 hours. proBNP  No results for input(s): PROBNP in the last 72 hours. BMP  No results for input(s): NA, K, CL, CO2, BUN, CREATININE, GLUCOSE, CALCIUM in the last 72 hours. WEIGHTS:  Wt Readings from Last 3 Encounters:   01/18/23 286 lb (129.7 kg)   01/12/23 292 lb (132.5 kg)   12/27/22 295 lb (133.8 kg)         TELEMETRY:  Cardiac Regularity: Regular  Cardiac Rhythm/Interpretation: NSR/PACED        ASSESSMENT:  Alexandra Wynn III's weight IS DOWN 9 LBS FROM 12/27/22. PT DENIES ANY DISCOMFORT. Interventions completed this visit:  IV diuretics given: No  Lab work obtained: Yes, BMP/BNP     Reviewed continue current medications that patient as prescribed answered any questions   Educated on signs and symptoms of HF  Educated on low sodium diet    PLAN:  Future Appointments   Date Time Provider Clemente Davis   1/18/2023  9:30 AM [facility] CHF ROOM 1 [place] [facility]   1/24/2023 12:00 PM [facility] CHF ROOM 1 SE [facility]F [facility]   2/3/2023  1:00 PM [facility]   2/6/2023 10:00 AM MD ANGELA Kramer CARDIO [facility]   2/27/2023  9:00 PM SEB SLEEP LAB BEDROOM 1 SEB SLEEP [facility]   4/6/2023  1:40 PM DO FARIDEH [place] SLEEP [facility]           Given clinic phone number and aware of signs and symptoms to call with any HF change in symptoms.

## 2023-01-18 NOTE — PLAN OF CARE
Problem: Chronic Conditions and Co-morbidities  Goal: Patient's chronic conditions and co-morbidity symptoms are monitored and maintained or improved  Flowsheets (Taken 1/18/2023 0339)  Care Plan - Patient's Chronic Conditions and Co-Morbidity Symptoms are Monitored and Maintained or Improved: Monitor and assess patient's chronic conditions and comorbid symptoms for stability, deterioration, or improvement

## 2023-01-18 NOTE — PROGRESS NOTES
1-16-23 last mems send, he will send when he gets home from chf clinic  (Met with patient at [facility] clinic visit ) having DR Us's (nephrology labs today also. 1-16-23 PAS 38, PAD 20   MARILIA 26 hr 73  Lost 9lbs since last chf visit. See CHF Clinic notes.

## 2023-01-24 ENCOUNTER — HOSPITAL ENCOUNTER (OUTPATIENT)
Dept: OTHER | Age: 43
Setting detail: THERAPIES SERIES
Discharge: HOME | End: 2023-01-24
Payer: MEDICARE

## 2023-01-24 VITALS
OXYGEN SATURATION: 99 % | SYSTOLIC BLOOD PRESSURE: 107 MMHG | RESPIRATION RATE: 18 BRPM | BODY MASS INDEX: 39.2 KG/M2 | DIASTOLIC BLOOD PRESSURE: 62 MMHG | WEIGHT: 289 LBS | HEART RATE: 70 BPM

## 2023-01-24 LAB
ANION GAP SERPL CALCULATED.3IONS-SCNC: 13 MMOL/L (ref 7–16)
BUN BLDV-MCNC: 61 MG/DL (ref 6–20)
CALCIUM SERPL-MCNC: 10.3 MG/DL (ref 8.6–10.2)
CHLORIDE BLD-SCNC: 101 MMOL/L (ref 98–107)
CO2: 20 MMOL/L (ref 22–29)
CREAT SERPL-MCNC: 2.2 MG/DL (ref 0.7–1.2)
GFR SERPL CREATININE-BSD FRML MDRD: 37 ML/MIN/1.73
GLUCOSE BLD-MCNC: 121 MG/DL (ref 74–99)
POTASSIUM SERPL-SCNC: 4.5 MMOL/L (ref 3.5–5)
PRO-BNP: 1424 PG/ML (ref 0–125)
SODIUM BLD-SCNC: 134 MMOL/L (ref 132–146)

## 2023-01-24 PROCEDURE — 99214 OFFICE O/P EST MOD 30 MIN: CPT

## 2023-01-24 PROCEDURE — 36415 COLL VENOUS BLD VENIPUNCTURE: CPT

## 2023-01-24 PROCEDURE — 83880 ASSAY OF NATRIURETIC PEPTIDE: CPT

## 2023-01-24 PROCEDURE — 80048 BASIC METABOLIC PNL TOTAL CA: CPT

## 2023-01-24 NOTE — PROGRESS NOTES
[facility]   CHF Clinic       [place]   1980  [phone]       Referring Doctor: Marvin Javier  Cardiologist: Ryan Richards  Nephrologist: n/a  PCP: Sharee Barajas      History of Present Illness:     Grand kelvin MONTESINOS is a 43 y.o. male with a history of HFrEF, most recent EF 31% on 1/19/2021. Patient Story:  He does NOT have dyspnea with exertion, shortness of breath, or decline in overall functional capacity. He does not have orthopnea, PND, nocturnal cough or hemoptysis. He denies abdominal distention or bloating, early satiety, anorexia/change in appetite. He does have a good urinary response to his oral diuretic. He does not have  lower extremity edema. He does not have lightheadedness, dizziness. He denies syncope or near syncope. He denies chest pain or palpitations. Allergies   Allergen Reactions    Farxiga [Dapagliflozin] Other (See Comments)     Caused throat to swell         Prior to Visit Medications    Medication Sig Taking? Authorizing Provider   ENTRESTO  MG per tablet TAKE 1 TABLET BY MOUTH TWICE DAILY *EMERGENCY REFILL*  Maged I Awadalla, MD   atorvastatin (LIPITOR) 40 MG tablet TAKE 1 TABLET BY MOUTH DAILY *EMERGENCY REFILL*  Maged I Awadalla, MD   bumetanide (BUMEX) 2 MG tablet 2mg every other day and  4mg on the other days. 2/4 alteration. Patient taking differently: 2 mg daily ON MONDAY, WED, FRIDAY.   Jessica Shi MD   insulin lispro (HUMALOG) 100 UNIT/ML SOLN injection vial INJECT 20 UNITS SUBCUTANEOUSLY THREE TIMES A DAY BEFORE MEALS  Maged I Awadalla, MD   ONETOUCH ULTRA strip USE TO TEST BLOOD SUGAR 2 TO 3 TIMES DAILY AND AS NEEDED FOR SYMPTOMS OF IRREGULAR BLOOD SUGAR  Maged I Awadalla, MD   spironolactone (ALDACTONE) 25 MG tablet TAKE 1/2 TABLET BY MOUTH ONCE DAILY  Maged I Awadalla, MD   carvedilol (COREG) 25 MG tablet TAKE ONE (1) TABLET BY MOUTH TWICE DAILY WITH MEALS  Jessica Shi MD   rivaroxaban (Shikha Pilsner) 15 MG TABS tablet Take 1 tablet by mouth daily (with breakfast)  Lorrayne Dakins, MD   magnesium oxide (MAG-OX) 400 MG tablet TAKE 1 TABLET BY MOUTH DAILY  Rodrigue Mackey MD   LEVEMIR FLEXTOUCH 100 UNIT/ML injection pen INJECT 40 UNITS SUBCUTANEOUSLY TWICE DAILY  Gustavo Madsen MD   allopurinol (ZYLOPRIM) 300 MG tablet TAKE 1 TABLET BY MOUTH DAILY  Gustavo Madsen MD   isosorbide dinitrate (ISORDIL) 10 MG tablet TAKE TWO (2) TABLETS BY MOUTH THREE TIMES A DAY  Cheryl Ramirez MD   nitroGLYCERIN (NITROSTAT) 0.4 MG SL tablet DISSOLVE 1 TABLET UNDER THE TONGUE AS NEEDED FOR CHEST PAIN EVERY 5 MINUTES UP TO 3 TIMES. IF NO RELIEF CALL 911. Patient not taking: No sig reported  Rodrigue Mackey MD   metFORMIN (GLUCOPHAGE) 1000 MG tablet TAKE ONE (1) TABLET BY MOUTH TWICE DAILY WITH MEALS  Rodrigue Mackey MD   Blood Pressure KIT 1 kit by Does not apply route daily  William Pimentel MD   Continuous Blood Gluc Transmit (DEXCOM G6 TRANSMITTER) MISC USE AS DIRECTED  Clarisa Nath MD   rivaroxaban (XARELTO) 20 MG TABS tablet Take 20 mg by mouth Daily with supper  Historical Provider, MD   magnesium oxide (MAG-OX) 400 MG tablet Take 1 tablet by mouth daily  Clarisa Nath MD             Guideline directed medical:  ARNI/ACE I/ARB: Yes  Beta blocker:   Yes  Aldosterone antagonist:  Yes        Physical Examination:     /62   Pulse 70   Resp 18   Wt 289 lb (131.1 kg)   SpO2 99%   BMI 39.20 kg/m²     Assessment  Charting Type: Shift assessment (chf clinic)    Neurological  Orientation Level: Oriented X4         HEENT (Head, Ears, Eyes, Nose, & Throat)  HEENT (WDL): Within Defined Limits    Respiratory  Respiratory Pattern: Regular  Respiratory Depth: Normal  Respiratory Quality/Effort: Unlabored  Chest Assessment: Chest expansion symmetrical  L Breath Sounds: Clear  R Breath Sounds: Clear    Breath Sounds  Right Upper Lobe: Clear  Right Middle Lobe: Clear  Right Lower Lobe: Clear  Left Upper Lobe: Clear  Left Lower Lobe: Clear         Cardiac  Cardiac Regularity: Regular  Cardiac Rhythm: AV paced    Rhythm Interpretation  Heart Rate: 70         Gastrointestinal  Abdominal (WDL): Exceptions to WDL  Abdomen Inspection: Soft               Peripheral Vascular  Peripheral Vascular (WDL): Within Defined Limits  RLE Edema: None  LLE Edema: None                   Genitourinary  Genitourinary (WDL): Within Defined Limits    Psychosocial  Psychosocial (WDL): Within Defined Limits                        Heart Rate: 70                   LAB DATA:    BNP  No results for input(s): BNP in the last 72 hours. proBNP  No results for input(s): PROBNP in the last 72 hours. BMP  No results for input(s): NA, K, CL, CO2, BUN, CREATININE, GLUCOSE, CALCIUM in the last 72 hours. WEIGHTS:  Wt Readings from Last 3 Encounters:   01/24/23 289 lb (131.1 kg)   01/18/23 286 lb (129.7 kg)   01/12/23 292 lb (132.5 kg)         TELEMETRY:  Cardiac Regularity: Regular  Cardiac Rhythm/Interpretation: NSR/PACED        ASSESSMENT:  Leandro Hernandez III's weight IS STABLE, DENIES ANY DISCOMFORT, WILL CALL FOR EARLIER APPT. IF NEEDED. Interventions completed this visit:  IV diuretics given: No  Lab work obtained: Yes, BMP/BNP     Reviewed continue current medications that patient as prescribed answered any questions   Educated on signs and symptoms of HF  Educated on low sodium diet    PLAN:  Future Appointments   Date Time Provider Clemente Davis   1/24/2023 12:00 PM [facility] ROOM 1 [facility]   2/3/2023  1:00 PM SCHEDULE, [address]   2/6/2023 10:00 AM Jessica Shi MD [facility]   2/14/2023 12:30 PM [facility] CHF ROOM 1 [facility]   2/27/2023  9:00 PM [facility] SLEEP LAB BEDROOM 1 [facility] SLEEP [facility]   4/6/2023  1:40 PM DO FARIDEH Del Rio SLEEP [facility]           Given clinic phone number and aware of signs and symptoms to call with any HF change in symptoms.

## 2023-01-24 NOTE — PLAN OF CARE
Problem: Chronic Conditions and Co-morbidities  Goal: Patient's chronic conditions and co-morbidity symptoms are monitored and maintained or improved  Flowsheets (Taken 1/24/2023 2990)  Care Plan - Patient's Chronic Conditions and Co-Morbidity Symptoms are Monitored and Maintained or Improved: Monitor and assess patient's chronic conditions and comorbid symptoms for stability, deterioration, or improvement

## 2023-02-01 DIAGNOSIS — I50.22 CHRONIC SYSTOLIC HEART FAILURE (HCC): ICD-10-CM

## 2023-02-01 DIAGNOSIS — Z79.899 MEDICATION DOSE CHANGED: ICD-10-CM

## 2023-02-02 RX ORDER — BUMETANIDE 2 MG/1
2 TABLET ORAL SEE ADMIN INSTRUCTIONS
Qty: 45 TABLET | Refills: 11 | Status: SHIPPED | OUTPATIENT
Start: 2023-02-02

## 2023-02-06 ENCOUNTER — NURSE ONLY (OUTPATIENT)
Dept: NON INVASIVE DIAGNOSTICS | Age: 43
End: 2023-02-06

## 2023-02-06 ENCOUNTER — TELEPHONE (OUTPATIENT)
Dept: NON INVASIVE DIAGNOSTICS | Age: 43
End: 2023-02-06

## 2023-02-06 ENCOUNTER — OFFICE VISIT (OUTPATIENT)
Dept: CARDIOLOGY CLINIC | Age: 43
End: 2023-02-06

## 2023-02-06 VITALS
HEART RATE: 70 BPM | RESPIRATION RATE: 16 BRPM | DIASTOLIC BLOOD PRESSURE: 68 MMHG | BODY MASS INDEX: 38.57 KG/M2 | WEIGHT: 291 LBS | HEIGHT: 73 IN | SYSTOLIC BLOOD PRESSURE: 104 MMHG

## 2023-02-06 DIAGNOSIS — Z95.9 S/P LEFT PULMONARY ARTERY PRESSURE SENSOR IMPLANT PLACEMENT: ICD-10-CM

## 2023-02-06 DIAGNOSIS — I42.8 NONISCHEMIC CARDIOMYOPATHY (HCC): ICD-10-CM

## 2023-02-06 DIAGNOSIS — Z95.810 S/P ICD (INTERNAL CARDIAC DEFIBRILLATOR) PROCEDURE: ICD-10-CM

## 2023-02-06 DIAGNOSIS — I48.0 PAROXYSMAL ATRIAL FIBRILLATION (HCC): ICD-10-CM

## 2023-02-06 DIAGNOSIS — Z01.810 PREOP CARDIOVASCULAR EXAM: Primary | ICD-10-CM

## 2023-02-06 NOTE — TELEPHONE ENCOUNTER
----- Message from May Alejandre RN sent at 2/6/2023  1:24 PM EST -----  Needs 6 month device clinic appointment PATIENTS [facility])  Thanks

## 2023-02-06 NOTE — PROGRESS NOTES
OFFICE VISIT     PRIMARY CARE PHYSICIAN:      Yashira Loving MD       ALLERGIES / SENSITIVITIES:        Allergies   Allergen Reactions    Alphonse Marques [Dapagliflozin] Other (See Comments)     Caused throat to swell          REVIEWED MEDICATIONS:        Current Outpatient Medications:     bumetanide (BUMEX) 2 MG tablet, Take 1 tablet by mouth See [address] [facility], WED, FRIDAY. , Disp: 45 tablet, Rfl: 11    ENTRESTO  MG per tablet, TAKE 1 TABLET BY MOUTH TWICE DAILY *EMERGENCY REFILL*, Disp: 60 tablet, Rfl: 10    atorvastatin (LIPITOR) 40 MG tablet, TAKE 1 TABLET BY MOUTH DAILY *EMERGENCY REFILL*, Disp: 30 tablet, Rfl: 10    insulin lispro (HUMALOG) 100 UNIT/ML SOLN injection vial, INJECT 20 UNITS SUBCUTANEOUSLY THREE TIMES A DAY BEFORE MEALS, Disp: 20 mL, Rfl: 10    ONETOUCH ULTRA strip, USE TO TEST BLOOD SUGAR 2 TO 3 TIMES DAILY AND AS NEEDED FOR SYMPTOMS OF IRREGULAR BLOOD SUGAR, Disp: 100 strip, Rfl: 0    spironolactone (ALDACTONE) 25 MG tablet, TAKE 1/2 TABLET BY MOUTH ONCE DAILY, Disp: 15 tablet, Rfl: 10    carvedilol (COREG) 25 MG tablet, TAKE ONE (1) TABLET BY MOUTH TWICE DAILY WITH MEALS, Disp: 60 tablet, Rfl: 10    rivaroxaban (XARELTO) 15 MG TABS tablet, Take 1 tablet by mouth daily (with breakfast), Disp: 90 tablet, Rfl: 3    magnesium oxide (MAG-OX) 400 MG tablet, TAKE 1 TABLET BY MOUTH DAILY, Disp: 30 tablet, Rfl: 10    LEVEMIR FLEXTOUCH 100 UNIT/ML injection pen, INJECT 40 UNITS SUBCUTANEOUSLY TWICE DAILY, Disp: 30 mL, Rfl: 10    allopurinol (ZYLOPRIM) 300 MG tablet, TAKE 1 TABLET BY MOUTH DAILY, Disp: 30 tablet, Rfl: 10    isosorbide dinitrate (ISORDIL) 10 MG tablet, TAKE TWO (2) TABLETS BY MOUTH THREE TIMES A DAY, Disp: 180 tablet, Rfl: 10    nitroGLYCERIN (NITROSTAT) 0.4 MG SL tablet, DISSOLVE 1 TABLET UNDER THE TONGUE AS NEEDED FOR CHEST PAIN EVERY 5 MINUTES UP TO 3 TIMES.  IF NO RELIEF CALL 911., Disp: 25 tablet, Rfl: 10    metFORMIN (GLUCOPHAGE) 1000 MG tablet, TAKE ONE (1) TABLET BY MOUTH TWICE DAILY WITH MEALS, Disp: 180 tablet, Rfl: 1    Blood Pressure KIT, 1 kit by Does not apply route daily, Disp: 1 kit, Rfl: 0    Continuous Blood Gluc Transmit (DEXCOM G6 TRANSMITTER) MISC, USE AS DIRECTED, Disp: 1 each, Rfl: 0      S: REASON FOR VISIT:       Chief Complaint   Patient presents with    Atrial Fibrillation     5 mo. Patient has no cardiac complaints    Cardiac Clearance          History of Present Illness:       Office Visit for follow up of A Fib, CAD, NICMP, preop clearance   43year old Thomas Urbano with Hc of CAD, A Fib, NICMP came for f/u visit   Having Gastric Bypass at [facility] - Needs clearance   No hospitalizations or surgeries since last visit   Patient is compliant with all medications   Curt any exertional chest pain or short of breath   No palpitations, dizzy or syncope.    Active at home   Try to watch diet          Past Medical History:   Diagnosis Date    Acute CHF ([address] 11/29/2011    Acute CHF ([address] 12/26/2011    Acute idiopathic gout of right foot 8/13/2016    AF (atrial fibrillation) ([address] 02/2020    Anemia 11/29/2011    CAD (coronary artery disease)     Cerebral artery occlusion with cerebral infarction ([address]     CKD (chronic kidney disease)     Gout     HTN (hypertension) 11/29/2011    Hyperlipidemia     Hypertension     Morbid obesity due to excess calories ([address]     Nonischemic cardiomyopathy ([address] 11/29/2011    Nonischemic cardiomyopathy ([address] 7/2013 7/2013- cardiac MRI revealed an LVEF of 20%    ARVIND (obstructive sleep apnea) 11/29/2011    S/P left heart catheterization by percutaneous approach 12-27/2011    Dr Can Letters    Systolic heart failure [facility]) 5/7/2012 5/7/12- cardiac catheterization revealed an LVEF of 10-15%, mitral regurgitation along with borderline prolapse of mitral valve    Type 2 diabetes mellitus with complication, with long-term current use of insulin ([address] 8/22/2016    URI, acute 11/29/2011            Past Surgical History:   Procedure Laterality Date CARDIAC CATHETERIZATION  08/01/2019    Dr Rina Koenig  11/20/2018    UPGRADE S-ICD TO BIV ICD   ([employer])  [facility]     CARDIOVERSION  02/14/2020    Successful CV of AF to NSR      (Dr. Adelina Mendieta)    Shaun Saas  03/10/2022    Dr. Adelina Mendieta. Carina Villeda V-paced / SR    ECHO COMPL W DOP COLOR FLOW  11/30/2011         ECHO COMPL W DOP COLOR FLOW  03/27/2012         INSERTABLE CARDIAC MONITOR  12/13/2018    cardioMIGUELINA luke, Dr. Connelly Kelley  12/15/2020    SUCCESSFUL OVERDRIVE PACE TERMINATION OF AF VIA ICD    ( [facility])    PACEMAKER PLACEMENT            Family History   Problem Relation Age of Onset    Cancer Mother     No Known Problems Father           Social History     Tobacco Use    Smoking status: Never    Smokeless tobacco: Never   Substance Use Topics    Alcohol use: Not Currently         Review of Systems:    Constitutional: negative for fever and chills, or significant weight loss  HEENT: negative for acute visual symptoms or auditory problems, no dysphagia  Respiratory: negative for cough, wheezing, or hemoptysis  Cardiovascular: negative for chest pain, palpitations, and dyspnea  Gastrointestinal: negative for abdominal pain, diarrhea, melena, nausea and vomiting  Endocrine: Negative for polyuria and polydyspsia  Genitourinary: negative for dysuria and hematuria  Derm: negative for rash and skin lesion(s)  Neurological: negative for tingling, numbness, weakness, seizures  Endocrine: negative for polydipsia and polyuria  Musculoskeletal: negative for pain or tenderness  Psychiatric: negative for anxiety, depression, or suicidal ideations         O:  COMPLETE PHYSICAL EXAM:       /68   Pulse 70   Resp 16   Ht 6' 1\" (1.854 m)   Wt 291 lb (132 kg)   BMI 38.39 kg/m²       General:   Patient alert, comfortable, no distress. Appears stated age. HEENT:    Pupils equal, no icterus; Tongue moist.   Neck:              No masses, Thyroid not palpable.  No elevated JVD, No carotid bruit. Chest:   Normal configuration, non tender. Lungs:   Clear to auscultation bilaterally except few scattered rhonchi. Cardiovascular:  Regular rhythm, 1/6 systolic murmur, No S3, no palpable thrills. Abdomen:  Soft, Bowel sounds normal, no pulsatile abdominal aorta, no palpable masses. Extremities:  No edema. Distal pulses palpable. No cyanosis, no clubbing. Skin:   Good turgor, warm and dry, no cyanosis. Musculoskeletal: No joint swelling or deformity. Neuro:   Cranial nerves grossly intact; No focal neurologic deficit. Psych:   Alert, good mood and effect. REVIEW OF DIAGNOSTIC TESTS:        Electrocardiogram: Reviewed     NM Stress (1/19/2021)  Impression: The myocardial perfusion imaging was equivocal with fixed inferior and  apical perfusion abnormalities in the face of ventricular pacing and  artifact. No reversible perfusion abnormalities were identified. Overall left ventricular systolic function was moderately impaired  with a dilated left ventricular cavity and abnormal septal motion the  face of ventricular pacing with additional generalized hypokinesis. Intermediate risk pharmacologic myocardial perfusion imaging study. 2D Echo: 8/2020   Summary   Left ventricle dilated at 7.1cm. Severe global hypokinesis, LV EF visually estimated about 25-30%. Mild left ventricular concentric hypertrophy noted. Stage II diastolic dysfunction. Left atrium is enlarged. Interatrial septum not well visualized but appears intact. Normal right ventricle size and function. Pacer/ICD lead in RV. No mitral valve prolapse. There is trace aortic regurgitation. There is trace tricuspid regurgitation, RVSP 30mmHg. Normal aortic root size. Trace of pericardial effusion. Pericardium appears normal.   No intra cardiac mass or thrombus. Compared to prior echo from 7/31/19 - No significant changes noted.         [facility] (8/2019, Dr. Gopal Colon)  Findings:  Left main: 0%  stenosis  LAD: 40-50 %  stenosis  Circumflex: 30 %   stenosis  RCA: Dominant. 30 %  stenosis  LV angio: not performed  Hemodynamics:  LV: 89 mmHg. EDP: 16 No gradient across AV. Ao: 81/67 ( 69 ). Post op diagnosis:  Mild CAD involving the LAD  PLAN:  Medical therapy / risk factor modification as per the advanced HF / Pulmonary hypertension team     [facility] with cardioMEMS implant (12/2018)  PRESSURES:  RA 12/15, 11.  RV 67/8, 14.  PA 65/27, 43. PCW 22. CARDIAC OUTPUT:  4.3 L/min. CARDIAC INDEX:  1.7 L/min/m2. PULSE OXIMETRY:  100%, (on 2 L nasal cannula). Pulmonary artery, oxygen saturation 56%. CONCLUSIONS:  1.  Moderate-to-severe pulmonary hypertension. 2.  Low pulmonary artery oxygen saturation and low cardiac output and cardiac index consistent with LV dysfunction. 3.  Successful placement of CardioMEMS heart failure monitoring device in the left pulmonary artery. ASSESSMENT / PLAN:    Kin Chacon was seen today for atrial fibrillation and cardiac clearance. Diagnoses and all orders for this visit:    Preop cardiovascular exam - No angina, no acute CHF. Cleared for his surgery from cardiac stand point. He is agreeable. -     EKG 12 Lead     NICM (nonischemic cardiomyopathy) ([address] - EF 25-30% Echo 8/2020. Continue BB, Entresto, Spironolactone. Consider adding Jardiance. Refer to [facility] to evaluate he is eligible for Heart transplant.           -     EKG 12 Lead     Hx of Low BP  - Asymptomatic, Monitor BP     S/p ICD -10/1/2013-Medtronic single lead. S/p CRT-D upgrade 11/21/2018 - Follows with EP, Dr Jaz Montalvo     Paroxysmal atrial fibrillation [facility]) - s/p DCCV 4/2022. Follows with Dr Jaz Montalvo - On Tikosyn and Xarelto for [address]  -     EKG 12 Lead     Atrial Flutter  - s/p Overdrive pace termination 12/15/2020     S/P left pulmonary artery pressure sensor (CardioMEMS)  implant placement    CAD - Mild to moderate CAD by [facility] August 2019 - Continue BB, Statin.  Not on Aspirin due to Xarelto     VHD - Mild-moderate MR and Mild TR. Essential hypertension - Monitor BP     ARVIND (obstructive sleep apnea) - uses CPAP     HLD - On statin therapy. Follows with PCP. Non-Morbid obesity - BMI 39.7 - Diet, exercise and weight loss discussed. Preventive Cardiology: Low cholesterol diet, regular exercise as tolerate, and gradual weight loss discussed. Above recommendations discussed. The patient's current medication list, allergies, problem list and results of prior tests (as available) were reviewed at today's visit   All questions answered about cardiac diagnoses and cardiac medications. Continue current medications. Monitor BP and heart rates. Compliance with medications and f/u with all physicians discussed. Risk factor modification based on risk profile discussed. Call if any exertional chest pain, short of breath, dizzy or palpitations. Follow up in 6 months or earlier if needed.          [facility] Cardiology  [address], [place], [address] [facility]  [phone]

## 2023-02-13 DIAGNOSIS — I50.22 CHRONIC SYSTOLIC HEART FAILURE (HCC): Primary | ICD-10-CM

## 2023-02-14 ENCOUNTER — HOSPITAL ENCOUNTER (OUTPATIENT)
Dept: OTHER | Age: 43
Setting detail: THERAPIES SERIES
Discharge: HOME | End: 2023-02-14
Payer: MEDICARE

## 2023-02-14 VITALS
SYSTOLIC BLOOD PRESSURE: 103 MMHG | WEIGHT: 290 LBS | BODY MASS INDEX: 38.26 KG/M2 | OXYGEN SATURATION: 97 % | DIASTOLIC BLOOD PRESSURE: 55 MMHG | RESPIRATION RATE: 18 BRPM | HEART RATE: 69 BPM

## 2023-02-14 LAB
ALBUMIN SERPL-MCNC: 4.4 G/DL (ref 3.5–5.2)
ALP BLD-CCNC: 84 U/L (ref 40–129)
ALT SERPL-CCNC: 8 U/L (ref 0–40)
ANION GAP SERPL CALCULATED.3IONS-SCNC: 17 MMOL/L (ref 7–16)
AST SERPL-CCNC: 14 U/L (ref 0–39)
BASOPHILS ABSOLUTE: 0.04 E9/L (ref 0–0.2)
BASOPHILS RELATIVE PERCENT: 0.6 % (ref 0–2)
BILIRUB SERPL-MCNC: 0.2 MG/DL (ref 0–1.2)
BUN BLDV-MCNC: 79 MG/DL (ref 6–20)
CALCIUM SERPL-MCNC: 10.3 MG/DL (ref 8.6–10.2)
CHLORIDE BLD-SCNC: 97 MMOL/L (ref 98–107)
CHOLESTEROL, TOTAL: 141 MG/DL (ref 0–199)
CO2: 21 MMOL/L (ref 22–29)
CREAT SERPL-MCNC: 2.7 MG/DL (ref 0.7–1.2)
EOSINOPHILS ABSOLUTE: 0.24 E9/L (ref 0.05–0.5)
EOSINOPHILS RELATIVE PERCENT: 3.7 % (ref 0–6)
GFR SERPL CREATININE-BSD FRML MDRD: 29 ML/MIN/1.73
GLUCOSE BLD-MCNC: 103 MG/DL (ref 74–99)
HBA1C MFR BLD: 6.3 % (ref 4–5.6)
HCT VFR BLD CALC: 32.3 % (ref 37–54)
HDLC SERPL-MCNC: 49 MG/DL
HEMOGLOBIN: 10.5 G/DL (ref 12.5–16.5)
IMMATURE GRANULOCYTES #: 0.03 E9/L
IMMATURE GRANULOCYTES %: 0.5 % (ref 0–5)
LDL CHOLESTEROL CALCULATED: 76 MG/DL (ref 0–99)
LYMPHOCYTES ABSOLUTE: 1.71 E9/L (ref 1.5–4)
LYMPHOCYTES RELATIVE PERCENT: 26.1 % (ref 20–42)
MCH RBC QN AUTO: 28.2 PG (ref 26–35)
MCHC RBC AUTO-ENTMCNC: 32.5 % (ref 32–34.5)
MCV RBC AUTO: 86.6 FL (ref 80–99.9)
MONOCYTES ABSOLUTE: 0.61 E9/L (ref 0.1–0.95)
MONOCYTES RELATIVE PERCENT: 9.3 % (ref 2–12)
NEUTROPHILS ABSOLUTE: 3.91 E9/L (ref 1.8–7.3)
NEUTROPHILS RELATIVE PERCENT: 59.8 % (ref 43–80)
PARATHYROID HORMONE INTACT: 59 PG/ML (ref 15–65)
PDW BLD-RTO: 15.3 FL (ref 11.5–15)
PHOSPHORUS: 4.5 MG/DL (ref 2.5–4.5)
PLATELET # BLD: 241 E9/L (ref 130–450)
PMV BLD AUTO: 10.5 FL (ref 7–12)
POTASSIUM SERPL-SCNC: 4.4 MMOL/L (ref 3.5–5)
PRO-BNP: 1181 PG/ML (ref 0–125)
RBC # BLD: 3.73 E12/L (ref 3.8–5.8)
SODIUM BLD-SCNC: 135 MMOL/L (ref 132–146)
TOTAL PROTEIN: 7.7 G/DL (ref 6.4–8.3)
TRIGL SERPL-MCNC: 79 MG/DL (ref 0–149)
VITAMIN D 25-HYDROXY: 19 NG/ML (ref 30–100)
VLDLC SERPL CALC-MCNC: 16 MG/DL
WBC # BLD: 6.5 E9/L (ref 4.5–11.5)

## 2023-02-14 PROCEDURE — 99214 OFFICE O/P EST MOD 30 MIN: CPT

## 2023-02-14 PROCEDURE — 82306 VITAMIN D 25 HYDROXY: CPT

## 2023-02-14 PROCEDURE — 80061 LIPID PANEL: CPT

## 2023-02-14 PROCEDURE — 84100 ASSAY OF PHOSPHORUS: CPT

## 2023-02-14 PROCEDURE — 80053 COMPREHEN METABOLIC PANEL: CPT

## 2023-02-14 PROCEDURE — 83880 ASSAY OF NATRIURETIC PEPTIDE: CPT

## 2023-02-14 PROCEDURE — 83970 ASSAY OF PARATHORMONE: CPT

## 2023-02-14 PROCEDURE — 83036 HEMOGLOBIN GLYCOSYLATED A1C: CPT

## 2023-02-14 PROCEDURE — 85025 COMPLETE CBC W/AUTO DIFF WBC: CPT

## 2023-02-14 PROCEDURE — 36415 COLL VENOUS BLD VENIPUNCTURE: CPT

## 2023-02-14 NOTE — PROGRESS NOTES
[facility]   CHF Clinic       [place]   1980  [phone]       Referring Doctor: Juvenal Soria  Cardiologist: Kyara Skinner  Nephrologist: n/a  PCP: Nadya Magana      History of Present Illness:     Grand kelvin MONTESINOS is a 43 y.o. male with a history of HFrEF, most recent EF 31% on 1/19/2021. Patient Story:  He does NOT have dyspnea with exertion, shortness of breath, or decline in overall functional capacity. He does not have orthopnea, PND, nocturnal cough or hemoptysis. He denies abdominal distention or bloating, early satiety, anorexia/change in appetite. He does have a good urinary response to his oral diuretic. He does not have  lower extremity edema. He does not have lightheadedness, dizziness. He denies syncope or near syncope. He denies chest pain or palpitations. Allergies   Allergen Reactions    Farxiga [Dapagliflozin] Other (See Comments)     Caused throat to swell         Prior to Visit Medications    Medication Sig Taking? Authorizing Provider   bumetanide (BUMEX) 2 MG tablet Take 1 tablet by mouth See [address] [facility], WED, FRIDAY.   Modesto Kapoor MD   ENTRESTO  MG per tablet TAKE 1 TABLET BY MOUTH TWICE DAILY *EMERGENCY REFILL*  Maged I Awadalla, MD   atorvastatin (LIPITOR) 40 MG tablet TAKE 1 TABLET BY MOUTH DAILY *EMERGENCY REFILL*  Maged I Awadalla, MD   insulin lispro (HUMALOG) 100 UNIT/ML SOLN injection vial INJECT 20 UNITS SUBCUTANEOUSLY THREE TIMES A DAY BEFORE MEALS  Maged I Awadalla, MD   ONETOUCH ULTRA strip USE TO TEST BLOOD SUGAR 2 TO 3 TIMES DAILY AND AS NEEDED FOR SYMPTOMS OF IRREGULAR BLOOD SUGAR  Maged I Awadalla, MD   spironolactone (ALDACTONE) 25 MG tablet TAKE 1/2 TABLET BY MOUTH ONCE DAILY  Maged I Awadalla, MD   carvedilol (COREG) 25 MG tablet TAKE ONE (1) TABLET BY MOUTH TWICE DAILY WITH MEALS  Modesto Kapoor MD   rivaroxaban (XARELTO) 15 MG TABS tablet Take 1 tablet by mouth daily (with breakfast) Jeannie Millard MD   magnesium oxide (MAG-OX) 400 MG tablet TAKE 1 TABLET BY MOUTH DAILY  Darshana Kinsey MD   LEVEMIR FLEXTOUCH 100 UNIT/ML injection pen INJECT 40 UNITS SUBCUTANEOUSLY TWICE DAILY  Demetrio Arellano MD   allopurinol (ZYLOPRIM) 300 MG tablet TAKE 1 TABLET BY MOUTH DAILY  Demetrio Arellano MD   isosorbide dinitrate (ISORDIL) 10 MG tablet TAKE TWO (2) TABLETS BY MOUTH THREE TIMES A DAY  Arnoldo Driscoll MD   nitroGLYCERIN (NITROSTAT) 0.4 MG SL tablet DISSOLVE 1 TABLET UNDER THE TONGUE AS NEEDED FOR CHEST PAIN EVERY 5 MINUTES UP TO 3 TIMES. IF NO RELIEF CALL 911.  Darshana Kinsey MD   metFORMIN (GLUCOPHAGE) 1000 MG tablet TAKE ONE (1) TABLET BY MOUTH TWICE DAILY WITH MEALS  Darshana Kinsey MD   Blood Pressure KIT 1 kit by Does not apply route daily  Kirti Rai MD   Continuous Blood Gluc Transmit (DEXCOM G6 TRANSMITTER) MISC USE AS DIRECTED  Alfred Long MD   rivaroxaban (XARELTO) 20 MG TABS tablet Take 20 mg by mouth Daily with supper  Historical Provider, MD   magnesium oxide (MAG-OX) 400 MG tablet Take 1 tablet by mouth daily  Alfred Long MD             Guideline directed medical:  ARNI/ACE I/ARB: Yes  Beta blocker:  Yes  Aldosterone antagonist:  Yes        Physical Examination:     BP (!) 103/55   Pulse 69   Resp 18   Wt 290 lb (131.5 kg)   SpO2 97%   BMI 38.26 kg/m²     Assessment  Charting Type: Shift assessment (chf clinic)    Neurological  Orientation Level: Oriented X4         HEENT (Head, Ears, Eyes, Nose, & Throat)  HEENT (WDL): Within Defined Limits    Respiratory  Respiratory Pattern: Regular  Respiratory Depth: Normal  Respiratory Quality/Effort: Unlabored  Chest Assessment: Chest expansion symmetrical  L Breath Sounds: Clear  R Breath Sounds: Clear    Breath Sounds  Right Upper Lobe: Clear  Right Middle Lobe: Clear  Right Lower Lobe: Clear  Left Upper Lobe: Clear  Left Lower Lobe: Clear         Cardiac  Cardiac Regularity: Regular  Cardiac Rhythm: AV paced    Rhythm  Interpretation  Heart Rate: 69         Gastrointestinal  Abdominal (WDL): Exceptions to WDL  Abdomen Inspection: Soft               Peripheral Vascular  Peripheral Vascular (WDL): Within Defined Limits  RLE Edema: None  LLE Edema: None                   Genitourinary  Genitourinary (WDL): Within Defined Limits    Psychosocial  Psychosocial (WDL): Within Defined Limits                        Heart Rate: 69                   LAB DATA:    BNP  No results for input(s): BNP in the last 72 hours. proBNP  No results for input(s): PROBNP in the last 72 hours. BMP  No results for input(s): NA, K, CL, CO2, BUN, CREATININE, GLUCOSE, CALCIUM in the last 72 hours. WEIGHTS:  Wt Readings from Last 3 Encounters:   02/14/23 290 lb (131.5 kg)   02/06/23 291 lb (132 kg)   01/24/23 289 lb (131.1 kg)         TELEMETRY:  Cardiac Regularity: Regular  Cardiac Rhythm/Interpretation: NSR/PACED        ASSESSMENT:  Pandora Leyden III's weight IS STABLE, DENIES ANY DISCOMFORT, WILL CALL FOR EARLIER APPT. IF NEEDED. No C/O SOB  Interventions completed this visit:  IV diuretics given: No  Lab work obtained: Yes, BMP/BNP     Reviewed continue current medications that patient as prescribed answered any questions   Educated on signs and symptoms of HF  Educated on low sodium diet    PLAN:  Future Appointments   Date Time Provider Clemente Davis   2/14/2023 12:30 PM [facility] ROOM 1 [facility]   2/27/2023  9:00 PM [facility] SLEEP LAB BEDROOM 1 [facility]   3/13/2023 12:00 PM [facility] CHF ROOM 1 [facility]   4/6/2023  1:40 PM DO FARIDEH Delarosa SLEEP [facility]           Given clinic phone number and aware of signs and symptoms to call with any HF change in symptoms.

## 2023-02-14 NOTE — PLAN OF CARE
Problem: Chronic Conditions and Co-morbidities  Goal: Patient's chronic conditions and co-morbidity symptoms are monitored and maintained or improved  Flowsheets (Taken 2/14/2023 8556)  Care Plan - Patient's Chronic Conditions and Co-Morbidity Symptoms are Monitored and Maintained or Improved: Monitor and assess patient's chronic conditions and comorbid symptoms for stability, deterioration, or improvement

## 2023-02-23 ENCOUNTER — TELEPHONE (OUTPATIENT)
Dept: CARDIOLOGY CLINIC | Age: 43
End: 2023-02-23

## 2023-02-23 NOTE — TELEPHONE ENCOUNTER
[employer]. [employer] tech team reset something on the back end. And now merlin  Received last 3 days of mems sends. (That he could not get to transmit. )  He will send todays reading now. While on phone transmitted. At goal PAD 21. Was easy to send. He has tech support # if any further send issues arise.        Reginaldo Bills RN

## 2023-02-23 NOTE — TELEPHONE ENCOUNTER
CardioMems update:  CardioMems Implant date: 12-    PAD:  Goal:23  Current trends: 19-26  No recent mems sent, requested mems send. 56% compliant with mems sends. Providers:  Cardiomems team: DR AUSTEN Mcbride CNP, TY Abrams RN  Cardiologist: Dr. Flor Mccloud  EP: Dr Tanesha Sheets     - managing antiarrhythmics and diuretic (last cardioverted 4-26-22) see renal trends. Advanced HF: DR Luz Mendez (to re-establish 11/3/22)  Nephrology- Dr Us  CHW: Juni Mariee with transportation Oct 2022. Uses transportation via his insurance. [facility]  PCP: Dr Kamar Mcleod (CPAP) 1/4/2012. Now Dr Diane Marte. Surgical Wt Loss:  working up for gastric bypass [facility]      Diuretic Protocol:  Bumex 2mg m-w-f   Aldactone 25mg  take 1/2 tab daily         GDMT:  Entresto 97-103mg BID  Coreg 25mg twice daily  Isordil 10mg TID    Has nitro no use. Allergies   Allergen Reactions    Farxiga [Dapagliflozin] Other (See Comments)     Caused throat to swell         CHF:  HFrEF  Severe nonischemic cardiomyopathy  1-19-21 EF 31%   Echo 8-5-2020 EF 88-92% stage II diastolic dysfunction  1888 ICD singlechamber, 9-2018 S/P ICD Medtronic Evera [id number]   BiV  7-2013 Cardiac MRI EF 20%    cardiac cath: EF 10-15%        ASSESSMENT:  2 14 23 last seen at chf clinic assessment with labs. 3/2/23 having  \"Ulcer in stomach \" fixed [employer]. then waiting for Dr Sil Mosquera to schedule Bariatric surgery. Making good urine, color is light yellow. No dizziness or syncope. He will follow with Dr Us office regarding his cr inc from 2.2 to 2.7  and bun inc 61 to 79 he is taking bumex 2mg  M-W-F only   His cardiomems unit is not working now. He was provided tech support to call when we hang up. He will notify Dr Roslyn Pearce office if sends problem not resolved.      PLAN:   Follow with nephrology as instructed   Follow with advanced HF and bariatric surgeon.   Call tech support [phone] cardiomems (issue sending last few days, no sends)  Keep sleep study set for 2/27/23  Daily weights, monitor for s/s fluid overload and dehydration. Call chf clinic if need extra add on visit if symptomatic chf.

## 2023-02-24 DIAGNOSIS — I50.22 CHRONIC SYSTOLIC HEART FAILURE (HCC): ICD-10-CM

## 2023-02-24 RX ORDER — NITROGLYCERIN 0.4 MG/1
TABLET SUBLINGUAL
Qty: 25 TABLET | Refills: 10 | OUTPATIENT
Start: 2023-02-24

## 2023-02-27 ENCOUNTER — HOSPITAL ENCOUNTER (OUTPATIENT)
Dept: SLEEP CENTER | Age: 43
Discharge: HOME | End: 2023-02-27
Payer: MEDICARE

## 2023-02-27 DIAGNOSIS — I10 ESSENTIAL HYPERTENSION: ICD-10-CM

## 2023-02-27 DIAGNOSIS — R06.09 EXERTIONAL DYSPNEA: ICD-10-CM

## 2023-02-27 DIAGNOSIS — E66.01 MORBID OBESITY DUE TO EXCESS CALORIES (HCC): ICD-10-CM

## 2023-02-27 DIAGNOSIS — I50.22 CHRONIC SYSTOLIC HEART FAILURE (HCC): ICD-10-CM

## 2023-02-27 DIAGNOSIS — G47.33 OBSTRUCTIVE SLEEP APNEA: ICD-10-CM

## 2023-02-27 PROCEDURE — 95811 POLYSOM 6/>YRS CPAP 4/> PARM: CPT

## 2023-02-28 VITALS
BODY MASS INDEX: 41.04 KG/M2 | HEART RATE: 70 BPM | WEIGHT: 303 LBS | SYSTOLIC BLOOD PRESSURE: 108 MMHG | HEIGHT: 72 IN | DIASTOLIC BLOOD PRESSURE: 72 MMHG | OXYGEN SATURATION: 97 %

## 2023-03-02 DIAGNOSIS — I50.22 CHRONIC SYSTOLIC HEART FAILURE (HCC): ICD-10-CM

## 2023-03-02 RX ORDER — NITROGLYCERIN 0.4 MG/1
TABLET SUBLINGUAL
Qty: 25 TABLET | Refills: 10 | Status: SHIPPED | OUTPATIENT
Start: 2023-03-02

## 2023-03-03 ENCOUNTER — HOSPITAL ENCOUNTER (OUTPATIENT)
Age: 43
Discharge: HOME | End: 2023-03-03
Payer: MEDICARE

## 2023-03-03 LAB
ALBUMIN SERPL-MCNC: 4.6 G/DL (ref 3.5–5.2)
ALP BLD-CCNC: 87 U/L (ref 40–129)
ALT SERPL-CCNC: 10 U/L (ref 0–40)
ANION GAP SERPL CALCULATED.3IONS-SCNC: 17 MMOL/L (ref 7–16)
AST SERPL-CCNC: 15 U/L (ref 0–39)
BILIRUB SERPL-MCNC: 0.3 MG/DL (ref 0–1.2)
BUN BLDV-MCNC: 58 MG/DL (ref 6–20)
CALCIUM SERPL-MCNC: 10.2 MG/DL (ref 8.6–10.2)
CHLORIDE BLD-SCNC: 102 MMOL/L (ref 98–107)
CHOLESTEROL, TOTAL: 146 MG/DL (ref 0–199)
CO2: 22 MMOL/L (ref 22–29)
CREAT SERPL-MCNC: 2.5 MG/DL (ref 0.7–1.2)
FERRITIN: 295 NG/ML
FOLATE: 10.8 NG/ML (ref 4.8–24.2)
GFR SERPL CREATININE-BSD FRML MDRD: 32 ML/MIN/1.73
GLUCOSE BLD-MCNC: 106 MG/DL (ref 74–99)
HBA1C MFR BLD: 6.2 % (ref 4–5.6)
HCT VFR BLD CALC: 31.6 % (ref 37–54)
HDLC SERPL-MCNC: 50 MG/DL
HEMOGLOBIN: 10.2 G/DL (ref 12.5–16.5)
IRON: 50 MCG/DL (ref 59–158)
LDL CHOLESTEROL CALCULATED: 84 MG/DL (ref 0–99)
MAGNESIUM: 1.9 MG/DL (ref 1.6–2.6)
MCH RBC QN AUTO: 27.9 PG (ref 26–35)
MCHC RBC AUTO-ENTMCNC: 32.3 % (ref 32–34.5)
MCV RBC AUTO: 86.3 FL (ref 80–99.9)
PDW BLD-RTO: 15.7 FL (ref 11.5–15)
PLATELET # BLD: 243 E9/L (ref 130–450)
PMV BLD AUTO: 10.5 FL (ref 7–12)
POTASSIUM SERPL-SCNC: 4.5 MMOL/L (ref 3.5–5)
RBC # BLD: 3.66 E12/L (ref 3.8–5.8)
SODIUM BLD-SCNC: 141 MMOL/L (ref 132–146)
TOTAL PROTEIN: 7.8 G/DL (ref 6.4–8.3)
TRIGL SERPL-MCNC: 62 MG/DL (ref 0–149)
TSH SERPL DL<=0.05 MIU/L-ACNC: 1.55 UIU/ML (ref 0.27–4.2)
VITAMIN B-12: 481 PG/ML (ref 211–946)
VITAMIN D 25-HYDROXY: 14 NG/ML (ref 30–100)
VLDLC SERPL CALC-MCNC: 12 MG/DL
WBC # BLD: 6.4 E9/L (ref 4.5–11.5)

## 2023-03-03 PROCEDURE — 82746 ASSAY OF FOLIC ACID SERUM: CPT

## 2023-03-03 PROCEDURE — 82607 VITAMIN B-12: CPT

## 2023-03-03 PROCEDURE — 84443 ASSAY THYROID STIM HORMONE: CPT

## 2023-03-03 PROCEDURE — 80061 LIPID PANEL: CPT

## 2023-03-03 PROCEDURE — 82728 ASSAY OF FERRITIN: CPT

## 2023-03-03 PROCEDURE — 83036 HEMOGLOBIN GLYCOSYLATED A1C: CPT

## 2023-03-03 PROCEDURE — 83735 ASSAY OF MAGNESIUM: CPT

## 2023-03-03 PROCEDURE — 36415 COLL VENOUS BLD VENIPUNCTURE: CPT

## 2023-03-03 PROCEDURE — 80053 COMPREHEN METABOLIC PANEL: CPT

## 2023-03-03 PROCEDURE — 85027 COMPLETE CBC AUTOMATED: CPT

## 2023-03-03 PROCEDURE — 82306 VITAMIN D 25 HYDROXY: CPT

## 2023-03-03 PROCEDURE — 83540 ASSAY OF IRON: CPT

## 2023-03-03 PROCEDURE — 84425 ASSAY OF VITAMIN B-1: CPT

## 2023-03-03 RX ORDER — ISOSORBIDE DINITRATE 10 MG/1
TABLET ORAL
Qty: 180 TABLET | Refills: 10 | Status: SHIPPED | OUTPATIENT
Start: 2023-03-03

## 2023-03-03 NOTE — PROCEDURES
[place] [address]     POLYSOMNOGRAPHY WITH CPAP/BPAP TITRATION REPORT       PATIENT NAME: Rober Mejía III               : 1980        MED REC NO:  [id number]     ACCOUNT NO:     [id number]  PROVIDER:  Bria Whitman DO  DATE OF STUDY: 23     SERVICE PROVIDER:  [facility]     REFERRING PROVIDER:   JYOTI Palm    AGE: 42 y.o.  yrs       SEX: male           Height: 72.0 in   Weight: 303.0 lbs   B.M.I: 41.1       Indication: the patient has a known history of ARVIND. Reevaluate optimal CPAP pressure for control of ARVIND.    Past Medical History:   Diagnosis Date    Acute CHF (Formerly [facility]) 2011    Acute CHF (Formerly [facility]) 2011    Acute idiopathic gout of right foot 2016    AF (atrial fibrillation) (Formerly [facility]) 2020    Anemia 2011    CAD (coronary artery disease)     Cerebral artery occlusion with cerebral infarction (Formerly [facility])     CKD (chronic kidney disease)     Gout     HTN (hypertension) 2011    Hyperlipidemia     Hypertension     Morbid obesity due to excess calories (Formerly [facility])     Nonischemic cardiomyopathy ([facility]) 2011    Nonischemic cardiomyopathy ([facility]) 2013- cardiac MRI revealed an LVEF of 20%    ARVIND (obstructive sleep apnea) 2011    S/P left heart catheterization by percutaneous approach     Dr Suarez    Systolic heart failure ([facility]) 2012- cardiac catheterization revealed an LVEF of 10-15%, mitral regurgitation along with borderline prolapse of mitral valve    Type 2 diabetes mellitus with complication, with long-term current use of insulin (Formerly [facility]) 2016    URI, acute 2011       Current Outpatient Medications:     isosorbide dinitrate (ISORDIL) 10 MG tablet, TAKE TWO (2) TABLETS BY MOUTH THREE TIMES A DAY, Disp: 180 tablet, Rfl: 10    nitroGLYCERIN (NITROSTAT) 0.4 MG SL tablet, up to max  of 3 total doses. If no relief after 1 dose, call 911., Disp: 25 tablet, Rfl: 10    bumetanide (BUMEX) 2 MG tablet, Take 1 tablet by mouth See [address] [facility], WED, FRIDAY. , Disp: 45 tablet, Rfl: 11    ENTRESTO  MG per tablet, TAKE 1 TABLET BY MOUTH TWICE DAILY *EMERGENCY REFILL*, Disp: 60 tablet, Rfl: 10    atorvastatin (LIPITOR) 40 MG tablet, TAKE 1 TABLET BY MOUTH DAILY *EMERGENCY REFILL*, Disp: 30 tablet, Rfl: 10    insulin lispro (HUMALOG) 100 UNIT/ML SOLN injection vial, INJECT 20 UNITS SUBCUTANEOUSLY THREE TIMES A DAY BEFORE MEALS, Disp: 20 mL, Rfl: 10    ONETOUCH ULTRA strip, USE TO TEST BLOOD SUGAR 2 TO 3 TIMES DAILY AND AS NEEDED FOR SYMPTOMS OF IRREGULAR BLOOD SUGAR, Disp: 100 strip, Rfl: 0    spironolactone (ALDACTONE) 25 MG tablet, TAKE 1/2 TABLET BY MOUTH ONCE DAILY, Disp: 15 tablet, Rfl: 10    carvedilol (COREG) 25 MG tablet, TAKE ONE (1) TABLET BY MOUTH TWICE DAILY WITH MEALS, Disp: 60 tablet, Rfl: 10    rivaroxaban (XARELTO) 15 MG TABS tablet, Take 1 tablet by mouth daily (with breakfast), Disp: 90 tablet, Rfl: 3    magnesium oxide (MAG-OX) 400 MG tablet, TAKE 1 TABLET BY MOUTH DAILY, Disp: 30 tablet, Rfl: 10    LEVEMIR FLEXTOUCH 100 UNIT/ML injection pen, INJECT 40 UNITS SUBCUTANEOUSLY TWICE DAILY, Disp: 30 mL, Rfl: 10    allopurinol (ZYLOPRIM) 300 MG tablet, TAKE 1 TABLET BY MOUTH DAILY, Disp: 30 tablet, Rfl: 10    metFORMIN (GLUCOPHAGE) 1000 MG tablet, TAKE ONE (1) TABLET BY MOUTH TWICE DAILY WITH MEALS, Disp: 180 tablet, Rfl: 1    Blood Pressure KIT, 1 kit by Does not apply route daily, Disp: 1 kit, Rfl: 0    Continuous Blood Gluc Transmit (DEXCOM G6 TRANSMITTER) MISC, USE AS DIRECTED, Disp: 1 each, Rfl: 0      DESCRIPTION: This patient has been previously diagnosed with sleep-disordered breathing and now returns for positive airway pressure titration. This full night of positive airway pressure titration consisted of EEG, EOG, EMG and 2-lead ECG monitoring.  Airflow (internal CPAP/bi-level PAP flow signal), chest and abdominal efforts by respiratory inductance plethysmography or polyvinylidene fluoride (PVDF) sensor, and pulse oximetry were monitored as well. Hypopneas were scored as at least a 30% reduction in amplitude of the semi-quantitative flow signal, associated with a 4% or greater oxygen desaturation. Respiratory effort related arousals (RERAs) were scored as at least a 30% reduction in amplitude of the semi-quantitative flow signal, associated with an EEG microarousal.      FINDINGS:  SLEEP CONTINUITY AND SLEEP ARCHITECTURE:  esting began at 10:48:06 PM and ended at 5:34:29 AM, for a total recording time (TRT) of 406.4 minutes. The sleep period lasted 395.6 minutes and the total sleep time (TST) was 362.5 minutes, which resulted in a sleep efficiency (TST÷TRT) of 89.2%. The sleep latency (SL) was 10.8 minutes, and the latency to the first occurrence of Stage R was 117.0 minutes. There were 3 Stage R periods observed on this study night, 11 awakenings (i.e. transitions to Stage W from any sleep stage), and 157 total stage transitions. Wake after sleep onset (WASO) time accounted for 33.1 minutes, while the time spent is each sleep stage was 60.0 minutes (Stage N1); 212.5 minutes (Stage N2); 29.5 minutes (Stage N3); and 60.5 minutes (Stage R). The percentage of Total Sleep Time in each stage was: 16.6% (Stage N1); 58.6% (Stage N2); 8.1% (Stage N3); and 16.7% (Stage R). The microarousal index was increased at 18.5. RESPIRATORY MONITORING: CPAP was initiated and 6cms of water pressure and titrated to 11cms of water pressure in order to abolish respiratory events. Respiratory events were decreased with the use of positive airway pressure therapy. Titration at the optimal pressure of 11cms of water pressure resulted in an AHI of 2.4 and a minimum oxyhemoglobin saturation of 91% and an average oxyhemoglobin saturation of 95%.  Time spent on this level of positive airway pressure did include supine REM sleep. EEG: No abnormal epileptiform activity was observed. ECG: The electrocardiogram documented paced rhythm, the patient is known to have an ICD. The average heart rate during sleep was 70 beats per minute. PERIODIC LIMB MOVEMENTS: Few periodic limb movements were noted. EMG/VIDEO MONITORING:  Loss of REM atonia, bruxism, and parasomnias were not observed. IMPRESSION:    1. This study demonstrated adequate control of ARVIND with CPAP. 2.  Subjectively, the patient reported sleeping better than usual and being willing to use PAP therapy at home on a chronic basis. RECOMMENDATIONS:  1. Auto-CPAP therapy at settings of 11-15 cm of water is recommended. Sleep study results to be discussed with patient by sleep medicine provider. CPAP will be ordered by the sleep medicine provider. The patient is to follow up at [address] [place] [address] for CPAP management. 2.  Per insurance requirements, the patient should be seen in clinical follow up within 3 months of receiving CPAP therapy in order to document adherence to therapy, assess efficacy of the prescribed settings (as based upon a residual AHI of less than or equal to 5 on the device's remote download), and evaluate resolution of sleep-related complaints. 3.  The patient should be strongly counseled against driving while drowsy. 4.  Weight loss efforts should be encouraged, as the severity of obstructive sleep apnea may improve although no guarantee of cure can be made.     EQUIPMENT ORDERING INFORMATION:  DEVICE: CPAP with heated humidification and a remote modem    SETTINGS:  11-15 cm of water     MASK TYPE: [employer]    MASK SIZE: Medium    SUPPLEMENTAL OXYGEN: None

## 2023-03-04 ENCOUNTER — HOSPITAL ENCOUNTER (OUTPATIENT)
Age: 43
Discharge: HOME | End: 2023-03-04
Payer: MEDICARE

## 2023-03-04 PROCEDURE — 84630 ASSAY OF ZINC: CPT

## 2023-03-13 ENCOUNTER — HOSPITAL ENCOUNTER (OUTPATIENT)
Dept: OTHER | Age: 43
Setting detail: THERAPIES SERIES
Discharge: HOME | End: 2023-03-13
Payer: MEDICARE

## 2023-03-13 VITALS
RESPIRATION RATE: 18 BRPM | HEART RATE: 70 BPM | SYSTOLIC BLOOD PRESSURE: 107 MMHG | OXYGEN SATURATION: 100 % | WEIGHT: 294 LBS | BODY MASS INDEX: 39.87 KG/M2 | DIASTOLIC BLOOD PRESSURE: 58 MMHG

## 2023-03-13 LAB
ANION GAP SERPL CALCULATED.3IONS-SCNC: 14 MMOL/L (ref 7–16)
BUN BLDV-MCNC: 79 MG/DL (ref 6–20)
CALCIUM SERPL-MCNC: 9.9 MG/DL (ref 8.6–10.2)
CHLORIDE BLD-SCNC: 103 MMOL/L (ref 98–107)
CO2: 20 MMOL/L (ref 22–29)
CREAT SERPL-MCNC: 2.6 MG/DL (ref 0.7–1.2)
GFR SERPL CREATININE-BSD FRML MDRD: 30 ML/MIN/1.73
GLUCOSE BLD-MCNC: 113 MG/DL (ref 74–99)
POTASSIUM SERPL-SCNC: 4.6 MMOL/L (ref 3.5–5)
PRO-BNP: 866 PG/ML (ref 0–125)
SODIUM BLD-SCNC: 137 MMOL/L (ref 132–146)

## 2023-03-13 PROCEDURE — 80048 BASIC METABOLIC PNL TOTAL CA: CPT

## 2023-03-13 PROCEDURE — 99214 OFFICE O/P EST MOD 30 MIN: CPT

## 2023-03-13 PROCEDURE — 36415 COLL VENOUS BLD VENIPUNCTURE: CPT

## 2023-03-13 PROCEDURE — 83880 ASSAY OF NATRIURETIC PEPTIDE: CPT

## 2023-03-13 NOTE — PLAN OF CARE
Problem: Chronic Conditions and Co-morbidities  Goal: Patient's chronic conditions and co-morbidity symptoms are monitored and maintained or improved  Flowsheets (Taken 3/13/2023 2390)  Care Plan - Patient's Chronic Conditions and Co-Morbidity Symptoms are Monitored and Maintained or Improved: Monitor and assess patient's chronic conditions and comorbid symptoms for stability, deterioration, or improvement

## 2023-03-13 NOTE — PROGRESS NOTES
[facility]   CHF Clinic       [place] III   1980  [phone]       Referring Doctor: Adelina Mendieta  Cardiologist: Gudelia Schmitt  Nephrologist: n/a  PCP: Jaspreet Rivers      History of Present Illness:     Grand kelvin MONTESINOS is a 43 y.o. male with a history of HFrEF, most recent EF 31% on 1/19/2021. Patient Story:  He does NOT have dyspnea with exertion, shortness of breath, or decline in overall functional capacity. He does not have orthopnea, PND, nocturnal cough or hemoptysis. He denies abdominal distention or bloating, early satiety, anorexia/change in appetite. He does have a good urinary response to his oral diuretic. He does not have  lower extremity edema. He does not have lightheadedness, dizziness. He denies syncope or near syncope. He denies chest pain or palpitations. Allergies   Allergen Reactions    Farxiga [Dapagliflozin] Other (See Comments)     Caused throat to swell         Prior to Visit Medications    Medication Sig Taking? Authorizing Provider   isosorbide dinitrate (ISORDIL) 10 MG tablet TAKE TWO (2) TABLETS BY MOUTH THREE TIMES A DAY  Evan Roy MD   nitroGLYCERIN (NITROSTAT) 0.4 MG SL tablet up to max of 3 total doses. If no relief after 1 dose, call 911. Mike Peoples MD   bumetanide (BUMEX) 2 MG tablet Take 1 tablet by mouth See [address] [facility], WED, FRIDAY.   Cassy Givens MD   ENTRESTO  MG per tablet TAKE 1 TABLET BY MOUTH TWICE DAILY *EMERGENCY REFILL*  Maged I Awadalla, MD   atorvastatin (LIPITOR) 40 MG tablet TAKE 1 TABLET BY MOUTH DAILY *EMERGENCY REFILL*  Maged I Awadalla, MD   insulin lispro (HUMALOG) 100 UNIT/ML SOLN injection vial INJECT 20 UNITS SUBCUTANEOUSLY THREE TIMES A DAY BEFORE MEALS  Maged I Awadalla, MD   ONETOUCH ULTRA strip USE TO TEST BLOOD SUGAR 2 TO 3 TIMES DAILY AND AS NEEDED FOR SYMPTOMS OF IRREGULAR BLOOD SUGAR  Maged I Awadalla, MD   spironolactone (ALDACTONE) 25 MG tablet TAKE 1/2 TABLET BY MOUTH ONCE DAILY  Maged I Awadalla, MD   carvedilol (COREG) 25 MG tablet TAKE ONE (1) TABLET BY MOUTH TWICE DAILY WITH MEALS  Marin Badillo MD   rivaroxaban (XARELTO) 15 MG TABS tablet Take 1 tablet by mouth daily (with breakfast)  Tong Millan MD   magnesium oxide (MAG-OX) 400 MG tablet TAKE 1 TABLET BY MOUTH DAILY  Sergey Thomas MD   LEVEMIR FLEXTOUCH 100 UNIT/ML injection pen INJECT 40 Norah Sexton MD   allopurinol (ZYLOPRIM) 300 MG tablet TAKE 1 TABLET BY MOUTH DAILY  Karon Atkinson MD   metFORMIN (GLUCOPHAGE) 1000 MG tablet TAKE ONE (1) TABLET BY MOUTH TWICE DAILY WITH MEALS  Sergey Thomas MD   Blood Pressure KIT 1 kit by Does not apply route daily  Ada Villarreal MD   Continuous Blood Gluc Transmit (DEXCOM G6 TRANSMITTER) MISC USE AS DIRECTED  Chyna Timmons MD   rivaroxaban (XARELTO) 20 MG TABS tablet Take 20 mg by mouth Daily with supper  Historical Provider, MD   magnesium oxide (MAG-OX) 400 MG tablet Take 1 tablet by mouth daily  Chyna Timmons MD             Guideline directed medical:  ARNI/ACE I/ARB: Yes  Beta blocker:   Yes  Aldosterone antagonist:  Yes  SGLT2i: No        Physical Examination:     BP (!) 107/58   Pulse 70   Resp 18   Wt 294 lb (133.4 kg)   SpO2 100%   BMI 39.87 kg/m²     Assessment  Charting Type: Shift assessment (chf clinic)    Neurological  Orientation Level: Oriented X4         HEENT (Head, Ears, Eyes, Nose, & Throat)  HEENT (WDL): Within Defined Limits    Respiratory  Respiratory Pattern: Regular  Respiratory Depth: Normal  Respiratory Quality/Effort: Unlabored  Chest Assessment: Chest expansion symmetrical  L Breath Sounds: Clear  R Breath Sounds: Clear    Breath Sounds  Right Upper Lobe: Clear  Right Middle Lobe: Clear  Right Lower Lobe: Clear  Left Upper Lobe: Clear  Left Lower Lobe: Clear         Cardiac  Cardiac Regularity: Regular  Cardiac Rhythm: AV paced    Rhythm Interpretation  Heart Rate: 70 Gastrointestinal  Abdominal (WDL): Exceptions to WDL  Abdomen Inspection: Soft               Peripheral Vascular  Peripheral Vascular (WDL): Within Defined Limits  RLE Edema: None  LLE Edema: None                   Genitourinary  Genitourinary (WDL): Within Defined Limits    Psychosocial  Psychosocial (WDL): Within Defined Limits                        Heart Rate: 70                   LAB DATA:    BNP  No results for input(s): BNP in the last 72 hours. proBNP  No results for input(s): PROBNP in the last 72 hours. BMP  No results for input(s): NA, K, CL, CO2, BUN, CREATININE, GLUCOSE, CALCIUM in the last 72 hours. WEIGHTS:  Wt Readings from Last 3 Encounters:   03/13/23 294 lb (133.4 kg)   02/28/23 (!) 303 lb (137.4 kg)   02/14/23 290 lb (131.5 kg)         TELEMETRY:  Cardiac Regularity: Regular  Cardiac Rhythm/Interpretation: NSR/PACED        ASSESSMENT:  Manuela Court III's weight IS STABLE, DENIES ANY DISCOMFORT, WILL CALL FOR EARLIER APPT. IF NEEDED. No C/O SOB  Interventions completed this visit:  IV diuretics given: No  Lab work obtained: Yes, BMP/BNP     Reviewed continue current medications that patient as prescribed answered any questions   Educated on signs and symptoms of HF  Educated on low sodium diet    PLAN:  Future Appointments   Date Time Provider Clemente Davis   4/6/2023  1:40 PM DO FARIDEH Sommers SLEEP [facility]   4/12/2023 12:00 PM [facility] CHF ROOM 1 [address]           Given clinic phone number and aware of signs and symptoms to call with any HF change in symptoms.

## 2023-03-16 ENCOUNTER — TELEPHONE (OUTPATIENT)
Dept: CARDIOLOGY CLINIC | Age: 43
End: 2023-03-16

## 2023-03-16 DIAGNOSIS — I50.22 CHRONIC SYSTOLIC HEART FAILURE (HCC): Primary | ICD-10-CM

## 2023-03-16 NOTE — TELEPHONE ENCOUNTER
Patient recently seen by you for CC for colonoscopy. They are asking if Xarelto may be held prior for 3 days.  Please advise

## 2023-03-28 ENCOUNTER — HOSPITAL ENCOUNTER (OUTPATIENT)
Age: 43
Discharge: HOME | End: 2023-03-28
Payer: MEDICARE

## 2023-03-28 PROCEDURE — 84630 ASSAY OF ZINC: CPT

## 2023-03-31 LAB — ZINC SERPL-MCNC: 75.9 UG/DL (ref 60–120)

## 2023-04-03 ENCOUNTER — TELEPHONE (OUTPATIENT)
Dept: CARDIOLOGY CLINIC | Age: 43
End: 2023-04-03

## 2023-04-03 RX ORDER — MAGNESIUM OXIDE 400 MG/1
400 TABLET ORAL DAILY
Qty: 30 TABLET | Refills: 10 | OUTPATIENT
Start: 2023-04-03

## 2023-04-03 NOTE — TELEPHONE ENCOUNTER
Pt called stating GI doctor ,  requesting Nadeen Baker to review medications. Pt could not recall the medications for review. Please advise. Thank you.

## 2023-04-03 NOTE — TELEPHONE ENCOUNTER
Patient had gastric bypass 03/29/23. The surgeon advised patient contact you to have his meds reviewed. His BP since surgery is down to 100s/60s and since he will be losing weight, they want him to make sure his medication dosing will not need adjusted.

## 2023-04-04 ENCOUNTER — HOSPITAL ENCOUNTER (INPATIENT)
Age: 43
LOS: 4 days | Discharge: HOME | End: 2023-04-08
Attending: EMERGENCY MEDICINE | Admitting: INTERNAL MEDICINE
Payer: MEDICARE

## 2023-04-04 ENCOUNTER — APPOINTMENT (OUTPATIENT)
Dept: GENERAL RADIOLOGY | Age: 43
End: 2023-04-04
Payer: MEDICARE

## 2023-04-04 DIAGNOSIS — N17.9 ACUTE KIDNEY INJURY SUPERIMPOSED ON CKD (HCC): ICD-10-CM

## 2023-04-04 DIAGNOSIS — N18.9 ACUTE KIDNEY INJURY SUPERIMPOSED ON CKD (HCC): ICD-10-CM

## 2023-04-04 DIAGNOSIS — E86.0 DEHYDRATION: Primary | ICD-10-CM

## 2023-04-04 LAB
ALBUMIN SERPL-MCNC: 4.6 G/DL (ref 3.5–5.2)
ALP SERPL-CCNC: 91 U/L (ref 40–129)
ALT SERPL-CCNC: 11 U/L (ref 0–40)
ANION GAP SERPL CALCULATED.3IONS-SCNC: 14 MMOL/L (ref 7–16)
AST SERPL-CCNC: 17 U/L (ref 0–39)
BACTERIA URNS QL MICRO: ABNORMAL /HPF
BASOPHILS # BLD: 0.03 E9/L (ref 0–0.2)
BASOPHILS NFR BLD: 0.3 % (ref 0–2)
BILIRUB SERPL-MCNC: 0.5 MG/DL (ref 0–1.2)
BILIRUB UR QL STRIP: NEGATIVE
BUN SERPL-MCNC: 70 MG/DL (ref 6–20)
CALCIUM SERPL-MCNC: 10.2 MG/DL (ref 8.6–10.2)
CHLORIDE SERPL-SCNC: 100 MMOL/L (ref 98–107)
CHLORIDE UR-SCNC: 30 MMOL/L
CLARITY UR: CLEAR
CO2 SERPL-SCNC: 22 MMOL/L (ref 22–29)
COLOR UR: YELLOW
CREAT SERPL-MCNC: 3.9 MG/DL (ref 0.7–1.2)
CREAT UR-MCNC: 174 MG/DL (ref 40–278)
EKG ATRIAL RATE: 70 BPM
EKG P-R INTERVAL: 174 MS
EKG Q-T INTERVAL: 548 MS
EKG QRS DURATION: 196 MS
EKG QTC CALCULATION (BAZETT): 591 MS
EKG R AXIS: -168 DEGREES
EKG T AXIS: -6 DEGREES
EKG VENTRICULAR RATE: 70 BPM
EOSINOPHIL # BLD: 0.15 E9/L (ref 0.05–0.5)
EOSINOPHIL NFR BLD: 1.5 % (ref 0–6)
ERYTHROCYTE [DISTWIDTH] IN BLOOD BY AUTOMATED COUNT: 15.9 FL (ref 11.5–15)
GLUCOSE SERPL-MCNC: 77 MG/DL (ref 74–99)
GLUCOSE UR STRIP-MCNC: NEGATIVE MG/DL
HCT VFR BLD AUTO: 35.3 % (ref 37–54)
HGB BLD-MCNC: 11.1 G/DL (ref 12.5–16.5)
HGB UR QL STRIP: NEGATIVE
IMM GRANULOCYTES # BLD: 0.05 E9/L
IMM GRANULOCYTES NFR BLD: 0.5 % (ref 0–5)
KETONES UR STRIP-MCNC: ABNORMAL MG/DL
LACTATE BLDV-SCNC: 1.5 MMOL/L (ref 0.5–2.2)
LEUKOCYTE ESTERASE UR QL STRIP: NEGATIVE
LYMPHOCYTES # BLD: 1.43 E9/L (ref 1.5–4)
LYMPHOCYTES NFR BLD: 14.7 % (ref 20–42)
MCH RBC QN AUTO: 28.8 PG (ref 26–35)
MCHC RBC AUTO-ENTMCNC: 31.4 % (ref 32–34.5)
MCV RBC AUTO: 91.5 FL (ref 80–99.9)
MONOCYTES # BLD: 0.64 E9/L (ref 0.1–0.95)
MONOCYTES NFR BLD: 6.6 % (ref 2–12)
NEUTROPHILS # BLD: 7.42 E9/L (ref 1.8–7.3)
NEUTS SEG NFR BLD: 76.4 % (ref 43–80)
NITRITE UR QL STRIP: NEGATIVE
OSMOLALITY URINE: 412 MOSM/KG (ref 300–900)
PH UR STRIP: 5 [PH] (ref 5–9)
PHOSPHATE SERPL-MCNC: 3.8 MG/DL (ref 2.5–4.5)
PLATELET # BLD AUTO: 239 E9/L (ref 130–450)
PMV BLD AUTO: 10.4 FL (ref 7–12)
POTASSIUM SERPL-SCNC: 4.5 MMOL/L (ref 3.5–5)
POTASSIUM UR-SCNC: 27.1 MMOL/L
PROT SERPL-MCNC: 8.2 G/DL (ref 6.4–8.3)
PROT UR STRIP-MCNC: NEGATIVE MG/DL
PROT UR-MCNC: 7 MG/DL (ref 0–12)
PROTEIN/CREAT RATIO: 0
PROTEIN/CREAT RATIO: 0 (ref 0–0.2)
RBC # BLD AUTO: 3.86 E12/L (ref 3.8–5.8)
RBC #/AREA URNS HPF: ABNORMAL /HPF (ref 0–2)
REASON FOR REJECTION: NORMAL
REJECTED TEST: NORMAL
SODIUM SERPL-SCNC: 136 MMOL/L (ref 132–146)
SODIUM UR-SCNC: 36 MMOL/L
SP GR UR STRIP: 1.02 (ref 1–1.03)
TROPONIN, HIGH SENSITIVITY: 71 NG/L (ref 0–11)
TROPONIN, HIGH SENSITIVITY: 80 NG/L (ref 0–11)
UREA NITROGEN, UR: 714 MG/DL (ref 800–1666)
UROBILINOGEN UR STRIP-ACNC: 0.2 E.U./DL
WBC # BLD: 9.7 E9/L (ref 4.5–11.5)
WBC #/AREA URNS HPF: ABNORMAL /HPF (ref 0–5)

## 2023-04-04 PROCEDURE — 84133 ASSAY OF URINE POTASSIUM: CPT

## 2023-04-04 PROCEDURE — 83605 ASSAY OF LACTIC ACID: CPT

## 2023-04-04 PROCEDURE — 93005 ELECTROCARDIOGRAM TRACING: CPT | Performed by: PHYSICIAN ASSISTANT

## 2023-04-04 PROCEDURE — 81001 URINALYSIS AUTO W/SCOPE: CPT

## 2023-04-04 PROCEDURE — 85025 COMPLETE CBC W/AUTO DIFF WBC: CPT

## 2023-04-04 PROCEDURE — 82436 ASSAY OF URINE CHLORIDE: CPT

## 2023-04-04 PROCEDURE — 82570 ASSAY OF URINE CREATININE: CPT

## 2023-04-04 PROCEDURE — 83935 ASSAY OF URINE OSMOLALITY: CPT

## 2023-04-04 PROCEDURE — 84100 ASSAY OF PHOSPHORUS: CPT

## 2023-04-04 PROCEDURE — 84540 ASSAY OF URINE/UREA-N: CPT

## 2023-04-04 PROCEDURE — 2060000000 HC ICU INTERMEDIATE R&B

## 2023-04-04 PROCEDURE — 84484 ASSAY OF TROPONIN QUANT: CPT

## 2023-04-04 PROCEDURE — 84156 ASSAY OF PROTEIN URINE: CPT

## 2023-04-04 PROCEDURE — 80053 COMPREHEN METABOLIC PANEL: CPT

## 2023-04-04 PROCEDURE — 93010 ELECTROCARDIOGRAM REPORT: CPT | Performed by: INTERNAL MEDICINE

## 2023-04-04 PROCEDURE — 99285 EMERGENCY DEPT VISIT HI MDM: CPT

## 2023-04-04 PROCEDURE — 84300 ASSAY OF URINE SODIUM: CPT

## 2023-04-04 PROCEDURE — 71045 X-RAY EXAM CHEST 1 VIEW: CPT

## 2023-04-04 RX ORDER — 0.9 % SODIUM CHLORIDE 0.9 %
1000 INTRAVENOUS SOLUTION INTRAVENOUS ONCE
Status: DISCONTINUED | OUTPATIENT
Start: 2023-04-04 | End: 2023-04-04

## 2023-04-04 ASSESSMENT — LIFESTYLE VARIABLES
HOW OFTEN DO YOU HAVE A DRINK CONTAINING ALCOHOL: NEVER
HOW MANY STANDARD DRINKS CONTAINING ALCOHOL DO YOU HAVE ON A TYPICAL DAY: PATIENT DOES NOT DRINK

## 2023-04-04 NOTE — ED PROVIDER NOTES
User Index  [JG] Amanda Varela MD       Medical Decision Making  Problems Addressed:  Acute kidney injury superimposed on CKD [facility]): acute illness or injury  Dehydration: acute illness or injury    Amount and/or Complexity of Data Reviewed  External Data Reviewed: notes. Labs: ordered. Decision-making details documented in ED Course. Radiology: ordered and independent interpretation performed. Decision-making details documented in ED Course. ECG/medicine tests: ordered and independent interpretation performed. Decision-making details documented in ED Course. Risk  Prescription drug management. Decision regarding hospitalization. CONSULTS: (Who and What was discussed)  IP CONSULT TO INTERNAL MEDICINE        I am the Primary Clinician of Record. FINAL IMPRESSION      1. Dehydration    2. Acute kidney injury superimposed on CKD [facility])          DISPOSITION/PLAN     DISPOSITION Admitted 04/04/2023 07:16:01 PM      PATIENT REFERRED TO:  No follow-up provider specified.     DISCHARGE MEDICATIONS:  New Prescriptions    No medications on file       DISCONTINUED MEDICATIONS:  Discontinued Medications    BLOOD PRESSURE KIT    1 kit by Does not apply route daily    CONTINUOUS BLOOD GLUC TRANSMIT (DEXCOM G6 TRANSMITTER) MISC    USE AS DIRECTED    ONETOUCH ULTRA STRIP    USE TO TEST BLOOD SUGAR 2 TO 3 TIMES DAILY AND AS NEEDED FOR SYMPTOMS OF IRREGULAR BLOOD SUGAR              (Please note that portions of this note were completed with a voice recognition program.  Efforts were made to edit the dictations but occasionally words are mis-transcribed.)    Angelic Pineda MD (electronically signed)           Amanda Varela MD  Resident  04/04/23 2501

## 2023-04-04 NOTE — ED NOTES
Department of Emergency Medicine  FIRST PROVIDER TRIAGE NOTE             Independent MLP           4/4/23  12:03 PM EDT    Date of Encounter: 4/4/23   MRN: [id number]      HPI: Martin Yu is a 43 y.o. male who presents to the ED for No chief complaint on file. Pt presenting after near syncopal episode today and hypotension today. Gastric bypass on 3/29    ROS: Negative for cp, sob, or abd pain. PE: Gen Appearance/Constitutional: alert  HEENT: NC/NT. PERRLA,  Airway patent. Initial Plan of Care: All treatment areas with department are currently occupied. Plan to order/Initiate the following while awaiting opening in ED: labs, EKG, and imaging studies.   Initiate Treatment-Testing, Proceed toTreatment Area When Bed Available for ED Attending/MLP to Continue Care    Electronically signed by Angely Peralta PA-C   DD: 4/4/23      Angely Peralta PA-C  04/04/23 5316

## 2023-04-05 LAB
METER GLUCOSE: 74 MG/DL (ref 74–99)
METER GLUCOSE: 75 MG/DL (ref 74–99)
METER GLUCOSE: 78 MG/DL (ref 74–99)
METER GLUCOSE: 92 MG/DL (ref 74–99)

## 2023-04-05 PROCEDURE — 82962 GLUCOSE BLOOD TEST: CPT

## 2023-04-05 PROCEDURE — 2060000000 HC ICU INTERMEDIATE R&B

## 2023-04-05 PROCEDURE — 6370000000 HC RX 637 (ALT 250 FOR IP): Performed by: INTERNAL MEDICINE

## 2023-04-05 PROCEDURE — 2580000003 HC RX 258: Performed by: INTERNAL MEDICINE

## 2023-04-05 RX ORDER — ISOSORBIDE DINITRATE 10 MG/1
10 TABLET ORAL 2 TIMES DAILY
Status: DISCONTINUED | OUTPATIENT
Start: 2023-04-05 | End: 2023-04-08 | Stop reason: HOSPADM

## 2023-04-05 RX ORDER — INSULIN LISPRO 100 [IU]/ML
0-4 INJECTION, SOLUTION INTRAVENOUS; SUBCUTANEOUS NIGHTLY
Status: DISCONTINUED | OUTPATIENT
Start: 2023-04-05 | End: 2023-04-08 | Stop reason: HOSPADM

## 2023-04-05 RX ORDER — NITROGLYCERIN 0.4 MG/1
0.4 TABLET SUBLINGUAL EVERY 5 MIN PRN
Status: DISCONTINUED | OUTPATIENT
Start: 2023-04-05 | End: 2023-04-08 | Stop reason: HOSPADM

## 2023-04-05 RX ORDER — INSULIN GLARGINE-YFGN 100 [IU]/ML
40 INJECTION, SOLUTION SUBCUTANEOUS 2 TIMES DAILY
Status: DISCONTINUED | OUTPATIENT
Start: 2023-04-05 | End: 2023-04-05

## 2023-04-05 RX ORDER — DEXTROSE MONOHYDRATE 100 MG/ML
INJECTION, SOLUTION INTRAVENOUS CONTINUOUS PRN
Status: DISCONTINUED | OUTPATIENT
Start: 2023-04-05 | End: 2023-04-08 | Stop reason: HOSPADM

## 2023-04-05 RX ORDER — BUMETANIDE 1 MG/1
2 TABLET ORAL SEE ADMIN INSTRUCTIONS
Status: DISCONTINUED | OUTPATIENT
Start: 2023-04-05 | End: 2023-04-08 | Stop reason: HOSPADM

## 2023-04-05 RX ORDER — ALLOPURINOL 300 MG/1
300 TABLET ORAL DAILY
Status: DISCONTINUED | OUTPATIENT
Start: 2023-04-05 | End: 2023-04-08 | Stop reason: HOSPADM

## 2023-04-05 RX ORDER — INSULIN LISPRO 100 [IU]/ML
0-8 INJECTION, SOLUTION INTRAVENOUS; SUBCUTANEOUS
Status: DISCONTINUED | OUTPATIENT
Start: 2023-04-05 | End: 2023-04-08 | Stop reason: HOSPADM

## 2023-04-05 RX ORDER — ATORVASTATIN CALCIUM 40 MG/1
40 TABLET, FILM COATED ORAL DAILY
Status: DISCONTINUED | OUTPATIENT
Start: 2023-04-05 | End: 2023-04-08 | Stop reason: HOSPADM

## 2023-04-05 RX ORDER — SODIUM CHLORIDE 9 MG/ML
INJECTION, SOLUTION INTRAVENOUS CONTINUOUS
Status: DISCONTINUED | OUTPATIENT
Start: 2023-04-05 | End: 2023-04-08 | Stop reason: HOSPADM

## 2023-04-05 RX ORDER — CARVEDILOL 25 MG/1
25 TABLET ORAL 2 TIMES DAILY WITH MEALS
Status: DISCONTINUED | OUTPATIENT
Start: 2023-04-05 | End: 2023-04-08 | Stop reason: HOSPADM

## 2023-04-05 RX ADMIN — ISOSORBIDE DINITRATE 10 MG: 10 TABLET ORAL at 14:51

## 2023-04-05 RX ADMIN — ISOSORBIDE DINITRATE 10 MG: 10 TABLET ORAL at 09:46

## 2023-04-05 RX ADMIN — CARVEDILOL 25 MG: 25 TABLET, FILM COATED ORAL at 09:46

## 2023-04-05 RX ADMIN — ALLOPURINOL 300 MG: 300 TABLET ORAL at 09:46

## 2023-04-05 RX ADMIN — ATORVASTATIN CALCIUM 40 MG: 40 TABLET, FILM COATED ORAL at 09:46

## 2023-04-05 RX ADMIN — CARVEDILOL 25 MG: 25 TABLET, FILM COATED ORAL at 16:18

## 2023-04-05 RX ADMIN — SODIUM CHLORIDE: 9 INJECTION, SOLUTION INTRAVENOUS at 10:47

## 2023-04-05 NOTE — PLAN OF CARE
Problem: Discharge Planning  Goal: Discharge to home or other facility with appropriate resources  Outcome: Progressing  Flowsheets (Taken 4/5/2023 4210)  Discharge to home or other facility with appropriate resources:   Identify barriers to discharge with patient and caregiver   Arrange for needed discharge resources and transportation as appropriate     Problem: ABCDS Injury Assessment  Goal: Absence of physical injury  Outcome: Progressing  Flowsheets (Taken 4/5/2023 1236)  Absence of Physical Injury: Implement safety measures based on patient assessment     Problem: Cardiovascular - Adult  Goal: Maintains optimal cardiac output and hemodynamic stability  Outcome: Progressing     Problem: Metabolic/Fluid and Electrolytes - Adult  Goal: Hemodynamic stability and optimal renal function maintained  Outcome: Progressing  Flowsheets (Taken 4/5/2023 0811)  Hemodynamic stability and optimal renal function maintained:   Monitor labs and assess for signs and symptoms of volume excess or deficit   Monitor intake, output and patient weight   Encourage oral intake as appropriate     Problem: Safety - Adult  Goal: Free from fall injury  Outcome: Progressing     Problem: Nutrition Deficit:  Goal: Optimize nutritional status  4/5/2023 1623 by Sabina Rosales RN  Outcome: Progressing  4/5/2023 1519 by Alyson Thompson RD  Outcome: Progressing

## 2023-04-05 NOTE — H&P
S/P ICD (internal cardiac defibrillator) procedure    Ventricular arrhythmia    Nonischemic cardiomyopathy (HCC)    S/P left pulmonary artery pressure sensor implant placement    Carotid disease, bilateral (HCC)    Chronic combined systolic and diastolic congestive heart failure (HCC)    Atrial fibrillation (HCC)    TAIWO (acute kidney injury) ([facility] [address]    Dehydration         PLAN:  Admit to telemetry  Gentle IV hydration  Hold diuretics spironolactone and Entresto  Monitor kidney functions  Advance diet and activity  Hold anticoagulant therapy for the time being  May need alternative anticoagulant therapy if creatinine does not improve  Questions answered to his satisfaction    Yasir Menon MD  6:17 PM  4/5/2023

## 2023-04-05 NOTE — ED NOTES
Patient recently got gastric bypass. Patient requires soft foods that he is able to \"mush up\". Patient declined all food options given to him in ER.      Marito Walker RN  04/04/23 8081

## 2023-04-05 NOTE — ED NOTES
Report given to [place], [facility]. Waiting for room to be cleaned.       Mark Alfaro RN  04/04/23 1153

## 2023-04-05 NOTE — DISCHARGE INSTRUCTIONS
HEART FAILURE  / CONGESTIVE HEART FAILURE  DISCHARGE INSTRUCTIONS:  GUIDELINES TO FOLLOW AT HOME    Self- Managed Care:     MEDICATIONS:  Take your medication as directed. If you are experiencing any side effects, inform your doctor, Do not stop taking any of your medications without letting your doctor know. Check with your doctor before taking any over-the-counter medications / herbal / or dietary supplements. They may interfere with your other medications. Do not take ibuprofen (Advil or Motrin) and naproxen (Aleve) without talking to your doctor first. They could make your heart failure worse. WEIGHT MONITORING:   Weigh yourself everyday (with the same scale) around the same time of the day and write it down. (you can chart them on a calendar or keep track of them on paper. Notify your doctor of a weight gain of 3 pounds or more in 1 day   OR a total of 5 pounds or more in 1 week    Take your weight record to your doctor visits  Also, the same goes if you loose more than 3# in one day, let your heart doctor know. DIET:   Cardiac heart healthy diet- Low saturated / low trans fat, no added salt, caffeine restricted, Low sodium diet-   No more than 2,000mg (2 grams) of salt / sodium per day (which equals to a little less than  a teaspoon of salt)  If your doctor wants you on a fluid restriction. ..it is usually recommended a fluid limit of 2,000cc -  Fluid restriction- 2,000 ml (milliliters) = 64 ounces = you can have 8 glasses of fluid per day (each glass 8 ounces)    Follow a low salt diet - avoid using salt at the table, avoid / limit use of canned soups, processed / packaged foods, salted snacks, olives and pickles. Do not use a salt substitute without checking with your doctor, they may contain a high amount of potassioum. (Mrs. Bourgeois Palm is safe to use).     Limit the use of alcohol       CALL YOUR DOCTOR THE FIRST DAY YOU NOTICE ANY OF THESE   SYMPTOMS:  You have a weight

## 2023-04-05 NOTE — PLAN OF CARE
Patient's chart updated to reflect:      . - HF care plan, HF education points and HF discharge instructions.  -Orders: 2 gram sodium diet, daily weights, I/O.  -PCP and cardiology follow up appointments to be scheduled within 7 days of hospital discharge. -CHF education session will be provided to the patient prior to hospital discharge.     Ada Hurley RN RN, BSN  Heart Failure Navigator

## 2023-04-06 ENCOUNTER — TELEPHONE (OUTPATIENT)
Dept: CARDIOLOGY CLINIC | Age: 43
End: 2023-04-06

## 2023-04-06 LAB
ALBUMIN SERPL-MCNC: 4.1 G/DL (ref 3.5–5.2)
ALP SERPL-CCNC: 79 U/L (ref 40–129)
ALT SERPL-CCNC: 8 U/L (ref 0–40)
ANION GAP SERPL CALCULATED.3IONS-SCNC: 12 MMOL/L (ref 7–16)
AST SERPL-CCNC: 13 U/L (ref 0–39)
BASOPHILS # BLD: 0.04 E9/L (ref 0–0.2)
BASOPHILS NFR BLD: 0.6 % (ref 0–2)
BILIRUB SERPL-MCNC: 0.4 MG/DL (ref 0–1.2)
BUN SERPL-MCNC: 50 MG/DL (ref 6–20)
CALCIUM SERPL-MCNC: 9.1 MG/DL (ref 8.6–10.2)
CHLORIDE SERPL-SCNC: 109 MMOL/L (ref 98–107)
CO2 SERPL-SCNC: 20 MMOL/L (ref 22–29)
CREAT SERPL-MCNC: 2.4 MG/DL (ref 0.7–1.2)
EOSINOPHIL # BLD: 0.21 E9/L (ref 0.05–0.5)
EOSINOPHIL NFR BLD: 3.1 % (ref 0–6)
ERYTHROCYTE [DISTWIDTH] IN BLOOD BY AUTOMATED COUNT: 15.9 FL (ref 11.5–15)
GLUCOSE SERPL-MCNC: 77 MG/DL (ref 74–99)
HCT VFR BLD AUTO: 32.7 % (ref 37–54)
HGB BLD-MCNC: 9.6 G/DL (ref 12.5–16.5)
IMM GRANULOCYTES # BLD: 0.03 E9/L
IMM GRANULOCYTES NFR BLD: 0.4 % (ref 0–5)
LYMPHOCYTES # BLD: 1.79 E9/L (ref 1.5–4)
LYMPHOCYTES NFR BLD: 26.1 % (ref 20–42)
MCH RBC QN AUTO: 28.7 PG (ref 26–35)
MCHC RBC AUTO-ENTMCNC: 29.4 % (ref 32–34.5)
MCV RBC AUTO: 97.6 FL (ref 80–99.9)
METER GLUCOSE: 115 MG/DL (ref 74–99)
METER GLUCOSE: 91 MG/DL (ref 74–99)
METER GLUCOSE: 97 MG/DL (ref 74–99)
METER GLUCOSE: 99 MG/DL (ref 74–99)
MONOCYTES # BLD: 0.74 E9/L (ref 0.1–0.95)
MONOCYTES NFR BLD: 10.8 % (ref 2–12)
NEUTROPHILS # BLD: 4.05 E9/L (ref 1.8–7.3)
NEUTS SEG NFR BLD: 59 % (ref 43–80)
PLATELET # BLD AUTO: 197 E9/L (ref 130–450)
PMV BLD AUTO: 10.3 FL (ref 7–12)
POTASSIUM SERPL-SCNC: 4.2 MMOL/L (ref 3.5–5)
PROT SERPL-MCNC: 6.7 G/DL (ref 6.4–8.3)
RBC # BLD AUTO: 3.35 E12/L (ref 3.8–5.8)
SODIUM SERPL-SCNC: 141 MMOL/L (ref 132–146)
WBC # BLD: 6.9 E9/L (ref 4.5–11.5)

## 2023-04-06 PROCEDURE — 80053 COMPREHEN METABOLIC PANEL: CPT

## 2023-04-06 PROCEDURE — 36415 COLL VENOUS BLD VENIPUNCTURE: CPT

## 2023-04-06 PROCEDURE — 6370000000 HC RX 637 (ALT 250 FOR IP): Performed by: INTERNAL MEDICINE

## 2023-04-06 PROCEDURE — 85025 COMPLETE CBC W/AUTO DIFF WBC: CPT

## 2023-04-06 PROCEDURE — 2580000003 HC RX 258: Performed by: INTERNAL MEDICINE

## 2023-04-06 PROCEDURE — 82962 GLUCOSE BLOOD TEST: CPT

## 2023-04-06 PROCEDURE — 2060000000 HC ICU INTERMEDIATE R&B

## 2023-04-06 RX ADMIN — CARVEDILOL 25 MG: 25 TABLET, FILM COATED ORAL at 08:26

## 2023-04-06 RX ADMIN — ISOSORBIDE DINITRATE 10 MG: 10 TABLET ORAL at 08:26

## 2023-04-06 RX ADMIN — ISOSORBIDE DINITRATE 10 MG: 10 TABLET ORAL at 16:57

## 2023-04-06 RX ADMIN — ALLOPURINOL 300 MG: 300 TABLET ORAL at 08:26

## 2023-04-06 RX ADMIN — SODIUM CHLORIDE: 9 INJECTION, SOLUTION INTRAVENOUS at 06:29

## 2023-04-06 RX ADMIN — ATORVASTATIN CALCIUM 40 MG: 40 TABLET, FILM COATED ORAL at 08:26

## 2023-04-06 RX ADMIN — CARVEDILOL 25 MG: 25 TABLET, FILM COATED ORAL at 17:39

## 2023-04-06 NOTE — PLAN OF CARE
Problem: Discharge Planning  Goal: Discharge to home or other facility with appropriate resources  Outcome: Progressing     Problem: ABCDS Injury Assessment  Goal: Absence of physical injury  Outcome: Progressing     Problem: Cardiovascular - Adult  Goal: Maintains optimal cardiac output and hemodynamic stability  Outcome: Progressing  Flowsheets (Taken 4/6/2023 0755)  Maintains optimal cardiac output and hemodynamic stability: Monitor blood pressure and heart rate     Problem: Metabolic/Fluid and Electrolytes - Adult  Goal: Hemodynamic stability and optimal renal function maintained  Outcome: Progressing     Problem: Safety - Adult  Goal: Free from fall injury  Outcome: Progressing     Problem: Nutrition Deficit:  Goal: Optimize nutritional status  Outcome: Progressing     Problem: Chronic Conditions and Co-morbidities  Goal: Patient's chronic conditions and co-morbidity symptoms are monitored and maintained or improved  Outcome: Progressing

## 2023-04-06 NOTE — TELEPHONE ENCOUNTER
Obtained Hospital mems unit and sent 2 reading with patient hob elevated 20 degrees:    4/6/23 PAS 38   PAD 18 MARILIA 24  heart rate 70     53% compliant with mems sends  He will resume mems send when he returns home. Says tentativly discharge tomorrow, still receiiving hydation. Says he feels better and not dizzy. Resting in bed.

## 2023-04-06 NOTE — CONSULTS
Having chest pain     Have confusion or cant think clearly          Sarwat Meraz, RN BSN, RN  Heart Failure Navigator        CONGESTIVE HEART FAILURE (CHF) AHA GUIDELINES  (Must be completed for Primary Diagnosis CHF or History of CHF)    Discharge Plan:  I placed the Heart Failure Home Instructions in patient's discharge instructions. Per Heart Failure GWTG, the patient should have a follow-up appointment made within 7 days of discharge.     New Diagnosis No    ECHO Results most recent:  Lab Results   Component Value Date    LVEF 31 01/19/2021    LVEFMODE Echo 07/31/2019                                        Social History     Tobacco Use   Smoking Status Never   Smokeless Tobacco Never        Immunization History   Administered Date(s) Administered    COVID-19, PFIZER PURPLE top, DILUTE for use, (age 15 y+), 30mcg/0.3mL 03/27/2021, 04/27/2021    Hep B, ENGERIX-B, (age 20y+), IM, 1mL 09/09/2019, 11/14/2019, 05/17/2021    Influenza, AFLURIA (age 1 yrs+), FLUZONE, (age 10 mo+), MDV, 0.5mL 09/26/2016    Influenza, FLUARIX, FLULAVAL, FLUZONE (age 10 mo+) AND AFLURIA, (age 1 y+), PF, 0.5mL 10/31/2018, 11/14/2019, 11/19/2020    Pneumococcal, PPSV23, PNEUMOVAX 21, (age 2y+), SC/IM, 0.5mL 09/26/2016    TDaP, ADACEL (age 10y-63y), Daylin Edwardsann (age 10y+), IM, 0.5mL 09/09/2019          Angiotensin-Converting-Enzyme (ACE) inhibitor ordered:  [] Yes  [x] No (specify contraindication):    [] Contraindicated  [] Hypotensive patient who was at immediate risk of cardiogenic shock  [] Hospitalized patient who experienced marked azotemia  [] Other Contraindications  [] Not Eligible  [] Not Tolerant  [] Patient Reason  [] System Reason  [] Other Reason    Angiotensin II receptor blockers (ARB) ordered:  [] Yes  [x] No (specify contraindication):    [] Contraindicated  [] Hypotensive patient who was at immediate risk of cardiogenic shock  [] Hospitalized patient who experienced marked azotemia  [] Other Contraindications    ARNI -

## 2023-04-07 LAB
ALBUMIN SERPL-MCNC: 4.4 G/DL (ref 3.5–5.2)
ALP SERPL-CCNC: 87 U/L (ref 40–129)
ALT SERPL-CCNC: 9 U/L (ref 0–40)
ANION GAP SERPL CALCULATED.3IONS-SCNC: 15 MMOL/L (ref 7–16)
AST SERPL-CCNC: 15 U/L (ref 0–39)
BASOPHILS # BLD: 0.03 E9/L (ref 0–0.2)
BASOPHILS NFR BLD: 0.5 % (ref 0–2)
BILIRUB SERPL-MCNC: 0.4 MG/DL (ref 0–1.2)
BUN SERPL-MCNC: 34 MG/DL (ref 6–20)
CALCIUM SERPL-MCNC: 9.3 MG/DL (ref 8.6–10.2)
CHLORIDE SERPL-SCNC: 110 MMOL/L (ref 98–107)
CO2 SERPL-SCNC: 19 MMOL/L (ref 22–29)
CREAT SERPL-MCNC: 1.9 MG/DL (ref 0.7–1.2)
EOSINOPHIL # BLD: 0.26 E9/L (ref 0.05–0.5)
EOSINOPHIL NFR BLD: 4 % (ref 0–6)
ERYTHROCYTE [DISTWIDTH] IN BLOOD BY AUTOMATED COUNT: 16 FL (ref 11.5–15)
GLUCOSE SERPL-MCNC: 80 MG/DL (ref 74–99)
HCT VFR BLD AUTO: 33.3 % (ref 37–54)
HGB BLD-MCNC: 10.3 G/DL (ref 12.5–16.5)
IMM GRANULOCYTES # BLD: 0.02 E9/L
IMM GRANULOCYTES NFR BLD: 0.3 % (ref 0–5)
LV EF: 35 %
LVEF MODALITY: NORMAL
LYMPHOCYTES # BLD: 2.17 E9/L (ref 1.5–4)
LYMPHOCYTES NFR BLD: 33.2 % (ref 20–42)
MCH RBC QN AUTO: 28.7 PG (ref 26–35)
MCHC RBC AUTO-ENTMCNC: 30.9 % (ref 32–34.5)
MCV RBC AUTO: 92.8 FL (ref 80–99.9)
METER GLUCOSE: 117 MG/DL (ref 74–99)
METER GLUCOSE: 125 MG/DL (ref 74–99)
METER GLUCOSE: 82 MG/DL (ref 74–99)
METER GLUCOSE: 97 MG/DL (ref 74–99)
MONOCYTES # BLD: 0.67 E9/L (ref 0.1–0.95)
MONOCYTES NFR BLD: 10.2 % (ref 2–12)
NEUTROPHILS # BLD: 3.39 E9/L (ref 1.8–7.3)
NEUTS SEG NFR BLD: 51.8 % (ref 43–80)
PLATELET # BLD AUTO: 226 E9/L (ref 130–450)
PMV BLD AUTO: 10.6 FL (ref 7–12)
POTASSIUM SERPL-SCNC: 4 MMOL/L (ref 3.5–5)
PROT SERPL-MCNC: 7.3 G/DL (ref 6.4–8.3)
RBC # BLD AUTO: 3.59 E12/L (ref 3.8–5.8)
SODIUM SERPL-SCNC: 144 MMOL/L (ref 132–146)
WBC # BLD: 6.5 E9/L (ref 4.5–11.5)

## 2023-04-07 PROCEDURE — 36415 COLL VENOUS BLD VENIPUNCTURE: CPT

## 2023-04-07 PROCEDURE — 80053 COMPREHEN METABOLIC PANEL: CPT

## 2023-04-07 PROCEDURE — 93306 TTE W/DOPPLER COMPLETE: CPT

## 2023-04-07 PROCEDURE — 82962 GLUCOSE BLOOD TEST: CPT

## 2023-04-07 PROCEDURE — 2060000000 HC ICU INTERMEDIATE R&B

## 2023-04-07 PROCEDURE — 6370000000 HC RX 637 (ALT 250 FOR IP): Performed by: INTERNAL MEDICINE

## 2023-04-07 PROCEDURE — 85025 COMPLETE CBC W/AUTO DIFF WBC: CPT

## 2023-04-07 RX ADMIN — ISOSORBIDE DINITRATE 10 MG: 10 TABLET ORAL at 09:31

## 2023-04-07 RX ADMIN — CARVEDILOL 25 MG: 25 TABLET, FILM COATED ORAL at 17:23

## 2023-04-07 RX ADMIN — ATORVASTATIN CALCIUM 40 MG: 40 TABLET, FILM COATED ORAL at 09:31

## 2023-04-07 RX ADMIN — CARVEDILOL 25 MG: 25 TABLET, FILM COATED ORAL at 09:31

## 2023-04-07 RX ADMIN — ALLOPURINOL 300 MG: 300 TABLET ORAL at 09:37

## 2023-04-07 RX ADMIN — ISOSORBIDE DINITRATE 10 MG: 10 TABLET ORAL at 15:05

## 2023-04-07 NOTE — PLAN OF CARE
Problem: ABCDS Injury Assessment  Goal: Absence of physical injury  4/6/2023 2219 by Tayler Abreu RN  Outcome: Progressing  4/6/2023 1957 by Karli Diaz RN  Outcome: Progressing

## 2023-04-07 NOTE — CARE COORDINATION
CRE improving today to 2.4, fluids continue. Plan is home when released with no needs. For questions I can be reached at [phone].  Jamel Burr [facility]
Echo completed today, needs read. IV hydration continues. CRE improved to 1.9. Plan is home with no needs when released. For questions I can be reached at [phone].  Pierce Florence, [facility]
Case Management to discuss the discharge plan with any other family members/significant others, and if so, who? No  Plans to Return to Present Housing: Yes  Other Identified Issues/Barriers to RETURNING to current housing:   Potential Assistance needed at discharge: N/A            Potential DME:    Patient expects to discharge to: [address] [place] [address] for transportation at discharge:      Financial    Payor: Radha Germain / Plan: Deepika Ma / Product Type: *No Product type* /     Does insurance require precert for SNF: Yes    Potential assistance Purchasing Medications: No  Meds-to-Beds request: Yes      Rhonda Londono Osborne [address]  [address] [address]  Phone: [phone] Fax: [phone]      Notes:    Factors facilitating achievement of predicted outcomes: Pleasant    Barriers to discharge: Medication managment    Additional Case Management Notes: The Plan for Transition of Care is related to the following treatment goals of Dehydration [E86.0]  Acute kidney injury superimposed on CKD ([facility] [address] [N17.9, E92.2]    IF APPLICABLE: The Patient and/or patient representative iKn Chacon and his family were provided with a choice of provider and agrees with the discharge plan. Freedom of choice list with basic dialogue that supports the patient's individualized plan of care/goals and shares the quality data associated with the providers was provided to:     Patient Representative Name:       The Patient and/or Patient Representative Agree with the Discharge Plan?       Mylene Melara [place]  Case Management Department  Ph: [phone] Fax:

## 2023-04-08 VITALS
OXYGEN SATURATION: 99 % | SYSTOLIC BLOOD PRESSURE: 112 MMHG | RESPIRATION RATE: 20 BRPM | HEIGHT: 72 IN | WEIGHT: 281.38 LBS | HEART RATE: 79 BPM | DIASTOLIC BLOOD PRESSURE: 74 MMHG | TEMPERATURE: 97.7 F | BODY MASS INDEX: 38.11 KG/M2

## 2023-04-08 LAB
ALBUMIN SERPL-MCNC: 3.7 G/DL (ref 3.5–5.2)
ALP SERPL-CCNC: 84 U/L (ref 40–129)
ALT SERPL-CCNC: 9 U/L (ref 0–40)
ANION GAP SERPL CALCULATED.3IONS-SCNC: 11 MMOL/L (ref 7–16)
AST SERPL-CCNC: 15 U/L (ref 0–39)
BASOPHILS # BLD: 0.03 E9/L (ref 0–0.2)
BASOPHILS NFR BLD: 0.5 % (ref 0–2)
BILIRUB SERPL-MCNC: 0.4 MG/DL (ref 0–1.2)
BUN SERPL-MCNC: 24 MG/DL (ref 6–20)
CALCIUM SERPL-MCNC: 9.1 MG/DL (ref 8.6–10.2)
CHLORIDE SERPL-SCNC: 107 MMOL/L (ref 98–107)
CO2 SERPL-SCNC: 19 MMOL/L (ref 22–29)
CREAT SERPL-MCNC: 1.7 MG/DL (ref 0.7–1.2)
EOSINOPHIL # BLD: 0.27 E9/L (ref 0.05–0.5)
EOSINOPHIL NFR BLD: 4.4 % (ref 0–6)
ERYTHROCYTE [DISTWIDTH] IN BLOOD BY AUTOMATED COUNT: 15.9 FL (ref 11.5–15)
GLUCOSE SERPL-MCNC: 81 MG/DL (ref 74–99)
HCT VFR BLD AUTO: 32.2 % (ref 37–54)
HGB BLD-MCNC: 10 G/DL (ref 12.5–16.5)
IMM GRANULOCYTES # BLD: 0.02 E9/L
IMM GRANULOCYTES NFR BLD: 0.3 % (ref 0–5)
LYMPHOCYTES # BLD: 1.95 E9/L (ref 1.5–4)
LYMPHOCYTES NFR BLD: 32.1 % (ref 20–42)
MCH RBC QN AUTO: 28.7 PG (ref 26–35)
MCHC RBC AUTO-ENTMCNC: 31.1 % (ref 32–34.5)
MCV RBC AUTO: 92.5 FL (ref 80–99.9)
METER GLUCOSE: 94 MG/DL (ref 74–99)
MONOCYTES # BLD: 0.67 E9/L (ref 0.1–0.95)
MONOCYTES NFR BLD: 11 % (ref 2–12)
NEUTROPHILS # BLD: 3.14 E9/L (ref 1.8–7.3)
NEUTS SEG NFR BLD: 51.7 % (ref 43–80)
PLATELET # BLD AUTO: 201 E9/L (ref 130–450)
PMV BLD AUTO: 9.9 FL (ref 7–12)
POTASSIUM SERPL-SCNC: 4 MMOL/L (ref 3.5–5)
PROT SERPL-MCNC: 6.8 G/DL (ref 6.4–8.3)
RBC # BLD AUTO: 3.48 E12/L (ref 3.8–5.8)
SODIUM SERPL-SCNC: 137 MMOL/L (ref 132–146)
WBC # BLD: 6.1 E9/L (ref 4.5–11.5)

## 2023-04-08 PROCEDURE — 80053 COMPREHEN METABOLIC PANEL: CPT

## 2023-04-08 PROCEDURE — 36415 COLL VENOUS BLD VENIPUNCTURE: CPT

## 2023-04-08 PROCEDURE — 82962 GLUCOSE BLOOD TEST: CPT

## 2023-04-08 PROCEDURE — 85025 COMPLETE CBC W/AUTO DIFF WBC: CPT

## 2023-04-08 PROCEDURE — 6370000000 HC RX 637 (ALT 250 FOR IP): Performed by: INTERNAL MEDICINE

## 2023-04-08 RX ADMIN — ISOSORBIDE DINITRATE 10 MG: 10 TABLET ORAL at 08:38

## 2023-04-08 RX ADMIN — CARVEDILOL 25 MG: 25 TABLET, FILM COATED ORAL at 08:38

## 2023-04-08 RX ADMIN — ALLOPURINOL 300 MG: 300 TABLET ORAL at 08:38

## 2023-04-08 RX ADMIN — ATORVASTATIN CALCIUM 40 MG: 40 TABLET, FILM COATED ORAL at 08:38

## 2023-04-08 NOTE — DISCHARGE SUMMARY
Physician Discharge Summary     Patient ID:  Yao Mares  [id number]  43 y.o.  1980    Admit date: 4/4/2023    Discharge date and time: 4/8/2023 12:30 PM     Admission Diagnoses: Dehydration [E86.0]  Acute kidney injury superimposed on CKD ([facility] [address] [N17.9, N18.9]    Discharge Diagnoses:   Acute kidney injury  Nonischemic dilated cardiomyopathy  AICD in place  Paroxysmal atrial fibrillation  Recent gastric surgery for obesity  Patient Active Problem List   Diagnosis    Obstructive sleep apnea    Chronic gout    CKD (chronic kidney disease)    Type 2 diabetes mellitus with complication, with long-term current use of insulin (HCC)    Hepatomegaly    Paroxysmal atrial fibrillation (HCC)    Essential hypertension    VHD (valvular heart disease)    Morbid obesity due to excess calories (HCC)    S/P ICD (internal cardiac defibrillator) procedure    Ventricular arrhythmia    Nonischemic cardiomyopathy (HCC)    S/P left pulmonary artery pressure sensor implant placement    Carotid disease, bilateral (HCC)    Chronic combined systolic and diastolic congestive heart failure (HCC)    Atrial fibrillation (HCC)    TAIWO (acute kidney injury) ([facility] [address]    Dehydration       Consults:     Procedures:    Hospital Course:   Patient is a 41-year-old -American man with a known history of dilated cardiomyopathy nonischemic and paroxysmal atrial fibrillation on anticoagulant therapy and multiple cardiac medications  He has an AICD in place never had any discharges from it  A week ago prior to admission he had gastric bypass surgery for obesity  [facility] in [place]  Following this he developed poor oral intake nausea and emesis resulting in dehydration  Apparently he continued to take his cardiac medications that included diuretics and Entresto  Presented to emergency room where he was found to be in acute kidney injury with a creatinine of more than 3  Admitted to telemetry  No arrhythmias encountered  Responded well to

## 2023-04-08 NOTE — PROGRESS NOTES
Comprehensive Nutrition Assessment    Type and Reason for Visit:  Initial, Positive Nutrition Screen    Nutrition Recommendations/Plan:   Modified Pureed diet to Bariatric Pureed diet  Continue to monitor     Malnutrition Assessment:  Malnutrition Status:  Insufficient data (04/05/23 1513)    Context:  Acute Illness     Findings of the 6 clinical characteristics of malnutrition:  Energy Intake:  Mild decrease in energy intake (Comment) (d/t recent gastric bypass surgery)  Weight Loss:  Unable to assess (d/t recent gastric bypass surgery)     Body Fat Loss:  Unable to assess     Muscle Mass Loss:  Unable to assess    Fluid Accumulation:  No significant fluid accumulation     Strength:  Not Performed    Nutrition Assessment:    Pt admits w/ near syncopal episode & hypotension, note Gastric bypass on 3/29. Dx: Dehydration, TAIWO superimposed on CKD. Hx: CHF, CAD, HTN, CKD, CVA, DM. Pt is on Pureed diet modified to [employer]. Pt is on meal intake is poor < 50%. Will continue to monitor    Nutrition Related Findings:    A/O x4, round/soft/obese/nontender abd, nausea, no edema noted, Gastric bypass, I/Os +WDL, Wound Type: None       Current Nutrition Intake & Therapies:    Average Meal Intake: 1-25%, 26-50%  Average Supplements Intake: None Ordered  BARIATRIC DIET; Bariatric Pureed; 4 carb choices (60 gm/meal)    Anthropometric Measures:  Height: 6' (182.9 cm)  Ideal Body Weight (IBW): 178 lbs (81 kg)       Current Body Weight: 267 lb (121.1 kg), 150 % IBW.     Current BMI (kg/m2): 36.2  Usual Body Weight:  (LAURIE d/t  recent gastric bypass sx.)     Weight Adjustment For: No Adjustment     BMI Categories: Obese Class 2 (BMI 35.0 -39.9)    Estimated Daily Nutrient Needs:  Energy Requirements Based On: Formula  Weight Used for Energy Requirements: Current  Energy (kcal/day): Per Bariatric Dr.     Protein (g/day): Per Bariatric Dr     Fluid (ml/day): Per Bariatric Dr.    Nutrition Diagnosis:   Inadequate oral intake
Meggan Keane MD FACP  Internal medicine  Progress Notes      CHIEF COMPLAINT: Nausea and emesis      HISTORY OF PRESENT ILLNESS:    4/5/2023  Patient was seen on the floor earlier this morning at the main campus of [facility] with the ER physician at the time of admission  Data reviewed in detail with the patient  66-year-old -American man with a known history of dilated nonischemic cardiomyopathy  Has an AICD in place  On multiple cardiac medications including Entresto  5 days ago he underwent gastric bypass surgery for obesity through the laparoscopy in [place]  He was discharged the following day  Since then he has not felt well with poor oral intake and nausea and emesis  No fever or chills or diarrhea  ER work-up reveals acute kidney injury due to dehydration  Admitted for further management  This morning he felt fairly well and wanted to eat  4/6/2023  Patient was seen on the floor earlier this morning  Sitting up in chair comfortably  Oral intake excellent  Overall he feels better  4/7/2023  Patient was seen on the floor earlier this morning  Oral intake better  No angina or dyspnea  Creatinine trending down  Past Medical History:    Past Medical History:   Diagnosis Date    Acute CHF ([address] 11/29/2011    Acute CHF ([address] 12/26/2011    Acute idiopathic gout of right foot 8/13/2016    AF (atrial fibrillation) ([address] 02/2020    Anemia 11/29/2011    CAD (coronary artery disease)     Cerebral artery occlusion with cerebral infarction ([address]     CKD (chronic kidney disease)     Gout     HTN (hypertension) 11/29/2011    Hyperlipidemia     Hypertension     Morbid obesity due to excess calories ([address]     Nonischemic cardiomyopathy ([address] 11/29/2011    Nonischemic cardiomyopathy ([address] 7/2013 7/2013- cardiac MRI revealed an LVEF of 20%    ARVIND (obstructive sleep apnea) 11/29/2011    S/P left heart catheterization by percutaneous approach 12-27/2011    Dr Can Letters    Systolic heart failure [facility]) 5/7/2012
Patient discharged. IV removed, telemetry box and leads removed and returned. Lockbox emptied. All belongings gathered and returned to patient. Discharge instructions reviewed with patient, all questions answered by MONICA. M No further needs.
Report given to Clemente Whitfield. Patient sitting up in chair fluids infusing, call light in reach.
Hepatomegaly    Paroxysmal atrial fibrillation (HCC)    Essential hypertension    VHD (valvular heart disease)    Morbid obesity due to excess calories (HCC)    S/P ICD (internal cardiac defibrillator) procedure    Ventricular arrhythmia    Nonischemic cardiomyopathy (HCC)    S/P left pulmonary artery pressure sensor implant placement    Carotid disease, bilateral (HCC)    Chronic combined systolic and diastolic congestive heart failure (HCC)    Atrial fibrillation (HCC)    TAIWO (acute kidney injury) ([facility] [address]    Dehydration         PLAN:  Check echocardiogram  Admit to telemetry  Gentle IV hydration  Hold diuretics spironolactone and Entresto  Monitor kidney functions  Advance diet and activity  Hold anticoagulant therapy for the time being  May need alternative anticoagulant therapy if creatinine does not improve  Questions answered to his satisfaction    Sara Brooks MD  5:35 PM  4/6/2023

## 2023-04-10 RX ORDER — MAGNESIUM OXIDE 400 MG/1
400 TABLET ORAL DAILY
Qty: 30 TABLET | Refills: 10 | OUTPATIENT
Start: 2023-04-10

## 2023-04-10 RX ORDER — DOFETILIDE 0.25 MG/1
CAPSULE ORAL
Qty: 60 CAPSULE | Refills: 10 | OUTPATIENT
Start: 2023-04-10

## 2023-04-17 ASSESSMENT — EJECTION FRACTION
EF_SOURCE: 2D ECHO
EF_VALUE: 35

## 2023-04-17 NOTE — DISCHARGE INSTRUCTIONS
also help you live longer. Talk with your doctor or pharmacist before you take a new prescription or over-the-counter medicine. Ask if the medicine is safe for you to take. Some medicines can affect your heart and make heart failure worse. Others may keep your heart failure medicines from working right. Over-the-counter medicines that you may need to avoid include herbal supplements, vitamins, pain relievers called NSAIDs, antacids, laxatives, and cough, cold, flu, or sinus medicine. Diet    Eat heart-healthy foods. These foods include vegetables, fruits, nuts, beans, lean meat, fish, and whole grains. Your doctor may suggest that you limit sodium. Your doctor can tell you how much sodium is right for you. An example is less than 3,000 mg a day. This includes all the salt you eat in cooking or in packaged foods. People get most of their sodium from processed foods. Fast food and restaurant meals also tend to be very high in sodium. Limit your fluid intake if your doctor tells you to. Your doctor will tell you how much fluid you can have in a day. Symptoms    Weigh yourself without clothing at the same time each day. Record your weight. Call your doctor if you have a sudden weight gain, such as more than 2 to 3 pounds in a day or 5 pounds in a week. (Your doctor may suggest a different range of weight gain.) A sudden weight gain may mean that your heart failure is getting worse. Check your symptoms every day to watch for changes. Know what to do if your symptoms get worse. Activity    Be active. Do not start to exercise until you have talked with your doctor. Together you can make an exercise program that is enjoyable and safe for you. Regular exercise can make your heart and your body stronger. Being active can help you feel better too. With your doctor, plan how often, how long, and how hard you will be active. Don't exercise too hard because it can put stress on your heart.      If your

## 2023-04-19 ENCOUNTER — TELEPHONE (OUTPATIENT)
Dept: CARDIOLOGY CLINIC | Age: 43
End: 2023-04-19

## 2023-04-19 RX ORDER — DOFETILIDE 0.25 MG/1
CAPSULE ORAL
Qty: 60 CAPSULE | Refills: 10 | OUTPATIENT
Start: 2023-04-19

## 2023-04-19 NOTE — TELEPHONE ENCOUNTER
\" Spoke with Mr. Aranza Kapoor  and confirmed hospital follow up appointment at [facility] CHF clinic with Saray Osborn tomorrow at 9 AM \" CF

## 2023-04-20 ENCOUNTER — TELEPHONE (OUTPATIENT)
Dept: OTHER | Age: 43
End: 2023-04-20

## 2023-04-20 ENCOUNTER — HOSPITAL ENCOUNTER (OUTPATIENT)
Dept: OTHER | Age: 43
Setting detail: THERAPIES SERIES
Discharge: HOME | End: 2023-04-20
Payer: MEDICARE

## 2023-04-20 VITALS
RESPIRATION RATE: 16 BRPM | HEIGHT: 72 IN | OXYGEN SATURATION: 99 % | WEIGHT: 281.9 LBS | SYSTOLIC BLOOD PRESSURE: 106 MMHG | HEART RATE: 70 BPM | BODY MASS INDEX: 38.18 KG/M2 | DIASTOLIC BLOOD PRESSURE: 73 MMHG

## 2023-04-20 LAB
ANION GAP SERPL CALCULATED.3IONS-SCNC: 12 MMOL/L (ref 7–16)
BNP BLD-MCNC: 1206 PG/ML (ref 0–125)
BUN SERPL-MCNC: 23 MG/DL (ref 6–20)
CALCIUM SERPL-MCNC: 9.3 MG/DL (ref 8.6–10.2)
CHLORIDE SERPL-SCNC: 106 MMOL/L (ref 98–107)
CO2 SERPL-SCNC: 21 MMOL/L (ref 22–29)
CREAT SERPL-MCNC: 2.1 MG/DL (ref 0.7–1.2)
GLUCOSE SERPL-MCNC: 92 MG/DL (ref 74–99)
POTASSIUM SERPL-SCNC: 4.1 MMOL/L (ref 3.5–5)
SODIUM SERPL-SCNC: 139 MMOL/L (ref 132–146)

## 2023-04-20 PROCEDURE — 80048 BASIC METABOLIC PNL TOTAL CA: CPT

## 2023-04-20 PROCEDURE — 99214 OFFICE O/P EST MOD 30 MIN: CPT | Performed by: NURSE PRACTITIONER

## 2023-04-20 PROCEDURE — 83880 ASSAY OF NATRIURETIC PEPTIDE: CPT

## 2023-04-20 RX ORDER — PANTOPRAZOLE SODIUM 40 MG/1
40 TABLET, DELAYED RELEASE ORAL DAILY
COMMUNITY

## 2023-04-20 RX ORDER — DOFETILIDE 0.25 MG/1
500 CAPSULE ORAL 2 TIMES DAILY
COMMUNITY
End: 2023-04-21 | Stop reason: SDUPTHER

## 2023-04-20 RX ORDER — SPIRONOLACTONE 25 MG/1
12.5 TABLET ORAL DAILY
COMMUNITY

## 2023-04-20 RX ORDER — ATORVASTATIN CALCIUM 40 MG/1
40 TABLET, FILM COATED ORAL DAILY
COMMUNITY

## 2023-04-20 RX ORDER — BUMETANIDE 2 MG/1
2 TABLET ORAL DAILY
COMMUNITY

## 2023-04-20 ASSESSMENT — PATIENT HEALTH QUESTIONNAIRE - PHQ9
1. LITTLE INTEREST OR PLEASURE IN DOING THINGS: NOT AT ALL
2. FEELING DOWN, DEPRESSED OR HOPELESS: NOT AT ALL
SUM OF ALL RESPONSES TO PHQ9 QUESTIONS 1 & 2: 0

## 2023-04-20 NOTE — TELEPHONE ENCOUNTER
Patient seen in CHF clinic today  Confused with his medications, he uses [employer] 3[employer] prefilled pill packaging    I personally called [employer] to review what they are filling for the patient, they have not been removing medications that had previously been discontinued. Alexis Berry was mistakenly taking the medication as they are filling the medication under generic name (Dofetilide) which confused the patient. Therefore Alexis Berry has been taking Dofetilide 250 mg twice daily since this has been filled in his pill pack, I do not believe he ever discontinued this. Dr. Sierra Zelaya can you please review, would you like Alexis Berry to stop Tikosyn as previously ordered? If so I will call [facility] directly and have it removed from his pill pack      Thank you!

## 2023-04-20 NOTE — PROGRESS NOTES
[facility] Visit      Reason for Visit: Heart failure      Primary Cardiologist: Dr. Carlos Pulido  EP Cardiologist: Dr. Anh Gaines  [facility]: Dr. Odessa Fam [facility])        History of Present Illness:     Mr. Stan Santacruz is a 43year old  male with a PMHx of long standing nonischemic dilated cardiomyopathy that was dx at age 27, chronic HFrEF, cardioMEMS in situ, CRT-D in situ, PAF, T2DM, iron deficiency, obesity, ARVIND. He recently underwent gastric bypass surgery, following surgery he was maintained on his previous surgical medical regimen. This resulted in him being hospitalized for TAIWO 1 week after surgery. Medications were de-escalated, he was IV hydrated and during hospitalization underwent TTE that demonstrated LVEF 35%, stage II DD, preserved RV size and function, mild MR, mild TR. He was discharged home with subjective improvement. He presents today in hospital follow-up, since discharge from the hospital his pill pack was not updated and he was attempting to remove medications that were stopped after admission for TAIWO. He has been cutting his 97/103 mg of Entresto in half, stopped carvedilol and was instructed to hold diuretics/spironolactone. Due to pill packing he has been accurately cutting the Entresto, not taking carvedilol however did resume spironolactone and bumetanide. He denies any dizziness, lightheadedness, near-syncope, syncope, strokelike symptoms, chest pain, pressure, heaviness, CLAYTON, orthopnea, PND, abdominal bloating, early satiety or lower extremity edema. He has lost approximately 25 pounds since gastric bypass surgery.         Patient Active Problem List    Diagnosis Date Noted    TAIWO (acute kidney injury) ([facility] [address] 04/28/2022     Priority: Medium    Atrial fibrillation ([address] 04/26/2022     Priority: Medium    Dehydration 04/04/2023    Chronic combined systolic and diastolic congestive heart failure ([address] 03/18/2020    Carotid disease, bilateral

## 2023-04-20 NOTE — TELEPHONE ENCOUNTER
[facility] due to Oral Scott being confused with his medications. He brought his pill box into the CHF clinic and what is listed is not consistent with recommended orders in Epic following recent TAIWO hospitalization. [facility] pillbox with following script:  Allopurinol 300 mg daily   Atorvastatin 40 mg daily  Bumex 2 mg three times weekly  Carvedilol 25 mg BID  Dofetilide 250 mcg BID  Entresto 97/103 mg BID  Isosorbide 20 mg TID  Magnesium 400 mg daily   Metformin 1000 mg daily  Pantoprazole 40 mg daily  Spironolactone 12.5 mg daily  Xarelto 15 mg daily      Sent metoprolol succinate 25 mg nightly and Entresto 24/26 mg BID pill bottles on top of the above pillbox      Instructed [facility] to remove following scripts:  Allopurinol 300 mg daily   Carvedilol 25 mg BID  Entresto 97/103 mg BID  Isosorbide 20 mg TID  Metformin 1000 mg daily      I called Oral Scott and review above with him - he advised that he will self remove the above medications from his pillbox and call with any questions. Will message Dr. Mary Anne Leo to update regarding Dofetilide          Celso Ek APRN-CNP  [facility] Cardiology.

## 2023-04-21 ENCOUNTER — TELEPHONE (OUTPATIENT)
Dept: OTHER | Age: 43
End: 2023-04-21

## 2023-04-21 RX ORDER — DOFETILIDE 0.12 MG/1
125 CAPSULE ORAL 2 TIMES DAILY
Qty: 60 CAPSULE | Refills: 3 | Status: SHIPPED | OUTPATIENT
Start: 2023-04-21

## 2023-04-21 NOTE — TELEPHONE ENCOUNTER
MD Prachi Alexander, APRFARIDEH - CNP  Caller: Unspecified Marquis Colin,  2:27 PM)  I had spoken to Mr. Greg Costa about his Tikosyn and he told me weeks ago that he had stopped it. I was concerned since his creatinine had increased considerably with a drop in his eGFR. However since then renal function has improved and therefore we can just reduce the dose of Tikosyn to 125 mcg twice a day     MR           I called [employer] and updated them on new dosing of Tikosyn. I called Pancho Andino and informed him   Tikosyn is a capsule therefore he will continue taking his medications as he has been taking, [employer] is mailing him out a new bottle of Tikosyn 125 mcg BID and he will reduce the dose and remove the higher dose capsules once this is delivered. Pancho Andino acknowledged understanding. Thank you     Boyd DAMIAN-CNP  [facility] Cardiology. No

## 2023-04-24 DIAGNOSIS — I50.22 CHRONIC SYSTOLIC HEART FAILURE (HCC): Primary | ICD-10-CM

## 2023-04-28 ENCOUNTER — HOSPITAL ENCOUNTER (OUTPATIENT)
Age: 43
Discharge: HOME | End: 2023-04-28
Payer: MEDICARE

## 2023-04-28 LAB
ANION GAP SERPL CALCULATED.3IONS-SCNC: 16 MMOL/L (ref 7–16)
BNP BLD-MCNC: 2090 PG/ML (ref 0–125)
BUN SERPL-MCNC: 27 MG/DL (ref 6–20)
CALCIUM SERPL-MCNC: 10.2 MG/DL (ref 8.6–10.2)
CHLORIDE SERPL-SCNC: 105 MMOL/L (ref 98–107)
CO2 SERPL-SCNC: 21 MMOL/L (ref 22–29)
CREAT SERPL-MCNC: 2.3 MG/DL (ref 0.7–1.2)
ERYTHROCYTE [DISTWIDTH] IN BLOOD BY AUTOMATED COUNT: 15.9 FL (ref 11.5–15)
FERRITIN SERPL-MCNC: 310 NG/ML
GLUCOSE SERPL-MCNC: 79 MG/DL (ref 74–99)
HCT VFR BLD AUTO: 37 % (ref 37–54)
HGB BLD-MCNC: 11.4 G/DL (ref 12.5–16.5)
IRON SERPL-MCNC: 47 MCG/DL (ref 59–158)
MAGNESIUM SERPL-MCNC: 2.3 MG/DL (ref 1.6–2.6)
MCH RBC QN AUTO: 28.7 PG (ref 26–35)
MCHC RBC AUTO-ENTMCNC: 30.8 % (ref 32–34.5)
MCV RBC AUTO: 93.2 FL (ref 80–99.9)
PLATELET # BLD AUTO: 236 E9/L (ref 130–450)
PMV BLD AUTO: 9.8 FL (ref 7–12)
POTASSIUM SERPL-SCNC: 4.9 MMOL/L (ref 3.5–5)
RBC # BLD AUTO: 3.97 E12/L (ref 3.8–5.8)
SODIUM SERPL-SCNC: 142 MMOL/L (ref 132–146)
VITAMIN D 25-HYDROXY: 47 NG/ML (ref 30–100)
WBC # BLD: 5.4 E9/L (ref 4.5–11.5)

## 2023-04-28 PROCEDURE — 82728 ASSAY OF FERRITIN: CPT

## 2023-04-28 PROCEDURE — 83735 ASSAY OF MAGNESIUM: CPT

## 2023-04-28 PROCEDURE — 85027 COMPLETE CBC AUTOMATED: CPT

## 2023-04-28 PROCEDURE — 36415 COLL VENOUS BLD VENIPUNCTURE: CPT

## 2023-04-28 PROCEDURE — 83880 ASSAY OF NATRIURETIC PEPTIDE: CPT

## 2023-04-28 PROCEDURE — 84630 ASSAY OF ZINC: CPT

## 2023-04-28 PROCEDURE — 80048 BASIC METABOLIC PNL TOTAL CA: CPT

## 2023-04-28 PROCEDURE — 83540 ASSAY OF IRON: CPT

## 2023-04-28 PROCEDURE — 82306 VITAMIN D 25 HYDROXY: CPT

## 2023-04-28 RX ORDER — RIVAROXABAN 15 MG/1
TABLET, FILM COATED ORAL
Qty: 30 TABLET | Refills: 10 | Status: SHIPPED | OUTPATIENT
Start: 2023-04-28

## 2023-05-02 LAB — ZINC SERPL-MCNC: 63.1 UG/DL (ref 60–120)

## 2023-05-04 PROBLEM — E86.0 DEHYDRATION: Status: RESOLVED | Noted: 2023-04-04 | Resolved: 2023-05-04

## 2023-05-08 ENCOUNTER — TELEPHONE (OUTPATIENT)
Dept: NON INVASIVE DIAGNOSTICS | Age: 43
End: 2023-05-08

## 2023-05-08 DIAGNOSIS — I50.22 CHRONIC SYSTOLIC HEART FAILURE (HCC): Primary | ICD-10-CM

## 2023-05-10 ENCOUNTER — HOSPITAL ENCOUNTER (OUTPATIENT)
Dept: OTHER | Age: 43
Setting detail: THERAPIES SERIES
Discharge: HOME | End: 2023-05-10
Payer: MEDICARE

## 2023-05-10 VITALS
RESPIRATION RATE: 18 BRPM | WEIGHT: 276 LBS | BODY MASS INDEX: 37.43 KG/M2 | HEART RATE: 80 BPM | SYSTOLIC BLOOD PRESSURE: 114 MMHG | DIASTOLIC BLOOD PRESSURE: 72 MMHG | OXYGEN SATURATION: 100 %

## 2023-05-10 LAB
ANION GAP SERPL CALCULATED.3IONS-SCNC: 14 MMOL/L (ref 7–16)
BNP BLD-MCNC: 1981 PG/ML (ref 0–125)
BUN SERPL-MCNC: 35 MG/DL (ref 6–20)
CALCIUM SERPL-MCNC: 10 MG/DL (ref 8.6–10.2)
CHLORIDE SERPL-SCNC: 105 MMOL/L (ref 98–107)
CO2 SERPL-SCNC: 22 MMOL/L (ref 22–29)
CREAT SERPL-MCNC: 2.4 MG/DL (ref 0.7–1.2)
GLUCOSE SERPL-MCNC: 72 MG/DL (ref 74–99)
POTASSIUM SERPL-SCNC: 4.5 MMOL/L (ref 3.5–5)
SODIUM SERPL-SCNC: 141 MMOL/L (ref 132–146)

## 2023-05-10 PROCEDURE — 80048 BASIC METABOLIC PNL TOTAL CA: CPT

## 2023-05-10 PROCEDURE — 2500000003 HC RX 250 WO HCPCS: Performed by: INTERNAL MEDICINE

## 2023-05-10 PROCEDURE — 96374 THER/PROPH/DIAG INJ IV PUSH: CPT

## 2023-05-10 PROCEDURE — 36415 COLL VENOUS BLD VENIPUNCTURE: CPT

## 2023-05-10 PROCEDURE — 99214 OFFICE O/P EST MOD 30 MIN: CPT

## 2023-05-10 PROCEDURE — 2580000003 HC RX 258: Performed by: INTERNAL MEDICINE

## 2023-05-10 PROCEDURE — 83880 ASSAY OF NATRIURETIC PEPTIDE: CPT

## 2023-05-10 RX ORDER — SODIUM CHLORIDE 0.9 % (FLUSH) 0.9 %
10 SYRINGE (ML) INJECTION 2 TIMES DAILY
Status: DISCONTINUED | OUTPATIENT
Start: 2023-05-10 | End: 2023-05-11 | Stop reason: HOSPADM

## 2023-05-10 RX ORDER — BUMETANIDE 0.25 MG/ML
2 INJECTION INTRAMUSCULAR; INTRAVENOUS ONCE
Status: COMPLETED | OUTPATIENT
Start: 2023-05-10 | End: 2023-05-10

## 2023-05-10 RX ADMIN — BUMETANIDE 2 MG: 0.25 INJECTION INTRAMUSCULAR; INTRAVENOUS at 13:12

## 2023-05-10 RX ADMIN — SODIUM CHLORIDE, PRESERVATIVE FREE 10 ML: 5 INJECTION INTRAVENOUS at 13:13

## 2023-05-10 NOTE — PROGRESS NOTES
[facility]   CHF Clinic       [place]   1980  [phone]       Referring Doctor: Fareed Turner  Cardiologist: Keri Currie  Nephrologist: n/a  PCP: Juliann Painting      History of Present Illness:     Grand kelvin MONTESINOS is a 43 y.o. male with a history of HFrEF, most recent EF 31% on 1/19/2021. Patient Story:  He does NOT have dyspnea with exertion, shortness of breath, or decline in overall functional capacity. He does not have orthopnea, PND, nocturnal cough or hemoptysis. He denies abdominal distention or bloating, early satiety, anorexia/change in appetite. He does have a good urinary response to his oral diuretic. He does not have  lower extremity edema. He does have lightheadedness, dizziness. He denies syncope or near syncope. He denies chest pain or palpitations. Allergies   Allergen Reactions    Farxiga [Dapagliflozin] Other (See Comments)     Caused throat to swell         Prior to Visit Medications    Medication Sig Taking? Authorizing Provider   XARELTO 15 MG TABS tablet TAKE 1 TABLET BY MOUTH ONCE DAILY WITH BREAKFAST *EMERGENCY REFILL*  Zoë Friday, MD   dofetilide (TIKOSYN) 125 MCG capsule Take 1 capsule by mouth 2 times daily  Carson Peña MD   atorvastatin (LIPITOR) 40 MG tablet Take 1 tablet by mouth daily  Historical Provider, MD   bumetanide (BUMEX) 2 MG tablet Take 1 tablet by mouth daily  Historical Provider, MD   pantoprazole (PROTONIX) 40 MG tablet Take 1 tablet by mouth daily  Historical Provider, MD   spironolactone (ALDACTONE) 25 MG tablet Take 0.5 tablets by mouth daily  Historical Provider, MD   sacubitril-valsartan (ENTRESTO) 49-51 MG per tablet Take 1 tablet by mouth 2 times daily  NATI Vazquez CNP   metoprolol succinate (TOPROL XL) 25 MG extended release tablet Take 1 tablet by mouth at bedtime  NATI Vazquez CNP   nitroGLYCERIN (NITROSTAT) 0.4 MG SL tablet up to max of 3 total doses.  If no relief after 1

## 2023-05-10 NOTE — PLAN OF CARE
Problem: Chronic Conditions and Co-morbidities  Goal: Patient's chronic conditions and co-morbidity symptoms are monitored and maintained or improved  Flowsheets (Taken 5/10/2023 1039)  Care Plan - Patient's Chronic Conditions and Co-Morbidity Symptoms are Monitored and Maintained or Improved: Monitor and assess patient's chronic conditions and comorbid symptoms for stability, deterioration, or improvement

## 2023-05-17 ENCOUNTER — HOSPITAL ENCOUNTER (OUTPATIENT)
Dept: OTHER | Age: 43
Setting detail: THERAPIES SERIES
Discharge: HOME | End: 2023-05-17
Payer: MEDICARE

## 2023-05-17 VITALS
WEIGHT: 273 LBS | HEART RATE: 69 BPM | RESPIRATION RATE: 16 BRPM | OXYGEN SATURATION: 100 % | BODY MASS INDEX: 37.03 KG/M2

## 2023-05-17 LAB
ANION GAP SERPL CALCULATED.3IONS-SCNC: 12 MMOL/L (ref 7–16)
BNP BLD-MCNC: 1450 PG/ML (ref 0–125)
BUN SERPL-MCNC: 47 MG/DL (ref 6–20)
CALCIUM SERPL-MCNC: 10 MG/DL (ref 8.6–10.2)
CHLORIDE SERPL-SCNC: 104 MMOL/L (ref 98–107)
CO2 SERPL-SCNC: 21 MMOL/L (ref 22–29)
CREAT SERPL-MCNC: 2.6 MG/DL (ref 0.7–1.2)
GLUCOSE SERPL-MCNC: 77 MG/DL (ref 74–99)
POTASSIUM SERPL-SCNC: 4.3 MMOL/L (ref 3.5–5)
SODIUM SERPL-SCNC: 137 MMOL/L (ref 132–146)

## 2023-05-17 PROCEDURE — 80048 BASIC METABOLIC PNL TOTAL CA: CPT

## 2023-05-17 PROCEDURE — 36415 COLL VENOUS BLD VENIPUNCTURE: CPT

## 2023-05-17 PROCEDURE — 83880 ASSAY OF NATRIURETIC PEPTIDE: CPT

## 2023-05-17 PROCEDURE — 99214 OFFICE O/P EST MOD 30 MIN: CPT

## 2023-05-17 NOTE — PROGRESS NOTES
[facility]   CHF Clinic       [place] III   1980  [phone]       Referring Doctor: Bryant Hill  Cardiologist: Malgorzaat Templeton  Nephrologist: n/a  PCP: Noah Napier      History of Present Illness:     Grand kelvin MONTESINOS is a 43 y.o. male with a history of HFrEF, most recent EF 31% on 1/19/2021. Patient Story:  He does NOT have dyspnea with exertion, shortness of breath, or decline in overall functional capacity. He does not have orthopnea, PND, nocturnal cough or hemoptysis. He denies abdominal distention or bloating, early satiety, anorexia/change in appetite. He does have a good urinary response to his oral diuretic. He does not have  lower extremity edema. He does have lightheadedness, dizziness. He denies syncope or near syncope. He denies chest pain or palpitations. Allergies   Allergen Reactions    Farxiga [Dapagliflozin] Other (See Comments)     Caused throat to swell         Prior to Visit Medications    Medication Sig Taking? Authorizing Provider   XARELTO 15 MG TABS tablet TAKE 1 TABLET BY MOUTH ONCE DAILY WITH BREAKFAST *EMERGENCY REFILL*  Ramon Witt MD   dofetilide (TIKOSYN) 125 MCG capsule Take 1 capsule by mouth 2 times daily  Jan Walton MD   atorvastatin (LIPITOR) 40 MG tablet Take 1 tablet by mouth daily  Historical Provider, MD   bumetanide (BUMEX) 2 MG tablet Take 1 tablet by mouth daily  Historical Provider, MD   pantoprazole (PROTONIX) 40 MG tablet Take 1 tablet by mouth daily  Historical Provider, MD   spironolactone (ALDACTONE) 25 MG tablet Take 0.5 tablets by mouth daily  Historical Provider, MD   sacubitril-valsartan (ENTRESTO) 49-51 MG per tablet Take 1 tablet by mouth 2 times daily  NATI Cagle CNP   metoprolol succinate (TOPROL XL) 25 MG extended release tablet Take 1 tablet by mouth at bedtime  NATI Cagle CNP   nitroGLYCERIN (NITROSTAT) 0.4 MG SL tablet up to max of 3 total doses.  If no relief after 1

## 2023-05-17 NOTE — PLAN OF CARE
Problem: Chronic Conditions and Co-morbidities  Goal: Patient's chronic conditions and co-morbidity symptoms are monitored and maintained or improved  Flowsheets (Taken 5/17/2023 4268)  Care Plan - Patient's Chronic Conditions and Co-Morbidity Symptoms are Monitored and Maintained or Improved: Monitor and assess patient's chronic conditions and comorbid symptoms for stability, deterioration, or improvement

## 2023-05-26 ENCOUNTER — TELEPHONE (OUTPATIENT)
Dept: CARDIOLOGY CLINIC | Age: 43
End: 2023-05-26

## 2023-05-26 ENCOUNTER — HOSPITAL ENCOUNTER (OUTPATIENT)
Age: 43
Discharge: HOME | End: 2023-05-26
Payer: MEDICARE

## 2023-05-26 DIAGNOSIS — I50.22 CHRONIC SYSTOLIC HEART FAILURE (HCC): Primary | ICD-10-CM

## 2023-05-26 DIAGNOSIS — I50.22 CHRONIC SYSTOLIC HEART FAILURE (HCC): ICD-10-CM

## 2023-05-26 LAB
ANION GAP SERPL CALCULATED.3IONS-SCNC: 15 MMOL/L (ref 7–16)
BNP BLD-MCNC: 1241 PG/ML (ref 0–125)
BUN SERPL-MCNC: 48 MG/DL (ref 6–20)
CALCIUM SERPL-MCNC: 10 MG/DL (ref 8.6–10.2)
CHLORIDE SERPL-SCNC: 102 MMOL/L (ref 98–107)
CO2 SERPL-SCNC: 19 MMOL/L (ref 22–29)
CREAT SERPL-MCNC: 2.5 MG/DL (ref 0.7–1.2)
GLUCOSE SERPL-MCNC: 82 MG/DL (ref 74–99)
POTASSIUM SERPL-SCNC: 4.4 MMOL/L (ref 3.5–5)
SODIUM SERPL-SCNC: 136 MMOL/L (ref 132–146)

## 2023-05-26 PROCEDURE — 80048 BASIC METABOLIC PNL TOTAL CA: CPT

## 2023-05-26 PROCEDURE — 36415 COLL VENOUS BLD VENIPUNCTURE: CPT

## 2023-05-26 PROCEDURE — 83880 ASSAY OF NATRIURETIC PEPTIDE: CPT

## 2023-05-26 NOTE — TELEPHONE ENCOUNTER
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 63% compliant with [facility] sends. Recent gastric surgery for obesity 3/29/23. Had f/u with Dr Jessica Key surgeon end of April and saw advanced HF NP early May (for Dr Abraham Mirza). Last chf clinic visit  5/17/23 no IV given. Noted inc trend of Cr   of  2.1>2.3>2.4>2.6 and down trending Pro-BNP  [id number]. GDMT:  Toprol xl 25mg nightly  Entresto 49-51mg BID  since April 12/2023  Aldactone 12.5mg daily  Bumex 2mg   3 x weekly M-W-F    [employer] [place] [facility] [employer]   5/26/2023 9:53 AM   med pack delivered 5-18-23  verified the above meds were dispensed. Magnesium was last sent in March. Patient self suspended it in April. Stated to [facility] he was not to take this med. Noted in epic 4/8/23 discharge summary Dr Joseph Romero was to take mag 400mg daily. 4/17/23 request for refill declined . With f/u mag level of 2.3 on 4/28/23. Called patient: phone assessment:  Says he Feels good now. Was doing gatorade zero and had swelling and felt  fluid in his lung. Stopped using the electrolyte supplement and symptoms stopped. Weight  (diet recently changed saw surgeon in [facility]- high protein diet, lots of  lean meats.) weight loss continues. 265#  today weight. 267# last week. Breathing is \"real good\". Speaking in full sentences without distress. Swelling- none now. Goes to gym  m-w-f  walks 3 miles while at [employer]. And walks 6 miles on the off days. Urine color clear and good volume. (No diarrhea or vomiting)  Denies dizzy/lightheaded. He Says Dr Abraham Mirza office told him not to take Magnesium any more. So he called [facility] and stopped shipment for April. Also noted the script was rejected for refill by pcp. He will catch the bus to come get labs today. Fax  # [phone]       NATI Pinzon - CNP for Dr Abraham Mirza 4-24-23 seen.      Plan: per ANTIONE M

## 2023-05-26 NOTE — RESULT ENCOUNTER NOTE
Labs reviewed  PAD rising on mems    Will have him take a dose of bumex today and tomorrow   Follow up blood work Tuesday  If renal function continues to rise consider reducing entresto/aldactone   **Medication changes are difficult with patient as he gets confused due to pill packing

## 2023-05-31 ENCOUNTER — HOSPITAL ENCOUNTER (INPATIENT)
Age: 43
LOS: 3 days | Discharge: HOME | DRG: 683 | End: 2023-06-03
Attending: EMERGENCY MEDICINE | Admitting: INTERNAL MEDICINE
Payer: MEDICARE

## 2023-05-31 ENCOUNTER — TELEPHONE (OUTPATIENT)
Dept: CARDIOLOGY CLINIC | Age: 43
End: 2023-05-31

## 2023-05-31 ENCOUNTER — HOSPITAL ENCOUNTER (OUTPATIENT)
Age: 43
Discharge: HOME | End: 2023-05-31
Payer: MEDICARE

## 2023-05-31 DIAGNOSIS — I50.22 CHRONIC SYSTOLIC HEART FAILURE (HCC): ICD-10-CM

## 2023-05-31 DIAGNOSIS — N17.9 AKI (ACUTE KIDNEY INJURY) (HCC): Primary | ICD-10-CM

## 2023-05-31 DIAGNOSIS — N18.4 STAGE 4 CHRONIC KIDNEY DISEASE (HCC): ICD-10-CM

## 2023-05-31 DIAGNOSIS — E86.0 DEHYDRATION: ICD-10-CM

## 2023-05-31 LAB
ANION GAP SERPL CALCULATED.3IONS-SCNC: 18 MMOL/L (ref 7–16)
BNP BLD-MCNC: 1254 PG/ML (ref 0–125)
BUN SERPL-MCNC: 79 MG/DL (ref 6–20)
CALCIUM SERPL-MCNC: 10.5 MG/DL (ref 8.6–10.2)
CHLORIDE SERPL-SCNC: 101 MMOL/L (ref 98–107)
CO2 SERPL-SCNC: 17 MMOL/L (ref 22–29)
CREAT SERPL-MCNC: 4 MG/DL (ref 0.7–1.2)
GLUCOSE SERPL-MCNC: 100 MG/DL (ref 74–99)
POTASSIUM SERPL-SCNC: 3.9 MMOL/L (ref 3.5–5)
SODIUM SERPL-SCNC: 136 MMOL/L (ref 132–146)

## 2023-05-31 PROCEDURE — 96361 HYDRATE IV INFUSION ADD-ON: CPT

## 2023-05-31 PROCEDURE — 83880 ASSAY OF NATRIURETIC PEPTIDE: CPT

## 2023-05-31 PROCEDURE — 96360 HYDRATION IV INFUSION INIT: CPT

## 2023-05-31 PROCEDURE — 80048 BASIC METABOLIC PNL TOTAL CA: CPT

## 2023-05-31 PROCEDURE — 99285 EMERGENCY DEPT VISIT HI MDM: CPT

## 2023-05-31 PROCEDURE — 2140000000 HC CCU INTERMEDIATE R&B

## 2023-05-31 PROCEDURE — 2580000003 HC RX 258

## 2023-05-31 PROCEDURE — 36415 COLL VENOUS BLD VENIPUNCTURE: CPT

## 2023-05-31 PROCEDURE — 93005 ELECTROCARDIOGRAM TRACING: CPT

## 2023-05-31 RX ORDER — 0.9 % SODIUM CHLORIDE 0.9 %
500 INTRAVENOUS SOLUTION INTRAVENOUS ONCE
Status: COMPLETED | OUTPATIENT
Start: 2023-05-31 | End: 2023-05-31

## 2023-05-31 RX ORDER — SODIUM CHLORIDE, SODIUM LACTATE, POTASSIUM CHLORIDE, CALCIUM CHLORIDE 600; 310; 30; 20 MG/100ML; MG/100ML; MG/100ML; MG/100ML
INJECTION, SOLUTION INTRAVENOUS CONTINUOUS
Status: DISCONTINUED | OUTPATIENT
Start: 2023-05-31 | End: 2023-06-01

## 2023-05-31 RX ADMIN — SODIUM CHLORIDE 500 ML: 9 INJECTION, SOLUTION INTRAVENOUS at 16:47

## 2023-05-31 ASSESSMENT — LIFESTYLE VARIABLES
HOW MANY STANDARD DRINKS CONTAINING ALCOHOL DO YOU HAVE ON A TYPICAL DAY: PATIENT DOES NOT DRINK
HOW OFTEN DO YOU HAVE A DRINK CONTAINING ALCOHOL: NEVER

## 2023-05-31 ASSESSMENT — PAIN - FUNCTIONAL ASSESSMENT: PAIN_FUNCTIONAL_ASSESSMENT: NONE - DENIES PAIN

## 2023-05-31 NOTE — ED PROVIDER NOTES
Steph Guillaume is a 43 y.o. male    HPI  Kitty Parks III is a 43 y.o. male presenting to the ED for Abnormal Lab (Sent in by Nephrology for elevated creatinine level. )    History comes primarily from the patient. Patient was sent to the emergency department due to an elevation in his creatinine from baseline. The patient had outpatient labs drawn today with a creatinine showing of 4.0 up from his baseline. The patient has had a creatinine that has been steadily climbing over the last several weeks. Most recently, the patient had a creatinine of 2.5 five days ago. The patient is denying any chest pain, shortness of breath, lightheadedness or dizziness, urinary or bowel symptoms, fever. ROS  Full review of systems completed. Pertinent positives and negatives per the HPI, unless otherwise stated ROS is negative. Physical Exam  Vitals reviewed. Constitutional:       General: He is not in acute distress. Appearance: Normal appearance. He is obese. He is not ill-appearing. HENT:      Head: Normocephalic and atraumatic. Right Ear: External ear normal.      Left Ear: External ear normal.      Nose: Nose normal. No rhinorrhea. Mouth/Throat:      Mouth: Mucous membranes are moist.      Pharynx: Oropharynx is clear. Eyes:      Extraocular Movements: Extraocular movements intact. Conjunctiva/sclera: Conjunctivae normal.      Pupils: Pupils are equal, round, and reactive to light. Cardiovascular:      Rate and Rhythm: Normal rate and regular rhythm. Heart sounds: Normal heart sounds. No murmur heard. Pulmonary:      Effort: Pulmonary effort is normal. No respiratory distress. Breath sounds: Normal breath sounds. No rhonchi. Abdominal:      General: Abdomen is flat. There is no distension. Tenderness: There is no abdominal tenderness. Musculoskeletal:         General: No swelling or tenderness. Normal range of motion.       Cervical back: Normal range of

## 2023-05-31 NOTE — TELEPHONE ENCOUNTER
----- Message from Veronica Henry MD sent at 5/31/2023  1:11 PM EDT -----  His labs noted.  Creatinine high    Tell him to hold Spironolactone and decrease Bumex to 1/2 the dose till next CHF clinic    Veronica Henry MD        ----- Message -----  From: Riky Jacinto Incoming Results From [facility]  Sent: 5/31/2023  11:25 AM EDT  To: Veronica Henry MD

## 2023-05-31 NOTE — TELEPHONE ENCOUNTER
Denies dizzy/lightheaded. Making good urine. Urinated really good with oral diuretics. Color of urine light to almost clear  Unchanged weight. Denies flank pains  Denies SOB  Denies any illness over the weekend. Keeping hydrated   Worked out this am and going back to gym tonight      Per ANTIONE Oleary CNP advised to go to ER for Cr 4 today on labs for eval and treat. He will lay on mems pillow before going to ER.        Mary Timmons RN

## 2023-05-31 NOTE — RESULT ENCOUNTER NOTE
Labs reviewed  Patient has had ongoing issues with his pill pack and knowing correct medications and dosing   He recently had gastric bypass surgery with major change in his diet  His cardioMEMS PAD goal is 22 last week PAD range was low 30's therefore was given just 2 doses of Bumex as he is only taking PRN. Last PAD was 22 at goal on 5/29/23    He has not laid in the past 2 days. ,       Please check on patient - any illness over the weekend?     Give TAIWO he needs to be seen in the ED       Thank you

## 2023-05-31 NOTE — TELEPHONE ENCOUNTER
----- Message from NATI Hoskins CNP sent at 5/31/2023  1:28 PM EDT -----  Labs reviewed  Patient has had ongoing issues with his pill pack and knowing correct medications and dosing   He recently had gastric bypass surgery with major change in his diet  His cardioMEMS PAD goal is 22 last week PAD range was low 30's therefore was given just 2 doses of Bumex as he is only taking PRN. Last PAD was 22 at goal on 5/29/23    He has not laid in the past 2 days. ,       Please check on patient - any illness over the weekend?     Give TAIWO he needs to be seen in the ED       Thank you

## 2023-06-01 PROBLEM — I50.20 HFREF (HEART FAILURE WITH REDUCED EJECTION FRACTION) (HCC): Status: ACTIVE | Noted: 2023-06-01

## 2023-06-01 PROBLEM — Z95.818 PRESENCE OF CARDIOMEMS HF SYSTEM: Status: ACTIVE | Noted: 2023-06-01

## 2023-06-01 LAB
ANION GAP SERPL CALCULATED.3IONS-SCNC: 15 MMOL/L (ref 7–16)
BUN SERPL-MCNC: 83 MG/DL (ref 6–20)
CALCIUM SERPL-MCNC: 9.9 MG/DL (ref 8.6–10.2)
CHLORIDE SERPL-SCNC: 105 MMOL/L (ref 98–107)
CHLORIDE UR-SCNC: <20 MMOL/L
CO2 SERPL-SCNC: 19 MMOL/L (ref 22–29)
CREAT SERPL-MCNC: 3.3 MG/DL (ref 0.7–1.2)
CREAT UR-MCNC: 115 MG/DL (ref 40–278)
CREAT UR-MCNC: 118 MG/DL (ref 40–278)
EKG ATRIAL RATE: 70 BPM
EKG P AXIS: -9 DEGREES
EKG P-R INTERVAL: 164 MS
EKG Q-T INTERVAL: 516 MS
EKG QRS DURATION: 162 MS
EKG QTC CALCULATION (BAZETT): 557 MS
EKG R AXIS: 170 DEGREES
EKG T AXIS: -9 DEGREES
EKG VENTRICULAR RATE: 70 BPM
GLUCOSE SERPL-MCNC: 103 MG/DL (ref 74–99)
METER GLUCOSE: 121 MG/DL (ref 74–99)
POTASSIUM SERPL-SCNC: 4.1 MMOL/L (ref 3.5–5)
POTASSIUM UR-SCNC: 20.8 MMOL/L
PROT UR-MCNC: 4 MG/DL (ref 0–12)
PROTEIN/CREAT RATIO: 0
PROTEIN/CREAT RATIO: 0 (ref 0–0.2)
SODIUM SERPL-SCNC: 139 MMOL/L (ref 132–146)
SODIUM UR-SCNC: <20 MMOL/L
UREA NITROGEN, UR: 741 MG/DL (ref 800–1666)

## 2023-06-01 PROCEDURE — 2140000000 HC CCU INTERMEDIATE R&B

## 2023-06-01 PROCEDURE — 51798 US URINE CAPACITY MEASURE: CPT

## 2023-06-01 PROCEDURE — 80048 BASIC METABOLIC PNL TOTAL CA: CPT

## 2023-06-01 PROCEDURE — 84300 ASSAY OF URINE SODIUM: CPT

## 2023-06-01 PROCEDURE — 84133 ASSAY OF URINE POTASSIUM: CPT

## 2023-06-01 PROCEDURE — 6370000000 HC RX 637 (ALT 250 FOR IP): Performed by: INTERNAL MEDICINE

## 2023-06-01 PROCEDURE — 36415 COLL VENOUS BLD VENIPUNCTURE: CPT

## 2023-06-01 PROCEDURE — 2580000003 HC RX 258: Performed by: EMERGENCY MEDICINE

## 2023-06-01 PROCEDURE — 99254 IP/OBS CNSLTJ NEW/EST MOD 60: CPT | Performed by: INTERNAL MEDICINE

## 2023-06-01 PROCEDURE — APPSS60 APP SPLIT SHARED TIME 46-60 MINUTES: Performed by: CLINICAL NURSE SPECIALIST

## 2023-06-01 PROCEDURE — 82962 GLUCOSE BLOOD TEST: CPT

## 2023-06-01 PROCEDURE — 82436 ASSAY OF URINE CHLORIDE: CPT

## 2023-06-01 PROCEDURE — 82570 ASSAY OF URINE CREATININE: CPT

## 2023-06-01 PROCEDURE — 84540 ASSAY OF URINE/UREA-N: CPT

## 2023-06-01 PROCEDURE — 93010 ELECTROCARDIOGRAM REPORT: CPT | Performed by: INTERNAL MEDICINE

## 2023-06-01 PROCEDURE — 84156 ASSAY OF PROTEIN URINE: CPT

## 2023-06-01 RX ORDER — HYDROCODONE BITARTRATE AND ACETAMINOPHEN 5; 325 MG/1; MG/1
1 TABLET ORAL EVERY 6 HOURS PRN
Status: DISCONTINUED | OUTPATIENT
Start: 2023-06-01 | End: 2023-06-03 | Stop reason: HOSPADM

## 2023-06-01 RX ORDER — ACETAMINOPHEN 500 MG
500 TABLET ORAL EVERY 4 HOURS PRN
Status: DISCONTINUED | OUTPATIENT
Start: 2023-06-01 | End: 2023-06-03 | Stop reason: HOSPADM

## 2023-06-01 RX ORDER — METOPROLOL SUCCINATE 25 MG/1
25 TABLET, EXTENDED RELEASE ORAL NIGHTLY
Status: DISCONTINUED | OUTPATIENT
Start: 2023-06-01 | End: 2023-06-03 | Stop reason: HOSPADM

## 2023-06-01 RX ORDER — PANTOPRAZOLE SODIUM 40 MG/1
40 TABLET, DELAYED RELEASE ORAL DAILY
Status: DISCONTINUED | OUTPATIENT
Start: 2023-06-01 | End: 2023-06-03 | Stop reason: HOSPADM

## 2023-06-01 RX ORDER — DOFETILIDE 0.12 MG/1
125 CAPSULE ORAL 2 TIMES DAILY
Status: DISCONTINUED | OUTPATIENT
Start: 2023-06-01 | End: 2023-06-03 | Stop reason: HOSPADM

## 2023-06-01 RX ORDER — NITROGLYCERIN 0.4 MG/1
0.4 TABLET SUBLINGUAL EVERY 5 MIN PRN
Status: DISCONTINUED | OUTPATIENT
Start: 2023-06-01 | End: 2023-06-03 | Stop reason: HOSPADM

## 2023-06-01 RX ORDER — ATORVASTATIN CALCIUM 40 MG/1
40 TABLET, FILM COATED ORAL DAILY
Status: DISCONTINUED | OUTPATIENT
Start: 2023-06-01 | End: 2023-06-03 | Stop reason: HOSPADM

## 2023-06-01 RX ADMIN — DOFETILIDE 125 MCG: 0.12 CAPSULE ORAL at 21:10

## 2023-06-01 RX ADMIN — DOFETILIDE 125 MCG: 0.12 CAPSULE ORAL at 09:50

## 2023-06-01 RX ADMIN — METOPROLOL SUCCINATE 25 MG: 25 TABLET, EXTENDED RELEASE ORAL at 21:10

## 2023-06-01 RX ADMIN — RIVAROXABAN 15 MG: 15 TABLET, FILM COATED ORAL at 17:57

## 2023-06-01 RX ADMIN — PANTOPRAZOLE SODIUM 40 MG: 40 TABLET, DELAYED RELEASE ORAL at 09:50

## 2023-06-01 RX ADMIN — SODIUM CHLORIDE, POTASSIUM CHLORIDE, SODIUM LACTATE AND CALCIUM CHLORIDE: 600; 310; 30; 20 INJECTION, SOLUTION INTRAVENOUS at 06:48

## 2023-06-01 RX ADMIN — ATORVASTATIN CALCIUM 40 MG: 40 TABLET, FILM COATED ORAL at 09:50

## 2023-06-01 RX ADMIN — HYDROCODONE BITARTRATE AND ACETAMINOPHEN 1 TABLET: 5; 325 TABLET ORAL at 18:29

## 2023-06-01 ASSESSMENT — PAIN DESCRIPTION - PAIN TYPE: TYPE: ACUTE PAIN

## 2023-06-01 ASSESSMENT — PAIN DESCRIPTION - LOCATION
LOCATION: BACK
LOCATION: BACK

## 2023-06-01 ASSESSMENT — PAIN DESCRIPTION - DESCRIPTORS
DESCRIPTORS: ACHING
DESCRIPTORS: ACHING

## 2023-06-01 ASSESSMENT — PAIN - FUNCTIONAL ASSESSMENT
PAIN_FUNCTIONAL_ASSESSMENT: NONE - DENIES PAIN
PAIN_FUNCTIONAL_ASSESSMENT: PREVENTS OR INTERFERES SOME ACTIVE ACTIVITIES AND ADLS

## 2023-06-01 ASSESSMENT — PAIN SCALES - GENERAL
PAINLEVEL_OUTOF10: 3
PAINLEVEL_OUTOF10: 7
PAINLEVEL_OUTOF10: 0
PAINLEVEL_OUTOF10: 0
PAINLEVEL_OUTOF10: 3
PAINLEVEL_OUTOF10: 7

## 2023-06-01 ASSESSMENT — PAIN DESCRIPTION - FREQUENCY: FREQUENCY: CONTINUOUS

## 2023-06-01 ASSESSMENT — PAIN DESCRIPTION - ONSET: ONSET: PROGRESSIVE

## 2023-06-01 ASSESSMENT — PAIN DESCRIPTION - ORIENTATION
ORIENTATION: RIGHT;LOWER
ORIENTATION: LOWER;OUTER

## 2023-06-01 NOTE — CONSULTS
Inpatient Cardiology Consultation      Reason for Consult:  Cardiomyopathy    Consulting Physician: Dr Jeannie Saeed    Requesting Physician:  Dr. Margo Ruiz     Date of Consultation: 6/1/2023    HISTORY OF PRESENT ILLNESS:   Mr Grabiel Mcbride is a 43year old black male, known to [facility] Cardiology and Hanh Matt, ADHF Cardiologist: Dr. Eagle Nye [facility]). Additionally, he follows closely with the heart failure clinic & Latanya DAMIAN. Last visit from [facility] Cardiology 4/24/23     PMH: Longstanding nonischemic dilated cardiomyopathy diagnosed age 27, chronic HFrEF LVEF 35% , CardioMEMS,  CRT-D in situ, PAF chronic Tikosyn therapy and OAC with Xarelto, T2DM, CKD, HTN, HLD, iron deficiency, obesity s/p recent gastric bypass, ARVIND. CHF Clinic 5/10/23 Bumex PO 1mg daily Spironolactone 12.5 daily  /72 HR 80 weight 276 lbs- Bumex IV 2mg given    5/31/23- Latanya DAMIAN - evaluated patient cardioMEMS - PAD goal 22  last week PAD low 30's given Bumex IV 2mg  5/29/23 PAD 22  Patient had not laid on pillow 2 days - Cr increasing - Dr. Raf Lang advised to hold Spironolactone and decrease Bumex by 1/2 current dose   Patient has difficulty with changing medications due to pill packing - sent ED by Latanya DAMIAN      [facility] ED 5/31/2023 via walk in for abnormal labs - elevated Cr  Arrival vitals: T97.8 RR 18 HR 82 /64-91/50 SPO2 90% on room air BMI 35.6  Significant Labs: BUN 79 CR 4.0 K3.9 calcium 10.5 proBNP 1254  RAD: None  ED treatment: NS IV 500ml    Patient seen and examined. Recent vitals: T97.9 RR 16 HR 70 /78 SPO2 96 on room air current weight 262 pounds  Intake and output not documented  Patient tells me that he has been recently weight training 1 hour at a time 3 times a week with no chest pain, dyspnea on exertion, orthopnea, PND, lower extremity swelling, weight changes. He has not had any changes in his urination. He did have some mild lower back pain yesterday after working out.

## 2023-06-01 NOTE — CARE COORDINATION
Transition of Care-Completed initial CM assessment via phone interview with patient. He lives alone in apartment, independent. No history of SNF or HHC. PCP is Dr. Jorge Andrews, preferred pharmacy is [facility] Mail order pharmacy (gets scripts filled in 24 hr turn around time), or [facility] [address]. No history of SNF or HHC. Admitted for acute kidney injury, Nephrology and Cardiology are following. Patient does not anticipate any discharge needs. He will transport himself home.      Terrell ANDERSONN, RN  [facility]

## 2023-06-01 NOTE — ED NOTES
Patient in room 37, patient oriented to room and call bell. Warm blankets provided and vs updated. Patient has no further needs at this time.  Call bell in reach     Fili Suarez RN  06/01/23 [id number]

## 2023-06-01 NOTE — H&P
Mamta Bishop MD [id number]  Internal medicine   History and Physical      CHIEF COMPLAINT: Abnormal labs      HISTORY OF PRESENT ILLNESS:    6/1/2023  Patient was seen on the floor earlier this morning at the main campus of [facility]  45-year-old -American man with nonischemic cardiomyopathy well-known to me from previous admission  Spoke with the ER physician at the time of admission  He was sent in by his PCP due to abnormal creatinine  Patient is asymptomatic    Past Medical History:    Past Medical History:   Diagnosis Date    Acute CHF ([address] 11/29/2011    Acute CHF ([address] 12/26/2011    Acute idiopathic gout of right foot 8/13/2016    AF (atrial fibrillation) ([address] 02/2020    Anemia 11/29/2011    CAD (coronary artery disease)     Cerebral artery occlusion with cerebral infarction ([address]     CKD (chronic kidney disease)     Gout     HTN (hypertension) 11/29/2011    Hyperlipidemia     Hypertension     Morbid obesity due to excess calories ([address]     Nonischemic cardiomyopathy ([address] 11/29/2011    Nonischemic cardiomyopathy ([address] 7/2013 7/2013- cardiac MRI revealed an LVEF of 20%    ARVIND (obstructive sleep apnea) 11/29/2011    S/P left heart catheterization by percutaneous approach 12-27/2011    Dr Morgan Burnham    Systolic heart failure [facility]) 5/7/2012 5/7/12- cardiac catheterization revealed an LVEF of 10-15%, mitral regurgitation along with borderline prolapse of mitral valve    Type 2 diabetes mellitus with complication, with long-term current use of insulin ([address] 8/22/2016    URI, acute 11/29/2011       Past Surgical History:    Past Surgical History:   Procedure Laterality Date    CARDIAC CATHETERIZATION  08/01/2019    Dr Nilda Diamond  11/20/2018    UPGRADE S-ICD TO BIV ICD   ([employer])  [facility]     CARDIOVERSION  02/14/2020    Successful CV of AF to NSR      (Dr. Addison Zuleta)    Anthony Posada  03/10/2022    Dr. Addison Zuleta. [place] Beckie Butts V-paced / SR    ECHO COMPL W DOP COLOR FLOW  11/30/2011

## 2023-06-01 NOTE — CONSULTS
[facility]  Nephrology Consult Note    Patient's Name: Steph Guillaume    Reason for Consult: TAIWO    Chief Complaint: Abnormal labs  History Obtained From:  patient, past medical records, and EMR    History of Present Illness:    Kitty Parks III is a 43 y.o. male with a past medical history of hypertension, CAD, CHF, and CKD. He presented to the ED on 5/31 with complaints of abnormal labs (he had a creatinine noted at 4 on 5/31). Vital signs on 5/31 includes temperature 97.8, respirations 18, pulse 82, /64, and he was 98% SPO2. Lab done on 5/21 includes BUN 79, creatinine 4, anion gap 18, CO2 17, and proBNP 1254. Our service was consulted to see the patient for TAIWO. Patient is known to our service and follows with Dr. Sully Lopez as an outpatient. He has a recent baseline creatinine of 2.1-2.5. At present, patient was seen and examined. He denies any chest pain or shortness of breath. He denies any abdominal pain, nausea, vomiting, or diarrhea. He denies any urgency, frequency, or dysuria. He denies any fevers or chills. He denies the use of NSAIDs.     PMH:    Past Medical History:   Diagnosis Date    Acute CHF ([address] 11/29/2011    Acute CHF ([address] 12/26/2011    Acute idiopathic gout of right foot 8/13/2016    AF (atrial fibrillation) ([address] 02/2020    Anemia 11/29/2011    CAD (coronary artery disease)     Cerebral artery occlusion with cerebral infarction ([address]     CKD (chronic kidney disease)     Gout     HTN (hypertension) 11/29/2011    Hyperlipidemia     Hypertension     Morbid obesity due to excess calories ([address]     Nonischemic cardiomyopathy ([address] 11/29/2011    Nonischemic cardiomyopathy ([address] 7/2013 7/2013- cardiac MRI revealed an LVEF of 20%    ARVIND (obstructive sleep apnea) 11/29/2011    S/P left heart catheterization by percutaneous approach 12-27/2011    Dr Vevelyn Lanes    Systolic heart failure [facility]) 5/7/2012 5/7/12- cardiac catheterization revealed an LVEF of 10-15%, mitral

## 2023-06-02 ENCOUNTER — APPOINTMENT (OUTPATIENT)
Dept: ULTRASOUND IMAGING | Age: 43
DRG: 683 | End: 2023-06-02
Payer: MEDICARE

## 2023-06-02 LAB
ALBUMIN SERPL-MCNC: 4.5 G/DL (ref 3.5–5.2)
ALP SERPL-CCNC: 102 U/L (ref 40–129)
ALT SERPL-CCNC: 12 U/L (ref 0–40)
ANION GAP SERPL CALCULATED.3IONS-SCNC: 20 MMOL/L (ref 7–16)
AST SERPL-CCNC: 18 U/L (ref 0–39)
BASOPHILS # BLD: 0.02 E9/L (ref 0–0.2)
BASOPHILS NFR BLD: 0.4 % (ref 0–2)
BILIRUB SERPL-MCNC: 0.3 MG/DL (ref 0–1.2)
BUN SERPL-MCNC: 69 MG/DL (ref 6–20)
CALCIUM SERPL-MCNC: 9.7 MG/DL (ref 8.6–10.2)
CHLORIDE SERPL-SCNC: 105 MMOL/L (ref 98–107)
CO2 SERPL-SCNC: 14 MMOL/L (ref 22–29)
CREAT SERPL-MCNC: 2.7 MG/DL (ref 0.7–1.2)
EOSINOPHIL # BLD: 0.28 E9/L (ref 0.05–0.5)
EOSINOPHIL NFR BLD: 5.5 % (ref 0–6)
ERYTHROCYTE [DISTWIDTH] IN BLOOD BY AUTOMATED COUNT: 14.1 FL (ref 11.5–15)
GLUCOSE SERPL-MCNC: 160 MG/DL (ref 74–99)
HCT VFR BLD AUTO: 37.1 % (ref 37–54)
HGB BLD-MCNC: 11.6 G/DL (ref 12.5–16.5)
IMM GRANULOCYTES # BLD: 0.01 E9/L
IMM GRANULOCYTES NFR BLD: 0.2 % (ref 0–5)
LYMPHOCYTES # BLD: 1.27 E9/L (ref 1.5–4)
LYMPHOCYTES NFR BLD: 25.1 % (ref 20–42)
MAGNESIUM SERPL-MCNC: 2.2 MG/DL (ref 1.6–2.6)
MCH RBC QN AUTO: 28.2 PG (ref 26–35)
MCHC RBC AUTO-ENTMCNC: 31.3 % (ref 32–34.5)
MCV RBC AUTO: 90.3 FL (ref 80–99.9)
MONOCYTES # BLD: 0.66 E9/L (ref 0.1–0.95)
MONOCYTES NFR BLD: 13.1 % (ref 2–12)
NEUTROPHILS # BLD: 2.81 E9/L (ref 1.8–7.3)
NEUTS SEG NFR BLD: 55.7 % (ref 43–80)
PHOSPHATE SERPL-MCNC: 2.7 MG/DL (ref 2.5–4.5)
PLATELET # BLD AUTO: 272 E9/L (ref 130–450)
PMV BLD AUTO: 10.4 FL (ref 7–12)
POTASSIUM SERPL-SCNC: 4.4 MMOL/L (ref 3.5–5)
PROT SERPL-MCNC: 7.6 G/DL (ref 6.4–8.3)
RBC # BLD AUTO: 4.11 E12/L (ref 3.8–5.8)
SODIUM SERPL-SCNC: 139 MMOL/L (ref 132–146)
WBC # BLD: 5.1 E9/L (ref 4.5–11.5)

## 2023-06-02 PROCEDURE — 6370000000 HC RX 637 (ALT 250 FOR IP)

## 2023-06-02 PROCEDURE — 99232 SBSQ HOSP IP/OBS MODERATE 35: CPT | Performed by: INTERNAL MEDICINE

## 2023-06-02 PROCEDURE — 83735 ASSAY OF MAGNESIUM: CPT

## 2023-06-02 PROCEDURE — 84100 ASSAY OF PHOSPHORUS: CPT

## 2023-06-02 PROCEDURE — 76770 US EXAM ABDO BACK WALL COMP: CPT

## 2023-06-02 PROCEDURE — 2140000000 HC CCU INTERMEDIATE R&B

## 2023-06-02 PROCEDURE — 80053 COMPREHEN METABOLIC PANEL: CPT

## 2023-06-02 PROCEDURE — 85025 COMPLETE CBC W/AUTO DIFF WBC: CPT

## 2023-06-02 PROCEDURE — 36415 COLL VENOUS BLD VENIPUNCTURE: CPT

## 2023-06-02 PROCEDURE — 6370000000 HC RX 637 (ALT 250 FOR IP): Performed by: INTERNAL MEDICINE

## 2023-06-02 RX ORDER — SODIUM BICARBONATE 650 MG/1
1300 TABLET ORAL 2 TIMES DAILY
Status: DISCONTINUED | OUTPATIENT
Start: 2023-06-02 | End: 2023-06-03 | Stop reason: HOSPADM

## 2023-06-02 RX ADMIN — ATORVASTATIN CALCIUM 40 MG: 40 TABLET, FILM COATED ORAL at 09:02

## 2023-06-02 RX ADMIN — HYDROCODONE BITARTRATE AND ACETAMINOPHEN 1 TABLET: 5; 325 TABLET ORAL at 15:09

## 2023-06-02 RX ADMIN — SODIUM BICARBONATE 1300 MG: 650 TABLET ORAL at 21:37

## 2023-06-02 RX ADMIN — DOFETILIDE 125 MCG: 0.12 CAPSULE ORAL at 21:37

## 2023-06-02 RX ADMIN — DOFETILIDE 125 MCG: 0.12 CAPSULE ORAL at 09:00

## 2023-06-02 RX ADMIN — PANTOPRAZOLE SODIUM 40 MG: 40 TABLET, DELAYED RELEASE ORAL at 09:01

## 2023-06-02 RX ADMIN — RIVAROXABAN 15 MG: 15 TABLET, FILM COATED ORAL at 17:36

## 2023-06-02 RX ADMIN — METOPROLOL SUCCINATE 25 MG: 25 TABLET, EXTENDED RELEASE ORAL at 21:37

## 2023-06-02 ASSESSMENT — PAIN SCALES - GENERAL
PAINLEVEL_OUTOF10: 0
PAINLEVEL_OUTOF10: 6
PAINLEVEL_OUTOF10: 0

## 2023-06-03 VITALS
TEMPERATURE: 97.5 F | RESPIRATION RATE: 18 BRPM | DIASTOLIC BLOOD PRESSURE: 70 MMHG | BODY MASS INDEX: 37.02 KG/M2 | WEIGHT: 273.3 LBS | HEART RATE: 70 BPM | HEIGHT: 72 IN | OXYGEN SATURATION: 100 % | SYSTOLIC BLOOD PRESSURE: 101 MMHG

## 2023-06-03 LAB
ALBUMIN SERPL-MCNC: 4.2 G/DL (ref 3.5–5.2)
ALP SERPL-CCNC: 102 U/L (ref 40–129)
ALT SERPL-CCNC: 11 U/L (ref 0–40)
ANION GAP SERPL CALCULATED.3IONS-SCNC: 12 MMOL/L (ref 7–16)
AST SERPL-CCNC: 18 U/L (ref 0–39)
BASOPHILS # BLD: 0.03 E9/L (ref 0–0.2)
BASOPHILS NFR BLD: 0.5 % (ref 0–2)
BILIRUB SERPL-MCNC: 0.4 MG/DL (ref 0–1.2)
BUN SERPL-MCNC: 60 MG/DL (ref 6–20)
CALCIUM SERPL-MCNC: 9.8 MG/DL (ref 8.6–10.2)
CHLORIDE SERPL-SCNC: 104 MMOL/L (ref 98–107)
CO2 SERPL-SCNC: 22 MMOL/L (ref 22–29)
CREAT SERPL-MCNC: 2.5 MG/DL (ref 0.7–1.2)
EOSINOPHIL # BLD: 0.33 E9/L (ref 0.05–0.5)
EOSINOPHIL NFR BLD: 5.7 % (ref 0–6)
ERYTHROCYTE [DISTWIDTH] IN BLOOD BY AUTOMATED COUNT: 14.1 FL (ref 11.5–15)
GLUCOSE SERPL-MCNC: 76 MG/DL (ref 74–99)
HCT VFR BLD AUTO: 38.2 % (ref 37–54)
HGB BLD-MCNC: 12 G/DL (ref 12.5–16.5)
IMM GRANULOCYTES # BLD: 0.01 E9/L
IMM GRANULOCYTES NFR BLD: 0.2 % (ref 0–5)
LYMPHOCYTES # BLD: 2.37 E9/L (ref 1.5–4)
LYMPHOCYTES NFR BLD: 41.3 % (ref 20–42)
MAGNESIUM SERPL-MCNC: 2.3 MG/DL (ref 1.6–2.6)
MCH RBC QN AUTO: 28.6 PG (ref 26–35)
MCHC RBC AUTO-ENTMCNC: 31.4 % (ref 32–34.5)
MCV RBC AUTO: 91.2 FL (ref 80–99.9)
MONOCYTES # BLD: 0.78 E9/L (ref 0.1–0.95)
MONOCYTES NFR BLD: 13.6 % (ref 2–12)
NEUTROPHILS # BLD: 2.22 E9/L (ref 1.8–7.3)
NEUTS SEG NFR BLD: 38.7 % (ref 43–80)
PHOSPHATE SERPL-MCNC: 3.3 MG/DL (ref 2.5–4.5)
PLATELET # BLD AUTO: 241 E9/L (ref 130–450)
PMV BLD AUTO: 10.1 FL (ref 7–12)
POTASSIUM SERPL-SCNC: 4 MMOL/L (ref 3.5–5)
PROT SERPL-MCNC: 7.4 G/DL (ref 6.4–8.3)
RBC # BLD AUTO: 4.19 E12/L (ref 3.8–5.8)
SODIUM SERPL-SCNC: 138 MMOL/L (ref 132–146)
WBC # BLD: 5.7 E9/L (ref 4.5–11.5)

## 2023-06-03 PROCEDURE — 36415 COLL VENOUS BLD VENIPUNCTURE: CPT

## 2023-06-03 PROCEDURE — 83735 ASSAY OF MAGNESIUM: CPT

## 2023-06-03 PROCEDURE — 99232 SBSQ HOSP IP/OBS MODERATE 35: CPT | Performed by: INTERNAL MEDICINE

## 2023-06-03 PROCEDURE — 6370000000 HC RX 637 (ALT 250 FOR IP): Performed by: INTERNAL MEDICINE

## 2023-06-03 PROCEDURE — 85025 COMPLETE CBC W/AUTO DIFF WBC: CPT

## 2023-06-03 PROCEDURE — 6370000000 HC RX 637 (ALT 250 FOR IP)

## 2023-06-03 PROCEDURE — 80053 COMPREHEN METABOLIC PANEL: CPT

## 2023-06-03 PROCEDURE — 84100 ASSAY OF PHOSPHORUS: CPT

## 2023-06-03 RX ORDER — SPIRONOLACTONE 25 MG/1
12.5 TABLET ORAL DAILY
Status: DISCONTINUED | OUTPATIENT
Start: 2023-06-03 | End: 2023-06-03 | Stop reason: HOSPADM

## 2023-06-03 RX ORDER — SODIUM BICARBONATE 650 MG/1
1300 TABLET ORAL 2 TIMES DAILY
Qty: 100 TABLET | Refills: 1 | Status: SHIPPED | OUTPATIENT
Start: 2023-06-03

## 2023-06-03 RX ADMIN — DOFETILIDE 125 MCG: 0.12 CAPSULE ORAL at 08:24

## 2023-06-03 RX ADMIN — SACUBITRIL AND VALSARTAN 1 TABLET: 24; 26 TABLET, FILM COATED ORAL at 10:40

## 2023-06-03 RX ADMIN — PANTOPRAZOLE SODIUM 40 MG: 40 TABLET, DELAYED RELEASE ORAL at 08:24

## 2023-06-03 RX ADMIN — SODIUM BICARBONATE 1300 MG: 650 TABLET ORAL at 08:24

## 2023-06-03 RX ADMIN — SPIRONOLACTONE 12.5 MG: 25 TABLET ORAL at 10:40

## 2023-06-03 RX ADMIN — ATORVASTATIN CALCIUM 40 MG: 40 TABLET, FILM COATED ORAL at 08:24

## 2023-06-03 NOTE — PROGRESS NOTES
4 Eyes Skin Assessment     NAME:  Gopal Briggs III  YOB: 1980  MEDICAL RECORD NUMBER:  [id number]    The patient is being assessed for  Admission    I agree that at least one RN has performed a thorough Head to Toe Skin Assessment on the patient. ALL assessment sites listed below have been assessed. Areas assessed by both nurses:    Head, Face, Ears, Shoulders, Back, Chest, Arms, Elbows, Hands, Sacrum. Buttock, Coccyx, Ischium, and Legs. Feet and Heels        Does the Patient have a Wound?  No noted wound(s)       James Prevention initiated by RN: No  Wound Care Orders initiated by RN: No    Pressure Injury (Stage 3,4, Unstageable, DTI, NWPT, and Complex wounds) if present, place Wound referral order by RN under : No    New Ostomies, if present place, Ostomy referral order under : No     Nurse 1 eSignature: Electronically signed by Barrington Aaron RN on 6/1/23 at 7:27 AM EDT    **SHARE this note so that the co-signing nurse can place an eSignature**    Nurse 2 eSignature: Electronically signed by Danyell Walden RN on 6/1/23 at 7:32 AM EDT
CardioMEMS readings:
INPATIENT CARDIOLOGY FOLLOW-UP    Name: Shasta Chanel    Age: 43 y.o. Date of Admission: 5/31/2023  3:41 PM    Date of Service: 6/3/2023    Primary Cardiologist: Dr. Tammy Ferguson    Chief Complaint: Follow-up for NICM    Interim History:  No new overnight cardiac complaints. Currently with no complaints of CP, SOB, palpitations, dizziness, or lightheadedness. AV paced on telemetry.     Review of Systems:   Negative except as described above    Problem List:  Patient Active Problem List   Diagnosis    Obstructive sleep apnea    Chronic gout    CKD (chronic kidney disease)    Type 2 diabetes mellitus with complication, with long-term current use of insulin (HCC)    Hepatomegaly    PAF (paroxysmal atrial fibrillation) ([facility])    Essential hypertension    VHD (valvular heart disease)    Morbid obesity due to excess calories ([facility])    Cardiac resynchronization therapy defibrillator (CRT-D) in place    Ventricular arrhythmia    NICM (nonischemic cardiomyopathy) ([facility] [address]    S/P left pulmonary artery pressure sensor implant placement    Carotid disease, bilateral ([facility] [address]    Chronic combined systolic and diastolic congestive heart failure (HCC)    Atrial fibrillation ([facility])    TAIWO (acute kidney injury) ([facility] [address]    HFrEF (heart failure with reduced ejection fraction) ([facility])    Presence of CardioMEMS HF system       Current Medications:    Current Facility-Administered Medications:     sodium bicarbonate tablet 1,300 mg, 1,300 mg, Oral, BID, NATI Brown - CNP, 1,300 mg at 06/03/23 0824    atorvastatin (LIPITOR) tablet 40 mg, 40 mg, Oral, Daily, Oneil Kehr, MD, 40 mg at 06/03/23 0824    dofetilide (TIKOSYN) capsule 125 mcg, 125 mcg, Oral, BID, Oneil Kehr, MD, 125 mcg at 06/03/23 0824    metoprolol succinate (TOPROL XL) extended release tablet 25 mg, 25 mg, Oral, Nightly, Oneil Kehr, MD, 25 mg at 06/02/23 2137    nitroGLYCERIN (NITROSTAT) SL tablet 0.4 mg, 0.4 mg, SubLINGual, Q5 Min PRN, Oneil Kehr, MD
Primo Garces NP notified of cardiology consult via [employer].
Sheila Sandhu MD FACP  Internal medicine  Progress Notes      CHIEF COMPLAINT: Abnormal labs      HISTORY OF PRESENT ILLNESS:    6/1/2023  Patient was seen on the floor earlier this morning at the main campus of [facility]  41-year-old -American man with nonischemic cardiomyopathy well-known to me from previous admission  Spoke with the ER physician at the time of admission  He was sent in by his PCP due to abnormal creatinine  Patient is asymptomatic  6/2/2023  He remains asymptomatic  Seen on the floor earlier this morning  Unable to get blood work by lab staff  Past Medical History:    Past Medical History:   Diagnosis Date    Acute CHF ([address] 11/29/2011    Acute CHF ([address] 12/26/2011    Acute idiopathic gout of right foot 8/13/2016    AF (atrial fibrillation) ([address] 02/2020    Anemia 11/29/2011    CAD (coronary artery disease)     Cerebral artery occlusion with cerebral infarction ([address]     CKD (chronic kidney disease)     Gout     HTN (hypertension) 11/29/2011    Hyperlipidemia     Hypertension     Morbid obesity due to excess calories ([address]     Nonischemic cardiomyopathy ([address] 11/29/2011    Nonischemic cardiomyopathy ([address] 7/2013 7/2013- cardiac MRI revealed an LVEF of 20%    ARVIND (obstructive sleep apnea) 11/29/2011    S/P left heart catheterization by percutaneous approach 12-27/2011    Dr Mendel Canal    Systolic heart failure [facility]) 5/7/2012 5/7/12- cardiac catheterization revealed an LVEF of 10-15%, mitral regurgitation along with borderline prolapse of mitral valve    Type 2 diabetes mellitus with complication, with long-term current use of insulin ([address] 8/22/2016    URI, acute 11/29/2011       Past Surgical History:    Past Surgical History:   Procedure Laterality Date    CARDIAC CATHETERIZATION  08/01/2019    Dr Joe Ashton  11/20/2018    UPGRADE S-ICD TO BIV ICD   ([employer])  [address]     CARDIOVERSION  02/14/2020    Successful CV of AF to NSR      (Dr. Dominik Mclaughlin)
[facility]  Nephrology Progress Note    Patient's Name: Nia Lopez III    History of Present Illness from 6/1 Consult Note: Ally Gimenez III is a 43 y.o. male with a past medical history of hypertension, CAD, CHF, and CKD. He presented to the ED on 5/31 with complaints of abnormal labs (he had a creatinine noted at 4 on 5/31). Vital signs on 5/31 includes temperature 97.8, respirations 18, pulse 82, /64, and he was 98% SPO2. Lab done on 5/21 includes BUN 79, creatinine 4, anion gap 18, CO2 17, and proBNP 1254. Our service was consulted to see the patient for TAIWO. Patient is known to our service and follows with Dr. Kota Phillips as an outpatient. He has a recent baseline creatinine of 2.1-2.5. At present, patient was seen and examined. He denies any chest pain or shortness of breath. He denies any abdominal pain, nausea, vomiting, or diarrhea. He denies any urgency, frequency, or dysuria. He denies any fevers or chills. He denies the use of NSAIDs. \"    Subjective:    6/2: Patient was seen and examined. He reports that he feels all right. He reports some nausea today. He denies any chest pain or shortness of breath.     PMH:    Past Medical History:   Diagnosis Date    Acute CHF ([address] 11/29/2011    Acute CHF ([address] 12/26/2011    Acute idiopathic gout of right foot 8/13/2016    AF (atrial fibrillation) ([address] 02/2020    Anemia 11/29/2011    CAD (coronary artery disease)     Cerebral artery occlusion with cerebral infarction ([address]     CKD (chronic kidney disease)     Gout     HTN (hypertension) 11/29/2011    Hyperlipidemia     Hypertension     Morbid obesity due to excess calories ([address]     Nonischemic cardiomyopathy ([address] 11/29/2011    Nonischemic cardiomyopathy ([address] 7/2013 7/2013- cardiac MRI revealed an LVEF of 20%    ARVIND (obstructive sleep apnea) 11/29/2011    S/P left heart catheterization by percutaneous approach 12-27/2011    Dr Enedelia Rivera    Systolic heart failure [facility]) 5/7/2012 5/7/12-
Ao: 81/67 ( 69 ). Post op diagnosis:  Mild CAD involving the LAD  PLAN:  Medical therapy / risk factor modification as per the advanced HF / Pulmonary hypertension team     NM Stress (1/19/2021)  Impression: The myocardial perfusion imaging was equivocal with fixed inferior and  apical perfusion abnormalities in the face of ventricular pacing and  artifact. No reversible perfusion abnormalities were identified. Overall left ventricular systolic function was moderately impaired  with a dilated left ventricular cavity and abnormal septal motion the  face of ventricular pacing with additional generalized hypokinesis. Intermediate risk pharmacologic myocardial perfusion imaging study. TTE (4/7/2023)   Summary   Dilated left ventricle (LVEDD 7.2 cm). Ejection fraction is visually estimated at 35%. Normal right ventricular size and function (TAPSE 2.6 cm). There is doppler evidence of stage II diastolic dysfunction. Mild mitral regurgitation. Mild tricuspid regurgitation. RV-RA gradient is estimated at 30 mmHg. CardioMEMS readings copied from Mars Page note 4/20/2023          Remote Pacemaker Interrogation: 5/19/23 device function WNL. No events. DDD 70-1 30. Heart failure diagnostics elevated. Increased OptiVol. A pacing 72% RV pacing 99% LV pacing 99% CRT pacing 99% atrial tach/atrial fibrillation burden 0% -see image below                  Assessment:  nonischemic dilated cardiomyopathy Chronic HFrEF LVEF 35% per TTE 4/2023  CardioMEMS & CRT-D in situ  NYHA Class II / ACC/AHA Stage C  GDMT: Toprol XL / Aldactone, Entresto, Bumex on hold due to TAIWO  TAIWO on CKD  PAF hx - OAC Xarelto - chronic Tikosyn   HTN - stable  HLD- on statin   DM II -nephropathy  obesity s/p s/p gastric sleeve 3/29/23 - weight loss of 30-40 lbs per patient  ARVIND compliant with CPAP  Chronic FE def anemia   Hx CVA - no residual     Recommendations:  Overall, he appears euvolemic on examination.   Etiology
04/04/2023 08:30 PM    GLUCOSEU Negative 04/04/2023 08:30 PM    KETUA TRACE 04/04/2023 08:30 PM    UROBILINOGEN 0.2 04/04/2023 08:30 PM    BILIRUBINUR Negative 04/04/2023 08:30 PM       Lab Results   Component Value Date/Time    LONNIE White 04/04/2023 08:30 PM       No components found for: URIC    No results found for: LIPIDPAN    Assessment and Plans:    TAIWO stage 3 on CKD 3B  TAIWO presumed in the setting of decreased effective renal perfusion- Entresto, Aldactone, Bumex  CKD-with history of diabetes mellitus, hypertension, CHF  Baseline creatinine 2.1 - > 2.5  Creatinine 4 on 5/21--> 2.5 today  Initial urine sodium<20, urine urea 741, urine creatinine 118  With scr returning to baseline plan to resume entresto and aldactone today  Monitor labs    2. High anion gap metabolic acidosis  In setting of TAIWO/CKD  Sodium bicarbonate 1300 mg oral twice daily  Monitor labs     3.  HFrEF  Echo 4/2023 EF 35%, stage II diastolic dysfunction  On Entresto, Aldactone, Bumex as an outpatient  Strict I&O, daily weights  Monitor volume status  Cardiology following    4. History of laparoscopic sleeve gastrectomy, laparoscopic liver biopsy, laparoscopic hiatal hernia repair 3/29/2023    NATI Holden - CNP    Patient seen and examined all key components of the physical performed independently , case discussed with NP, all pertinent labs and radiologic tests personally reviewed agree with above.       Waleska Araya MD

## 2023-06-05 ENCOUNTER — CARE COORDINATION (OUTPATIENT)
Dept: CASE MANAGEMENT | Age: 43
End: 2023-06-05

## 2023-06-05 ENCOUNTER — TELEPHONE (OUTPATIENT)
Dept: CARDIOLOGY CLINIC | Age: 43
End: 2023-06-05

## 2023-06-05 DIAGNOSIS — I50.20 HFREF (HEART FAILURE WITH REDUCED EJECTION FRACTION) (HCC): Primary | ICD-10-CM

## 2023-06-05 NOTE — CARE COORDINATION
Non MH PCP Care Transitions Outreach Attempt    Call within 2 business days of discharge: Yes     Attempted to reach the patient for initial Care Transition call post hospital discharge. Message left with CTN's contact information requesting return phone call. Will attempt outreach again. Patient: Oren Strickland Patient : 1980 MRN: [id number]    Last Discharge [address]       Date Complaint Diagnosis Description Type Department Provider    23 Abnormal Lab TAIWO (acute kidney injury) ([facility] . .. ED to Hosp-Admission (Discharged) (ADMITTED) [address] Loree Del Valle MD; Milton Acuna. .. Was this an external facility discharge?  No     Noted following upcoming appointments from discharge chart review:   [facility] follow up appointment(s):   Future Appointments   Date Time Provider Clemente Davis   2023 12:00 PM [facility] ROOM 1 [facility]   2023 12:15 PM [facility] CHF ROOM 1 [facility]   2023  1:30 PM SCHEDULE, [facility]     Non-BS follow up appointment(s):

## 2023-06-05 NOTE — TELEPHONE ENCOUNTER
----- Message from Sarah Lambert MD sent at 6/3/2023  3:35 PM EDT -----  Lets make an appointment for him at the CHF clinic for next week if he does not have 1. He will need a BMP. Thank you.

## 2023-06-06 ENCOUNTER — TELEPHONE (OUTPATIENT)
Dept: CARDIOLOGY CLINIC | Age: 43
End: 2023-06-06

## 2023-06-06 ENCOUNTER — CARE COORDINATION (OUTPATIENT)
Dept: CASE MANAGEMENT | Age: 43
End: 2023-06-06

## 2023-06-06 DIAGNOSIS — I50.22 CHRONIC SYSTOLIC HEART FAILURE (HCC): Primary | ICD-10-CM

## 2023-06-06 NOTE — TELEPHONE ENCOUNTER
Reading reminder sent 6:23 PM to send mems reading. 60% compliant with mems send  Current Goal PAD 22  Reading 6/5/23 P  AD 29        Hx NICM  4/7/23 TTE EF 35%    Going to chf clinic tomorrow, per Dr Pradeep Chance inpatient note needs BMP    Recent hospitalization. TAIWO 5/31/23 to 6/3/23. [facility]phrology ([facility] patient of DR Us  and cardiology [facility]) followed inpatient. PLAN  Dr Pradeep Chance to evaluate adjustment of  goal PAD after CHF clinic visit. Per Dr Pradeep Chance notes Kimberlee Jovel 24-26mg BID.)  He will evaluate for aldactone reinitiate after BMP at [facility] clinic.  Pending visit tomorrow

## 2023-06-06 NOTE — CARE COORDINATION
-Pt called back stating he wasn't sure if the rep he talked to at [address] knew what she was doing. He states that he told her that the Larinda Chars could not be filled because the insurance denied it and also stated that the insurance won't cover Sodium Bicarb. -CTN let the pt know that this CTN will call [facility] and get specifics and will f/u with him. He voiced understanding and appreciation. -CTN called [facility] and spoke with [facility]. He states that the Sodium Bicarb is not covered through his insurance, however, he states he can run a discount coupon through and see what the amount will be.  -[facility] states that with the discount coupon Sodium Bicarb would be $4.72/month for the rx  -[facility] states that the Larinda Chars is okay and there is no problems filling this.  Marie Ahujakush states that he can put in a rush, overnight ship to be sent to the pt. -CTN let [facility] know that CTN would like to do a 3-way call with the pt and find out if the Sodium Bicarb cost will be okay first.  -3-way call done with pt and [facility]. Pt states that he is agreeable to the discounted amount for the Sodium Bicarb and states its okay to fill and send. [facility] noted approval and states that he put both medications through to be overnighted. He states he can already see in the system that the pharmacy is working on getting these meds prepared.  -Pt did not have any other needs/concerns at this time. -CTN will sign off and resolve CT episode at this time.
Discussed follow-up appointments. If no appointment was previously scheduled, appointment scheduling offered: Pt already scheduled to f/u with pcp on 6/20/23. Is follow up appointment scheduled within 7 days of discharge? No.    Follow Up  Future Appointments   Date Time Provider Clemente Davis   6/7/2023 12:00 PM [facility] ROOM 1 [facility]   6/19/2023 12:15 PM [facility] ROOM 1 [facility]   9/14/2023  1:30 PM SCHEDULE, [address] Transition Nurse provided contact information. No further follow-up call indicated based on severity of symptoms and risk factors.       Veronica Jacobo RN

## 2023-06-07 ENCOUNTER — HOSPITAL ENCOUNTER (OUTPATIENT)
Dept: OTHER | Age: 43
Setting detail: THERAPIES SERIES
Discharge: HOME | End: 2023-06-07
Payer: MEDICARE

## 2023-06-07 VITALS
BODY MASS INDEX: 36.35 KG/M2 | OXYGEN SATURATION: 100 % | WEIGHT: 268 LBS | RESPIRATION RATE: 17 BRPM | HEART RATE: 69 BPM | DIASTOLIC BLOOD PRESSURE: 59 MMHG | SYSTOLIC BLOOD PRESSURE: 99 MMHG

## 2023-06-07 LAB
ANION GAP SERPL CALCULATED.3IONS-SCNC: 13 MMOL/L (ref 7–16)
BNP BLD-MCNC: 1469 PG/ML (ref 0–125)
BUN SERPL-MCNC: 49 MG/DL (ref 6–20)
CALCIUM SERPL-MCNC: 10.4 MG/DL (ref 8.6–10.2)
CHLORIDE SERPL-SCNC: 100 MMOL/L (ref 98–107)
CO2 SERPL-SCNC: 21 MMOL/L (ref 22–29)
CREAT SERPL-MCNC: 2.3 MG/DL (ref 0.7–1.2)
GLUCOSE SERPL-MCNC: 67 MG/DL (ref 74–99)
POTASSIUM SERPL-SCNC: 4.2 MMOL/L (ref 3.5–5)
SODIUM SERPL-SCNC: 134 MMOL/L (ref 132–146)

## 2023-06-07 PROCEDURE — 83880 ASSAY OF NATRIURETIC PEPTIDE: CPT

## 2023-06-07 PROCEDURE — 80048 BASIC METABOLIC PNL TOTAL CA: CPT

## 2023-06-07 PROCEDURE — 36415 COLL VENOUS BLD VENIPUNCTURE: CPT

## 2023-06-07 PROCEDURE — 99214 OFFICE O/P EST MOD 30 MIN: CPT

## 2023-06-07 NOTE — PLAN OF CARE
Problem: Chronic Conditions and Co-morbidities  Goal: Patient's chronic conditions and co-morbidity symptoms are monitored and maintained or improved  Flowsheets (Taken 6/7/2023 6199)  Care Plan - Patient's Chronic Conditions and Co-Morbidity Symptoms are Monitored and Maintained or Improved: Monitor and assess patient's chronic conditions and comorbid symptoms for stability, deterioration, or improvement

## 2023-06-07 NOTE — PROGRESS NOTES
Eulalia Menezes, APRN - CNP   nitroGLYCERIN (NITROSTAT) 0.4 MG SL tablet up to max of 3 total doses. If no relief after 1 dose, call 911. Gerda Olivares MD   rivaroxaban (XARELTO) 20 MG TABS tablet Take 20 mg by mouth Daily with supper  Historical Provider, MD   magnesium oxide (MAG-OX) 400 MG tablet Take 1 tablet by mouth daily  Summer Rizzo MD             Guideline directed medical:  HFrEF- 4/7/23- 35%  ARNI/ACE I/ARB: Trae Milian, 5/31/23- dose was decreased to 24-26 mg , hospitalized with TAIWO  Beta blocker:  Yes- Toprol XL  Aldosterone antagonist:  Yes  SGLT2i: No        Physical Examination:     BP (!) 99/59   Pulse 69   Resp 17   Wt 268 lb (121.6 kg)   SpO2 100%   BMI 36.35 kg/m²     Assessment  Charting Type: Shift assessment (chf clinic)    Neurological  Orientation Level: Oriented X4         HEENT (Head, Ears, Eyes, Nose, & Throat)  HEENT (WDL): Within Defined Limits    Respiratory  Respiratory Pattern: Regular  Respiratory Depth: Normal  Respiratory Quality/Effort: Unlabored  Chest Assessment: Chest expansion symmetrical  L Breath Sounds: Clear  R Breath Sounds: Clear    Breath Sounds  Right Upper Lobe: Clear  Right Middle Lobe: Clear  Right Lower Lobe: Clear  Left Upper Lobe: Clear  Left Lower Lobe: Clear         Cardiac  Cardiac Regularity: Regular  Cardiac Rhythm: AV paced    Rhythm Interpretation  Pulse: 69         Gastrointestinal  Abdominal (WDL): Within Defined Limits  Abdomen Inspection: Soft  RUQ Bowel Sounds: Active  LUQ Bowel Sounds: Active  RLQ Bowel Sounds: Active  LLQ Bowel Sounds: Active          Bowel Sounds  RUQ Bowel Sounds: Active  LUQ Bowel Sounds: Active  RLQ Bowel Sounds: Active  LLQ Bowel Sounds:  Active    Peripheral Vascular  Peripheral Vascular (WDL): Within Defined Limits  RLE Edema: None  LLE Edema: None                   Genitourinary  Genitourinary (WDL): Within Defined Limits    Psychosocial  Psychosocial (WDL): Within Defined Limits                        Pulse:

## 2023-06-08 NOTE — TELEPHONE ENCOUNTER
Maximino Stark please review chf visit and labs  Will need specific med name/dose/frequency of any meds changing. See Dr Sam Hester note.

## 2023-06-09 NOTE — TELEPHONE ENCOUNTER
Message  Received: NATI García - PRIYA Gomez, RN  Caller: Unspecified (3 days ago,  6:23 PM)  Called and spoke with Dr. Yoli Gutierrez low dose Blanco Nichols 24/26 mg BID   Continue to hold spironolactone   Follow up labs Wednesday next week   If worsening renal function with retrial of entresto will transition to hydralazine/isordil     Thank you

## 2023-06-19 ENCOUNTER — HOSPITAL ENCOUNTER (OUTPATIENT)
Dept: OTHER | Age: 43
Setting detail: THERAPIES SERIES
Discharge: HOME | End: 2023-06-19
Payer: MEDICARE

## 2023-06-19 VITALS
BODY MASS INDEX: 36.89 KG/M2 | WEIGHT: 272 LBS | SYSTOLIC BLOOD PRESSURE: 105 MMHG | RESPIRATION RATE: 18 BRPM | DIASTOLIC BLOOD PRESSURE: 65 MMHG | HEART RATE: 70 BPM | OXYGEN SATURATION: 100 %

## 2023-06-19 LAB
ANION GAP SERPL CALCULATED.3IONS-SCNC: 14 MMOL/L (ref 7–16)
BNP BLD-MCNC: 1995 PG/ML (ref 0–125)
BUN SERPL-MCNC: 37 MG/DL (ref 6–20)
CALCIUM SERPL-MCNC: 9.9 MG/DL (ref 8.6–10.2)
CHLORIDE SERPL-SCNC: 104 MMOL/L (ref 98–107)
CO2 SERPL-SCNC: 20 MMOL/L (ref 22–29)
CREAT SERPL-MCNC: 2.3 MG/DL (ref 0.7–1.2)
GLUCOSE SERPL-MCNC: 68 MG/DL (ref 74–99)
POTASSIUM SERPL-SCNC: 3.8 MMOL/L (ref 3.5–5)
SODIUM SERPL-SCNC: 138 MMOL/L (ref 132–146)

## 2023-06-19 PROCEDURE — 99214 OFFICE O/P EST MOD 30 MIN: CPT

## 2023-06-19 PROCEDURE — 36415 COLL VENOUS BLD VENIPUNCTURE: CPT

## 2023-06-19 PROCEDURE — 83880 ASSAY OF NATRIURETIC PEPTIDE: CPT

## 2023-06-19 PROCEDURE — 80048 BASIC METABOLIC PNL TOTAL CA: CPT

## 2023-06-19 NOTE — PROGRESS NOTES
[facility]   CHF Clinic       [place]   1980  [phone]       Referring Doctor: Anne Garcia  Cardiologist: Beth De Los Santos  Nephrologist: n/a  PCP: Isidoro Huynh      History of Present Illness:     Grand kelvin MONTESINOS is a 43 y.o. male with a history of HFrEF, most recent EF 35%, 4/7/23     Patient Story:  He does NOT have dyspnea with exertion, shortness of breath, or decline in overall functional capacity. He does not have orthopnea, PND, nocturnal cough or hemoptysis. He denies abdominal distention or bloating, early satiety, anorexia/change in appetite. He does have a good urinary response to his oral diuretic. He does not have lower extremity edema. He does not have lightheadedness, dizziness. He denies syncope or near syncope. He denies chest pain or palpitations. Allergies   Allergen Reactions    Farxiga [Dapagliflozin] Other (See Comments)     Caused throat to swell         Prior to Visit Medications    Medication Sig Taking? Authorizing Provider   sodium bicarbonate 650 MG tablet Take 2 tablets by mouth 2 times daily  Heraclio Bains MD   sacubitril-valsartan (ENTRESTO) 24-26 MG per tablet Take 1 tablet by mouth 2 times daily  Heraclio Bains MD   XARELTO 15 MG TABS tablet TAKE 1 TABLET BY MOUTH ONCE DAILY WITH BREAKFAST *EMERGENCY REFILL*  Danyell Cruz MD   dofetilide (TIKOSYN) 125 MCG capsule Take 1 capsule by mouth 2 times daily  Rashi Murray MD   atorvastatin (LIPITOR) 40 MG tablet Take 1 tablet by mouth daily  Historical Provider, MD   pantoprazole (PROTONIX) 40 MG tablet Take 1 tablet by mouth daily  Historical Provider, MD   spironolactone (ALDACTONE) 25 MG tablet Take 0.5 tablets by mouth daily 6/7- pt dc'd from hospital aldactone restarted.   Patient not taking: Reported on 6/19/2023  Historical Provider, MD   metoprolol succinate (TOPROL XL) 25 MG extended release tablet Take 1 tablet by mouth at bedtime  NATI Vallecillo - CNP

## 2023-06-19 NOTE — PLAN OF CARE
Problem: Chronic Conditions and Co-morbidities  Goal: Patient's chronic conditions and co-morbidity symptoms are monitored and maintained or improved  Flowsheets (Taken 6/19/2023 1553)  Care Plan - Patient's Chronic Conditions and Co-Morbidity Symptoms are Monitored and Maintained or Improved: Monitor and assess patient's chronic conditions and comorbid symptoms for stability, deterioration, or improvement

## 2023-06-25 ENCOUNTER — TELEPHONE (OUTPATIENT)
Dept: OTHER | Facility: CLINIC | Age: 43
End: 2023-06-25

## 2023-06-25 ENCOUNTER — APPOINTMENT (OUTPATIENT)
Dept: GENERAL RADIOLOGY | Age: 43
DRG: 309 | End: 2023-06-25
Payer: MEDICARE

## 2023-06-25 ENCOUNTER — HOSPITAL ENCOUNTER (INPATIENT)
Age: 43
LOS: 3 days | Discharge: HOME | DRG: 309 | End: 2023-06-28
Attending: EMERGENCY MEDICINE | Admitting: INTERNAL MEDICINE
Payer: MEDICARE

## 2023-06-25 DIAGNOSIS — Z45.02 ICD (IMPLANTABLE CARDIOVERTER-DEFIBRILLATOR) DISCHARGE: ICD-10-CM

## 2023-06-25 DIAGNOSIS — Z79.01 ON CONTINUOUS ORAL ANTICOAGULATION: ICD-10-CM

## 2023-06-25 DIAGNOSIS — I47.20 VENTRICULAR TACHYCARDIA (HCC): Primary | ICD-10-CM

## 2023-06-25 LAB
ALBUMIN SERPL-MCNC: 4.9 G/DL (ref 3.5–5.2)
ALP SERPL-CCNC: 118 U/L (ref 40–129)
ALT SERPL-CCNC: 10 U/L (ref 0–40)
ANION GAP SERPL CALCULATED.3IONS-SCNC: 18 MMOL/L (ref 7–16)
APTT BLD: 31.7 SEC (ref 24.5–35.1)
AST SERPL-CCNC: 16 U/L (ref 0–39)
BASOPHILS # BLD: 0.05 E9/L (ref 0–0.2)
BASOPHILS NFR BLD: 0.9 % (ref 0–2)
BILIRUB SERPL-MCNC: 0.6 MG/DL (ref 0–1.2)
BNP BLD-MCNC: 2369 PG/ML (ref 0–125)
BUN SERPL-MCNC: 34 MG/DL (ref 6–20)
CALCIUM SERPL-MCNC: 10.2 MG/DL (ref 8.6–10.2)
CHLORIDE SERPL-SCNC: 101 MMOL/L (ref 98–107)
CO2 SERPL-SCNC: 20 MMOL/L (ref 22–29)
CREAT SERPL-MCNC: 2.2 MG/DL (ref 0.7–1.2)
EOSINOPHIL # BLD: 0.25 E9/L (ref 0.05–0.5)
EOSINOPHIL NFR BLD: 4.5 % (ref 0–6)
ERYTHROCYTE [DISTWIDTH] IN BLOOD BY AUTOMATED COUNT: 13.9 FL (ref 11.5–15)
GLUCOSE SERPL-MCNC: 136 MG/DL (ref 74–99)
HCT VFR BLD AUTO: 38.7 % (ref 37–54)
HGB BLD-MCNC: 12.1 G/DL (ref 12.5–16.5)
IMM GRANULOCYTES # BLD: 0.01 E9/L
IMM GRANULOCYTES NFR BLD: 0.2 % (ref 0–5)
INR BLD: 1.7
LYMPHOCYTES # BLD: 1.55 E9/L (ref 1.5–4)
LYMPHOCYTES NFR BLD: 27.7 % (ref 20–42)
MAGNESIUM SERPL-MCNC: 1.9 MG/DL (ref 1.6–2.6)
MCH RBC QN AUTO: 28.3 PG (ref 26–35)
MCHC RBC AUTO-ENTMCNC: 31.3 % (ref 32–34.5)
MCV RBC AUTO: 90.4 FL (ref 80–99.9)
MONOCYTES # BLD: 0.51 E9/L (ref 0.1–0.95)
MONOCYTES NFR BLD: 9.1 % (ref 2–12)
NEUTROPHILS # BLD: 3.22 E9/L (ref 1.8–7.3)
NEUTS SEG NFR BLD: 57.6 % (ref 43–80)
PLATELET # BLD AUTO: 254 E9/L (ref 130–450)
PMV BLD AUTO: 10.4 FL (ref 7–12)
POTASSIUM SERPL-SCNC: 3.6 MMOL/L (ref 3.5–5)
PROT SERPL-MCNC: 8.3 G/DL (ref 6.4–8.3)
PROTHROMBIN TIME: 18.9 SEC (ref 9.3–12.4)
RBC # BLD AUTO: 4.28 E12/L (ref 3.8–5.8)
SODIUM SERPL-SCNC: 139 MMOL/L (ref 132–146)
T4 FREE SERPL-MCNC: 1.46 NG/DL (ref 0.93–1.7)
TROPONIN, HIGH SENSITIVITY: 54 NG/L (ref 0–11)
TROPONIN, HIGH SENSITIVITY: 58 NG/L (ref 0–11)
TSH SERPL-MCNC: 3.52 UIU/ML (ref 0.27–4.2)
WBC # BLD: 5.6 E9/L (ref 4.5–11.5)

## 2023-06-25 PROCEDURE — 2140000000 HC CCU INTERMEDIATE R&B

## 2023-06-25 PROCEDURE — 99285 EMERGENCY DEPT VISIT HI MDM: CPT

## 2023-06-25 PROCEDURE — 83880 ASSAY OF NATRIURETIC PEPTIDE: CPT

## 2023-06-25 PROCEDURE — 83735 ASSAY OF MAGNESIUM: CPT

## 2023-06-25 PROCEDURE — 6370000000 HC RX 637 (ALT 250 FOR IP): Performed by: STUDENT IN AN ORGANIZED HEALTH CARE EDUCATION/TRAINING PROGRAM

## 2023-06-25 PROCEDURE — 80053 COMPREHEN METABOLIC PANEL: CPT

## 2023-06-25 PROCEDURE — 71045 X-RAY EXAM CHEST 1 VIEW: CPT

## 2023-06-25 PROCEDURE — 85610 PROTHROMBIN TIME: CPT

## 2023-06-25 PROCEDURE — 85730 THROMBOPLASTIN TIME PARTIAL: CPT

## 2023-06-25 PROCEDURE — 84484 ASSAY OF TROPONIN QUANT: CPT

## 2023-06-25 PROCEDURE — 85025 COMPLETE CBC W/AUTO DIFF WBC: CPT

## 2023-06-25 PROCEDURE — 84439 ASSAY OF FREE THYROXINE: CPT

## 2023-06-25 PROCEDURE — 84443 ASSAY THYROID STIM HORMONE: CPT

## 2023-06-25 PROCEDURE — 93005 ELECTROCARDIOGRAM TRACING: CPT | Performed by: STUDENT IN AN ORGANIZED HEALTH CARE EDUCATION/TRAINING PROGRAM

## 2023-06-25 RX ORDER — AMIODARONE HYDROCHLORIDE 200 MG/1
200 TABLET ORAL ONCE
Status: COMPLETED | OUTPATIENT
Start: 2023-06-25 | End: 2023-06-25

## 2023-06-25 RX ADMIN — AMIODARONE HYDROCHLORIDE 200 MG: 200 TABLET ORAL at 22:47

## 2023-06-26 ENCOUNTER — ANESTHESIA EVENT (OUTPATIENT)
Dept: CARDIAC CATH/INVASIVE PROCEDURES | Age: 43
DRG: 309 | End: 2023-06-26
Payer: MEDICARE

## 2023-06-26 ENCOUNTER — ANESTHESIA (OUTPATIENT)
Dept: CARDIAC CATH/INVASIVE PROCEDURES | Age: 43
DRG: 309 | End: 2023-06-26
Payer: MEDICARE

## 2023-06-26 ENCOUNTER — TELEPHONE (OUTPATIENT)
Dept: CARDIOLOGY CLINIC | Age: 43
End: 2023-06-26

## 2023-06-26 ENCOUNTER — APPOINTMENT (OUTPATIENT)
Dept: CARDIAC CATH/INVASIVE PROCEDURES | Age: 43
DRG: 309 | End: 2023-06-26
Payer: MEDICARE

## 2023-06-26 LAB
EKG ATRIAL RATE: 87 BPM
EKG Q-T INTERVAL: 474 MS
EKG QRS DURATION: 162 MS
EKG QTC CALCULATION (BAZETT): 595 MS
EKG R AXIS: -161 DEGREES
EKG T AXIS: 31 DEGREES
EKG VENTRICULAR RATE: 95 BPM

## 2023-06-26 PROCEDURE — 5A2204Z RESTORATION OF CARDIAC RHYTHM, SINGLE: ICD-10-PCS | Performed by: INTERNAL MEDICINE

## 2023-06-26 PROCEDURE — 6370000000 HC RX 637 (ALT 250 FOR IP): Performed by: INTERNAL MEDICINE

## 2023-06-26 PROCEDURE — 2140000000 HC CCU INTERMEDIATE R&B

## 2023-06-26 PROCEDURE — 92960 CARDIOVERSION ELECTRIC EXT: CPT | Performed by: INTERNAL MEDICINE

## 2023-06-26 PROCEDURE — 6360000002 HC RX W HCPCS: Performed by: NURSE ANESTHETIST, CERTIFIED REGISTERED

## 2023-06-26 PROCEDURE — 2709999900 HC NON-CHARGEABLE SUPPLY

## 2023-06-26 PROCEDURE — 3700000000 HC ANESTHESIA ATTENDED CARE

## 2023-06-26 PROCEDURE — 93010 ELECTROCARDIOGRAM REPORT: CPT | Performed by: INTERNAL MEDICINE

## 2023-06-26 PROCEDURE — 6370000000 HC RX 637 (ALT 250 FOR IP): Performed by: NURSE PRACTITIONER

## 2023-06-26 PROCEDURE — 3700000001 HC ADD 15 MINUTES (ANESTHESIA)

## 2023-06-26 PROCEDURE — 2580000003 HC RX 258: Performed by: NURSE ANESTHETIST, CERTIFIED REGISTERED

## 2023-06-26 PROCEDURE — 99223 1ST HOSP IP/OBS HIGH 75: CPT | Performed by: INTERNAL MEDICINE

## 2023-06-26 PROCEDURE — 92960 CARDIOVERSION ELECTRIC EXT: CPT

## 2023-06-26 RX ORDER — AMIODARONE HYDROCHLORIDE 200 MG/1
400 TABLET ORAL 3 TIMES DAILY
Status: DISCONTINUED | OUTPATIENT
Start: 2023-06-26 | End: 2023-06-28 | Stop reason: HOSPADM

## 2023-06-26 RX ORDER — SODIUM BICARBONATE 650 MG/1
1300 TABLET ORAL 2 TIMES DAILY
Status: DISCONTINUED | OUTPATIENT
Start: 2023-06-26 | End: 2023-06-28 | Stop reason: HOSPADM

## 2023-06-26 RX ORDER — METOPROLOL SUCCINATE 25 MG/1
25 TABLET, EXTENDED RELEASE ORAL NIGHTLY
Status: DISCONTINUED | OUTPATIENT
Start: 2023-06-26 | End: 2023-06-28 | Stop reason: HOSPADM

## 2023-06-26 RX ORDER — PROPOFOL 10 MG/ML
INJECTION, EMULSION INTRAVENOUS PRN
Status: DISCONTINUED | OUTPATIENT
Start: 2023-06-26 | End: 2023-06-26 | Stop reason: SDUPTHER

## 2023-06-26 RX ORDER — SODIUM CHLORIDE 9 MG/ML
INJECTION, SOLUTION INTRAVENOUS CONTINUOUS PRN
Status: DISCONTINUED | OUTPATIENT
Start: 2023-06-26 | End: 2023-06-26 | Stop reason: SDUPTHER

## 2023-06-26 RX ORDER — PANTOPRAZOLE SODIUM 40 MG/1
40 TABLET, DELAYED RELEASE ORAL DAILY
Status: DISCONTINUED | OUTPATIENT
Start: 2023-06-26 | End: 2023-06-28 | Stop reason: HOSPADM

## 2023-06-26 RX ORDER — ACETAMINOPHEN 500 MG
500 TABLET ORAL EVERY 4 HOURS PRN
Status: DISCONTINUED | OUTPATIENT
Start: 2023-06-26 | End: 2023-06-28 | Stop reason: HOSPADM

## 2023-06-26 RX ORDER — ATORVASTATIN CALCIUM 40 MG/1
40 TABLET, FILM COATED ORAL DAILY
Status: DISCONTINUED | OUTPATIENT
Start: 2023-06-26 | End: 2023-06-28 | Stop reason: HOSPADM

## 2023-06-26 RX ADMIN — METOPROLOL SUCCINATE 25 MG: 25 TABLET, EXTENDED RELEASE ORAL at 21:39

## 2023-06-26 RX ADMIN — PROPOFOL 200 MG: 10 INJECTION, EMULSION INTRAVENOUS at 17:23

## 2023-06-26 RX ADMIN — AMIODARONE HYDROCHLORIDE 400 MG: 200 TABLET ORAL at 14:32

## 2023-06-26 RX ADMIN — AMIODARONE HYDROCHLORIDE 400 MG: 200 TABLET ORAL at 09:50

## 2023-06-26 RX ADMIN — ATORVASTATIN CALCIUM 40 MG: 40 TABLET, FILM COATED ORAL at 08:52

## 2023-06-26 RX ADMIN — SACUBITRIL AND VALSARTAN 1 TABLET: 24; 26 TABLET, FILM COATED ORAL at 21:39

## 2023-06-26 RX ADMIN — AMIODARONE HYDROCHLORIDE 400 MG: 200 TABLET ORAL at 21:39

## 2023-06-26 RX ADMIN — SACUBITRIL AND VALSARTAN 1 TABLET: 24; 26 TABLET, FILM COATED ORAL at 08:52

## 2023-06-26 RX ADMIN — PANTOPRAZOLE SODIUM 40 MG: 40 TABLET, DELAYED RELEASE ORAL at 08:52

## 2023-06-26 RX ADMIN — SODIUM BICARBONATE TAB 650 MG 1300 MG: 650 TAB at 08:52

## 2023-06-26 RX ADMIN — RIVAROXABAN 15 MG: 15 TABLET, FILM COATED ORAL at 18:25

## 2023-06-26 RX ADMIN — SODIUM BICARBONATE TAB 650 MG 1300 MG: 650 TAB at 21:39

## 2023-06-26 RX ADMIN — SODIUM CHLORIDE: 9 INJECTION, SOLUTION INTRAVENOUS at 17:15

## 2023-06-26 ASSESSMENT — PAIN SCALES - GENERAL: PAINLEVEL_OUTOF10: 0

## 2023-06-27 ENCOUNTER — APPOINTMENT (OUTPATIENT)
Dept: GENERAL RADIOLOGY | Age: 43
DRG: 309 | End: 2023-06-27
Payer: MEDICARE

## 2023-06-27 LAB
ALBUMIN SERPL-MCNC: 4.1 G/DL (ref 3.5–5.2)
ALP SERPL-CCNC: 98 U/L (ref 40–129)
ALT SERPL-CCNC: 9 U/L (ref 0–40)
ANION GAP SERPL CALCULATED.3IONS-SCNC: 16 MMOL/L (ref 7–16)
AST SERPL-CCNC: 13 U/L (ref 0–39)
BILIRUB SERPL-MCNC: 0.4 MG/DL (ref 0–1.2)
BUN SERPL-MCNC: 30 MG/DL (ref 6–20)
CALCIUM SERPL-MCNC: 9.6 MG/DL (ref 8.6–10.2)
CHLORIDE SERPL-SCNC: 105 MMOL/L (ref 98–107)
CO2 SERPL-SCNC: 20 MMOL/L (ref 22–29)
CREAT SERPL-MCNC: 2.3 MG/DL (ref 0.7–1.2)
GLUCOSE SERPL-MCNC: 88 MG/DL (ref 74–99)
MAGNESIUM SERPL-MCNC: 2 MG/DL (ref 1.6–2.6)
POTASSIUM SERPL-SCNC: 3.8 MMOL/L (ref 3.5–5)
PROT SERPL-MCNC: 7 G/DL (ref 6.4–8.3)
SODIUM SERPL-SCNC: 141 MMOL/L (ref 132–146)

## 2023-06-27 PROCEDURE — 2500000003 HC RX 250 WO HCPCS: Performed by: NURSE PRACTITIONER

## 2023-06-27 PROCEDURE — 80053 COMPREHEN METABOLIC PANEL: CPT

## 2023-06-27 PROCEDURE — 71046 X-RAY EXAM CHEST 2 VIEWS: CPT

## 2023-06-27 PROCEDURE — 99222 1ST HOSP IP/OBS MODERATE 55: CPT | Performed by: NURSE PRACTITIONER

## 2023-06-27 PROCEDURE — 6370000000 HC RX 637 (ALT 250 FOR IP): Performed by: INTERNAL MEDICINE

## 2023-06-27 PROCEDURE — 99232 SBSQ HOSP IP/OBS MODERATE 35: CPT | Performed by: STUDENT IN AN ORGANIZED HEALTH CARE EDUCATION/TRAINING PROGRAM

## 2023-06-27 PROCEDURE — 36415 COLL VENOUS BLD VENIPUNCTURE: CPT

## 2023-06-27 PROCEDURE — 83735 ASSAY OF MAGNESIUM: CPT

## 2023-06-27 PROCEDURE — 6370000000 HC RX 637 (ALT 250 FOR IP): Performed by: NURSE PRACTITIONER

## 2023-06-27 PROCEDURE — 2140000000 HC CCU INTERMEDIATE R&B

## 2023-06-27 RX ORDER — BUMETANIDE 1 MG/1
2 TABLET ORAL
COMMUNITY

## 2023-06-27 RX ORDER — BUMETANIDE 1 MG/1
2 TABLET ORAL
Status: DISCONTINUED | OUTPATIENT
Start: 2023-06-28 | End: 2023-06-28 | Stop reason: HOSPADM

## 2023-06-27 RX ORDER — BUMETANIDE 0.25 MG/ML
1 INJECTION INTRAMUSCULAR; INTRAVENOUS ONCE
Status: COMPLETED | OUTPATIENT
Start: 2023-06-27 | End: 2023-06-27

## 2023-06-27 RX ADMIN — SODIUM BICARBONATE TAB 650 MG 1300 MG: 650 TAB at 10:10

## 2023-06-27 RX ADMIN — RIVAROXABAN 15 MG: 15 TABLET, FILM COATED ORAL at 17:34

## 2023-06-27 RX ADMIN — AMIODARONE HYDROCHLORIDE 400 MG: 200 TABLET ORAL at 20:59

## 2023-06-27 RX ADMIN — METOPROLOL SUCCINATE 25 MG: 25 TABLET, EXTENDED RELEASE ORAL at 20:59

## 2023-06-27 RX ADMIN — PANTOPRAZOLE SODIUM 40 MG: 40 TABLET, DELAYED RELEASE ORAL at 10:11

## 2023-06-27 RX ADMIN — SODIUM BICARBONATE TAB 650 MG 1300 MG: 650 TAB at 20:59

## 2023-06-27 RX ADMIN — AMIODARONE HYDROCHLORIDE 400 MG: 200 TABLET ORAL at 16:30

## 2023-06-27 RX ADMIN — AMIODARONE HYDROCHLORIDE 400 MG: 200 TABLET ORAL at 10:10

## 2023-06-27 RX ADMIN — BUMETANIDE 1 MG: 0.25 INJECTION INTRAMUSCULAR; INTRAVENOUS at 14:20

## 2023-06-27 RX ADMIN — SACUBITRIL AND VALSARTAN 1 TABLET: 24; 26 TABLET, FILM COATED ORAL at 20:59

## 2023-06-27 RX ADMIN — ATORVASTATIN CALCIUM 40 MG: 40 TABLET, FILM COATED ORAL at 20:59

## 2023-06-27 RX ADMIN — SACUBITRIL AND VALSARTAN 1 TABLET: 24; 26 TABLET, FILM COATED ORAL at 10:11

## 2023-06-27 ASSESSMENT — PAIN SCALES - GENERAL
PAINLEVEL_OUTOF10: 0

## 2023-06-28 VITALS
RESPIRATION RATE: 18 BRPM | SYSTOLIC BLOOD PRESSURE: 112 MMHG | WEIGHT: 268 LBS | OXYGEN SATURATION: 95 % | TEMPERATURE: 97.6 F | DIASTOLIC BLOOD PRESSURE: 81 MMHG | BODY MASS INDEX: 36.3 KG/M2 | HEIGHT: 72 IN | HEART RATE: 70 BPM

## 2023-06-28 LAB
ALBUMIN SERPL-MCNC: 4.2 G/DL (ref 3.5–5.2)
ALP SERPL-CCNC: 98 U/L (ref 40–129)
ALT SERPL-CCNC: 9 U/L (ref 0–40)
ANION GAP SERPL CALCULATED.3IONS-SCNC: 14 MMOL/L (ref 7–16)
AST SERPL-CCNC: 13 U/L (ref 0–39)
BILIRUB SERPL-MCNC: 0.6 MG/DL (ref 0–1.2)
BUN SERPL-MCNC: 29 MG/DL (ref 6–20)
CALCIUM SERPL-MCNC: 9.5 MG/DL (ref 8.6–10.2)
CHLORIDE SERPL-SCNC: 106 MMOL/L (ref 98–107)
CO2 SERPL-SCNC: 21 MMOL/L (ref 22–29)
CREAT SERPL-MCNC: 2.4 MG/DL (ref 0.7–1.2)
GLUCOSE SERPL-MCNC: 121 MG/DL (ref 74–99)
MAGNESIUM SERPL-MCNC: 1.9 MG/DL (ref 1.6–2.6)
POTASSIUM SERPL-SCNC: 3.8 MMOL/L (ref 3.5–5)
PROT SERPL-MCNC: 7.1 G/DL (ref 6.4–8.3)
SODIUM SERPL-SCNC: 141 MMOL/L (ref 132–146)

## 2023-06-28 PROCEDURE — 80053 COMPREHEN METABOLIC PANEL: CPT

## 2023-06-28 PROCEDURE — 2580000003 HC RX 258: Performed by: INTERNAL MEDICINE

## 2023-06-28 PROCEDURE — 93005 ELECTROCARDIOGRAM TRACING: CPT | Performed by: STUDENT IN AN ORGANIZED HEALTH CARE EDUCATION/TRAINING PROGRAM

## 2023-06-28 PROCEDURE — 6370000000 HC RX 637 (ALT 250 FOR IP): Performed by: NURSE PRACTITIONER

## 2023-06-28 PROCEDURE — 83735 ASSAY OF MAGNESIUM: CPT

## 2023-06-28 PROCEDURE — 36415 COLL VENOUS BLD VENIPUNCTURE: CPT

## 2023-06-28 PROCEDURE — 6370000000 HC RX 637 (ALT 250 FOR IP): Performed by: INTERNAL MEDICINE

## 2023-06-28 PROCEDURE — 99233 SBSQ HOSP IP/OBS HIGH 50: CPT | Performed by: STUDENT IN AN ORGANIZED HEALTH CARE EDUCATION/TRAINING PROGRAM

## 2023-06-28 RX ORDER — SODIUM CHLORIDE 0.9 % (FLUSH) 0.9 %
5-40 SYRINGE (ML) INJECTION 2 TIMES DAILY
Status: DISCONTINUED | OUTPATIENT
Start: 2023-06-28 | End: 2023-06-28 | Stop reason: HOSPADM

## 2023-06-28 RX ORDER — AMIODARONE HYDROCHLORIDE 400 MG/1
TABLET ORAL
Qty: 90 TABLET | Refills: 1 | Status: SHIPPED | OUTPATIENT
Start: 2023-06-28

## 2023-06-28 RX ADMIN — AMIODARONE HYDROCHLORIDE 400 MG: 200 TABLET ORAL at 08:42

## 2023-06-28 RX ADMIN — SODIUM CHLORIDE, PRESERVATIVE FREE 10 ML: 5 INJECTION INTRAVENOUS at 08:43

## 2023-06-28 RX ADMIN — RIVAROXABAN 15 MG: 15 TABLET, FILM COATED ORAL at 16:52

## 2023-06-28 RX ADMIN — PANTOPRAZOLE SODIUM 40 MG: 40 TABLET, DELAYED RELEASE ORAL at 08:42

## 2023-06-28 RX ADMIN — SODIUM BICARBONATE TAB 650 MG 1300 MG: 650 TAB at 08:42

## 2023-06-28 RX ADMIN — BUMETANIDE 2 MG: 1 TABLET ORAL at 08:42

## 2023-06-28 RX ADMIN — AMIODARONE HYDROCHLORIDE 400 MG: 200 TABLET ORAL at 15:51

## 2023-06-28 RX ADMIN — ATORVASTATIN CALCIUM 40 MG: 40 TABLET, FILM COATED ORAL at 08:42

## 2023-06-28 RX ADMIN — SACUBITRIL AND VALSARTAN 1 TABLET: 24; 26 TABLET, FILM COATED ORAL at 08:42

## 2023-06-28 ASSESSMENT — PAIN SCALES - GENERAL
PAINLEVEL_OUTOF10: 0

## 2023-06-29 ENCOUNTER — CARE COORDINATION (OUTPATIENT)
Dept: CASE MANAGEMENT | Age: 43
End: 2023-06-29

## 2023-06-29 DIAGNOSIS — I50.22 CHRONIC SYSTOLIC HEART FAILURE (HCC): Primary | ICD-10-CM

## 2023-06-29 LAB
EKG ATRIAL RATE: 70 BPM
EKG P AXIS: 85 DEGREES
EKG P-R INTERVAL: 154 MS
EKG Q-T INTERVAL: 534 MS
EKG QRS DURATION: 162 MS
EKG QTC CALCULATION (BAZETT): 576 MS
EKG R AXIS: -176 DEGREES
EKG T AXIS: -31 DEGREES
EKG VENTRICULAR RATE: 70 BPM

## 2023-06-30 ENCOUNTER — CARE COORDINATION (OUTPATIENT)
Dept: CASE MANAGEMENT | Age: 43
End: 2023-06-30

## 2023-06-30 DIAGNOSIS — I48.20 CHRONIC ATRIAL FIBRILLATION (HCC): Primary | ICD-10-CM

## 2023-06-30 PROCEDURE — 1111F DSCHRG MED/CURRENT MED MERGE: CPT | Performed by: INTERNAL MEDICINE

## 2023-07-03 RX ORDER — DOFETILIDE 0.12 MG/1
CAPSULE ORAL
Qty: 60 CAPSULE | Refills: 10 | OUTPATIENT
Start: 2023-07-03

## 2023-07-07 ENCOUNTER — CARE COORDINATION (OUTPATIENT)
Dept: CASE MANAGEMENT | Age: 43
End: 2023-07-07

## 2023-07-07 NOTE — CARE COORDINATION
[facility] Care Transitions Follow Up Call    Care Transition Nurse attempted subsequent CT outreach leaving Hipaa VM w/ my contact info. Patient: Julio Cesar Ayala III  Patient : 1980   MRN: <[id number]>  Reason for Admission:  2023 - 2023 [facility]. Ventricular tachycardia, ICD discharges, AF w/ RVR, Volume overload, Cardioversion. Discharge Date: 23 RARS: Readmission Risk Score: 19.6  Readmit  CT     Non-[facility] PCP  CHF Clinic 7/10 1:00  Device check  1:30  Cardio 23      PDM: Beverly Vargas [phone]      PMH: Morbid obesity, Cardiomyopathy, HTN, AF, Noncompliance, Gout, CKD, DM, ARVIND.      Rey Delarosa RN

## 2023-07-10 ENCOUNTER — HOSPITAL ENCOUNTER (OUTPATIENT)
Dept: OTHER | Age: 43
Setting detail: THERAPIES SERIES
Discharge: HOME | End: 2023-07-10
Payer: MEDICARE

## 2023-07-10 ENCOUNTER — TELEPHONE (OUTPATIENT)
Dept: OTHER | Age: 43
End: 2023-07-10

## 2023-07-10 VITALS
DIASTOLIC BLOOD PRESSURE: 74 MMHG | BODY MASS INDEX: 35.94 KG/M2 | HEART RATE: 62 BPM | WEIGHT: 265 LBS | RESPIRATION RATE: 18 BRPM | OXYGEN SATURATION: 99 % | SYSTOLIC BLOOD PRESSURE: 121 MMHG

## 2023-07-10 DIAGNOSIS — I50.42 CHRONIC COMBINED SYSTOLIC AND DIASTOLIC CONGESTIVE HEART FAILURE (HCC): ICD-10-CM

## 2023-07-10 DIAGNOSIS — E87.6 HYPOKALEMIA: Primary | ICD-10-CM

## 2023-07-10 DIAGNOSIS — I50.20 HFREF (HEART FAILURE WITH REDUCED EJECTION FRACTION) (HCC): Primary | ICD-10-CM

## 2023-07-10 LAB
ANION GAP SERPL CALCULATED.3IONS-SCNC: 15 MMOL/L (ref 7–16)
BNP BLD-MCNC: 2450 PG/ML (ref 0–125)
BUN SERPL-MCNC: 22 MG/DL (ref 6–20)
CALCIUM SERPL-MCNC: 9.9 MG/DL (ref 8.6–10.2)
CHLORIDE SERPL-SCNC: 101 MMOL/L (ref 98–107)
CO2 SERPL-SCNC: 23 MMOL/L (ref 22–29)
CREAT SERPL-MCNC: 2.5 MG/DL (ref 0.7–1.2)
GLUCOSE SERPL-MCNC: 76 MG/DL (ref 74–99)
POTASSIUM SERPL-SCNC: 3.2 MMOL/L (ref 3.5–5)
SODIUM SERPL-SCNC: 139 MMOL/L (ref 132–146)

## 2023-07-10 PROCEDURE — 2500000003 HC RX 250 WO HCPCS: Performed by: INTERNAL MEDICINE

## 2023-07-10 PROCEDURE — 80048 BASIC METABOLIC PNL TOTAL CA: CPT

## 2023-07-10 PROCEDURE — 83880 ASSAY OF NATRIURETIC PEPTIDE: CPT

## 2023-07-10 PROCEDURE — 36415 COLL VENOUS BLD VENIPUNCTURE: CPT

## 2023-07-10 PROCEDURE — 96374 THER/PROPH/DIAG INJ IV PUSH: CPT

## 2023-07-10 PROCEDURE — 2580000003 HC RX 258: Performed by: INTERNAL MEDICINE

## 2023-07-10 PROCEDURE — 99214 OFFICE O/P EST MOD 30 MIN: CPT

## 2023-07-10 RX ORDER — POTASSIUM CHLORIDE 20 MEQ/1
20 TABLET, EXTENDED RELEASE ORAL DAILY
Qty: 90 TABLET | Refills: 0 | Status: SHIPPED | OUTPATIENT
Start: 2023-07-10

## 2023-07-10 RX ORDER — BUMETANIDE 0.25 MG/ML
2 INJECTION INTRAMUSCULAR; INTRAVENOUS ONCE
Status: COMPLETED | OUTPATIENT
Start: 2023-07-10 | End: 2023-07-10

## 2023-07-10 RX ORDER — SODIUM CHLORIDE 0.9 % (FLUSH) 0.9 %
5-40 SYRINGE (ML) INJECTION ONCE
Status: COMPLETED | OUTPATIENT
Start: 2023-07-10 | End: 2023-07-10

## 2023-07-10 RX ADMIN — BUMETANIDE 2 MG: 0.25 INJECTION INTRAMUSCULAR; INTRAVENOUS at 12:55

## 2023-07-10 RX ADMIN — SODIUM CHLORIDE, PRESERVATIVE FREE 10 ML: 5 INJECTION INTRAVENOUS at 12:55

## 2023-07-10 NOTE — PROGRESS NOTES
PROBNP in the last 72 hours. BMP  No results for input(s): NA, K, CL, CO2, BUN, CREATININE, GLUCOSE, CALCIUM in the last 72 hours. WEIGHTS:  Wt Readings from Last 3 Encounters:   07/10/23 265 lb (120.2 kg)   06/28/23 268 lb (121.6 kg)   06/19/23 272 lb (123.4 kg)         TELEMETRY:  Cardiac Regularity: Regular  Cardiac Rhythm/Interpretation: NSR/PACED        ASSESSMENT:  Kathleen Urrutia III's weight is down 7lbs from last visit. Reports SOB and fine crackles auscultated. Negative for JVD. Patient states he weighs himself daily, follows a low sodium diet, and drinks no more than 64oz of fluid. Interventions completed this visit:  IV diuretics given: Yes, Bumex 2mg IVP  Lab work obtained: Yes BNP/BMP     Reviewed continue current medications that patient as prescribed answered any questions   Educated on signs and symptoms of HF  Educated on low sodium diet    PLAN:  Future Appointments   Date Time Provider [address]   7/10/2023  1:00 PM [facility] CHF ROOM 1 [facility]   9/19/2023  9:20 AM Traci Zuniga MD [address] [facility]           Given clinic phone number and aware of signs and symptoms to call with any HF change in symptoms.

## 2023-07-10 NOTE — PLAN OF CARE
Problem: Chronic Conditions and Co-morbidities  Goal: Patient's chronic conditions and co-morbidity symptoms are monitored and maintained or improved  Flowsheets (Taken 7/10/2023 9906)  Care Plan - Patient's Chronic Conditions and Co-Morbidity Symptoms are Monitored and Maintained or Improved: Monitor and assess patient's chronic conditions and comorbid symptoms for stability, deterioration, or improvement

## 2023-07-13 ENCOUNTER — HOSPITAL ENCOUNTER (OUTPATIENT)
Age: 43
Discharge: HOME | End: 2023-07-13
Payer: MEDICARE

## 2023-07-13 LAB
ALBUMIN SERPL-MCNC: 4.6 G/DL (ref 3.5–5.2)
ALP SERPL-CCNC: 107 U/L (ref 40–129)
ALT SERPL-CCNC: 15 U/L (ref 0–40)
ANION GAP SERPL CALCULATED.3IONS-SCNC: 21 MMOL/L (ref 7–16)
AST SERPL-CCNC: 16 U/L (ref 0–39)
BILIRUB SERPL-MCNC: 0.7 MG/DL (ref 0–1.2)
BUN SERPL-MCNC: 25 MG/DL (ref 6–20)
CALCIUM SERPL-MCNC: 9.8 MG/DL (ref 8.6–10.2)
CHLORIDE SERPL-SCNC: 104 MMOL/L (ref 98–107)
CO2 SERPL-SCNC: 18 MMOL/L (ref 22–29)
CREAT SERPL-MCNC: 2.7 MG/DL (ref 0.7–1.2)
ERYTHROCYTE [DISTWIDTH] IN BLOOD BY AUTOMATED COUNT: 14.3 FL (ref 11.5–15)
FERRITIN SERPL-MCNC: 226 NG/ML
FOLATE SERPL-MCNC: 6.7 NG/ML (ref 4.8–24.2)
GLUCOSE SERPL-MCNC: 104 MG/DL (ref 74–99)
HCT VFR BLD AUTO: 37.6 % (ref 37–54)
HGB BLD-MCNC: 11.8 G/DL (ref 12.5–16.5)
IRON SERPL-MCNC: 53 MCG/DL (ref 59–158)
MAGNESIUM SERPL-MCNC: 1.8 MG/DL (ref 1.6–2.6)
MCH RBC QN AUTO: 28.3 PG (ref 26–35)
MCHC RBC AUTO-ENTMCNC: 31.4 % (ref 32–34.5)
MCV RBC AUTO: 90.2 FL (ref 80–99.9)
PLATELET # BLD AUTO: 293 E9/L (ref 130–450)
PMV BLD AUTO: 10.2 FL (ref 7–12)
POTASSIUM SERPL-SCNC: 3.5 MMOL/L (ref 3.5–5)
PROT SERPL-MCNC: 7.9 G/DL (ref 6.4–8.3)
RBC # BLD AUTO: 4.17 E12/L (ref 3.8–5.8)
SODIUM SERPL-SCNC: 143 MMOL/L (ref 132–146)
VIT B12 SERPL-MCNC: 354 PG/ML (ref 211–946)
WBC # BLD: 5.1 E9/L (ref 4.5–11.5)

## 2023-07-13 PROCEDURE — 84630 ASSAY OF ZINC: CPT

## 2023-07-13 PROCEDURE — 82746 ASSAY OF FOLIC ACID SERUM: CPT

## 2023-07-13 PROCEDURE — 36415 COLL VENOUS BLD VENIPUNCTURE: CPT

## 2023-07-13 PROCEDURE — 85027 COMPLETE CBC AUTOMATED: CPT

## 2023-07-13 PROCEDURE — 82607 VITAMIN B-12: CPT

## 2023-07-13 PROCEDURE — 80053 COMPREHEN METABOLIC PANEL: CPT

## 2023-07-13 PROCEDURE — 83735 ASSAY OF MAGNESIUM: CPT

## 2023-07-13 PROCEDURE — 82728 ASSAY OF FERRITIN: CPT

## 2023-07-13 PROCEDURE — 83540 ASSAY OF IRON: CPT

## 2023-07-16 LAB — ZINC SERPL-MCNC: 72.5 UG/DL (ref 60–120)

## 2023-07-17 ENCOUNTER — HOSPITAL ENCOUNTER (OUTPATIENT)
Age: 43
Discharge: HOME | End: 2023-07-17
Payer: MEDICARE

## 2023-07-17 DIAGNOSIS — E87.6 HYPOKALEMIA: ICD-10-CM

## 2023-07-17 LAB
ANION GAP SERPL CALCULATED.3IONS-SCNC: 15 MMOL/L (ref 7–16)
BUN SERPL-MCNC: 26 MG/DL (ref 6–20)
CALCIUM SERPL-MCNC: 10.3 MG/DL (ref 8.6–10.2)
CHLORIDE SERPL-SCNC: 105 MMOL/L (ref 98–107)
CO2 SERPL-SCNC: 23 MMOL/L (ref 22–29)
CREAT SERPL-MCNC: 2.4 MG/DL (ref 0.7–1.2)
GFR SERPL CREATININE-BSD FRML MDRD: 34 ML/MIN/1.73M2
GLUCOSE SERPL-MCNC: 145 MG/DL (ref 74–99)
POTASSIUM SERPL-SCNC: 3.3 MMOL/L (ref 3.5–5)
SODIUM SERPL-SCNC: 143 MMOL/L (ref 132–146)

## 2023-07-17 PROCEDURE — 36415 COLL VENOUS BLD VENIPUNCTURE: CPT

## 2023-07-17 PROCEDURE — 80048 BASIC METABOLIC PNL TOTAL CA: CPT

## 2023-07-18 ENCOUNTER — TELEPHONE (OUTPATIENT)
Dept: CARDIOLOGY CLINIC | Age: 43
End: 2023-07-18

## 2023-07-18 DIAGNOSIS — I50.42 CHRONIC COMBINED SYSTOLIC AND DIASTOLIC CONGESTIVE HEART FAILURE (HCC): ICD-10-CM

## 2023-07-18 DIAGNOSIS — I50.22 CHRONIC SYSTOLIC HEART FAILURE (HCC): Primary | ICD-10-CM

## 2023-07-18 DIAGNOSIS — I50.20 HFREF (HEART FAILURE WITH REDUCED EJECTION FRACTION) (HCC): ICD-10-CM

## 2023-07-18 DIAGNOSIS — E87.6 HYPOKALEMIA: ICD-10-CM

## 2023-07-18 NOTE — RESULT ENCOUNTER NOTE
Labs reviewed  If taking as ordered then please increase potassium to 40 meq daily   Follow up labs in 1 week    Thank you

## 2023-07-18 NOTE — TELEPHONE ENCOUNTER
----- Message from NATI Newman CNP sent at 7/18/2023  9:35 AM EDT -----  Labs reviewed  If taking as ordered then please increase potassium to 40 meq daily   Follow up labs in 1 week    Thank you

## 2023-07-18 NOTE — TELEPHONE ENCOUNTER
He says the heart dr told him not to take K+ 20meq daily. So he stopped taking it. Please advise on dose. [facility]. He has NO potassium at home. He will call [employer] [facility] to check on script status in a little bit.

## 2023-07-19 ENCOUNTER — CARE COORDINATION (OUTPATIENT)
Dept: CASE MANAGEMENT | Age: 43
End: 2023-07-19

## 2023-07-19 ENCOUNTER — TELEPHONE (OUTPATIENT)
Dept: NON INVASIVE DIAGNOSTICS | Age: 43
End: 2023-07-19

## 2023-07-19 NOTE — CARE COORDINATION
[facility] Care Transitions Follow Up Call    Patient Current Location:  Home: [address] [place] [address], [address]    Care Transition Nurse contacted the patient by telephone to follow up after admission. Verified name and  with patient as identifiers. Patient: Donnell Lobo III  Patient : 1980   MRN: <[id number]>  Reason for Admission: 2023 - 2023 [address]. Ventricular tachycardia, ICD discharges, AF w/ RVR, Volume overload, Cardioversion. Discharge Date: 23 RARS: Readmission Risk Score: 19.6  Readmit  CT     [facility] P[facility] Attended per pt. CHF Clinic 7/10 1:00. Attended. Next appt  12:15. Device check  1:30  Cardio 23. Attended. PDM: Evette Turner [phone]      PMH: Morbid obesity, Cardiomyopathy, HTN, AF, Noncompliance, Gout, CKD, DM, ARVIND. Needs to be reviewed by the provider   Additional needs identified to be addressed with provider: No  none             Method of communication with provider: none. [employer] shares he had K of 3.3 and has increased  Potassium to 40 mEq daily. He continues his Bumex 2 mg TIW. He will have lab recheck in a wk. He denies any constipation, muscle weakness/spasms, N/T to extremities, or irreg HR/palpitations. Reviewed higher potassium foods include green leafy veggies like spinach, melon, kiwi, bananas, and sweet potatoes, v/u. He has not had any further ICD discharges since return to home. Reports today's home /60. Denies any symptom concerns. Says he has been taking it easy. No appetite or elimination concerns. Has/taking meds as directed. States he did see his PCP and went to cardio appt. He attends CHF clinic. No home needs or med refill needs today. Addressed changes since last contact:  none  Discussed follow-up appointments. If no appointment was previously scheduled, appointment scheduling offered: Yes. Is follow up appointment scheduled within 7 days of discharge?    [facility]

## 2023-07-20 RX ORDER — POTASSIUM CHLORIDE 20 MEQ/1
20 TABLET, EXTENDED RELEASE ORAL DAILY
Qty: 90 TABLET | Refills: 0 | Status: SHIPPED | OUTPATIENT
Start: 2023-07-20

## 2023-07-20 NOTE — TELEPHONE ENCOUNTER
Merced Guerrero, APRN - CNP  Khang Pineda, RN  Caller: Unspecified (2 days ago,  5:44 PM)  I think he was supposed to see Dr. Shannon Lagos last week   Is that who instructed him to stop? If so please forward recent labs to him to advise if he would like K restarted   Is he still having loose stools?

## 2023-07-20 NOTE — TELEPHONE ENCOUNTER
He picked up his K+ 20meq  2 days ago and has been taking it daily. He has no diarrhea. [facility] advise on K+ needs. (I [facility] billy's lab results and her request for Dr CANA[facility] Senait [facility] manage K+ oral due to he stopped the K+ ).

## 2023-07-21 NOTE — TELEPHONE ENCOUNTER
Rell Vidal from Dr. Padmini Magallanes office called back confirmed doctor will monitor K+ doctor request that when labs are completed that we fax to office since they can't see results in [address] [place] [address].  Fax#: [phone]

## 2023-07-27 NOTE — TELEPHONE ENCOUNTER
Spoke with Arpan Singh this morning,  says he's having stress test at [facility] with Dr Cole Delatorre and rechecking the heart pumping #. He will send mems reading today.

## 2023-07-28 ENCOUNTER — CARE COORDINATION (OUTPATIENT)
Dept: CASE MANAGEMENT | Age: 43
End: 2023-07-28

## 2023-07-28 NOTE — CARE COORDINATION
[facility] Care Transitions Follow Up Call    Patient: Tabitha Reddy III  Patient : 1980   MRN: <[id number]>  Reason for Admission: 2023 - 2023 [address]. Ventricular tachycardia, ICD discharges, AF w/ RVR, Volume overload, Cardioversion. Discharge Date: 23 RARS: Readmission Risk Score: 19.6    Attempted to reach pt for follow up call. Left message requesting call back.     Follow Up  Future Appointments   Date Time Provider [address]   2023 12:15 PM [facility] CHF ROOM 1 [facility]   2023  9:20 AM Aurea Millard MD [address] [facility]

## 2023-07-31 ENCOUNTER — APPOINTMENT (OUTPATIENT)
Dept: OTHER | Age: 43
End: 2023-07-31
Payer: MEDICARE

## 2023-07-31 VITALS
OXYGEN SATURATION: 99 % | WEIGHT: 259 LBS | RESPIRATION RATE: 18 BRPM | BODY MASS INDEX: 35.13 KG/M2 | HEART RATE: 60 BPM | DIASTOLIC BLOOD PRESSURE: 82 MMHG | SYSTOLIC BLOOD PRESSURE: 120 MMHG

## 2023-07-31 LAB
ANION GAP SERPL CALCULATED.3IONS-SCNC: 16 MMOL/L (ref 7–16)
BNP SERPL-MCNC: 2235 PG/ML (ref 0–125)
BUN SERPL-MCNC: 34 MG/DL (ref 6–20)
CALCIUM SERPL-MCNC: 9.8 MG/DL (ref 8.6–10.2)
CHLORIDE SERPL-SCNC: 105 MMOL/L (ref 98–107)
CO2 SERPL-SCNC: 19 MMOL/L (ref 22–29)
CREAT SERPL-MCNC: 2.5 MG/DL (ref 0.7–1.2)
GFR SERPL CREATININE-BSD FRML MDRD: 31 ML/MIN/1.73M2
GLUCOSE SERPL-MCNC: 93 MG/DL (ref 74–99)
POTASSIUM SERPL-SCNC: 4 MMOL/L (ref 3.5–5)
SODIUM SERPL-SCNC: 140 MMOL/L (ref 132–146)

## 2023-07-31 PROCEDURE — 99214 OFFICE O/P EST MOD 30 MIN: CPT

## 2023-07-31 PROCEDURE — 80048 BASIC METABOLIC PNL TOTAL CA: CPT

## 2023-07-31 PROCEDURE — 83880 ASSAY OF NATRIURETIC PEPTIDE: CPT

## 2023-07-31 RX ORDER — SPIRONOLACTONE 25 MG/1
12.5 TABLET ORAL DAILY
COMMUNITY

## 2023-07-31 NOTE — PLAN OF CARE
Problem: Chronic Conditions and Co-morbidities  Goal: Patient's chronic conditions and co-morbidity symptoms are monitored and maintained or improved  Flowsheets (Taken 7/31/2023 7963)  Care Plan - Patient's Chronic Conditions and Co-Morbidity Symptoms are Monitored and Maintained or Improved: Monitor and assess patient's chronic conditions and comorbid symptoms for stability, deterioration, or improvement

## 2023-07-31 NOTE — PROGRESS NOTES
[facility]   CHF Clinic       Anay MONTESINOS   1980  [phone]       Referring Doctor: Sara Potter  Cardiologist: Kacey Cruz  Nephrologist: n/a  PCP: Ricardo Mistry      History of Present Illness:     Anay MONTESINOS is a 37 y.o. male with a history of HFrEF, most recent EF 31% on 1/19/2021. Patient Story:  He does NOT have dyspnea with exertion, shortness of breath, or decline in overall functional capacity. He does not have orthopnea, PND, nocturnal cough or hemoptysis. He denies abdominal distention or bloating, early satiety, anorexia/change in appetite. He does have a good urinary response to his oral diuretic. He does not have  lower extremity edema. He does have lightheadedness, dizziness. He denies syncope or near syncope. He denies chest pain or palpitations. Allergies   Allergen Reactions    Farxiga [Dapagliflozin] Other (See Comments)     Caused throat to swell    Tikosyn [Dofetilide] Palpitations         Prior to Visit Medications    Medication Sig Taking? Authorizing Provider   spironolactone (ALDACTONE) 25 MG tablet Take 0.5 tablets by mouth daily Yes Historical Provider, MD   potassium chloride (KLOR-CON M) 20 MEQ extended release tablet Take 1 tablet by mouth daily  NATI Newman - CNP   amiodarone (PACERONE) 400 MG tablet Take 1 tablet 3 times a day with food until 7/5/23, then take 1 tablet daily thereafter.   Shlipi Hastings DO   bumetanide (BUMEX) 1 MG tablet Take 2 tablets by mouth three times a week  Historical Provider, MD   sodium bicarbonate 650 MG tablet Take 2 tablets by mouth 2 times daily  Ana Henson MD   sacubitril-valsartan (ENTRESTO) 24-26 MG per tablet Take 1 tablet by mouth 2 times daily  Ana Henson MD   XARELTO 15 MG TABS tablet TAKE 1 TABLET BY MOUTH ONCE DAILY WITH BREAKFAST *EMERGENCY REFILL*  Virginia Potter MD   atorvastatin (LIPITOR) 40 MG tablet Take 1 tablet by mouth daily  Historical Provider, MD

## 2023-08-01 ENCOUNTER — TELEPHONE (OUTPATIENT)
Dept: CARDIOLOGY CLINIC | Age: 43
End: 2023-08-01

## 2023-08-01 NOTE — TELEPHONE ENCOUNTER
CHF Clinic Note Faxed 8/1/23. CF    ===View-only below this line===  ----- Message -----  From: NATI Doan CNP  Sent: 7/31/2023   3:31 PM EDT  To: Dharmesh Lacey  Subject: FW: Inpatient Notes                              Please send CHF clinic note to Dr. Valentino Meyer - patient is being seen this week     Thank you     ----- Message -----  From: Sherra Lennox, RN  Sent: 7/31/2023   2:40 PM EDT  To: NATI Doan CNP  Subject: Inpatient Notes

## 2023-08-10 ENCOUNTER — TELEPHONE (OUTPATIENT)
Dept: CARDIOLOGY CLINIC | Age: 43
End: 2023-08-10

## 2023-08-10 NOTE — TELEPHONE ENCOUNTER
Chery Failing III Had stress test at [facility] with Dr Shashank Guzman- [facility]   Per patient set f/u for 8-29 -23 and supposed to talk about LVAD and transplant. No note available from care everywhere yet in epic from Dr Shashank Guzman visit or stress test result. PAD trends high 20's to mid 30's. Feels good today despite PAD 32 from 8-6-23, (was 29 PAD 8/5/23)  he will send mems reading today. Doesn't feel fluid up. Encouraged daily mems sends. Talking in full sentences without distress. Will call chf clinic if needs sooner visit.      Future Appointments   Date Time Provider [address]   8/28/2023 12:15 PM [facility] CHF ROOM 1 [facility]   9/19/2023  9:20 AM Joyce Ernandez MD [address] [facility]

## 2023-08-23 NOTE — PROGRESS NOTES
8/23/23Jospmati Cagle called and left a voice message that he is cancelling his appt for 8/28/23. He is getting an LVAD. I called and spoke with him. He will call us when is able to come to an appt. Juan Ramon Ashton RN 8/23/23 [id number].

## 2023-08-25 ENCOUNTER — TELEPHONE (OUTPATIENT)
Dept: OTHER | Age: 43
End: 2023-08-25

## 2023-08-25 NOTE — TELEPHONE ENCOUNTER
CardioMems update:    Providers:  Cardiomems team: TY Tafoya CNP, RN  Cardiologist: Dr. Al Varela  [facility]: Dr. Serene Duverney       PAD:  Goal: 24  Current trends:        ASSESSMENT:  Patient has not laid on cardioMEMS pillow in 5 days  Also cancelled CHF clinic appointment this week      PLAN:  Called and spoke Joyce Reyez - reminded to lay on cardioMEMS  He states that he has ongoing fatigue however denies any weight gain, shortness of breath, swelling  Has good urinary response to oral diuretic and staying hydrated  He has appointment with [facility] on Tuesday to discuss advanced options (LVAD, transplant), since this consideration he has cancelled all local appointments and wasn't laying on pillow due to \"fatigue\" and emotionally trying to deal with current decisions. He denies any SI/HI. Emotional support given. Will continue to monitor      Lynette DAMIAN-CNP  [facility]. Transposition Flap Text: The defect edges were debeveled with a #15 scalpel blade.  Given the location of the defect and the proximity to free margins a transposition flap was deemed most appropriate.  Using a sterile surgical marker, an appropriate transposition flap was drawn incorporating the defect.    The area thus outlined was incised deep to adipose tissue with a #15 scalpel blade.  The skin margins were undermined to an appropriate distance in all directions utilizing iris scissors.

## 2023-08-28 ENCOUNTER — HOSPITAL ENCOUNTER (OUTPATIENT)
Dept: OTHER | Age: 43
Setting detail: THERAPIES SERIES
Discharge: HOME | End: 2023-08-28

## 2023-08-31 ENCOUNTER — TELEPHONE (OUTPATIENT)
Dept: MEDSURG UNIT | Age: 43
End: 2023-08-31

## 2023-08-31 NOTE — TELEPHONE ENCOUNTER
CardioMems update:      PAD:  Goal: 24  Current trends: 26-33  MARILIA trends: 40-46       Providers:  Cardiomems team: DR AUSTEN Kline CNP, TY Abrams RN, Fatmata Perez RN   Cardiologist: Dr. Radhika Gonzalez         Diuretic Protocol:  Bumex 2mg PRN   Spironolactone 12.5 daily   Potassium Chloride 25 Meq Daily          GDMT:  Entresto 24-26 2 times daily  Toprol XL 25 nightly       CHF:   HFrEF 35%       ASSESSMENT:  Received update from Dr Suzie Ingram re moving forward with evaluation for heart transplant. Patient wants second opinion at [facility]. Dr Suzie Ingram office to coordinate. Spoke with patient 2:59 PM. States he is listed for heart and kidney transplant. Will need within the next 3-4 years. No medication changes were made. He is taking Bumex as needed for swelling. PLAN:  Follow up call to patient. Reminder for Mems sends daily. Continue to follow with advanced heart failure. No future appointments. Left message with Brigitte for follow up with Dr Sohail Dunn.

## 2023-09-06 ENCOUNTER — APPOINTMENT (OUTPATIENT)
Dept: GENERAL RADIOLOGY | Age: 43
End: 2023-09-06
Payer: MEDICARE

## 2023-09-06 ENCOUNTER — TELEPHONE (OUTPATIENT)
Dept: CARDIOLOGY CLINIC | Age: 43
End: 2023-09-06

## 2023-09-06 ENCOUNTER — HOSPITAL ENCOUNTER (EMERGENCY)
Age: 43
Discharge: HOME | End: 2023-09-07
Attending: EMERGENCY MEDICINE
Payer: MEDICARE

## 2023-09-06 VITALS
TEMPERATURE: 97.6 F | RESPIRATION RATE: 16 BRPM | DIASTOLIC BLOOD PRESSURE: 84 MMHG | HEART RATE: 80 BPM | OXYGEN SATURATION: 100 % | SYSTOLIC BLOOD PRESSURE: 111 MMHG

## 2023-09-06 DIAGNOSIS — R11.2 NAUSEA VOMITING AND DIARRHEA: Primary | ICD-10-CM

## 2023-09-06 DIAGNOSIS — R19.7 NAUSEA VOMITING AND DIARRHEA: Primary | ICD-10-CM

## 2023-09-06 LAB
ALBUMIN SERPL-MCNC: 4.4 G/DL (ref 3.5–5.2)
ALP SERPL-CCNC: 100 U/L (ref 40–129)
ALT SERPL-CCNC: 29 U/L (ref 0–40)
ANION GAP SERPL CALCULATED.3IONS-SCNC: 14 MMOL/L (ref 7–16)
AST SERPL-CCNC: 24 U/L (ref 0–39)
BACTERIA URNS QL MICRO: ABNORMAL
BASOPHILS # BLD: 0.04 K/UL (ref 0–0.2)
BASOPHILS NFR BLD: 1 % (ref 0–2)
BILIRUB SERPL-MCNC: 0.9 MG/DL (ref 0–1.2)
BILIRUB UR QL STRIP: NEGATIVE
BNP SERPL-MCNC: 5080 PG/ML (ref 0–125)
BUN SERPL-MCNC: 51 MG/DL (ref 6–20)
CALCIUM SERPL-MCNC: 10 MG/DL (ref 8.6–10.2)
CHLORIDE SERPL-SCNC: 103 MMOL/L (ref 98–107)
CLARITY UR: CLEAR
CO2 SERPL-SCNC: 19 MMOL/L (ref 22–29)
COLOR UR: YELLOW
CREAT SERPL-MCNC: 3 MG/DL (ref 0.7–1.2)
EKG ATRIAL RATE: 57 BPM
EKG P AXIS: 0 DEGREES
EKG P-R INTERVAL: 168 MS
EKG Q-T INTERVAL: 586 MS
EKG QRS DURATION: 188 MS
EKG QTC CALCULATION (BAZETT): 570 MS
EKG R AXIS: -138 DEGREES
EKG T AXIS: 42 DEGREES
EKG VENTRICULAR RATE: 57 BPM
EOSINOPHIL # BLD: 0.11 K/UL (ref 0.05–0.5)
EOSINOPHILS RELATIVE PERCENT: 2 % (ref 0–6)
ERYTHROCYTE [DISTWIDTH] IN BLOOD BY AUTOMATED COUNT: 15.6 % (ref 11.5–15)
GFR SERPL CREATININE-BSD FRML MDRD: 26 ML/MIN/1.73M2
GLUCOSE SERPL-MCNC: 99 MG/DL (ref 74–99)
GLUCOSE UR STRIP-MCNC: NEGATIVE MG/DL
HCT VFR BLD AUTO: 31.3 % (ref 37–54)
HGB BLD-MCNC: 10 G/DL (ref 12.5–16.5)
HGB UR QL STRIP.AUTO: ABNORMAL
IMM GRANULOCYTES # BLD AUTO: <0.03 K/UL (ref 0–0.58)
IMM GRANULOCYTES NFR BLD: 0 % (ref 0–5)
INR PPP: 4.3
KETONES UR STRIP-MCNC: NEGATIVE MG/DL
LACTATE BLDV-SCNC: 1.9 MMOL/L (ref 0.5–2.2)
LEUKOCYTE ESTERASE UR QL STRIP: NEGATIVE
LIPASE SERPL-CCNC: 27 U/L (ref 13–60)
LYMPHOCYTES NFR BLD: 0.78 K/UL (ref 1.5–4)
LYMPHOCYTES RELATIVE PERCENT: 14 % (ref 20–42)
MAGNESIUM SERPL-MCNC: 2.5 MG/DL (ref 1.6–2.6)
MCH RBC QN AUTO: 27.6 PG (ref 26–35)
MCHC RBC AUTO-ENTMCNC: 31.9 G/DL (ref 32–34.5)
MCV RBC AUTO: 86.5 FL (ref 80–99.9)
MONOCYTES NFR BLD: 0.84 K/UL (ref 0.1–0.95)
MONOCYTES NFR BLD: 15 % (ref 2–12)
NEUTROPHILS NFR BLD: 69 % (ref 43–80)
NEUTS SEG NFR BLD: 3.91 K/UL (ref 1.8–7.3)
NITRITE UR QL STRIP: NEGATIVE
PH UR STRIP: 6 [PH] (ref 5–9)
PLATELET # BLD AUTO: 296 K/UL (ref 130–450)
PMV BLD AUTO: 9.9 FL (ref 7–12)
POTASSIUM SERPL-SCNC: 4.9 MMOL/L (ref 3.5–5)
PROT SERPL-MCNC: 8.2 G/DL (ref 6.4–8.3)
PROT UR STRIP-MCNC: ABNORMAL MG/DL
PROTHROMBIN TIME: 46.3 SEC (ref 9.3–12.4)
RBC # BLD AUTO: 3.62 M/UL (ref 3.8–5.8)
RBC #/AREA URNS HPF: ABNORMAL /HPF
SODIUM SERPL-SCNC: 136 MMOL/L (ref 132–146)
SP GR UR STRIP: 1.01 (ref 1–1.03)
TROPONIN I SERPL HS-MCNC: 54 NG/L (ref 0–11)
UROBILINOGEN UR STRIP-ACNC: 0.2 EU/DL (ref 0–1)
WBC #/AREA URNS HPF: ABNORMAL /HPF
WBC OTHER # BLD: 5.7 K/UL (ref 4.5–11.5)

## 2023-09-06 PROCEDURE — 83605 ASSAY OF LACTIC ACID: CPT

## 2023-09-06 PROCEDURE — 71045 X-RAY EXAM CHEST 1 VIEW: CPT

## 2023-09-06 PROCEDURE — 80053 COMPREHEN METABOLIC PANEL: CPT

## 2023-09-06 PROCEDURE — 85610 PROTHROMBIN TIME: CPT

## 2023-09-06 PROCEDURE — 83880 ASSAY OF NATRIURETIC PEPTIDE: CPT

## 2023-09-06 PROCEDURE — 83690 ASSAY OF LIPASE: CPT

## 2023-09-06 PROCEDURE — 84484 ASSAY OF TROPONIN QUANT: CPT

## 2023-09-06 PROCEDURE — 83735 ASSAY OF MAGNESIUM: CPT

## 2023-09-06 PROCEDURE — 99285 EMERGENCY DEPT VISIT HI MDM: CPT

## 2023-09-06 PROCEDURE — 85025 COMPLETE CBC W/AUTO DIFF WBC: CPT

## 2023-09-06 PROCEDURE — 81001 URINALYSIS AUTO W/SCOPE: CPT

## 2023-09-06 PROCEDURE — 93010 ELECTROCARDIOGRAM REPORT: CPT | Performed by: INTERNAL MEDICINE

## 2023-09-06 PROCEDURE — 93005 ELECTROCARDIOGRAM TRACING: CPT | Performed by: EMERGENCY MEDICINE

## 2023-09-06 ASSESSMENT — ENCOUNTER SYMPTOMS
BLOOD IN STOOL: 0
CONSTIPATION: 0
NAUSEA: 1
COUGH: 1
VOMITING: 1
ABDOMINAL PAIN: 0
DIARRHEA: 1

## 2023-09-06 NOTE — TELEPHONE ENCOUNTER
Patient called asking for the results of his cardiomems sent in on 9/5/23. Please advise.   Thank you

## 2023-09-06 NOTE — ED PROVIDER NOTES
nature. Patient had alleviation of his symptoms with administration of Zofran. Patient was evaluated for possible cardiac causes of his nausea. Patient had stable troponins. Patient's BNP was at his baseline. Patient had no evidence of fluid retention. Patient had no pericardial effusion. Patient was evaluated for possible urinary tract infection. Patient's urinalysis was unremarkable. Patient had no leukocytosis. Patient had normal lipase making pancreatitis unlikely. Patient was discharged following stable troponins. EKG was unremarkable. This makes STEMI and NSTEMI unlikely. Patient was discharged in stable condition. Patient was offered Zofran. Patient states he has Zofran at home.     ED Course as of 09/07/23 0129   Wed Sep 06, 2023   1408 Chest x-ray interpreted by me, cardiomegaly but otherwise no acute process [HH]   1920 EKG Interpretation  Interpreted by emergency department physician, Dr. Calderon Lang: paced  Rate: normal  Axis: normal  Ectopy: none  Conduction: normal  ST Segments: no acute change  T Waves: no acute change  Q Waves: none    Clinical Impression: paced rhythm   [HH]      ED Course User Index  [HH] [facility]             Chronic Conditions:   Past Medical History:   Diagnosis Date    Acute CHF ([address]) 11/29/2011    Acute CHF ([address]) 12/26/2011    Acute idiopathic gout of right foot 08/13/2016    AF (atrial fibrillation) ([address]) 02/2020    Anemia 11/29/2011    CAD (coronary artery disease)     cardioversion 06/26/2023    dR Diaz    Cerebral artery occlusion with cerebral infarction ([address])     CKD (chronic kidney disease)     Gout     HTN (hypertension) 11/29/2011    Hyperlipidemia     Hypertension     Morbid obesity due to excess calories ([address])     Nonischemic cardiomyopathy ([address]) 11/29/2011    Nonischemic cardiomyopathy ([address]) 07/2013 7/2013- cardiac MRI revealed an LVEF of 20%    ARVIND (obstructive sleep apnea) 11/29/2011    S/P left heart catheterization by percutaneous approach 12-27/2011    Dr Bryon Madrigal    Systolic heart failure [facility]) 05/07/2012 5/7/12- cardiac catheterization revealed an LVEF of 10-15%, mitral regurgitation along with borderline prolapse of mitral valve    Type 2 diabetes mellitus with complication, with long-term current use of insulin ([address]) 08/22/2016    URI, acute 11/29/2011         Records Reviewed: Extensive cardiac review from [facility], [facility] clinic. Note from 8/29/2023 was evaluated, note from 8/25/2023 was evaluated. CONSULTS: (Who and What was discussed)  None      FINAL IMPRESSION      1.  Nausea vomiting and diarrhea          DISPOSITION/PLAN     DISPOSITION Decision To Discharge 09/07/2023 01:20:40 AM      PATIENT REFERRED TO:  Gloria Doe MD  [address] [place] [phone]    Call   As needed      DISCHARGE MEDICATIONS:  New Prescriptions    No medications on file            (Please note that portions of this note were completed with a voice recognition program.  Efforts were made to edit the dictations but occasionally words are mis-transcribed.)    Serg Barajas DO (electronically signed)           Serg Barajas DO  Resident  09/07/23 9048

## 2023-09-07 LAB — TROPONIN I SERPL HS-MCNC: 55 NG/L (ref 0–11)

## 2023-09-07 PROCEDURE — 84484 ASSAY OF TROPONIN QUANT: CPT

## 2023-09-07 ASSESSMENT — ENCOUNTER SYMPTOMS: BACK PAIN: 0

## 2023-09-07 NOTE — TELEPHONE ENCOUNTER
Last mems reading was sent     9/3 PAS 71 PAD 31 MARILIA 46    9/5 PAS 66 PAD 28 MARILIA 39    Was in ER on 9/6/23 for N/V. D/C home.    8:01 AM  Called patient reminded to sent mems reading today. States at present he doesn't no how he feels \"because I have not been up and moving yet. \"  No complaints at present. No audible SOB, speaking in full sentences without distress. Faxed Dr. Merlyn Chambers ER notes and mems readings.

## 2023-09-18 DIAGNOSIS — I50.22 CHRONIC SYSTOLIC HEART FAILURE (HCC): Primary | ICD-10-CM

## 2023-10-10 ENCOUNTER — TELEPHONE (OUTPATIENT)
Dept: CARDIOLOGY | Age: 43
End: 2023-10-10

## 2023-10-10 NOTE — TELEPHONE ENCOUNTER
CardioMems update:      PAD:  Goal:24  Current trends:            Providers:  Cardiomems team: Dr. Rebeka Sanders CNP  Cardiologist:  Dr. Urmila [facility]acon [facility])        Diuretic Protocol:  Bumex 1 mg daily three times weekly  Spironolactone 12.5 mg daily         GDMT:      GUIDELINE DIRECTED MEDICAL THERAPY for HFrEF/HFmrEF:  ARNI/ACE I/ARB: (1a indication)  Entresto 24/26 mg BID  Hydralazine/Nitrates (1a in symptomatic AA population, 2b if unable to tolerate ACE/ARB/ARNI)  No  Beta blocker: (1a indication)  Metoprolol Succinate (Toprol)  Aldosterone antagonist: (1a indication)  Spironolactone 12.5 mg daily  SGLT2i: (1a indication)  No    If Channel inhibitor (2a indication if HR >70 on maximally tolerated beta blocker)  No  Cardiac glycoside (2b indication for symptomatic HFrEF)  No  Soluble Guanylate Cyclase ([address] [place] [address]) Stimulator (2b indication LVEF <45% with recent [address] [place] [address] Blvd, IV diuretics or elevated BNP)  No  Potassium binders (2b indication for hyperkalemia while taking RAASi)  No             CHF:   HFrEF 35%          ASSESSMENT:  Call placed to patient at [phone] and again at [phone] to remind patient to lay on CardioMEMS pillow daily and send readings and to remind patient of upcoming CHF Clinic appointment. Voicemail left. Future Appointments   Date Time Provider [address]   10/13/2023  1:15 PM [facility] CHF ROOM 1 [facility]             PLAN:  Lay on CardioMEMS pillow daily and send readings. Go to upcoming CHF Clinic appointment this week on Friday.         Electronically signed by Mauro Biggs RN on 10/10/2023 at 1:44 PM

## 2023-10-13 ENCOUNTER — HOSPITAL ENCOUNTER (OUTPATIENT)
Dept: OTHER | Age: 43
Setting detail: THERAPIES SERIES
Discharge: HOME | End: 2023-10-13
Payer: MEDICARE

## 2023-10-13 VITALS
RESPIRATION RATE: 16 BRPM | SYSTOLIC BLOOD PRESSURE: 106 MMHG | OXYGEN SATURATION: 100 % | WEIGHT: 232 LBS | BODY MASS INDEX: 31.46 KG/M2 | DIASTOLIC BLOOD PRESSURE: 58 MMHG

## 2023-10-13 DIAGNOSIS — I50.20 HFREF (HEART FAILURE WITH REDUCED EJECTION FRACTION) (HCC): ICD-10-CM

## 2023-10-13 DIAGNOSIS — I50.42 CHRONIC COMBINED SYSTOLIC AND DIASTOLIC CONGESTIVE HEART FAILURE (HCC): ICD-10-CM

## 2023-10-13 DIAGNOSIS — I50.20 HFREF (HEART FAILURE WITH REDUCED EJECTION FRACTION) (HCC): Primary | ICD-10-CM

## 2023-10-13 LAB
ANION GAP SERPL CALCULATED.3IONS-SCNC: 15 MMOL/L (ref 7–16)
BNP SERPL-MCNC: 1262 PG/ML (ref 0–125)
BUN SERPL-MCNC: 66 MG/DL (ref 6–20)
CALCIUM SERPL-MCNC: 9.8 MG/DL (ref 8.6–10.2)
CHLORIDE SERPL-SCNC: 100 MMOL/L (ref 98–107)
CO2 SERPL-SCNC: 20 MMOL/L (ref 22–29)
CREAT SERPL-MCNC: 3.4 MG/DL (ref 0.7–1.2)
GFR SERPL CREATININE-BSD FRML MDRD: 22 ML/MIN/1.73M2
GLUCOSE SERPL-MCNC: 78 MG/DL (ref 74–99)
POTASSIUM SERPL-SCNC: 4.5 MMOL/L (ref 3.5–5)
SODIUM SERPL-SCNC: 135 MMOL/L (ref 132–146)

## 2023-10-13 PROCEDURE — 80048 BASIC METABOLIC PNL TOTAL CA: CPT

## 2023-10-13 PROCEDURE — 99214 OFFICE O/P EST MOD 30 MIN: CPT

## 2023-10-13 PROCEDURE — 83880 ASSAY OF NATRIURETIC PEPTIDE: CPT

## 2023-10-13 ASSESSMENT — PATIENT HEALTH QUESTIONNAIRE - PHQ9
1. LITTLE INTEREST OR PLEASURE IN DOING THINGS: NOT AT ALL
SUM OF ALL RESPONSES TO PHQ9 QUESTIONS 1 & 2: 0
1. LITTLE INTEREST OR PLEASURE IN DOING THINGS: NOT AT ALL
2. FEELING DOWN, DEPRESSED OR HOPELESS: NOT AT ALL
SUM OF ALL RESPONSES TO PHQ9 QUESTIONS 1 & 2: 0
2. FEELING DOWN, DEPRESSED OR HOPELESS: NOT AT ALL

## 2023-10-13 NOTE — PROGRESS NOTES
understands instructions. Electronically signed by Hazel Dang RN on 10/13/2023 at 3:23 PM     Progress note routed to Dr. Colton Lake. Scheduled to follow up in CHF clinic on:    Future Appointments   Date Time Provider [address]   11/30/2023 12:00 PM [facility] CHF ROOM 1 [facility]

## 2023-10-13 NOTE — FLOWSHEET NOTE
10/13/23 1400   Over the past 2 weeks, how often have you been bothered by any of the following problems?    Little interest or pleasure in doing things Not at all   Feeling down, depressed, or hopeless Not at all   Patient Health Questionnaire-2 Score 0     CHF

## 2023-10-13 NOTE — PLAN OF CARE
Problem: Chronic Conditions and Co-morbidities  Goal: Patient's chronic conditions and co-morbidity symptoms are monitored and maintained or improved  Flowsheets (Taken 10/13/2023 0356)  Care Plan - Patient's Chronic Conditions and Co-Morbidity Symptoms are Monitored and Maintained or Improved: Monitor and assess patient's chronic conditions and comorbid symptoms for stability, deterioration, or improvement

## 2023-10-14 ENCOUNTER — HOSPITAL ENCOUNTER (INPATIENT)
Age: 43
LOS: 4 days | Discharge: HOME | DRG: 312 | End: 2023-10-19
Attending: EMERGENCY MEDICINE | Admitting: INTERNAL MEDICINE
Payer: MEDICARE

## 2023-10-14 ENCOUNTER — APPOINTMENT (OUTPATIENT)
Dept: GENERAL RADIOLOGY | Age: 43
DRG: 312 | End: 2023-10-14
Payer: MEDICARE

## 2023-10-14 DIAGNOSIS — R07.9 CHEST PAIN, UNSPECIFIED TYPE: Primary | ICD-10-CM

## 2023-10-14 DIAGNOSIS — N17.9 AKI (ACUTE KIDNEY INJURY) (HCC): ICD-10-CM

## 2023-10-14 DIAGNOSIS — N18.9 CHRONIC KIDNEY DISEASE, UNSPECIFIED CKD STAGE: ICD-10-CM

## 2023-10-14 LAB
ALBUMIN SERPL-MCNC: 5.3 G/DL (ref 3.5–5.2)
ALP SERPL-CCNC: 167 U/L (ref 40–129)
ALT SERPL-CCNC: 87 U/L (ref 0–40)
ANION GAP SERPL CALCULATED.3IONS-SCNC: 27 MMOL/L (ref 7–16)
AST SERPL-CCNC: 51 U/L (ref 0–39)
BASOPHILS # BLD: 0.02 K/UL (ref 0–0.2)
BASOPHILS NFR BLD: 1 % (ref 0–2)
BILIRUB SERPL-MCNC: 0.7 MG/DL (ref 0–1.2)
BUN SERPL-MCNC: 67 MG/DL (ref 6–20)
CALCIUM SERPL-MCNC: 10.9 MG/DL (ref 8.6–10.2)
CHLORIDE SERPL-SCNC: 95 MMOL/L (ref 98–107)
CO2 SERPL-SCNC: 15 MMOL/L (ref 22–29)
CREAT SERPL-MCNC: 3.8 MG/DL (ref 0.7–1.2)
EOSINOPHIL # BLD: 0.1 K/UL (ref 0.05–0.5)
EOSINOPHILS RELATIVE PERCENT: 3 % (ref 0–6)
ERYTHROCYTE [DISTWIDTH] IN BLOOD BY AUTOMATED COUNT: 16.5 % (ref 11.5–15)
GFR SERPL CREATININE-BSD FRML MDRD: 19 ML/MIN/1.73M2
GLUCOSE SERPL-MCNC: 145 MG/DL (ref 74–99)
HCT VFR BLD AUTO: 41.3 % (ref 37–54)
HGB BLD-MCNC: 13.4 G/DL (ref 12.5–16.5)
IMM GRANULOCYTES # BLD AUTO: <0.03 K/UL (ref 0–0.58)
IMM GRANULOCYTES NFR BLD: 1 % (ref 0–5)
LIPASE SERPL-CCNC: 61 U/L (ref 13–60)
LYMPHOCYTES NFR BLD: 0.83 K/UL (ref 1.5–4)
LYMPHOCYTES RELATIVE PERCENT: 21 % (ref 20–42)
MAGNESIUM SERPL-MCNC: 2.4 MG/DL (ref 1.6–2.6)
MCH RBC QN AUTO: 27.3 PG (ref 26–35)
MCHC RBC AUTO-ENTMCNC: 32.4 G/DL (ref 32–34.5)
MCV RBC AUTO: 84.1 FL (ref 80–99.9)
MONOCYTES NFR BLD: 0.23 K/UL (ref 0.1–0.95)
MONOCYTES NFR BLD: 6 % (ref 2–12)
NEUTROPHILS NFR BLD: 70 % (ref 43–80)
NEUTS SEG NFR BLD: 2.76 K/UL (ref 1.8–7.3)
PLATELET # BLD AUTO: 299 K/UL (ref 130–450)
PMV BLD AUTO: 10 FL (ref 7–12)
POTASSIUM SERPL-SCNC: 4.2 MMOL/L (ref 3.5–5)
PROT SERPL-MCNC: 9.6 G/DL (ref 6.4–8.3)
RBC # BLD AUTO: 4.91 M/UL (ref 3.8–5.8)
SODIUM SERPL-SCNC: 137 MMOL/L (ref 132–146)
TROPONIN I SERPL HS-MCNC: 71 NG/L (ref 0–11)
TROPONIN I SERPL HS-MCNC: 78 NG/L (ref 0–11)
WBC OTHER # BLD: 4 K/UL (ref 4.5–11.5)

## 2023-10-14 PROCEDURE — 6370000000 HC RX 637 (ALT 250 FOR IP)

## 2023-10-14 PROCEDURE — 6370000000 HC RX 637 (ALT 250 FOR IP): Performed by: INTERNAL MEDICINE

## 2023-10-14 PROCEDURE — 83735 ASSAY OF MAGNESIUM: CPT

## 2023-10-14 PROCEDURE — 80053 COMPREHEN METABOLIC PANEL: CPT

## 2023-10-14 PROCEDURE — 71045 X-RAY EXAM CHEST 1 VIEW: CPT

## 2023-10-14 PROCEDURE — 2580000003 HC RX 258: Performed by: INTERNAL MEDICINE

## 2023-10-14 PROCEDURE — G0378 HOSPITAL OBSERVATION PER HR: HCPCS

## 2023-10-14 PROCEDURE — 96361 HYDRATE IV INFUSION ADD-ON: CPT

## 2023-10-14 PROCEDURE — 85025 COMPLETE CBC W/AUTO DIFF WBC: CPT

## 2023-10-14 PROCEDURE — 96374 THER/PROPH/DIAG INJ IV PUSH: CPT

## 2023-10-14 PROCEDURE — 99285 EMERGENCY DEPT VISIT HI MDM: CPT

## 2023-10-14 PROCEDURE — APPSS180 APP SPLIT SHARED TIME > 60 MINUTES: Performed by: CLINICAL NURSE SPECIALIST

## 2023-10-14 PROCEDURE — 84484 ASSAY OF TROPONIN QUANT: CPT

## 2023-10-14 PROCEDURE — 83690 ASSAY OF LIPASE: CPT

## 2023-10-14 PROCEDURE — 93005 ELECTROCARDIOGRAM TRACING: CPT

## 2023-10-14 PROCEDURE — 6360000002 HC RX W HCPCS

## 2023-10-14 PROCEDURE — 99223 1ST HOSP IP/OBS HIGH 75: CPT | Performed by: INTERNAL MEDICINE

## 2023-10-14 RX ORDER — ERGOCALCIFEROL 1.25 MG/1
50000 CAPSULE ORAL
Status: DISCONTINUED | OUTPATIENT
Start: 2023-10-16 | End: 2023-10-19 | Stop reason: HOSPADM

## 2023-10-14 RX ORDER — PROCHLORPERAZINE EDISYLATE 5 MG/ML
10 INJECTION INTRAMUSCULAR; INTRAVENOUS EVERY 6 HOURS PRN
Status: DISCONTINUED | OUTPATIENT
Start: 2023-10-14 | End: 2023-10-19 | Stop reason: HOSPADM

## 2023-10-14 RX ORDER — SODIUM BICARBONATE 650 MG/1
1300 TABLET ORAL 2 TIMES DAILY
Status: DISCONTINUED | OUTPATIENT
Start: 2023-10-14 | End: 2023-10-19 | Stop reason: HOSPADM

## 2023-10-14 RX ORDER — BUMETANIDE 1 MG/1
2 TABLET ORAL
Status: DISCONTINUED | OUTPATIENT
Start: 2023-10-16 | End: 2023-10-19 | Stop reason: HOSPADM

## 2023-10-14 RX ORDER — NITROGLYCERIN 0.4 MG/1
0.4 TABLET SUBLINGUAL EVERY 5 MIN PRN
Status: ON HOLD | COMMUNITY

## 2023-10-14 RX ORDER — M-VIT,TX,IRON,MINS/CALC/FOLIC 27MG-0.4MG
1 TABLET ORAL DAILY
Status: DISCONTINUED | OUTPATIENT
Start: 2023-10-14 | End: 2023-10-19 | Stop reason: HOSPADM

## 2023-10-14 RX ORDER — LANOLIN ALCOHOL/MO/W.PET/CERES
3 CREAM (GRAM) TOPICAL NIGHTLY PRN
Status: DISCONTINUED | OUTPATIENT
Start: 2023-10-14 | End: 2023-10-19 | Stop reason: HOSPADM

## 2023-10-14 RX ORDER — ASPIRIN 81 MG/1
324 TABLET, CHEWABLE ORAL ONCE
Status: COMPLETED | OUTPATIENT
Start: 2023-10-14 | End: 2023-10-14

## 2023-10-14 RX ORDER — ACETAMINOPHEN 500 MG
500 TABLET ORAL EVERY 4 HOURS PRN
Status: DISCONTINUED | OUTPATIENT
Start: 2023-10-14 | End: 2023-10-19 | Stop reason: HOSPADM

## 2023-10-14 RX ORDER — SPIRONOLACTONE 25 MG/1
25 TABLET ORAL DAILY
Status: DISCONTINUED | OUTPATIENT
Start: 2023-10-14 | End: 2023-10-19 | Stop reason: HOSPADM

## 2023-10-14 RX ORDER — AMIODARONE HYDROCHLORIDE 200 MG/1
200 TABLET ORAL 2 TIMES DAILY
Status: ON HOLD | COMMUNITY
End: 2023-10-19 | Stop reason: HOSPADM

## 2023-10-14 RX ORDER — SODIUM CHLORIDE 9 MG/ML
INJECTION, SOLUTION INTRAVENOUS CONTINUOUS
Status: DISCONTINUED | OUTPATIENT
Start: 2023-10-14 | End: 2023-10-19

## 2023-10-14 RX ORDER — ATORVASTATIN CALCIUM 40 MG/1
40 TABLET, FILM COATED ORAL DAILY
Status: DISCONTINUED | OUTPATIENT
Start: 2023-10-14 | End: 2023-10-19 | Stop reason: HOSPADM

## 2023-10-14 RX ORDER — NITROGLYCERIN 0.4 MG/1
0.4 TABLET SUBLINGUAL EVERY 5 MIN PRN
Status: DISCONTINUED | OUTPATIENT
Start: 2023-10-14 | End: 2023-10-19 | Stop reason: HOSPADM

## 2023-10-14 RX ORDER — METOPROLOL SUCCINATE 25 MG/1
25 TABLET, EXTENDED RELEASE ORAL NIGHTLY
Status: DISCONTINUED | OUTPATIENT
Start: 2023-10-14 | End: 2023-10-15

## 2023-10-14 RX ORDER — ERGOCALCIFEROL 1.25 MG/1
50000 CAPSULE ORAL WEEKLY
Status: ON HOLD | COMMUNITY

## 2023-10-14 RX ORDER — AMIODARONE HYDROCHLORIDE 200 MG/1
200 TABLET ORAL 2 TIMES DAILY
Status: DISCONTINUED | OUTPATIENT
Start: 2023-10-14 | End: 2023-10-15

## 2023-10-14 RX ORDER — FENTANYL CITRATE 50 UG/ML
50 INJECTION, SOLUTION INTRAMUSCULAR; INTRAVENOUS ONCE
Status: COMPLETED | OUTPATIENT
Start: 2023-10-14 | End: 2023-10-14

## 2023-10-14 RX ORDER — M-VIT,TX,IRON,MINS/CALC/FOLIC 27MG-0.4MG
1 TABLET ORAL DAILY
Status: ON HOLD | COMMUNITY

## 2023-10-14 RX ADMIN — AMIODARONE HYDROCHLORIDE 200 MG: 200 TABLET ORAL at 21:04

## 2023-10-14 RX ADMIN — ACETAMINOPHEN 500 MG: 500 TABLET ORAL at 21:02

## 2023-10-14 RX ADMIN — FENTANYL CITRATE 50 MCG: 50 INJECTION INTRAMUSCULAR; INTRAVENOUS at 11:21

## 2023-10-14 RX ADMIN — SODIUM CHLORIDE: 9 INJECTION, SOLUTION INTRAVENOUS at 19:44

## 2023-10-14 RX ADMIN — SODIUM BICARBONATE 1300 MG: 650 TABLET ORAL at 21:03

## 2023-10-14 RX ADMIN — MELATONIN 3 MG ORAL TABLET 3 MG: 3 TABLET ORAL at 21:04

## 2023-10-14 RX ADMIN — ATORVASTATIN CALCIUM 40 MG: 40 TABLET, FILM COATED ORAL at 21:04

## 2023-10-14 RX ADMIN — MULTIPLE VITAMINS W/ MINERALS TAB 1 TABLET: TAB at 21:06

## 2023-10-14 RX ADMIN — ASPIRIN 324 MG: 81 TABLET, CHEWABLE ORAL at 11:21

## 2023-10-14 ASSESSMENT — PAIN SCALES - GENERAL
PAINLEVEL_OUTOF10: 7
PAINLEVEL_OUTOF10: 0
PAINLEVEL_OUTOF10: 5
PAINLEVEL_OUTOF10: 0
PAINLEVEL_OUTOF10: 6

## 2023-10-14 ASSESSMENT — PAIN DESCRIPTION - ORIENTATION
ORIENTATION: RIGHT

## 2023-10-14 ASSESSMENT — PAIN DESCRIPTION - LOCATION
LOCATION: NECK

## 2023-10-14 ASSESSMENT — PAIN DESCRIPTION - DESCRIPTORS: DESCRIPTORS: CRAMPING

## 2023-10-14 ASSESSMENT — PAIN - FUNCTIONAL ASSESSMENT: PAIN_FUNCTIONAL_ASSESSMENT: ACTIVITIES ARE NOT PREVENTED

## 2023-10-14 NOTE — ED PROVIDER NOTES
[facility]        Pt Name: Roddy Song  MRN: [id number]  [address] 1980  Date of evaluation: 10/14/2023  Provider: Pawel Mora DO  PCP: Chery Fregoso MD  Note Started: 10:50 AM EDT 10/14/23    CHIEF COMPLAINT       Chief Complaint   Patient presents with    Chest Pain     Patient was having right sided chest pain last night, took 1 nitro with some relief but pain continued into this morning, 30min ago defibrillator fired 1 time       HISTORY OF PRESENT ILLNESS: 1 or more Elements   History From: Patient    Limitations to history : None  Social Determinants : None    Chery Ohara III is a 37 y.o. male who presents for chest pain. Patient states he was having right-sided chest pain 2 nights ago, he took some nitro which helped him sleep. Yesterday he felt better. Then last night he started developing right-sided chest pain again. He tried taking 2 nitros which did not help. He states the pain is now localized to the anterior part of his right shoulder and radiates down the arm. It does not radiate up the neck or to the back. He describes the pain as a heaviness. He has some shortness of breath with the pain. He states about 30 minutes ago his defibrillator fired once. It has fired in the past about a few months ago. Patient denies any cough or congestion. Denies any fever, chills, n/v, headache, dizziness, palpitations, cough, abd pain.     Nursing Notes were all reviewed and agreed with or any disagreements were addressed in the HPI.    ROS:   Pertinent positives and negatives are stated within HPI, all other systems reviewed and are negative.      --------------------------------------------- PAST HISTORY ---------------------------------------------  Past Medical History:  has a past medical history of Acute CHF ([address]), Acute CHF ([address]), Acute idiopathic gout of right foot, AF (atrial stable, afebrile, nontoxic-appearing, in no respiratory distress. Physical exam remarkable for normal cardiac and lung exams  Ddx: MI, ACS, arrhythmia, electrolyte abnormality  Labs interpreted by me: CMP showed normal electrolyte levels, BUN and creatinine elevated, similar to previous labs, liver enzymes slightly elevated, CBC showed leukopenia, hemoglobin stable at 13.4. Troponin elevated, trending downward on repeat. EKG interpreted by me in detail on ED course. CXR with no obvious effusions or consolidations concerning pna, no ptx     Patient administered aspirin and fentanyl for the chest pain. Patient's pain improved. Due to patient's changes in EKG and chest pain as well as his cardiac history, patient will be admitted to the hospital for further work-up and treatment. Please see ED course for more details:    ED Course as of 10/14/23 1612   Sat Oct 14, 2023   1056 EKG Interpretation  Interpreted by emergency department physician. 10/14/23  Time: 10:52    Rate: 63 bpm  Axis: right axis deviation  CO: no p waves  QRS: 190 ms  Qtc: 630 ms  Rhythm: regular  Clinical Impression: ventricular-paced rhythm  Comparison to old EKG: changes have occurred when compared to most recent     [AD]   1142 EKG Interpretation  Interpreted by emergency department physician. 10/14/23  Time: 11:29    Rate: 71 bpm  Axis: right axis deviation  CO: no p waves  QRS: 198 ms  Qtc: 641 ms  Rhythm: regular  Clinical Impression: ventricular paced rhythm   Comparison to old EKG: stable as compared to pt's most recent      [AD]   9302 Spoke with [employer] who stated they interrogated the patient's pacemaker, but there were no shocks delivered today.  He was only in atrial flutter earlier today that was not treated with a shock [AD]   [phone] with Dr. Malathi Sandoval, EP who states there is nothing he would do for patient for this episode of atrial flutter and to consult cardiology about his chest pain [AD]   [phone] Spoke with

## 2023-10-14 NOTE — ED PROVIDER NOTES
ATTENDING PROVIDER ATTESTATION:     Kylie Rob III presented to the emergency department for evaluation of Chest Pain (Patient was having right sided chest pain last night, took 1 nitro with some relief but pain continued into this morning, 30min ago defibrillator fired 1 time)   and was initially evaluated by the Medical Resident. See Original ED Note for H&P and ED course above. I have reviewed and discussed the case, including pertinent history (medical, surgical, family and social) and exam findings with the Medical Resident assigned to Kylie Rob III. I have personally performed and/or participated in the history, exam, medical decision making, and procedures and agree with all pertinent clinical information and any additional changes or corrections are noted below in my assessment and plan. I have discussed this patient in detail with the resident, and provided the instruction and education,       I have reviewed my findings and recommendations with the assigned Medical Resident, Kylie Rob III and members of family present at the time of disposition. I have performed a history and physical examination of this patient and directly supervised the resident caring for this patient              [address]        Pt Name: Ani Kumar  MRN: [id number]  [facility] 1980  Date of evaluation: 10/14/2023  Provider: Jeramy Mayer MD  PCP: Jose Manuel Patterson MD  Note Started: 10:46 AM EDT 10/14/23    CHIEF COMPLAINT       Chief Complaint   Patient presents with    Chest Pain     Patient was having right sided chest pain last night, took 1 nitro with some relief but pain continued into this morning, 30min ago defibrillator fired 1 time       HISTORY OF PRESENT ILLNESS: 1 or more Elements     Limitations to history : None    Kylie Rob III is a 37 y.o. male who presents for chest pain. Chest pain, onset last night.  Pressure in right chest radiating to the right MULTIVITAMIN-MINERALS) tablet Take 1 tablet by mouth daily      NONFORMULARY Take by mouth daily Lion's Oswaldo Supplement      rivaroxaban (XARELTO) 15 MG TABS tablet Take 1 tablet by mouth daily (with breakfast)      spironolactone (ALDACTONE) 25 MG tablet Take 1 tablet by mouth daily      potassium chloride (KLOR-CON M) 20 MEQ extended release tablet Take 1 tablet by mouth daily  Qty: 90 tablet, Refills: 0    Associated Diagnoses: HFrEF (heart failure with reduced ejection fraction) ([address]); Chronic combined systolic and diastolic congestive heart failure (HCC)      bumetanide (BUMEX) 2 MG tablet Take 1 tablet by mouth See Admin Instructions Given Monday, Wednesday, Friday. sacubitril-valsartan (ENTRESTO) 24-26 MG per tablet Take 1 tablet by mouth 2 times daily  Qty: 60 tablet, Refills: 1      atorvastatin (LIPITOR) 40 MG tablet Take 1 tablet by mouth daily      pantoprazole (PROTONIX) 40 MG tablet Take 1 tablet by mouth daily      metoprolol succinate (TOPROL XL) 25 MG extended release tablet Take 1 tablet by mouth at bedtime  Qty: 30 tablet, Refills: 5    Comments: STOP COREG  Associated Diagnoses: Chronic systolic heart failure ([address]);  New medication added             ALLERGIES     Farxiga [dapagliflozin] and Tikosyn [dofetilide]    FAMILYHISTORY       Family History   Problem Relation Age of Onset    Cancer Mother     No Known Problems Father         SOCIAL HISTORY       Social History     Tobacco Use    Smoking status: Never     Passive exposure: Never    Smokeless tobacco: Never   Vaping Use    Vaping Use: Never used   Substance Use Topics    Alcohol use: Not Currently     Comment: caffeine: none    Drug use: Never       SCREENINGS        Jamie Coma Scale  Eye Opening: Spontaneous  Best Verbal Response: Oriented  Best Motor Response: Obeys commands  Jamie Coma Scale Score: 15                CIWA Assessment  BP: 101/68  Pulse: 55           PHYSICAL EXAM  1 or more Elements     ED Triage Vitals   BP Temp

## 2023-10-14 NOTE — CONSULTS
tablet Take 1 tablet by mouth 2 times daily 60 tablet 1    atorvastatin (LIPITOR) 40 MG tablet Take 1 tablet by mouth daily      pantoprazole (PROTONIX) 40 MG tablet Take 1 tablet by mouth daily      metoprolol succinate (TOPROL XL) 25 MG extended release tablet Take 1 tablet by mouth at bedtime 30 tablet 5        Allergies   Allergen Reactions    Farxiga [Dapagliflozin] Other (See Comments)     Caused throat to swell    Tikosyn [Dofetilide] Palpitations     ROS:   Constitutional: Negative for fever. Positive for activity change and negative for appetite change. HENT: Negative for epistaxis. Eyes: Negative for diploplia, blurred vision. Respiratory: Negative for cough. Positive for chest tightness and negative for shortness of breath. Negative for wheezing. Cardiovascular: pertinent positives in HPI  Gastrointestinal: Negative for abdominal pain and blood in stool. All other review of systems are negative     PHYSICAL EXAM:   Vitals:    10/14/23 1131 10/14/23 1245   BP: 101/63 107/65   Pulse: 74 63   Resp: 20 16      Constitutional: Well-developed, no acute distress  Eyes: conjunctivae normal, no xanthelasma   Ears, Nose, Throat: oral mucosa moist, no cyanosis   CV: no JVD. Regular rate and rhythm. Normal S1S2 and no S3. No murmurs, rubs, or gallops. PMI is nondisplaced  Lungs: clear to auscultation bilaterally, normal respiratory effort without used of accessory muscles  Abdomen: soft, non-tender, bowel sounds present, no masses or hepatomegaly   Musculoskeletal: no digital clubbing, no edema   Skin: warm, no rashes   CRT-D site: well healed.  No erosion, infection or migration    I have personally reviewed the laboratory, cardiac diagnostic and radiographic testing as outlined below:    Data:    Recent Labs     10/14/23  1106   WBC 4.0*   HGB 13.4   HCT 41.3        Recent Labs     10/13/23  1305 10/14/23  1106    137   K 4.5 4.2    95*   CO2 20* 15*   BUN 66* 67*   CREATININE 3.4* 3.8* cardiopulmonary exercise up to a goal of 250 min/wk, daily compliance with CPAP in treating sleep apnea, smoking cessation and limited ETOH intake. 2. Ventricular tachycardia  - History of sustained VT in July 2019, December 2020 and June 2023.  - No recurrent since 6/29/23.  - On Toprol XL and Amiodarone    3. CRT-D in situ  - Single chamber ICD implantation on 10/1/13.  - CRT-D upgrade on 11/20/18.  - Normal device function    4. Prolonged Qtc  - Qtc 573-579 ms per manual calculation.   - In the setting of BIV pacing and QRS of 198-202 ms. 5. Nonischemic cardiomyopathy and chronic HFrEF   -12/2011 [facility]: Normal coronaries  - LV EF 20-30% on TTE 12/1/11  - LV EF 20% on TTE 8/10/16.  - LV EF 15-20% on TTE 10/13/18.  - Status post cardio-mem 12/13/18.  - LV EF 20-25% on TTE 7/30/19.  - LV EF 25-30% on TTE 8/5/20.  - LV EF 35% on TTE 4/4/23.  - On GDMT including Toprol XL, Entresto, Aldactone and Bumex.  - NYHA class II, ACC/AHA stage C.  - Euvolemic and compensated. - Ongoing evaluation for LVAD and heart transplant. 6. Nonobstructive coronary artery disease  - [facility] 8/1/2019: Left main: 0% stenosis. LAD: 40-50 %  stenosis Circumflex: 30 % stenosis. RCA: Dominant. 30 %  stenosis. 7. Chest pain and neck pian  - Atypical.  - Management per Cardiology. 8. Hypertension    9. Hyperlipidemia    10. Diabetes mellitus    11. Acute on chronic kidney disease  Estimated Creatinine Clearance: 31 mL/min (A) (based on SCr of 3.8 mg/dL (H)). 12. Morbid obesity   - Status post gastric bypass. There is no height or weight on file to calculate BMI. 13. Obstructive sleep apnea on CPAP    Recommendations:  Normal CRT-D function. CRT-D reprogrammed as above. Decrease Amiodarone to 200 mg daily. Continue Toprol XL 25 mg daily. Continue Xarelto 15 mg daily. Chest pain and HF as well as GDMT adjustment per Cardiology. Avoid QT prolongation medications. EP will sign off.  Please call for any questions or

## 2023-10-14 NOTE — PROGRESS NOTES
Pt belongings upon admission to floor:   Shirt, Shorts, undergarments, extra change of clothes, cell phone, , earrings (in ears).

## 2023-10-15 PROBLEM — Z95.810 PRESENCE OF CARDIAC RESYNCHRONIZATION THERAPY DEFIBRILLATOR (CRT-D): Status: ACTIVE | Noted: 2023-10-15

## 2023-10-15 PROBLEM — R55 SYNCOPE AND COLLAPSE: Status: ACTIVE | Noted: 2023-10-15

## 2023-10-15 LAB
ALBUMIN SERPL-MCNC: 3.7 G/DL (ref 3.5–5.2)
ALP SERPL-CCNC: 109 U/L (ref 40–129)
ALT SERPL-CCNC: 56 U/L (ref 0–40)
ANION GAP SERPL CALCULATED.3IONS-SCNC: 16 MMOL/L (ref 7–16)
AST SERPL-CCNC: 30 U/L (ref 0–39)
BASOPHILS # BLD: 0.03 K/UL (ref 0–0.2)
BASOPHILS NFR BLD: 1 % (ref 0–2)
BILIRUB SERPL-MCNC: 0.6 MG/DL (ref 0–1.2)
BUN SERPL-MCNC: 61 MG/DL (ref 6–20)
CALCIUM SERPL-MCNC: 9.2 MG/DL (ref 8.6–10.2)
CHLORIDE SERPL-SCNC: 105 MMOL/L (ref 98–107)
CO2 SERPL-SCNC: 17 MMOL/L (ref 22–29)
CREAT SERPL-MCNC: 3.4 MG/DL (ref 0.7–1.2)
EKG ATRIAL RATE: 73 BPM
EKG Q-T INTERVAL: 590 MS
EKG QRS DURATION: 198 MS
EKG QTC CALCULATION (BAZETT): 641 MS
EKG R AXIS: 172 DEGREES
EKG T AXIS: 13 DEGREES
EKG VENTRICULAR RATE: 71 BPM
EOSINOPHIL # BLD: 0.17 K/UL (ref 0.05–0.5)
EOSINOPHILS RELATIVE PERCENT: 3 % (ref 0–6)
ERYTHROCYTE [DISTWIDTH] IN BLOOD BY AUTOMATED COUNT: 16.4 % (ref 11.5–15)
GFR SERPL CREATININE-BSD FRML MDRD: 22 ML/MIN/1.73M2
GLUCOSE SERPL-MCNC: 76 MG/DL (ref 74–99)
HCT VFR BLD AUTO: 32.2 % (ref 37–54)
HGB BLD-MCNC: 10.3 G/DL (ref 12.5–16.5)
IMM GRANULOCYTES # BLD AUTO: <0.03 K/UL (ref 0–0.58)
IMM GRANULOCYTES NFR BLD: 0 % (ref 0–5)
LYMPHOCYTES NFR BLD: 1.19 K/UL (ref 1.5–4)
LYMPHOCYTES RELATIVE PERCENT: 22 % (ref 20–42)
MCH RBC QN AUTO: 27.2 PG (ref 26–35)
MCHC RBC AUTO-ENTMCNC: 32 G/DL (ref 32–34.5)
MCV RBC AUTO: 85.2 FL (ref 80–99.9)
MONOCYTES NFR BLD: 0.77 K/UL (ref 0.1–0.95)
MONOCYTES NFR BLD: 14 % (ref 2–12)
NEUTROPHILS NFR BLD: 60 % (ref 43–80)
NEUTS SEG NFR BLD: 3.22 K/UL (ref 1.8–7.3)
PLATELET # BLD AUTO: 223 K/UL (ref 130–450)
PMV BLD AUTO: 9.6 FL (ref 7–12)
POTASSIUM SERPL-SCNC: 4 MMOL/L (ref 3.5–5)
PROT SERPL-MCNC: 7 G/DL (ref 6.4–8.3)
RBC # BLD AUTO: 3.78 M/UL (ref 3.8–5.8)
SODIUM SERPL-SCNC: 138 MMOL/L (ref 132–146)
WBC OTHER # BLD: 5.4 K/UL (ref 4.5–11.5)

## 2023-10-15 PROCEDURE — 2580000003 HC RX 258: Performed by: INTERNAL MEDICINE

## 2023-10-15 PROCEDURE — 85025 COMPLETE CBC W/AUTO DIFF WBC: CPT

## 2023-10-15 PROCEDURE — 94660 CPAP INITIATION&MGMT: CPT

## 2023-10-15 PROCEDURE — 93284 PRGRMG EVAL IMPLANTABLE DFB: CPT | Performed by: INTERNAL MEDICINE

## 2023-10-15 PROCEDURE — 1200000000 HC SEMI PRIVATE

## 2023-10-15 PROCEDURE — 93005 ELECTROCARDIOGRAM TRACING: CPT | Performed by: INTERNAL MEDICINE

## 2023-10-15 PROCEDURE — 93010 ELECTROCARDIOGRAM REPORT: CPT | Performed by: INTERNAL MEDICINE

## 2023-10-15 PROCEDURE — 96361 HYDRATE IV INFUSION ADD-ON: CPT

## 2023-10-15 PROCEDURE — 6370000000 HC RX 637 (ALT 250 FOR IP): Performed by: INTERNAL MEDICINE

## 2023-10-15 PROCEDURE — 99233 SBSQ HOSP IP/OBS HIGH 50: CPT | Performed by: INTERNAL MEDICINE

## 2023-10-15 PROCEDURE — 99223 1ST HOSP IP/OBS HIGH 75: CPT | Performed by: INTERNAL MEDICINE

## 2023-10-15 PROCEDURE — 36415 COLL VENOUS BLD VENIPUNCTURE: CPT

## 2023-10-15 PROCEDURE — 80053 COMPREHEN METABOLIC PANEL: CPT

## 2023-10-15 RX ORDER — AMIODARONE HYDROCHLORIDE 200 MG/1
200 TABLET ORAL DAILY
Status: DISCONTINUED | OUTPATIENT
Start: 2023-10-16 | End: 2023-10-19 | Stop reason: HOSPADM

## 2023-10-15 RX ORDER — MIDODRINE HYDROCHLORIDE 5 MG/1
5 TABLET ORAL
Status: DISCONTINUED | OUTPATIENT
Start: 2023-10-15 | End: 2023-10-19 | Stop reason: HOSPADM

## 2023-10-15 RX ORDER — METOPROLOL SUCCINATE 25 MG/1
25 TABLET, EXTENDED RELEASE ORAL NIGHTLY
Status: DISCONTINUED | OUTPATIENT
Start: 2023-10-16 | End: 2023-10-19 | Stop reason: HOSPADM

## 2023-10-15 RX ORDER — HYDROCODONE BITARTRATE AND ACETAMINOPHEN 5; 325 MG/1; MG/1
1 TABLET ORAL EVERY 6 HOURS PRN
Status: DISCONTINUED | OUTPATIENT
Start: 2023-10-15 | End: 2023-10-19 | Stop reason: HOSPADM

## 2023-10-15 RX ADMIN — SODIUM CHLORIDE: 9 INJECTION, SOLUTION INTRAVENOUS at 23:04

## 2023-10-15 RX ADMIN — AMIODARONE HYDROCHLORIDE 200 MG: 200 TABLET ORAL at 09:33

## 2023-10-15 RX ADMIN — MULTIPLE VITAMINS W/ MINERALS TAB 1 TABLET: TAB at 09:33

## 2023-10-15 RX ADMIN — SODIUM CHLORIDE: 9 INJECTION, SOLUTION INTRAVENOUS at 09:19

## 2023-10-15 RX ADMIN — SODIUM BICARBONATE 1300 MG: 650 TABLET ORAL at 21:22

## 2023-10-15 RX ADMIN — MIDODRINE HYDROCHLORIDE 5 MG: 5 TABLET ORAL at 16:28

## 2023-10-15 RX ADMIN — MIDODRINE HYDROCHLORIDE 5 MG: 5 TABLET ORAL at 11:24

## 2023-10-15 RX ADMIN — RIVAROXABAN 15 MG: 15 TABLET, FILM COATED ORAL at 09:34

## 2023-10-15 RX ADMIN — SODIUM BICARBONATE 1300 MG: 650 TABLET ORAL at 09:33

## 2023-10-15 RX ADMIN — ACETAMINOPHEN 500 MG: 500 TABLET ORAL at 03:26

## 2023-10-15 RX ADMIN — ATORVASTATIN CALCIUM 40 MG: 40 TABLET, FILM COATED ORAL at 21:10

## 2023-10-15 RX ADMIN — HYDROCODONE BITARTRATE AND ACETAMINOPHEN 1 TABLET: 5; 325 TABLET ORAL at 21:10

## 2023-10-15 RX ADMIN — HYDROCODONE BITARTRATE AND ACETAMINOPHEN 1 TABLET: 5; 325 TABLET ORAL at 09:33

## 2023-10-15 RX ADMIN — METOPROLOL SUCCINATE 25 MG: 25 TABLET, EXTENDED RELEASE ORAL at 21:13

## 2023-10-15 ASSESSMENT — PAIN SCALES - GENERAL
PAINLEVEL_OUTOF10: 5
PAINLEVEL_OUTOF10: 6
PAINLEVEL_OUTOF10: 0
PAINLEVEL_OUTOF10: 4
PAINLEVEL_OUTOF10: 6
PAINLEVEL_OUTOF10: 2
PAINLEVEL_OUTOF10: 2

## 2023-10-15 ASSESSMENT — PAIN DESCRIPTION - ORIENTATION
ORIENTATION: RIGHT

## 2023-10-15 ASSESSMENT — PAIN DESCRIPTION - LOCATION
LOCATION: NECK
LOCATION: NECK;SHOULDER
LOCATION: NECK;SHOULDER
LOCATION: NECK

## 2023-10-15 ASSESSMENT — PAIN DESCRIPTION - DESCRIPTORS
DESCRIPTORS: ACHING;CRAMPING
DESCRIPTORS: CRAMPING
DESCRIPTORS: CRAMPING
DESCRIPTORS: ACHING;CRAMPING
DESCRIPTORS: CRAMPING

## 2023-10-15 ASSESSMENT — PAIN DESCRIPTION - PAIN TYPE
TYPE: ACUTE PAIN
TYPE: ACUTE PAIN

## 2023-10-15 ASSESSMENT — PAIN - FUNCTIONAL ASSESSMENT
PAIN_FUNCTIONAL_ASSESSMENT: PREVENTS OR INTERFERES SOME ACTIVE ACTIVITIES AND ADLS

## 2023-10-15 ASSESSMENT — PAIN DESCRIPTION - ONSET
ONSET: ON-GOING
ONSET: ON-GOING

## 2023-10-15 ASSESSMENT — PAIN DESCRIPTION - FREQUENCY: FREQUENCY: INTERMITTENT

## 2023-10-15 NOTE — PLAN OF CARE
Problem: Discharge Planning  Goal: Discharge to home or other facility with appropriate resources  Outcome: Progressing     Problem: Safety - Adult  Goal: Free from fall injury  Outcome: Progressing     Problem: Pain  Goal: Verbalizes/displays adequate comfort level or baseline comfort level  Outcome: Progressing     Problem: Cardiovascular - Adult  Goal: Maintains optimal cardiac output and hemodynamic stability  Outcome: Not Progressing     Problem: Cardiovascular - Adult  Goal: Absence of cardiac dysrhythmias or at baseline

## 2023-10-15 NOTE — PROGRESS NOTES
Dr. Cortney King notified tby secure voice mail that blood pressure 78/50  HR 60  IVF NS 75 cc hr. Metoprolol not given this evening, but amiodarone given with HS medications.

## 2023-10-15 NOTE — PROGRESS NOTES
At 2057  B/p 82/64   HR  65    Amiodarone given  Metoprolol held   Repeat B/p now 99/63   HR  65. Patient given tylenol for right neck pain  He states it feels like a cramp. Like he slept on his neck wrong. He denies radiation to arm back or chest. IVF at 75 cc hr have been initiated as per Dr. Elana Callahan.  Geovanni Crowe RN  10/14/2023

## 2023-10-15 NOTE — H&P
Alvin Nina MD [address] [place] [address]  Internal medicine   History and Physical      CHIEF COMPLAINT: Syncope      HISTORY OF PRESENT ILLNESS:    10/15/2023  Patient was seen on the floor earlier this morning at the main campus of [address] [place] [address] reviewed in detail with the patient  Spoke with the ER physician at the time of admission  59-year-old -American man well-known to me from previous hospitalizations  Patient with nonischemic dilated cardiomyopathy and atrial and ventricular arrhythmias status post AICD placement  Yesterday he developed right-sided neck pain and was trying to figure out if it was a cardiac  And he became concerned and try to call EMS and stood up and collapsed to the floor  He was brought to emergency room  There was no AICD discharges reported  Low blood pressures and high creatinine was noted  He denied anterior chest pain or shortness of breath  Admitted for further management  QTc prolongation on chronic basis noted although it is worse currently    Past Medical History:    Past Medical History:   Diagnosis Date    Acute CHF ([address]) 11/29/2011    Acute CHF ([address]) 12/26/2011    Acute idiopathic gout of right foot 08/13/2016    AF (atrial fibrillation) ([address]) 02/2020    Anemia 11/29/2011    CAD (coronary artery disease)     cardioversion 06/26/2023    dR Diaz    Cerebral artery occlusion with cerebral infarction ([address])     CKD (chronic kidney disease)     Gout     HTN (hypertension) 11/29/2011    Hyperlipidemia     Hypertension     Morbid obesity due to excess calories ([address])     Nonischemic cardiomyopathy ([address]) 11/29/2011    Nonischemic cardiomyopathy ([address]) 07/2013 7/2013- cardiac MRI revealed an LVEF of 20%    ARVIND (obstructive sleep apnea) 11/29/2011    S/P left heart catheterization by percutaneous approach 12-27/2011    Dr Bryon Madrigal    Systolic heart failure [facility]) 05/07/2012 5/7/12- cardiac catheterization revealed an LVEF of 10-15%, mitral regurgitation along with borderline prolapse of mitral valve    Type 2 diabetes mellitus with complication, with long-term current use of insulin ([address]) 08/22/2016    URI, acute 11/29/2011       Past Surgical History:    Past Surgical History:   Procedure Laterality Date    CARDIAC CATHETERIZATION  08/01/2019    Dr Tr Rodriguez  11/20/2018    UPGRADE S-ICD TO BIV ICD   ([employer])  [facility]     CARDIOVERSION  02/14/2020    Successful CV of AF to NSR      (Dr. Whit Nair)    Cori Swati  03/10/2022    Dr. Whit Nair. Diamond Hamman Diamond Hamman V-paced / SR    CARDIOVERSION  06/26/2023    Successful  Dr. Whit Nair    ECHO COMPL W DOP COLOR FLOW  11/30/2011         ECHO COMPL W DOP COLOR FLOW  03/27/2012         INSERTABLE CARDIAC MONITOR  12/13/2018    cardioMIGUELINA luke, Dr. Bronwyn Bay  12/15/2020    SUCCESSFUL OVERDRIVE PACE TERMINATION OF AF VIA ICD    ( [facility])    PACEMAKER PLACEMENT         Medications Prior to Admission:    Medications Prior to Admission: amiodarone (CORDARONE) 200 MG tablet, Take 1 tablet by mouth 2 times daily  nitroGLYCERIN (NITROSTAT) 0.4 MG SL tablet, Place 1 tablet under the tongue every 5 minutes as needed for Chest pain up to max of 3 total doses. If no relief after 1 dose, call 911. vitamin D (ERGOCALCIFEROL) 1.25 MG (35247 UT) CAPS capsule, Take 1 capsule by mouth once a week Given Monday. Multiple Vitamins-Minerals (THERAPEUTIC MULTIVITAMIN-MINERALS) tablet, Take 1 tablet by mouth daily  NONFORMULARY, Take by mouth daily Lion's Oswaldo Supplement  rivaroxaban (XARELTO) 15 MG TABS tablet, Take 1 tablet by mouth daily (with breakfast)  spironolactone (ALDACTONE) 25 MG tablet, Take 1 tablet by mouth daily  potassium chloride (KLOR-CON M) 20 MEQ extended release tablet, Take 1 tablet by mouth daily  bumetanide (BUMEX) 2 MG tablet, Take 1 tablet by mouth See Admin Instructions Given Monday, Wednesday, Friday.   sacubitril-valsartan (ENTRESTO) 24-26 MG per tablet, Take 1 tablet by mouth 2 times daily  atorvastatin (LIPITOR) 40

## 2023-10-15 NOTE — PROGRESS NOTES
Dr. Jaylan Thornton notified of VS and low B/p  and slow heart rate throughout the shift. He states he no additional orders at this time.

## 2023-10-16 LAB
ALBUMIN SERPL-MCNC: 4.3 G/DL (ref 3.5–5.2)
ALP SERPL-CCNC: 123 U/L (ref 40–129)
ALT SERPL-CCNC: 66 U/L (ref 0–40)
ANION GAP SERPL CALCULATED.3IONS-SCNC: 14 MMOL/L (ref 7–16)
AST SERPL-CCNC: 35 U/L (ref 0–39)
BASOPHILS # BLD: 0.03 K/UL (ref 0–0.2)
BASOPHILS NFR BLD: 1 % (ref 0–2)
BILIRUB SERPL-MCNC: 1 MG/DL (ref 0–1.2)
BUN SERPL-MCNC: 56 MG/DL (ref 6–20)
CALCIUM SERPL-MCNC: 9.4 MG/DL (ref 8.6–10.2)
CHLORIDE SERPL-SCNC: 102 MMOL/L (ref 98–107)
CO2 SERPL-SCNC: 20 MMOL/L (ref 22–29)
CREAT SERPL-MCNC: 3.2 MG/DL (ref 0.7–1.2)
EKG ATRIAL RATE: 55 BPM
EKG ATRIAL RATE: 71 BPM
EKG P AXIS: 61 DEGREES
EKG P AXIS: 73 DEGREES
EKG P-R INTERVAL: 176 MS
EKG Q-T INTERVAL: 616 MS
EKG Q-T INTERVAL: 634 MS
EKG QRS DURATION: 190 MS
EKG QRS DURATION: 202 MS
EKG QTC CALCULATION (BAZETT): 606 MS
EKG QTC CALCULATION (BAZETT): 630 MS
EKG R AXIS: -147 DEGREES
EKG R AXIS: 176 DEGREES
EKG T AXIS: 19 DEGREES
EKG T AXIS: 45 DEGREES
EKG VENTRICULAR RATE: 55 BPM
EKG VENTRICULAR RATE: 63 BPM
EOSINOPHIL # BLD: 0.22 K/UL (ref 0.05–0.5)
EOSINOPHILS RELATIVE PERCENT: 5 % (ref 0–6)
ERYTHROCYTE [DISTWIDTH] IN BLOOD BY AUTOMATED COUNT: 16.7 % (ref 11.5–15)
GFR SERPL CREATININE-BSD FRML MDRD: 24 ML/MIN/1.73M2
GLUCOSE SERPL-MCNC: 91 MG/DL (ref 74–99)
HCT VFR BLD AUTO: 35.5 % (ref 37–54)
HGB BLD-MCNC: 11.1 G/DL (ref 12.5–16.5)
IMM GRANULOCYTES # BLD AUTO: <0.03 K/UL (ref 0–0.58)
IMM GRANULOCYTES NFR BLD: 0 % (ref 0–5)
LYMPHOCYTES NFR BLD: 1.28 K/UL (ref 1.5–4)
LYMPHOCYTES RELATIVE PERCENT: 28 % (ref 20–42)
MCH RBC QN AUTO: 27.2 PG (ref 26–35)
MCHC RBC AUTO-ENTMCNC: 31.3 G/DL (ref 32–34.5)
MCV RBC AUTO: 87 FL (ref 80–99.9)
MONOCYTES NFR BLD: 0.66 K/UL (ref 0.1–0.95)
MONOCYTES NFR BLD: 15 % (ref 2–12)
NEUTROPHILS NFR BLD: 51 % (ref 43–80)
NEUTS SEG NFR BLD: 2.31 K/UL (ref 1.8–7.3)
PLATELET # BLD AUTO: 247 K/UL (ref 130–450)
PMV BLD AUTO: 9.9 FL (ref 7–12)
POTASSIUM SERPL-SCNC: 4 MMOL/L (ref 3.5–5)
PROT SERPL-MCNC: 7.8 G/DL (ref 6.4–8.3)
RBC # BLD AUTO: 4.08 M/UL (ref 3.8–5.8)
SODIUM SERPL-SCNC: 136 MMOL/L (ref 132–146)
WBC OTHER # BLD: 4.5 K/UL (ref 4.5–11.5)

## 2023-10-16 PROCEDURE — 36415 COLL VENOUS BLD VENIPUNCTURE: CPT

## 2023-10-16 PROCEDURE — 99233 SBSQ HOSP IP/OBS HIGH 50: CPT | Performed by: INTERNAL MEDICINE

## 2023-10-16 PROCEDURE — 6370000000 HC RX 637 (ALT 250 FOR IP): Performed by: INTERNAL MEDICINE

## 2023-10-16 PROCEDURE — 94660 CPAP INITIATION&MGMT: CPT

## 2023-10-16 PROCEDURE — 1200000000 HC SEMI PRIVATE

## 2023-10-16 PROCEDURE — 93306 TTE W/DOPPLER COMPLETE: CPT

## 2023-10-16 PROCEDURE — 80053 COMPREHEN METABOLIC PANEL: CPT

## 2023-10-16 PROCEDURE — 85025 COMPLETE CBC W/AUTO DIFF WBC: CPT

## 2023-10-16 RX ADMIN — AMIODARONE HYDROCHLORIDE 200 MG: 200 TABLET ORAL at 08:41

## 2023-10-16 RX ADMIN — ERGOCALCIFEROL 50000 UNITS: 1.25 CAPSULE ORAL at 08:45

## 2023-10-16 RX ADMIN — METOPROLOL SUCCINATE 25 MG: 25 TABLET, EXTENDED RELEASE ORAL at 21:44

## 2023-10-16 RX ADMIN — MIDODRINE HYDROCHLORIDE 5 MG: 5 TABLET ORAL at 17:00

## 2023-10-16 RX ADMIN — MIDODRINE HYDROCHLORIDE 5 MG: 5 TABLET ORAL at 08:41

## 2023-10-16 RX ADMIN — SODIUM BICARBONATE 1300 MG: 650 TABLET ORAL at 08:42

## 2023-10-16 RX ADMIN — RIVAROXABAN 15 MG: 15 TABLET, FILM COATED ORAL at 08:41

## 2023-10-16 RX ADMIN — ATORVASTATIN CALCIUM 40 MG: 40 TABLET, FILM COATED ORAL at 21:44

## 2023-10-16 RX ADMIN — MIDODRINE HYDROCHLORIDE 5 MG: 5 TABLET ORAL at 12:22

## 2023-10-16 RX ADMIN — HYDROCODONE BITARTRATE AND ACETAMINOPHEN 1 TABLET: 5; 325 TABLET ORAL at 21:44

## 2023-10-16 RX ADMIN — SODIUM BICARBONATE 1300 MG: 650 TABLET ORAL at 21:44

## 2023-10-16 ASSESSMENT — PAIN SCALES - GENERAL
PAINLEVEL_OUTOF10: 1
PAINLEVEL_OUTOF10: 6
PAINLEVEL_OUTOF10: 2
PAINLEVEL_OUTOF10: 1
PAINLEVEL_OUTOF10: 2

## 2023-10-16 ASSESSMENT — PAIN - FUNCTIONAL ASSESSMENT: PAIN_FUNCTIONAL_ASSESSMENT: ACTIVITIES ARE NOT PREVENTED

## 2023-10-16 ASSESSMENT — PAIN DESCRIPTION - ORIENTATION: ORIENTATION: LEFT

## 2023-10-16 ASSESSMENT — PAIN DESCRIPTION - LOCATION
LOCATION: NECK
LOCATION: SHOULDER

## 2023-10-16 ASSESSMENT — PAIN DESCRIPTION - DESCRIPTORS: DESCRIPTORS: ACHING;DISCOMFORT;DULL

## 2023-10-16 NOTE — PROGRESS NOTES
Thomas Lemus MD FACP  Internal medicine  Progress Notes      CHIEF COMPLAINT: Syncope      HISTORY OF PRESENT ILLNESS:    10/15/2023  Patient was seen on the floor earlier this morning at the main campus of [address] [place] [address] reviewed in detail with the patient  Spoke with the ER physician at the time of admission  72-year-old -American man well-known to me from previous hospitalizations  Patient with nonischemic dilated cardiomyopathy and atrial and ventricular arrhythmias status post AICD placement  Yesterday he developed right-sided neck pain and was trying to figure out if it was a cardiac  And he became concerned and try to call EMS and stood up and collapsed to the floor  He was brought to emergency room  There was no AICD discharges reported  Low blood pressures and high creatinine was noted  He denied anterior chest pain or shortness of breath  Admitted for further management  QTc prolongation on chronic basis noted although it is worse currently  10/16/2023  Patient was seen on the floor earlier this morning  No acute events overnight  Overall he feels better  Pain control adequate  Past Medical History:    Past Medical History:   Diagnosis Date    Acute CHF ([address]) 11/29/2011    Acute CHF ([address]) 12/26/2011    Acute idiopathic gout of right foot 08/13/2016    AF (atrial fibrillation) ([address]) 02/2020    Anemia 11/29/2011    CAD (coronary artery disease)     cardioversion 06/26/2023    dR Diaz    Cerebral artery occlusion with cerebral infarction ([address])     CKD (chronic kidney disease)     Gout     HTN (hypertension) 11/29/2011    Hyperlipidemia     Hypertension     Morbid obesity due to excess calories ([address])     Nonischemic cardiomyopathy ([address]) 11/29/2011    Nonischemic cardiomyopathy ([address]) 07/2013 7/2013- cardiac MRI revealed an LVEF of 20%    ARVIND (obstructive sleep apnea) 11/29/2011    S/P left heart catheterization by percutaneous approach 12-27/2011    Dr Momo Haley    Systolic heart failure

## 2023-10-16 NOTE — PLAN OF CARE
Problem: Discharge Planning  Goal: Discharge to home or other facility with appropriate resources  10/16/2023 0118 by Ant Sharpe RN  Outcome: Progressing     Problem: Safety - Adult  Goal: Free from fall injury  10/16/2023 0118 by Ant Sharpe RN  Outcome: Progressing     Problem: Cardiovascular - Adult  Goal: Absence of cardiac dysrhythmias or at baseline  10/16/2023 0118 by Nichole Mcginnis RN  Outcome: Progressing     Problem: Metabolic/Fluid and Electrolytes - Adult  Goal: Hemodynamic stability and optimal renal function maintained  10/16/2023 0118 by Ant Sharpe RN  Outcome: Progressing     Problem: Chronic Conditions and Co-morbidities  Goal: Patient's chronic conditions and co-morbidity symptoms are monitored and maintained or improved  10/16/2023 0118 by Ant Sharpe RN  Outcome: Progressing

## 2023-10-17 LAB
ALBUMIN SERPL-MCNC: 3.9 G/DL (ref 3.5–5.2)
ALP SERPL-CCNC: 116 U/L (ref 40–129)
ALT SERPL-CCNC: 59 U/L (ref 0–40)
ANION GAP SERPL CALCULATED.3IONS-SCNC: 13 MMOL/L (ref 7–16)
AST SERPL-CCNC: 32 U/L (ref 0–39)
BASOPHILS # BLD: 0.04 K/UL (ref 0–0.2)
BASOPHILS NFR BLD: 1 % (ref 0–2)
BILIRUB SERPL-MCNC: 0.9 MG/DL (ref 0–1.2)
BUN SERPL-MCNC: 50 MG/DL (ref 6–20)
CALCIUM SERPL-MCNC: 9.3 MG/DL (ref 8.6–10.2)
CHLORIDE SERPL-SCNC: 106 MMOL/L (ref 98–107)
CO2 SERPL-SCNC: 20 MMOL/L (ref 22–29)
CREAT SERPL-MCNC: 2.8 MG/DL (ref 0.7–1.2)
EOSINOPHIL # BLD: 0.18 K/UL (ref 0.05–0.5)
EOSINOPHILS RELATIVE PERCENT: 4 % (ref 0–6)
ERYTHROCYTE [DISTWIDTH] IN BLOOD BY AUTOMATED COUNT: 17.2 % (ref 11.5–15)
GFR SERPL CREATININE-BSD FRML MDRD: 27 ML/MIN/1.73M2
GLUCOSE SERPL-MCNC: 77 MG/DL (ref 74–99)
HCT VFR BLD AUTO: 30 % (ref 37–54)
HGB BLD-MCNC: 9.6 G/DL (ref 12.5–16.5)
IMM GRANULOCYTES # BLD AUTO: <0.03 K/UL (ref 0–0.58)
IMM GRANULOCYTES NFR BLD: 1 % (ref 0–5)
LYMPHOCYTES NFR BLD: 1.24 K/UL (ref 1.5–4)
LYMPHOCYTES RELATIVE PERCENT: 28 % (ref 20–42)
MCH RBC QN AUTO: 27.3 PG (ref 26–35)
MCHC RBC AUTO-ENTMCNC: 32 G/DL (ref 32–34.5)
MCV RBC AUTO: 85.2 FL (ref 80–99.9)
MONOCYTES NFR BLD: 0.68 K/UL (ref 0.1–0.95)
MONOCYTES NFR BLD: 16 % (ref 2–12)
NEUTROPHILS NFR BLD: 51 % (ref 43–80)
NEUTS SEG NFR BLD: 2.21 K/UL (ref 1.8–7.3)
PLATELET # BLD AUTO: 207 K/UL (ref 130–450)
PMV BLD AUTO: 10.1 FL (ref 7–12)
POTASSIUM SERPL-SCNC: 4.2 MMOL/L (ref 3.5–5)
PROT SERPL-MCNC: 6.8 G/DL (ref 6.4–8.3)
RBC # BLD AUTO: 3.52 M/UL (ref 3.8–5.8)
SODIUM SERPL-SCNC: 139 MMOL/L (ref 132–146)
WBC OTHER # BLD: 4.4 K/UL (ref 4.5–11.5)

## 2023-10-17 PROCEDURE — 99232 SBSQ HOSP IP/OBS MODERATE 35: CPT | Performed by: INTERNAL MEDICINE

## 2023-10-17 PROCEDURE — 80053 COMPREHEN METABOLIC PANEL: CPT

## 2023-10-17 PROCEDURE — 36415 COLL VENOUS BLD VENIPUNCTURE: CPT

## 2023-10-17 PROCEDURE — 6370000000 HC RX 637 (ALT 250 FOR IP): Performed by: INTERNAL MEDICINE

## 2023-10-17 PROCEDURE — APPSS60 APP SPLIT SHARED TIME 46-60 MINUTES: Performed by: NURSE PRACTITIONER

## 2023-10-17 PROCEDURE — 1200000000 HC SEMI PRIVATE

## 2023-10-17 PROCEDURE — 94660 CPAP INITIATION&MGMT: CPT

## 2023-10-17 PROCEDURE — 85025 COMPLETE CBC W/AUTO DIFF WBC: CPT

## 2023-10-17 RX ADMIN — MIDODRINE HYDROCHLORIDE 5 MG: 5 TABLET ORAL at 17:50

## 2023-10-17 RX ADMIN — MULTIPLE VITAMINS W/ MINERALS TAB 1 TABLET: TAB at 09:43

## 2023-10-17 RX ADMIN — MIDODRINE HYDROCHLORIDE 5 MG: 5 TABLET ORAL at 12:00

## 2023-10-17 RX ADMIN — SODIUM BICARBONATE 1300 MG: 650 TABLET ORAL at 08:00

## 2023-10-17 RX ADMIN — AMIODARONE HYDROCHLORIDE 200 MG: 200 TABLET ORAL at 09:43

## 2023-10-17 RX ADMIN — MIDODRINE HYDROCHLORIDE 5 MG: 5 TABLET ORAL at 09:43

## 2023-10-17 RX ADMIN — ATORVASTATIN CALCIUM 40 MG: 40 TABLET, FILM COATED ORAL at 21:16

## 2023-10-17 RX ADMIN — METOPROLOL SUCCINATE 25 MG: 25 TABLET, EXTENDED RELEASE ORAL at 21:16

## 2023-10-17 RX ADMIN — SODIUM BICARBONATE 1300 MG: 650 TABLET ORAL at 21:16

## 2023-10-17 RX ADMIN — RIVAROXABAN 15 MG: 15 TABLET, FILM COATED ORAL at 09:00

## 2023-10-17 RX ADMIN — HYDROCODONE BITARTRATE AND ACETAMINOPHEN 1 TABLET: 5; 325 TABLET ORAL at 21:18

## 2023-10-17 ASSESSMENT — PAIN DESCRIPTION - ORIENTATION: ORIENTATION: MID

## 2023-10-17 ASSESSMENT — PAIN SCALES - GENERAL
PAINLEVEL_OUTOF10: 1
PAINLEVEL_OUTOF10: 6

## 2023-10-17 ASSESSMENT — PAIN DESCRIPTION - DESCRIPTORS: DESCRIPTORS: CRAMPING

## 2023-10-17 ASSESSMENT — PAIN DESCRIPTION - LOCATION: LOCATION: SHOULDER

## 2023-10-17 NOTE — CARE COORDINATION
level: Independent in ADLs/IADLs    PT AM-PAC:   /24  OT AM-PAC:   /24    Family can provide assistance at DC: Yes  Would you like Case Management to discuss the discharge plan with any other family members/significant others, and if so, who? No  Plans to Return to Present Housing: Yes  Other Identified Issues/Barriers to RETURNING to current housing: none   Potential Assistance needed at discharge: N/A            Potential DME:    Patient expects to discharge to: [address] [place] [address] Road for transportation at discharge:      Financial    Payor: Julio César Haji / Plan: Bushra Rodríguez / Product Type: *No Product type* /     Does insurance require precert for SNF: Yes    Potential assistance Purchasing Medications:    Meds-to-Beds request:        [facility], [address] [facility] [address] [facility] [address] [facility] [address]  Phone: [phone] Fax: [phone] [facility] #[id number], [address] [place] [address] [phone] [facility] [phone]  [address] [place] [id number]  Phone: [phone] Fax: [phone]      Notes:    Factors facilitating achievement of predicted outcomes: Family support    Barriers to discharge: Medical complications    Additional Case Management Notes: see notes     The Plan for Transition of Care is related to the following treatment goals of Chest pain [R07.9]  Chest pain, unspecified type [R07.9]  Syncope and collapse [P13]    IF APPLICABLE: The Patient and/or patient representative Marietta Novoa and his family were provided with a choice of provider and agrees with the discharge plan. Freedom of choice list with basic dialogue that supports the patient's individualized plan of care/goals and shares the quality data associated with the providers was provided to:     Patient Representative Name:       The Patient and/or Patient Representative Agree with the Discharge Plan?       Ariel Sarabia RN  Case Management Department  Ph: [phone] Fax:

## 2023-10-17 NOTE — PLAN OF CARE
Can you send her 1 valium to take before procedure with Dr COREA Monday morning please.    Problem: Discharge Planning  Goal: Discharge to home or other facility with appropriate resources  Outcome: Progressing     Problem: Safety - Adult  Goal: Free from fall injury  Outcome: Progressing     Problem: Pain  Goal: Verbalizes/displays adequate comfort level or baseline comfort level  Outcome: Progressing     Problem: Cardiovascular - Adult  Goal: Maintains optimal cardiac output and hemodynamic stability  Outcome: Progressing     Problem: Cardiovascular - Adult  Goal: Absence of cardiac dysrhythmias or at baseline  Outcome: Progressing     Problem: Metabolic/Fluid and Electrolytes - Adult  Goal: Hemodynamic stability and optimal renal function maintained  Outcome: Progressing     Problem: Chronic Conditions and Co-morbidities  Goal: Patient's chronic conditions and co-morbidity symptoms are monitored and maintained or improved  Outcome: Progressing     Problem: ABCDS Injury Assessment  Goal: Absence of physical injury  Outcome: Progressing

## 2023-10-18 LAB
ANION GAP SERPL CALCULATED.3IONS-SCNC: 21 MMOL/L (ref 7–16)
BUN SERPL-MCNC: 54 MG/DL (ref 6–20)
CALCIUM SERPL-MCNC: 9.4 MG/DL (ref 8.6–10.2)
CHLORIDE SERPL-SCNC: 105 MMOL/L (ref 98–107)
CHLORIDE UR-SCNC: 12 MMOL/L
CO2 SERPL-SCNC: 16 MMOL/L (ref 22–29)
CREAT SERPL-MCNC: 3.3 MG/DL (ref 0.7–1.2)
CREAT UR-MCNC: 240.7 MG/DL (ref 40–278)
GFR SERPL CREATININE-BSD FRML MDRD: 23 ML/MIN/1.73M2
GLUCOSE SERPL-MCNC: 89 MG/DL (ref 74–99)
POTASSIUM SERPL-SCNC: 4.5 MMOL/L (ref 3.5–5)
SODIUM SERPL-SCNC: 142 MMOL/L (ref 132–146)
SODIUM UR-SCNC: <20 MMOL/L
UUN UR-MCNC: 1084 MG/DL (ref 800–1666)

## 2023-10-18 PROCEDURE — 36415 COLL VENOUS BLD VENIPUNCTURE: CPT

## 2023-10-18 PROCEDURE — 6370000000 HC RX 637 (ALT 250 FOR IP): Performed by: INTERNAL MEDICINE

## 2023-10-18 PROCEDURE — 82570 ASSAY OF URINE CREATININE: CPT

## 2023-10-18 PROCEDURE — 82436 ASSAY OF URINE CHLORIDE: CPT

## 2023-10-18 PROCEDURE — 1200000000 HC SEMI PRIVATE

## 2023-10-18 PROCEDURE — 94660 CPAP INITIATION&MGMT: CPT

## 2023-10-18 PROCEDURE — 84300 ASSAY OF URINE SODIUM: CPT

## 2023-10-18 PROCEDURE — 84540 ASSAY OF URINE/UREA-N: CPT

## 2023-10-18 PROCEDURE — 80048 BASIC METABOLIC PNL TOTAL CA: CPT

## 2023-10-18 RX ADMIN — HYDROCODONE BITARTRATE AND ACETAMINOPHEN 1 TABLET: 5; 325 TABLET ORAL at 22:22

## 2023-10-18 RX ADMIN — AMIODARONE HYDROCHLORIDE 200 MG: 200 TABLET ORAL at 08:22

## 2023-10-18 RX ADMIN — METOPROLOL SUCCINATE 25 MG: 25 TABLET, EXTENDED RELEASE ORAL at 20:56

## 2023-10-18 RX ADMIN — SODIUM BICARBONATE 1300 MG: 650 TABLET ORAL at 20:56

## 2023-10-18 RX ADMIN — SODIUM BICARBONATE 1300 MG: 650 TABLET ORAL at 08:21

## 2023-10-18 RX ADMIN — MIDODRINE HYDROCHLORIDE 5 MG: 5 TABLET ORAL at 17:12

## 2023-10-18 RX ADMIN — MIDODRINE HYDROCHLORIDE 5 MG: 5 TABLET ORAL at 08:21

## 2023-10-18 RX ADMIN — ATORVASTATIN CALCIUM 40 MG: 40 TABLET, FILM COATED ORAL at 20:57

## 2023-10-18 RX ADMIN — MULTIPLE VITAMINS W/ MINERALS TAB 1 TABLET: TAB at 08:21

## 2023-10-18 RX ADMIN — RIVAROXABAN 15 MG: 15 TABLET, FILM COATED ORAL at 08:22

## 2023-10-18 RX ADMIN — MIDODRINE HYDROCHLORIDE 5 MG: 5 TABLET ORAL at 11:49

## 2023-10-18 ASSESSMENT — PAIN - FUNCTIONAL ASSESSMENT: PAIN_FUNCTIONAL_ASSESSMENT: ACTIVITIES ARE NOT PREVENTED

## 2023-10-18 ASSESSMENT — PAIN SCALES - GENERAL
PAINLEVEL_OUTOF10: 6
PAINLEVEL_OUTOF10: 0
PAINLEVEL_OUTOF10: 0

## 2023-10-18 ASSESSMENT — PAIN DESCRIPTION - DESCRIPTORS: DESCRIPTORS: CRAMPING

## 2023-10-18 ASSESSMENT — PAIN DESCRIPTION - ORIENTATION: ORIENTATION: RIGHT

## 2023-10-18 ASSESSMENT — PAIN DESCRIPTION - LOCATION: LOCATION: SHOULDER

## 2023-10-18 NOTE — PROGRESS NOTES
Thuy Caba MD [address] [place] [address]  Internal medicine  Progress Notes      CHIEF COMPLAINT: Syncope      HISTORY OF PRESENT ILLNESS:    10/15/2023  Patient was seen on the floor earlier this morning at the main campus of [address] [place] [address] reviewed in detail with the patient  Spoke with the ER physician at the time of admission  45-year-old -American man well-known to me from previous hospitalizations  Patient with nonischemic dilated cardiomyopathy and atrial and ventricular arrhythmias status post AICD placement  Yesterday he developed right-sided neck pain and was trying to figure out if it was a cardiac  And he became concerned and try to call EMS and stood up and collapsed to the floor  He was brought to emergency room  There was no AICD discharges reported  Low blood pressures and high creatinine was noted  He denied anterior chest pain or shortness of breath  Admitted for further management  QTc prolongation on chronic basis noted although it is worse currently  10/16/2023  Patient was seen on the floor earlier this morning  No acute events overnight  Overall he feels better  Pain control adequate  10/17/2023  Patient was seen on the floor earlier this morning  Remains asymptomatic  No acute events overnight  10/18/2023  Patient was seen on the floor earlier this morning  Denies chest pain or dyspnea  No acute events overnight  Past Medical History:    Past Medical History:   Diagnosis Date    Acute CHF ([address]) 11/29/2011    Acute CHF ([address]) 12/26/2011    Acute idiopathic gout of right foot 08/13/2016    AF (atrial fibrillation) ([address]) 02/2020    Anemia 11/29/2011    CAD (coronary artery disease)     cardioversion 06/26/2023    dR Diaz    Cerebral artery occlusion with cerebral infarction ([address])     CKD (chronic kidney disease)     Gout     HTN (hypertension) 11/29/2011    Hyperlipidemia     Hypertension     Morbid obesity due to excess calories ([address])     Nonischemic cardiomyopathy ([address]) 11/29/2011    Nonischemic

## 2023-10-18 NOTE — CONSULTS
3.3*   BUN 56* 50* 54*   LABGLOM 24* 27* 23*   GLUCOSE 91 77 89   CALCIUM 9.4 9.3 9.4       Vit D, 25-Hydroxy   Date Value Ref Range Status   04/28/2023 47 30 - 100 ng/mL Final     Comment:     <20 ng/mL. ........... Darol Cocker Deficient  20-30 ng/mL. ......... Darol Cocker Insufficient   ng/mL. ........ Darol Cocker Sufficient  >100 ng/mL. .......... Darol Cocker Toxic         PTH   Date Value Ref Range Status   02/14/2023 59 15 - 65 pg/mL Final       Recent Labs     10/16/23  0546 10/17/23  0556   ALT 66* 59*   AST 35 32   ALKPHOS 123 116   BILITOT 1.0 0.9       Recent Labs     10/16/23  0546 10/17/23  0556   LABALBU 4.3 3.9       Ferritin   Date Value Ref Range Status   07/13/2023 226 ng/mL Final     Comment:     FERRITIN Reference Ranges:  Adult Males   20 - 60 years:    30 - 400 ng/mL  Adult females 16 - 61 years:    15 - 150 ng/mL  Adults greater than 60 years:   no established reference range  Pediatrics:                     no established reference range       Iron   Date Value Ref Range Status   07/13/2023 53 (L) 59 - 158 mcg/dL Final     TIBC   Date Value Ref Range Status   03/15/2020 247 (L) 250 - 450 mcg/dL Final       Vitamin B-12   Date Value Ref Range Status   07/13/2023 354 211 - 946 pg/mL Final       Folate   Date Value Ref Range Status   07/13/2023 6.7 4.8 - 24.2 ng/mL Final       Lab Results   Component Value Date/Time    COLORU Yellow 09/06/2023 01:20 PM    NITRU NEGATIVE 09/06/2023 01:20 PM    GLUCOSEU NEGATIVE 09/06/2023 01:20 PM    KETUA NEGATIVE 09/06/2023 01:20 PM    UROBILINOGEN 0.2 09/06/2023 01:20 PM    BILIRUBINUR NEGATIVE 09/06/2023 01:20 PM       Lab Results   Component Value Date/Time    OSMOU 412 04/04/2023 08:30 PM       No components found for: \"URIC\"    No results found for: \"LIPIDPAN\"    Assessment and Plans:     TAIWO on CKD 4  TAIWO presumed secondary to diuretics and exacerbated Entresto; patient with history of HFrEF  Baseline creatinine 2.7-3  Creatinine peaked at 3.8 on 10/14--> 3.3 today  Check urine studies  Bladder scan Avoid nephrotoxins/NSAIDs  Entresto and diuretics on hold for now  On IV fluids  Monitor labs    2. Chest pain  Troponin 71 on 10/14  CXR 10/14 questionable groundglass infiltrate within the right lung base, cardiomegaly, no findings of CHF  Previously seen by EP and cardiology    3. History of HFrEF  echo 10/16/2023 - EF of 20-25%, mild concentric left ventricular hypertrophy, mild-mod tricuspid regurg  Bumex, Entresto, spironolactone on hold for now  Strict I&O, daily weights  Previously seen by cardiology  Monitor volume status    4. High anion gap metabolic acidosis  In the setting of TAIWO/CKD  Anion gap 21 with CO2 16 on 10/18  On oral sodium bicarbonate 1300 mg twice daily  Monitor labs    5. Intermittent hypotension  On midodrine 5 mg oral 3 times daily  Monitor Bps    6. Anemia   In part due to CKD  Hemoglobin target 10-12  Hemoglobin 9.6 on 10/17  Transfuse for hemoglobin<7  Monitor H&H    7.  History of atrial fibrillation   on Xarelto, metoprolol, amiodarone     NATI Reyes - CNP    Patient seen and examined all key components of the physical performed independently , case discussed with NP, all pertinent labs and radiologic tests personally reviewed agree with above.       Ubaldo Sr MD

## 2023-10-19 VITALS
SYSTOLIC BLOOD PRESSURE: 110 MMHG | TEMPERATURE: 98.3 F | WEIGHT: 236 LBS | OXYGEN SATURATION: 99 % | RESPIRATION RATE: 18 BRPM | DIASTOLIC BLOOD PRESSURE: 77 MMHG | BODY MASS INDEX: 31.97 KG/M2 | HEART RATE: 64 BPM | HEIGHT: 72 IN

## 2023-10-19 LAB
ANION GAP SERPL CALCULATED.3IONS-SCNC: 18 MMOL/L (ref 7–16)
BUN SERPL-MCNC: 54 MG/DL (ref 6–20)
CALCIUM SERPL-MCNC: 10 MG/DL (ref 8.6–10.2)
CHLORIDE SERPL-SCNC: 103 MMOL/L (ref 98–107)
CO2 SERPL-SCNC: 16 MMOL/L (ref 22–29)
CREAT SERPL-MCNC: 3.4 MG/DL (ref 0.7–1.2)
ERYTHROCYTE [DISTWIDTH] IN BLOOD BY AUTOMATED COUNT: 17.3 % (ref 11.5–15)
GFR SERPL CREATININE-BSD FRML MDRD: 22 ML/MIN/1.73M2
GLUCOSE SERPL-MCNC: 98 MG/DL (ref 74–99)
HCT VFR BLD AUTO: 32.4 % (ref 37–54)
HGB BLD-MCNC: 10 G/DL (ref 12.5–16.5)
MAGNESIUM SERPL-MCNC: 2.5 MG/DL (ref 1.6–2.6)
MCH RBC QN AUTO: 27.4 PG (ref 26–35)
MCHC RBC AUTO-ENTMCNC: 30.9 G/DL (ref 32–34.5)
MCV RBC AUTO: 88.8 FL (ref 80–99.9)
PHOSPHATE SERPL-MCNC: 3.6 MG/DL (ref 2.5–4.5)
PLATELET # BLD AUTO: 221 K/UL (ref 130–450)
PMV BLD AUTO: 9.8 FL (ref 7–12)
POTASSIUM SERPL-SCNC: 4.8 MMOL/L (ref 3.5–5)
RBC # BLD AUTO: 3.65 M/UL (ref 3.8–5.8)
SODIUM SERPL-SCNC: 137 MMOL/L (ref 132–146)
WBC OTHER # BLD: 4.9 K/UL (ref 4.5–11.5)

## 2023-10-19 PROCEDURE — 84100 ASSAY OF PHOSPHORUS: CPT

## 2023-10-19 PROCEDURE — 6370000000 HC RX 637 (ALT 250 FOR IP): Performed by: INTERNAL MEDICINE

## 2023-10-19 PROCEDURE — 36415 COLL VENOUS BLD VENIPUNCTURE: CPT

## 2023-10-19 PROCEDURE — 80048 BASIC METABOLIC PNL TOTAL CA: CPT

## 2023-10-19 PROCEDURE — 83735 ASSAY OF MAGNESIUM: CPT

## 2023-10-19 PROCEDURE — 85027 COMPLETE CBC AUTOMATED: CPT

## 2023-10-19 PROCEDURE — 94660 CPAP INITIATION&MGMT: CPT

## 2023-10-19 RX ORDER — SODIUM BICARBONATE 650 MG/1
1300 TABLET ORAL 2 TIMES DAILY
Qty: 100 TABLET | Refills: 1 | Status: ON HOLD | OUTPATIENT
Start: 2023-10-19

## 2023-10-19 RX ORDER — AMIODARONE HYDROCHLORIDE 200 MG/1
200 TABLET ORAL DAILY
Qty: 30 TABLET | Refills: 1 | Status: ON HOLD | OUTPATIENT
Start: 2023-10-20

## 2023-10-19 RX ORDER — SODIUM BICARBONATE 650 MG/1
1300 TABLET ORAL 2 TIMES DAILY
Qty: 100 TABLET | Refills: 1 | Status: SHIPPED | OUTPATIENT
Start: 2023-10-19 | End: 2023-10-19 | Stop reason: SDUPTHER

## 2023-10-19 RX ORDER — BUMETANIDE 2 MG/1
1 TABLET ORAL
Qty: 30 TABLET | Refills: 3 | Status: SHIPPED | OUTPATIENT
Start: 2023-10-20 | End: 2023-10-19 | Stop reason: SDUPTHER

## 2023-10-19 RX ORDER — MIDODRINE HYDROCHLORIDE 5 MG/1
5 TABLET ORAL
Qty: 90 TABLET | Refills: 3 | Status: SHIPPED | OUTPATIENT
Start: 2023-10-19 | End: 2023-10-19 | Stop reason: SDUPTHER

## 2023-10-19 RX ORDER — BUMETANIDE 2 MG/1
1 TABLET ORAL
Qty: 30 TABLET | Refills: 3 | Status: SHIPPED | OUTPATIENT
Start: 2023-10-20 | End: 2023-10-23

## 2023-10-19 RX ORDER — AMIODARONE HYDROCHLORIDE 200 MG/1
200 TABLET ORAL DAILY
COMMUNITY
Start: 2023-10-20 | End: 2023-10-19 | Stop reason: SDUPTHER

## 2023-10-19 RX ORDER — MIDODRINE HYDROCHLORIDE 5 MG/1
5 TABLET ORAL
Qty: 90 TABLET | Refills: 3 | Status: ON HOLD | OUTPATIENT
Start: 2023-10-19

## 2023-10-19 RX ADMIN — MIDODRINE HYDROCHLORIDE 5 MG: 5 TABLET ORAL at 08:38

## 2023-10-19 RX ADMIN — MULTIPLE VITAMINS W/ MINERALS TAB 1 TABLET: TAB at 08:38

## 2023-10-19 RX ADMIN — AMIODARONE HYDROCHLORIDE 200 MG: 200 TABLET ORAL at 08:38

## 2023-10-19 RX ADMIN — RIVAROXABAN 15 MG: 15 TABLET, FILM COATED ORAL at 08:37

## 2023-10-19 RX ADMIN — SODIUM BICARBONATE 1300 MG: 650 TABLET ORAL at 08:37

## 2023-10-19 RX ADMIN — MIDODRINE HYDROCHLORIDE 5 MG: 5 TABLET ORAL at 11:17

## 2023-10-19 NOTE — PLAN OF CARE
Problem: Discharge Planning  Goal: Discharge to home or other facility with appropriate resources  Outcome: Completed     Problem: Safety - Adult  Goal: Free from fall injury  Outcome: Completed     Problem: Pain  Goal: Verbalizes/displays adequate comfort level or baseline comfort level  Outcome: Completed     Problem: Cardiovascular - Adult  Goal: Maintains optimal cardiac output and hemodynamic stability  Outcome: Completed  Goal: Absence of cardiac dysrhythmias or at baseline  Outcome: Completed     Problem: Metabolic/Fluid and Electrolytes - Adult  Goal: Hemodynamic stability and optimal renal function maintained  Outcome: Completed     Problem: Chronic Conditions and Co-morbidities  Goal: Patient's chronic conditions and co-morbidity symptoms are monitored and maintained or improved  Outcome: Completed     Problem: ABCDS Injury Assessment  Goal: Absence of physical injury  Outcome: Completed

## 2023-10-19 NOTE — PROGRESS NOTES
AVS and D/C instructions reviewed with patient -no questions/concerns at this time. [address] [place] [address]  w/Dr. Maddy Parkinson called unit asking to hold d/c for Dr. Maddy Duncan to speak w/Dr. Rudi Diggs. Pt updated, transportation cancelled at this time. [address] [place] [address] w/Dr. Maddy Diggs stated pt is ok to d/c.

## 2023-10-19 NOTE — PROGRESS NOTES
amiodarone  200 mg Oral Daily    metoprolol succinate  25 mg Oral Nightly    atorvastatin  40 mg Oral Daily    [Held by provider] bumetanide  2 mg Oral Once per day on Mon Wed Fri    therapeutic multivitamin-minerals  1 tablet Oral Daily    rivaroxaban  15 mg Oral Daily with breakfast    [Held by provider] sacubitril-valsartan  1 tablet Oral BID    sodium bicarbonate  1,300 mg Oral BID    [Held by provider] spironolactone  25 mg Oral Daily    vitamin D  50,000 Units Oral Once per day on Mon             Meds prn:     HYDROcodone 5 mg - acetaminophen, nitroGLYCERIN, acetaminophen, melatonin, prochlorperazine, perflutren lipid microspheres    Meds prior to admission:     No current facility-administered medications on file prior to encounter. Current Outpatient Medications on File Prior to Encounter   Medication Sig Dispense Refill    amiodarone (CORDARONE) 200 MG tablet Take 1 tablet by mouth 2 times daily      nitroGLYCERIN (NITROSTAT) 0.4 MG SL tablet Place 1 tablet under the tongue every 5 minutes as needed for Chest pain up to max of 3 total doses. If no relief after 1 dose, call 911. vitamin D (ERGOCALCIFEROL) 1.25 MG (61369 UT) CAPS capsule Take 1 capsule by mouth once a week Given Monday. Multiple Vitamins-Minerals (THERAPEUTIC MULTIVITAMIN-MINERALS) tablet Take 1 tablet by mouth daily      NONFORMULARY Take by mouth daily [employer]al[employer] Supplement      rivaroxaban (XARELTO) 15 MG TABS tablet Take 1 tablet by mouth daily (with breakfast)      spironolactone (ALDACTONE) 25 MG tablet Take 1 tablet by mouth daily      potassium chloride (KLOR-CON M) 20 MEQ extended release tablet Take 1 tablet by mouth daily 90 tablet 0    bumetanide (BUMEX) 2 MG tablet Take 1 tablet by mouth See Admin Instructions Given Monday, Wednesday, Friday.       sacubitril-valsartan (ENTRESTO) 24-26 MG per tablet Take 1 tablet by mouth 2 times daily 60 tablet 1    atorvastatin (LIPITOR) 40 MG tablet Take 1 tablet by mouth daily WBC 4.4* 4.9   HGB 9.6* 10.0*   HCT 30.0* 32.4*   MCV 85.2 88.8    221         Recent Labs     10/17/23  0556 10/18/23  0609 10/19/23  0539    142 137   K 4.2 4.5 4.8    105 103   CO2 20* 16* 16*   CREATININE 2.8* 3.3* 3.4*   BUN 50* 54* 54*   LABGLOM 27* 23* 22*   GLUCOSE 77 89 98   CALCIUM 9.3 9.4 10.0   PHOS  --   --  3.6   MG  --   --  2.5         Vit D, 25-Hydroxy   Date Value Ref Range Status   04/28/2023 47 30 - 100 ng/mL Final     Comment:     <20 ng/mL. ........... Dian Pinch Deficient  20-30 ng/mL. ......... Dian Pinch Insufficient   ng/mL. ........ Dian Pinch Sufficient  >100 ng/mL. .......... Dian Pinch Toxic         PTH   Date Value Ref Range Status   02/14/2023 59 15 - 65 pg/mL Final       Recent Labs     10/17/23  0556   ALT 59*   AST 32   ALKPHOS 116   BILITOT 0.9         Recent Labs     10/17/23  0556   LABALBU 3.9         Ferritin   Date Value Ref Range Status   07/13/2023 226 ng/mL Final     Comment:     FERRITIN Reference Ranges:  Adult Males   20 - 60 years:    30 - 400 ng/mL  Adult females 16 - 61 years:    15 - 150 ng/mL  Adults greater than 60 years:   no established reference range  Pediatrics:                     no established reference range       Iron   Date Value Ref Range Status   07/13/2023 53 (L) 59 - 158 mcg/dL Final     TIBC   Date Value Ref Range Status   03/15/2020 247 (L) 250 - 450 mcg/dL Final       Vitamin B-12   Date Value Ref Range Status   07/13/2023 354 211 - 946 pg/mL Final       Folate   Date Value Ref Range Status   07/13/2023 6.7 4.8 - 24.2 ng/mL Final       Lab Results   Component Value Date/Time    COLORU Yellow 09/06/2023 01:20 PM    NITRU NEGATIVE 09/06/2023 01:20 PM    GLUCOSEU NEGATIVE 09/06/2023 01:20 PM    KETUA NEGATIVE 09/06/2023 01:20 PM    UROBILINOGEN 0.2 09/06/2023 01:20 PM    BILIRUBINUR NEGATIVE 09/06/2023 01:20 PM       Lab Results   Component Value Date/Time    DELFINO <20 10/18/2023 09:15 AM    OSMOU 412 04/04/2023 08:30 PM       No components found for: \"URIC\"    No results

## 2023-10-19 NOTE — PROGRESS NOTES
[facility]  [facility]  Progress Notes      CHIEF COMPLAINT: Syncope      HISTORY OF PRESENT ILLNESS:    10/15/2023  Patient was seen on the floor earlier this morning at the main campus [address] reviewed in detail with the patient  Spoke with the ER physician at the time of admission  27-year-old -American man well-known to me from previous hospitalizations  Patient with nonischemic dilated cardiomyopathy and atrial and ventricular arrhythmias status post AICD placement  Yesterday he developed right-sided neck pain and was trying to figure out if it was a cardiac  And he became concerned and try to call EMS and stood up and collapsed to the floor  He was brought to emergency room  There was no AICD discharges reported  Low blood pressures and high creatinine was noted  He denied anterior chest pain or shortness of breath  Admitted for further management  QTc prolongation on chronic basis noted although it is worse currently  10/16/2023  Patient was seen on the floor earlier this morning  No acute events overnight  Overall he feels better  Pain control adequate  10/17/2023  Patient was seen on the floor earlier this morning  Remains asymptomatic  No acute events overnight  10/18/2023  Patient was seen on the floor earlier this morning  Denies chest pain or dyspnea  No acute events overnight  10/19/2023  Patient was seen on the floor earlier this morning  Remains asymptomatic  Diuretics and Entresto are on hold  Creatinine continues to creep up  Otherwise no acute events overnight  Past Medical History:    Past Medical History:   Diagnosis Date    Acute CHF ([address]) 11/29/2011    Acute CHF ([address]) 12/26/2011    Acute idiopathic gout of right foot 08/13/2016    AF (atrial fibrillation) ([address]) 02/2020    Anemia 11/29/2011    CAD (coronary artery disease)     cardioversion 06/26/2023    dR Diaz    Cerebral artery occlusion with cerebral infarction [facility])     CKD (chronic kidney

## 2023-10-19 NOTE — PROGRESS NOTES
Date: 10/18/2023    Time: 9:23 PM    Patient Placed On BIPAP/CPAP/ Non-Invasive Ventilation? Yes    If no must comment. Facial area red/color change? No           If YES are Blister/Lesion present? No   If yes must notify nursing staff  BIPAP/CPAP skin barrier?   Yes    Skin barrier type:mepilexlite       Comments:        Chela Izaguirre RCP

## 2023-10-20 ENCOUNTER — CARE COORDINATION (OUTPATIENT)
Dept: CARE COORDINATION | Age: 43
End: 2023-10-20

## 2023-10-20 RX ORDER — ALLOPURINOL 100 MG/1
100 TABLET ORAL DAILY
COMMUNITY

## 2023-10-20 RX ORDER — ACYCLOVIR 400 MG/1
400 TABLET ORAL DAILY
COMMUNITY

## 2023-10-20 NOTE — CARE COORDINATION
Non MH PCP Care Transitions Initial Follow Up Call    Call within 2 business days of discharge: Yes    Patient Current Location:  Home: [address] [place] [address], [address]    Care Transition Nurse contacted the patient by telephone to perform post hospital discharge assessment. Provided introduction to self, and explanation of the Care Transition Nurse role. Patient: Migdalia Obrien Patient : 1980   MRN: [id number]  Reason for Admission: CP  Discharge Date: 10/19/23 RARS: Readmission Risk Score: 19      Last Discharge Facility       Date Complaint Diagnosis Description Type Department Provider    10/14/23 Chest Pain Chest pain, unspecified type . .. ED to Hosp-Admission (Discharged) (ADMITTED) [facility] Alyssa Scott MD; [place], [place] . .. Was this an external facility discharge? No     Challenges to be reviewed by the provider   Additional needs identified to be addressed with provider: No  none               Method of communication with provider: none. CTN called and spoke with the pt for an initial care transition call post hospital discharge. Pt admitted on 10/14/23 with dx CP. Pt presented to ED with c/o R neck pain that radiated down R arm and syncope. Noted per cardiology CP atypical.    Spoke with pt who states that he is feeling \"fine\" this morning and denies any recurrent CP, neck or R arm pain, or syncope. Pt reports BP this morning was 110/70. He denies any dizziness/lightheadedness, n/v, or sob. Pt states that wt is stable at 233 lbs. He states that this is actually down a little. Pt reports some BLE edema. He did just take his Bumex this morning and is hopeful this well help. CTN reviewed the pt's medications in full. New, changed, and stopped medications reviewed. Confirmed pt has all new rxs from discharge. 1111F not entered, as pt is established with a non MH pcp. CTN reviewed HFU appt information.  Pt prefers to call and schedule f/u appts wit his pcp,

## 2023-10-22 ENCOUNTER — TELEPHONE (OUTPATIENT)
Dept: OTHER | Facility: CLINIC | Age: 43
End: 2023-10-22

## 2023-10-22 ENCOUNTER — APPOINTMENT (OUTPATIENT)
Dept: GENERAL RADIOLOGY | Age: 43
DRG: 291 | End: 2023-10-22
Payer: MEDICARE

## 2023-10-22 ENCOUNTER — HOSPITAL ENCOUNTER (INPATIENT)
Age: 43
LOS: 4 days | Discharge: HOME | DRG: 291 | End: 2023-10-26
Attending: STUDENT IN AN ORGANIZED HEALTH CARE EDUCATION/TRAINING PROGRAM | Admitting: INTERNAL MEDICINE
Payer: MEDICARE

## 2023-10-22 DIAGNOSIS — I50.9 ACUTE ON CHRONIC CONGESTIVE HEART FAILURE, UNSPECIFIED HEART FAILURE TYPE (HCC): ICD-10-CM

## 2023-10-22 DIAGNOSIS — I42.9 CARDIOMYOPATHY, UNSPECIFIED TYPE (HCC): ICD-10-CM

## 2023-10-22 DIAGNOSIS — R07.9 CHEST PAIN, UNSPECIFIED TYPE: Primary | ICD-10-CM

## 2023-10-22 DIAGNOSIS — Z95.9 CARDIAC DEVICE IN SITU: ICD-10-CM

## 2023-10-22 LAB
ALBUMIN SERPL-MCNC: 4.8 G/DL (ref 3.5–5.2)
ALP SERPL-CCNC: 177 U/L (ref 40–129)
ALT SERPL-CCNC: 155 U/L (ref 0–40)
ANION GAP SERPL CALCULATED.3IONS-SCNC: 18 MMOL/L (ref 7–16)
AST SERPL-CCNC: 94 U/L (ref 0–39)
BASOPHILS # BLD: 0.03 K/UL (ref 0–0.2)
BASOPHILS NFR BLD: 1 % (ref 0–2)
BILIRUB SERPL-MCNC: 0.8 MG/DL (ref 0–1.2)
BNP SERPL-MCNC: 5359 PG/ML (ref 0–125)
BUN SERPL-MCNC: 61 MG/DL (ref 6–20)
CALCIUM SERPL-MCNC: 10.1 MG/DL (ref 8.6–10.2)
CHLORIDE SERPL-SCNC: 98 MMOL/L (ref 98–107)
CO2 SERPL-SCNC: 20 MMOL/L (ref 22–29)
CREAT SERPL-MCNC: 3.5 MG/DL (ref 0.7–1.2)
EOSINOPHIL # BLD: 0.1 K/UL (ref 0.05–0.5)
EOSINOPHILS RELATIVE PERCENT: 2 % (ref 0–6)
ERYTHROCYTE [DISTWIDTH] IN BLOOD BY AUTOMATED COUNT: 17.3 % (ref 11.5–15)
GFR SERPL CREATININE-BSD FRML MDRD: 22 ML/MIN/1.73M2
GLUCOSE SERPL-MCNC: 125 MG/DL (ref 74–99)
HCT VFR BLD AUTO: 34.7 % (ref 37–54)
HGB BLD-MCNC: 10.8 G/DL (ref 12.5–16.5)
IMM GRANULOCYTES # BLD AUTO: <0.03 K/UL (ref 0–0.58)
IMM GRANULOCYTES NFR BLD: 0 % (ref 0–5)
LYMPHOCYTES NFR BLD: 1.07 K/UL (ref 1.5–4)
LYMPHOCYTES RELATIVE PERCENT: 21 % (ref 20–42)
MCH RBC QN AUTO: 27.1 PG (ref 26–35)
MCHC RBC AUTO-ENTMCNC: 31.1 G/DL (ref 32–34.5)
MCV RBC AUTO: 87 FL (ref 80–99.9)
MONOCYTES NFR BLD: 0.45 K/UL (ref 0.1–0.95)
MONOCYTES NFR BLD: 9 % (ref 2–12)
NEUTROPHILS NFR BLD: 68 % (ref 43–80)
NEUTS SEG NFR BLD: 3.57 K/UL (ref 1.8–7.3)
PLATELET # BLD AUTO: 269 K/UL (ref 130–450)
PMV BLD AUTO: 9.7 FL (ref 7–12)
POTASSIUM SERPL-SCNC: 4.1 MMOL/L (ref 3.5–5)
PROT SERPL-MCNC: 8.7 G/DL (ref 6.4–8.3)
RBC # BLD AUTO: 3.99 M/UL (ref 3.8–5.8)
REASON FOR REJECTION: NORMAL
SODIUM SERPL-SCNC: 136 MMOL/L (ref 132–146)
SPECIMEN SOURCE: NORMAL
TROPONIN I SERPL HS-MCNC: 50 NG/L (ref 0–11)
TROPONIN I SERPL HS-MCNC: 56 NG/L (ref 0–11)
WBC OTHER # BLD: 5.2 K/UL (ref 4.5–11.5)
ZZ NTE CLEAN UP: ORDERED TEST: NORMAL

## 2023-10-22 PROCEDURE — 93005 ELECTROCARDIOGRAM TRACING: CPT

## 2023-10-22 PROCEDURE — 80053 COMPREHEN METABOLIC PANEL: CPT

## 2023-10-22 PROCEDURE — 84484 ASSAY OF TROPONIN QUANT: CPT

## 2023-10-22 PROCEDURE — 99285 EMERGENCY DEPT VISIT HI MDM: CPT

## 2023-10-22 PROCEDURE — 83880 ASSAY OF NATRIURETIC PEPTIDE: CPT

## 2023-10-22 PROCEDURE — 85025 COMPLETE CBC W/AUTO DIFF WBC: CPT

## 2023-10-22 PROCEDURE — 2140000000 HC CCU INTERMEDIATE R&B

## 2023-10-22 PROCEDURE — 71045 X-RAY EXAM CHEST 1 VIEW: CPT

## 2023-10-22 ASSESSMENT — PAIN DESCRIPTION - ONSET: ONSET: SUDDEN

## 2023-10-22 ASSESSMENT — PAIN DESCRIPTION - LOCATION: LOCATION: CHEST

## 2023-10-22 ASSESSMENT — PAIN DESCRIPTION - FREQUENCY: FREQUENCY: CONTINUOUS

## 2023-10-22 ASSESSMENT — PAIN DESCRIPTION - PAIN TYPE: TYPE: ACUTE PAIN

## 2023-10-22 ASSESSMENT — PAIN DESCRIPTION - DESCRIPTORS: DESCRIPTORS: PRESSURE

## 2023-10-22 ASSESSMENT — PAIN - FUNCTIONAL ASSESSMENT
PAIN_FUNCTIONAL_ASSESSMENT: 0-10
PAIN_FUNCTIONAL_ASSESSMENT: ACTIVITIES ARE NOT PREVENTED

## 2023-10-22 ASSESSMENT — PAIN SCALES - GENERAL: PAINLEVEL_OUTOF10: 4

## 2023-10-22 ASSESSMENT — PAIN DESCRIPTION - ORIENTATION: ORIENTATION: LEFT

## 2023-10-22 NOTE — ED PROVIDER NOTES
[address] ENCOUNTER        Pt Name: Immanuel Mendoza  MRN: [id number]  [address] [place] 1980  Date of evaluation: 10/22/2023  Provider: Amelia Arthur DO  PCP: Perla Meade MD  Note Started: 6:35 PM EDT 10/22/23    CHIEF COMPLAINT       Chief Complaint   Patient presents with    Chest Pain     Sudden onset upper left side chest pain, non radiating. Pt given ASA 324mg by EMS. Hx CHF and has pacemaker       HISTORY OF PRESENT ILLNESS: 1 or more Elements   History From: PATIENT     Limitations to history : None    Alessia Bethea III is a 37 y.o. male (EF 25%), atrial fibrillation, NSTEMI arrived with a complaint of chest pain that started this morning. The pain is described as a aching/sharp bilateral chest pain nonradiating. He has had some associated shortness of breath lightheadedness. Patient was admitted recently [facility] discharged about 5 days ago after he had similar symptoms with a syncopal episode. His echocardiogram while he was there indicated an EF of 20-25%. He has medications adjusted at the time. Nursing Notes were all reviewed and agreed with or any disagreements were addressed in the HPI. REVIEW OF SYSTEMS :      Review of Systems    POSITIVE (+): chest pain, shortness of breath   NEGATIVE (-): fevers, chills, nausea, vomiting, diarrhea, constipation, shortness of breath, chest pain, abdominal pain      SURGICAL HISTORY     Past Surgical History:   Procedure Laterality Date    CARDIAC CATHETERIZATION  08/01/2019    Dr Conner Mark  11/20/2018    UPGRADE S-ICD TO BIV ICD   ([employer])  [facility]     CARDIOVERSION  02/14/2020    Successful CV of AF to NSR      (Dr. Tevin Lassiter)    [facility]  03/10/2022    Dr. Tevin Lassiter. Anushka Ahn V-paced / SR    CARDIOVERSION  06/26/2023    Successful  Dr. Tevin Lassiter    ECHO COMPL W DOP COLOR FLOW  11/30/2011         ECHO COMPL W DOP COLOR FLOW  03/27/2012

## 2023-10-23 LAB
ALBUMIN SERPL-MCNC: 4.1 G/DL (ref 3.5–5.2)
ALP SERPL-CCNC: 150 U/L (ref 40–129)
ALT SERPL-CCNC: 118 U/L (ref 0–40)
ANION GAP SERPL CALCULATED.3IONS-SCNC: 18 MMOL/L (ref 7–16)
AST SERPL-CCNC: 58 U/L (ref 0–39)
BASOPHILS # BLD: 0.04 K/UL (ref 0–0.2)
BASOPHILS NFR BLD: 1 % (ref 0–2)
BILIRUB SERPL-MCNC: 0.8 MG/DL (ref 0–1.2)
BUN SERPL-MCNC: 58 MG/DL (ref 6–20)
CALCIUM SERPL-MCNC: 9.7 MG/DL (ref 8.6–10.2)
CHLORIDE SERPL-SCNC: 104 MMOL/L (ref 98–107)
CO2 SERPL-SCNC: 20 MMOL/L (ref 22–29)
CREAT SERPL-MCNC: 3.2 MG/DL (ref 0.7–1.2)
EOSINOPHIL # BLD: 0.1 K/UL (ref 0.05–0.5)
EOSINOPHILS RELATIVE PERCENT: 2 % (ref 0–6)
ERYTHROCYTE [DISTWIDTH] IN BLOOD BY AUTOMATED COUNT: 17.5 % (ref 11.5–15)
GFR SERPL CREATININE-BSD FRML MDRD: 24 ML/MIN/1.73M2
GLUCOSE SERPL-MCNC: 84 MG/DL (ref 74–99)
HCT VFR BLD AUTO: 32.4 % (ref 37–54)
HGB BLD-MCNC: 10.3 G/DL (ref 12.5–16.5)
IMM GRANULOCYTES # BLD AUTO: <0.03 K/UL (ref 0–0.58)
IMM GRANULOCYTES NFR BLD: 0 % (ref 0–5)
LYMPHOCYTES NFR BLD: 1.42 K/UL (ref 1.5–4)
LYMPHOCYTES RELATIVE PERCENT: 25 % (ref 20–42)
MCH RBC QN AUTO: 27.4 PG (ref 26–35)
MCHC RBC AUTO-ENTMCNC: 31.8 G/DL (ref 32–34.5)
MCV RBC AUTO: 86.2 FL (ref 80–99.9)
MONOCYTES NFR BLD: 0.76 K/UL (ref 0.1–0.95)
MONOCYTES NFR BLD: 13 % (ref 2–12)
NEUTROPHILS NFR BLD: 59 % (ref 43–80)
NEUTS SEG NFR BLD: 3.41 K/UL (ref 1.8–7.3)
PLATELET # BLD AUTO: 264 K/UL (ref 130–450)
PMV BLD AUTO: 9.6 FL (ref 7–12)
POTASSIUM SERPL-SCNC: 3.9 MMOL/L (ref 3.5–5)
PROT SERPL-MCNC: 7.8 G/DL (ref 6.4–8.3)
RBC # BLD AUTO: 3.76 M/UL (ref 3.8–5.8)
SODIUM SERPL-SCNC: 142 MMOL/L (ref 132–146)
WBC OTHER # BLD: 5.8 K/UL (ref 4.5–11.5)

## 2023-10-23 PROCEDURE — 6370000000 HC RX 637 (ALT 250 FOR IP): Performed by: EMERGENCY MEDICINE

## 2023-10-23 PROCEDURE — 6370000000 HC RX 637 (ALT 250 FOR IP): Performed by: INTERNAL MEDICINE

## 2023-10-23 PROCEDURE — 80053 COMPREHEN METABOLIC PANEL: CPT

## 2023-10-23 PROCEDURE — 99222 1ST HOSP IP/OBS MODERATE 55: CPT | Performed by: INTERNAL MEDICINE

## 2023-10-23 PROCEDURE — 85025 COMPLETE CBC W/AUTO DIFF WBC: CPT

## 2023-10-23 PROCEDURE — 2140000000 HC CCU INTERMEDIATE R&B

## 2023-10-23 PROCEDURE — APPSS180 APP SPLIT SHARED TIME > 60 MINUTES: Performed by: CLINICAL NURSE SPECIALIST

## 2023-10-23 RX ORDER — CALCIUM CARBONATE 300MG(750)
400 TABLET,CHEWABLE ORAL DAILY
Status: ON HOLD | COMMUNITY

## 2023-10-23 RX ORDER — SODIUM BICARBONATE 650 MG/1
1300 TABLET ORAL ONCE
Status: COMPLETED | OUTPATIENT
Start: 2023-10-23 | End: 2023-10-23

## 2023-10-23 RX ORDER — POTASSIUM CHLORIDE 20 MEQ/1
20 TABLET, EXTENDED RELEASE ORAL DAILY
Qty: 90 TABLET | Refills: 0 | OUTPATIENT
Start: 2023-10-23

## 2023-10-23 RX ORDER — ERGOCALCIFEROL 1.25 MG/1
50000 CAPSULE ORAL WEEKLY
Status: DISCONTINUED | OUTPATIENT
Start: 2023-10-23 | End: 2023-10-26 | Stop reason: HOSPADM

## 2023-10-23 RX ORDER — FERROUS SULFATE 325(65) MG
325 TABLET ORAL
Status: DISCONTINUED | OUTPATIENT
Start: 2023-10-23 | End: 2023-10-26 | Stop reason: HOSPADM

## 2023-10-23 RX ORDER — ATORVASTATIN CALCIUM 40 MG/1
40 TABLET, FILM COATED ORAL ONCE
Status: COMPLETED | OUTPATIENT
Start: 2023-10-23 | End: 2023-10-23

## 2023-10-23 RX ORDER — ACETAMINOPHEN 500 MG
500 TABLET ORAL EVERY 4 HOURS PRN
Status: DISCONTINUED | OUTPATIENT
Start: 2023-10-23 | End: 2023-10-26 | Stop reason: HOSPADM

## 2023-10-23 RX ORDER — FERROUS SULFATE 325(65) MG
325 TABLET ORAL
Status: ON HOLD | COMMUNITY
Start: 2023-05-08

## 2023-10-23 RX ORDER — MIDODRINE HYDROCHLORIDE 5 MG/1
5 TABLET ORAL
Status: DISCONTINUED | OUTPATIENT
Start: 2023-10-23 | End: 2023-10-26 | Stop reason: HOSPADM

## 2023-10-23 RX ORDER — PSEUDOEPHEDRINE HCL 30 MG
500 TABLET ORAL 3 TIMES DAILY
Status: ON HOLD | COMMUNITY
Start: 2023-04-03

## 2023-10-23 RX ORDER — BUMETANIDE 0.25 MG/ML
2 INJECTION INTRAMUSCULAR; INTRAVENOUS EVERY 12 HOURS
Status: DISCONTINUED | OUTPATIENT
Start: 2023-10-23 | End: 2023-10-24

## 2023-10-23 RX ORDER — AMIODARONE HYDROCHLORIDE 200 MG/1
200 TABLET ORAL DAILY
Status: DISCONTINUED | OUTPATIENT
Start: 2023-10-23 | End: 2023-10-26 | Stop reason: HOSPADM

## 2023-10-23 RX ORDER — PANTOPRAZOLE SODIUM 40 MG/1
40 TABLET, DELAYED RELEASE ORAL DAILY
Status: DISCONTINUED | OUTPATIENT
Start: 2023-10-23 | End: 2023-10-26 | Stop reason: HOSPADM

## 2023-10-23 RX ORDER — ATORVASTATIN CALCIUM 40 MG/1
40 TABLET, FILM COATED ORAL NIGHTLY
Status: DISCONTINUED | OUTPATIENT
Start: 2023-10-23 | End: 2023-10-26 | Stop reason: HOSPADM

## 2023-10-23 RX ORDER — PSEUDOEPHEDRINE HCL 30 MG
500 TABLET ORAL ONCE
Status: DISCONTINUED | OUTPATIENT
Start: 2023-10-23 | End: 2023-10-26 | Stop reason: HOSPADM

## 2023-10-23 RX ORDER — PSEUDOEPHEDRINE HCL 30 MG
500 TABLET ORAL 3 TIMES DAILY
Status: DISCONTINUED | OUTPATIENT
Start: 2023-10-23 | End: 2023-10-26 | Stop reason: HOSPADM

## 2023-10-23 RX ORDER — LANOLIN ALCOHOL/MO/W.PET/CERES
3 CREAM (GRAM) TOPICAL NIGHTLY PRN
Status: DISCONTINUED | OUTPATIENT
Start: 2023-10-23 | End: 2023-10-26 | Stop reason: HOSPADM

## 2023-10-23 RX ORDER — NITROGLYCERIN 0.4 MG/1
0.4 TABLET SUBLINGUAL EVERY 5 MIN PRN
Status: DISCONTINUED | OUTPATIENT
Start: 2023-10-23 | End: 2023-10-26 | Stop reason: HOSPADM

## 2023-10-23 RX ORDER — BUMETANIDE 1 MG/1
1 TABLET ORAL
Status: DISCONTINUED | OUTPATIENT
Start: 2023-10-23 | End: 2023-10-24

## 2023-10-23 RX ORDER — BUMETANIDE 2 MG/1
2 TABLET ORAL SEE ADMIN INSTRUCTIONS
Status: ON HOLD | COMMUNITY
End: 2023-10-26 | Stop reason: HOSPADM

## 2023-10-23 RX ORDER — METOPROLOL SUCCINATE 25 MG/1
25 TABLET, EXTENDED RELEASE ORAL NIGHTLY
Status: DISCONTINUED | OUTPATIENT
Start: 2023-10-23 | End: 2023-10-26 | Stop reason: HOSPADM

## 2023-10-23 RX ORDER — M-VIT,TX,IRON,MINS/CALC/FOLIC 27MG-0.4MG
1 TABLET ORAL DAILY
Status: DISCONTINUED | OUTPATIENT
Start: 2023-10-23 | End: 2023-10-26 | Stop reason: HOSPADM

## 2023-10-23 RX ORDER — LANOLIN ALCOHOL/MO/W.PET/CERES
400 CREAM (GRAM) TOPICAL DAILY
Status: DISCONTINUED | OUTPATIENT
Start: 2023-10-23 | End: 2023-10-26 | Stop reason: HOSPADM

## 2023-10-23 RX ORDER — ALLOPURINOL 100 MG/1
100 TABLET ORAL DAILY
Status: DISCONTINUED | OUTPATIENT
Start: 2023-10-23 | End: 2023-10-26 | Stop reason: HOSPADM

## 2023-10-23 RX ORDER — SODIUM BICARBONATE 650 MG/1
1300 TABLET ORAL 2 TIMES DAILY
Status: DISCONTINUED | OUTPATIENT
Start: 2023-10-23 | End: 2023-10-26 | Stop reason: HOSPADM

## 2023-10-23 RX ADMIN — ATORVASTATIN CALCIUM 40 MG: 40 TABLET, FILM COATED ORAL at 22:00

## 2023-10-23 RX ADMIN — ATORVASTATIN CALCIUM 40 MG: 40 TABLET, FILM COATED ORAL at 03:44

## 2023-10-23 RX ADMIN — RIVAROXABAN 15 MG: 15 TABLET, FILM COATED ORAL at 09:39

## 2023-10-23 RX ADMIN — BUMETANIDE 1 MG: 1 TABLET ORAL at 09:37

## 2023-10-23 RX ADMIN — MIDODRINE HYDROCHLORIDE 5 MG: 5 TABLET ORAL at 14:02

## 2023-10-23 RX ADMIN — FERROUS SULFATE TAB 325 MG (65 MG ELEMENTAL FE) 325 MG: 325 (65 FE) TAB at 09:34

## 2023-10-23 RX ADMIN — METOPROLOL SUCCINATE 25 MG: 25 TABLET, EXTENDED RELEASE ORAL at 22:43

## 2023-10-23 RX ADMIN — SODIUM BICARBONATE 1300 MG: 650 TABLET ORAL at 22:00

## 2023-10-23 RX ADMIN — Medication 500 MG: at 09:39

## 2023-10-23 RX ADMIN — ALLOPURINOL 100 MG: 100 TABLET ORAL at 09:35

## 2023-10-23 RX ADMIN — MIDODRINE HYDROCHLORIDE 5 MG: 5 TABLET ORAL at 19:02

## 2023-10-23 RX ADMIN — CYCLOPENTOLATE HYDROCHLORIDE 1 TABLET: 10 SOLUTION/ DROPS OPHTHALMIC at 09:38

## 2023-10-23 RX ADMIN — PANTOPRAZOLE SODIUM 40 MG: 40 TABLET, DELAYED RELEASE ORAL at 09:37

## 2023-10-23 RX ADMIN — Medication 400 MG: at 09:36

## 2023-10-23 RX ADMIN — SODIUM BICARBONATE 1300 MG: 650 TABLET ORAL at 09:36

## 2023-10-23 RX ADMIN — Medication 500 MG: at 22:43

## 2023-10-23 RX ADMIN — SODIUM BICARBONATE 1300 MG: 650 TABLET ORAL at 03:44

## 2023-10-23 RX ADMIN — MIDODRINE HYDROCHLORIDE 5 MG: 5 TABLET ORAL at 09:37

## 2023-10-23 NOTE — ED NOTES
[facility]     [address] [facility], [place], [address] [place] [address]  10/22/23 2015       [address] [facility] [place], [address]  10/22/23 2015

## 2023-10-23 NOTE — ED NOTES
Nurse to nurse report given to Olayinka Montoya RN     238 [place], [place]  10/22/23 9712

## 2023-10-23 NOTE — ED NOTES
Spoke to [employer] rep to resend interrogation report. Fax number given, nurse waiting for report.       Garth Carney RN  10/23/23 4137

## 2023-10-23 NOTE — ED NOTES
Report given to receiving nurse. Pt will be transported when bed is ready.       Betty Cross, RN  10/23/23 [id number]

## 2023-10-23 NOTE — ED NOTES
Received report from [facility]; assumed care of pt at this time     Bria Sin RN  10/22/23 0654

## 2023-10-24 LAB
ALBUMIN SERPL-MCNC: 3.9 G/DL (ref 3.5–5.2)
ALP SERPL-CCNC: 140 U/L (ref 40–129)
ALT SERPL-CCNC: 93 U/L (ref 0–40)
ANION GAP SERPL CALCULATED.3IONS-SCNC: 13 MMOL/L (ref 7–16)
AST SERPL-CCNC: 35 U/L (ref 0–39)
BASOPHILS # BLD: 0.04 K/UL (ref 0–0.2)
BASOPHILS NFR BLD: 1 % (ref 0–2)
BILIRUB SERPL-MCNC: 0.8 MG/DL (ref 0–1.2)
BUN SERPL-MCNC: 56 MG/DL (ref 6–20)
CALCIUM SERPL-MCNC: 9.5 MG/DL (ref 8.6–10.2)
CHLORIDE SERPL-SCNC: 102 MMOL/L (ref 98–107)
CO2 SERPL-SCNC: 24 MMOL/L (ref 22–29)
CREAT SERPL-MCNC: 3.4 MG/DL (ref 0.7–1.2)
EKG ATRIAL RATE: 47 BPM
EKG Q-T INTERVAL: 588 MS
EKG QRS DURATION: 184 MS
EKG QTC CALCULATION (BAZETT): 606 MS
EKG R AXIS: 173 DEGREES
EKG T AXIS: 22 DEGREES
EKG VENTRICULAR RATE: 64 BPM
EOSINOPHIL # BLD: 0.11 K/UL (ref 0.05–0.5)
EOSINOPHILS RELATIVE PERCENT: 2 % (ref 0–6)
ERYTHROCYTE [DISTWIDTH] IN BLOOD BY AUTOMATED COUNT: 17.6 % (ref 11.5–15)
GFR SERPL CREATININE-BSD FRML MDRD: 22 ML/MIN/1.73M2
GLUCOSE SERPL-MCNC: 113 MG/DL (ref 74–99)
HCT VFR BLD AUTO: 31.1 % (ref 37–54)
HGB BLD-MCNC: 9.6 G/DL (ref 12.5–16.5)
IMM GRANULOCYTES # BLD AUTO: <0.03 K/UL (ref 0–0.58)
IMM GRANULOCYTES NFR BLD: 0 % (ref 0–5)
LYMPHOCYTES NFR BLD: 1.07 K/UL (ref 1.5–4)
LYMPHOCYTES RELATIVE PERCENT: 21 % (ref 20–42)
MAGNESIUM SERPL-MCNC: 2.1 MG/DL (ref 1.6–2.6)
MCH RBC QN AUTO: 27.2 PG (ref 26–35)
MCHC RBC AUTO-ENTMCNC: 30.9 G/DL (ref 32–34.5)
MCV RBC AUTO: 88.1 FL (ref 80–99.9)
MONOCYTES NFR BLD: 0.63 K/UL (ref 0.1–0.95)
MONOCYTES NFR BLD: 13 % (ref 2–12)
NEUTROPHILS NFR BLD: 63 % (ref 43–80)
NEUTS SEG NFR BLD: 3.14 K/UL (ref 1.8–7.3)
PLATELET # BLD AUTO: 230 K/UL (ref 130–450)
PMV BLD AUTO: 9.9 FL (ref 7–12)
POTASSIUM SERPL-SCNC: 3.8 MMOL/L (ref 3.5–5)
PROT SERPL-MCNC: 7.2 G/DL (ref 6.4–8.3)
RBC # BLD AUTO: 3.53 M/UL (ref 3.8–5.8)
SODIUM SERPL-SCNC: 139 MMOL/L (ref 132–146)
WBC OTHER # BLD: 5 K/UL (ref 4.5–11.5)

## 2023-10-24 PROCEDURE — 6370000000 HC RX 637 (ALT 250 FOR IP): Performed by: INTERNAL MEDICINE

## 2023-10-24 PROCEDURE — 80053 COMPREHEN METABOLIC PANEL: CPT

## 2023-10-24 PROCEDURE — 83735 ASSAY OF MAGNESIUM: CPT

## 2023-10-24 PROCEDURE — 2500000003 HC RX 250 WO HCPCS: Performed by: CLINICAL NURSE SPECIALIST

## 2023-10-24 PROCEDURE — 2140000000 HC CCU INTERMEDIATE R&B

## 2023-10-24 PROCEDURE — 94660 CPAP INITIATION&MGMT: CPT

## 2023-10-24 PROCEDURE — 85025 COMPLETE CBC W/AUTO DIFF WBC: CPT

## 2023-10-24 PROCEDURE — 93010 ELECTROCARDIOGRAM REPORT: CPT | Performed by: INTERNAL MEDICINE

## 2023-10-24 PROCEDURE — 99232 SBSQ HOSP IP/OBS MODERATE 35: CPT | Performed by: INTERNAL MEDICINE

## 2023-10-24 PROCEDURE — 36415 COLL VENOUS BLD VENIPUNCTURE: CPT

## 2023-10-24 RX ORDER — PROCHLORPERAZINE EDISYLATE 5 MG/ML
10 INJECTION INTRAMUSCULAR; INTRAVENOUS EVERY 6 HOURS PRN
Status: DISCONTINUED | OUTPATIENT
Start: 2023-10-24 | End: 2023-10-26 | Stop reason: HOSPADM

## 2023-10-24 RX ORDER — BUMETANIDE 1 MG/1
1 TABLET ORAL DAILY
Status: DISCONTINUED | OUTPATIENT
Start: 2023-10-25 | End: 2023-10-26 | Stop reason: HOSPADM

## 2023-10-24 RX ADMIN — SODIUM BICARBONATE 1300 MG: 650 TABLET ORAL at 19:46

## 2023-10-24 RX ADMIN — MIDODRINE HYDROCHLORIDE 5 MG: 5 TABLET ORAL at 11:46

## 2023-10-24 RX ADMIN — ACETAMINOPHEN 500 MG: 500 TABLET ORAL at 22:47

## 2023-10-24 RX ADMIN — EMPAGLIFLOZIN 10 MG: 10 TABLET, FILM COATED ORAL at 08:55

## 2023-10-24 RX ADMIN — METOPROLOL SUCCINATE 25 MG: 25 TABLET, EXTENDED RELEASE ORAL at 19:49

## 2023-10-24 RX ADMIN — CYCLOPENTOLATE HYDROCHLORIDE 1 TABLET: 10 SOLUTION/ DROPS OPHTHALMIC at 08:55

## 2023-10-24 RX ADMIN — Medication 500 MG: at 08:55

## 2023-10-24 RX ADMIN — BUMETANIDE 2 MG: 0.25 INJECTION INTRAMUSCULAR; INTRAVENOUS at 12:48

## 2023-10-24 RX ADMIN — ALLOPURINOL 100 MG: 100 TABLET ORAL at 08:55

## 2023-10-24 RX ADMIN — Medication 500 MG: at 19:46

## 2023-10-24 RX ADMIN — MIDODRINE HYDROCHLORIDE 5 MG: 5 TABLET ORAL at 08:54

## 2023-10-24 RX ADMIN — RIVAROXABAN 15 MG: 15 TABLET, FILM COATED ORAL at 08:55

## 2023-10-24 RX ADMIN — FERROUS SULFATE TAB 325 MG (65 MG ELEMENTAL FE) 325 MG: 325 (65 FE) TAB at 08:54

## 2023-10-24 RX ADMIN — BUMETANIDE 2 MG: 0.25 INJECTION INTRAMUSCULAR; INTRAVENOUS at 00:37

## 2023-10-24 RX ADMIN — MIDODRINE HYDROCHLORIDE 5 MG: 5 TABLET ORAL at 17:04

## 2023-10-24 RX ADMIN — Medication 500 MG: at 13:39

## 2023-10-24 RX ADMIN — PANTOPRAZOLE SODIUM 40 MG: 40 TABLET, DELAYED RELEASE ORAL at 08:54

## 2023-10-24 RX ADMIN — ATORVASTATIN CALCIUM 40 MG: 40 TABLET, FILM COATED ORAL at 19:46

## 2023-10-24 RX ADMIN — Medication 400 MG: at 08:55

## 2023-10-24 RX ADMIN — ACETAMINOPHEN 500 MG: 500 TABLET ORAL at 08:56

## 2023-10-24 RX ADMIN — SODIUM BICARBONATE 1300 MG: 650 TABLET ORAL at 08:55

## 2023-10-24 ASSESSMENT — PAIN - FUNCTIONAL ASSESSMENT: PAIN_FUNCTIONAL_ASSESSMENT: ACTIVITIES ARE NOT PREVENTED

## 2023-10-24 ASSESSMENT — PAIN SCALES - GENERAL
PAINLEVEL_OUTOF10: 0
PAINLEVEL_OUTOF10: 8
PAINLEVEL_OUTOF10: 7

## 2023-10-24 ASSESSMENT — PAIN DESCRIPTION - ORIENTATION
ORIENTATION: RIGHT

## 2023-10-24 ASSESSMENT — PAIN DESCRIPTION - LOCATION
LOCATION: ELBOW
LOCATION: ARM
LOCATION: ELBOW

## 2023-10-24 ASSESSMENT — PAIN DESCRIPTION - DESCRIPTORS
DESCRIPTORS: SHARP
DESCRIPTORS: ACHING;DISCOMFORT;SORE
DESCRIPTORS: SHARP;ACHING

## 2023-10-24 NOTE — FLOWSHEET NOTE
Pt arrived to PCCU with the following belongings. Pt oriented to room and all questions answered. 10/23/23 [facility] None   Vision - Corrective Lenses None   Hearing Aid None   Clothing Shirt; Shorts;Socks; Footwear; At bedside   [facility]; Watch; At bedside   Electronic Devices Cell Phone;; At bedside   Weapons (Notify Protective Services/Security) None   Other Valuables Other (Comment); At bedside  (Deodorant, toothbrush, toothpaste)   Home Medications None   Valuables Given To Patient   Provide Name(s) of Who Valuable(s) Were Given To ALBA WAGNER

## 2023-10-24 NOTE — CARE COORDINATION
Social Work/ Case Management Transition of Care Planning ([address] Gonzales, [address]):   Pt reports to the hospital with chest pain. SW spoke with Pt at bedside who states he lives alone in an apartment with elevator access. Pt states he does drive but does not currently work. Pt's PCP is Dr. Solange eMraz and Pharmacy of choice is [facility] [address]. Pt states he has a CPAP at home and no hx of HHC. SW left list of [facility] [address] facilities in room, Pt wanted a referral to [facility] Work [address] [place] [address], they can accept Pt, SW faxed clinicals. SW/CM to follow.    1 Mundo [place], [place]  10/24/2023

## 2023-10-24 NOTE — ACP (ADVANCE CARE PLANNING)
Advance Care Planning   The patient has the following advanced directives on file:  Advance Directives       Power of [address] Will ACP-Advance Directive ACP-Power of     Not on File Filed on 03/28/19 Filed Not on File            The patient has appointed the following active healthcare agents:    Primary Decision Maker: Lillian Mejía - Aunt/Uncle - [phone]    Secondary Decision Maker: Roque Mulligan - Parent - [phone]    The Patient has the following current code status:    Code Status: Prior          [facility]  10/24/2023

## 2023-10-24 NOTE — PLAN OF CARE
Problem: Chronic Conditions and Co-morbidities  Goal: Patient's chronic conditions and co-morbidity symptoms are monitored and maintained or improved  Outcome: Progressing  Flowsheets (Taken 10/24/2023 0855)  Care Plan - Patient's Chronic Conditions and Co-Morbidity Symptoms are Monitored and Maintained or Improved: Monitor and assess patient's chronic conditions and comorbid symptoms for stability, deterioration, or improvement     Problem: Discharge Planning  Goal: Discharge to home or other facility with appropriate resources  Outcome: Progressing  Flowsheets (Taken 10/24/2023 0855)  Discharge to home or other facility with appropriate resources: Identify barriers to discharge with patient and caregiver     Problem: Pain  Goal: Verbalizes/displays adequate comfort level or baseline comfort level  Outcome: Progressing

## 2023-10-24 NOTE — PATIENT CARE CONFERENCE
P Quality Flow/Interdisciplinary Rounds Progress Note        Quality Flow Rounds held on October 24, 2023    Disciplines Attending:  Bedside Nurse, , , and Nursing Unit Leadership    Dee Dee Gonzalez III was admitted on 10/22/2023  6:22 PM    Anticipated Discharge Date:       Disposition:    James Score:  James Scale Score: 21    Readmission Risk              Risk of Unplanned Readmission:  27           Discussed patient goal for the day, patient clinical progression, and barriers to discharge.   The following Goal(s) of the Day/Commitment(s) have been identified: report labs/diagnostics       Zenia Vieyra RN  October 24, 2023

## 2023-10-24 NOTE — DISCHARGE INSTRUCTIONS
HEART FAILURE  / CONGESTIVE HEART FAILURE  DISCHARGE INSTRUCTIONS:  GUIDELINES TO FOLLOW AT HOME    Self- Managed Care:     MEDICATIONS:  Take your medication as directed. If you are experiencing any side effects, inform your doctor, Do not stop taking any of your medications without letting your doctor know. Check with your doctor before taking any over-the-counter medications / herbal / or dietary supplements. They may interfere with your other medications. Do not take ibuprofen (Advil or Motrin) and naproxen (Aleve) without talking to your doctor first. They could make your heart failure worse. WEIGHT MONITORING:   Weigh yourself everyday (with the same scale) around the same time of the day and write it down. (you can chart them on a calendar or keep track of them on paper. Notify your doctor of a weight gain of 3 pounds or more in 1 day   OR a total of 5 pounds or more in 1 week    Take your weight record to your doctor visits  Also, the same goes if you loose more than 3# in one day, let your heart doctor know. DIET:   Cardiac heart healthy diet- Low saturated / low trans fat, no added salt, caffeine restricted, Low sodium diet-   No more than 2,000mg (2 grams) of salt / sodium per day (which equals to a little less than  a teaspoon of salt)  If your doctor wants you on a fluid restriction. ..it is usually recommended a fluid limit of 2,000cc -  Fluid restriction- 2,000 ml (milliliters) = 64 ounces = you can have 8 glasses of fluid per day (each glass 8 ounces)    Follow a low salt diet - avoid using salt at the table, avoid / limit use of canned soups, processed / packaged foods, salted snacks, olives and pickles. Do not use a salt substitute without checking with your doctor, they may contain a high amount of potassioum. (Mrs. Henrique Velasquez is safe to use).     Limit the use of alcohol       CALL YOUR DOCTOR THE FIRST DAY YOU NOTICE ANY OF THESE   SYMPTOMS:  You have a weight

## 2023-10-25 LAB
ALBUMIN SERPL-MCNC: 4.1 G/DL (ref 3.5–5.2)
ALP SERPL-CCNC: 140 U/L (ref 40–129)
ALT SERPL-CCNC: 77 U/L (ref 0–40)
ANION GAP SERPL CALCULATED.3IONS-SCNC: 14 MMOL/L (ref 7–16)
AST SERPL-CCNC: 27 U/L (ref 0–39)
BASOPHILS # BLD: 0.04 K/UL (ref 0–0.2)
BASOPHILS NFR BLD: 1 % (ref 0–2)
BILIRUB SERPL-MCNC: 0.8 MG/DL (ref 0–1.2)
BUN SERPL-MCNC: 54 MG/DL (ref 6–20)
CALCIUM SERPL-MCNC: 9.8 MG/DL (ref 8.6–10.2)
CHLORIDE SERPL-SCNC: 102 MMOL/L (ref 98–107)
CO2 SERPL-SCNC: 25 MMOL/L (ref 22–29)
CREAT SERPL-MCNC: 3.6 MG/DL (ref 0.7–1.2)
EOSINOPHIL # BLD: 0.18 K/UL (ref 0.05–0.5)
EOSINOPHILS RELATIVE PERCENT: 3 % (ref 0–6)
ERYTHROCYTE [DISTWIDTH] IN BLOOD BY AUTOMATED COUNT: 17.9 % (ref 11.5–15)
GFR SERPL CREATININE-BSD FRML MDRD: 21 ML/MIN/1.73M2
GLUCOSE SERPL-MCNC: 84 MG/DL (ref 74–99)
HCT VFR BLD AUTO: 32.6 % (ref 37–54)
HGB BLD-MCNC: 10.3 G/DL (ref 12.5–16.5)
IMM GRANULOCYTES # BLD AUTO: <0.03 K/UL (ref 0–0.58)
IMM GRANULOCYTES NFR BLD: 0 % (ref 0–5)
LYMPHOCYTES NFR BLD: 1.31 K/UL (ref 1.5–4)
LYMPHOCYTES RELATIVE PERCENT: 23 % (ref 20–42)
MAGNESIUM SERPL-MCNC: 2.4 MG/DL (ref 1.6–2.6)
MCH RBC QN AUTO: 27.8 PG (ref 26–35)
MCHC RBC AUTO-ENTMCNC: 31.6 G/DL (ref 32–34.5)
MCV RBC AUTO: 88.1 FL (ref 80–99.9)
MONOCYTES NFR BLD: 0.77 K/UL (ref 0.1–0.95)
MONOCYTES NFR BLD: 14 % (ref 2–12)
NEUTROPHILS NFR BLD: 59 % (ref 43–80)
NEUTS SEG NFR BLD: 3.33 K/UL (ref 1.8–7.3)
PLATELET # BLD AUTO: 250 K/UL (ref 130–450)
PMV BLD AUTO: 9.9 FL (ref 7–12)
POTASSIUM SERPL-SCNC: 3.7 MMOL/L (ref 3.5–5)
PROT SERPL-MCNC: 7.5 G/DL (ref 6.4–8.3)
RBC # BLD AUTO: 3.7 M/UL (ref 3.8–5.8)
SODIUM SERPL-SCNC: 141 MMOL/L (ref 132–146)
WBC OTHER # BLD: 5.7 K/UL (ref 4.5–11.5)

## 2023-10-25 PROCEDURE — 83735 ASSAY OF MAGNESIUM: CPT

## 2023-10-25 PROCEDURE — 80053 COMPREHEN METABOLIC PANEL: CPT

## 2023-10-25 PROCEDURE — 36415 COLL VENOUS BLD VENIPUNCTURE: CPT

## 2023-10-25 PROCEDURE — 6370000000 HC RX 637 (ALT 250 FOR IP): Performed by: INTERNAL MEDICINE

## 2023-10-25 PROCEDURE — 85025 COMPLETE CBC W/AUTO DIFF WBC: CPT

## 2023-10-25 PROCEDURE — 2140000000 HC CCU INTERMEDIATE R&B

## 2023-10-25 PROCEDURE — 94660 CPAP INITIATION&MGMT: CPT

## 2023-10-25 PROCEDURE — 5A09357 ASSISTANCE WITH RESPIRATORY VENTILATION, LESS THAN 24 CONSECUTIVE HOURS, CONTINUOUS POSITIVE AIRWAY PRESSURE: ICD-10-PCS | Performed by: INTERNAL MEDICINE

## 2023-10-25 RX ORDER — OXYCODONE HYDROCHLORIDE 5 MG/1
5 TABLET ORAL EVERY 4 HOURS PRN
Status: DISCONTINUED | OUTPATIENT
Start: 2023-10-25 | End: 2023-10-26 | Stop reason: HOSPADM

## 2023-10-25 RX ORDER — COLCHICINE 0.6 MG/1
0.6 TABLET ORAL ONCE
Status: COMPLETED | OUTPATIENT
Start: 2023-10-25 | End: 2023-10-25

## 2023-10-25 RX ORDER — PREDNISONE 20 MG/1
40 TABLET ORAL ONCE
Status: COMPLETED | OUTPATIENT
Start: 2023-10-25 | End: 2023-10-25

## 2023-10-25 RX ADMIN — FERROUS SULFATE TAB 325 MG (65 MG ELEMENTAL FE) 325 MG: 325 (65 FE) TAB at 08:47

## 2023-10-25 RX ADMIN — MIDODRINE HYDROCHLORIDE 5 MG: 5 TABLET ORAL at 12:50

## 2023-10-25 RX ADMIN — ATORVASTATIN CALCIUM 40 MG: 40 TABLET, FILM COATED ORAL at 20:37

## 2023-10-25 RX ADMIN — SODIUM BICARBONATE 1300 MG: 650 TABLET ORAL at 20:37

## 2023-10-25 RX ADMIN — MIDODRINE HYDROCHLORIDE 5 MG: 5 TABLET ORAL at 17:48

## 2023-10-25 RX ADMIN — ACETAMINOPHEN 500 MG: 500 TABLET ORAL at 22:07

## 2023-10-25 RX ADMIN — SODIUM BICARBONATE 1300 MG: 650 TABLET ORAL at 08:46

## 2023-10-25 RX ADMIN — EMPAGLIFLOZIN 10 MG: 10 TABLET, FILM COATED ORAL at 08:48

## 2023-10-25 RX ADMIN — BUMETANIDE 1 MG: 1 TABLET ORAL at 08:47

## 2023-10-25 RX ADMIN — Medication 500 MG: at 08:47

## 2023-10-25 RX ADMIN — Medication 400 MG: at 08:46

## 2023-10-25 RX ADMIN — OXYCODONE HYDROCHLORIDE 5 MG: 5 TABLET ORAL at 17:51

## 2023-10-25 RX ADMIN — AMIODARONE HYDROCHLORIDE 200 MG: 200 TABLET ORAL at 08:46

## 2023-10-25 RX ADMIN — PANTOPRAZOLE SODIUM 40 MG: 40 TABLET, DELAYED RELEASE ORAL at 08:47

## 2023-10-25 RX ADMIN — METOPROLOL SUCCINATE 25 MG: 25 TABLET, EXTENDED RELEASE ORAL at 20:37

## 2023-10-25 RX ADMIN — COLCHICINE 0.6 MG: 0.6 TABLET ORAL at 12:50

## 2023-10-25 RX ADMIN — OXYCODONE HYDROCHLORIDE 5 MG: 5 TABLET ORAL at 22:07

## 2023-10-25 RX ADMIN — Medication 500 MG: at 20:37

## 2023-10-25 RX ADMIN — CYCLOPENTOLATE HYDROCHLORIDE 1 TABLET: 10 SOLUTION/ DROPS OPHTHALMIC at 08:46

## 2023-10-25 RX ADMIN — Medication 500 MG: at 15:25

## 2023-10-25 RX ADMIN — PREDNISONE 40 MG: 20 TABLET ORAL at 17:51

## 2023-10-25 RX ADMIN — MIDODRINE HYDROCHLORIDE 5 MG: 5 TABLET ORAL at 08:47

## 2023-10-25 RX ADMIN — RIVAROXABAN 15 MG: 15 TABLET, FILM COATED ORAL at 08:48

## 2023-10-25 RX ADMIN — ALLOPURINOL 100 MG: 100 TABLET ORAL at 08:47

## 2023-10-25 ASSESSMENT — PAIN SCALES - GENERAL
PAINLEVEL_OUTOF10: 8
PAINLEVEL_OUTOF10: 0
PAINLEVEL_OUTOF10: 7
PAINLEVEL_OUTOF10: 7

## 2023-10-25 ASSESSMENT — PAIN DESCRIPTION - LOCATION: LOCATION: ARM;ELBOW

## 2023-10-25 ASSESSMENT — PAIN DESCRIPTION - ORIENTATION: ORIENTATION: RIGHT

## 2023-10-25 ASSESSMENT — PAIN - FUNCTIONAL ASSESSMENT: PAIN_FUNCTIONAL_ASSESSMENT: ACTIVITIES ARE NOT PREVENTED

## 2023-10-25 ASSESSMENT — PAIN DESCRIPTION - DESCRIPTORS: DESCRIPTORS: ACHING;DISCOMFORT;SORE

## 2023-10-25 NOTE — PATIENT CARE CONFERENCE
[facility] Quality Flow/Interdisciplinary Rounds Progress Note        Quality Flow Rounds held on October 25, 2023    Disciplines Attending:  Bedside Nurse, , , and Nursing Unit Leadership    Rhianna Finnegan III was admitted on 10/22/2023  6:22 PM    Anticipated Discharge Date:       Disposition:    James Score:  James Scale Score: 21    Readmission Risk              Risk of Unplanned Readmission:  31           Discussed patient goal for the day, patient clinical progression, and barriers to discharge.   The following Goal(s) of the Day/Commitment(s) have been identified:  discharge [address] Fernanda Bermudez RN  October 25, 2023

## 2023-10-25 NOTE — CARE COORDINATION
Social Work/ Case Management Transition of Care Planning ([address] Mundo Gonzales, [address]):   Per report and chart review Pt is  on room air. Pt complaining of swollen and painful elbow. Pt is on PO bumex. Per Dr. Jason Sanders note Pt can be discharge to follow up at [facility] as he has an appointment tomorrow. Pt's BUN 54, Creatinine 3.6, GFR 21, ALT 77. SW/CM to follow.    1 Mundo [place], [place]  10/25/2023

## 2023-10-26 VITALS
RESPIRATION RATE: 16 BRPM | SYSTOLIC BLOOD PRESSURE: 117 MMHG | DIASTOLIC BLOOD PRESSURE: 71 MMHG | HEIGHT: 75 IN | TEMPERATURE: 96.7 F | HEART RATE: 55 BPM | BODY MASS INDEX: 29.24 KG/M2 | OXYGEN SATURATION: 100 % | WEIGHT: 235.2 LBS

## 2023-10-26 LAB
ANION GAP SERPL CALCULATED.3IONS-SCNC: 18 MMOL/L (ref 7–16)
BUN SERPL-MCNC: 55 MG/DL (ref 6–20)
CALCIUM SERPL-MCNC: 10.2 MG/DL (ref 8.6–10.2)
CHLORIDE SERPL-SCNC: 98 MMOL/L (ref 98–107)
CO2 SERPL-SCNC: 23 MMOL/L (ref 22–29)
CREAT SERPL-MCNC: 3.6 MG/DL (ref 0.7–1.2)
GFR SERPL CREATININE-BSD FRML MDRD: 21 ML/MIN/1.73M2
GLUCOSE SERPL-MCNC: 146 MG/DL (ref 74–99)
MAGNESIUM SERPL-MCNC: 2.4 MG/DL (ref 1.6–2.6)
POTASSIUM SERPL-SCNC: 4.4 MMOL/L (ref 3.5–5)
SODIUM SERPL-SCNC: 139 MMOL/L (ref 132–146)
URATE SERPL-MCNC: 12.6 MG/DL (ref 3.4–7)

## 2023-10-26 PROCEDURE — 84550 ASSAY OF BLOOD/URIC ACID: CPT

## 2023-10-26 PROCEDURE — 6370000000 HC RX 637 (ALT 250 FOR IP): Performed by: INTERNAL MEDICINE

## 2023-10-26 PROCEDURE — 94660 CPAP INITIATION&MGMT: CPT

## 2023-10-26 PROCEDURE — 80048 BASIC METABOLIC PNL TOTAL CA: CPT

## 2023-10-26 PROCEDURE — 83735 ASSAY OF MAGNESIUM: CPT

## 2023-10-26 PROCEDURE — 36415 COLL VENOUS BLD VENIPUNCTURE: CPT

## 2023-10-26 RX ORDER — BUMETANIDE 1 MG/1
1 TABLET ORAL DAILY
Qty: 30 TABLET | Refills: 3 | Status: ON HOLD | OUTPATIENT
Start: 2023-10-26

## 2023-10-26 RX ORDER — PREDNISONE 20 MG/1
20 TABLET ORAL DAILY
Qty: 5 TABLET | Refills: 0 | Status: ON HOLD | OUTPATIENT
Start: 2023-10-26 | End: 2023-10-31

## 2023-10-26 RX ADMIN — BUMETANIDE 1 MG: 1 TABLET ORAL at 09:25

## 2023-10-26 RX ADMIN — SODIUM BICARBONATE 1300 MG: 650 TABLET ORAL at 09:25

## 2023-10-26 RX ADMIN — CYCLOPENTOLATE HYDROCHLORIDE 1 TABLET: 10 SOLUTION/ DROPS OPHTHALMIC at 09:24

## 2023-10-26 RX ADMIN — FERROUS SULFATE TAB 325 MG (65 MG ELEMENTAL FE) 325 MG: 325 (65 FE) TAB at 09:24

## 2023-10-26 RX ADMIN — ALLOPURINOL 100 MG: 100 TABLET ORAL at 09:25

## 2023-10-26 RX ADMIN — Medication 500 MG: at 09:24

## 2023-10-26 RX ADMIN — MIDODRINE HYDROCHLORIDE 5 MG: 5 TABLET ORAL at 09:25

## 2023-10-26 RX ADMIN — PANTOPRAZOLE SODIUM 40 MG: 40 TABLET, DELAYED RELEASE ORAL at 09:25

## 2023-10-26 RX ADMIN — RIVAROXABAN 15 MG: 15 TABLET, FILM COATED ORAL at 09:24

## 2023-10-26 RX ADMIN — EMPAGLIFLOZIN 10 MG: 10 TABLET, FILM COATED ORAL at 09:25

## 2023-10-26 RX ADMIN — Medication 400 MG: at 09:25

## 2023-10-26 RX ADMIN — AMIODARONE HYDROCHLORIDE 200 MG: 200 TABLET ORAL at 09:27

## 2023-10-26 ASSESSMENT — PAIN SCALES - GENERAL: PAINLEVEL_OUTOF10: 0

## 2023-10-26 NOTE — PROGRESS NOTES
4 Eyes Skin Assessment     NAME:  Nicole Lindsey III  YOB: 1980  MEDICAL RECORD NUMBER:  [id number]    The patient is being assessed for  Admission    I agree that at least one RN has performed a thorough Head to Toe Skin Assessment on the patient. ALL assessment sites listed below have been assessed. Areas assessed by both nurses:    Head, Face, Ears, Shoulders, Back, Chest, Arms, Elbows, Hands, Sacrum. Buttock, Coccyx, Ischium, Legs. Feet and Heels, and Under Medical Devices         Does the Patient have a Wound?  No noted wound(s)       James Prevention initiated by RN: Yes  Wound Care Orders initiated by RN: Yes    Pressure Injury (Stage 3,4, Unstageable, DTI, NWPT, and Complex wounds) if present, place Wound referral order by RN under : No    New Ostomies, if present place, Ostomy referral order under : No     Nurse 1 eSignature: Electronically signed by Gemma Grimaldo RN on 10/23/23 at 11:58 PM EDT    **SHARE this note so that the co-signing nurse can place an eSignature**    Nurse 2 eSignature: Electronically signed by Tamica Lopez RN on 10/24/23 at 1:04 AM EDT
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  Admitted last week for drug-induced hypotension resulting in syncope  Discharged home on reduced doses of Bumex  Aldactone and Entresto were discontinued due to abnormal creatinine and low blood pressures  Midodrine was started  He now states that he felt dizzy again when he stood up followed by right-sided chest discomfort relieved by aspirin  Being admitted for further management  No palpitations or syncope reported  Creatinine is stable  10/24/2023  Patient was seen on the floor earlier this morning  No further chest pain  Overall he feels better  Past Medical History:    Past Medical History:   Diagnosis Date    Acute CHF ([address]) 11/29/2011    Acute CHF ([address]) 12/26/2011    Acute idiopathic gout of right foot 08/13/2016    AF (atrial fibrillation) ([address]) 02/2020    Anemia 11/29/2011    CAD (coronary artery disease)     cardioversion 06/26/2023    dR Diaz    Cerebral artery occlusion with cerebral infarction ([address])     CKD (chronic kidney disease)     Gout     HTN (hypertension) 11/29/2011    Hyperlipidemia     Hypertension     Morbid obesity due to excess calories ([address])     Nonischemic cardiomyopathy ([address]) 11/29/2011    Nonischemic cardiomyopathy ([address]) 07/2013 7/2013- cardiac MRI revealed an LVEF of 20%    ARVIND (obstructive sleep apnea) 11/29/2011    S/P left heart catheterization by percutaneous approach 12-27/2011    Dr Saurav Phillips    Systolic heart failure [facility]) 05/07/2012 5/7/12- cardiac catheterization revealed an LVEF of 10-15%, mitral
CLINICAL PHARMACY NOTE: MEDS TO BEDS    Total # of Prescriptions Filled: 2   The following medications were delivered to the patient:  Bumetanide 1  Prednisone 20    Additional Documentation:
CardioMems update:    CardioMEMS reading done while inpatient today      PAD:   Goal: 24  Current trends: 10/24/23: PAD 27                           10/23/23: PAD 26                           10/22/23: PAD 28              Electronically signed by Kelechi Harmon RN on 10/24/2023 at 10:39 AM
CardioMems update:  CardioMEMS reading done while inpatient today    PAD:  Goal: 24  Current trends: 10/2323: PAD 26        10/24/23: PAD 27        10/25/23: PAD 24        Providers:  Cardiomems team: DR AUSTEN Jonas CNP  Cardiologist: Dr. Marcelo Irene
CardioMems update: CardioMEMS reading done while inpatient. Patient reports he is getting d/c today. Instructed patient to be sure to lay on his CardioMEMS pillow upon discharge. Mr. Alejo Kyaw understanding. PAD:  Goal: 24  Current trends: 10/23/23: PAD 26        10/24/23: PAD 27        10/25/23: PAD 24          10/26/23: PAD 26        Providers:  Cardiomems team: DR AUSTEN Jimenez CNP  Cardiologist: Dr. Radha Deshpande
Date: 10/24/2023    Time: 10:41 PM    Patient Placed On BIPAP/CPAP/ Non-Invasive Ventilation? Yes    If no must comment. Facial area red/color change? No           If YES are Blister/Lesion present? No   If yes must notify nursing staff  BIPAP/CPAP skin barrier?   Yes    Skin barrier type:mepilexlite       Comments:        Isabell Perez RCP
Date: 10/25/2023    Time: 10:36 PM    Patient Placed On BIPAP/CPAP/ Non-Invasive Ventilation? Yes    If no must comment. Facial area red/color change? No           If YES are Blister/Lesion present? No   If yes must notify nursing staff  BIPAP/CPAP skin barrier?   Yes    Skin barrier type:mepilexlite       Fredrick MarthaMICHA    10/25/23 4100   NIV Type   NIV Started/Stopped On   Equipment Type V60   Mode CPAP   Mask Type Full face mask   Mask Size Large   Assessment   Pulse 67   Respirations 20   SpO2 100 %   Level of Consciousness 0   Comfort Level Good   Using Accessory Muscles No   Mask Compliance Good   Skin Assessment Clean, dry, & intact   Skin Protection for O2 Device Yes   Orientation Middle   Location Nose   Intervention(s) Skin Barrier   Settings/Measurements   PIP Observed 15 cm H20   CPAP/EPAP 15 cmH2O   Vt (Measured) 520 mL   FiO2  30 %   Minute Volume (L/min) 10.3 Liters   Mask Leak (lpm) 60 lpm   Patient's Home Machine No   Alarm Settings   Alarms On Y   Low Pressure (cmH2O) 5 cmH2O   High Pressure (cmH2O) 30 cmH2O   Apnea (secs) 20 secs   RR Low (bpm) 10   RR High (bpm) 35 br/min
Dr Benjamín Bhardwaj notified via perfect serve that patient requesting cpap order
Messaged Dr. Dany Villafana via perfect serve as pt is having pain 8/10 in his RUE. Says he has a hx of tennis elbow. Pt elbow is pretty swollen. Says it never swells like this. I gave pt 500 mg of tylenol, ice and elevated extremity.
Nephro consult sent via perfect serve.
Pt nauseous and dry heaving, messaged Dr. Khang Russ for orders.
signs  Extremities:  No clubbing, cyanosis,   Trace peripheral edema noted  Right elbow is swollen and tender  Skin:  Warm and dry, no open lesions or rash  Neuro:  Cranial nerves 2-12 intact, no focal deficits  Breast: deferred  Rectal: deferred  Genitalia:  deferred    LABS:        ASSESSMENT:    Olecranon bursitis/acute gout right elbow  Principal Problem:    Chest pain unlikely cardiac  Chronic dizziness from low blood pressures  Nonischemic cardiomyopathy  Paroxysmal atrial fibrillation  AICD in place  Chronic kidney disease        PLAN:  1 dose of colchicine p.o. x1  Patient can be discharged to be followed at [facility]  Has an appointment for tomorrow  Monitor blood pressures and creatinine  Resume home medications including Xarelto  Continue to hold Entresto and Aldactone  May not need further investigations  Questions answered to his satisfaction  Thuy Caba MD  12:08 PM  10/25/2023
established reference range  Pediatrics:                     no established reference range       Iron   Date Value Ref Range Status   07/13/2023 53 (L) 59 - 158 mcg/dL Final     TIBC   Date Value Ref Range Status   03/15/2020 247 (L) 250 - 450 mcg/dL Final       Vitamin B-12   Date Value Ref Range Status   07/13/2023 354 211 - 946 pg/mL Final       Folate   Date Value Ref Range Status   07/13/2023 6.7 4.8 - 24.2 ng/mL Final       Lab Results   Component Value Date/Time    COLORU Yellow 09/06/2023 01:20 PM    NITRU NEGATIVE 09/06/2023 01:20 PM    GLUCOSEU NEGATIVE 09/06/2023 01:20 PM    KETUA NEGATIVE 09/06/2023 01:20 PM    UROBILINOGEN 0.2 09/06/2023 01:20 PM    BILIRUBINUR NEGATIVE 09/06/2023 01:20 PM       Lab Results   Component Value Date/Time    DELFINO <20 10/18/2023 09:15 AM    OSMOU 412 04/04/2023 08:30 PM       No components found for: \"URIC\"    No results found for: \"LIPIDPAN\"    Assessment and Plans:    CKD 4  History of hypertension, hyperlipidemia, CHF  Baseline creatinine 2.7-3  Creatinine 3.6 today  Avoid nephrotoxins/NSAIDs  Strict I&O, daily weights  S/p IV bumex--> on bumex 1 mg oral daily   Monitor labs    2. Chest pain  Troponin 50 on 10/22  Cardiology following     3.  HFrEF  echo 10/16/2023 - EF of 20-25%, mild concentric left ventricular hypertrophy, mild-mod tricuspid regurg  proBNP 5359  Strict I&O, daily weights  S/p IV bumex--> on bumex 1 mg oral daily   Monitor volume status  Cardiology following    4. High anion gap metabolic acidosis  Likely in the setting of CKD  Anion gap 18 with CO2 20 on 10/22--> CO2 25 today   On oral sodium bicarbonate 1300 mg twice daily  Monitor labs     5. Intermittent hypotension  On midodrine 5 mg oral 3 times daily  Monitor BPs     6.   Anemia   In part due to CKD  Hemoglobin target 10-12  Hemoglobin 10.3 on 10/25  Transfuse for hemoglobin<7  Monitor H&H     7.  History of atrial fibrillation   on Xarelto, metoprolol, amiodarone     NATI Duron -
deficiency anemia, hemoglobin 10.3>> 9.6        Plan:   No ischemic work-up is planned at this time. Cardiac cath done in 2019 was reviewed has mild nonobstructive CAD. CardioMEMS readings were obtained and the PA diastolic pressure, 27 goal 24  Discontinue IV Bumex and restart oral Bumex and change it to 1 mg p.o. daily. Strict input output charting. Consider renal consult  Recent echo was reviewed, EF 20 to 25%. ICD evaluation was reviewed as above, no VT VF was noted. Patient is stable from the cardiology standpoint, will sign off, please call us if any further questions or concerns. More  than 35 minutes  was spent counseling the patient, reviewing the medications, results, assessment and the rationale for the above recommendations. NOTE:  This report was transcribed using voice recognition software. Every effort was made to ensure accuracy; however, inadvertent computerized transcription errors may be present. Yazmin Colin MD., [place].   [facility]) Cardiology

## 2023-10-26 NOTE — PATIENT CARE CONFERENCE
[facility] Quality Flow/Interdisciplinary Rounds Progress Note        Quality Flow Rounds held on October 26, 2023    Disciplines Attending:  Bedside Nurse, , , and Nursing Unit Leadership    Iain Garcia III was admitted on 10/22/2023  6:22 PM    Anticipated Discharge Date:       Disposition:    James Score:  James Scale Score: 23    Readmission Risk              Risk of Unplanned Readmission:  32           Discussed patient goal for the day, patient clinical progression, and barriers to discharge.   The following Goal(s) of the Day/Commitment(s) have been identified:  Labs - Report Results      Honorio Osborne RN  October 26, 2023

## 2023-10-26 NOTE — CARE COORDINATION
Social Work/ Case Management Transition of Care Planning (1 Mundo Gonzales, [address]):     Discharge order noted. Plan is home and Pt will arrange transport with friend/family.    1 Mundo Gonzales [place]  10/26/2023

## 2023-10-26 NOTE — PLAN OF CARE
Problem: Chronic Conditions and Co-morbidities  Goal: Patient's chronic conditions and co-morbidity symptoms are monitored and maintained or improved  10/25/2023 2232 by Arthur Murray RN  Outcome: Progressing     Problem: Discharge Planning  Goal: Discharge to home or other facility with appropriate resources  10/25/2023 2232 by Arthur Murray RN  Outcome: Progressing     Problem: Pain  Goal: Verbalizes/displays adequate comfort level or baseline comfort level  10/25/2023 2232 by Arthur Murray RN  Outcome: Progressing     Problem: Safety - Adult  Goal: Free from fall injury  Outcome: Progressing

## 2023-10-27 ENCOUNTER — CARE COORDINATION (OUTPATIENT)
Dept: CARE COORDINATION | Age: 43
End: 2023-10-27

## 2023-10-27 ENCOUNTER — APPOINTMENT (OUTPATIENT)
Dept: GENERAL RADIOLOGY | Age: 43
DRG: 291 | End: 2023-10-27
Payer: MEDICARE

## 2023-10-27 ENCOUNTER — HOSPITAL ENCOUNTER (INPATIENT)
Age: 43
LOS: 3 days | Discharge: ANOTHER HOSPITAL | DRG: 291 | End: 2023-10-30
Attending: EMERGENCY MEDICINE | Admitting: INTERNAL MEDICINE
Payer: MEDICARE

## 2023-10-27 DIAGNOSIS — R79.89 ELEVATED BRAIN NATRIURETIC PEPTIDE (BNP) LEVEL: Primary | ICD-10-CM

## 2023-10-27 LAB
ALBUMIN SERPL-MCNC: 3.4 G/DL (ref 3.5–5.2)
ALBUMIN SERPL-MCNC: NORMAL G/DL (ref 3.5–5.2)
ALBUMIN/GLOB SERPL: NORMAL {RATIO} (ref 1–2.5)
ALP SERPL-CCNC: 153 U/L (ref 40–129)
ALP SERPL-CCNC: NORMAL U/L (ref 40–129)
ALT SERPL-CCNC: 72 U/L (ref 0–40)
ALT SERPL-CCNC: NORMAL U/L (ref 5–41)
ANION GAP SERPL CALCULATED.3IONS-SCNC: 19 MMOL/L (ref 7–16)
ANION GAP SERPL CALCULATED.3IONS-SCNC: NORMAL MMOL/L (ref 9–17)
AST SERPL-CCNC: 46 U/L (ref 0–39)
AST SERPL-CCNC: NORMAL U/L
BASOPHILS # BLD: 0.02 K/UL (ref 0–0.2)
BASOPHILS NFR BLD: 0 % (ref 0–2)
BILIRUB SERPL-MCNC: 0.9 MG/DL (ref 0–1.2)
BILIRUB SERPL-MCNC: NORMAL MG/DL (ref 0.3–1.2)
BNP SERPL-MCNC: ABNORMAL PG/ML (ref 0–125)
BUN SERPL-MCNC: 51 MG/DL (ref 6–20)
BUN SERPL-MCNC: NORMAL MG/DL (ref 6–20)
BUN/CREAT SERPL: NORMAL (ref 9–20)
CALCIUM SERPL-MCNC: 8.9 MG/DL (ref 8.6–10.2)
CALCIUM SERPL-MCNC: NORMAL MG/DL (ref 8.6–10.4)
CHLORIDE SERPL-SCNC: 102 MMOL/L (ref 98–107)
CHLORIDE SERPL-SCNC: NORMAL MMOL/L (ref 98–107)
CO2 SERPL-SCNC: 18 MMOL/L (ref 22–29)
CO2 SERPL-SCNC: NORMAL MMOL/L (ref 20–31)
CREAT SERPL-MCNC: 3.4 MG/DL (ref 0.7–1.2)
CREAT SERPL-MCNC: NORMAL MG/DL (ref 0.7–1.2)
EKG ATRIAL RATE: 63 BPM
EKG P AXIS: 103 DEGREES
EKG P-R INTERVAL: 136 MS
EKG Q-T INTERVAL: 570 MS
EKG QRS DURATION: 192 MS
EKG QTC CALCULATION (BAZETT): 583 MS
EKG R AXIS: -3 DEGREES
EKG T AXIS: -173 DEGREES
EKG VENTRICULAR RATE: 63 BPM
EOSINOPHIL # BLD: 0 K/UL (ref 0.05–0.5)
EOSINOPHILS RELATIVE PERCENT: 0 % (ref 0–6)
ERYTHROCYTE [DISTWIDTH] IN BLOOD BY AUTOMATED COUNT: 18.2 % (ref 11.5–15)
GFR SERPL CREATININE-BSD FRML MDRD: 22 ML/MIN/1.73M2
GFR SERPL CREATININE-BSD FRML MDRD: NORMAL ML/MIN/1.73M2
GLUCOSE SERPL-MCNC: 124 MG/DL (ref 74–99)
GLUCOSE SERPL-MCNC: NORMAL MG/DL (ref 70–99)
HCT VFR BLD AUTO: 34 % (ref 37–54)
HGB BLD-MCNC: 10.5 G/DL (ref 12.5–16.5)
IMM GRANULOCYTES # BLD AUTO: 0.06 K/UL (ref 0–0.58)
IMM GRANULOCYTES NFR BLD: 1 % (ref 0–5)
LYMPHOCYTES NFR BLD: 0.78 K/UL (ref 1.5–4)
LYMPHOCYTES RELATIVE PERCENT: 8 % (ref 20–42)
MCH RBC QN AUTO: 27.3 PG (ref 26–35)
MCHC RBC AUTO-ENTMCNC: 30.9 G/DL (ref 32–34.5)
MCV RBC AUTO: 88.3 FL (ref 80–99.9)
MONOCYTES NFR BLD: 0.71 K/UL (ref 0.1–0.95)
MONOCYTES NFR BLD: 7 % (ref 2–12)
NEUTROPHILS NFR BLD: 84 % (ref 43–80)
NEUTS SEG NFR BLD: 8.18 K/UL (ref 1.8–7.3)
PLATELET # BLD AUTO: 295 K/UL (ref 130–450)
PMV BLD AUTO: 9.9 FL (ref 7–12)
POTASSIUM SERPL-SCNC: 3.8 MMOL/L (ref 3.5–5)
POTASSIUM SERPL-SCNC: NORMAL MMOL/L (ref 3.7–5.3)
PROT SERPL-MCNC: 7 G/DL (ref 6.4–8.3)
PROT SERPL-MCNC: NORMAL G/DL (ref 6.4–8.3)
RBC # BLD AUTO: 3.85 M/UL (ref 3.8–5.8)
SODIUM SERPL-SCNC: 139 MMOL/L (ref 132–146)
SODIUM SERPL-SCNC: NORMAL MMOL/L (ref 135–144)
TROPONIN I SERPL HS-MCNC: 51 NG/L (ref 0–11)
TROPONIN I SERPL HS-MCNC: 52 NG/L (ref 0–11)
TROPONIN I SERPL HS-MCNC: NORMAL NG/L (ref 0–22)
TROPONIN INTERP: NORMAL
TROPONIN T SERPL-MCNC: NORMAL NG/ML
WBC OTHER # BLD: 9.8 K/UL (ref 4.5–11.5)

## 2023-10-27 PROCEDURE — 85025 COMPLETE CBC W/AUTO DIFF WBC: CPT

## 2023-10-27 PROCEDURE — 99285 EMERGENCY DEPT VISIT HI MDM: CPT

## 2023-10-27 PROCEDURE — 2500000003 HC RX 250 WO HCPCS: Performed by: INTERNAL MEDICINE

## 2023-10-27 PROCEDURE — 93005 ELECTROCARDIOGRAM TRACING: CPT

## 2023-10-27 PROCEDURE — 83880 ASSAY OF NATRIURETIC PEPTIDE: CPT

## 2023-10-27 PROCEDURE — 80053 COMPREHEN METABOLIC PANEL: CPT

## 2023-10-27 PROCEDURE — 71045 X-RAY EXAM CHEST 1 VIEW: CPT

## 2023-10-27 PROCEDURE — APPSS180 APP SPLIT SHARED TIME > 60 MINUTES: Performed by: CLINICAL NURSE SPECIALIST

## 2023-10-27 PROCEDURE — 99223 1ST HOSP IP/OBS HIGH 75: CPT | Performed by: INTERNAL MEDICINE

## 2023-10-27 PROCEDURE — G0378 HOSPITAL OBSERVATION PER HR: HCPCS

## 2023-10-27 PROCEDURE — 84484 ASSAY OF TROPONIN QUANT: CPT

## 2023-10-27 PROCEDURE — 2140000000 HC CCU INTERMEDIATE R&B

## 2023-10-27 PROCEDURE — 96374 THER/PROPH/DIAG INJ IV PUSH: CPT

## 2023-10-27 PROCEDURE — 93010 ELECTROCARDIOGRAM REPORT: CPT | Performed by: INTERNAL MEDICINE

## 2023-10-27 RX ORDER — BUMETANIDE 0.25 MG/ML
1 INJECTION INTRAMUSCULAR; INTRAVENOUS ONCE
Status: COMPLETED | OUTPATIENT
Start: 2023-10-27 | End: 2023-10-27

## 2023-10-27 RX ADMIN — BUMETANIDE 1 MG: 0.25 INJECTION INTRAMUSCULAR; INTRAVENOUS at 20:09

## 2023-10-27 NOTE — CARE COORDINATION
Care Transitions Initial Follow Up Call    Call within 2 business days of discharge: Yes    Patient Current Location:  Home: [address] [place] [address], [facility] [address] Transition Nurse contacted the patient by telephone to perform post hospital discharge assessment. Verified name and  with patient as identifiers. Provided introduction to self, and explanation of the Care Transition Nurse role. Patient: Hanna Wilson III Patient : 1980   MRN: [id number]  Reason for Admission: CP  Discharge Date: 10/26/23 RARS: Readmission Risk Score: 27.3    Last Discharge Facility       Date Complaint Diagnosis Description Type Department Provider    10/22/23 Chest Pain Chest pain, unspecified type . .. ED to Hosp-Admission (Discharged) (ADMITTED) [facility] [address] [place] [address] Anshul Rizo MD; Norah Bullock. .. Spoke with Oliva taylor for [address] PCP care transition call post hospital discharge. He reports he is \"not too good\" today. He went on to explain that he didn't sleep very good, he feels incredibly dizzy, and feels like his throat is swollen. He is able to swallow. He has an appointment at [facility] (OUT[facility]) today, however, the appointment is with his gastric bypass doctor and he will most likely cancel. CTN inquired if he checked his BP yet, he then said he did but he needs to go and call 911 because he does not feel right, denies CTN calling 911 and the call was ended.      Non-face-to-face services provided:  Obtained and reviewed discharge summary and/or continuity of care documents    Follow Up  Future Appointments   Date Time Provider [address]   2023 12:00 PM [facility] ROOM 1 [facility] Zully Caldwell RN

## 2023-10-27 NOTE — ED NOTES
[employer] called w/ report. [employer] staff referenced optivol thresh hold crossed yesterday measuring intrathoracic impedence. Device is working properly. Report handed to ED physician.    Belinda Wagner RN  10/27/23 [facility]       Belinda Wagner RN  10/27/23 9897

## 2023-10-27 NOTE — ED NOTES
[employer] pacemaker interrogated per physician request, report sent to [employer].      Cornell Alejandre RN  10/27/23 0230

## 2023-10-28 PROBLEM — I36.1 NONRHEUMATIC TRICUSPID VALVE REGURGITATION: Status: ACTIVE | Noted: 2023-10-28

## 2023-10-28 PROBLEM — I95.9 HYPOTENSION: Status: ACTIVE | Noted: 2023-10-28

## 2023-10-28 PROBLEM — Z79.01 CHRONIC ANTICOAGULATION: Status: ACTIVE | Noted: 2023-10-28

## 2023-10-28 PROBLEM — I34.0 NONRHEUMATIC MITRAL VALVE REGURGITATION: Status: ACTIVE | Noted: 2023-10-28

## 2023-10-28 PROBLEM — Z86.73 HISTORY OF CVA (CEREBROVASCULAR ACCIDENT): Status: ACTIVE | Noted: 2023-10-28

## 2023-10-28 PROBLEM — I27.20 PULMONARY HTN (HCC): Status: ACTIVE | Noted: 2023-10-28

## 2023-10-28 PROBLEM — I50.21 ACUTE SYSTOLIC (CONGESTIVE) HEART FAILURE (HCC): Status: ACTIVE | Noted: 2020-03-14

## 2023-10-28 PROBLEM — I25.10 CORONARY ARTERY DISEASE INVOLVING NATIVE CORONARY ARTERY OF NATIVE HEART WITHOUT ANGINA PECTORIS: Status: ACTIVE | Noted: 2023-10-28

## 2023-10-28 LAB
ALBUMIN SERPL-MCNC: 4.1 G/DL (ref 3.5–5.2)
ALP SERPL-CCNC: 186 U/L (ref 40–129)
ALT SERPL-CCNC: 103 U/L (ref 0–40)
ANION GAP SERPL CALCULATED.3IONS-SCNC: 18 MMOL/L (ref 7–16)
AST SERPL-CCNC: 65 U/L (ref 0–39)
BASOPHILS # BLD: 0.02 K/UL (ref 0–0.2)
BASOPHILS NFR BLD: 0 % (ref 0–2)
BILIRUB SERPL-MCNC: 1 MG/DL (ref 0–1.2)
BUN SERPL-MCNC: 61 MG/DL (ref 6–20)
CALCIUM SERPL-MCNC: 10.1 MG/DL (ref 8.6–10.2)
CHLORIDE SERPL-SCNC: 99 MMOL/L (ref 98–107)
CO2 SERPL-SCNC: 22 MMOL/L (ref 22–29)
CREAT SERPL-MCNC: 4.1 MG/DL (ref 0.7–1.2)
EOSINOPHIL # BLD: 0.01 K/UL (ref 0.05–0.5)
EOSINOPHILS RELATIVE PERCENT: 0 % (ref 0–6)
ERYTHROCYTE [DISTWIDTH] IN BLOOD BY AUTOMATED COUNT: 18.7 % (ref 11.5–15)
GFR SERPL CREATININE-BSD FRML MDRD: 18 ML/MIN/1.73M2
GLUCOSE SERPL-MCNC: 104 MG/DL (ref 74–99)
HCT VFR BLD AUTO: 32.1 % (ref 37–54)
HGB BLD-MCNC: 10.4 G/DL (ref 12.5–16.5)
IMM GRANULOCYTES # BLD AUTO: <0.03 K/UL (ref 0–0.58)
IMM GRANULOCYTES NFR BLD: 0 % (ref 0–5)
LYMPHOCYTES NFR BLD: 1.52 K/UL (ref 1.5–4)
LYMPHOCYTES RELATIVE PERCENT: 19 % (ref 20–42)
MCH RBC QN AUTO: 27.8 PG (ref 26–35)
MCHC RBC AUTO-ENTMCNC: 32.4 G/DL (ref 32–34.5)
MCV RBC AUTO: 85.8 FL (ref 80–99.9)
MONOCYTES NFR BLD: 0.84 K/UL (ref 0.1–0.95)
MONOCYTES NFR BLD: 10 % (ref 2–12)
NEUTROPHILS NFR BLD: 71 % (ref 43–80)
NEUTS SEG NFR BLD: 5.8 K/UL (ref 1.8–7.3)
PLATELET # BLD AUTO: 287 K/UL (ref 130–450)
PMV BLD AUTO: 9.6 FL (ref 7–12)
POTASSIUM SERPL-SCNC: 3.6 MMOL/L (ref 3.5–5)
PROT SERPL-MCNC: 7.8 G/DL (ref 6.4–8.3)
RBC # BLD AUTO: 3.74 M/UL (ref 3.8–5.8)
SODIUM SERPL-SCNC: 139 MMOL/L (ref 132–146)
WBC OTHER # BLD: 8.2 K/UL (ref 4.5–11.5)

## 2023-10-28 PROCEDURE — G0378 HOSPITAL OBSERVATION PER HR: HCPCS

## 2023-10-28 PROCEDURE — 36415 COLL VENOUS BLD VENIPUNCTURE: CPT

## 2023-10-28 PROCEDURE — 80053 COMPREHEN METABOLIC PANEL: CPT

## 2023-10-28 PROCEDURE — 99232 SBSQ HOSP IP/OBS MODERATE 35: CPT | Performed by: INTERNAL MEDICINE

## 2023-10-28 PROCEDURE — 85025 COMPLETE CBC W/AUTO DIFF WBC: CPT

## 2023-10-28 PROCEDURE — 6370000000 HC RX 637 (ALT 250 FOR IP): Performed by: INTERNAL MEDICINE

## 2023-10-28 PROCEDURE — 5A09457 ASSISTANCE WITH RESPIRATORY VENTILATION, 24-96 CONSECUTIVE HOURS, CONTINUOUS POSITIVE AIRWAY PRESSURE: ICD-10-PCS | Performed by: INTERNAL MEDICINE

## 2023-10-28 PROCEDURE — 94660 CPAP INITIATION&MGMT: CPT

## 2023-10-28 PROCEDURE — 2140000000 HC CCU INTERMEDIATE R&B

## 2023-10-28 RX ORDER — ATORVASTATIN CALCIUM 40 MG/1
40 TABLET, FILM COATED ORAL NIGHTLY
Status: DISCONTINUED | OUTPATIENT
Start: 2023-10-28 | End: 2023-10-30 | Stop reason: HOSPADM

## 2023-10-28 RX ORDER — POTASSIUM CHLORIDE 20 MEQ/1
20 TABLET, EXTENDED RELEASE ORAL DAILY
Status: DISCONTINUED | OUTPATIENT
Start: 2023-10-28 | End: 2023-10-30 | Stop reason: HOSPADM

## 2023-10-28 RX ORDER — ALLOPURINOL 100 MG/1
100 TABLET ORAL DAILY
Status: DISCONTINUED | OUTPATIENT
Start: 2023-10-28 | End: 2023-10-30 | Stop reason: HOSPADM

## 2023-10-28 RX ORDER — PANTOPRAZOLE SODIUM 40 MG/1
40 TABLET, DELAYED RELEASE ORAL DAILY
Status: DISCONTINUED | OUTPATIENT
Start: 2023-10-28 | End: 2023-10-30 | Stop reason: HOSPADM

## 2023-10-28 RX ORDER — ERGOCALCIFEROL 1.25 MG/1
50000 CAPSULE ORAL WEEKLY
Status: DISCONTINUED | OUTPATIENT
Start: 2023-10-30 | End: 2023-10-30 | Stop reason: HOSPADM

## 2023-10-28 RX ORDER — LANOLIN ALCOHOL/MO/W.PET/CERES
400 CREAM (GRAM) TOPICAL DAILY
Status: DISCONTINUED | OUTPATIENT
Start: 2023-10-28 | End: 2023-10-30 | Stop reason: HOSPADM

## 2023-10-28 RX ORDER — MIDODRINE HYDROCHLORIDE 5 MG/1
5 TABLET ORAL
Status: DISCONTINUED | OUTPATIENT
Start: 2023-10-28 | End: 2023-10-30 | Stop reason: HOSPADM

## 2023-10-28 RX ORDER — SODIUM BICARBONATE 650 MG/1
1300 TABLET ORAL 2 TIMES DAILY WITH MEALS
Status: DISCONTINUED | OUTPATIENT
Start: 2023-10-28 | End: 2023-10-30 | Stop reason: HOSPADM

## 2023-10-28 RX ORDER — FERROUS SULFATE 325(65) MG
325 TABLET ORAL
Status: DISCONTINUED | OUTPATIENT
Start: 2023-10-28 | End: 2023-10-30 | Stop reason: HOSPADM

## 2023-10-28 RX ORDER — M-VIT,TX,IRON,MINS/CALC/FOLIC 27MG-0.4MG
1 TABLET ORAL DAILY
Status: DISCONTINUED | OUTPATIENT
Start: 2023-10-28 | End: 2023-10-30 | Stop reason: HOSPADM

## 2023-10-28 RX ORDER — NITROGLYCERIN 0.4 MG/1
0.4 TABLET SUBLINGUAL EVERY 5 MIN PRN
Status: DISCONTINUED | OUTPATIENT
Start: 2023-10-28 | End: 2023-10-30 | Stop reason: HOSPADM

## 2023-10-28 RX ORDER — AMIODARONE HYDROCHLORIDE 200 MG/1
200 TABLET ORAL DAILY
Status: DISCONTINUED | OUTPATIENT
Start: 2023-10-28 | End: 2023-10-30 | Stop reason: HOSPADM

## 2023-10-28 RX ORDER — CALCIUM CARBONATE 500 MG/1
500 TABLET, CHEWABLE ORAL 3 TIMES DAILY
Status: DISCONTINUED | OUTPATIENT
Start: 2023-10-28 | End: 2023-10-30 | Stop reason: HOSPADM

## 2023-10-28 RX ORDER — METOPROLOL SUCCINATE 25 MG/1
25 TABLET, EXTENDED RELEASE ORAL NIGHTLY
Status: DISCONTINUED | OUTPATIENT
Start: 2023-10-28 | End: 2023-10-30 | Stop reason: HOSPADM

## 2023-10-28 RX ORDER — BUMETANIDE 1 MG/1
1 TABLET ORAL DAILY
Status: DISCONTINUED | OUTPATIENT
Start: 2023-10-28 | End: 2023-10-30 | Stop reason: HOSPADM

## 2023-10-28 RX ORDER — PREDNISONE 20 MG/1
20 TABLET ORAL DAILY
Status: DISCONTINUED | OUTPATIENT
Start: 2023-10-28 | End: 2023-10-30 | Stop reason: HOSPADM

## 2023-10-28 RX ADMIN — MIDODRINE HYDROCHLORIDE 5 MG: 5 TABLET ORAL at 17:11

## 2023-10-28 RX ADMIN — MULTIPLE VITAMINS W/ MINERALS TAB 1 TABLET: TAB at 09:07

## 2023-10-28 RX ADMIN — SODIUM BICARBONATE 1300 MG: 650 TABLET ORAL at 17:11

## 2023-10-28 RX ADMIN — SODIUM BICARBONATE 1300 MG: 650 TABLET ORAL at 09:07

## 2023-10-28 RX ADMIN — PREDNISONE 20 MG: 20 TABLET ORAL at 09:06

## 2023-10-28 RX ADMIN — BUMETANIDE 1 MG: 1 TABLET ORAL at 09:06

## 2023-10-28 RX ADMIN — FERROUS SULFATE TAB 325 MG (65 MG ELEMENTAL FE) 325 MG: 325 (65 FE) TAB at 09:06

## 2023-10-28 RX ADMIN — ATORVASTATIN CALCIUM 40 MG: 40 TABLET, FILM COATED ORAL at 22:10

## 2023-10-28 RX ADMIN — AMIODARONE HYDROCHLORIDE 200 MG: 200 TABLET ORAL at 09:06

## 2023-10-28 RX ADMIN — Medication 400 MG: at 09:06

## 2023-10-28 RX ADMIN — ALLOPURINOL 100 MG: 100 TABLET ORAL at 09:07

## 2023-10-28 RX ADMIN — RIVAROXABAN 15 MG: 15 TABLET, FILM COATED ORAL at 09:57

## 2023-10-28 RX ADMIN — CALCIUM CARBONATE 500 MG: 500 TABLET, CHEWABLE ORAL at 09:06

## 2023-10-28 RX ADMIN — MIDODRINE HYDROCHLORIDE 5 MG: 5 TABLET ORAL at 12:00

## 2023-10-28 RX ADMIN — MIDODRINE HYDROCHLORIDE 5 MG: 5 TABLET ORAL at 09:07

## 2023-10-28 RX ADMIN — POTASSIUM CHLORIDE 20 MEQ: 1500 TABLET, EXTENDED RELEASE ORAL at 09:06

## 2023-10-28 RX ADMIN — CALCIUM CARBONATE 500 MG: 500 TABLET, CHEWABLE ORAL at 12:00

## 2023-10-28 RX ADMIN — METOPROLOL SUCCINATE 25 MG: 25 TABLET, EXTENDED RELEASE ORAL at 22:10

## 2023-10-28 RX ADMIN — PANTOPRAZOLE SODIUM 40 MG: 40 TABLET, DELAYED RELEASE ORAL at 09:07

## 2023-10-28 ASSESSMENT — ENCOUNTER SYMPTOMS
NAUSEA: 0
VOMITING: 0
VOICE CHANGE: 0
ABDOMINAL PAIN: 0
PHOTOPHOBIA: 0
WHEEZING: 0
DIARRHEA: 0
TROUBLE SWALLOWING: 0
SHORTNESS OF BREATH: 0
BACK PAIN: 0

## 2023-10-28 NOTE — ED NOTES
Taken to 65[address] in stable condition with all personal belongings     Juan Page, [address]  10/28/23 5560

## 2023-10-28 NOTE — H&P
DEFIBRILLATOR PLACEMENT  11/20/2018    UPGRADE S-ICD TO BIV ICD   ([employer])  RAHEJA     CARDIOVERSION  02/14/2020    Successful CV of AF to NSR      (Dr. Alexei Dorsey)    Lenora Beverage  03/10/2022    Dr. Alexei Dorsey. Valaria Daft Valaria Daft V-paced / Geovanny Magdalenoy  06/26/2023    Successful  Dr. Alexei Dorsey    ECHO COMPL W DOP COLOR FLOW  11/30/2011         ECHO COMPL W DOP COLOR FLOW  03/27/2012         INSERTABLE CARDIAC MONITOR  12/13/2018    cardiomems, MIGUELINA, Dr. Tim Ramirez  12/15/2020    SUCCESSFUL OVERDRIVE PACE TERMINATION OF AF VIA ICD    (DR. Alexei Dorsey)    PACEMAKER PLACEMENT         Medications Prior to Admission:    Medications Prior to Admission: bumetanide (BUMEX) 1 MG tablet, Take 1 tablet by mouth daily  predniSONE (DELTASONE) 20 MG tablet, Take 1 tablet by mouth daily for 5 days  calcium citrate (CALCITRATE) 250 MG TABS tablet, Take 2 tablets by mouth 3 times daily  ferrous sulfate (IRON 325) 325 (65 Fe) MG tablet, Take 1 tablet by mouth daily (with breakfast)  Magnesium 400 MG TABS, 400 mg daily  allopurinol (ZYLOPRIM) 100 MG tablet, Take 1 tablet by mouth daily  sodium bicarbonate 650 MG tablet, Take 2 tablets by mouth 2 times daily  amiodarone (CORDARONE) 200 MG tablet, Take 1 tablet by mouth daily  midodrine (PROAMATINE) 5 MG tablet, Take 1 tablet by mouth 3 times daily (with meals)  nitroGLYCERIN (NITROSTAT) 0.4 MG SL tablet, Place 1 tablet under the tongue every 5 minutes as needed for Chest pain up to max of 3 total doses. If no relief after 1 dose, call 911. vitamin D (ERGOCALCIFEROL) 1.25 MG (37830 UT) CAPS capsule, Take 1 capsule by mouth once a week Given Monday.   Multiple Vitamins-Minerals (THERAPEUTIC MULTIVITAMIN-MINERALS) tablet, Take 1 tablet by mouth daily  rivaroxaban (XARELTO) 15 MG TABS tablet, Take 1 tablet by mouth daily (with breakfast)  potassium chloride (KLOR-CON M) 20 MEQ extended release tablet, Take 1 tablet by mouth daily  atorvastatin (LIPITOR) 40 MG tablet, Take 1 tablet by mouth

## 2023-10-28 NOTE — ED PROVIDER NOTES
[address] [facility] ENCOUNTER      Pt Name: Traci Neff  MRN: [id number]  [address] [facility] [place] 1980  Date of evaluation: 10/27/2023  Provider: Brisa Camargo DO  PCP: Jenny Knapp MD    HPI: 19-year-old male present emerged from complaints of neck tightness, paresthesia over the lips. Patient reports that previously breakfast this morning and got a call to check on patient following patient's recent discharge from hospital yesterday. Patient reports while he was talking he began to feel sensation of tightness, tingling of the lips. Patient reports no changes in intake from any of the patient has not had previously, ate a turkey sausage sandwich which she has had multiple times in the past.  Denies any recent changes to medication. Denies any chest pain or shortness of breath. Patient on initial exam for improvement of symptomatology time. Chief Complaint   Patient presents with    [place]ness of [address] PT STATES HE HAS NOT FELT BETTER SINCE . PT STATES HE HAD THROAT SWELLING SENSATION AND LIPS TINGLING. PT REPORTS SYMPTOMS IMPROVING. Review of Systems   Constitutional:  Negative for chills, fatigue and fever. HENT:  Negative for trouble swallowing and voice change. Eyes:  Negative for photophobia and visual disturbance. Respiratory:  Negative for shortness of breath and wheezing. Cardiovascular:  Negative for chest pain and palpitations. Gastrointestinal:  Negative for abdominal pain, diarrhea, nausea and vomiting. Genitourinary:  Negative for dysuria, frequency, hematuria and urgency. Musculoskeletal:  Positive for neck pain. Negative for arthralgias, back pain and neck stiffness. Skin:  Negative for rash and wound. Neurological:  Negative for dizziness, syncope, facial asymmetry, weakness, light-headedness and headaches.    Psychiatric/Behavioral:  Negative for

## 2023-10-28 NOTE — PLAN OF CARE
Problem: Chronic Conditions and Co-morbidities  Goal: Patient's chronic conditions and co-morbidity symptoms are monitored and maintained or improved  10/28/2023 1309 by Dannielle Henley RN  Outcome: Progressing  10/28/2023 0138 by Cielo Berman RN  Outcome: Progressing     Problem: Discharge Planning  Goal: Discharge to home or other facility with appropriate resources  10/28/2023 1309 by Dannielle Henley RN  Outcome: Progressing  10/28/2023 0138 by Cielo Berman RN  Outcome: Progressing     Problem: Safety - Adult  Goal: Free from fall injury  10/28/2023 1309 by Dannielle Henley RN  Outcome: Progressing  10/28/2023 0138 by Cielo Berman RN  Outcome: Progressing     Problem: ABCDS Injury Assessment  Goal: Absence of physical injury  10/28/2023 1309 by Dannielle Henley RN  Outcome: Progressing  10/28/2023 0138 by Cielo Berman RN  Outcome: Progressing

## 2023-10-29 LAB
ALBUMIN SERPL-MCNC: 3.7 G/DL (ref 3.5–5.2)
ALP SERPL-CCNC: 169 U/L (ref 40–129)
ALT SERPL-CCNC: 105 U/L (ref 0–40)
ANION GAP SERPL CALCULATED.3IONS-SCNC: 17 MMOL/L (ref 7–16)
AST SERPL-CCNC: 60 U/L (ref 0–39)
BASOPHILS # BLD: 0.01 K/UL (ref 0–0.2)
BASOPHILS NFR BLD: 0 % (ref 0–2)
BILIRUB SERPL-MCNC: 0.9 MG/DL (ref 0–1.2)
BUN SERPL-MCNC: 64 MG/DL (ref 6–20)
CALCIUM SERPL-MCNC: 9.4 MG/DL (ref 8.6–10.2)
CHLORIDE SERPL-SCNC: 99 MMOL/L (ref 98–107)
CO2 SERPL-SCNC: 22 MMOL/L (ref 22–29)
CREAT SERPL-MCNC: 3.9 MG/DL (ref 0.7–1.2)
EOSINOPHIL # BLD: 0.01 K/UL (ref 0.05–0.5)
EOSINOPHILS RELATIVE PERCENT: 0 % (ref 0–6)
ERYTHROCYTE [DISTWIDTH] IN BLOOD BY AUTOMATED COUNT: 18.2 % (ref 11.5–15)
GFR SERPL CREATININE-BSD FRML MDRD: 19 ML/MIN/1.73M2
GLUCOSE SERPL-MCNC: 111 MG/DL (ref 74–99)
HCT VFR BLD AUTO: 29.1 % (ref 37–54)
HGB BLD-MCNC: 9.2 G/DL (ref 12.5–16.5)
IMM GRANULOCYTES # BLD AUTO: 0.03 K/UL (ref 0–0.58)
IMM GRANULOCYTES NFR BLD: 0 % (ref 0–5)
LYMPHOCYTES NFR BLD: 1.14 K/UL (ref 1.5–4)
LYMPHOCYTES RELATIVE PERCENT: 16 % (ref 20–42)
MAGNESIUM SERPL-MCNC: 2.5 MG/DL (ref 1.6–2.6)
MCH RBC QN AUTO: 27.5 PG (ref 26–35)
MCHC RBC AUTO-ENTMCNC: 31.6 G/DL (ref 32–34.5)
MCV RBC AUTO: 86.9 FL (ref 80–99.9)
MONOCYTES NFR BLD: 0.82 K/UL (ref 0.1–0.95)
MONOCYTES NFR BLD: 12 % (ref 2–12)
NEUTROPHILS NFR BLD: 71 % (ref 43–80)
NEUTS SEG NFR BLD: 4.95 K/UL (ref 1.8–7.3)
PHOSPHATE SERPL-MCNC: 4.2 MG/DL (ref 2.5–4.5)
PLATELET # BLD AUTO: 242 K/UL (ref 130–450)
PMV BLD AUTO: 10 FL (ref 7–12)
POTASSIUM SERPL-SCNC: 3.8 MMOL/L (ref 3.5–5)
PROT SERPL-MCNC: 6.5 G/DL (ref 6.4–8.3)
RBC # BLD AUTO: 3.35 M/UL (ref 3.8–5.8)
SODIUM SERPL-SCNC: 138 MMOL/L (ref 132–146)
WBC OTHER # BLD: 7 K/UL (ref 4.5–11.5)

## 2023-10-29 PROCEDURE — 6370000000 HC RX 637 (ALT 250 FOR IP): Performed by: INTERNAL MEDICINE

## 2023-10-29 PROCEDURE — 85025 COMPLETE CBC W/AUTO DIFF WBC: CPT

## 2023-10-29 PROCEDURE — G0378 HOSPITAL OBSERVATION PER HR: HCPCS

## 2023-10-29 PROCEDURE — 94660 CPAP INITIATION&MGMT: CPT

## 2023-10-29 PROCEDURE — 80053 COMPREHEN METABOLIC PANEL: CPT

## 2023-10-29 PROCEDURE — 83735 ASSAY OF MAGNESIUM: CPT

## 2023-10-29 PROCEDURE — 99232 SBSQ HOSP IP/OBS MODERATE 35: CPT | Performed by: INTERNAL MEDICINE

## 2023-10-29 PROCEDURE — 84100 ASSAY OF PHOSPHORUS: CPT

## 2023-10-29 PROCEDURE — 36415 COLL VENOUS BLD VENIPUNCTURE: CPT

## 2023-10-29 PROCEDURE — 2140000000 HC CCU INTERMEDIATE R&B

## 2023-10-29 RX ADMIN — SODIUM BICARBONATE 1300 MG: 650 TABLET ORAL at 17:16

## 2023-10-29 RX ADMIN — ALLOPURINOL 100 MG: 100 TABLET ORAL at 08:56

## 2023-10-29 RX ADMIN — PANTOPRAZOLE SODIUM 40 MG: 40 TABLET, DELAYED RELEASE ORAL at 08:57

## 2023-10-29 RX ADMIN — POTASSIUM CHLORIDE 20 MEQ: 1500 TABLET, EXTENDED RELEASE ORAL at 08:56

## 2023-10-29 RX ADMIN — Medication 400 MG: at 08:57

## 2023-10-29 RX ADMIN — MULTIPLE VITAMINS W/ MINERALS TAB 1 TABLET: TAB at 08:57

## 2023-10-29 RX ADMIN — MIDODRINE HYDROCHLORIDE 5 MG: 5 TABLET ORAL at 08:56

## 2023-10-29 RX ADMIN — RIVAROXABAN 15 MG: 15 TABLET, FILM COATED ORAL at 08:57

## 2023-10-29 RX ADMIN — CALCIUM CARBONATE 500 MG: 500 TABLET, CHEWABLE ORAL at 19:55

## 2023-10-29 RX ADMIN — MIDODRINE HYDROCHLORIDE 5 MG: 5 TABLET ORAL at 11:43

## 2023-10-29 RX ADMIN — SODIUM BICARBONATE 1300 MG: 650 TABLET ORAL at 08:56

## 2023-10-29 RX ADMIN — PREDNISONE 20 MG: 20 TABLET ORAL at 08:56

## 2023-10-29 RX ADMIN — MIDODRINE HYDROCHLORIDE 5 MG: 5 TABLET ORAL at 17:16

## 2023-10-29 RX ADMIN — CALCIUM CARBONATE 500 MG: 500 TABLET, CHEWABLE ORAL at 13:53

## 2023-10-29 RX ADMIN — ATORVASTATIN CALCIUM 40 MG: 40 TABLET, FILM COATED ORAL at 19:54

## 2023-10-29 RX ADMIN — FERROUS SULFATE TAB 325 MG (65 MG ELEMENTAL FE) 325 MG: 325 (65 FE) TAB at 08:57

## 2023-10-29 RX ADMIN — CALCIUM CARBONATE 500 MG: 500 TABLET, CHEWABLE ORAL at 08:57

## 2023-10-29 RX ADMIN — BUMETANIDE 1 MG: 1 TABLET ORAL at 08:57

## 2023-10-29 RX ADMIN — METOPROLOL SUCCINATE 25 MG: 25 TABLET, EXTENDED RELEASE ORAL at 19:54

## 2023-10-29 RX ADMIN — AMIODARONE HYDROCHLORIDE 200 MG: 200 TABLET ORAL at 08:56

## 2023-10-29 NOTE — PLAN OF CARE
Problem: Chronic Conditions and Co-morbidities  Goal: Patient's chronic conditions and co-morbidity symptoms are monitored and maintained or improved  10/29/2023 1029 by Tayo Amin RN  Outcome: Progressing  10/29/2023 0205 by Ciro Espinoza RN  Outcome: Progressing     Problem: Discharge Planning  Goal: Discharge to home or other facility with appropriate resources  10/29/2023 1029 by Tayo Amin RN  Outcome: Progressing  10/29/2023 0205 by Ciro Espinoza RN  Outcome: Progressing     Problem: Safety - Adult  Goal: Free from fall injury  10/29/2023 0205 by Ciro Espinoza RN  Outcome: Progressing     Problem: ABCDS Injury Assessment  Goal: Absence of physical injury  10/29/2023 0205 by Ciro Espinoza RN  Outcome: Progressing

## 2023-10-29 NOTE — CARE COORDINATION
Spoke with [facility] cardiology department  Rod Davis has accepted the patient for inpatient transfer  Bed availability pending

## 2023-10-29 NOTE — PLAN OF CARE
Problem: Chronic Conditions and Co-morbidities  Goal: Patient's chronic conditions and co-morbidity symptoms are monitored and maintained or improved  10/29/2023 0205 by Imer Newby RN  Outcome: Progressing  10/28/2023 1309 by Markos Ferrari RN  Outcome: Progressing     Problem: Discharge Planning  Goal: Discharge to home or other facility with appropriate resources  10/29/2023 0205 by Imer Newby RN  Outcome: Progressing  10/28/2023 1309 by Markos Ferrari RN  Outcome: Progressing     Problem: Safety - Adult  Goal: Free from fall injury  10/29/2023 0205 by Imer Newby RN  Outcome: Progressing  10/28/2023 1309 by Markos Ferrari RN  Outcome: Progressing     Problem: ABCDS Injury Assessment  Goal: Absence of physical injury  10/29/2023 0205 by Imer Newby RN  Outcome: Progressing  10/28/2023 1309 by Markos Ferrari RN  Outcome: Progressing

## 2023-10-30 VITALS
TEMPERATURE: 97.6 F | WEIGHT: 236.2 LBS | OXYGEN SATURATION: 100 % | HEART RATE: 55 BPM | HEIGHT: 74 IN | RESPIRATION RATE: 19 BRPM | SYSTOLIC BLOOD PRESSURE: 120 MMHG | DIASTOLIC BLOOD PRESSURE: 87 MMHG | BODY MASS INDEX: 30.31 KG/M2

## 2023-10-30 LAB
ALBUMIN SERPL-MCNC: 4 G/DL (ref 3.5–5.2)
ALP SERPL-CCNC: 192 U/L (ref 40–129)
ALT SERPL-CCNC: 171 U/L (ref 0–40)
ANION GAP SERPL CALCULATED.3IONS-SCNC: 16 MMOL/L (ref 7–16)
AST SERPL-CCNC: 98 U/L (ref 0–39)
BASOPHILS # BLD: 0.01 K/UL (ref 0–0.2)
BASOPHILS NFR BLD: 0 % (ref 0–2)
BILIRUB SERPL-MCNC: 0.9 MG/DL (ref 0–1.2)
BUN SERPL-MCNC: 66 MG/DL (ref 6–20)
CALCIUM SERPL-MCNC: 9.7 MG/DL (ref 8.6–10.2)
CHLORIDE SERPL-SCNC: 102 MMOL/L (ref 98–107)
CO2 SERPL-SCNC: 22 MMOL/L (ref 22–29)
CREAT SERPL-MCNC: 3.7 MG/DL (ref 0.7–1.2)
EKG ATRIAL RATE: 56 BPM
EKG Q-T INTERVAL: 618 MS
EKG QRS DURATION: 194 MS
EKG QTC CALCULATION (BAZETT): 601 MS
EKG R AXIS: -174 DEGREES
EKG T AXIS: 5 DEGREES
EKG VENTRICULAR RATE: 57 BPM
EOSINOPHIL # BLD: 0.01 K/UL (ref 0.05–0.5)
EOSINOPHILS RELATIVE PERCENT: 0 % (ref 0–6)
ERYTHROCYTE [DISTWIDTH] IN BLOOD BY AUTOMATED COUNT: 18.7 % (ref 11.5–15)
GFR SERPL CREATININE-BSD FRML MDRD: 20 ML/MIN/1.73M2
GLUCOSE SERPL-MCNC: 107 MG/DL (ref 74–99)
HCT VFR BLD AUTO: 32.7 % (ref 37–54)
HGB BLD-MCNC: 9.9 G/DL (ref 12.5–16.5)
IMM GRANULOCYTES # BLD AUTO: 0.03 K/UL (ref 0–0.58)
IMM GRANULOCYTES NFR BLD: 0 % (ref 0–5)
LYMPHOCYTES NFR BLD: 1.23 K/UL (ref 1.5–4)
LYMPHOCYTES RELATIVE PERCENT: 18 % (ref 20–42)
MAGNESIUM SERPL-MCNC: 2.6 MG/DL (ref 1.6–2.6)
MCH RBC QN AUTO: 27.8 PG (ref 26–35)
MCHC RBC AUTO-ENTMCNC: 30.3 G/DL (ref 32–34.5)
MCV RBC AUTO: 91.9 FL (ref 80–99.9)
MONOCYTES NFR BLD: 0.76 K/UL (ref 0.1–0.95)
MONOCYTES NFR BLD: 11 % (ref 2–12)
NEUTROPHILS NFR BLD: 71 % (ref 43–80)
NEUTS SEG NFR BLD: 4.93 K/UL (ref 1.8–7.3)
PHOSPHATE SERPL-MCNC: 3.8 MG/DL (ref 2.5–4.5)
PLATELET # BLD AUTO: 249 K/UL (ref 130–450)
PMV BLD AUTO: 10.3 FL (ref 7–12)
POTASSIUM SERPL-SCNC: 3.9 MMOL/L (ref 3.5–5)
PROT SERPL-MCNC: 7.4 G/DL (ref 6.4–8.3)
RBC # BLD AUTO: 3.56 M/UL (ref 3.8–5.8)
SODIUM SERPL-SCNC: 140 MMOL/L (ref 132–146)
WBC OTHER # BLD: 7 K/UL (ref 4.5–11.5)

## 2023-10-30 PROCEDURE — G0378 HOSPITAL OBSERVATION PER HR: HCPCS

## 2023-10-30 PROCEDURE — 36415 COLL VENOUS BLD VENIPUNCTURE: CPT

## 2023-10-30 PROCEDURE — 94660 CPAP INITIATION&MGMT: CPT

## 2023-10-30 PROCEDURE — 83735 ASSAY OF MAGNESIUM: CPT

## 2023-10-30 PROCEDURE — 80053 COMPREHEN METABOLIC PANEL: CPT

## 2023-10-30 PROCEDURE — 6370000000 HC RX 637 (ALT 250 FOR IP): Performed by: INTERNAL MEDICINE

## 2023-10-30 PROCEDURE — 84100 ASSAY OF PHOSPHORUS: CPT

## 2023-10-30 PROCEDURE — 85025 COMPLETE CBC W/AUTO DIFF WBC: CPT

## 2023-10-30 RX ADMIN — PREDNISONE 20 MG: 20 TABLET ORAL at 08:10

## 2023-10-30 RX ADMIN — SODIUM BICARBONATE 1300 MG: 650 TABLET ORAL at 08:10

## 2023-10-30 RX ADMIN — RIVAROXABAN 15 MG: 15 TABLET, FILM COATED ORAL at 08:09

## 2023-10-30 RX ADMIN — MIDODRINE HYDROCHLORIDE 5 MG: 5 TABLET ORAL at 08:10

## 2023-10-30 RX ADMIN — ERGOCALCIFEROL 50000 UNITS: 1.25 CAPSULE ORAL at 08:10

## 2023-10-30 RX ADMIN — CALCIUM CARBONATE 500 MG: 500 TABLET, CHEWABLE ORAL at 08:09

## 2023-10-30 RX ADMIN — PANTOPRAZOLE SODIUM 40 MG: 40 TABLET, DELAYED RELEASE ORAL at 08:10

## 2023-10-30 RX ADMIN — MULTIPLE VITAMINS W/ MINERALS TAB 1 TABLET: TAB at 08:10

## 2023-10-30 RX ADMIN — ALLOPURINOL 100 MG: 100 TABLET ORAL at 08:10

## 2023-10-30 RX ADMIN — POTASSIUM CHLORIDE 20 MEQ: 1500 TABLET, EXTENDED RELEASE ORAL at 08:09

## 2023-10-30 RX ADMIN — Medication 400 MG: at 08:09

## 2023-10-30 RX ADMIN — BUMETANIDE 1 MG: 1 TABLET ORAL at 08:10

## 2023-10-30 RX ADMIN — AMIODARONE HYDROCHLORIDE 200 MG: 200 TABLET ORAL at 08:10

## 2023-10-30 RX ADMIN — FERROUS SULFATE TAB 325 MG (65 MG ELEMENTAL FE) 325 MG: 325 (65 FE) TAB at 08:10

## 2023-10-30 NOTE — CARE COORDINATION
Care Coordination  The patient was admitted  with nonischemic dilated cardiomyopathy with poor LV ejection fraction and chronic kidney disease  Management has been complicated by low blood pressures. He is now in CHF. He has an AICD in place. The patient now has a new bed at [facility]. [facility] ambulance to be here around 10 am to pick him up. His transport envelope is done.

## 2023-10-30 NOTE — PATIENT CARE CONFERENCE
P Quality Flow/Interdisciplinary Rounds Progress Note        Quality Flow Rounds held on October 30, 2023    Disciplines Attending:  Bedside Nurse, , , and Nursing Unit Leadership    Beth Soria III was admitted on 10/27/2023  2:23 PM    Anticipated Discharge Date:       Disposition:    James Score:  James Scale Score: 22    Readmission Risk              Risk of Unplanned Readmission:  36           Discussed patient goal for the day, patient clinical progression, and barriers to discharge.   The following Goal(s) of the Day/Commitment(s) have been identified:  transfer      Stacia Bey RN  October 30, 2023

## 2023-10-30 NOTE — DISCHARGE INSTRUCTIONS
HEART FAILURE  / CONGESTIVE HEART FAILURE  DISCHARGE INSTRUCTIONS:  GUIDELINES TO FOLLOW AT HOME  Future Appointments   Date Time Provider [address]   11/30/2023 12:00 PM [facility] ROOM 1 [facility]         Self- Managed Care:     MEDICATIONS:  Take your medication as directed. If you are experiencing any side effects, inform your doctor, Do not stop taking any of your medications without letting your doctor know. Check with your doctor before taking any over-the-counter medications / herbal / or dietary supplements. They may interfere with your other medications. Do not take ibuprofen (Advil or Motrin) and naproxen (Aleve) without talking to your doctor first. They could make your heart failure worse. WEIGHT MONITORING:   Weigh yourself everyday (with the same scale) around the same time of the day and write it down. (you can chart them on a calendar or keep track of them on paper. Notify your doctor of a weight gain of 3 pounds or more in 1 day   OR a total of 5 pounds or more in 1 week    Take your weight record to your doctor visits  Also, the same goes if you loose more than 3# in one day, let your heart doctor know. DIET:   Cardiac heart healthy diet- Low saturated / low trans fat, no added salt, caffeine restricted, Low sodium diet-   No more than 2,000mg (2 grams) of salt / sodium per day (which equals to a little less than  a teaspoon of salt)  If your doctor wants you on a fluid restriction. ..it is usually recommended a fluid limit of 2,000cc -  Fluid restriction- 2,000 ml (milliliters) = 64 ounces = you can have 8 glasses of fluid per day (each glass 8 ounces)    Follow a low salt diet - avoid using salt at the table, avoid / limit use of canned soups, processed / packaged foods, salted snacks, olives and pickles.   Do not use a salt substitute without checking with your doctor, they may contain a high amount of potassioum. (Mrs. Rich Jauregui is safe to

## 2023-10-30 NOTE — DISCHARGE INSTR - COC
Continuity of Care Form    Patient Name: Migdalia Obrien   :  1980  MRN:  [id number]    Admit date:  10/27/2023  Discharge date:  10/30/23    Code Status Order: Prior   Advance Directives:     Admitting Physician:  Alyssa Scott MD  PCP: Jermaine Tran MD    Discharging Nurse: [facility] Unit/Room#: 2304/9450-C  Discharging Unit Phone Number: [phone]    Emergency Contact:   Extended Emergency Contact Information  Primary Emergency Contact: Lillian Mejía  Address: [address] [place] [address] Janice Klein of [address] [address] Phone: [phone]  Mobile Phone: [phone]  Relation: Aunt/Uncle  Secondary Emergency Contact: Veronica Hernandez  Mobile Phone: [phone]  Relation: Parent    Past Surgical History:  Past Surgical History:   Procedure Laterality Date    CARDIAC CATHETERIZATION  2019    Dr Clarence Saavedra  2018    UPGRADE S-ICD TO BIV ICD   ([employer])  [facility]     CARDIOVERSION  2020    Successful CV of AF to NSR      (Dr. Rosie Dumas)    Pham Masker  03/10/2022    Dr. Rosie Dumas. Sonja John Sonja John V-paced / SR    CARDIOVERSION  2023    Successful  Dr. Rosie Dumas    ECHO COMPL W DOP COLOR FLOW  2011         ECHO COMPL W DOP COLOR FLOW  2012         INSERTABLE CARDIAC MONITOR  2018    cardiomeMIGUELINA fregoso, Dr. Rakel Plunkett  12/15/2020    SUCCESSFUL OVERDRIVE PACE TERMINATION OF AF VIA ICD    ( [facility])    PACEMAKER PLACEMENT         Immunization History:   Immunization History   Administered Date(s) Administered    COVID-19, PFIZER PURPLE top, DILUTE for use, (age 15 y+), 30mcg/0.3mL 2021, 2021    Hep B, ENGERIX-B, (age 20y+), IM, 1mL 2019, 2019, 2021    Influenza, AFLURIA (age 1 yrs+), FLUZONE, (age 10 mo+), MDV, 0.5mL 2016    Influenza, FLUARIX, FLULAVAL, FLUZONE (age 10 mo+) AND AFLURIA, (age 1 y+), PF, 0.5mL 10/31/2018, 2019, 2020    Pneumococcal, PPSV23,

## 2023-10-30 NOTE — PLAN OF CARE
Patient's chart updated to reflect:      . - HF care plan, HF education points and HF discharge instructions.  -Orders: 2 gram sodium diet, daily weights, I/O.  -PCP and cardiology follow up appointments to be scheduled within 7 days of hospital discharge. -CHF education session will be provided to the patient prior to hospital discharge.     Nickie Romo RN   Heart Failure Navigator

## 2023-10-31 NOTE — DISCHARGE SUMMARY
Physician Discharge Summary     Patient ID:  Nuvia Brown  [id number]  37 y.o.  1980    Admit date: 10/27/2023    Discharge date and time: 10/30/2023  1:17 PM     Admission Diagnoses: Elevated brain natriuretic peptide (BNP) level [R79.89]    Discharge Diagnoses:   Dilated nonischemic cardiomyopathy  Acute on chronic systolic heart failure  Drug-induced hypotension  Acute on chronic kidney failure  Paroxysmal atrial fibrillation  Patient Active Problem List   Diagnosis    Obstructive sleep apnea    Chronic gout    Chronic kidney disease (CKD), stage IV (severe) (HCC)    Type 2 diabetes mellitus with complication, with long-term current use of insulin (HCC)    Hepatomegaly    PAF (paroxysmal atrial fibrillation) (HCC)    Essential hypertension    VHD (valvular heart disease)    Morbid obesity due to excess calories (HCC)    Cardiac resynchronization therapy defibrillator (CRT-D) in place    Ventricular arrhythmia    NICM (nonischemic cardiomyopathy) ([address])    S/P left pulmonary artery pressure sensor implant placement    Acute systolic (congestive) heart failure (HCC)    Carotid disease, bilateral (HCC)    Chronic combined systolic and diastolic congestive heart failure (HCC)    Atrial flutter (HCC)    Atrial fibrillation (HCC)    TAIWO (acute kidney injury) ([address])    HFrEF (heart failure with reduced ejection fraction) ([address])    Presence of CardioMEMS HF system    Ventricular tachycardia (HCC)    Acute chest pain    Presence of cardiac resynchronization therapy defibrillator (CRT-D)    Syncope and collapse    Elevated brain natriuretic peptide (BNP) level    Coronary artery disease involving native coronary artery of native heart without angina pectoris    Nonrheumatic mitral valve regurgitation    Nonrheumatic tricuspid valve regurgitation    Pulmonary HTN ([address])    History of CVA (cerebrovascular accident)    Chronic anticoagulation    Hypotension       Consults: Cardiology and nephrology    Procedures:

## 2023-11-07 ENCOUNTER — TELEPHONE (OUTPATIENT)
Dept: CARDIOLOGY | Age: 43
End: 2023-11-07

## 2023-11-07 NOTE — TELEPHONE ENCOUNTER
Call placed to patient from [facility] team for update since patient was transferred to [facility] for advanced heart failure and unable to send cardioMEMS readings. Patient says he is currently still hospitalized at [facility] and being worked up to be placed on a heart transplant list and most likely will be getting a balloon pump in a few days.       Electronically signed by Andrea Fu RN on 11/7/2023 at 10:07 AM

## 2023-11-17 ENCOUNTER — TELEPHONE (OUTPATIENT)
Dept: CARDIOLOGY CLINIC | Age: 43
End: 2023-11-17

## 2023-11-17 NOTE — TELEPHONE ENCOUNTER
CardioMems update:      PAD:  Goal:24  Current trends:20            Providers:  Cardiomems team: Dr. Ashley Garcia CNP  Cardiologist[facility]  [facility]        Diuretic Protocol:  Bumex 1 mg daily three times weekly  Spironolactone 12.5 mg daily         GDMT:      GUIDELINE DIRECTED MEDICAL THERAPY for HFrEF/HFmrEF:  ARNI/ACE I/ARB: (1a indication)  Entresto 24/26 mg BID  Hydralazine/Nitrates (1a in symptomatic AA population, 2b if unable to tolerate ACE/ARB/ARNI)  No  Beta blocker: (1a indication)  Metoprolol Succinate (Toprol)  Aldosterone antagonist: (1a indication)  Spironolactone 12.5 mg daily  SGLT2i: (1a indication)  No    If Channel inhibitor (2a indication if HR >70 on maximally tolerated beta blocker)  No  Cardiac glycoside (2b indication for symptomatic HFrEF)  No  Soluble Guanylate Cyclase ([address] [place] [address]) Stimulator (2b indication LVEF <45% with recent [address], IV diuretics or elevated BNP)  No  Potassium binders (2b indication for hyperkalemia while taking RAASi)  No             CHF:   HFrEF 35%          ASSESSMENT:  Call placed to patient today and he says he is currently still inpatient at [facility]. Saúl Hooker says he is getting work up for heart transplant list but a MRI yesterday showed a liver lesion that they will look into further prior to any further transplant workup. Emotional support given and we will continue to check on him weekly via telephone calls while inpatient. cardioMEMS reading was sent today through [facility] and updated in [address] [place] [address] website. Future Appointments   Date Time Provider [address]   11/30/2023 12:00 PM [facility] CHF ROOM 1 [facility]       PLAN:  Awaiting further workup per [facility] will send cardioMEMS reading while inpatient.         Electronically signed by Alcira Dodge RN on 11/17/2023 at 4:38 PM

## 2023-11-24 ENCOUNTER — HOSPITAL ENCOUNTER (EMERGENCY)
Age: 43
Discharge: ANOTHER HOSPITAL | End: 2023-11-25
Attending: STUDENT IN AN ORGANIZED HEALTH CARE EDUCATION/TRAINING PROGRAM
Payer: MEDICARE

## 2023-11-24 DIAGNOSIS — I21.4 NSTEMI (NON-ST ELEVATED MYOCARDIAL INFARCTION) (HCC): Primary | ICD-10-CM

## 2023-11-24 PROCEDURE — 96368 THER/DIAG CONCURRENT INF: CPT

## 2023-11-24 PROCEDURE — 96365 THER/PROPH/DIAG IV INF INIT: CPT

## 2023-11-24 PROCEDURE — 96375 TX/PRO/DX INJ NEW DRUG ADDON: CPT

## 2023-11-24 PROCEDURE — 99285 EMERGENCY DEPT VISIT HI MDM: CPT

## 2023-11-24 PROCEDURE — 96366 THER/PROPH/DIAG IV INF ADDON: CPT

## 2023-11-24 PROCEDURE — 96376 TX/PRO/DX INJ SAME DRUG ADON: CPT

## 2023-11-24 PROCEDURE — 93005 ELECTROCARDIOGRAM TRACING: CPT

## 2023-11-24 RX ORDER — ASPIRIN 81 MG/1
243 TABLET, CHEWABLE ORAL ONCE
Status: COMPLETED | OUTPATIENT
Start: 2023-11-25 | End: 2023-11-25

## 2023-11-24 ASSESSMENT — PAIN DESCRIPTION - PAIN TYPE: TYPE: ACUTE PAIN

## 2023-11-24 ASSESSMENT — PAIN DESCRIPTION - ONSET: ONSET: SUDDEN

## 2023-11-24 ASSESSMENT — ENCOUNTER SYMPTOMS
CHEST TIGHTNESS: 1
ABDOMINAL PAIN: 0
SHORTNESS OF BREATH: 0
ABDOMINAL DISTENTION: 0

## 2023-11-24 ASSESSMENT — PAIN SCALES - GENERAL: PAINLEVEL_OUTOF10: 7

## 2023-11-24 ASSESSMENT — PAIN - FUNCTIONAL ASSESSMENT
PAIN_FUNCTIONAL_ASSESSMENT: 0-10
PAIN_FUNCTIONAL_ASSESSMENT: ACTIVITIES ARE NOT PREVENTED

## 2023-11-24 ASSESSMENT — PAIN DESCRIPTION - ORIENTATION: ORIENTATION: LEFT

## 2023-11-24 ASSESSMENT — PAIN DESCRIPTION - FREQUENCY: FREQUENCY: CONTINUOUS

## 2023-11-24 ASSESSMENT — PAIN DESCRIPTION - LOCATION: LOCATION: CHEST

## 2023-11-24 ASSESSMENT — PAIN DESCRIPTION - DESCRIPTORS: DESCRIPTORS: SHARP

## 2023-11-25 ENCOUNTER — APPOINTMENT (OUTPATIENT)
Dept: GENERAL RADIOLOGY | Age: 43
End: 2023-11-25
Payer: MEDICARE

## 2023-11-25 VITALS
HEART RATE: 63 BPM | RESPIRATION RATE: 16 BRPM | DIASTOLIC BLOOD PRESSURE: 69 MMHG | OXYGEN SATURATION: 100 % | HEIGHT: 74 IN | WEIGHT: 232 LBS | TEMPERATURE: 97.5 F | SYSTOLIC BLOOD PRESSURE: 112 MMHG | BODY MASS INDEX: 29.77 KG/M2

## 2023-11-25 LAB
ALBUMIN SERPL-MCNC: 4 G/DL (ref 3.5–5.2)
ALP SERPL-CCNC: 183 U/L (ref 40–129)
ALT SERPL-CCNC: 68 U/L (ref 0–40)
ANION GAP SERPL CALCULATED.3IONS-SCNC: 18 MMOL/L (ref 7–16)
AST SERPL-CCNC: 31 U/L (ref 0–39)
BASOPHILS # BLD: 0.02 K/UL (ref 0–0.2)
BASOPHILS NFR BLD: 0 % (ref 0–2)
BILIRUB SERPL-MCNC: 0.8 MG/DL (ref 0–1.2)
BILIRUB UR QL STRIP: NEGATIVE
BNP SERPL-MCNC: 5967 PG/ML (ref 0–125)
BUN SERPL-MCNC: 72 MG/DL (ref 6–20)
CALCIUM SERPL-MCNC: 9.9 MG/DL (ref 8.6–10.2)
CHLORIDE SERPL-SCNC: 94 MMOL/L (ref 98–107)
CLARITY UR: CLEAR
CO2 SERPL-SCNC: 22 MMOL/L (ref 22–29)
COLOR UR: YELLOW
COMMENT: NORMAL
CREAT SERPL-MCNC: 3.7 MG/DL (ref 0.7–1.2)
EKG ATRIAL RATE: 63 BPM
EKG Q-T INTERVAL: 612 MS
EKG QRS DURATION: 162 MS
EKG QTC CALCULATION (BAZETT): 626 MS
EKG R AXIS: 165 DEGREES
EKG T AXIS: 21 DEGREES
EKG VENTRICULAR RATE: 63 BPM
EOSINOPHIL # BLD: 0.03 K/UL (ref 0.05–0.5)
EOSINOPHILS RELATIVE PERCENT: 1 % (ref 0–6)
ERYTHROCYTE [DISTWIDTH] IN BLOOD BY AUTOMATED COUNT: 19.3 % (ref 11.5–15)
GFR SERPL CREATININE-BSD FRML MDRD: 20 ML/MIN/1.73M2
GLUCOSE SERPL-MCNC: 183 MG/DL (ref 74–99)
GLUCOSE UR STRIP-MCNC: NEGATIVE MG/DL
HCT VFR BLD AUTO: 24.9 % (ref 37–54)
HGB BLD-MCNC: 7.8 G/DL (ref 12.5–16.5)
HGB UR QL STRIP.AUTO: NEGATIVE
IMM GRANULOCYTES # BLD AUTO: 0.04 K/UL (ref 0–0.58)
IMM GRANULOCYTES NFR BLD: 1 % (ref 0–5)
INR PPP: 1.6
KETONES UR STRIP-MCNC: NEGATIVE MG/DL
LACTATE BLDV-SCNC: 1.6 MMOL/L (ref 0.5–2.2)
LEUKOCYTE ESTERASE UR QL STRIP: NEGATIVE
LYMPHOCYTES NFR BLD: 0.83 K/UL (ref 1.5–4)
LYMPHOCYTES RELATIVE PERCENT: 13 % (ref 20–42)
MAGNESIUM SERPL-MCNC: 2.5 MG/DL (ref 1.6–2.6)
MCH RBC QN AUTO: 28 PG (ref 26–35)
MCHC RBC AUTO-ENTMCNC: 31.3 G/DL (ref 32–34.5)
MCV RBC AUTO: 89.2 FL (ref 80–99.9)
MONOCYTES NFR BLD: 0.91 K/UL (ref 0.1–0.95)
MONOCYTES NFR BLD: 14 % (ref 2–12)
NEUTROPHILS NFR BLD: 72 % (ref 43–80)
NEUTS SEG NFR BLD: 4.82 K/UL (ref 1.8–7.3)
NITRITE UR QL STRIP: NEGATIVE
PARTIAL THROMBOPLASTIN TIME: 33.4 SEC (ref 24.5–35.1)
PARTIAL THROMBOPLASTIN TIME: 34.2 SEC (ref 24.5–35.1)
PH UR STRIP: 7 [PH] (ref 5–9)
PLATELET # BLD AUTO: 223 K/UL (ref 130–450)
PMV BLD AUTO: 9.7 FL (ref 7–12)
POTASSIUM SERPL-SCNC: 4 MMOL/L (ref 3.5–5)
PROT SERPL-MCNC: 6.9 G/DL (ref 6.4–8.3)
PROT UR STRIP-MCNC: NEGATIVE MG/DL
PROTHROMBIN TIME: 17.2 SEC (ref 9.3–12.4)
RBC # BLD AUTO: 2.79 M/UL (ref 3.8–5.8)
SODIUM SERPL-SCNC: 134 MMOL/L (ref 132–146)
SP GR UR STRIP: 1.01 (ref 1–1.03)
T4 FREE SERPL-MCNC: 2 NG/DL (ref 0.9–1.7)
TROPONIN I SERPL HS-MCNC: 100 NG/L (ref 0–11)
TROPONIN I SERPL HS-MCNC: 90 NG/L (ref 0–11)
TROPONIN I SERPL HS-MCNC: 99 NG/L (ref 0–11)
TSH SERPL DL<=0.05 MIU/L-ACNC: 5.49 UIU/ML (ref 0.27–4.2)
UROBILINOGEN UR STRIP-ACNC: 0.2 EU/DL (ref 0–1)
WBC OTHER # BLD: 6.7 K/UL (ref 4.5–11.5)

## 2023-11-25 PROCEDURE — 71045 X-RAY EXAM CHEST 1 VIEW: CPT

## 2023-11-25 PROCEDURE — 85730 THROMBOPLASTIN TIME PARTIAL: CPT

## 2023-11-25 PROCEDURE — 6360000002 HC RX W HCPCS: Performed by: STUDENT IN AN ORGANIZED HEALTH CARE EDUCATION/TRAINING PROGRAM

## 2023-11-25 PROCEDURE — 6370000000 HC RX 637 (ALT 250 FOR IP)

## 2023-11-25 PROCEDURE — 83605 ASSAY OF LACTIC ACID: CPT

## 2023-11-25 PROCEDURE — 80053 COMPREHEN METABOLIC PANEL: CPT

## 2023-11-25 PROCEDURE — 85610 PROTHROMBIN TIME: CPT

## 2023-11-25 PROCEDURE — 96376 TX/PRO/DX INJ SAME DRUG ADON: CPT

## 2023-11-25 PROCEDURE — 84484 ASSAY OF TROPONIN QUANT: CPT

## 2023-11-25 PROCEDURE — 83880 ASSAY OF NATRIURETIC PEPTIDE: CPT

## 2023-11-25 PROCEDURE — 85025 COMPLETE CBC W/AUTO DIFF WBC: CPT

## 2023-11-25 PROCEDURE — 96365 THER/PROPH/DIAG IV INF INIT: CPT

## 2023-11-25 PROCEDURE — 93005 ELECTROCARDIOGRAM TRACING: CPT | Performed by: STUDENT IN AN ORGANIZED HEALTH CARE EDUCATION/TRAINING PROGRAM

## 2023-11-25 PROCEDURE — 96375 TX/PRO/DX INJ NEW DRUG ADDON: CPT

## 2023-11-25 PROCEDURE — 93010 ELECTROCARDIOGRAM REPORT: CPT | Performed by: INTERNAL MEDICINE

## 2023-11-25 PROCEDURE — 6360000002 HC RX W HCPCS

## 2023-11-25 PROCEDURE — 84439 ASSAY OF FREE THYROXINE: CPT

## 2023-11-25 PROCEDURE — 83735 ASSAY OF MAGNESIUM: CPT

## 2023-11-25 PROCEDURE — 96368 THER/DIAG CONCURRENT INF: CPT

## 2023-11-25 PROCEDURE — 96366 THER/PROPH/DIAG IV INF ADDON: CPT

## 2023-11-25 PROCEDURE — 81003 URINALYSIS AUTO W/O SCOPE: CPT

## 2023-11-25 PROCEDURE — 84443 ASSAY THYROID STIM HORMONE: CPT

## 2023-11-25 RX ORDER — HEPARIN SODIUM 1000 [USP'U]/ML
4000 INJECTION, SOLUTION INTRAVENOUS; SUBCUTANEOUS PRN
Status: DISCONTINUED | OUTPATIENT
Start: 2023-11-25 | End: 2023-11-25 | Stop reason: HOSPADM

## 2023-11-25 RX ORDER — FENTANYL CITRATE 50 UG/ML
25 INJECTION, SOLUTION INTRAMUSCULAR; INTRAVENOUS ONCE
Status: COMPLETED | OUTPATIENT
Start: 2023-11-25 | End: 2023-11-25

## 2023-11-25 RX ORDER — HEPARIN SODIUM 10000 [USP'U]/100ML
5-30 INJECTION, SOLUTION INTRAVENOUS CONTINUOUS
Status: DISCONTINUED | OUTPATIENT
Start: 2023-11-25 | End: 2023-11-25 | Stop reason: HOSPADM

## 2023-11-25 RX ORDER — MAGNESIUM SULFATE IN WATER 40 MG/ML
2000 INJECTION, SOLUTION INTRAVENOUS ONCE
Status: COMPLETED | OUTPATIENT
Start: 2023-11-25 | End: 2023-11-25

## 2023-11-25 RX ORDER — HEPARIN SODIUM 1000 [USP'U]/ML
2000 INJECTION, SOLUTION INTRAVENOUS; SUBCUTANEOUS PRN
Status: DISCONTINUED | OUTPATIENT
Start: 2023-11-25 | End: 2023-11-25 | Stop reason: HOSPADM

## 2023-11-25 RX ORDER — HEPARIN SODIUM 1000 [USP'U]/ML
4000 INJECTION, SOLUTION INTRAVENOUS; SUBCUTANEOUS ONCE
Status: COMPLETED | OUTPATIENT
Start: 2023-11-25 | End: 2023-11-25

## 2023-11-25 RX ADMIN — MAGNESIUM SULFATE HEPTAHYDRATE 2000 MG: 40 INJECTION, SOLUTION INTRAVENOUS at 08:20

## 2023-11-25 RX ADMIN — ASPIRIN 243 MG: 81 TABLET, CHEWABLE ORAL at 00:14

## 2023-11-25 RX ADMIN — FENTANYL CITRATE 25 MCG: 50 INJECTION INTRAMUSCULAR; INTRAVENOUS at 01:57

## 2023-11-25 RX ADMIN — HEPARIN SODIUM 9 UNITS/KG/HR: 10000 INJECTION, SOLUTION INTRAVENOUS at 02:07

## 2023-11-25 RX ADMIN — FENTANYL CITRATE 25 MCG: 50 INJECTION INTRAMUSCULAR; INTRAVENOUS at 08:09

## 2023-11-25 RX ADMIN — HEPARIN SODIUM 4000 UNITS: 1000 INJECTION INTRAVENOUS; SUBCUTANEOUS at 02:01

## 2023-11-25 RX ADMIN — HEPARIN SODIUM 2000 UNITS: 1000 INJECTION INTRAVENOUS; SUBCUTANEOUS at 09:49

## 2023-11-25 ASSESSMENT — PAIN SCALES - GENERAL
PAINLEVEL_OUTOF10: 8
PAINLEVEL_OUTOF10: 9

## 2023-11-25 ASSESSMENT — PAIN DESCRIPTION - ORIENTATION
ORIENTATION: MID
ORIENTATION: LEFT

## 2023-11-25 ASSESSMENT — PAIN DESCRIPTION - DESCRIPTORS: DESCRIPTORS: SHARP

## 2023-11-25 ASSESSMENT — PAIN DESCRIPTION - LOCATION
LOCATION: CHEST
LOCATION: CHEST

## 2023-11-25 NOTE — ED NOTES
Spoke with Unique from access center states she spoke with [facility] and patients insurance will pay for transport , she states ETA for physicians ambulance  is 90 minutes - 2 hours.       Rowan Wells RN  11/25/23 [id number]

## 2023-11-25 NOTE — ED NOTES
Nurse to nurse report given to Carlos Martinez RN at [facility], [facility], [address]  11/25/23 4631

## 2023-11-25 NOTE — ED NOTES
[id number]    Orientation: ANN, unresponsive  Activity: bedrest, TR q2h  Diet/BS Checks: NPO  Tele:  n/a  IV Access/Drains: L PICC SL, Gtube clamped  Pain Management: Scheduled ativan and morphine  Abnormal VS/Results: deferred d/t comfort cares  Bowel/Bladder: Liu with minimal UOP, no bm this shift  Skin/Wounds: scattered bruising  Consults: n/a  D/C Disposition: plan to pass in hospital  Other Info: Periods of apnea. Pt was suctioned x3. PRN Atropine x2 given     [facility] taken to [facility] with transport      Myriam Vega RN  01/19/21 7736

## 2023-11-25 NOTE — ED NOTES
Call placed to [facility] to get updated ETA. Spoke with Paris Harrison. He stated that patient is on will call status d/t patient being private pay. Patient will need to pay $2504 up front before being transported to [facility]. Patient states that he is unable to make full payment upfront. Charge nurse notified and will contact access center.      Okey Cranker, RN  11/25/23 8282

## 2023-11-25 NOTE — ED NOTES
Spoke with Unique from [facility] regarding patient transport with [facility] , she states she will call back updated information for patient      Mark Anderson RN  11/25/23 6800

## 2023-11-25 NOTE — ED PROVIDER NOTES
[facility]  Department of Emergency Medicine   EM Physician H&P                  Celestino Barger 37 y.o. male PMHx of ARVIND, type 2 diabetes, atrial fibrillation, TAIWO, chronic gout, CKD stage IV, thyromegaly, PAF, essential hypertension, VHD long hospitalization at [facility] for 6 weeks, hypertension, pulmonary hypertension, pitting edema, HFrEF presents to the ED c/o chest pain. Onset: Several hours ago. Location/Radiation: Substernal left chest with radiation up to the left shoulder. Duration: Intermittent, became progressively constant. Characterization: Dull achy and sharp at times. Aggravating Factors: None. Relieving Factors: None. Severity: Mild to moderate. Patient reports that chest pain is now substernal with no radiation at this time. He reports he just got discharged from [facility] today. Reports that he was told that he needs a new heart, new kidneys, and possibly new liver. Reports he was in [facility] hospital for 6 weeks. .       Assx Sxs: Chest pain. He Denies: Fever/chills, Yakov pain, nausea/vomiting, diaphoresis. Review of Systems   Constitutional:  Negative for activity change and appetite change. Respiratory:  Positive for chest tightness. Negative for shortness of breath. Cardiovascular:  Positive for chest pain. Gastrointestinal:  Negative for abdominal distention and abdominal pain. Physical Exam  Vitals reviewed. Constitutional:       General: He is not in acute distress. Appearance: Normal appearance. He is not ill-appearing. HENT:      Head: Normocephalic. Right Ear: External ear normal.      Left Ear: External ear normal.      Nose: Nose normal.      Mouth/Throat:      Mouth: Mucous membranes are moist.      Pharynx: Oropharynx is clear. No oropharyngeal exudate or posterior oropharyngeal erythema. Eyes:      General:         Right eye: No discharge. Left eye: No discharge.       Extraocular Movements: Extraocular movements 146 mmol/L    Potassium 4.0 3.5 - 5.0 mmol/L    Chloride 94 (L) 98 - 107 mmol/L    CO2 22 22 - 29 mmol/L    Anion Gap 18 (H) 7 - 16 mmol/L    Glucose 183 (H) 74 - 99 mg/dL    BUN 72 (H) 6 - 20 mg/dL    Creatinine 3.7 (H) 0.70 - 1.20 mg/dL    Est, Glom Filt Rate 20 (L) >60 mL/min/1.73m2    Calcium 9.9 8.6 - 10.2 mg/dL    Total Protein 6.9 6.4 - 8.3 g/dL    Albumin 4.0 3.5 - 5.2 g/dL    Total Bilirubin 0.8 0.0 - 1.2 mg/dL    Alkaline Phosphatase 183 (H) 40 - 129 U/L    ALT 68 (H) 0 - 40 U/L    AST 31 0 - 39 U/L   CBC with Auto Differential   Result Value Ref Range    WBC 6.7 4.5 - 11.5 k/uL    RBC 2.79 (L) 3.80 - 5.80 m/uL    Hemoglobin 7.8 (L) 12.5 - 16.5 g/dL    Hematocrit 24.9 (L) 37.0 - 54.0 %    MCV 89.2 80.0 - 99.9 fL    MCH 28.0 26.0 - 35.0 pg    MCHC 31.3 (L) 32.0 - 34.5 g/dL    RDW 19.3 (H) 11.5 - 15.0 %    Platelets 019 012 - 906 k/uL    MPV 9.7 7.0 - 12.0 fL    Neutrophils % 72 43.0 - 80.0 %    Lymphocytes % 13 (L) 20.0 - 42.0 %    Monocytes % 14 (H) 2.0 - 12.0 %    Eosinophils % 1 0 - 6 %    Basophils % 0 0.0 - 2.0 %    Immature Granulocytes 1 0.0 - 5.0 %    Neutrophils Absolute 4.82 1.80 - 7.30 k/uL    Lymphocytes Absolute 0.83 (L) 1.50 - 4.00 k/uL    Monocytes Absolute 0.91 0.10 - 0.95 k/uL    Eosinophils Absolute 0.03 (L) 0.05 - 0.50 k/uL    Basophils Absolute 0.02 0.00 - 0.20 k/uL    Absolute Immature Granulocyte 0.04 0.00 - 0.58 k/uL   Lactic Acid   Result Value Ref Range    Lactic Acid 1.6 0.5 - 2.2 mmol/L   Protime-INR   Result Value Ref Range    Protime 17.2 (H) 9.3 - 12.4 sec    INR 1.6    Magnesium   Result Value Ref Range    Magnesium 2.5 1.6 - 2.6 mg/dL   Troponin   Result Value Ref Range    Troponin, High Sensitivity 90 (H) 0 - 11 ng/L   APTT   Result Value Ref Range    PTT 33.4 24.5 - 35.1 sec       Radiology  XR CHEST PORTABLE   Final Result   Mild right basilar increased density possible for atelectasis or infiltrate.       RECOMMENDATION:   PA and lateral exam may better evaluate  as

## 2023-11-29 LAB
EKG ATRIAL RATE: 59 BPM
EKG P AXIS: 48 DEGREES
EKG P-R INTERVAL: 168 MS
EKG Q-T INTERVAL: 636 MS
EKG QRS DURATION: 236 MS
EKG QTC CALCULATION (BAZETT): 629 MS
EKG R AXIS: -163 DEGREES
EKG T AXIS: 44 DEGREES
EKG VENTRICULAR RATE: 59 BPM

## 2023-11-29 PROCEDURE — 93010 ELECTROCARDIOGRAM REPORT: CPT | Performed by: INTERNAL MEDICINE

## 2023-12-28 ENCOUNTER — HOSPITAL ENCOUNTER (EMERGENCY)
Age: 43
Discharge: HOME | End: 2023-12-28
Attending: EMERGENCY MEDICINE
Payer: MEDICARE

## 2023-12-28 ENCOUNTER — APPOINTMENT (OUTPATIENT)
Dept: GENERAL RADIOLOGY | Age: 43
End: 2023-12-28
Payer: MEDICARE

## 2023-12-28 VITALS
RESPIRATION RATE: 18 BRPM | SYSTOLIC BLOOD PRESSURE: 146 MMHG | BODY MASS INDEX: 29.8 KG/M2 | WEIGHT: 220 LBS | HEIGHT: 72 IN | OXYGEN SATURATION: 97 % | DIASTOLIC BLOOD PRESSURE: 87 MMHG | TEMPERATURE: 98.2 F | HEART RATE: 88 BPM

## 2023-12-28 DIAGNOSIS — I50.9 CONGESTIVE HEART FAILURE, UNSPECIFIED HF CHRONICITY, UNSPECIFIED HEART FAILURE TYPE (HCC): Primary | ICD-10-CM

## 2023-12-28 LAB
ALBUMIN SERPL-MCNC: 3.8 G/DL (ref 3.5–5.2)
ALP SERPL-CCNC: 485 U/L (ref 40–129)
ALT SERPL-CCNC: 46 U/L (ref 0–40)
ANION GAP SERPL CALCULATED.3IONS-SCNC: 16 MMOL/L (ref 7–16)
AST SERPL-CCNC: 70 U/L (ref 0–39)
BASOPHILS # BLD: 0.04 K/UL (ref 0–0.2)
BASOPHILS NFR BLD: 1 % (ref 0–2)
BILIRUB SERPL-MCNC: 1.2 MG/DL (ref 0–1.2)
BNP SERPL-MCNC: 6971 PG/ML (ref 0–125)
BUN SERPL-MCNC: 68 MG/DL (ref 6–20)
CALCIUM SERPL-MCNC: 9.1 MG/DL (ref 8.6–10.2)
CHLORIDE SERPL-SCNC: 99 MMOL/L (ref 98–107)
CO2 SERPL-SCNC: 21 MMOL/L (ref 22–29)
CREAT SERPL-MCNC: 3.4 MG/DL (ref 0.7–1.2)
EKG ATRIAL RATE: 73 BPM
EKG P AXIS: -27 DEGREES
EKG P-R INTERVAL: 134 MS
EKG Q-T INTERVAL: 590 MS
EKG QRS DURATION: 188 MS
EKG QTC CALCULATION (BAZETT): 649 MS
EKG R AXIS: 178 DEGREES
EKG T AXIS: 35 DEGREES
EKG VENTRICULAR RATE: 73 BPM
EOSINOPHIL # BLD: 0.07 K/UL (ref 0.05–0.5)
EOSINOPHILS RELATIVE PERCENT: 1 % (ref 0–6)
ERYTHROCYTE [DISTWIDTH] IN BLOOD BY AUTOMATED COUNT: 16.3 % (ref 11.5–15)
GFR SERPL CREATININE-BSD FRML MDRD: 22 ML/MIN/1.73M2
GLUCOSE SERPL-MCNC: 128 MG/DL (ref 74–99)
HCT VFR BLD AUTO: 31.3 % (ref 37–54)
HGB BLD-MCNC: 10.4 G/DL (ref 12.5–16.5)
IMM GRANULOCYTES # BLD AUTO: 0.08 K/UL (ref 0–0.58)
IMM GRANULOCYTES NFR BLD: 1 % (ref 0–5)
LYMPHOCYTES NFR BLD: 1.31 K/UL (ref 1.5–4)
LYMPHOCYTES RELATIVE PERCENT: 16 % (ref 20–42)
MCH RBC QN AUTO: 28.7 PG (ref 26–35)
MCHC RBC AUTO-ENTMCNC: 33.2 G/DL (ref 32–34.5)
MCV RBC AUTO: 86.5 FL (ref 80–99.9)
MONOCYTES NFR BLD: 0.63 K/UL (ref 0.1–0.95)
MONOCYTES NFR BLD: 8 % (ref 2–12)
NEUTROPHILS NFR BLD: 73 % (ref 43–80)
NEUTS SEG NFR BLD: 5.84 K/UL (ref 1.8–7.3)
PLATELET # BLD AUTO: 310 K/UL (ref 130–450)
PMV BLD AUTO: 9.2 FL (ref 7–12)
POTASSIUM SERPL-SCNC: 3.7 MMOL/L (ref 3.5–5)
PROT SERPL-MCNC: 7.1 G/DL (ref 6.4–8.3)
RBC # BLD AUTO: 3.62 M/UL (ref 3.8–5.8)
SODIUM SERPL-SCNC: 136 MMOL/L (ref 132–146)
TROPONIN I SERPL HS-MCNC: 89 NG/L (ref 0–11)
TROPONIN I SERPL HS-MCNC: 96 NG/L (ref 0–11)
WBC OTHER # BLD: 8 K/UL (ref 4.5–11.5)

## 2023-12-28 PROCEDURE — 83880 ASSAY OF NATRIURETIC PEPTIDE: CPT

## 2023-12-28 PROCEDURE — 84484 ASSAY OF TROPONIN QUANT: CPT

## 2023-12-28 PROCEDURE — 99285 EMERGENCY DEPT VISIT HI MDM: CPT

## 2023-12-28 PROCEDURE — 85025 COMPLETE CBC W/AUTO DIFF WBC: CPT

## 2023-12-28 PROCEDURE — 80053 COMPREHEN METABOLIC PANEL: CPT

## 2023-12-28 PROCEDURE — 93005 ELECTROCARDIOGRAM TRACING: CPT | Performed by: EMERGENCY MEDICINE

## 2023-12-28 PROCEDURE — 71045 X-RAY EXAM CHEST 1 VIEW: CPT

## 2023-12-28 PROCEDURE — 93010 ELECTROCARDIOGRAM REPORT: CPT | Performed by: INTERNAL MEDICINE

## 2023-12-28 RX ORDER — HYDRALAZINE HYDROCHLORIDE 50 MG/1
150 TABLET, FILM COATED ORAL EVERY 8 HOURS
COMMUNITY

## 2023-12-28 RX ORDER — CARVEDILOL 3.12 MG/1
3.12 TABLET ORAL 2 TIMES DAILY WITH MEALS
COMMUNITY

## 2023-12-28 RX ORDER — TORSEMIDE 100 MG/1
100 TABLET ORAL 2 TIMES DAILY
COMMUNITY

## 2023-12-28 RX ORDER — BUMETANIDE 0.25 MG/ML
0.5 INJECTION INTRAMUSCULAR; INTRAVENOUS ONCE
Status: DISCONTINUED | OUTPATIENT
Start: 2023-12-28 | End: 2023-12-28 | Stop reason: HOSPADM

## 2023-12-28 RX ORDER — ISOSORBIDE DINITRATE 40 MG/1
40 TABLET ORAL 3 TIMES DAILY
COMMUNITY

## 2023-12-28 RX ORDER — NIFEDIPINE 60 MG/1
60 TABLET, FILM COATED, EXTENDED RELEASE ORAL DAILY
COMMUNITY

## 2023-12-28 RX ORDER — METOLAZONE 2.5 MG/1
2.5 TABLET ORAL
COMMUNITY

## 2023-12-28 RX ORDER — ANTACID TABLETS 500 MG/1
1 TABLET, CHEWABLE ORAL DAILY
COMMUNITY

## 2023-12-28 NOTE — ED NOTES
Pt ambulated approx 150 ft spo2 room air was 99% entire time, heart rate 83. Pt denied any sob, ambulated well no other needs voiced at present time. Rn aware of need for iv meds.

## 2023-12-28 NOTE — ED NOTES
Repeat troponin drawn and sent, pt ital signs rechecked and pt placed back in chair #4. Pt denies any pain at this time.

## 2023-12-28 NOTE — ED PROVIDER NOTES
HPI:  12/28/23, Time: 3:13 AM CHIRAG Soria III is a 37 y.o. male history of heart failure history of anemia coronary disease history of chronic kidney disease gout history of hypertension hyperlipidemia nonischemic cardiomyopathy history of diabetes history of atrial fibrillation presenting to the ED for sob, beginning hours ago. The complaint has been persistent, moderate in severity, and worsened by nothing. Patient presented because of shortness of breath that started while he was at home several hours ago. Patient reporting shortness of breath at rest.  Patient reporting no chest pain he reports no abdominal pain he reports no productive cough he reports no fever or chills. Patient reporting no leg pain but does report swelling to lower extremities. Patient reporting no headache or syncope. Patient reports he is on Eliquis for    ROS:   Pertinent positives and negatives are stated within HPI, all other systems reviewed and are negative.  --------------------------------------------- PAST HISTORY ---------------------------------------------  Past Medical History:  has a past medical history of Acute CHF ([address]), Acute CHF ([address]), Acute idiopathic gout of right foot, AF (atrial fibrillation) ([address]), Anemia, CAD (coronary artery disease), cardioversion, Cerebral artery occlusion with cerebral infarction ([address]), CKD (chronic kidney disease), Gout, HTN (hypertension), Hyperlipidemia, Hypertension, Morbid obesity due to excess calories ([address]), Nonischemic cardiomyopathy ([address]), Nonischemic cardiomyopathy ([address]), ARVIND (obstructive sleep apnea), S/P left heart catheterization by percutaneous approach, S/P left pulmonary artery pressure sensor implant placement, Systolic heart failure ([address]), Type 2 diabetes mellitus with complication, with long-term current use of insulin ([address]), and URI, acute.     Past Surgical History:  has a past surgical history that includes ECHO Compl W Dop Color Flow (11/30/2011); ECHO

## 2024-01-03 ENCOUNTER — TELEPHONE (OUTPATIENT)
Dept: CARDIOLOGY CLINIC | Age: 44
End: 2024-01-03

## 2024-01-03 DIAGNOSIS — Z95.818 PRESENCE OF CARDIOMEMS HF SYSTEM: ICD-10-CM

## 2024-01-03 DIAGNOSIS — I50.20 HFREF (HEART FAILURE WITH REDUCED EJECTION FRACTION) (HCC): Primary | ICD-10-CM

## 2024-01-03 NOTE — TELEPHONE ENCOUNTER
CardioMems update:      PAD:  Goal:24  Current trends: none since 12/8/23            Providers:  Cardiomems team: ANTIONE Katcini CNP, Aliza Calderon, MONICA  Cardiologist: Dr. Driscoll/Dr. Mcdaniel ([facility])          CHF:   HFrEF 35%          ASSESSMENT:    Patient was awaiting heart transplant and was inpatient at . While inpatient he was getting cardioMEMS readings done by staff while inpatient. Called patient to get update and see how he is feeling since hospital discharge.  Rober says he is feeling okay, every day is getting a bit better since he was in hospital for internal bleed last week and is slowly recovering from that.      PLAN:  Patient agreeable to resume cardioMEMS readings now that he is back home and will send this evening.      No future appointments.    Electronically signed by Aliza Calderon, RN on 1/3/2024 at 5:06 PM

## 2024-01-06 ENCOUNTER — HOSPITAL ENCOUNTER (INPATIENT)
Age: 44
LOS: 3 days | Discharge: HOME | DRG: 291 | End: 2024-01-09
Attending: INTERNAL MEDICINE | Admitting: INTERNAL MEDICINE

## 2024-01-06 ENCOUNTER — APPOINTMENT (OUTPATIENT)
Dept: GENERAL RADIOLOGY | Age: 44
DRG: 291 | End: 2024-01-06

## 2024-01-06 DIAGNOSIS — M79.89 SWELLING OF LOWER EXTREMITY: ICD-10-CM

## 2024-01-06 DIAGNOSIS — R07.9 CHEST PAIN, UNSPECIFIED TYPE: Primary | ICD-10-CM

## 2024-01-06 DIAGNOSIS — R06.00 DYSPNEA, UNSPECIFIED TYPE: ICD-10-CM

## 2024-01-06 DIAGNOSIS — R79.89 ELEVATED TROPONIN: ICD-10-CM

## 2024-01-06 DIAGNOSIS — R06.02 SOB (SHORTNESS OF BREATH): ICD-10-CM

## 2024-01-06 LAB
ANION GAP SERPL CALCULATED.3IONS-SCNC: 16 MMOL/L (ref 7–16)
BASOPHILS # BLD: 0.06 K/UL (ref 0–0.2)
BASOPHILS NFR BLD: 1 % (ref 0–2)
BNP SERPL-MCNC: 5086 PG/ML (ref 0–125)
BUN SERPL-MCNC: 58 MG/DL (ref 6–20)
CALCIUM SERPL-MCNC: 9.8 MG/DL (ref 8.6–10.2)
CHLORIDE SERPL-SCNC: 97 MMOL/L (ref 98–107)
CO2 SERPL-SCNC: 21 MMOL/L (ref 22–29)
CREAT SERPL-MCNC: 3.4 MG/DL (ref 0.7–1.2)
D DIMER: 1602 NG/ML DDU (ref 0–232)
EOSINOPHIL # BLD: 0.1 K/UL (ref 0.05–0.5)
EOSINOPHILS RELATIVE PERCENT: 1 % (ref 0–6)
ERYTHROCYTE [DISTWIDTH] IN BLOOD BY AUTOMATED COUNT: 17.2 % (ref 11.5–15)
GFR SERPL CREATININE-BSD FRML MDRD: 22 ML/MIN/1.73M2
GLUCOSE SERPL-MCNC: 168 MG/DL (ref 74–99)
HCT VFR BLD AUTO: 31.9 % (ref 37–54)
HGB BLD-MCNC: 10.5 G/DL (ref 12.5–16.5)
IMM GRANULOCYTES # BLD AUTO: 0.08 K/UL (ref 0–0.58)
IMM GRANULOCYTES NFR BLD: 1 % (ref 0–5)
LYMPHOCYTES NFR BLD: 1.37 K/UL (ref 1.5–4)
LYMPHOCYTES RELATIVE PERCENT: 14 % (ref 20–42)
MCH RBC QN AUTO: 28.8 PG (ref 26–35)
MCHC RBC AUTO-ENTMCNC: 32.9 G/DL (ref 32–34.5)
MCV RBC AUTO: 87.6 FL (ref 80–99.9)
MONOCYTES NFR BLD: 0.93 K/UL (ref 0.1–0.95)
MONOCYTES NFR BLD: 10 % (ref 2–12)
NEUTROPHILS NFR BLD: 74 % (ref 43–80)
NEUTS SEG NFR BLD: 7.07 K/UL (ref 1.8–7.3)
PLATELET # BLD AUTO: 320 K/UL (ref 130–450)
PMV BLD AUTO: 9.1 FL (ref 7–12)
POTASSIUM SERPL-SCNC: 3.7 MMOL/L (ref 3.5–5)
RBC # BLD AUTO: 3.64 M/UL (ref 3.8–5.8)
SODIUM SERPL-SCNC: 134 MMOL/L (ref 132–146)
TROPONIN I SERPL HS-MCNC: 100 NG/L (ref 0–11)
TROPONIN I SERPL HS-MCNC: 92 NG/L (ref 0–11)
WBC OTHER # BLD: 9.6 K/UL (ref 4.5–11.5)

## 2024-01-06 PROCEDURE — 2060000000 HC ICU INTERMEDIATE R&B

## 2024-01-06 PROCEDURE — B54BZZA ULTRASONOGRAPHY OF RIGHT LOWER EXTREMITY VEINS, GUIDANCE: ICD-10-PCS | Performed by: INTERNAL MEDICINE

## 2024-01-06 PROCEDURE — 84484 ASSAY OF TROPONIN QUANT: CPT

## 2024-01-06 PROCEDURE — 99285 EMERGENCY DEPT VISIT HI MDM: CPT

## 2024-01-06 PROCEDURE — 6370000000 HC RX 637 (ALT 250 FOR IP)

## 2024-01-06 PROCEDURE — 85027 COMPLETE CBC AUTOMATED: CPT

## 2024-01-06 PROCEDURE — 85730 THROMBOPLASTIN TIME PARTIAL: CPT

## 2024-01-06 PROCEDURE — 99223 1ST HOSP IP/OBS HIGH 75: CPT | Performed by: INTERNAL MEDICINE

## 2024-01-06 PROCEDURE — 85379 FIBRIN DEGRADATION QUANT: CPT

## 2024-01-06 PROCEDURE — 80048 BASIC METABOLIC PNL TOTAL CA: CPT

## 2024-01-06 PROCEDURE — 2580000003 HC RX 258: Performed by: INTERNAL MEDICINE

## 2024-01-06 PROCEDURE — 93005 ELECTROCARDIOGRAM TRACING: CPT

## 2024-01-06 PROCEDURE — 85025 COMPLETE CBC W/AUTO DIFF WBC: CPT

## 2024-01-06 PROCEDURE — 06HM33Z INSERTION OF INFUSION DEVICE INTO RIGHT FEMORAL VEIN, PERCUTANEOUS APPROACH: ICD-10-PCS | Performed by: INTERNAL MEDICINE

## 2024-01-06 PROCEDURE — 71045 X-RAY EXAM CHEST 1 VIEW: CPT

## 2024-01-06 PROCEDURE — 83880 ASSAY OF NATRIURETIC PEPTIDE: CPT

## 2024-01-06 RX ORDER — AMIODARONE HYDROCHLORIDE 200 MG/1
200 TABLET ORAL DAILY
Status: DISCONTINUED | OUTPATIENT
Start: 2024-01-07 | End: 2024-01-08

## 2024-01-06 RX ORDER — ONDANSETRON 2 MG/ML
4 INJECTION INTRAMUSCULAR; INTRAVENOUS EVERY 6 HOURS PRN
Status: DISCONTINUED | OUTPATIENT
Start: 2024-01-06 | End: 2024-01-06

## 2024-01-06 RX ORDER — ALLOPURINOL 100 MG/1
100 TABLET ORAL DAILY
Status: DISCONTINUED | OUTPATIENT
Start: 2024-01-07 | End: 2024-01-09 | Stop reason: HOSPADM

## 2024-01-06 RX ORDER — HEPARIN SODIUM 1000 [USP'U]/ML
40 INJECTION, SOLUTION INTRAVENOUS; SUBCUTANEOUS PRN
Status: DISCONTINUED | OUTPATIENT
Start: 2024-01-06 | End: 2024-01-07

## 2024-01-06 RX ORDER — PROCHLORPERAZINE MALEATE 10 MG
5 TABLET ORAL EVERY 6 HOURS PRN
Status: DISCONTINUED | OUTPATIENT
Start: 2024-01-06 | End: 2024-01-09 | Stop reason: HOSPADM

## 2024-01-06 RX ORDER — BENZONATATE 100 MG/1
100 CAPSULE ORAL 3 TIMES DAILY PRN
Status: DISCONTINUED | OUTPATIENT
Start: 2024-01-06 | End: 2024-01-09 | Stop reason: HOSPADM

## 2024-01-06 RX ORDER — MIDODRINE HYDROCHLORIDE 5 MG/1
5 TABLET ORAL
Status: DISCONTINUED | OUTPATIENT
Start: 2024-01-07 | End: 2024-01-09 | Stop reason: HOSPADM

## 2024-01-06 RX ORDER — ASPIRIN 81 MG/1
324 TABLET, CHEWABLE ORAL ONCE
Status: COMPLETED | OUTPATIENT
Start: 2024-01-06 | End: 2024-01-06

## 2024-01-06 RX ORDER — PANTOPRAZOLE SODIUM 40 MG/1
40 TABLET, DELAYED RELEASE ORAL DAILY
Status: DISCONTINUED | OUTPATIENT
Start: 2024-01-07 | End: 2024-01-09 | Stop reason: HOSPADM

## 2024-01-06 RX ORDER — SODIUM CHLORIDE 0.9 % (FLUSH) 0.9 %
5-40 SYRINGE (ML) INJECTION PRN
Status: DISCONTINUED | OUTPATIENT
Start: 2024-01-06 | End: 2024-01-09 | Stop reason: HOSPADM

## 2024-01-06 RX ORDER — SODIUM BICARBONATE 650 MG/1
1300 TABLET ORAL DAILY
Status: DISCONTINUED | OUTPATIENT
Start: 2024-01-07 | End: 2024-01-09 | Stop reason: HOSPADM

## 2024-01-06 RX ORDER — ATORVASTATIN CALCIUM 40 MG/1
40 TABLET, FILM COATED ORAL DAILY
Status: DISCONTINUED | OUTPATIENT
Start: 2024-01-07 | End: 2024-01-09 | Stop reason: HOSPADM

## 2024-01-06 RX ORDER — HEPARIN SODIUM 1000 [USP'U]/ML
80 INJECTION, SOLUTION INTRAVENOUS; SUBCUTANEOUS ONCE
Status: COMPLETED | OUTPATIENT
Start: 2024-01-06 | End: 2024-01-07

## 2024-01-06 RX ORDER — METOPROLOL SUCCINATE 25 MG/1
25 TABLET, EXTENDED RELEASE ORAL NIGHTLY
Status: DISCONTINUED | OUTPATIENT
Start: 2024-01-07 | End: 2024-01-07

## 2024-01-06 RX ORDER — POTASSIUM CHLORIDE 7.45 MG/ML
10 INJECTION INTRAVENOUS PRN
Status: DISCONTINUED | OUTPATIENT
Start: 2024-01-06 | End: 2024-01-09 | Stop reason: HOSPADM

## 2024-01-06 RX ORDER — BUMETANIDE 1 MG/1
1 TABLET ORAL DAILY
Status: DISCONTINUED | OUTPATIENT
Start: 2024-01-07 | End: 2024-01-07

## 2024-01-06 RX ORDER — POLYETHYLENE GLYCOL 3350 17 G/17G
17 POWDER, FOR SOLUTION ORAL DAILY PRN
Status: DISCONTINUED | OUTPATIENT
Start: 2024-01-06 | End: 2024-01-09 | Stop reason: HOSPADM

## 2024-01-06 RX ORDER — HEPARIN SODIUM 10000 [USP'U]/100ML
5-30 INJECTION, SOLUTION INTRAVENOUS CONTINUOUS
Status: DISCONTINUED | OUTPATIENT
Start: 2024-01-06 | End: 2024-01-07

## 2024-01-06 RX ORDER — PROCHLORPERAZINE EDISYLATE 5 MG/ML
10 INJECTION INTRAMUSCULAR; INTRAVENOUS EVERY 6 HOURS PRN
Status: DISCONTINUED | OUTPATIENT
Start: 2024-01-06 | End: 2024-01-09 | Stop reason: HOSPADM

## 2024-01-06 RX ORDER — ACETAMINOPHEN 325 MG/1
650 TABLET ORAL EVERY 6 HOURS PRN
Status: DISCONTINUED | OUTPATIENT
Start: 2024-01-06 | End: 2024-01-09 | Stop reason: HOSPADM

## 2024-01-06 RX ORDER — ONDANSETRON 4 MG/1
4 TABLET, ORALLY DISINTEGRATING ORAL EVERY 8 HOURS PRN
Status: DISCONTINUED | OUTPATIENT
Start: 2024-01-06 | End: 2024-01-06

## 2024-01-06 RX ORDER — HEPARIN SODIUM 1000 [USP'U]/ML
80 INJECTION, SOLUTION INTRAVENOUS; SUBCUTANEOUS PRN
Status: DISCONTINUED | OUTPATIENT
Start: 2024-01-06 | End: 2024-01-07

## 2024-01-06 RX ORDER — SODIUM CHLORIDE 0.9 % (FLUSH) 0.9 %
5-40 SYRINGE (ML) INJECTION EVERY 12 HOURS SCHEDULED
Status: DISCONTINUED | OUTPATIENT
Start: 2024-01-06 | End: 2024-01-09 | Stop reason: HOSPADM

## 2024-01-06 RX ORDER — MAGNESIUM SULFATE IN WATER 40 MG/ML
2000 INJECTION, SOLUTION INTRAVENOUS PRN
Status: DISCONTINUED | OUTPATIENT
Start: 2024-01-06 | End: 2024-01-09 | Stop reason: HOSPADM

## 2024-01-06 RX ORDER — HYDRALAZINE HYDROCHLORIDE 20 MG/ML
10 INJECTION INTRAMUSCULAR; INTRAVENOUS EVERY 6 HOURS PRN
Status: DISCONTINUED | OUTPATIENT
Start: 2024-01-06 | End: 2024-01-09 | Stop reason: HOSPADM

## 2024-01-06 RX ORDER — POTASSIUM CHLORIDE 20 MEQ/1
40 TABLET, EXTENDED RELEASE ORAL PRN
Status: DISCONTINUED | OUTPATIENT
Start: 2024-01-06 | End: 2024-01-09 | Stop reason: HOSPADM

## 2024-01-06 RX ORDER — SODIUM CHLORIDE 9 MG/ML
INJECTION, SOLUTION INTRAVENOUS PRN
Status: DISCONTINUED | OUTPATIENT
Start: 2024-01-06 | End: 2024-01-09 | Stop reason: HOSPADM

## 2024-01-06 RX ORDER — LANOLIN ALCOHOL/MO/W.PET/CERES
3 CREAM (GRAM) TOPICAL NIGHTLY PRN
Status: DISCONTINUED | OUTPATIENT
Start: 2024-01-06 | End: 2024-01-07

## 2024-01-06 RX ORDER — CALCIUM CARBONATE 500 MG/1
500 TABLET, CHEWABLE ORAL 3 TIMES DAILY PRN
Status: DISCONTINUED | OUTPATIENT
Start: 2024-01-06 | End: 2024-01-09 | Stop reason: HOSPADM

## 2024-01-06 RX ORDER — ACETAMINOPHEN 650 MG/1
650 SUPPOSITORY RECTAL EVERY 6 HOURS PRN
Status: DISCONTINUED | OUTPATIENT
Start: 2024-01-06 | End: 2024-01-09 | Stop reason: HOSPADM

## 2024-01-06 RX ADMIN — Medication 10 ML: at 20:26

## 2024-01-06 RX ADMIN — ASPIRIN 324 MG: 81 TABLET, CHEWABLE ORAL at 15:54

## 2024-01-06 NOTE — ED PROVIDER NOTES
43 y.o. male history of heart failure history of anemia coronary disease history of chronic kidney disease gout history of hypertension hyperlipidemia nonischemic cardiomyopathy history of diabetes history of atrial fibrillation.  He is on Eliquis and has a ICD and a pacemaker placed.  He comes to the emerged part with complaints of shortness of breath, chest pain and leg swelling.  Patient stated that he started with mid sternal pressure this morning when he woke up and has been steady without any aggravating or alleviating factors.  No radiation.  No back pain.  No nausea no vomiting.  He also started to feel he was having palpitations around 12:00.  He also reports bilateral leg swelling with more increase swelling in the left leg for the past few weeks.  He has tried Bumex without much improvement of the leg swelling.  He has not taken any medications for his pain.  He states that he took his Eliquis this morning along with his rate control medications.  He denies the following: Fever, sneezing, cough, pleuritic chest pain, abdominal pain, nausea, vomiting, urinary symptoms, GI bleed, fevers.            Chief Complaint   Patient presents with    Shortness of Breath     Patient states he started having SOB this afternoon hx chf    Chest Pain     Mid sternal pressure starting this morning    Leg Swelling     Bilateral leg edema x 2 weeks       Review of Systems   Pertinent same HPI above  Physical Exam  Vitals reviewed.   Constitutional:       General: He is not in acute distress.     Appearance: He is not ill-appearing.   HENT:      Head: Normocephalic.      Right Ear: External ear normal.      Left Ear: External ear normal.      Nose: Nose normal.      Mouth/Throat:      Mouth: Mucous membranes are moist.   Eyes:      General:         Right eye: No discharge.         Left eye: No discharge.      Conjunctiva/sclera: Conjunctivae normal.   Cardiovascular:      Rate and Rhythm: Normal rate and regular rhythm.

## 2024-01-07 ENCOUNTER — APPOINTMENT (OUTPATIENT)
Dept: NUCLEAR MEDICINE | Age: 44
DRG: 291 | End: 2024-01-07

## 2024-01-07 ENCOUNTER — APPOINTMENT (OUTPATIENT)
Dept: ULTRASOUND IMAGING | Age: 44
DRG: 291 | End: 2024-01-07

## 2024-01-07 PROBLEM — R06.00 DYSPNEA: Status: ACTIVE | Noted: 2024-01-07

## 2024-01-07 PROBLEM — R79.89 ELEVATED TROPONIN: Status: ACTIVE | Noted: 2024-01-07

## 2024-01-07 PROBLEM — M79.89 SWELLING OF LOWER EXTREMITY: Status: ACTIVE | Noted: 2024-01-07

## 2024-01-07 LAB
25(OH)D3 SERPL-MCNC: 49.7 NG/ML (ref 30–100)
ALBUMIN SERPL-MCNC: 4 G/DL (ref 3.5–5.2)
ALP SERPL-CCNC: 345 U/L (ref 40–129)
ALT SERPL-CCNC: 26 U/L (ref 0–40)
ANION GAP SERPL CALCULATED.3IONS-SCNC: 16 MMOL/L (ref 7–16)
AST SERPL-CCNC: 21 U/L (ref 0–39)
BILIRUB SERPL-MCNC: 1 MG/DL (ref 0–1.2)
BUN SERPL-MCNC: 53 MG/DL (ref 6–20)
CALCIUM SERPL-MCNC: 9.4 MG/DL (ref 8.6–10.2)
CHLORIDE SERPL-SCNC: 98 MMOL/L (ref 98–107)
CHOLEST SERPL-MCNC: 131 MG/DL
CO2 SERPL-SCNC: 23 MMOL/L (ref 22–29)
CREAT SERPL-MCNC: 3.2 MG/DL (ref 0.7–1.2)
EKG ATRIAL RATE: 74 BPM
EKG P AXIS: 39 DEGREES
EKG P-R INTERVAL: 160 MS
EKG Q-T INTERVAL: 518 MS
EKG QRS DURATION: 170 MS
EKG QTC CALCULATION (BAZETT): 574 MS
EKG R AXIS: -150 DEGREES
EKG T AXIS: 37 DEGREES
EKG VENTRICULAR RATE: 74 BPM
ERYTHROCYTE [DISTWIDTH] IN BLOOD BY AUTOMATED COUNT: 17.1 % (ref 11.5–15)
ERYTHROCYTE [DISTWIDTH] IN BLOOD BY AUTOMATED COUNT: 17.3 % (ref 11.5–15)
GFR SERPL CREATININE-BSD FRML MDRD: 24 ML/MIN/1.73M2
GLUCOSE BLD-MCNC: 152 MG/DL (ref 74–99)
GLUCOSE SERPL-MCNC: 93 MG/DL (ref 74–99)
HBA1C MFR BLD: 6.3 % (ref 4–5.6)
HCT VFR BLD AUTO: 28.7 % (ref 37–54)
HCT VFR BLD AUTO: 28.9 % (ref 37–54)
HDLC SERPL-MCNC: 57 MG/DL
HGB BLD-MCNC: 9.4 G/DL (ref 12.5–16.5)
HGB BLD-MCNC: 9.5 G/DL (ref 12.5–16.5)
LDLC SERPL CALC-MCNC: 63 MG/DL
MAGNESIUM SERPL-MCNC: 2.1 MG/DL (ref 1.6–2.6)
MCH RBC QN AUTO: 29.2 PG (ref 26–35)
MCH RBC QN AUTO: 29.3 PG (ref 26–35)
MCHC RBC AUTO-ENTMCNC: 32.8 G/DL (ref 32–34.5)
MCHC RBC AUTO-ENTMCNC: 32.9 G/DL (ref 32–34.5)
MCV RBC AUTO: 89.1 FL (ref 80–99.9)
MCV RBC AUTO: 89.2 FL (ref 80–99.9)
PARTIAL THROMBOPLASTIN TIME: 32.4 SEC (ref 24.5–35.1)
PARTIAL THROMBOPLASTIN TIME: 38 SEC (ref 24.5–35.1)
PARTIAL THROMBOPLASTIN TIME: >240 SEC (ref 24.5–35.1)
PHOSPHATE SERPL-MCNC: 3.3 MG/DL (ref 2.5–4.5)
PLATELET # BLD AUTO: 266 K/UL (ref 130–450)
PLATELET # BLD AUTO: 277 K/UL (ref 130–450)
PMV BLD AUTO: 9 FL (ref 7–12)
PMV BLD AUTO: 9.2 FL (ref 7–12)
POTASSIUM SERPL-SCNC: 3.3 MMOL/L (ref 3.5–5)
PROT SERPL-MCNC: 7.1 G/DL (ref 6.4–8.3)
RBC # BLD AUTO: 3.22 M/UL (ref 3.8–5.8)
RBC # BLD AUTO: 3.24 M/UL (ref 3.8–5.8)
SODIUM SERPL-SCNC: 137 MMOL/L (ref 132–146)
T3FREE SERPL-MCNC: 1.76 PG/ML (ref 2–4.4)
T4 SERPL-MCNC: 11.5 UG/DL (ref 4.5–11.7)
TRIGL SERPL-MCNC: 57 MG/DL
TSH SERPL DL<=0.05 MIU/L-ACNC: 5.32 UIU/ML (ref 0.27–4.2)
VLDLC SERPL CALC-MCNC: 11 MG/DL
WBC OTHER # BLD: 7.2 K/UL (ref 4.5–11.5)
WBC OTHER # BLD: 7.4 K/UL (ref 4.5–11.5)

## 2024-01-07 PROCEDURE — 84100 ASSAY OF PHOSPHORUS: CPT

## 2024-01-07 PROCEDURE — 78582 LUNG VENTILAT&PERFUS IMAGING: CPT

## 2024-01-07 PROCEDURE — 82306 VITAMIN D 25 HYDROXY: CPT

## 2024-01-07 PROCEDURE — 6360000002 HC RX W HCPCS: Performed by: INTERNAL MEDICINE

## 2024-01-07 PROCEDURE — 6370000000 HC RX 637 (ALT 250 FOR IP): Performed by: STUDENT IN AN ORGANIZED HEALTH CARE EDUCATION/TRAINING PROGRAM

## 2024-01-07 PROCEDURE — 6370000000 HC RX 637 (ALT 250 FOR IP): Performed by: INTERNAL MEDICINE

## 2024-01-07 PROCEDURE — 93010 ELECTROCARDIOGRAM REPORT: CPT | Performed by: INTERNAL MEDICINE

## 2024-01-07 PROCEDURE — 2580000003 HC RX 258: Performed by: INTERNAL MEDICINE

## 2024-01-07 PROCEDURE — 36556 INSERT NON-TUNNEL CV CATH: CPT

## 2024-01-07 PROCEDURE — A9540 TC99M MAA: HCPCS | Performed by: RADIOLOGY

## 2024-01-07 PROCEDURE — 80053 COMPREHEN METABOLIC PANEL: CPT

## 2024-01-07 PROCEDURE — 83735 ASSAY OF MAGNESIUM: CPT

## 2024-01-07 PROCEDURE — 99232 SBSQ HOSP IP/OBS MODERATE 35: CPT | Performed by: STUDENT IN AN ORGANIZED HEALTH CARE EDUCATION/TRAINING PROGRAM

## 2024-01-07 PROCEDURE — 84443 ASSAY THYROID STIM HORMONE: CPT

## 2024-01-07 PROCEDURE — A9539 TC99M PENTETATE: HCPCS | Performed by: RADIOLOGY

## 2024-01-07 PROCEDURE — 2060000000 HC ICU INTERMEDIATE R&B

## 2024-01-07 PROCEDURE — 84436 ASSAY OF TOTAL THYROXINE: CPT

## 2024-01-07 PROCEDURE — 83036 HEMOGLOBIN GLYCOSYLATED A1C: CPT

## 2024-01-07 PROCEDURE — 3430000000 HC RX DIAGNOSTIC RADIOPHARMACEUTICAL: Performed by: RADIOLOGY

## 2024-01-07 PROCEDURE — 93970 EXTREMITY STUDY: CPT

## 2024-01-07 PROCEDURE — 85730 THROMBOPLASTIN TIME PARTIAL: CPT

## 2024-01-07 PROCEDURE — 84481 FREE ASSAY (FT-3): CPT

## 2024-01-07 PROCEDURE — 82962 GLUCOSE BLOOD TEST: CPT

## 2024-01-07 PROCEDURE — 80061 LIPID PANEL: CPT

## 2024-01-07 RX ORDER — POTASSIUM CHLORIDE 20 MEQ/1
40 TABLET, EXTENDED RELEASE ORAL 2 TIMES DAILY
COMMUNITY

## 2024-01-07 RX ORDER — DEXTROSE MONOHYDRATE 100 MG/ML
INJECTION, SOLUTION INTRAVENOUS CONTINUOUS PRN
Status: DISCONTINUED | OUTPATIENT
Start: 2024-01-07 | End: 2024-01-09 | Stop reason: HOSPADM

## 2024-01-07 RX ORDER — CARVEDILOL 6.25 MG/1
3.12 TABLET ORAL 2 TIMES DAILY WITH MEALS
Status: DISCONTINUED | OUTPATIENT
Start: 2024-01-07 | End: 2024-01-09 | Stop reason: HOSPADM

## 2024-01-07 RX ORDER — ERGOCALCIFEROL 1.25 MG/1
50000 CAPSULE ORAL WEEKLY
COMMUNITY

## 2024-01-07 RX ORDER — METOCLOPRAMIDE 5 MG/1
5 TABLET ORAL 4 TIMES DAILY PRN
Status: DISCONTINUED | OUTPATIENT
Start: 2024-01-07 | End: 2024-01-08

## 2024-01-07 RX ORDER — AMIODARONE HYDROCHLORIDE 200 MG/1
400 TABLET ORAL DAILY
COMMUNITY
End: 2024-01-07

## 2024-01-07 RX ORDER — LIDOCAINE 4 G/G
1 PATCH TOPICAL DAILY
COMMUNITY

## 2024-01-07 RX ORDER — PSEUDOEPHEDRINE HCL 30 MG
500 TABLET ORAL 2 TIMES DAILY
Status: DISCONTINUED | OUTPATIENT
Start: 2024-01-07 | End: 2024-01-09 | Stop reason: HOSPADM

## 2024-01-07 RX ORDER — INSULIN LISPRO 100 [IU]/ML
0-4 INJECTION, SOLUTION INTRAVENOUS; SUBCUTANEOUS NIGHTLY
Status: DISCONTINUED | OUTPATIENT
Start: 2024-01-07 | End: 2024-01-09 | Stop reason: HOSPADM

## 2024-01-07 RX ORDER — LANOLIN ALCOHOL/MO/W.PET/CERES
3 CREAM (GRAM) TOPICAL NIGHTLY PRN
Status: DISCONTINUED | OUTPATIENT
Start: 2024-01-07 | End: 2024-01-09 | Stop reason: HOSPADM

## 2024-01-07 RX ORDER — SPIRONOLACTONE 25 MG/1
25 TABLET ORAL DAILY
COMMUNITY
End: 2024-01-07

## 2024-01-07 RX ORDER — METOCLOPRAMIDE 5 MG/1
5 TABLET ORAL 4 TIMES DAILY PRN
Status: ON HOLD | COMMUNITY
End: 2024-01-09 | Stop reason: HOSPADM

## 2024-01-07 RX ORDER — ACETAMINOPHEN 500 MG
500 TABLET ORAL EVERY 6 HOURS PRN
COMMUNITY

## 2024-01-07 RX ORDER — LANOLIN ALCOHOL/MO/W.PET/CERES
400 CREAM (GRAM) TOPICAL DAILY
Status: DISCONTINUED | OUTPATIENT
Start: 2024-01-07 | End: 2024-01-09 | Stop reason: HOSPADM

## 2024-01-07 RX ORDER — INSULIN LISPRO 100 [IU]/ML
0-4 INJECTION, SOLUTION INTRAVENOUS; SUBCUTANEOUS
Status: DISCONTINUED | OUTPATIENT
Start: 2024-01-07 | End: 2024-01-09 | Stop reason: HOSPADM

## 2024-01-07 RX ORDER — LANOLIN ALCOHOL/MO/W.PET/CERES
3 CREAM (GRAM) TOPICAL NIGHTLY PRN
COMMUNITY

## 2024-01-07 RX ORDER — BUMETANIDE 0.25 MG/ML
1 INJECTION INTRAMUSCULAR; INTRAVENOUS EVERY 12 HOURS
Status: DISCONTINUED | OUTPATIENT
Start: 2024-01-07 | End: 2024-01-07

## 2024-01-07 RX ORDER — CALCIUM CARBONATE 500 MG/1
1 TABLET, CHEWABLE ORAL DAILY
Status: ON HOLD | COMMUNITY
End: 2024-01-09 | Stop reason: HOSPADM

## 2024-01-07 RX ORDER — FERROUS SULFATE 325(65) MG
325 TABLET ORAL
Status: DISCONTINUED | OUTPATIENT
Start: 2024-01-08 | End: 2024-01-09 | Stop reason: HOSPADM

## 2024-01-07 RX ORDER — POLYETHYLENE GLYCOL 3350 17 G/17G
17 POWDER, FOR SOLUTION ORAL DAILY PRN
COMMUNITY

## 2024-01-07 RX ORDER — POTASSIUM CHLORIDE 20 MEQ/1
20 TABLET, EXTENDED RELEASE ORAL DAILY
Status: DISCONTINUED | OUTPATIENT
Start: 2024-01-07 | End: 2024-01-09 | Stop reason: HOSPADM

## 2024-01-07 RX ORDER — SODIUM BICARBONATE 650 MG/1
1300 TABLET ORAL DAILY
COMMUNITY

## 2024-01-07 RX ORDER — SACUBITRIL AND VALSARTAN 24; 26 MG/1; MG/1
1 TABLET, FILM COATED ORAL 2 TIMES DAILY
COMMUNITY
End: 2024-01-07

## 2024-01-07 RX ORDER — ISOSORBIDE DINITRATE 10 MG/1
40 TABLET ORAL 3 TIMES DAILY
Status: DISCONTINUED | OUTPATIENT
Start: 2024-01-07 | End: 2024-01-07

## 2024-01-07 RX ORDER — KIT FOR THE PREPARATION OF TECHNETIUM TC 99M PENTETATE 20 MG/1
35 INJECTION, POWDER, LYOPHILIZED, FOR SOLUTION INTRAVENOUS; RESPIRATORY (INHALATION)
Status: COMPLETED | OUTPATIENT
Start: 2024-01-07 | End: 2024-01-07

## 2024-01-07 RX ORDER — MECLIZINE HCL 12.5 MG/1
25 TABLET ORAL 3 TIMES DAILY PRN
Status: DISCONTINUED | OUTPATIENT
Start: 2024-01-07 | End: 2024-01-09 | Stop reason: HOSPADM

## 2024-01-07 RX ORDER — CARVEDILOL 3.12 MG/1
3.12 TABLET ORAL 2 TIMES DAILY WITH MEALS
COMMUNITY

## 2024-01-07 RX ORDER — BUMETANIDE 2 MG/1
1 TABLET ORAL
COMMUNITY
End: 2024-01-07

## 2024-01-07 RX ORDER — MECLIZINE HYDROCHLORIDE 25 MG/1
25 TABLET ORAL 3 TIMES DAILY PRN
COMMUNITY

## 2024-01-07 RX ORDER — TORSEMIDE 20 MG/1
100 TABLET ORAL 2 TIMES DAILY
Status: DISCONTINUED | OUTPATIENT
Start: 2024-01-07 | End: 2024-01-08

## 2024-01-07 RX ORDER — TORSEMIDE 100 MG/1
100 TABLET ORAL 2 TIMES DAILY
COMMUNITY

## 2024-01-07 RX ORDER — M-VIT,TX,IRON,MINS/CALC/FOLIC 27MG-0.4MG
1 TABLET ORAL DAILY
Status: DISCONTINUED | OUTPATIENT
Start: 2024-01-07 | End: 2024-01-09 | Stop reason: HOSPADM

## 2024-01-07 RX ORDER — SENNOSIDES A AND B 8.6 MG/1
2 TABLET, FILM COATED ORAL 2 TIMES DAILY
COMMUNITY

## 2024-01-07 RX ORDER — ERGOCALCIFEROL 1.25 MG/1
50000 CAPSULE ORAL WEEKLY
Status: DISCONTINUED | OUTPATIENT
Start: 2024-01-08 | End: 2024-01-09 | Stop reason: HOSPADM

## 2024-01-07 RX ORDER — FERROUS SULFATE 325(65) MG
325 TABLET ORAL
COMMUNITY

## 2024-01-07 RX ADMIN — Medication 400 MG: at 18:42

## 2024-01-07 RX ADMIN — Medication 6 MILLICURIE: at 12:28

## 2024-01-07 RX ADMIN — MELATONIN 3 MG ORAL TABLET 3 MG: 3 TABLET ORAL at 14:58

## 2024-01-07 RX ADMIN — BUMETANIDE 1 MG: 1 TABLET ORAL at 08:57

## 2024-01-07 RX ADMIN — CARVEDILOL 3.12 MG: 6.25 TABLET, FILM COATED ORAL at 18:41

## 2024-01-07 RX ADMIN — POTASSIUM BICARBONATE 40 MEQ: 782 TABLET, EFFERVESCENT ORAL at 14:58

## 2024-01-07 RX ADMIN — MIDODRINE HYDROCHLORIDE 5 MG: 5 TABLET ORAL at 12:00

## 2024-01-07 RX ADMIN — Medication 500 MG: at 20:57

## 2024-01-07 RX ADMIN — TORSEMIDE 100 MG: 20 TABLET ORAL at 20:54

## 2024-01-07 RX ADMIN — POTASSIUM CHLORIDE 20 MEQ: 1500 TABLET, EXTENDED RELEASE ORAL at 18:42

## 2024-01-07 RX ADMIN — MIDODRINE HYDROCHLORIDE 5 MG: 5 TABLET ORAL at 18:42

## 2024-01-07 RX ADMIN — AMIODARONE HYDROCHLORIDE 200 MG: 200 TABLET ORAL at 08:57

## 2024-01-07 RX ADMIN — Medication 10 ML: at 09:02

## 2024-01-07 RX ADMIN — HEPARIN SODIUM 18 UNITS/KG/HR: 10000 INJECTION, SOLUTION INTRAVENOUS at 03:24

## 2024-01-07 RX ADMIN — Medication 10 ML: at 23:50

## 2024-01-07 RX ADMIN — PANTOPRAZOLE SODIUM 40 MG: 40 TABLET, DELAYED RELEASE ORAL at 08:57

## 2024-01-07 RX ADMIN — Medication 1 TABLET: at 18:42

## 2024-01-07 RX ADMIN — KIT FOR THE PREPARATION OF TECHNETIUM TC 99M PENTETATE 35 MILLICURIE: 20 INJECTION, POWDER, LYOPHILIZED, FOR SOLUTION INTRAVENOUS; RESPIRATORY (INHALATION) at 12:28

## 2024-01-07 RX ADMIN — MIDODRINE HYDROCHLORIDE 5 MG: 5 TABLET ORAL at 08:57

## 2024-01-07 RX ADMIN — APIXABAN 5 MG: 5 TABLET, FILM COATED ORAL at 20:54

## 2024-01-07 RX ADMIN — HEPARIN SODIUM 7980 UNITS: 1000 INJECTION INTRAVENOUS; SUBCUTANEOUS at 03:23

## 2024-01-07 NOTE — H&P
paroxysmal nocturnal dyspnea.  Respiratory:  Shortness of breath, coughing, sputum production, hemoptysis, wheezing, orthopnea.  Gastrointestinal:  Nausea, vomiting, diarrhea, heartburn, constipation, abdominal pain, hematemesis, hematochezia, melena, acholic stools  Genito-Urinary:  Dysuria, urgency, frequency, hematuria  Musculoskeletal:  Joint pain, joint stiffness, joint swelling, muscle pain  Neurology:  Headache, focal neurological deficits, weakness, numbness, paresthesia  Derm:  Rashes, ulcers, excoriations, bruising  Extremities:  Decreased ROM, peripheral edema, mottling    Physical Examination  Vitals:  BP 93/66   Pulse 76   Temp 97.8 °F (36.6 °C)   Resp 20   Wt 99.8 kg (220 lb)   SpO2 99%   BMI 29.84 kg/m²   General Appearance:  awake, alert, and oriented to person, place, time, and purpose; appears stated age and cooperative; no apparent distress no labored breathing  HEENT:  NCAT; PERRL; conjunctiva pink, sclera clear  Neck:  no adenopathy, bruit, JVD, tenderness, masses, or nodules; supple, symmetrical, trachea midline, thyroid not enlarged  Lung:  clear to auscultation bilaterally; no use of accessory muscles; no rhonchi, rales, or crackles  Heart:  regular rate and regular rhythm without murmur, rub, or gallop  Abdomen:  soft, nontender, nondistended; normoactive bowel sounds; no organomegaly  Extremities:  extremities normal, atraumatic, no cyanosis LLE edema 3+  Musculokeletal:  no joint swelling, no muscle tenderness. ROM normal in all joints of extremities.   Neurologic:  mental status A&Ox3, thought content appropriate; CN II-XII grossly intact; sensation intact, motor strength 5/5 globally; no slurred speech    Laboratory Data  Recent Results (from the past 24 hour(s))   EKG 12 Lead    Collection Time: 01/06/24  3:33 PM   Result Value Ref Range    Ventricular Rate 74 BPM    Atrial Rate 74 BPM    P-R Interval 160 ms    QRS Duration 170 ms    Q-T Interval 518 ms    QTc Calculation ([place]izenayan)

## 2024-01-07 NOTE — ED NOTES
Previous shift RN and ED resident attempted to establish 2nd IV prior to start of heparin and were unable to obtain 2nd IV. Patient stated to ED resident he was okay with placement of central line vs IV team consult in AM. ED resident to place central line. Dr. Roman notified and okay with plan.

## 2024-01-08 ENCOUNTER — APPOINTMENT (OUTPATIENT)
Dept: GENERAL RADIOLOGY | Age: 44
DRG: 291 | End: 2024-01-08

## 2024-01-08 PROBLEM — I50.9 ACUTE DECOMPENSATED HEART FAILURE (HCC): Status: ACTIVE | Noted: 2020-03-14

## 2024-01-08 PROBLEM — N18.32 STAGE 3B CHRONIC KIDNEY DISEASE (HCC): Status: ACTIVE | Noted: 2024-01-08

## 2024-01-08 LAB
ANION GAP SERPL CALCULATED.3IONS-SCNC: 16 MMOL/L (ref 7–16)
B PARAP IS1001 DNA NPH QL NAA+NON-PROBE: NOT DETECTED
B PERT DNA SPEC QL NAA+PROBE: NOT DETECTED
BUN SERPL-MCNC: 51 MG/DL (ref 6–20)
C PNEUM DNA NPH QL NAA+NON-PROBE: NOT DETECTED
CALCIUM SERPL-MCNC: 9.6 MG/DL (ref 8.6–10.2)
CHLORIDE SERPL-SCNC: 99 MMOL/L (ref 98–107)
CO2 SERPL-SCNC: 24 MMOL/L (ref 22–29)
CREAT SERPL-MCNC: 3 MG/DL (ref 0.7–1.2)
ERYTHROCYTE [DISTWIDTH] IN BLOOD BY AUTOMATED COUNT: 17.4 % (ref 11.5–15)
FLUAV RNA NPH QL NAA+NON-PROBE: NOT DETECTED
FLUBV RNA NPH QL NAA+NON-PROBE: NOT DETECTED
GFR SERPL CREATININE-BSD FRML MDRD: 25 ML/MIN/1.73M2
GLUCOSE BLD-MCNC: 102 MG/DL (ref 74–99)
GLUCOSE BLD-MCNC: 106 MG/DL (ref 74–99)
GLUCOSE BLD-MCNC: 135 MG/DL (ref 74–99)
GLUCOSE BLD-MCNC: 141 MG/DL (ref 74–99)
GLUCOSE SERPL-MCNC: 92 MG/DL (ref 74–99)
HADV DNA NPH QL NAA+NON-PROBE: NOT DETECTED
HCOV 229E RNA NPH QL NAA+NON-PROBE: NOT DETECTED
HCOV HKU1 RNA NPH QL NAA+NON-PROBE: NOT DETECTED
HCOV NL63 RNA NPH QL NAA+NON-PROBE: NOT DETECTED
HCOV OC43 RNA NPH QL NAA+NON-PROBE: NOT DETECTED
HCT VFR BLD AUTO: 31.3 % (ref 37–54)
HGB BLD-MCNC: 10.2 G/DL (ref 12.5–16.5)
HMPV RNA NPH QL NAA+NON-PROBE: NOT DETECTED
HPIV1 RNA NPH QL NAA+NON-PROBE: NOT DETECTED
HPIV2 RNA NPH QL NAA+NON-PROBE: NOT DETECTED
HPIV3 RNA NPH QL NAA+NON-PROBE: NOT DETECTED
HPIV4 RNA NPH QL NAA+NON-PROBE: NOT DETECTED
M PNEUMO DNA NPH QL NAA+NON-PROBE: NOT DETECTED
MCH RBC QN AUTO: 28.9 PG (ref 26–35)
MCHC RBC AUTO-ENTMCNC: 32.6 G/DL (ref 32–34.5)
MCV RBC AUTO: 88.7 FL (ref 80–99.9)
PLATELET # BLD AUTO: 314 K/UL (ref 130–450)
PMV BLD AUTO: 9.5 FL (ref 7–12)
POTASSIUM SERPL-SCNC: 3.6 MMOL/L (ref 3.5–5)
RBC # BLD AUTO: 3.53 M/UL (ref 3.8–5.8)
RSV RNA NPH QL NAA+NON-PROBE: NOT DETECTED
RV+EV RNA NPH QL NAA+NON-PROBE: NOT DETECTED
SARS-COV-2 RNA NPH QL NAA+NON-PROBE: NOT DETECTED
SODIUM SERPL-SCNC: 139 MMOL/L (ref 132–146)
SPECIMEN DESCRIPTION: NORMAL
WBC OTHER # BLD: 8.7 K/UL (ref 4.5–11.5)

## 2024-01-08 PROCEDURE — APPSS60 APP SPLIT SHARED TIME 46-60 MINUTES: Performed by: PHYSICIAN ASSISTANT

## 2024-01-08 PROCEDURE — 36556 INSERT NON-TUNNEL CV CATH: CPT

## 2024-01-08 PROCEDURE — 99223 1ST HOSP IP/OBS HIGH 75: CPT | Performed by: INTERNAL MEDICINE

## 2024-01-08 PROCEDURE — 2060000000 HC ICU INTERMEDIATE R&B

## 2024-01-08 PROCEDURE — 2580000003 HC RX 258: Performed by: INTERNAL MEDICINE

## 2024-01-08 PROCEDURE — 6370000000 HC RX 637 (ALT 250 FOR IP): Performed by: STUDENT IN AN ORGANIZED HEALTH CARE EDUCATION/TRAINING PROGRAM

## 2024-01-08 PROCEDURE — 71045 X-RAY EXAM CHEST 1 VIEW: CPT

## 2024-01-08 PROCEDURE — 99255 IP/OBS CONSLTJ NEW/EST HI 80: CPT | Performed by: INTERNAL MEDICINE

## 2024-01-08 PROCEDURE — 93284 PRGRMG EVAL IMPLANTABLE DFB: CPT | Performed by: INTERNAL MEDICINE

## 2024-01-08 PROCEDURE — 80048 BASIC METABOLIC PNL TOTAL CA: CPT

## 2024-01-08 PROCEDURE — 0202U NFCT DS 22 TRGT SARS-COV-2: CPT

## 2024-01-08 PROCEDURE — 85027 COMPLETE CBC AUTOMATED: CPT

## 2024-01-08 PROCEDURE — 6370000000 HC RX 637 (ALT 250 FOR IP): Performed by: INTERNAL MEDICINE

## 2024-01-08 PROCEDURE — 82962 GLUCOSE BLOOD TEST: CPT

## 2024-01-08 RX ORDER — TORSEMIDE 20 MG/1
100 TABLET ORAL 2 TIMES DAILY
Status: DISCONTINUED | OUTPATIENT
Start: 2024-01-08 | End: 2024-01-09 | Stop reason: HOSPADM

## 2024-01-08 RX ORDER — BUMETANIDE 0.25 MG/ML
1 INJECTION INTRAMUSCULAR; INTRAVENOUS EVERY 12 HOURS
Status: DISCONTINUED | OUTPATIENT
Start: 2024-01-08 | End: 2024-01-08

## 2024-01-08 RX ORDER — BUMETANIDE 0.25 MG/ML
2 INJECTION INTRAMUSCULAR; INTRAVENOUS EVERY 12 HOURS
Status: DISCONTINUED | OUTPATIENT
Start: 2024-01-08 | End: 2024-01-08

## 2024-01-08 RX ADMIN — FERROUS SULFATE TAB 325 MG (65 MG ELEMENTAL FE) 325 MG: 325 (65 FE) TAB at 10:04

## 2024-01-08 RX ADMIN — POTASSIUM CHLORIDE 20 MEQ: 1500 TABLET, EXTENDED RELEASE ORAL at 10:03

## 2024-01-08 RX ADMIN — TORSEMIDE 100 MG: 20 TABLET ORAL at 21:12

## 2024-01-08 RX ADMIN — CARVEDILOL 3.12 MG: 6.25 TABLET, FILM COATED ORAL at 16:54

## 2024-01-08 RX ADMIN — MIDODRINE HYDROCHLORIDE 5 MG: 5 TABLET ORAL at 13:26

## 2024-01-08 RX ADMIN — Medication 400 MG: at 10:04

## 2024-01-08 RX ADMIN — POTASSIUM BICARBONATE 20 MEQ: 782 TABLET, EFFERVESCENT ORAL at 13:26

## 2024-01-08 RX ADMIN — CARVEDILOL 3.12 MG: 6.25 TABLET, FILM COATED ORAL at 10:04

## 2024-01-08 RX ADMIN — APIXABAN 5 MG: 5 TABLET, FILM COATED ORAL at 21:11

## 2024-01-08 RX ADMIN — SODIUM BICARBONATE 1300 MG: 650 TABLET ORAL at 10:03

## 2024-01-08 RX ADMIN — Medication 10 ML: at 21:12

## 2024-01-08 RX ADMIN — MIDODRINE HYDROCHLORIDE 5 MG: 5 TABLET ORAL at 10:12

## 2024-01-08 RX ADMIN — APIXABAN 5 MG: 5 TABLET, FILM COATED ORAL at 10:04

## 2024-01-08 RX ADMIN — MIDODRINE HYDROCHLORIDE 5 MG: 5 TABLET ORAL at 16:54

## 2024-01-08 RX ADMIN — Medication 1 TABLET: at 13:26

## 2024-01-08 RX ADMIN — Medication 500 MG: at 10:07

## 2024-01-08 RX ADMIN — ATORVASTATIN CALCIUM 40 MG: 40 TABLET, FILM COATED ORAL at 10:04

## 2024-01-08 RX ADMIN — ALLOPURINOL 100 MG: 100 TABLET ORAL at 10:04

## 2024-01-08 RX ADMIN — AMIODARONE HYDROCHLORIDE 200 MG: 200 TABLET ORAL at 10:04

## 2024-01-08 RX ADMIN — ERGOCALCIFEROL 50000 UNITS: 1.25 CAPSULE ORAL at 10:14

## 2024-01-08 RX ADMIN — PANTOPRAZOLE SODIUM 40 MG: 40 TABLET, DELAYED RELEASE ORAL at 10:03

## 2024-01-08 NOTE — CONSULTS
INPATIENT CARDIOLOGY CONSULT     Reason for Consult: Elevated troponin, CP, CHF     Cardiologist: Dr. Willson     Requesting Physician: Dr. Briceno     Date of Consultation: 1/8/2024    HISTORY OF PRESENT ILLNESS:   Patient is a 43 year old AAM known to Dr. Driscoll, as well as ADHF/Transplant Team in [facility] in [facility] and [facility] Dr. Millard.    He has a complex past medical history as detailed below.    RECENT ADMISSIONS:  [facility] Admission 10/14/2023 presented with right side chest pain and thought he had had ICD shock; serial hs-Emelina levels were 78 >>71.  He had self adjusted his Bumex due to weight gain of four to five pounds.  He was felt to have TAIWO, given IV fluids and Bumex, Entresto, and Aldactone were held.  He was seen in consultation by Dr. Acuna and later Dr. Us due to TAIWO/CKD (Cr peaked at 3.8).  He was discharged on October 19 on reduced dose of Bumex but the dose cannot be confirmed.  Entresto was not resumed and he was started on midodrine; Cr at that time was 3.4.  [facility] Admission 10/22/2023 with left side chest discomfort, dizziness, and palpitations.  He was seen in consultation by Dr. Santiago, hs-Emelina levels were 56 >>50, Cr 3.5, BNP 5359.  His pain was felt to be non anginal.  CardioMEMs readings showed PA diastolic pressure of 27 (goal 24).  He was diuresed with Bumex 2 mg IV every 12 hours and then changed to oral Bumex 1 mg daily on October 24.  He was discharged on October 26.  [facility] Admission 10/29/2023 for acute CHF.  Transferred to .  Underwent RHC on 11/1, as detailed below.  He was optimized medically.  He was presented to the LVAD/transplant committee, and was approved for listing for both renal and heart transplant.  Liver MRI revealed lesion, and underwent biopsy 11/2022 with results pending upon discharge.  After liver biopsy, patient was switched to Eliquis reduced dose twice daily.  Discharge weight 107 kg.  Discharged on the following cardiac medications: Amiodarone 200 
[facility]  Consultation Report  PATIENT: Rober Mejía III  MEDICAL RECORD NUMBER: [id number]  DATE OF SERVICE:  1/8/2024  ATTENDING ELECTROPHYSIOLOGIST: Skylar Diaz MD  PRIMARY ELECTROPHYSIOLOGIST: Jeannie Millard MD  REFERRING PHYSICIAN: No ref. provider found and Awadalla, Maged I, MD  CHIEF COMPLAINT: Shortness of breath    HPI: This is a 43 y.o. male with a history of   Patient Active Problem List   Diagnosis    Obstructive sleep apnea    Chronic gout    Chronic kidney disease (CKD), stage IV (severe) ([facility])    Type 2 diabetes mellitus with complication, with long-term current use of insulin (HCC)    Hepatomegaly    PAF (paroxysmal atrial fibrillation) ([facility])    Essential hypertension    VHD (valvular heart disease)    Morbid obesity due to excess calories ([facility])    Cardiac resynchronization therapy defibrillator (CRT-D) in place    Ventricular arrhythmia    NICM (nonischemic cardiomyopathy) ([facility])    S/P left pulmonary artery pressure sensor implant placement    Acute systolic (congestive) heart failure (HCC)    Carotid disease, bilateral (HCC)    Chronic combined systolic and diastolic congestive heart failure (HCC)    Atrial flutter (HCC)    Atrial fibrillation (HCC)    TAIWO (acute kidney injury) (HCC)    HFrEF (heart failure with reduced ejection fraction) ([facility])    Presence of CardioMEMS HF system    Ventricular tachycardia (HCC)    Acute chest pain    Presence of cardiac resynchronization therapy defibrillator (CRT-D)    Syncope and collapse    Elevated brain natriuretic peptide (BNP) level    Coronary artery disease involving native coronary artery of native heart without angina pectoris    Nonrheumatic mitral valve regurgitation    Nonrheumatic tricuspid valve regurgitation    Pulmonary HTN (HCC)    History of CVA (cerebrovascular accident)    Chronic anticoagulation    Hypotension    Chest pain    Swelling of lower extremity    
(LIPITOR) 40 MG tablet Take 1 tablet by mouth daily      pantoprazole (PROTONIX) 40 MG tablet Take 1 tablet by mouth daily         Allergies:    Farxiga [dapagliflozin] and Tikosyn [dofetilide]    Social History:     reports that he has never smoked. He has never been exposed to tobacco smoke. He has never used smokeless tobacco. He reports that he does not currently use alcohol. He reports that he does not use drugs.    Family History:         Problem Relation Age of Onset    Cancer Mother     No Known Problems Father        Review of Systems:   Pertinent items are noted in HPI.    Physical Exam:      Patient Vitals for the past 24 hrs:   BP Pulse Resp SpO2   01/08/24 0730 119/85 65 16 96 %   01/08/24 0630 127/89 63 22 100 %   01/08/24 0600 105/80 62 18 100 %   01/08/24 0500 120/86 64 30 95 %   01/08/24 0430 118/82 64 30 93 %   01/08/24 0400 117/84 61 12 93 %   01/08/24 0300 118/81 69 19 98 %   01/08/24 0230 -- -- 19 --   01/08/24 0200 105/71 61 13 100 %   01/08/24 0130 121/83 63 15 95 %   01/08/24 0030 118/83 65 26 93 %   01/08/24 0002 119/86 62 13 93 %   01/08/24 0000 119/86 63 30 (!) 83 %   01/07/24 2330 118/81 62 14 98 %   01/07/24 2054 116/82 -- -- --   01/07/24 1841 (!) 125/92 82 -- --   01/07/24 1830 (!) 125/92 76 -- --   01/07/24 1730 (!) 118/90 -- -- --   01/07/24 1500 (!) 120/90 65 26 --   01/07/24 1400 116/80 65 22 --   01/07/24 1200 124/85 66 19 97 %       No intake or output data in the 24 hours ending 01/08/24 0954    General: Awake, alert, no acute distress  Neck: No JVD noted  Lungs: Clear bilaterally upper, diminished to the bases bilaterally.  Unlabored  CV: Regular rate and rhythm.  No rub  Abd: Soft, nontender, nondistended.  Active bowel sounds  Skin: Warm and dry.  No rash on exposed extremities  Ext: Trace BLE edema   Neuro: Awake, answers questions appropriately    Data:    Recent Labs     01/06/24  2300 01/07/24  0703 01/08/24  0600   WBC 7.4 7.2 8.7   HGB 9.4* 9.5* 10.2*   HCT 28.7* 28.9*

## 2024-01-09 VITALS
HEART RATE: 60 BPM | DIASTOLIC BLOOD PRESSURE: 63 MMHG | TEMPERATURE: 97.6 F | BODY MASS INDEX: 25.45 KG/M2 | WEIGHT: 220 LBS | HEIGHT: 78 IN | RESPIRATION RATE: 19 BRPM | SYSTOLIC BLOOD PRESSURE: 103 MMHG | OXYGEN SATURATION: 100 %

## 2024-01-09 LAB
ANION GAP SERPL CALCULATED.3IONS-SCNC: 16 MMOL/L (ref 7–16)
BUN SERPL-MCNC: 53 MG/DL (ref 6–20)
CALCIUM SERPL-MCNC: 9.4 MG/DL (ref 8.6–10.2)
CHLORIDE SERPL-SCNC: 97 MMOL/L (ref 98–107)
CO2 SERPL-SCNC: 22 MMOL/L (ref 22–29)
CREAT SERPL-MCNC: 3 MG/DL (ref 0.7–1.2)
GFR SERPL CREATININE-BSD FRML MDRD: 26 ML/MIN/1.73M2
GLUCOSE BLD-MCNC: 140 MG/DL (ref 74–99)
GLUCOSE BLD-MCNC: 95 MG/DL (ref 74–99)
GLUCOSE SERPL-MCNC: 88 MG/DL (ref 74–99)
MAGNESIUM SERPL-MCNC: 1.9 MG/DL (ref 1.6–2.6)
POTASSIUM SERPL-SCNC: 3.4 MMOL/L (ref 3.5–5)
SODIUM SERPL-SCNC: 135 MMOL/L (ref 132–146)

## 2024-01-09 PROCEDURE — 99233 SBSQ HOSP IP/OBS HIGH 50: CPT | Performed by: INTERNAL MEDICINE

## 2024-01-09 PROCEDURE — 83735 ASSAY OF MAGNESIUM: CPT

## 2024-01-09 PROCEDURE — 82962 GLUCOSE BLOOD TEST: CPT

## 2024-01-09 PROCEDURE — 80048 BASIC METABOLIC PNL TOTAL CA: CPT

## 2024-01-09 PROCEDURE — 6370000000 HC RX 637 (ALT 250 FOR IP): Performed by: STUDENT IN AN ORGANIZED HEALTH CARE EDUCATION/TRAINING PROGRAM

## 2024-01-09 PROCEDURE — 2580000003 HC RX 258: Performed by: INTERNAL MEDICINE

## 2024-01-09 PROCEDURE — 6360000002 HC RX W HCPCS: Performed by: STUDENT IN AN ORGANIZED HEALTH CARE EDUCATION/TRAINING PROGRAM

## 2024-01-09 PROCEDURE — 6370000000 HC RX 637 (ALT 250 FOR IP): Performed by: INTERNAL MEDICINE

## 2024-01-09 PROCEDURE — 99239 HOSP IP/OBS DSCHRG MGMT >30: CPT | Performed by: STUDENT IN AN ORGANIZED HEALTH CARE EDUCATION/TRAINING PROGRAM

## 2024-01-09 PROCEDURE — 6370000000 HC RX 637 (ALT 250 FOR IP)

## 2024-01-09 PROCEDURE — 36415 COLL VENOUS BLD VENIPUNCTURE: CPT

## 2024-01-09 PROCEDURE — 2700000000 HC OXYGEN THERAPY PER DAY

## 2024-01-09 RX ORDER — MEXILETINE HYDROCHLORIDE 150 MG/1
150 CAPSULE ORAL EVERY 8 HOURS SCHEDULED
Status: DISCONTINUED | OUTPATIENT
Start: 2024-01-09 | End: 2024-01-09 | Stop reason: HOSPADM

## 2024-01-09 RX ORDER — BUMETANIDE 0.25 MG/ML
1 INJECTION INTRAMUSCULAR; INTRAVENOUS 2 TIMES DAILY
Status: DISCONTINUED | OUTPATIENT
Start: 2024-01-09 | End: 2024-01-09 | Stop reason: HOSPADM

## 2024-01-09 RX ORDER — BUMETANIDE 0.25 MG/ML
2 INJECTION INTRAMUSCULAR; INTRAVENOUS 2 TIMES DAILY
Status: DISCONTINUED | OUTPATIENT
Start: 2024-01-09 | End: 2024-01-09

## 2024-01-09 RX ORDER — MEXILETINE HYDROCHLORIDE 150 MG/1
150 CAPSULE ORAL EVERY 8 HOURS SCHEDULED
Qty: 90 CAPSULE | Refills: 1 | Status: SHIPPED | OUTPATIENT
Start: 2024-01-09

## 2024-01-09 RX ADMIN — CARVEDILOL 3.12 MG: 6.25 TABLET, FILM COATED ORAL at 17:01

## 2024-01-09 RX ADMIN — FERROUS SULFATE TAB 325 MG (65 MG ELEMENTAL FE) 325 MG: 325 (65 FE) TAB at 08:52

## 2024-01-09 RX ADMIN — Medication 400 MG: at 08:52

## 2024-01-09 RX ADMIN — ATORVASTATIN CALCIUM 40 MG: 40 TABLET, FILM COATED ORAL at 08:51

## 2024-01-09 RX ADMIN — APIXABAN 5 MG: 5 TABLET, FILM COATED ORAL at 08:52

## 2024-01-09 RX ADMIN — SODIUM BICARBONATE 1300 MG: 650 TABLET ORAL at 08:52

## 2024-01-09 RX ADMIN — CARVEDILOL 3.12 MG: 6.25 TABLET, FILM COATED ORAL at 08:52

## 2024-01-09 RX ADMIN — PANTOPRAZOLE SODIUM 40 MG: 40 TABLET, DELAYED RELEASE ORAL at 08:52

## 2024-01-09 RX ADMIN — Medication 10 ML: at 08:55

## 2024-01-09 RX ADMIN — BUMETANIDE 1 MG: 0.25 INJECTION, SOLUTION INTRAMUSCULAR; INTRAVENOUS at 08:52

## 2024-01-09 RX ADMIN — ALLOPURINOL 100 MG: 100 TABLET ORAL at 08:52

## 2024-01-09 RX ADMIN — MEXILETINE HYDROCHLORIDE 150 MG: 150 CAPSULE ORAL at 12:11

## 2024-01-09 RX ADMIN — MIDODRINE HYDROCHLORIDE 5 MG: 5 TABLET ORAL at 17:01

## 2024-01-09 RX ADMIN — POTASSIUM CHLORIDE 20 MEQ: 1500 TABLET, EXTENDED RELEASE ORAL at 08:51

## 2024-01-09 RX ADMIN — MIDODRINE HYDROCHLORIDE 5 MG: 5 TABLET ORAL at 12:11

## 2024-01-09 RX ADMIN — Medication 1 TABLET: at 08:52

## 2024-01-09 RX ADMIN — Medication 500 MG: at 08:54

## 2024-01-09 RX ADMIN — MIDODRINE HYDROCHLORIDE 5 MG: 5 TABLET ORAL at 08:51

## 2024-01-09 NOTE — PLAN OF CARE
Problem: Discharge Planning  Goal: Discharge to home or other facility with appropriate resources  Outcome: Completed  Flowsheets (Taken 1/9/2024 0845)  Discharge to home or other facility with appropriate resources: Identify barriers to discharge with patient and caregiver     Problem: Pain  Goal: Verbalizes/displays adequate comfort level or baseline comfort level  1/9/2024 1607 by Carmen Barajas RN  Outcome: Completed  1/9/2024 1254 by Carmen Barajas RN  Outcome: Progressing  Flowsheets  Taken 1/9/2024 1200  Verbalizes/displays adequate comfort level or baseline comfort level: Encourage patient to monitor pain and request assistance  Taken 1/9/2024 0845  Verbalizes/displays adequate comfort level or baseline comfort level: Encourage patient to monitor pain and request assistance     Problem: Chronic Conditions and Co-morbidities  Goal: Patient's chronic conditions and co-morbidity symptoms are monitored and maintained or improved  1/9/2024 1607 by Carmen Barajas RN  Outcome: Completed  1/9/2024 1254 by Carmen Barajas RN  Outcome: Progressing  Flowsheets (Taken 1/9/2024 0845)  Care Plan - Patient's Chronic Conditions and Co-Morbidity Symptoms are Monitored and Maintained or Improved: Monitor and assess patient's chronic conditions and comorbid symptoms for stability, deterioration, or improvement     Problem: Skin/Tissue Integrity  Goal: Absence of new skin breakdown  Description: 1.  Monitor for areas of redness and/or skin breakdown  2.  Assess vascular access sites hourly  3.  Every 4-6 hours minimum:  Change oxygen saturation probe site  4.  Every 4-6 hours:  If on nasal continuous positive airway pressure, respiratory therapy assess nares and determine need for appliance change or resting period.  Outcome: Completed

## 2024-01-09 NOTE — DISCHARGE INSTRUCTIONS
Chest Pain: Care Instructions  Overview     There are many things that can cause chest pain. Some are not serious and will get better on their own in a few days. But some kinds of chest pain need more testing and treatment. Your doctor may have recommended a follow-up visit in the next few days. If you are not getting better, you may need more tests or treatment.  Even though your doctor has released you, you still need to watch for any problems. The doctor carefully checked you, but sometimes problems can develop later. If you have new symptoms or if your symptoms do not get better, get medical care right away.  If you have worse or different chest pain or pressure that lasts more than 5 minutes or you passed out (lost consciousness), call 911 or seek other emergency help right away.   A medical visit is only one step in your treatment. Even if you feel better, you still need to do what your doctor recommends, such as going to all suggested follow-up appointments and taking medicines exactly as directed. This will help you recover and help prevent future problems.  How can you care for yourself at home?  Rest until you feel better.  Take your medicine exactly as prescribed. Call your doctor if you think you are having a problem with your medicine.  Do not drive after taking a prescription pain medicine.  When should you call for help?   Call 911 if:     You passed out (lost consciousness).     You have severe difficulty breathing.     You have symptoms of a heart attack. These may include:  Chest pain or pressure, or a strange feeling in your chest.  Sweating.  Shortness of breath.  Nausea or vomiting.  Pain, pressure, or a strange feeling in your back, neck, jaw, or upper belly or in one or both shoulders or arms.  Lightheadedness or sudden weakness.  A fast or irregular heartbeat.  After you call 911, the  may tell you to chew 1 adult-strength or 2 to 4 low-dose aspirin. Wait for an ambulance. Do not

## 2024-01-09 NOTE — DISCHARGE SUMMARY
Hospital Medicine Discharge Summary    Patient ID: Rober Mejía III      Patient's PCP: Awadalla, Maged I, MD    Admit Date: 1/6/2024     Discharge Date: 1/9/2024    Admitting Physician: Freida Roman DO     Discharge Physician: Zayra Briceno MD     Discharge Diagnoses:       Active Hospital Problems    Diagnosis Date Noted    Stage 3b chronic kidney disease (HCC) [N18.32] 01/08/2024    Swelling of lower extremity [M79.89] 01/07/2024    Elevated troponin [R79.89] 01/07/2024    Dyspnea [R06.00] 01/07/2024    Chest pain [R07.9] 01/06/2024    Pulmonary hypertension (HCC) [I27.20] 10/28/2023    Acute decompensated heart failure (HCC) [I50.9] 03/14/2020       The patient was seen and examined on day of discharge and this discharge summary is in conjunction with any daily progress note from day of discharge.    Hospital Course:     Rober presents to ER with complaint of SOB, chest pain, leg swelling has been going on since few weeks he had mid sternal pressure which had been steady, also complains of palpitations.  Was found to be hypotensive BP 93/66 in ER BUN 58, creatinine 3.4, proBNP 5086, troponin 100, D-dimer 1602, due to CKD unable to get CTA.  Aspirin Given in ED, VQ scan was low probability for PE, ultrasound of lower extremities was negative for DVT he normally takes eliquis (2.5 BID? May be underdosed) was started on Eliquis 5 Mg p.o. twice daily.     He has been worked up for kidney transplant, proximately around December 2023 he underwent liver biopsy as part of workup regarding kidney transplant, complicated with Hemorrhagic Shock.   Echo from 10/16/2023 showed ejection fraction of 20 to 25%, he states he has been compliant with his medications he has CRT-D he stated he needs to see EP.  EP consulted, due to prior history VT/AF amiodarone was discontinued, normal CRT-D function, no further interventions recommended at this point EP recommended continuing Coreg 3.125 Mg twice daily, Eliquis 5 Mg

## 2024-01-09 NOTE — PLAN OF CARE
Problem: Pain  Goal: Verbalizes/displays adequate comfort level or baseline comfort level  Outcome: Progressing  Flowsheets  Taken 1/9/2024 1200  Verbalizes/displays adequate comfort level or baseline comfort level: Encourage patient to monitor pain and request assistance  Taken 1/9/2024 0845  Verbalizes/displays adequate comfort level or baseline comfort level: Encourage patient to monitor pain and request assistance     Problem: Chronic Conditions and Co-morbidities  Goal: Patient's chronic conditions and co-morbidity symptoms are monitored and maintained or improved  Outcome: Progressing  Flowsheets (Taken 1/9/2024 0845)  Care Plan - Patient's Chronic Conditions and Co-Morbidity Symptoms are Monitored and Maintained or Improved: Monitor and assess patient's chronic conditions and comorbid symptoms for stability, deterioration, or improvement

## 2024-01-09 NOTE — PLAN OF CARE
Problem: Discharge Planning  Goal: Discharge to home or other facility with appropriate resources  Outcome: Progressing     Problem: Pain  Goal: Verbalizes/displays adequate comfort level or baseline comfort level  Outcome: Progressing     Problem: Chronic Conditions and Co-morbidities  Goal: Patient's chronic conditions and co-morbidity symptoms are monitored and maintained or improved  Outcome: Progressing

## 2024-01-10 ENCOUNTER — CARE COORDINATION (OUTPATIENT)
Dept: CARE COORDINATION | Age: 44
End: 2024-01-10

## 2024-01-10 DIAGNOSIS — N18.32 STAGE 3B CHRONIC KIDNEY DISEASE (HCC): Primary | ICD-10-CM

## 2024-01-10 NOTE — PROGRESS NOTES
Hospitalist Progress Note      SYNOPSIS:Rober presents to ER with complaint of SOB, chest pain, leg swelling has been going on since few weeks he had mid sternal pressure which had been steady, also complains of palpitations.  Was found to be hypotensive BP 93/66 in ER BUN 58, creatinine 3.4, proBNP 5086, troponin 100, D-dimer 1602, due to CKD unable to get CTA.  Aspirin Given in ED, VQ scan was low probability for PE, ultrasound of lower extremities was negative for DVT he normally takes eliquis (2.5 BID? May be underdosed) we will restart Eliquis 5 Mg p.o. twice daily.   He has been worked up for kidney transplant, proximately around December 2023 he underwent liver biopsy as part of workup regarding kidney transplant, complicated with Hemorrhagic Shock.   Echo from 10/16/2023 showed ejection fraction of 20 to 25%, he states he has been compliant with his medications he has ICD with pacemaker, does not remember when his pacemaker was interrogated, he stated he needs to see Dr. Millard.  EP consulted, due to VT/AF amiodarone was discontinued.   Cardiology consulted for chest pain, heart failure with reduced ejection fraction.  Cardiology recommending nephrology consult for volume management.    Patient admitted on 1/6/2024 for Chest pain      SUBJECTIVE:  Stable overnight.  He denies new complaints, swelling of the legs improving. records reviewed.     No data recorded.    DIET: ADULT DIET; Regular; Low Potassium (Less than 3000 mg/day); Low Phosphorus (Less than 1000 mg)  CODE: Full Code  No intake or output data in the 24 hours ending 01/08/24 1335      Review of Systems  All bolded are positive; please see HPI  General:  Fever, chills, diaphoresis, fatigue, malaise, night sweats, weight loss  Psychological:  Anxiety, disorientation, hallucinations.  ENT:  Epistaxis, headaches, vertigo, visual changes.  Cardiovascular:  Chest pain, irregular heartbeats, palpitations, paroxysmal nocturnal dyspnea.  Respiratory: 
    Hospitalist Progress Note      SYNOPSIS:Rober presents to ER with complaint of SOB, chest pain, leg swelling has been going on since few weeks he had mid sternal pressure which had been steady, also complains of palpitations.  Was found to be hypotensive BP 93/66 in ER BUN 58, creatinine 3.4, proBNP 5086, troponin 100, D-dimer 1602, due to CKD unable to get CTA.  Aspirin Given in ED, VQ scan was ordered, ultrasound of lower extremities. He normally takes eliquis (2.5 BID? May be underdosed)   He has been worked up for kidney transplant, proximately around 2023 he underwent liver biopsy as part of workup regarding kidney transplant.  Echo from 10/16/2023 showed ejection fraction of 20 to 25%, he states he has been compliant with his medications and Bumex has not been helping, he has ICD with pacemaker, does not remember when his pacemaker was interrogated, he stated he needs to see Dr. Millard.     Patient admitted on 2024 for Chest pain      SUBJECTIVE:  Stable overnight.  He complains of leg swelling, chest pain denies shortness of breath patient seen and examined  Records reviewed.     Temp (24hrs), Av.9 °F (36.6 °C), Min:97.8 °F (36.6 °C), Max:97.9 °F (36.6 °C)    DIET: ADULT DIET; Regular; Low Potassium (Less than 3000 mg/day); Low Phosphorus (Less than 1000 mg)  CODE: Full Code    Intake/Output Summary (Last 24 hours) at 2024 1219  Last data filed at 2024 0704  Gross per 24 hour   Intake --   Output 1275 ml   Net -1275 ml       Review of Systems  All bolded are positive; please see HPI  General:  Fever, chills, diaphoresis, fatigue, malaise, night sweats, weight loss  Psychological:  Anxiety, disorientation, hallucinations.  ENT:  Epistaxis, headaches, vertigo, visual changes.  Cardiovascular:  Chest pain, irregular heartbeats, palpitations, paroxysmal nocturnal dyspnea.  Respiratory:  Shortness of breath, coughing, sputum production, hemoptysis, wheezing, 
4 Eyes Skin Assessment     NAME:  Rober Mejía III  YOB: 1980  MEDICAL RECORD NUMBER:  [id number]    The patient is being assessed for  Admission    I agree that at least one RN has performed a thorough Head to Toe Skin Assessment on the patient. ALL assessment sites listed below have been assessed.      Areas assessed by both nurses:    Head, Face, Ears, Shoulders, Back, Chest, Arms, Elbows, Hands, Sacrum. Buttock, Coccyx, Ischium, and Legs. Feet and Heels        Does the Patient have a Wound? No noted wound(s)       James Prevention initiated by RN: No  Wound Care Orders initiated by RN: No    Pressure Injury (Stage 3,4, Unstageable, DTI, NWPT, and Complex wounds) if present, place Wound referral order by RN under : No    New Ostomies, if present place, Ostomy referral order under : No     Nurse 1 eSignature: Electronically signed by Melissa Ramirez RN on 1/8/24 at 6:04 PM EST    **SHARE this note so that the co-signing nurse can place an eSignature**    Nurse 2 eSignature: Electronically signed by Aliza Barreto RN on 1/8/24 at 6:49 PM EST  
CLINICAL PHARMACY NOTE: MEDS TO BEDS    Total # of Prescriptions Filled: 2   The following medications were delivered to the patient:  Mexiletine 150mg  Eliquis 5mg    Additional Documentation:  Nelda delivered to patient 1-9-24  
Nephrology consult called to  per perfect patient added to census.   
[facility]  Nephrology Progress Note    Patient's Name: Rober Mejía III    History of Present Illness from 1/8 consult note:    \"Rober Mejía III is a 43 y.o. male with a past medical history of CHF, atrial fibrillation, CAD, and type 2 diabetes mellitus.  He presented to the ED on 1/6 with complaints of chest pain.  Vital signs on 1/6 includes Temperature 97.8, respirations 20, pulse 76, BP 93/66, and he was 99% SpO2.  Lab data on 1/6 includes BUN 58, creatinine 3.4, proBNP 5086, and hemoglobin 10.5.  He had a chest x-ray on 1/6 which showed no acute cardiopulmonary process.  He had a nuclear med VQ scan on 1/7 which showed low probability for PE.  Cardiology and EP have been consulted to see the patient.  Nephrology has been consulted to see the patient for CHF, CKD, on renal transplant list.  Patient is known to our service and has a history of CKD 4 with a baseline creatinine of 2.7-3.  At present, patient was seen and examined.  He reports that he had dull chest pain.  He denies any shortness of breath.  He denies any abdominal pain, nausea, vomiting, or diarrhea.  He denies any dizziness or headaches.\"    Subjective:    1/9: Patient was seen and examined.  He reports that he feels all right.  He denies any chest pain or shortness of breath.  He denies any nausea or vomiting.    PMH:    Past Medical History:   Diagnosis Date    Acute CHF (HCC) 11/29/2011    Acute CHF (HCC) 12/26/2011    Acute idiopathic gout of right foot 08/13/2016    AF (atrial fibrillation) (HCC) 02/2020    Anemia 11/29/2011    CAD (coronary artery disease)     cardioversion 06/26/2023    dR Diaz    Cerebral artery occlusion with cerebral infarction (HCC)     CKD (chronic kidney disease)     Gout     HTN (hypertension) 11/29/2011    Hyperlipidemia     Hypertension     Morbid obesity due to excess calories (HCC)     Nonischemic cardiomyopathy (HCC) 11/29/2011    Nonischemic cardiomyopathy (HCC) 07/2013 7/2013- cardiac MRI 
LFTs  Prolonged QTc.  QTc 573-579 ms per manual calculation 10/14/2023 EP - In the setting of BIV pacing and QRS of 198-202 ms. CRT-D reprogrammed by EP >> Avoid QT prolongation medications  T2DM with nephropathy.  Has not needed insulin since 4/2023   History of HTN.  More recently, hypotension requiring midodrine   HLD, on statin   Left occipital CVA: questionable date (? 2013)  CKD.  Recent baseline creatinine 2.0-3.0  Admitted with TAIWO in 6/2023 (BUN 79, Cr 4.0). Bumex, Aldactone, and Entresto were held and given IV fluids.   Admitted with TAIWO in 10/2023 (peak Cr 3.8). Bumex was reduced. Aldactone and Entresto stopped.   Admission creatinine with TAIWO 3.75  11/2023       ASSESSMENT:  Chest pain, with atypical features.  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  Currently on [facility] heart transplant list   Low-normal CO, with elevated R and L filling pressures and pulmonary hypertension on RHC  11/2023  Moderate to severe TR, mild to moderate MR and pulmonary hypertension on (RVSP 62.3) on TTE  11/2023  S/p single lead Medtronic ICD 2013 with upgrade to Bi-V ICD Medtronic 11/2018.  S/p CardioMEMs implant 12/2018  Acute hypoxic respiratory failure, on 3L NC  Persistent PAF/Fl, OAC with reduced dose Eliquis.  Currently off AAD therapy  History of VT with ICD shock 6/2023 and discontinuation of Tikosyn  History of prolonged QTc  CKD/ESRD, currently on [facility] renal transplant list   Chronic anemia/iron deficiency (on oral iron supplementation)  Hypokalemia, supplemented   History of HTN.  More recently, hypotension requiring midodrine   HLD, on statin   T2DM with nephropathy   History of CVA 2013, no residual deficits  Obesity s/p gastric sleeve 3/2023  GERD  ARVIND, compliant with CPAP  Gout   Vitamin D deficiency   Intolerance to Farxiga: throat swelling   Liver lesion 
2023.  - Started on Amiodarone in June 2023 and discontinued in November 2023 due to elevated LFT.  - AF burden 0%.  - Presents in NSR.  - Re-education on importance of well controlled HTN (goal BP < 130/80), adequate weight control (goal BMI of < 27), physical activity consisting of moderate cardiopulmonary exercise up to a goal of 250 min/wk, daily compliance with CPAP in treating sleep apnea, smoking cessation and limited ETOH intake.     3. History of ventricular tachycardia  - History of sustained VT in July 2019, December 2020 and June 2023.  - On Coreg.   - No recurrent since 6/29/23.  - Amiodarone discontinued in November 2023 due to elevated LFT  -Start mexiletine 150 mg 3 times daily  -On telemetry paced rhythm rates in the 60s    4. Prolonged Qtc  - Qtc 574 ms  - In the setting of BIV pacing and QRS of 170 ms.    5. Nonischemic cardiomyopathy and chronic HFrEF   -12/2011 [facility]: Normal coronaries  - LV EF 20-30% on TTE 12/1/11  - LV EF 20% on TTE 8/10/16.  - LV EF 15-20% on TTE 10/13/18.  - Status post cardio-mem 12/13/18.  - LV EF 20-25% on TTE 7/30/19.  - LV EF 25-30% on TTE 8/5/20.  - LV EF 35% on TTE 4/4/23.  - LV EF 20-25% on TTE 10/16/23.  - His GDMTs, Hydralazine, isosorbide dinitrate, Metolazone, Entresto and Spironolactone, were discontinued in December 2023 due to hypotension.  - NYHA class III, ACC/AHA stage D.  - Decompensated.  - Ongoing evaluation for LVAD and heart transplant.  - Management per Cardiology.    6. Nonobstructive coronary artery disease  - [facility] 8/1/2019: Left main: 0% stenosis. LAD: 40-50 %  stenosis Circumflex: 30 % stenosis. RCA: Dominant.  30 %  stenosis.     7. Chest pain and neck pian  - Atypical.  - Management per Cardiology.    8. Hypertension    9. Hyperlipidemia    10. Diabetes mellitus    11. Chronic kidney disease  Estimated Creatinine Clearance: 41 mL/min (A) (based on SCr of 3 mg/dL (H)).     12. History of morbid obesity   - Status post gastric bypass.  Body mass

## 2024-01-10 NOTE — CARE COORDINATION
Yes  Care Transitions Interventions         Discussed follow-up appointments. If no appointment was previously scheduled, appointment scheduling offered: Yes.   Is follow up appointment scheduled within 7 days of discharge? See above appt note.    Follow Up  No future appointments.    Care Transition Nurse provided contact information.  No further follow-up call indicated based on severity of symptoms and risk factors. CTN s/o    Yumiko Case RN

## 2024-01-16 ENCOUNTER — TELEPHONE (OUTPATIENT)
Dept: NON INVASIVE DIAGNOSTICS | Age: 44
End: 2024-01-16

## 2024-01-16 NOTE — TELEPHONE ENCOUNTER
----- Message from NATI Claudio CNP sent at 1/8/2024 11:46 AM EST -----  Please schedule hospital follow up with Dr Millard in 4-6 weeks  Thanks!

## 2024-01-22 ENCOUNTER — HOSPITAL ENCOUNTER (OUTPATIENT)
Dept: OTHER | Age: 44
Setting detail: THERAPIES SERIES
Discharge: HOME | End: 2024-01-22
Payer: COMMERCIAL

## 2024-01-22 VITALS
WEIGHT: 229 LBS | BODY MASS INDEX: 26.46 KG/M2 | OXYGEN SATURATION: 100 % | DIASTOLIC BLOOD PRESSURE: 67 MMHG | RESPIRATION RATE: 18 BRPM | SYSTOLIC BLOOD PRESSURE: 106 MMHG | HEART RATE: 64 BPM

## 2024-01-22 LAB
ANION GAP SERPL CALCULATED.3IONS-SCNC: 12 MMOL/L (ref 7–16)
BNP SERPL-MCNC: 4140 PG/ML (ref 0–125)
BUN SERPL-MCNC: 46 MG/DL (ref 6–20)
CALCIUM SERPL-MCNC: 9.9 MG/DL (ref 8.6–10.2)
CHLORIDE SERPL-SCNC: 105 MMOL/L (ref 98–107)
CO2 SERPL-SCNC: 23 MMOL/L (ref 22–29)
CREAT SERPL-MCNC: 2.7 MG/DL (ref 0.7–1.2)
GFR SERPL CREATININE-BSD FRML MDRD: 30 ML/MIN/1.73M2
GLUCOSE SERPL-MCNC: 109 MG/DL (ref 74–99)
POTASSIUM SERPL-SCNC: 4.8 MMOL/L (ref 3.5–5)
SODIUM SERPL-SCNC: 140 MMOL/L (ref 132–146)

## 2024-01-22 PROCEDURE — 2580000003 HC RX 258: Performed by: NURSE PRACTITIONER

## 2024-01-22 PROCEDURE — 96374 THER/PROPH/DIAG INJ IV PUSH: CPT

## 2024-01-22 PROCEDURE — 99214 OFFICE O/P EST MOD 30 MIN: CPT

## 2024-01-22 PROCEDURE — 6360000002 HC RX W HCPCS: Performed by: NURSE PRACTITIONER

## 2024-01-22 PROCEDURE — 83880 ASSAY OF NATRIURETIC PEPTIDE: CPT

## 2024-01-22 PROCEDURE — 80048 BASIC METABOLIC PNL TOTAL CA: CPT

## 2024-01-22 RX ORDER — POTASSIUM CHLORIDE 20 MEQ/1
20 TABLET, EXTENDED RELEASE ORAL DAILY
Qty: 60 TABLET | Refills: 3 | Status: SHIPPED | OUTPATIENT
Start: 2024-01-22

## 2024-01-22 RX ORDER — SPIRONOLACTONE 25 MG/1
25 TABLET ORAL DAILY
COMMUNITY

## 2024-01-22 RX ORDER — BUMETANIDE 2 MG/1
1 TABLET ORAL
COMMUNITY

## 2024-01-22 RX ORDER — SACUBITRIL AND VALSARTAN 24; 26 MG/1; MG/1
1 TABLET, FILM COATED ORAL 2 TIMES DAILY
COMMUNITY

## 2024-01-22 RX ORDER — AMIODARONE HYDROCHLORIDE 200 MG/1
400 TABLET ORAL DAILY
COMMUNITY

## 2024-01-22 RX ORDER — BUMETANIDE 0.25 MG/ML
2 INJECTION INTRAMUSCULAR; INTRAVENOUS ONCE
Status: COMPLETED | OUTPATIENT
Start: 2024-01-22 | End: 2024-01-22

## 2024-01-22 RX ORDER — SODIUM CHLORIDE 0.9 % (FLUSH) 0.9 %
10 SYRINGE (ML) INJECTION PRN
Status: DISCONTINUED | OUTPATIENT
Start: 2024-01-22 | End: 2024-01-25 | Stop reason: HOSPADM

## 2024-01-22 RX ORDER — ALLOPURINOL 100 MG/1
100 TABLET ORAL DAILY
COMMUNITY

## 2024-01-22 RX ORDER — MIDODRINE HYDROCHLORIDE 5 MG/1
5 TABLET ORAL 3 TIMES DAILY
COMMUNITY

## 2024-01-22 RX ORDER — METOPROLOL SUCCINATE 25 MG/1
25 TABLET, EXTENDED RELEASE ORAL NIGHTLY
COMMUNITY

## 2024-01-22 RX ADMIN — BUMETANIDE 2 MG: 0.25 INJECTION, SOLUTION INTRAMUSCULAR; INTRAVENOUS at 13:21

## 2024-01-22 RX ADMIN — SODIUM CHLORIDE, PRESERVATIVE FREE 10 ML: 5 INJECTION INTRAVENOUS at 13:23

## 2024-01-22 ASSESSMENT — PATIENT HEALTH QUESTIONNAIRE - PHQ9
2. FEELING DOWN, DEPRESSED OR HOPELESS: NOT AT ALL
1. LITTLE INTEREST OR PLEASURE IN DOING THINGS: NOT AT ALL
SUM OF ALL RESPONSES TO PHQ9 QUESTIONS 1 & 2: 0

## 2024-01-22 NOTE — PROGRESS NOTES
Patient Health Questionnaire-2 Score 0     Interpretation of Total Score Depression Severity:   1-4 = Minimal depression  5-9 = Mild depression  10-14 = Moderate depression  15-19 = Moderately severe depression  20-27 = Severe depression   [] Patient provided community resources for counseling services  [] PCP called and PHQ result faxed   [] Behavorial health consultant called    \    Scheduled to follow up in CHF clinic on:   Future Appointments   Date Time Provider Department Center   1/31/2024  7:30 AM [facility] CHF ROOM 1 [facility]   2/21/2024  4:00 PM SCHEDULE, VITA MILLER DEVICE [facility]   2/21/2024  4:00 PM Jeannie Millard MD [facility]

## 2024-01-22 NOTE — FLOWSHEET NOTE
01/22/24 1219   Over the past 2 weeks, how often have you been bothered by any of the following problems?   Little interest or pleasure in doing things Not at all   Feeling down, depressed, or hopeless Not at all   Patient Health Questionnaire-2 Score 0

## 2024-01-31 ENCOUNTER — HOSPITAL ENCOUNTER (OUTPATIENT)
Dept: OTHER | Age: 44
Setting detail: THERAPIES SERIES
Discharge: HOME | End: 2024-01-31
Payer: COMMERCIAL

## 2024-01-31 VITALS
HEART RATE: 60 BPM | SYSTOLIC BLOOD PRESSURE: 104 MMHG | BODY MASS INDEX: 26.46 KG/M2 | RESPIRATION RATE: 18 BRPM | WEIGHT: 229 LBS | OXYGEN SATURATION: 100 % | DIASTOLIC BLOOD PRESSURE: 70 MMHG

## 2024-01-31 LAB
ANION GAP SERPL CALCULATED.3IONS-SCNC: 12 MMOL/L (ref 7–16)
BNP SERPL-MCNC: 3959 PG/ML (ref 0–125)
BUN SERPL-MCNC: 40 MG/DL (ref 6–20)
CALCIUM SERPL-MCNC: 9.5 MG/DL (ref 8.6–10.2)
CHLORIDE SERPL-SCNC: 103 MMOL/L (ref 98–107)
CO2 SERPL-SCNC: 23 MMOL/L (ref 22–29)
CREAT SERPL-MCNC: 2.6 MG/DL (ref 0.7–1.2)
GFR SERPL CREATININE-BSD FRML MDRD: 30 ML/MIN/1.73M2
GLUCOSE SERPL-MCNC: 101 MG/DL (ref 74–99)
POTASSIUM SERPL-SCNC: 4 MMOL/L (ref 3.5–5)
SODIUM SERPL-SCNC: 138 MMOL/L (ref 132–146)

## 2024-01-31 PROCEDURE — 96374 THER/PROPH/DIAG INJ IV PUSH: CPT

## 2024-01-31 PROCEDURE — 80048 BASIC METABOLIC PNL TOTAL CA: CPT

## 2024-01-31 PROCEDURE — 6360000002 HC RX W HCPCS: Performed by: NURSE PRACTITIONER

## 2024-01-31 PROCEDURE — 2580000003 HC RX 258: Performed by: NURSE PRACTITIONER

## 2024-01-31 PROCEDURE — 83880 ASSAY OF NATRIURETIC PEPTIDE: CPT

## 2024-01-31 PROCEDURE — 99214 OFFICE O/P EST MOD 30 MIN: CPT

## 2024-01-31 RX ORDER — CARVEDILOL 3.12 MG/1
3.12 TABLET ORAL 2 TIMES DAILY WITH MEALS
COMMUNITY

## 2024-01-31 RX ORDER — M-VIT,TX,IRON,MINS/CALC/FOLIC 27MG-0.4MG
1 TABLET ORAL DAILY
COMMUNITY

## 2024-01-31 RX ORDER — SODIUM CHLORIDE 0.9 % (FLUSH) 0.9 %
10 SYRINGE (ML) INJECTION PRN
Status: DISCONTINUED | OUTPATIENT
Start: 2024-01-31 | End: 2024-02-01 | Stop reason: HOSPADM

## 2024-01-31 RX ORDER — BUMETANIDE 0.25 MG/ML
2 INJECTION INTRAMUSCULAR; INTRAVENOUS ONCE
Status: COMPLETED | OUTPATIENT
Start: 2024-01-31 | End: 2024-01-31

## 2024-01-31 RX ORDER — ERGOCALCIFEROL 1.25 MG/1
50000 CAPSULE ORAL WEEKLY
COMMUNITY

## 2024-01-31 RX ORDER — MEXILETINE HYDROCHLORIDE 150 MG/1
150 CAPSULE ORAL 3 TIMES DAILY
COMMUNITY

## 2024-01-31 RX ADMIN — SODIUM CHLORIDE, PRESERVATIVE FREE 10 ML: 5 INJECTION INTRAVENOUS at 08:57

## 2024-01-31 RX ADMIN — BUMETANIDE 2 MG: 0.25 INJECTION INTRAMUSCULAR; INTRAVENOUS at 08:56

## 2024-01-31 NOTE — PLAN OF CARE
Problem: Chronic Conditions and Co-morbidities  Goal: Patient's chronic conditions and co-morbidity symptoms are monitored and maintained or improved  Flowsheets (Taken 1/31/2024 3997)  Care Plan - Patient's Chronic Conditions and Co-Morbidity Symptoms are Monitored and Maintained or Improved: Monitor and assess patient's chronic conditions and comorbid symptoms for stability, deterioration, or improvement

## 2024-02-02 ENCOUNTER — TELEPHONE (OUTPATIENT)
Dept: CARDIOLOGY CLINIC | Age: 44
End: 2024-02-02

## 2024-02-02 NOTE — TELEPHONE ENCOUNTER
Camille Sepulveda, RN transplant coordinator from      Requesting to speak to you directly regarding plan of care. [phone].

## 2024-02-02 NOTE — PROGRESS NOTES
2-2-24 1330    Patient notified to  RX of Mexitil at [facility] today.  
  01/07/2024 345 (H)     INR:  INR (no units)   Date Value   11/25/2023 1.6         Wt Readings from Last 3 Encounters:   01/31/24 103.9 kg (229 lb)   01/22/24 103.9 kg (229 lb)   01/06/24 99.8 kg (220 lb)           ASSESSMENT/PLAN:    [] Euvolemic          [x] Hypervolemic, with increase from baseline:  [x] Shortness of breath/CLAYTON  [] JVD  [] HJR  [] Abnormal lung assessment:   [] Orthopnea  [] PND  [] Decreased urinary response to oral diuretic   [] Scrotal swelling   [] Lower extremity edema  [] Compression stockings provided  [] Decline in functional capacity (unable to perform activities they had previously been able to do)  [] Weight gain     [x] IV diuretics given IV BUMEX 2 mg  [] Provider notified of recurrent IV diuretic use  Patient c/o increased SOB when walking. Edema continues on LLE 1+ pitting. RLE has None. Otherwise assessment unremarkable.     Additional Notes:    Reviewed medications from [employer] box and bottles that patient brought in with him today.      [x]Lab work obtained    [x] Patient/Family Educated On:  [x] HF zones (Green, Yellow, Red) and aware of when to take action   [x] Daily weights  [] Scale provided   [x] Low sodium diet (2000 mg)  Barriers to compliance  [] Refuses to monitor diet  [] Socioeconomic difficulties  [] Unable to cook for self (use of frozen meals, can goods, etc)  [] CHF CHW consulted  [] Low sodium meal delivery options given to patient  [] Dietician consulted   [] Low sodium recipes provided  [] Sodium free seasoning provided   [x] Fluid intake 6-8 cups (around 64 oz)  [x] Reviewed currently prescribed medications with patient, educated on importance of compliance and answered any questions regarding their medication  [] Pill box provided to patient  [] Patient using pill packing pharmacy   [] CPAP/BiPAP use  [] Low impact exercise / cardiac rehab   [] LifeVest use  [x] Patient aware of signs and symptoms of worsening HF, CHF clinic phone number provided and made

## 2024-02-02 NOTE — RESULT ENCOUNTER NOTE
Discharge Instructions after Colonoscopy or Sigmoidoscopy       Today you had a Colonoscopy     Activity and Diet   You were given medicine for pain. You may be dizzy or sleepy.   For 24 hours:     Do not drive or use heavy equipment.     Do not make important decisions.     Do not drink any alcohol.   You may return to your normal diet and medicines.       Discomfort     Air was placed in your colon during the exam in order to see it. Walking helps to pass the air.     You may take Tylenol (acetaminophen) for pain unless your doctor has told you not to.   Do not take aspirin or ibuprofen (Advil, Motrin, or other anti-inflammatory   drugs) for _____ days.       Follow-up   ____ We took small tissue samples or polyps to study. Your doctor will call you with the results within two weeks.       When to call:       Call right away if you have:     Unusual pain in belly or chest pain not relieved with passing air.     More than 1 to 2 Tablespoons of bleeding from your rectum.     Fever above 100.6  F (37.5  C).       If you have severe pain, bleeding, or shortness of breath, go to an emergency room.       If you have questions, call:   Monday to Friday, 7 a.m. to 4:30 p.m.   Endoscopy: [phone] (We may have to call you back)       After hours   Hospital: [phone] (Ask for the GI fellow on call)      Labs and CHF clinic note reviewed  Spoke with Nicolette ANDERSON in CHF clinic     Patient repeatedly takes his medications incorrectly when [facility] mails him pill pack he has difficulty determining how to self adjust his medications.     We need to do shared decision making with Rober regarding how we can help him with his medications - with his understanding that he needs to take ownership of this compliance.     EP to be made aware that patient has been off mexitil.     Please ask patient to bring all pill packing and supplemental bottles to his next CHF clinic appointment. We can help show him how to fill a pill box to help compliance as the mail order pill packing is hindering not helping his care at this point.    I also received a message from the [place] cardiology office from  RN ([phone]) regarding Rober's care - number was called and voicemail left.

## 2024-02-06 PROBLEM — R79.89 ELEVATED TROPONIN: Status: RESOLVED | Noted: 2024-01-07 | Resolved: 2024-02-06

## 2024-02-09 ENCOUNTER — HOSPITAL ENCOUNTER (OUTPATIENT)
Dept: OTHER | Age: 44
Setting detail: THERAPIES SERIES
Discharge: HOME | End: 2024-02-09
Payer: COMMERCIAL

## 2024-02-09 VITALS
RESPIRATION RATE: 18 BRPM | BODY MASS INDEX: 26.46 KG/M2 | DIASTOLIC BLOOD PRESSURE: 73 MMHG | WEIGHT: 229 LBS | HEART RATE: 66 BPM | OXYGEN SATURATION: 100 % | SYSTOLIC BLOOD PRESSURE: 115 MMHG

## 2024-02-09 LAB
ANION GAP SERPL CALCULATED.3IONS-SCNC: 14 MMOL/L (ref 7–16)
BNP SERPL-MCNC: 6035 PG/ML (ref 0–125)
BUN SERPL-MCNC: 54 MG/DL (ref 6–20)
CALCIUM SERPL-MCNC: 9.6 MG/DL (ref 8.6–10.2)
CHLORIDE SERPL-SCNC: 101 MMOL/L (ref 98–107)
CO2 SERPL-SCNC: 19 MMOL/L (ref 22–29)
CREAT SERPL-MCNC: 2.8 MG/DL (ref 0.7–1.2)
GFR SERPL CREATININE-BSD FRML MDRD: 28 ML/MIN/1.73M2
GLUCOSE SERPL-MCNC: 113 MG/DL (ref 74–99)
POTASSIUM SERPL-SCNC: 4.1 MMOL/L (ref 3.5–5)
SODIUM SERPL-SCNC: 134 MMOL/L (ref 132–146)

## 2024-02-09 PROCEDURE — 96374 THER/PROPH/DIAG INJ IV PUSH: CPT

## 2024-02-09 PROCEDURE — 83880 ASSAY OF NATRIURETIC PEPTIDE: CPT

## 2024-02-09 PROCEDURE — 80048 BASIC METABOLIC PNL TOTAL CA: CPT

## 2024-02-09 PROCEDURE — 2580000003 HC RX 258: Performed by: NURSE PRACTITIONER

## 2024-02-09 PROCEDURE — 99214 OFFICE O/P EST MOD 30 MIN: CPT

## 2024-02-09 PROCEDURE — 6360000002 HC RX W HCPCS: Performed by: NURSE PRACTITIONER

## 2024-02-09 RX ORDER — SODIUM CHLORIDE 0.9 % (FLUSH) 0.9 %
5-40 SYRINGE (ML) INJECTION 2 TIMES DAILY
Status: DISCONTINUED | OUTPATIENT
Start: 2024-02-09 | End: 2024-02-10 | Stop reason: HOSPADM

## 2024-02-09 RX ORDER — BUMETANIDE 0.25 MG/ML
2 INJECTION INTRAMUSCULAR; INTRAVENOUS ONCE
Status: COMPLETED | OUTPATIENT
Start: 2024-02-09 | End: 2024-02-09

## 2024-02-09 RX ADMIN — SODIUM CHLORIDE, PRESERVATIVE FREE 10 ML: 5 INJECTION INTRAVENOUS at 12:46

## 2024-02-09 RX ADMIN — BUMETANIDE 2 MG: 0.25 INJECTION INTRAMUSCULAR; INTRAVENOUS at 12:46

## 2024-02-09 NOTE — PLAN OF CARE
Problem: Chronic Conditions and Co-morbidities  Goal: Patient's chronic conditions and co-morbidity symptoms are monitored and maintained or improved  Flowsheets (Taken 2/9/2024 4808)  Care Plan - Patient's Chronic Conditions and Co-Morbidity Symptoms are Monitored and Maintained or Improved: Monitor and assess patient's chronic conditions and comorbid symptoms for stability, deterioration, or improvement

## 2024-02-09 NOTE — PROGRESS NOTES
Congestive Heart Failure Clinic   [facility]       Referring Provider: Dr. Loyola  Primary Care Physician: Awadalla, Maged I, MD   Cardiologist: Dr. Driscoll/ Dr Mcdaniel ([place])/[place] ()  Nephrologist: Dr. Us      HISTORY OF PRESENT ILLNESS:     Rober Mejía III is a 43 y.o. (1980) male with a history of HFrEF (EF < 40%), most recent EF:  Lab Results   Component Value Date    LVEF 35 04/07/2023    LVEFMODE Echo 07/31/2019         He presents to the CHF clinic for ongoing evaluation and monitoring of heart failure.    In the CHF clinic today he denies any adverse symptoms except:  [] Dizziness or lightheadedness   [] Syncope or near syncope  [] Recent Fall  [x] Shortness of breath at rest   [x] Dyspnea with exertion  [x] Decline in functional capacity (unable to perform activities they had previously been able to do)  [x] Fatigue   [] Orthopnea  [] PND  [] Nocturnal cough  [] Hemoptysis  [x] Chest pain, pressure, or discomfort  [] Palpitations  [] Abdominal distention  [] Abdominal bloating  [] Early satiety  [] Blood in stool   [] Diarrhea  [] Constipation  [x] Nausea/Vomiting a few days ago   [] Decreased urinary response to oral diuretic   [] Scrotal swelling   [x] Lower extremity edema ( left leg )  [] Used PRN doses of oral diuretic   [] Weight gain    Wt Readings from Last 3 Encounters:   02/09/24 103.9 kg (229 lb)   01/31/24 103.9 kg (229 lb)   01/22/24 103.9 kg (229 lb)           SOCIAL HISTORY:  [x] Denies tobacco, alcohol or illicit drug abuse  [] Tobacco use:  [] ETOH use:  [] Illicit drug use:        MEDICATIONS:    Allergies   Allergen Reactions    Farxiga [Dapagliflozin] Other (See Comments)     Caused throat to swell    Tikosyn [Dofetilide] Palpitations     Prior to Visit Medications    Medication Sig Taking? Authorizing Provider   vitamin D (ERGOCALCIFEROL) 1.25 MG (59615 UT) CAPS capsule Take 1 capsule by mouth once a week Monday  Provider, MD Saw

## 2024-02-10 PROCEDURE — 93295 DEV INTERROG REMOTE 1/2/MLT: CPT | Performed by: INTERNAL MEDICINE

## 2024-02-10 PROCEDURE — 93296 REM INTERROG EVL PM/IDS: CPT | Performed by: INTERNAL MEDICINE

## 2024-02-10 PROCEDURE — 93297 REM INTERROG DEV EVAL ICPMS: CPT | Performed by: INTERNAL MEDICINE

## 2024-02-15 ENCOUNTER — TELEPHONE (OUTPATIENT)
Dept: OTHER | Age: 44
End: 2024-02-15

## 2024-02-15 DIAGNOSIS — Z95.818 PRESENCE OF CARDIOMEMS HF SYSTEM: ICD-10-CM

## 2024-02-15 DIAGNOSIS — I50.22 CHRONIC SYSTOLIC HEART FAILURE (HCC): Primary | ICD-10-CM

## 2024-02-15 RX ORDER — BUMETANIDE 1 MG/1
1 TABLET ORAL DAILY
COMMUNITY
End: 2024-02-16 | Stop reason: ALTCHOICE

## 2024-02-15 NOTE — TELEPHONE ENCOUNTER
CardioMems update:      PAD:  Goal:24  Current trends: 28, 29, 29, 32                Providers:  Cardiomems team: ANTIONE Kat CNP, Aliza Calderon, RN  Cardiologist: Dr. Driscoll/Dr. Mcdaniel ([facility])          CHF:   HFrEF 35%          ASSESSMENT:  Elevated PAD several days in a row from cardioMEMS readings. Patient expressed his frustration with his medications being mixed up from recent discharges/instructions from [facility] and [facility].  He says he did get a new pill box as requested last week from CHF clinic nurse.  Discussed with JYOTI Palm and notified that patient is scheduled for CHF clinic follow up tomorrow, 2/16/2024 in [place].    PLAN:  Per JYOTI Palm-have [place] bring ALL prescribed medication bottles and any [facility] prescription box to appointment tomorrow to evaluate all medications and doses and straighten out current medication situation.    Bring empty pill box to clinic as well and nurse can fill pill box while in clinic with correct medications per provider.    Plan for IV diuretics tomorrow while at CHF clinic due to elevated PAD pressures on cardioMEMS.            Future Appointments   Date Time Provider Department Center   2/16/2024 10:00 AM [facility] CHF ROOM 1 [facility]   2/21/2024 11:30 AM SCHEDULE, MHYX ANGELA DEVICE [facility]   2/21/2024 11:30 AM Jeannie Millard MD [facility]   3/27/2024 11:00 AM Wilbur Pringle MD [facility]       Electronically signed by Aliza Calderon RN on 2/15/2024 at 5:48 PM

## 2024-02-16 ENCOUNTER — HOSPITAL ENCOUNTER (OUTPATIENT)
Dept: OTHER | Age: 44
Setting detail: THERAPIES SERIES
Discharge: HOME | End: 2024-02-16
Payer: COMMERCIAL

## 2024-02-16 VITALS
SYSTOLIC BLOOD PRESSURE: 116 MMHG | RESPIRATION RATE: 18 BRPM | BODY MASS INDEX: 26.46 KG/M2 | WEIGHT: 229 LBS | HEART RATE: 68 BPM | OXYGEN SATURATION: 100 % | DIASTOLIC BLOOD PRESSURE: 71 MMHG

## 2024-02-16 LAB
ANION GAP SERPL CALCULATED.3IONS-SCNC: 13 MMOL/L (ref 7–16)
BNP SERPL-MCNC: 7253 PG/ML (ref 0–125)
BUN SERPL-MCNC: 58 MG/DL (ref 6–20)
CALCIUM SERPL-MCNC: 10.2 MG/DL (ref 8.6–10.2)
CHLORIDE SERPL-SCNC: 101 MMOL/L (ref 98–107)
CO2 SERPL-SCNC: 21 MMOL/L (ref 22–29)
CREAT SERPL-MCNC: 2.6 MG/DL (ref 0.7–1.2)
GFR SERPL CREATININE-BSD FRML MDRD: 31 ML/MIN/1.73M2
GLUCOSE SERPL-MCNC: 78 MG/DL (ref 74–99)
POTASSIUM SERPL-SCNC: 4.4 MMOL/L (ref 3.5–5)
SODIUM SERPL-SCNC: 135 MMOL/L (ref 132–146)

## 2024-02-16 PROCEDURE — 80048 BASIC METABOLIC PNL TOTAL CA: CPT

## 2024-02-16 PROCEDURE — 96374 THER/PROPH/DIAG INJ IV PUSH: CPT

## 2024-02-16 PROCEDURE — 6360000002 HC RX W HCPCS: Performed by: NURSE PRACTITIONER

## 2024-02-16 PROCEDURE — 83880 ASSAY OF NATRIURETIC PEPTIDE: CPT

## 2024-02-16 PROCEDURE — 2580000003 HC RX 258: Performed by: NURSE PRACTITIONER

## 2024-02-16 PROCEDURE — 99214 OFFICE O/P EST MOD 30 MIN: CPT

## 2024-02-16 RX ORDER — BUMETANIDE 0.25 MG/ML
2 INJECTION INTRAMUSCULAR; INTRAVENOUS ONCE
Status: COMPLETED | OUTPATIENT
Start: 2024-02-16 | End: 2024-02-16

## 2024-02-16 RX ORDER — CARVEDILOL 3.12 MG/1
3.12 TABLET ORAL 2 TIMES DAILY WITH MEALS
COMMUNITY
End: 2024-02-16 | Stop reason: SDUPTHER

## 2024-02-16 RX ORDER — PANTOPRAZOLE SODIUM 40 MG/1
40 TABLET, DELAYED RELEASE ORAL DAILY
Qty: 30 TABLET | Refills: 3 | Status: SHIPPED | OUTPATIENT
Start: 2024-02-16

## 2024-02-16 RX ORDER — ATORVASTATIN CALCIUM 40 MG/1
40 TABLET, FILM COATED ORAL NIGHTLY
Qty: 90 TABLET | Refills: 3 | Status: SHIPPED | OUTPATIENT
Start: 2024-02-16

## 2024-02-16 RX ORDER — SODIUM CHLORIDE 0.9 % (FLUSH) 0.9 %
10 SYRINGE (ML) INJECTION 2 TIMES DAILY
Status: DISCONTINUED | OUTPATIENT
Start: 2024-02-16 | End: 2024-02-17 | Stop reason: HOSPADM

## 2024-02-16 RX ORDER — SODIUM BICARBONATE 650 MG/1
650 TABLET ORAL 2 TIMES DAILY
Qty: 60 TABLET | Refills: 3 | Status: SHIPPED | OUTPATIENT
Start: 2024-02-16

## 2024-02-16 RX ORDER — ERGOCALCIFEROL 1.25 MG/1
50000 CAPSULE ORAL WEEKLY
Qty: 5 CAPSULE | Refills: 3 | Status: SHIPPED | OUTPATIENT
Start: 2024-02-16

## 2024-02-16 RX ORDER — TORSEMIDE 100 MG/1
100 TABLET ORAL DAILY
Qty: 60 TABLET | Refills: 3 | Status: SHIPPED | OUTPATIENT
Start: 2024-02-16

## 2024-02-16 RX ORDER — M-VIT,TX,IRON,MINS/CALC/FOLIC 27MG-0.4MG
1 TABLET ORAL DAILY
Qty: 90 TABLET | Refills: 3 | Status: SHIPPED | OUTPATIENT
Start: 2024-02-16

## 2024-02-16 RX ORDER — CARVEDILOL 3.12 MG/1
3.12 TABLET ORAL 2 TIMES DAILY WITH MEALS
Qty: 60 TABLET | Refills: 3 | Status: SHIPPED | OUTPATIENT
Start: 2024-02-16

## 2024-02-16 RX ORDER — POTASSIUM CHLORIDE 20 MEQ/1
40 TABLET, EXTENDED RELEASE ORAL DAILY
Qty: 60 TABLET | Refills: 3 | Status: SHIPPED | OUTPATIENT
Start: 2024-02-16

## 2024-02-16 RX ORDER — MIDODRINE HYDROCHLORIDE 5 MG/1
2.5 TABLET ORAL 3 TIMES DAILY
Qty: 90 TABLET | Refills: 3 | Status: SHIPPED | OUTPATIENT
Start: 2024-02-16

## 2024-02-16 RX ADMIN — SODIUM CHLORIDE, PRESERVATIVE FREE 10 ML: 5 INJECTION INTRAVENOUS at 09:47

## 2024-02-16 RX ADMIN — BUMETANIDE 2 MG: 0.25 INJECTION INTRAMUSCULAR; INTRAVENOUS at 09:46

## 2024-02-16 NOTE — RESULT ENCOUNTER NOTE
Labs and CHF clinic note reviewed  Spoke with Kerri ANDERSON as well as [place] while he was in the CHF clinic.     We are stopping pill packing at this time as Rober is unable to remain compliant with medication changes when medications are delivered in pill packages.    We are going to transfer Rober's medications to local pharmacy [facility] who will deliver pill bottles to his house    Vitals in CHF clinic today: 116/71, 68    Compared Medication list from  ADHF visit to what Rober has at home.   Will send the following scripts to [facility] to get back on correct regimen.  With the updated regimen I decreased his midodrine to 2.5 mg TID as directed by  and attempt to stop next week.     If Rober can demonstrate compliance with above then will transition of medications will then plan to transition Eliquis to coumadin and set up with anticoagulation clinic as requested by  team.     CHF CHW called and spoke with [facility] store who will delivery medications to Rober tomorrow.     [facility] was called and all scripts were transferred to [facility]     [place] called and scheduled to come back to CHF clinic on Monday so we can ensure scripts were delivered from [place] and assist / teach him how to fill pill box.

## 2024-02-16 NOTE — PROGRESS NOTES
Congestive Heart Failure Clinic   [facility]       Referring Provider: Dr. Loyola  Primary Care Physician: Awadalla, Maged I, MD   Cardiologist: Dr. Driscoll/ Dr Mcdaniel ([place])/Freeman ()  Nephrologist: Dr. Us      HISTORY OF PRESENT ILLNESS:     Rober Mejía III is a 43 y.o. (1980) male with a history of HFrEF (EF < 40%), most recent EF:  Lab Results   Component Value Date    LVEF 35 04/07/2023    LVEFMODE Echo 07/31/2019         He presents to the CHF clinic for ongoing evaluation and monitoring of heart failure.    In the CHF clinic today he denies any adverse symptoms except:  [] Dizziness or lightheadedness   [] Syncope or near syncope  [] Recent Fall  [x] Shortness of breath at rest   [x] Dyspnea with exertion  [x] Decline in functional capacity (unable to perform activities they had previously been able to do)  [x] Fatigue   [] Orthopnea  [] PND  [] Nocturnal cough  [] Hemoptysis  [] Chest pain, pressure, or discomfort  [] Palpitations  [] Abdominal distention  [] Abdominal bloating  [] Early satiety  [] Blood in stool   [] Diarrhea  [] Constipation  [x] Nausea/Vomiting a few days ago   [] Decreased urinary response to oral diuretic   [] Scrotal swelling   [x] Lower extremity edema ( left leg )  [] Used PRN doses of oral diuretic   [] Weight gain    Wt Readings from Last 3 Encounters:   02/16/24 103.9 kg (229 lb)   02/09/24 103.9 kg (229 lb)   01/31/24 103.9 kg (229 lb)           SOCIAL HISTORY:  [x] Denies tobacco, alcohol or illicit drug abuse  [] Tobacco use:  [] ETOH use:  [] Illicit drug use:        MEDICATIONS:    Allergies   Allergen Reactions    Farxiga [Dapagliflozin] Other (See Comments)     Caused throat to swell    Tikosyn [Dofetilide] Palpitations     Prior to Visit Medications    Medication Sig Taking? Authorizing Provider   apixaban (ELIQUIS) 5 MG TABS tablet Take 1 tablet by mouth 2 times daily Yes Provider, MD Saw   carvedilol (COREG) 3.125

## 2024-02-16 NOTE — PLAN OF CARE
Problem: Chronic Conditions and Co-morbidities  Goal: Patient's chronic conditions and co-morbidity symptoms are monitored and maintained or improved  Flowsheets (Taken 2/16/2024 0928)  Care Plan - Patient's Chronic Conditions and Co-Morbidity Symptoms are Monitored and Maintained or Improved: Monitor and assess patient's chronic conditions and comorbid symptoms for stability, deterioration, or improvement

## 2024-02-19 ENCOUNTER — HOSPITAL ENCOUNTER (OUTPATIENT)
Dept: OTHER | Age: 44
Setting detail: THERAPIES SERIES
Discharge: HOME | End: 2024-02-19
Payer: COMMERCIAL

## 2024-02-19 VITALS
OXYGEN SATURATION: 100 % | DIASTOLIC BLOOD PRESSURE: 70 MMHG | HEART RATE: 70 BPM | RESPIRATION RATE: 18 BRPM | BODY MASS INDEX: 25.42 KG/M2 | WEIGHT: 220 LBS | SYSTOLIC BLOOD PRESSURE: 112 MMHG

## 2024-02-19 LAB
ANION GAP SERPL CALCULATED.3IONS-SCNC: 12 MMOL/L (ref 7–16)
BNP SERPL-MCNC: 6632 PG/ML (ref 0–125)
BUN SERPL-MCNC: 64 MG/DL (ref 6–20)
CALCIUM SERPL-MCNC: 9.9 MG/DL (ref 8.6–10.2)
CHLORIDE SERPL-SCNC: 101 MMOL/L (ref 98–107)
CO2 SERPL-SCNC: 22 MMOL/L (ref 22–29)
CREAT SERPL-MCNC: 2.8 MG/DL (ref 0.7–1.2)
GFR SERPL CREATININE-BSD FRML MDRD: 28 ML/MIN/1.73M2
GLUCOSE SERPL-MCNC: 99 MG/DL (ref 74–99)
POTASSIUM SERPL-SCNC: 4.9 MMOL/L (ref 3.5–5)
SODIUM SERPL-SCNC: 135 MMOL/L (ref 132–146)

## 2024-02-19 PROCEDURE — 99214 OFFICE O/P EST MOD 30 MIN: CPT

## 2024-02-19 PROCEDURE — 83880 ASSAY OF NATRIURETIC PEPTIDE: CPT

## 2024-02-19 PROCEDURE — 80048 BASIC METABOLIC PNL TOTAL CA: CPT

## 2024-02-19 NOTE — PROGRESS NOTES
Congestive Heart Failure Clinic   [facility]       Referring Provider: Dr. Loyola  Primary Care Physician: Awadalla, Maged I, MD   Cardiologist: Dr. Driscoll/ Dr Mcdaniel ([place])/Freeman ()  Nephrologist: Dr. Us      HISTORY OF PRESENT ILLNESS:     Rober Mejía III is a 43 y.o. (1980) male with a history of HFrEF (EF < 40%), most recent EF:  Lab Results   Component Value Date    LVEF 35 04/07/2023    LVEFMODE Echo 07/31/2019         He presents to the CHF clinic for ongoing evaluation and monitoring of heart failure.    In the CHF clinic today he denies any adverse symptoms except:  [] Dizziness or lightheadedness   [] Syncope or near syncope  [] Recent Fall  [x] Shortness of breath at rest(\"sometimes\")  [x] Dyspnea with exertion  [x] Decline in functional capacity (unable to perform activities they had previously been able to do)  [x] Fatigue   [] Orthopnea  [] PND  [] Nocturnal cough  [] Hemoptysis  [] Chest pain, pressure, or discomfort  [] Palpitations  [] Abdominal distention  [] Abdominal bloating  [] Early satiety  [] Blood in stool   [] Diarrhea  [] Constipation  [] Nausea/Vomiting a few days ago   [] Decreased urinary response to oral diuretic   [] Scrotal swelling   [x] Lower extremity edema ( left leg )  [] Used PRN doses of oral diuretic   [] Weight gain    Wt Readings from Last 3 Encounters:   02/19/24 99.8 kg (220 lb)   02/16/24 103.9 kg (229 lb)   02/09/24 103.9 kg (229 lb)           SOCIAL HISTORY:  [x] Denies tobacco, alcohol or illicit drug abuse  [] Tobacco use:  [] ETOH use:  [] Illicit drug use:        MEDICATIONS:    Allergies   Allergen Reactions    Farxiga [Dapagliflozin] Other (See Comments)     Caused throat to swell    Tikosyn [Dofetilide] Palpitations     Prior to Visit Medications    Medication Sig Taking? Authorizing Provider   vitamin D (ERGOCALCIFEROL) 1.25 MG (17342 UT) CAPS capsule Take 1 capsule by mouth once a week Monday  Krystina Melgar

## 2024-02-19 NOTE — PROGRESS NOTES
Spoke to [facility] and current list of medications faxed to pharmacy and pharmacy notified pt is on Mexitil 150 mg TID, which he received from [facility].  [facility] confirmed will call pharmacy and have Mexitil transferred to their pharmacy   Confirmation received from fax.    Medication list given to pt and medications reviewed.  Pt confirmed can fill own med box.

## 2024-02-19 NOTE — PLAN OF CARE
Problem: Chronic Conditions and Co-morbidities  Goal: Patient's chronic conditions and co-morbidity symptoms are monitored and maintained or improved  Flowsheets (Taken 2/19/2024 6722)  Care Plan - Patient's Chronic Conditions and Co-Morbidity Symptoms are Monitored and Maintained or Improved: Monitor and assess patient's chronic conditions and comorbid symptoms for stability, deterioration, or improvement

## 2024-02-21 DIAGNOSIS — Z95.818 PRESENCE OF CARDIOMEMS HF SYSTEM: Primary | ICD-10-CM

## 2024-02-21 DIAGNOSIS — I50.20 HFREF (HEART FAILURE WITH REDUCED EJECTION FRACTION) (HCC): ICD-10-CM

## 2024-02-21 NOTE — PROGRESS NOTES
CardioMEMS Billing  Rober Mejía III 1980    Monitoring period                         Optimal PA pressure range  (PAD goal 24  range 26-32)             Procedure code: 31250     BACKGROUND: Rober Mejía III was implanted with the CardioMEMS™ PA Sensor on 12/13/2018. I use this technology to monitor PA pressures on a weekly basis to ensure patients are within their optimal range to prevent decompensation.      SUMMARY: I utilized the remote monitoring data from (Merlin[employer]™ PCN) and set optimal targets for pulmonary artery pressure thresholds as part of acute and chronic management of patient’s heart failure. During the period indicated above, I monitored the patient’s pulmonary artery pressures at least weekly via trend analysis and notification alerts sent directly into patient’s medical record (EPIC) record, which provides alerts when patient’s PA pressures are outside of set range to prompt immediate action in clinical review and communication with patient.       The daily data reports are archived in the Merlin[employer] PCN system.       [**See 30 day Merlin PDF report attached to order encounter for dates above**]     Reviewed by MONICA Ortiz, APRN-CNP

## 2024-02-22 ENCOUNTER — TELEPHONE (OUTPATIENT)
Dept: CARDIOLOGY CLINIC | Age: 44
End: 2024-02-22

## 2024-02-22 NOTE — TELEPHONE ENCOUNTER
CardioMems update:      PAD:  Goal:24  Current trends: 31,28,29,27,29,31                Providers:  Cardiomems team: Dr. AUSTEN Willson, ANTIONE Melgar CNP, Aliza Calderon, MONICA  Cardiologist: Dr. Driscoll/Dr. Mcdaniel ([facility])      CHF:   HFrEF 35%    Diuretic Regimen  Torsemide 100 mg daily      GUIDELINE DIRECTED MEDICAL THERAPY for HFrEF/HFmrEF:  ARNI/ACE I/ARB: (1a indication)  No  Hydralazine/Nitrates (1a in symptomatic AA population, 2b if unable to tolerate ACE/ARB/ARNI)  No  Beta blocker: (1a indication)  Carvedilol (Coreg)  Aldosterone antagonist: (1a indication)  No  SGLT2i: (1a indication)  No    If Channel inhibitor (2a indication if HR >70 on maximally tolerated beta blocker)  No  Cardiac glycoside (2b indication for symptomatic HFrEF)  No  Soluble Guanylate Cyclase (sGC) Stimulator (2b indication LVEF <45% with recent HFH, IV diuretics or elevated BNP)  No  Potassium binders (2b indication for hyperkalemia while taking RAASi)  No             ASSESSMENT:  Elevated PAD several days in a row from cardioMEMS readings. Rober says he has not missed any doses of his medications.    PLAN:  Per NATI Palm-CNP-have Rober TAKE TORSEMIDE 100 MG BID and follow up for repeat labs next week at [facility] CHF clinic appointment on Monday, 2/26/24          Future Appointments   Date Time Provider Department Center   2/26/2024 12:45 PM [facility] CHF ROOM 1 [facility]   3/27/2024 11:00 AM Wilbur Pringle MD [facility]   5/23/2024 11:00 AM SCHEDULE, MHYX YTOWN DEVICE YTOWN ELECTR [facility]       Electronically signed by Aliza Calderon RN on 2/22/2024 at 4:52 PM

## 2024-02-23 ENCOUNTER — APPOINTMENT (OUTPATIENT)
Dept: OTHER | Age: 44
End: 2024-02-23
Payer: COMMERCIAL

## 2024-02-26 ENCOUNTER — HOSPITAL ENCOUNTER (OUTPATIENT)
Dept: OTHER | Age: 44
Setting detail: THERAPIES SERIES
Discharge: HOME | End: 2024-02-26
Payer: COMMERCIAL

## 2024-02-26 VITALS
SYSTOLIC BLOOD PRESSURE: 120 MMHG | OXYGEN SATURATION: 100 % | RESPIRATION RATE: 17 BRPM | DIASTOLIC BLOOD PRESSURE: 78 MMHG | WEIGHT: 315 LBS | HEART RATE: 72 BPM | BODY MASS INDEX: 38.48 KG/M2

## 2024-02-26 LAB
ANION GAP SERPL CALCULATED.3IONS-SCNC: 15 MMOL/L (ref 7–16)
BNP SERPL-MCNC: 9558 PG/ML (ref 0–125)
BUN SERPL-MCNC: 77 MG/DL (ref 6–20)
CALCIUM SERPL-MCNC: 9.8 MG/DL (ref 8.6–10.2)
CHLORIDE SERPL-SCNC: 102 MMOL/L (ref 98–107)
CO2 SERPL-SCNC: 23 MMOL/L (ref 22–29)
CREAT SERPL-MCNC: 2.8 MG/DL (ref 0.7–1.2)
GFR SERPL CREATININE-BSD FRML MDRD: 28 ML/MIN/1.73M2
GLUCOSE SERPL-MCNC: 150 MG/DL (ref 74–99)
POTASSIUM SERPL-SCNC: 3.5 MMOL/L (ref 3.5–5)
SODIUM SERPL-SCNC: 140 MMOL/L (ref 132–146)

## 2024-02-26 PROCEDURE — 2580000003 HC RX 258: Performed by: INTERNAL MEDICINE

## 2024-02-26 PROCEDURE — 99214 OFFICE O/P EST MOD 30 MIN: CPT

## 2024-02-26 PROCEDURE — 83880 ASSAY OF NATRIURETIC PEPTIDE: CPT

## 2024-02-26 PROCEDURE — 80048 BASIC METABOLIC PNL TOTAL CA: CPT

## 2024-02-26 PROCEDURE — 96374 THER/PROPH/DIAG INJ IV PUSH: CPT

## 2024-02-26 PROCEDURE — 6360000002 HC RX W HCPCS: Performed by: INTERNAL MEDICINE

## 2024-02-26 RX ORDER — SODIUM CHLORIDE 0.9 % (FLUSH) 0.9 %
10 SYRINGE (ML) INJECTION PRN
Status: DISCONTINUED | OUTPATIENT
Start: 2024-02-26 | End: 2024-02-27 | Stop reason: HOSPADM

## 2024-02-26 RX ORDER — BUMETANIDE 0.25 MG/ML
2 INJECTION INTRAMUSCULAR; INTRAVENOUS ONCE
Status: COMPLETED | OUTPATIENT
Start: 2024-02-26 | End: 2024-02-26

## 2024-02-26 RX ADMIN — BUMETANIDE 2 MG: 0.25 INJECTION INTRAMUSCULAR; INTRAVENOUS at 12:31

## 2024-02-26 RX ADMIN — SODIUM CHLORIDE, PRESERVATIVE FREE 10 ML: 5 INJECTION INTRAVENOUS at 12:32

## 2024-02-26 NOTE — PLAN OF CARE
Problem: Chronic Conditions and Co-morbidities  Goal: Patient's chronic conditions and co-morbidity symptoms are monitored and maintained or improved  Flowsheets (Taken 2/26/2024 5634)  Care Plan - Patient's Chronic Conditions and Co-Morbidity Symptoms are Monitored and Maintained or Improved: Monitor and assess patient's chronic conditions and comorbid symptoms for stability, deterioration, or improvement

## 2024-02-26 NOTE — PROGRESS NOTES
difficulties  [] Unable to cook for self (use of frozen meals, can goods, etc)  [] CHF CHW consulted  [] Low sodium meal delivery options given to patient  [] Dietician consulted   [] Low sodium recipes provided  [] Sodium free seasoning provided   [x] Fluid intake 6-8 cups (around 64 oz)  [x] Reviewed currently prescribed medications with patient, educated on importance of compliance and answered any questions regarding their medication  [] Pill box provided to patient  [] Patient using pill packing pharmacy   [x] CPAP/BiPAP use(nightly )  [] Low impact exercise / cardiac rehab   [] LifeVest use  [x] Patient aware of signs and symptoms of worsening HF, CHF clinic phone number provided and made aware to call clinic for sooner if evaluation if needed     [] Difficulty affording medications  [] CHF CHW consulted  [] Prescription assistance information given   [] [facility] medication assistance program information given   [] Sample medications provided to patient to help bridge gap until affordability N/A    [x] PHQ assessment completed while in CHF clinic (1st visit, every 3 months and PRN)    Additional note: 2/26/24- Pt very tired, states he has a hard time sleeping, \"just not able to sleep.\" Pt appears very sad, down. He feels he may have anxiety and would like to talk to someone. I provided him with Counseling sheet information. There is one office that takes walk-ins on [address], until 1330 today. He states he has a ride through his insurance and would not be able to go after this visit. They need 3 day notice. I asked if he had any family/friends that could take him tomorrow. He did not think his aunt could tomorrow but will check.  When asked, he does not feel that he would harm himself or others, just feeling anxious. He states at times his heart \"doesn't seem right,\" he does not know if he should call 911. He does have pulse ox and can check heart rate.     His pill packing is until 3/17/24, then

## 2024-02-27 ENCOUNTER — TELEPHONE (OUTPATIENT)
Dept: OTHER | Age: 44
End: 2024-02-27

## 2024-02-27 RX ORDER — POTASSIUM CHLORIDE 20 MEQ/1
40 TABLET, EXTENDED RELEASE ORAL DAILY
Qty: 4 TABLET | Refills: 0 | Status: SHIPPED | OUTPATIENT
Start: 2024-02-27 | End: 2024-02-29

## 2024-02-27 NOTE — RESULT ENCOUNTER NOTE
CHF clinic visit and labs reviewed.     VS:  BP: 120/78   Pulse: 72  Weight up 4 lbs since 2/19  Torsemide had aubrey increased to 100 mg BID for elevated PAD on Cardiomems (31)    Given 2 mg IV Bumex    BUN 64>>77  Cr 2.8>>2.8  Potassium 3.5     BNP 6632>>9558     Take an additional 40 mEq of KDur today and tomorrow please.   [facility] since he is still using pill pack and [facility] can hopefully deliver.     Attempted to reschedule for next week, but unable to attend morning appts, so scheduled for following week.

## 2024-02-27 NOTE — TELEPHONE ENCOUNTER
2/27/24 0950- I called and spoke to pt regarding new order. He repeated order back to me and verbalized understanding.    ___________________________________________  Alicia Javier, APRN - Silvia Ramos RN  CHF clinic visit and labs reviewed.    VS:  BP: 120/78  Pulse: 72  Weight up 4 lbs since 2/19  Torsemide had aubrey increased to 100 mg BID for elevated PAD on Cardiomems (31)    Given 2 mg IV Bumex    BUN 64>>77  Cr 2.8>>2.8  Potassium 3.5    BNP 6632>>9558    Take an additional 40 mEq of KDur today and tomorrow please.  I sent to [facility] since he is still using pill pack and [facility] can hopefully deliver.    Attempted to reschedule for next week, but unable to attend morning appts, so scheduled for following week.  _____________________________________________    He is going to call his insurance company to arrange ride for counseling appt.    NAEEM Mena RN.

## 2024-03-06 ENCOUNTER — APPOINTMENT (OUTPATIENT)
Dept: GENERAL RADIOLOGY | Age: 44
End: 2024-03-06
Payer: MEDICARE

## 2024-03-06 LAB
ALBUMIN SERPL-MCNC: 4.2 G/DL (ref 3.5–5.2)
ALP SERPL-CCNC: 256 U/L (ref 40–129)
ALT SERPL-CCNC: 42 U/L (ref 0–40)
ANION GAP SERPL CALCULATED.3IONS-SCNC: 17 MMOL/L (ref 7–16)
AST SERPL-CCNC: 35 U/L (ref 0–39)
BASOPHILS # BLD: 0 K/UL (ref 0–0.2)
BASOPHILS NFR BLD: 0 % (ref 0–2)
BILIRUB SERPL-MCNC: 2.1 MG/DL (ref 0–1.2)
BNP INTERPRETATION: NORMAL
BNP SERPL-MCNC: 9477 PG/ML (ref 0–125)
BNP SERPL-MCNC: NORMAL PG/ML
BUN SERPL-MCNC: 85 MG/DL (ref 6–20)
CALCIUM SERPL-MCNC: 9.9 MG/DL (ref 8.6–10.2)
CHLORIDE SERPL-SCNC: 103 MMOL/L (ref 98–107)
CO2 SERPL-SCNC: 19 MMOL/L (ref 22–29)
CREAT SERPL-MCNC: 3 MG/DL (ref 0.7–1.2)
EOSINOPHIL # BLD: 0.13 K/UL (ref 0.05–0.5)
EOSINOPHILS RELATIVE PERCENT: 2 % (ref 0–6)
ERYTHROCYTE [DISTWIDTH] IN BLOOD BY AUTOMATED COUNT: 19.3 % (ref 11.5–15)
GFR SERPL CREATININE-BSD FRML MDRD: 25 ML/MIN/1.73M2
GLUCOSE SERPL-MCNC: 142 MG/DL (ref 74–99)
HCT VFR BLD AUTO: 38.2 % (ref 37–54)
HGB BLD-MCNC: 12.1 G/DL (ref 12.5–16.5)
LYMPHOCYTES NFR BLD: 0.57 K/UL (ref 1.5–4)
LYMPHOCYTES RELATIVE PERCENT: 8 % (ref 20–42)
MCH RBC QN AUTO: 29.2 PG (ref 26–35)
MCHC RBC AUTO-ENTMCNC: 31.7 G/DL (ref 32–34.5)
MCV RBC AUTO: 92 FL (ref 80–99.9)
MONOCYTES NFR BLD: 0.69 K/UL (ref 0.1–0.95)
MONOCYTES NFR BLD: 10 % (ref 2–12)
NEUTROPHILS NFR BLD: 81 % (ref 43–80)
NEUTS SEG NFR BLD: 5.72 K/UL (ref 1.8–7.3)
NUCLEATED RED BLOOD CELLS: 1 PER 100 WBC
PLATELET CONFIRMATION: NORMAL
PLATELET, FLUORESCENCE: 223 K/UL (ref 130–450)
PMV BLD AUTO: 10.6 FL (ref 7–12)
POTASSIUM SERPL-SCNC: 3.9 MMOL/L (ref 3.5–5)
PROT SERPL-MCNC: 7.2 G/DL (ref 6.4–8.3)
RBC # BLD AUTO: 4.15 M/UL (ref 3.8–5.8)
RBC # BLD: ABNORMAL 10*6/UL
SODIUM SERPL-SCNC: 139 MMOL/L (ref 132–146)
TROPONIN I SERPL HS-MCNC: 85 NG/L (ref 0–11)
WBC OTHER # BLD: 7.1 K/UL (ref 4.5–11.5)

## 2024-03-06 PROCEDURE — 83880 ASSAY OF NATRIURETIC PEPTIDE: CPT

## 2024-03-06 PROCEDURE — 93005 ELECTROCARDIOGRAM TRACING: CPT | Performed by: STUDENT IN AN ORGANIZED HEALTH CARE EDUCATION/TRAINING PROGRAM

## 2024-03-06 PROCEDURE — 99285 EMERGENCY DEPT VISIT HI MDM: CPT

## 2024-03-06 PROCEDURE — 84484 ASSAY OF TROPONIN QUANT: CPT

## 2024-03-06 PROCEDURE — 71045 X-RAY EXAM CHEST 1 VIEW: CPT

## 2024-03-06 PROCEDURE — 85025 COMPLETE CBC W/AUTO DIFF WBC: CPT

## 2024-03-06 PROCEDURE — 80053 COMPREHEN METABOLIC PANEL: CPT

## 2024-03-06 RX ORDER — BUMETANIDE 0.25 MG/ML
1 INJECTION INTRAMUSCULAR; INTRAVENOUS ONCE
Status: COMPLETED | OUTPATIENT
Start: 2024-03-06 | End: 2024-03-07

## 2024-03-06 NOTE — ED PROVIDER NOTES
[facility] EMERGENCY DEPARTMENT  EMERGENCY DEPARTMENT ENCOUNTER        Pt Name: Rober Mejía III  MRN: [id number]  Birthdate 1980  Date of evaluation: 3/6/2024  Provider: Lois Arias MD  PCP: Awadalla, Maged I, MD  Note Started: 5:25 PM EST 3/6/24    HPI  43 y.o. male history of CHF presenting for shortness of breath, leg swelling.  Patient complains of worsening leg swelling and abdominal swelling for the past 2 weeks.  He states he has gained 10 pounds in the last week.  Complains of orthopnea and dyspnea on exertion.  Has been having trouble sleeping at night due to shortness of breath.  Denies any fever or chest pain.      --------------------------------------------- PAST HISTORY ---------------------------------------------  Past Medical History:  has a past medical history of Acute CHF (HCC), Acute CHF (HCC), Acute idiopathic gout of right foot, AF (atrial fibrillation) (HCC), Anemia, CAD (coronary artery disease), cardioversion, Cerebral artery occlusion with cerebral infarction (HCC), CKD (chronic kidney disease), Gout, HTN (hypertension), Hyperlipidemia, Hypertension, Morbid obesity due to excess calories (HCC), Nonischemic cardiomyopathy (HCC), Nonischemic cardiomyopathy (HCC), ARVIND (obstructive sleep apnea), S/P left heart catheterization by percutaneous approach, S/P left pulmonary artery pressure sensor implant placement, Systolic heart failure (HCC), Type 2 diabetes mellitus with complication, with long-term current use of insulin (HCC), and URI, acute.    Past Surgical History:  has a past surgical history that includes ECHO Compl W Dop Color Flow (11/30/2011); ECHO Compl W Dop Color Flow (03/27/2012); Insertable Cardiac Monitor (12/13/2018); Cardioversion (02/14/2020); Cardiac defibrillator placement (11/20/2018); Cardiac catheterization (08/01/2019); pacemaker placement; other surgical history (12/15/2020); Cardioversion (03/10/2022); and Cardioversion  5.2 g/dL    Total Bilirubin 2.1 (H) 0.0 - 1.2 mg/dL    Alkaline Phosphatase 256 (H) 40 - 129 U/L    ALT 42 (H) 0 - 40 U/L    AST 35 0 - 39 U/L   Brain Natriuretic Peptide   Result Value Ref Range    Pro-BNP 9,477 (H) 0 - 125 pg/mL   Troponin   Result Value Ref Range    Troponin, High Sensitivity 85 (H) 0 - 11 ng/L   EKG 12 Lead   Result Value Ref Range    Ventricular Rate 71 BPM    Atrial Rate 71 BPM    P-R Interval 184 ms    QRS Duration 164 ms    Q-T Interval 544 ms    QTc Calculation (Bazett) 591 ms    R Axis 162 degrees    T Axis -12 degrees       RADIOLOGY:  XR CHEST 1 VIEW   Final Result   1. Stable mild cardiomegaly   2. Mild pulmonary vascular congestion.   3. No focal pneumonia or pleural effusion.               ------------------------- NURSING NOTES AND VITALS REVIEWED ---------------------------  Date / Time Roomed:  3/7/2024 12:34 AM  ED Bed Assignment:  18A/18A-18    The nursing notes within the ED encounter and vital signs as below have been reviewed.     Patient Vitals for the past 24 hrs:   BP Temp Temp src Pulse Resp SpO2   03/07/24 0400 (!) 127/93 -- -- 71 17 99 %   03/07/24 0324 (!) 117/93 -- -- 72 16 100 %   03/07/24 0232 123/76 -- -- 80 -- 97 %   03/07/24 0129 117/73 -- -- 72 -- 99 %   03/06/24 1709 109/76 -- -- -- 20 --   03/06/24 1646 -- 97.3 °F (36.3 °C) Temporal 73 -- 99 %       Oxygen Saturation Interpretation: Normal      Medical Decision Making  43-year-old male with history of nonischemic cardiomyopathy with CRT-D in place presenting with leg swelling, abdominal swelling, orthopnea, dyspnea on exertion, 10 pound weight gain in 1 week.  Patient had bilateral lower extremity edema on exam.  EKG did not show any acute ischemic changes.  Troponin approximately at baseline.  proBNP markedly elevated.  Chest x-ray negative for focal consolidation or pneumothorax.  Patient is on Eliquis, low suspicion for PE at this time.  Patient does appear clinically fluid overloaded, is becoming more

## 2024-03-07 ENCOUNTER — HOSPITAL ENCOUNTER (INPATIENT)
Age: 44
LOS: 1 days | Discharge: ANOTHER HOSPITAL | End: 2024-03-08
Attending: STUDENT IN AN ORGANIZED HEALTH CARE EDUCATION/TRAINING PROGRAM | Admitting: INTERNAL MEDICINE
Payer: MEDICARE

## 2024-03-07 DIAGNOSIS — I50.9 CONGESTIVE HEART FAILURE, UNSPECIFIED HF CHRONICITY, UNSPECIFIED HEART FAILURE TYPE (HCC): Primary | ICD-10-CM

## 2024-03-07 PROBLEM — I50.40 CHF (CONGESTIVE HEART FAILURE), NYHA CLASS III, UNSPECIFIED FAILURE CHRONICITY, COMBINED (HCC): Status: ACTIVE | Noted: 2024-03-07

## 2024-03-07 LAB
ANION GAP SERPL CALCULATED.3IONS-SCNC: 15 MMOL/L (ref 7–16)
BUN SERPL-MCNC: 85 MG/DL (ref 6–20)
CALCIUM SERPL-MCNC: 9.7 MG/DL (ref 8.6–10.2)
CHLORIDE SERPL-SCNC: 104 MMOL/L (ref 98–107)
CO2 SERPL-SCNC: 22 MMOL/L (ref 22–29)
CREAT SERPL-MCNC: 2.9 MG/DL (ref 0.7–1.2)
EKG ATRIAL RATE: 71 BPM
EKG P-R INTERVAL: 184 MS
EKG Q-T INTERVAL: 544 MS
EKG QRS DURATION: 164 MS
EKG QTC CALCULATION (BAZETT): 591 MS
EKG R AXIS: 162 DEGREES
EKG T AXIS: -12 DEGREES
EKG VENTRICULAR RATE: 71 BPM
GFR SERPL CREATININE-BSD FRML MDRD: 27 ML/MIN/1.73M2
GLUCOSE SERPL-MCNC: 99 MG/DL (ref 74–99)
MAGNESIUM SERPL-MCNC: 2.4 MG/DL (ref 1.6–2.6)
POTASSIUM SERPL-SCNC: 3.6 MMOL/L (ref 3.5–5)
SODIUM SERPL-SCNC: 141 MMOL/L (ref 132–146)

## 2024-03-07 PROCEDURE — 83735 ASSAY OF MAGNESIUM: CPT

## 2024-03-07 PROCEDURE — 80048 BASIC METABOLIC PNL TOTAL CA: CPT

## 2024-03-07 PROCEDURE — 1200000000 HC SEMI PRIVATE

## 2024-03-07 PROCEDURE — 93010 ELECTROCARDIOGRAM REPORT: CPT | Performed by: INTERNAL MEDICINE

## 2024-03-07 PROCEDURE — 6370000000 HC RX 637 (ALT 250 FOR IP)

## 2024-03-07 PROCEDURE — 6360000002 HC RX W HCPCS

## 2024-03-07 PROCEDURE — 6360000002 HC RX W HCPCS: Performed by: STUDENT IN AN ORGANIZED HEALTH CARE EDUCATION/TRAINING PROGRAM

## 2024-03-07 PROCEDURE — 6370000000 HC RX 637 (ALT 250 FOR IP): Performed by: INTERNAL MEDICINE

## 2024-03-07 PROCEDURE — 2580000003 HC RX 258

## 2024-03-07 PROCEDURE — 36415 COLL VENOUS BLD VENIPUNCTURE: CPT

## 2024-03-07 RX ORDER — ERGOCALCIFEROL 1.25 MG/1
50000 CAPSULE ORAL WEEKLY
Status: DISCONTINUED | OUTPATIENT
Start: 2024-03-11 | End: 2024-03-08 | Stop reason: HOSPADM

## 2024-03-07 RX ORDER — ACETAMINOPHEN 325 MG/1
650 TABLET ORAL EVERY 6 HOURS PRN
Status: DISCONTINUED | OUTPATIENT
Start: 2024-03-07 | End: 2024-03-08 | Stop reason: HOSPADM

## 2024-03-07 RX ORDER — POTASSIUM CHLORIDE 20 MEQ/1
60 TABLET, EXTENDED RELEASE ORAL DAILY
COMMUNITY

## 2024-03-07 RX ORDER — ACETAMINOPHEN 650 MG/1
650 SUPPOSITORY RECTAL EVERY 6 HOURS PRN
Status: DISCONTINUED | OUTPATIENT
Start: 2024-03-07 | End: 2024-03-08 | Stop reason: HOSPADM

## 2024-03-07 RX ORDER — ONDANSETRON 2 MG/ML
4 INJECTION INTRAMUSCULAR; INTRAVENOUS EVERY 6 HOURS PRN
Status: DISCONTINUED | OUTPATIENT
Start: 2024-03-07 | End: 2024-03-07 | Stop reason: CLARIF

## 2024-03-07 RX ORDER — PROCHLORPERAZINE MALEATE 10 MG
10 TABLET ORAL EVERY 8 HOURS PRN
Status: DISCONTINUED | OUTPATIENT
Start: 2024-03-07 | End: 2024-03-08 | Stop reason: HOSPADM

## 2024-03-07 RX ORDER — SODIUM CHLORIDE 0.9 % (FLUSH) 0.9 %
5-40 SYRINGE (ML) INJECTION EVERY 12 HOURS SCHEDULED
Status: DISCONTINUED | OUTPATIENT
Start: 2024-03-07 | End: 2024-03-08 | Stop reason: HOSPADM

## 2024-03-07 RX ORDER — CARVEDILOL 6.25 MG/1
3.12 TABLET ORAL 2 TIMES DAILY WITH MEALS
Status: DISCONTINUED | OUTPATIENT
Start: 2024-03-07 | End: 2024-03-08 | Stop reason: HOSPADM

## 2024-03-07 RX ORDER — PROCHLORPERAZINE EDISYLATE 5 MG/ML
10 INJECTION INTRAMUSCULAR; INTRAVENOUS EVERY 6 HOURS PRN
Status: DISCONTINUED | OUTPATIENT
Start: 2024-03-07 | End: 2024-03-08 | Stop reason: HOSPADM

## 2024-03-07 RX ORDER — SODIUM BICARBONATE 650 MG/1
650 TABLET ORAL 2 TIMES DAILY
Status: DISCONTINUED | OUTPATIENT
Start: 2024-03-07 | End: 2024-03-07 | Stop reason: ALTCHOICE

## 2024-03-07 RX ORDER — SODIUM CHLORIDE 0.9 % (FLUSH) 0.9 %
5-40 SYRINGE (ML) INJECTION PRN
Status: DISCONTINUED | OUTPATIENT
Start: 2024-03-07 | End: 2024-03-08 | Stop reason: HOSPADM

## 2024-03-07 RX ORDER — TORSEMIDE 100 MG/1
100 TABLET ORAL 2 TIMES DAILY
COMMUNITY

## 2024-03-07 RX ORDER — PANTOPRAZOLE SODIUM 40 MG/1
40 TABLET, DELAYED RELEASE ORAL DAILY
Status: DISCONTINUED | OUTPATIENT
Start: 2024-03-07 | End: 2024-03-08 | Stop reason: HOSPADM

## 2024-03-07 RX ORDER — POTASSIUM CHLORIDE 20 MEQ/1
40 TABLET, EXTENDED RELEASE ORAL PRN
Status: DISCONTINUED | OUTPATIENT
Start: 2024-03-07 | End: 2024-03-07 | Stop reason: ALTCHOICE

## 2024-03-07 RX ORDER — SODIUM CHLORIDE 9 MG/ML
INJECTION, SOLUTION INTRAVENOUS PRN
Status: DISCONTINUED | OUTPATIENT
Start: 2024-03-07 | End: 2024-03-08 | Stop reason: HOSPADM

## 2024-03-07 RX ORDER — ERGOCALCIFEROL 1.25 MG/1
50000 CAPSULE ORAL WEEKLY
COMMUNITY

## 2024-03-07 RX ORDER — POTASSIUM CHLORIDE 7.45 MG/ML
10 INJECTION INTRAVENOUS PRN
Status: DISCONTINUED | OUTPATIENT
Start: 2024-03-07 | End: 2024-03-07 | Stop reason: ALTCHOICE

## 2024-03-07 RX ORDER — MIDODRINE HYDROCHLORIDE 5 MG/1
5 TABLET ORAL 3 TIMES DAILY
Status: DISCONTINUED | OUTPATIENT
Start: 2024-03-07 | End: 2024-03-08 | Stop reason: HOSPADM

## 2024-03-07 RX ORDER — BUMETANIDE 0.25 MG/ML
2 INJECTION INTRAMUSCULAR; INTRAVENOUS DAILY
Status: DISCONTINUED | OUTPATIENT
Start: 2024-03-07 | End: 2024-03-08 | Stop reason: HOSPADM

## 2024-03-07 RX ORDER — MEXILETINE HYDROCHLORIDE 150 MG/1
150 CAPSULE ORAL 3 TIMES DAILY
Status: DISCONTINUED | OUTPATIENT
Start: 2024-03-07 | End: 2024-03-08 | Stop reason: HOSPADM

## 2024-03-07 RX ORDER — ATORVASTATIN CALCIUM 40 MG/1
40 TABLET, FILM COATED ORAL NIGHTLY
Status: DISCONTINUED | OUTPATIENT
Start: 2024-03-07 | End: 2024-03-08 | Stop reason: HOSPADM

## 2024-03-07 RX ORDER — POLYETHYLENE GLYCOL 3350 17 G/17G
17 POWDER, FOR SOLUTION ORAL DAILY PRN
Status: DISCONTINUED | OUTPATIENT
Start: 2024-03-07 | End: 2024-03-08 | Stop reason: HOSPADM

## 2024-03-07 RX ORDER — MAGNESIUM HYDROXIDE/ALUMINUM HYDROXICE/SIMETHICONE 120; 1200; 1200 MG/30ML; MG/30ML; MG/30ML
30 SUSPENSION ORAL EVERY 6 HOURS PRN
Status: DISCONTINUED | OUTPATIENT
Start: 2024-03-07 | End: 2024-03-08 | Stop reason: HOSPADM

## 2024-03-07 RX ORDER — M-VIT,TX,IRON,MINS/CALC/FOLIC 27MG-0.4MG
1 TABLET ORAL DAILY
Status: DISCONTINUED | OUTPATIENT
Start: 2024-03-07 | End: 2024-03-08 | Stop reason: HOSPADM

## 2024-03-07 RX ORDER — MAGNESIUM SULFATE IN WATER 40 MG/ML
2000 INJECTION, SOLUTION INTRAVENOUS PRN
Status: DISCONTINUED | OUTPATIENT
Start: 2024-03-07 | End: 2024-03-07 | Stop reason: ALTCHOICE

## 2024-03-07 RX ORDER — ONDANSETRON 4 MG/1
4 TABLET, ORALLY DISINTEGRATING ORAL EVERY 8 HOURS PRN
Status: DISCONTINUED | OUTPATIENT
Start: 2024-03-07 | End: 2024-03-07 | Stop reason: CLARIF

## 2024-03-07 RX ADMIN — MEXILETINE HYDROCHLORIDE 150 MG: 150 CAPSULE ORAL at 21:33

## 2024-03-07 RX ADMIN — BUMETANIDE 1 MG: 0.25 INJECTION, SOLUTION INTRAMUSCULAR; INTRAVENOUS at 03:24

## 2024-03-07 RX ADMIN — APIXABAN 5 MG: 5 TABLET, FILM COATED ORAL at 08:54

## 2024-03-07 RX ADMIN — MIDODRINE HYDROCHLORIDE 5 MG: 5 TABLET ORAL at 14:15

## 2024-03-07 RX ADMIN — BUMETANIDE 2 MG: 0.25 INJECTION, SOLUTION INTRAMUSCULAR; INTRAVENOUS at 08:57

## 2024-03-07 RX ADMIN — PROCHLORPERAZINE MALEATE 10 MG: 10 TABLET ORAL at 03:02

## 2024-03-07 RX ADMIN — MEXILETINE HYDROCHLORIDE 150 MG: 150 CAPSULE ORAL at 14:15

## 2024-03-07 RX ADMIN — MIDODRINE HYDROCHLORIDE 5 MG: 5 TABLET ORAL at 21:32

## 2024-03-07 RX ADMIN — SODIUM CHLORIDE, PRESERVATIVE FREE 10 ML: 5 INJECTION INTRAVENOUS at 09:03

## 2024-03-07 RX ADMIN — PANTOPRAZOLE SODIUM 40 MG: 40 TABLET, DELAYED RELEASE ORAL at 08:56

## 2024-03-07 RX ADMIN — MEXILETINE HYDROCHLORIDE 150 MG: 150 CAPSULE ORAL at 08:57

## 2024-03-07 RX ADMIN — ATORVASTATIN CALCIUM 40 MG: 40 TABLET, FILM COATED ORAL at 21:33

## 2024-03-07 RX ADMIN — MIDODRINE HYDROCHLORIDE 5 MG: 5 TABLET ORAL at 08:56

## 2024-03-07 RX ADMIN — APIXABAN 5 MG: 5 TABLET, FILM COATED ORAL at 21:32

## 2024-03-07 RX ADMIN — Medication 1 TABLET: at 08:56

## 2024-03-07 RX ADMIN — CARVEDILOL 3.12 MG: 6.25 TABLET, FILM COATED ORAL at 08:55

## 2024-03-07 RX ADMIN — CARVEDILOL 3.12 MG: 6.25 TABLET, FILM COATED ORAL at 17:48

## 2024-03-07 ASSESSMENT — ENCOUNTER SYMPTOMS
COUGH: 0
NAUSEA: 0
VOMITING: 0
SHORTNESS OF BREATH: 1
DIARRHEA: 0

## 2024-03-07 NOTE — ED NOTES
Spoke to transplant coordinator Meek with , callback # [phone], patient follows with  per patient, spoke to attending provider regarding patient to speak with .

## 2024-03-07 NOTE — ED NOTES
Patient bed assignment  7th floor  main ICU bed 12 nurse to nurse number [phone]. Physicians ETA [id number].

## 2024-03-07 NOTE — PROGRESS NOTES
Transfer initiated to [facility].  Accepted physician Dr. Jernigan. Awaits bed availability.     Electronically signed by Michael Ludwig MD on 3/7/24 at 2:06 PM EST

## 2024-03-07 NOTE — H&P
Positive as follows:      Problem Relation Age of Onset    Cancer Mother     No Known Problems Father        Review of Systems:  All bolded are positive; please see HPI  General:  Fever, chills, diaphoresis, fatigue, malaise, night sweats, weight loss  Psychological:  Anxiety, disorientation, hallucinations.  ENT:  Epistaxis, headaches, vertigo, visual changes.  Cardiovascular:  Chest pain, irregular heartbeats, palpitations, paroxysmal nocturnal dyspnea.  Respiratory:  Shortness of breath, coughing, sputum production, hemoptysis, wheezing, orthopnea.  Gastrointestinal:  Nausea, vomiting, diarrhea, heartburn, constipation, abdominal pain, hematemesis, hematochezia, melena, acholic stools  Genito-Urinary:  Dysuria, urgency, frequency, hematuria  Musculoskeletal:  Joint pain, joint stiffness, joint swelling, muscle pain  Neurology:  Headache, focal neurological deficits, weakness, numbness, paresthesia  Derm:  Rashes, ulcers, excoriations, bruising  Extremities:  Decreased ROM, peripheral edema, mottling    Physical Exam:  /76   Pulse 73   Temp 97.3 °F (36.3 °C) (Temporal)   Resp 20   SpO2 99%   General appearance: No apparent distress, appears stated age and cooperative.  HEENT: Conjunctivae/corneas clear. Mucous membranes moist.  Neck: Supple. No JVD.  Respiratory:  Clear to auscultation bilaterally.  Normal respiratory effort.   Cardiovascular:  RRR. S1, S2 without MRG.  PV: Pulses palpable.  3+ pitting edema in bilateral lower extremity  Abdomen: Soft, non-tender, non-distended. +BS  Musculoskeletal: No obvious deformities.   Skin: Normal skin color.  No rashes or lesions. Good turgor.   Neurologic:  Grossly non-focal. Awake, alert, following commands.   Psychiatric: Alert and oriented, thought content appropriate, normal insight and judgement    Reviewed EKG and CXR personally      CBC:   Recent Labs     03/06/24  1734   WBC 7.1   RBC 4.15   HGB 12.1*   HCT 38.2   MCV 92.0   RDW 19.3*     BMP:   Recent  failure with EF 20 to 25%  Nonischemic cardiomyopathy  History of V-fib/V. tach with most recent ICD discharge in June 2023  History of ARVIND  Proximal atrial fibrillation on Eliquis  Diabetes mellitus  History of morbid obesity s/p gastric bypass May 2023  Chronic kidney disease stage IV    Plan:  Discussed patient's case with ED physician.  Patient presented to ED with complaints of worsening bilateral lower extremity edema.  Patient states gained 10 pounds in last week.  Admitted for CHF exacerbation.     Acute on chronic decompensated heart failure with EF 20 to 25%  BNP 9477 on presentation.  This appears close to patient's baseline.   Last known EF 20-25%.   Daily weights.   Intake and output.   IV diuresis with Bumex  Monitor electrolytes.   Consider cardiology consult    Proximal atrial fibrillation  Continue Eliquis    History of V-fib/V. tach with most recent ICD discharge in June 2023  Was seen by electrophysiology on last admission, amiodarone was discontinued    Other chronic comorbidities: DM, CKD  Creatinine noted to be elevated beyond baseline at 3.0, baseline appears to be 2.6-2.8.  Possible poor response to Bumex as patient received IV Bumex 1 hour prior to examination and has not had urine output as of yet.  Will consult nephrology for further recommendations.  Input appreciated.  Continue to follow labs replete as indicated  Continue home medications as appropriate    Diet: No diet orders on file  Code Status: Prior  Surrogate decision maker confirmed with patient:   Extended Emergency Contact Information  Primary Emergency Contact: Lillian Mejía  Address: [address] [place] [address] of [facility]  Home Phone: [phone]  Mobile Phone: [phone]  Relation: Aunt/Uncle  Secondary Emergency Contact: Rober Mejía Jr  Mobile Phone: [phone]  Relation: Parent    DVT Prophylaxis: []Lovenox []Heparin []PCD [x] Warfarin/NOAC []Encouraged ambulation  Disposition:

## 2024-03-07 NOTE — CONSULTS
[facility]  Nephrology Consult Note    Patient's Name: Rober Mejía III    Reason for Consult:  Admitted for CHF exacerbation, not responding to IV Bumex, also with TAIWO     Chief Complaint: Shortness of breath, leg swelling  History Obtained From:  patient, past medical records, and EMR    History of Present Illness:    Rober Mejía III is a 43 y.o. male with a past medical history of CAD, cerebral artery occlusion, CKD, hypertension, and ARVIND.  He presented to the ED on 3/6 with complaints of shortness of breath and leg swelling.  Vital signs on 3/6 includes temperature 97.3, respirations 20, pulse 73, /76, and he was 99% SpO2.  Lab data on 3/6 includes CO2 19, BUN 85, creatinine 3, proBNP 9477, and hemoglobin 12.1.  He had a chest x-ray on 3/6 which showed stable mild cardiomegaly, mild pulmonary vascular congestion.  Nephrology has been consulted to see the patient for shortness of breath and leg swelling.  Patient is known to our service and we have followed him in the hospital in the past.  He has a history of CKD 4 and has a baseline creatinine of 2.8.  He is currently following at [facility] for possible heart/kidney transplant.  At present, patient was seen and examined.  He reports that he feels all right.  He explained that he had leg swelling.  He did report that he follows with the CHF clinic.  He explained that was hard to breathe while laying down.  He reports intermittent vomiting.  He denies any chest pain.  He denies any abdominal pain or diarrhea.  He denies any fevers or headache.  He reports his appetite is normal.    PMH:    Past Medical History:   Diagnosis Date    Acute CHF ([facility]) 11/29/2011    Acute CHF (HCC) 12/26/2011    Acute idiopathic gout of right foot 08/13/2016    AF (atrial fibrillation) ([facility]) 02/2020    Anemia 11/29/2011    CAD (coronary artery disease)     cardioversion 06/26/2023    dR Diaz    Cerebral artery occlusion with cerebral infarction ([facility])     CKD

## 2024-03-07 NOTE — ED NOTES
Patient bed at  main ICU bed 12, nurse to nurse #[phone], called [facility] to confirm and set up transport.

## 2024-03-08 ENCOUNTER — HOSPITAL ENCOUNTER (OUTPATIENT)
Dept: OTHER | Age: 44
Setting detail: THERAPIES SERIES
Discharge: HOME | End: 2024-03-08

## 2024-03-08 VITALS
WEIGHT: 242.51 LBS | HEART RATE: 70 BPM | TEMPERATURE: 98.3 F | OXYGEN SATURATION: 99 % | RESPIRATION RATE: 20 BRPM | DIASTOLIC BLOOD PRESSURE: 81 MMHG | SYSTOLIC BLOOD PRESSURE: 113 MMHG | BODY MASS INDEX: 28.02 KG/M2

## 2024-03-08 NOTE — ED NOTES
Access center called [facility] transport set up between [phone]  for pt to go to [place].

## 2024-04-02 ENCOUNTER — APPOINTMENT (OUTPATIENT)
Dept: CT IMAGING | Age: 44
DRG: 682 | End: 2024-04-02
Payer: MEDICARE

## 2024-04-02 ENCOUNTER — APPOINTMENT (OUTPATIENT)
Dept: GENERAL RADIOLOGY | Age: 44
DRG: 682 | End: 2024-04-02
Payer: MEDICARE

## 2024-04-02 ENCOUNTER — APPOINTMENT (OUTPATIENT)
Dept: ULTRASOUND IMAGING | Age: 44
DRG: 682 | End: 2024-04-02
Payer: MEDICARE

## 2024-04-02 ENCOUNTER — HOSPITAL ENCOUNTER (INPATIENT)
Age: 44
LOS: 2 days | Discharge: ANOTHER HOSPITAL | DRG: 682 | End: 2024-04-04
Attending: EMERGENCY MEDICINE | Admitting: STUDENT IN AN ORGANIZED HEALTH CARE EDUCATION/TRAINING PROGRAM
Payer: MEDICARE

## 2024-04-02 DIAGNOSIS — K76.9 LIVER LESION, LEFT LOBE: ICD-10-CM

## 2024-04-02 DIAGNOSIS — R18.8 OTHER ASCITES: ICD-10-CM

## 2024-04-02 DIAGNOSIS — N17.9 AKI (ACUTE KIDNEY INJURY) (HCC): Primary | ICD-10-CM

## 2024-04-02 DIAGNOSIS — E87.5 HYPERKALEMIA: ICD-10-CM

## 2024-04-02 DIAGNOSIS — E83.41 HYPERMAGNESEMIA: ICD-10-CM

## 2024-04-02 DIAGNOSIS — M79.89 SWELLING OF LOWER EXTREMITY: ICD-10-CM

## 2024-04-02 DIAGNOSIS — K57.90 DIVERTICULOSIS: ICD-10-CM

## 2024-04-02 LAB
ALBUMIN SERPL-MCNC: 4.6 G/DL (ref 3.5–5.2)
ALP SERPL-CCNC: 225 U/L (ref 40–129)
ALT SERPL-CCNC: 41 U/L (ref 0–40)
ANION GAP SERPL CALCULATED.3IONS-SCNC: 19 MMOL/L (ref 7–16)
ANION GAP SERPL CALCULATED.3IONS-SCNC: 20 MMOL/L (ref 7–16)
AST SERPL-CCNC: 66 U/L (ref 0–39)
BASOPHILS # BLD: 0.02 K/UL (ref 0–0.2)
BASOPHILS NFR BLD: 0 % (ref 0–2)
BILIRUB DIRECT SERPL-MCNC: 2 MG/DL (ref 0–0.3)
BILIRUB INDIRECT SERPL-MCNC: 1 MG/DL (ref 0–1)
BILIRUB SERPL-MCNC: 3 MG/DL (ref 0–1.2)
BNP SERPL-MCNC: ABNORMAL PG/ML (ref 0–125)
BUN SERPL-MCNC: 110 MG/DL (ref 6–20)
BUN SERPL-MCNC: 118 MG/DL (ref 6–20)
CALCIUM SERPL-MCNC: 10.3 MG/DL (ref 8.6–10.2)
CALCIUM SERPL-MCNC: 10.4 MG/DL (ref 8.6–10.2)
CHLORIDE SERPL-SCNC: 100 MMOL/L (ref 98–107)
CHLORIDE SERPL-SCNC: 102 MMOL/L (ref 98–107)
CO2 SERPL-SCNC: 18 MMOL/L (ref 22–29)
CO2 SERPL-SCNC: 19 MMOL/L (ref 22–29)
CREAT SERPL-MCNC: 6 MG/DL (ref 0.7–1.2)
CREAT SERPL-MCNC: 6.3 MG/DL (ref 0.7–1.2)
EKG ATRIAL RATE: 72 BPM
EKG P AXIS: 29 DEGREES
EKG P-R INTERVAL: 152 MS
EKG Q-T INTERVAL: 524 MS
EKG QRS DURATION: 226 MS
EKG QTC CALCULATION (BAZETT): 573 MS
EKG R AXIS: -174 DEGREES
EKG T AXIS: 12 DEGREES
EKG VENTRICULAR RATE: 72 BPM
EOSINOPHIL # BLD: 0.07 K/UL (ref 0.05–0.5)
EOSINOPHILS RELATIVE PERCENT: 1 % (ref 0–6)
ERYTHROCYTE [DISTWIDTH] IN BLOOD BY AUTOMATED COUNT: 18.6 % (ref 11.5–15)
ERYTHROCYTE [DISTWIDTH] IN BLOOD BY AUTOMATED COUNT: 18.8 % (ref 11.5–15)
GFR SERPL CREATININE-BSD FRML MDRD: 11 ML/MIN/1.73M2
GFR SERPL CREATININE-BSD FRML MDRD: 11 ML/MIN/1.73M2
GLUCOSE SERPL-MCNC: 119 MG/DL (ref 74–99)
GLUCOSE SERPL-MCNC: 145 MG/DL (ref 74–99)
HCT VFR BLD AUTO: 34.6 % (ref 37–54)
HCT VFR BLD AUTO: 36 % (ref 37–54)
HGB BLD-MCNC: 10.8 G/DL (ref 12.5–16.5)
HGB BLD-MCNC: 11.7 G/DL (ref 12.5–16.5)
IMM GRANULOCYTES # BLD AUTO: 0.04 K/UL (ref 0–0.58)
IMM GRANULOCYTES NFR BLD: 1 % (ref 0–5)
LIPASE SERPL-CCNC: 103 U/L (ref 13–60)
LYMPHOCYTES NFR BLD: 0.78 K/UL (ref 1.5–4)
LYMPHOCYTES RELATIVE PERCENT: 13 % (ref 20–42)
MAGNESIUM SERPL-MCNC: 3.1 MG/DL (ref 1.6–2.6)
MCH RBC QN AUTO: 29.9 PG (ref 26–35)
MCH RBC QN AUTO: 30.9 PG (ref 26–35)
MCHC RBC AUTO-ENTMCNC: 31.2 G/DL (ref 32–34.5)
MCHC RBC AUTO-ENTMCNC: 32.5 G/DL (ref 32–34.5)
MCV RBC AUTO: 95 FL (ref 80–99.9)
MCV RBC AUTO: 95.8 FL (ref 80–99.9)
MONOCYTES NFR BLD: 0.81 K/UL (ref 0.1–0.95)
MONOCYTES NFR BLD: 13 % (ref 2–12)
NEUTROPHILS NFR BLD: 72 % (ref 43–80)
NEUTS SEG NFR BLD: 4.44 K/UL (ref 1.8–7.3)
PARTIAL THROMBOPLASTIN TIME: 30.9 SEC (ref 24.5–35.1)
PLATELET # BLD AUTO: 187 K/UL (ref 130–450)
PLATELET # BLD AUTO: 206 K/UL (ref 130–450)
PMV BLD AUTO: 10.2 FL (ref 7–12)
PMV BLD AUTO: 10.7 FL (ref 7–12)
POTASSIUM SERPL-SCNC: 5.4 MMOL/L (ref 3.5–5)
POTASSIUM SERPL-SCNC: 5.4 MMOL/L (ref 3.5–5)
POTASSIUM SERPL-SCNC: 6.3 MMOL/L (ref 3.5–5)
PROT SERPL-MCNC: 8.5 G/DL (ref 6.4–8.3)
RBC # BLD AUTO: 3.61 M/UL (ref 3.8–5.8)
RBC # BLD AUTO: 3.79 M/UL (ref 3.8–5.8)
SODIUM SERPL-SCNC: 138 MMOL/L (ref 132–146)
SODIUM SERPL-SCNC: 140 MMOL/L (ref 132–146)
TROPONIN I SERPL HS-MCNC: 148 NG/L (ref 0–11)
TROPONIN I SERPL HS-MCNC: 149 NG/L (ref 0–11)
TROPONIN I SERPL HS-MCNC: NORMAL NG/L (ref 0–22)
TROPONIN INTERP: NORMAL
TROPONIN T SERPL-MCNC: NORMAL NG/ML
WBC OTHER # BLD: 6.2 K/UL (ref 4.5–11.5)
WBC OTHER # BLD: 6.5 K/UL (ref 4.5–11.5)

## 2024-04-02 PROCEDURE — 2580000003 HC RX 258: Performed by: EMERGENCY MEDICINE

## 2024-04-02 PROCEDURE — 84484 ASSAY OF TROPONIN QUANT: CPT

## 2024-04-02 PROCEDURE — 93005 ELECTROCARDIOGRAM TRACING: CPT | Performed by: EMERGENCY MEDICINE

## 2024-04-02 PROCEDURE — 71045 X-RAY EXAM CHEST 1 VIEW: CPT

## 2024-04-02 PROCEDURE — 83880 ASSAY OF NATRIURETIC PEPTIDE: CPT

## 2024-04-02 PROCEDURE — 82105 ALPHA-FETOPROTEIN SERUM: CPT

## 2024-04-02 PROCEDURE — 85730 THROMBOPLASTIN TIME PARTIAL: CPT

## 2024-04-02 PROCEDURE — 96365 THER/PROPH/DIAG IV INF INIT: CPT

## 2024-04-02 PROCEDURE — 85027 COMPLETE CBC AUTOMATED: CPT

## 2024-04-02 PROCEDURE — 74176 CT ABD & PELVIS W/O CONTRAST: CPT

## 2024-04-02 PROCEDURE — 96375 TX/PRO/DX INJ NEW DRUG ADDON: CPT

## 2024-04-02 PROCEDURE — 84132 ASSAY OF SERUM POTASSIUM: CPT

## 2024-04-02 PROCEDURE — 80048 BASIC METABOLIC PNL TOTAL CA: CPT

## 2024-04-02 PROCEDURE — 82248 BILIRUBIN DIRECT: CPT

## 2024-04-02 PROCEDURE — 85025 COMPLETE CBC W/AUTO DIFF WBC: CPT

## 2024-04-02 PROCEDURE — 6360000002 HC RX W HCPCS: Performed by: EMERGENCY MEDICINE

## 2024-04-02 PROCEDURE — 80053 COMPREHEN METABOLIC PANEL: CPT

## 2024-04-02 PROCEDURE — 83735 ASSAY OF MAGNESIUM: CPT

## 2024-04-02 PROCEDURE — 93010 ELECTROCARDIOGRAM REPORT: CPT | Performed by: INTERNAL MEDICINE

## 2024-04-02 PROCEDURE — 36415 COLL VENOUS BLD VENIPUNCTURE: CPT

## 2024-04-02 PROCEDURE — 2140000000 HC CCU INTERMEDIATE R&B

## 2024-04-02 PROCEDURE — 99222 1ST HOSP IP/OBS MODERATE 55: CPT | Performed by: STUDENT IN AN ORGANIZED HEALTH CARE EDUCATION/TRAINING PROGRAM

## 2024-04-02 PROCEDURE — 6360000002 HC RX W HCPCS: Performed by: STUDENT IN AN ORGANIZED HEALTH CARE EDUCATION/TRAINING PROGRAM

## 2024-04-02 PROCEDURE — 93970 EXTREMITY STUDY: CPT

## 2024-04-02 PROCEDURE — 83690 ASSAY OF LIPASE: CPT

## 2024-04-02 PROCEDURE — 99285 EMERGENCY DEPT VISIT HI MDM: CPT

## 2024-04-02 PROCEDURE — 96361 HYDRATE IV INFUSION ADD-ON: CPT

## 2024-04-02 RX ORDER — SODIUM CHLORIDE 9 MG/ML
INJECTION, SOLUTION INTRAVENOUS CONTINUOUS
Status: ACTIVE | OUTPATIENT
Start: 2024-04-02 | End: 2024-04-03

## 2024-04-02 RX ORDER — HEPARIN SODIUM 1000 [USP'U]/ML
2000 INJECTION, SOLUTION INTRAVENOUS; SUBCUTANEOUS PRN
Status: DISCONTINUED | OUTPATIENT
Start: 2024-04-02 | End: 2024-04-04 | Stop reason: HOSPADM

## 2024-04-02 RX ORDER — CARVEDILOL 3.12 MG/1
3.12 TABLET ORAL 2 TIMES DAILY WITH MEALS
Status: DISCONTINUED | OUTPATIENT
Start: 2024-04-03 | End: 2024-04-04 | Stop reason: HOSPADM

## 2024-04-02 RX ORDER — CALCIUM GLUCONATE 10 MG/ML
1000 INJECTION, SOLUTION INTRAVENOUS ONCE
Status: COMPLETED | OUTPATIENT
Start: 2024-04-02 | End: 2024-04-02

## 2024-04-02 RX ORDER — ATORVASTATIN CALCIUM 40 MG/1
40 TABLET, FILM COATED ORAL NIGHTLY
Status: DISCONTINUED | OUTPATIENT
Start: 2024-04-02 | End: 2024-04-04 | Stop reason: HOSPADM

## 2024-04-02 RX ORDER — 0.9 % SODIUM CHLORIDE 0.9 %
1000 INTRAVENOUS SOLUTION INTRAVENOUS ONCE
Status: COMPLETED | OUTPATIENT
Start: 2024-04-02 | End: 2024-04-02

## 2024-04-02 RX ORDER — HEPARIN SODIUM 1000 [USP'U]/ML
60 INJECTION, SOLUTION INTRAVENOUS; SUBCUTANEOUS ONCE
Status: COMPLETED | OUTPATIENT
Start: 2024-04-02 | End: 2024-04-02

## 2024-04-02 RX ORDER — SODIUM CHLORIDE 9 MG/ML
INJECTION, SOLUTION INTRAVENOUS CONTINUOUS
Status: DISCONTINUED | OUTPATIENT
Start: 2024-04-02 | End: 2024-04-02

## 2024-04-02 RX ORDER — HEPARIN SODIUM 1000 [USP'U]/ML
4000 INJECTION, SOLUTION INTRAVENOUS; SUBCUTANEOUS PRN
Status: DISCONTINUED | OUTPATIENT
Start: 2024-04-02 | End: 2024-04-04 | Stop reason: HOSPADM

## 2024-04-02 RX ORDER — HEPARIN SODIUM 10000 [USP'U]/100ML
5-30 INJECTION, SOLUTION INTRAVENOUS CONTINUOUS
Status: DISCONTINUED | OUTPATIENT
Start: 2024-04-02 | End: 2024-04-04 | Stop reason: HOSPADM

## 2024-04-02 RX ORDER — ONDANSETRON 2 MG/ML
4 INJECTION INTRAMUSCULAR; INTRAVENOUS ONCE
Status: COMPLETED | OUTPATIENT
Start: 2024-04-02 | End: 2024-04-02

## 2024-04-02 RX ADMIN — HEPARIN SODIUM 11 UNITS/KG/HR: 10000 INJECTION, SOLUTION INTRAVENOUS at 22:27

## 2024-04-02 RX ADMIN — SODIUM CHLORIDE: 9 INJECTION, SOLUTION INTRAVENOUS at 17:20

## 2024-04-02 RX ADMIN — CALCIUM GLUCONATE 1000 MG: 10 INJECTION, SOLUTION INTRAVENOUS at 17:18

## 2024-04-02 RX ADMIN — SODIUM CHLORIDE 1000 ML: 9 INJECTION, SOLUTION INTRAVENOUS at 17:19

## 2024-04-02 RX ADMIN — HEPARIN SODIUM 5440 UNITS: 1000 INJECTION INTRAVENOUS; SUBCUTANEOUS at 22:27

## 2024-04-02 RX ADMIN — SODIUM CHLORIDE 1000 ML: 9 INJECTION, SOLUTION INTRAVENOUS at 13:42

## 2024-04-02 RX ADMIN — ONDANSETRON 4 MG: 2 INJECTION INTRAMUSCULAR; INTRAVENOUS at 13:42

## 2024-04-02 NOTE — H&P
ORDERING CLINICIAN: GLADYS AGUAYO    FINDINGS: AP radiograph of the chest was provided.    [place]-Brijesh catheter entering from the right jugular vein remains with the tip in the right pulmonary artery. Automatic implantable cardioversion device entering from the left is again noted with right atrial, right ventricular, and left ventricular leads unchanged in position. CardioMEMS device is again visible in a medial lower lobe branch of the left pulmonary artery.    CARDIOMEDIASTINAL SILHOUETTE: Cardiomediastinal silhouette is large on a chronic basis but stable in size and configuration.    LUNGS: There is no segmental consolidation or atelectasis. There is no mass or nodule. The minimal interstitial prominence previously described has shown slight improvement.    ABDOMEN: No remarkable upper abdominal findings.    BONES: No acute osseous changes.       1.  Slight improvement in the appearance of the chest. No new abnormality.          MACRO: None    Signed by: Demetrio Gross 3/11/2024 4:39 PM Dictation workstation:   [id number]    XR CHEST 1 VIEW    Result Date: 3/10/2024  Interpreted By:  Shola New, STUDY: XR CHEST 1 VIEW; 3/10/2024 8:11 am    INDICATION: Signs/Symptoms:swan in situ.    COMPARISON: None.    ACCESSION NUMBER(S): [id number]    ORDERING CLINICIAN: TRUPTI PORTILLO    FINDINGS:        CARDIOMEDIASTINAL SILHOUETTE: Cardiomegaly versus pericardial effusion. [place]-Brijesh catheter overlies the right main pulmonary artery. Dual-chamber and coronary sinus pacemaker leads in place. CardioMEMS device overlies the left pulmonary artery.     LUNGS: Minimal interstitial prominence. No focal airspace disease.    ABDOMEN: No remarkable upper abdominal findings.    BONES: No acute osseous changes.       1.  [place]-Brijesh catheter overlies the right main/interlobar pulmonary artery. No focal airspace disease.       Signed by: Shola New 3/10/2024 2:10 PM Dictation workstation:   [id number]    XR CHEST 1 VIEW    Result

## 2024-04-02 NOTE — ED PROVIDER NOTES
to excess calories (HCC), Nonischemic cardiomyopathy (HCC) (11/29/2011), Nonischemic cardiomyopathy (HCC) (07/2013), ARVIND (obstructive sleep apnea) (11/29/2011), S/P left heart catheterization by percutaneous approach (12-27/2011), S/P left pulmonary artery pressure sensor implant placement, Systolic heart failure (HCC) (05/07/2012), Type 2 diabetes mellitus with complication, with long-term current use of insulin (HCC) (08/22/2016), and URI, acute (11/29/2011).     EMERGENCY DEPARTMENT COURSE    Vitals:    Vitals:    04/02/24 1830 04/02/24 2111 04/03/24 0039 04/03/24 0413   BP: 111/74 121/88 114/78 121/87   Pulse: 68 65 70 65   Resp: 21 18 16 16   Temp:  97.3 °F (36.3 °C) 97.1 °F (36.2 °C)    TempSrc:  Oral Oral    SpO2:  100% 100% 100%   Weight:       Height:           Patient was given the following medications:  Medications   heparin (porcine) injection 4,000 Units (has no administration in time range)   heparin (porcine) injection 2,000 Units (has no administration in time range)   heparin 25,000 units in dextrose 5% 250 mL (premix) infusion (11 Units/kg/hr × 90.7 kg IntraVENous Rate/Dose Verify 4/3/24 0200)   carvedilol (COREG) tablet 3.125 mg (has no administration in time range)   atorvastatin (LIPITOR) tablet 40 mg (40 mg Oral Given 4/3/24 0000)   0.9 % sodium chloride infusion ( IntraVENous Rate/Dose Verify 4/3/24 0200)   prochlorperazine (COMPAZINE) injection 10 mg (10 mg IntraVENous Given 4/3/24 0439)   sodium chloride 0.9 % bolus 1,000 mL (0 mLs IntraVENous Stopped 4/2/24 1726)   ondansetron (ZOFRAN) injection 4 mg (4 mg IntraVENous Given 4/2/24 1342)   sodium chloride 0.9 % bolus 1,000 mL (0 mLs IntraVENous Stopped 4/2/24 1852)   calcium gluconate 1000 mg in sodium chloride 100 mL (0 mg IntraVENous Stopped 4/2/24 1818)   heparin (porcine) injection 5,440 Units (5,440 Units IntraVENous Given 4/2/24 0422)       Medical Decision Making/Differential Diagnosis:  CC/HPI Summary, Social Determinants of health,

## 2024-04-03 ENCOUNTER — APPOINTMENT (OUTPATIENT)
Dept: INTERVENTIONAL RADIOLOGY/VASCULAR | Age: 44
DRG: 682 | End: 2024-04-03
Payer: MEDICARE

## 2024-04-03 VITALS
SYSTOLIC BLOOD PRESSURE: 117 MMHG | HEIGHT: 72 IN | WEIGHT: 200 LBS | TEMPERATURE: 98 F | RESPIRATION RATE: 16 BRPM | OXYGEN SATURATION: 94 % | HEART RATE: 71 BPM | DIASTOLIC BLOOD PRESSURE: 84 MMHG | BODY MASS INDEX: 27.09 KG/M2

## 2024-04-03 LAB
ALBUMIN FLD-MCNC: 2.3 G/DL
ALBUMIN SERPL-MCNC: 4.2 G/DL (ref 3.5–5.2)
ALP SERPL-CCNC: 201 U/L (ref 40–129)
ALT SERPL-CCNC: 37 U/L (ref 0–40)
AMMONIA PLAS-SCNC: 88 UMOL/L (ref 16–60)
ANION GAP SERPL CALCULATED.3IONS-SCNC: 17 MMOL/L (ref 7–16)
ANION GAP SERPL CALCULATED.3IONS-SCNC: 18 MMOL/L (ref 7–16)
APPEARANCE FLD: NORMAL
AST SERPL-CCNC: 46 U/L (ref 0–39)
BASOPHILS # BLD: 0.03 K/UL (ref 0–0.2)
BASOPHILS NFR BLD: 1 % (ref 0–2)
BILIRUB DIRECT SERPL-MCNC: 2 MG/DL (ref 0–0.3)
BILIRUB INDIRECT SERPL-MCNC: 0.9 MG/DL (ref 0–1)
BILIRUB SERPL-MCNC: 2.9 MG/DL (ref 0–1.2)
BODY FLD TYPE: NORMAL
BUN SERPL-MCNC: 108 MG/DL (ref 6–20)
BUN SERPL-MCNC: 110 MG/DL (ref 6–20)
CALCIUM SERPL-MCNC: 9.9 MG/DL (ref 8.6–10.2)
CALCIUM SERPL-MCNC: 9.9 MG/DL (ref 8.6–10.2)
CHLORIDE SERPL-SCNC: 105 MMOL/L (ref 98–107)
CHLORIDE SERPL-SCNC: 105 MMOL/L (ref 98–107)
CLOT CHECK: NORMAL
CO2 SERPL-SCNC: 17 MMOL/L (ref 22–29)
CO2 SERPL-SCNC: 18 MMOL/L (ref 22–29)
COLOR FLD: YELLOW
CREAT SERPL-MCNC: 5.6 MG/DL (ref 0.7–1.2)
CREAT SERPL-MCNC: 5.9 MG/DL (ref 0.7–1.2)
EOSINOPHIL # BLD: 0.09 K/UL (ref 0.05–0.5)
EOSINOPHILS RELATIVE PERCENT: 2 % (ref 0–6)
ERYTHROCYTE [DISTWIDTH] IN BLOOD BY AUTOMATED COUNT: 18.7 % (ref 11.5–15)
GFR SERPL CREATININE-BSD FRML MDRD: 11 ML/MIN/1.73M2
GFR SERPL CREATININE-BSD FRML MDRD: 12 ML/MIN/1.73M2
GLUCOSE FLD-MCNC: 97 MG/DL
GLUCOSE SERPL-MCNC: 112 MG/DL (ref 74–99)
GLUCOSE SERPL-MCNC: 81 MG/DL (ref 74–99)
HAV IGM SERPL QL IA: NONREACTIVE
HBV CORE IGM SERPL QL IA: NONREACTIVE
HBV SURFACE AG SERPL QL IA: NONREACTIVE
HCT VFR BLD AUTO: 33.5 % (ref 37–54)
HCV AB SERPL QL IA: NONREACTIVE
HGB BLD-MCNC: 10.8 G/DL (ref 12.5–16.5)
IMM GRANULOCYTES # BLD AUTO: <0.03 K/UL (ref 0–0.58)
IMM GRANULOCYTES NFR BLD: 0 % (ref 0–5)
INR PPP: 2.3
INR PPP: 2.4
LDH FLD L TO P-CCNC: 122 U/L
LYMPHOCYTES NFR BLD: 0.97 K/UL (ref 1.5–4)
LYMPHOCYTES RELATIVE PERCENT: 21 % (ref 20–42)
MAGNESIUM SERPL-MCNC: 2.8 MG/DL (ref 1.6–2.6)
MCH RBC QN AUTO: 30.3 PG (ref 26–35)
MCHC RBC AUTO-ENTMCNC: 32.2 G/DL (ref 32–34.5)
MCV RBC AUTO: 94.1 FL (ref 80–99.9)
MONOCYTES NFR BLD: 0.85 K/UL (ref 0.1–0.95)
MONOCYTES NFR BLD: 18 % (ref 2–12)
MONOCYTES NFR FLD: 93 %
NEUTROPHILS NFR BLD: 59 % (ref 43–80)
NEUTROPHILS NFR FLD: 7 %
NEUTS SEG NFR BLD: 2.78 K/UL (ref 1.8–7.3)
PARTIAL THROMBOPLASTIN TIME: 62.9 SEC (ref 24.5–35.1)
PLATELET # BLD AUTO: 173 K/UL (ref 130–450)
PMV BLD AUTO: 10.9 FL (ref 7–12)
POTASSIUM SERPL-SCNC: 4.4 MMOL/L (ref 3.5–5)
POTASSIUM SERPL-SCNC: 4.7 MMOL/L (ref 3.5–5)
PROT FLD-MCNC: 3.7 G/DL
PROT SERPL-MCNC: 7.6 G/DL (ref 6.4–8.3)
PROTHROMBIN TIME: 25.3 SEC (ref 9.3–12.4)
PROTHROMBIN TIME: 25.8 SEC (ref 9.3–12.4)
RBC # BLD AUTO: 3.56 M/UL (ref 3.8–5.8)
RBC # FLD: 4000 CELLS/UL
SODIUM SERPL-SCNC: 140 MMOL/L (ref 132–146)
SODIUM SERPL-SCNC: 140 MMOL/L (ref 132–146)
SPECIMEN TYPE: NORMAL
TROPONIN I SERPL HS-MCNC: 140 NG/L (ref 0–11)
WBC # FLD: 217 CELLS/UL
WBC OTHER # BLD: 4.7 K/UL (ref 4.5–11.5)

## 2024-04-03 PROCEDURE — 89051 BODY FLUID CELL COUNT: CPT

## 2024-04-03 PROCEDURE — 36415 COLL VENOUS BLD VENIPUNCTURE: CPT

## 2024-04-03 PROCEDURE — 84484 ASSAY OF TROPONIN QUANT: CPT

## 2024-04-03 PROCEDURE — 6360000002 HC RX W HCPCS

## 2024-04-03 PROCEDURE — 0W9G3ZZ DRAINAGE OF PERITONEAL CAVITY, PERCUTANEOUS APPROACH: ICD-10-PCS | Performed by: STUDENT IN AN ORGANIZED HEALTH CARE EDUCATION/TRAINING PROGRAM

## 2024-04-03 PROCEDURE — 82248 BILIRUBIN DIRECT: CPT

## 2024-04-03 PROCEDURE — 82042 OTHER SOURCE ALBUMIN QUAN EA: CPT

## 2024-04-03 PROCEDURE — 6370000000 HC RX 637 (ALT 250 FOR IP)

## 2024-04-03 PROCEDURE — 6360000002 HC RX W HCPCS: Performed by: STUDENT IN AN ORGANIZED HEALTH CARE EDUCATION/TRAINING PROGRAM

## 2024-04-03 PROCEDURE — 2140000000 HC CCU INTERMEDIATE R&B

## 2024-04-03 PROCEDURE — 80053 COMPREHEN METABOLIC PANEL: CPT

## 2024-04-03 PROCEDURE — C1729 CATH, DRAINAGE: HCPCS

## 2024-04-03 PROCEDURE — 2500000003 HC RX 250 WO HCPCS: Performed by: PHYSICIAN ASSISTANT

## 2024-04-03 PROCEDURE — 88305 TISSUE EXAM BY PATHOLOGIST: CPT

## 2024-04-03 PROCEDURE — 85610 PROTHROMBIN TIME: CPT

## 2024-04-03 PROCEDURE — 85730 THROMBOPLASTIN TIME PARTIAL: CPT

## 2024-04-03 PROCEDURE — 99222 1ST HOSP IP/OBS MODERATE 55: CPT | Performed by: NURSE PRACTITIONER

## 2024-04-03 PROCEDURE — 87070 CULTURE OTHR SPECIMN AEROBIC: CPT

## 2024-04-03 PROCEDURE — 6370000000 HC RX 637 (ALT 250 FOR IP): Performed by: STUDENT IN AN ORGANIZED HEALTH CARE EDUCATION/TRAINING PROGRAM

## 2024-04-03 PROCEDURE — 2580000003 HC RX 258: Performed by: STUDENT IN AN ORGANIZED HEALTH CARE EDUCATION/TRAINING PROGRAM

## 2024-04-03 PROCEDURE — 80048 BASIC METABOLIC PNL TOTAL CA: CPT

## 2024-04-03 PROCEDURE — 85025 COMPLETE CBC W/AUTO DIFF WBC: CPT

## 2024-04-03 PROCEDURE — P9047 ALBUMIN (HUMAN), 25%, 50ML: HCPCS | Performed by: STUDENT IN AN ORGANIZED HEALTH CARE EDUCATION/TRAINING PROGRAM

## 2024-04-03 PROCEDURE — 82945 GLUCOSE OTHER FLUID: CPT

## 2024-04-03 PROCEDURE — 80074 ACUTE HEPATITIS PANEL: CPT

## 2024-04-03 PROCEDURE — 6370000000 HC RX 637 (ALT 250 FOR IP): Performed by: NURSE PRACTITIONER

## 2024-04-03 PROCEDURE — 87205 SMEAR GRAM STAIN: CPT

## 2024-04-03 PROCEDURE — 83735 ASSAY OF MAGNESIUM: CPT

## 2024-04-03 PROCEDURE — 82140 ASSAY OF AMMONIA: CPT

## 2024-04-03 PROCEDURE — 49083 ABD PARACENTESIS W/IMAGING: CPT

## 2024-04-03 PROCEDURE — 88112 CYTOPATH CELL ENHANCE TECH: CPT

## 2024-04-03 PROCEDURE — 84157 ASSAY OF PROTEIN OTHER: CPT

## 2024-04-03 PROCEDURE — 83615 LACTATE (LD) (LDH) ENZYME: CPT

## 2024-04-03 RX ORDER — PROCHLORPERAZINE EDISYLATE 5 MG/ML
10 INJECTION INTRAMUSCULAR; INTRAVENOUS EVERY 6 HOURS PRN
Status: DISCONTINUED | OUTPATIENT
Start: 2024-04-03 | End: 2024-04-04 | Stop reason: HOSPADM

## 2024-04-03 RX ORDER — LIDOCAINE HYDROCHLORIDE 20 MG/ML
INJECTION, SOLUTION INFILTRATION; PERINEURAL PRN
Status: COMPLETED | OUTPATIENT
Start: 2024-04-03 | End: 2024-04-03

## 2024-04-03 RX ORDER — MEXILETINE HYDROCHLORIDE 150 MG/1
150 CAPSULE ORAL 3 TIMES DAILY
Status: DISCONTINUED | OUTPATIENT
Start: 2024-04-03 | End: 2024-04-04 | Stop reason: HOSPADM

## 2024-04-03 RX ORDER — MIDODRINE HYDROCHLORIDE 2.5 MG/1
2.5 TABLET ORAL 3 TIMES DAILY
Status: DISCONTINUED | OUTPATIENT
Start: 2024-04-03 | End: 2024-04-03

## 2024-04-03 RX ORDER — SODIUM BICARBONATE 650 MG/1
650 TABLET ORAL 3 TIMES DAILY
Status: DISCONTINUED | OUTPATIENT
Start: 2024-04-03 | End: 2024-04-04 | Stop reason: HOSPADM

## 2024-04-03 RX ORDER — PANTOPRAZOLE SODIUM 20 MG/1
20 TABLET, DELAYED RELEASE ORAL
Status: DISCONTINUED | OUTPATIENT
Start: 2024-04-04 | End: 2024-04-04 | Stop reason: HOSPADM

## 2024-04-03 RX ORDER — ERGOCALCIFEROL 1.25 MG/1
50000 CAPSULE ORAL WEEKLY
Status: DISCONTINUED | OUTPATIENT
Start: 2024-04-03 | End: 2024-04-04 | Stop reason: HOSPADM

## 2024-04-03 RX ORDER — MIDODRINE HYDROCHLORIDE 10 MG/1
10 TABLET ORAL 3 TIMES DAILY
Status: DISCONTINUED | OUTPATIENT
Start: 2024-04-03 | End: 2024-04-04 | Stop reason: HOSPADM

## 2024-04-03 RX ORDER — LACTULOSE 10 G/15ML
20 SOLUTION ORAL
Status: DISCONTINUED | OUTPATIENT
Start: 2024-04-03 | End: 2024-04-04 | Stop reason: HOSPADM

## 2024-04-03 RX ORDER — LACTULOSE 10 G/15ML
20 SOLUTION ORAL 3 TIMES DAILY
Status: DISCONTINUED | OUTPATIENT
Start: 2024-04-03 | End: 2024-04-03

## 2024-04-03 RX ORDER — HYDRALAZINE HYDROCHLORIDE 50 MG/1
150 TABLET, FILM COATED ORAL 3 TIMES DAILY
COMMUNITY

## 2024-04-03 RX ORDER — SUCRALFATE 1 G/1
1 TABLET ORAL EVERY 12 HOURS SCHEDULED
Qty: 120 TABLET | Refills: 3 | Status: SHIPPED | OUTPATIENT
Start: 2024-04-03

## 2024-04-03 RX ORDER — ALBUMIN (HUMAN) 12.5 G/50ML
25 SOLUTION INTRAVENOUS ONCE
Status: COMPLETED | OUTPATIENT
Start: 2024-04-03 | End: 2024-04-03

## 2024-04-03 RX ORDER — ISOSORBIDE DINITRATE 40 MG/1
40 TABLET ORAL 3 TIMES DAILY
COMMUNITY

## 2024-04-03 RX ORDER — SODIUM BICARBONATE 650 MG/1
650 TABLET ORAL 2 TIMES DAILY
Status: DISCONTINUED | OUTPATIENT
Start: 2024-04-03 | End: 2024-04-03

## 2024-04-03 RX ORDER — SUCRALFATE 1 G/1
1 TABLET ORAL EVERY 12 HOURS SCHEDULED
Status: DISCONTINUED | OUTPATIENT
Start: 2024-04-03 | End: 2024-04-04 | Stop reason: HOSPADM

## 2024-04-03 RX ADMIN — HEPARIN SODIUM 11 UNITS/KG/HR: 10000 INJECTION, SOLUTION INTRAVENOUS at 16:22

## 2024-04-03 RX ADMIN — PHYTONADIONE 10 MG: 10 INJECTION, EMULSION INTRAMUSCULAR; INTRAVENOUS; SUBCUTANEOUS at 09:31

## 2024-04-03 RX ADMIN — MEXILETINE HYDROCHLORIDE 150 MG: 150 CAPSULE ORAL at 20:18

## 2024-04-03 RX ADMIN — SODIUM CHLORIDE: 9 INJECTION, SOLUTION INTRAVENOUS at 00:00

## 2024-04-03 RX ADMIN — MEXILETINE HYDROCHLORIDE 150 MG: 150 CAPSULE ORAL at 11:56

## 2024-04-03 RX ADMIN — SODIUM BICARBONATE 650 MG: 650 TABLET ORAL at 16:13

## 2024-04-03 RX ADMIN — MEXILETINE HYDROCHLORIDE 150 MG: 150 CAPSULE ORAL at 16:13

## 2024-04-03 RX ADMIN — MIDODRINE HYDROCHLORIDE 2.5 MG: 2.5 TABLET ORAL at 09:28

## 2024-04-03 RX ADMIN — ATORVASTATIN CALCIUM 40 MG: 40 TABLET, FILM COATED ORAL at 20:18

## 2024-04-03 RX ADMIN — PROCHLORPERAZINE EDISYLATE 10 MG: 5 INJECTION INTRAMUSCULAR; INTRAVENOUS at 04:39

## 2024-04-03 RX ADMIN — CARVEDILOL 3.12 MG: 3.12 TABLET, FILM COATED ORAL at 16:13

## 2024-04-03 RX ADMIN — CARVEDILOL 3.12 MG: 3.12 TABLET, FILM COATED ORAL at 09:28

## 2024-04-03 RX ADMIN — ERGOCALCIFEROL 50000 UNITS: 1.25 CAPSULE ORAL at 11:57

## 2024-04-03 RX ADMIN — LIDOCAINE HYDROCHLORIDE 7 ML: 20 INJECTION, SOLUTION INFILTRATION; PERINEURAL at 14:22

## 2024-04-03 RX ADMIN — ALBUMIN (HUMAN) 25 G: 0.25 INJECTION, SOLUTION INTRAVENOUS at 19:00

## 2024-04-03 RX ADMIN — LACTULOSE 20 G: 20 SOLUTION ORAL at 11:56

## 2024-04-03 RX ADMIN — ATORVASTATIN CALCIUM 40 MG: 40 TABLET, FILM COATED ORAL at 00:00

## 2024-04-03 RX ADMIN — SUCRALFATE 1 G: 1 TABLET ORAL at 20:18

## 2024-04-03 RX ADMIN — MIDODRINE HYDROCHLORIDE 10 MG: 10 TABLET ORAL at 20:19

## 2024-04-03 RX ADMIN — SODIUM BICARBONATE 650 MG: 650 TABLET ORAL at 20:19

## 2024-04-03 RX ADMIN — SODIUM BICARBONATE 650 MG: 650 TABLET ORAL at 09:28

## 2024-04-03 ASSESSMENT — ENCOUNTER SYMPTOMS
ABDOMINAL PAIN: 0
DIARRHEA: 0
BACK PAIN: 0
VOMITING: 0
CHEST TIGHTNESS: 0
NAUSEA: 0
COUGH: 0

## 2024-04-03 ASSESSMENT — PAIN - FUNCTIONAL ASSESSMENT: PAIN_FUNCTIONAL_ASSESSMENT: NONE - DENIES PAIN

## 2024-04-03 NOTE — OR NURSING
Needle insertion to RLQ performed by the provider. Sterile technique was maintained throughout the procedure. Site prepped with antiseptic solution and draped in a sterile fashion. The patient tolerated the procedure well with no immediate signs of distress or adverse reactions noted.

## 2024-04-03 NOTE — PROGRESS NOTES
Post-Procedure SBAR Report    Patient Name: Rober Mejía III  MRN: [id number]  : 1980  Date of Procedure: 24  Completed Procedure: Paracentesis    Telephone SBAR report provided to MONICA Guan.     Information from the following report(s) Nurse Handoff Report, Procedure Verification, and Event Log was reviewed.    Opportunity for questions and clarification was provided.

## 2024-04-03 NOTE — CARE COORDINATION
Patient presented to the ED from home due to nausea and emesis; admitted for TAIWO, hyperkalemia, hypermagnesemia, diverticulosis and swelling of lower extremity. Met with patient and his auth at bedside for transition of care planning. Patient reports he lives alone in a 2nd floor apartment home, elevator access, he is currently independent in the room, ambulates without a device; has a walker at home. Uses [facility] and has no PCP; patient agreeable to PCP appt being set up. Patient plans to return home, reports no home going needs and his aunt to transport home. Patient and aunt inquiring about assist in the home with cleaning and shopping; SW to check for resources.    Case Management Assessment  Initial Evaluation    Date/Time of Evaluation: 4/3/2024 2:34 PM  Assessment Completed by: LATHA Everett    If patient is discharged prior to next notation, then this note serves as note for discharge by case management.    Patient Name: Rober Mejía III                   YOB: 1980  Diagnosis: Hyperkalemia [E87.5]  Hypermagnesemia [E83.41]  Diverticulosis [K57.90]  Other ascites [R18.8]  Liver lesion, left lobe [K76.9]  TAIWO (acute kidney injury) (HCC) [N17.9]  Swelling of lower extremity [M79.89]                   Date / Time: 4/2/2024 12:06 PM    Patient Admission Status: Inpatient   Readmission Risk (Low < 19, Mod (19-27), High > 27): Readmission Risk Score: 32.6    Current PCP: Awadalla, Maged I, MD  PCP verified by CM? No (has no PCP; agreeable to appt being set up)    Chart Reviewed: Yes      History Provided by: Patient, Child/Family  Patient Orientation: Alert and Oriented    Patient Cognition: Alert    Hospitalization in the last 30 days (Readmission):  No    If yes, Readmission Assessment in CM Navigator will be completed.    Advance Directives:      Code Status: Full Code   Patient's Primary Decision Maker is: Legal Next of Kin    Primary Decision Maker: Lillian Mejía - Aunt/Uncle -

## 2024-04-03 NOTE — PROGRESS NOTES
This nurse notified MD Larose via perfect serve in regards to INR. New orders received to hold heparin gtt at this time. Administer Vitamin K. Pt. Aware of new orders.

## 2024-04-03 NOTE — PROGRESS NOTES
Received a call from  [facility], bed number and nurse to nurse phone number given. [facility] says transport will be set up through [facility]. Notified [facility]. [facility] not aware of transfer to .    Call placed to nursing supervisor to see if transport has been initiated for transfer to . Spoke to Cheryl as well to inquire about transport. Call placed to [facility] to get update on transport.    Spoke to Krystina. Krystina to set up transport to  and call back with time.    GABRIELA NYE RN

## 2024-04-03 NOTE — PROGRESS NOTES
[facility] Hospitalist Progress Note    SYNOPSIS: Patient admitted on 4/2/2024 for TAIWO (acute kidney injury) (HCC)     43 y.o. year old male  who  has a past medical history of Acute CHF (HCC), Acute CHF (HCC), Acute idiopathic gout of right foot, AF (atrial fibrillation) (HCC), Anemia, CAD (coronary artery disease), cardioversion, Cerebral artery occlusion with cerebral infarction (HCC), CKD (chronic kidney disease), Gout, HTN (hypertension), Hyperlipidemia, Hypertension, Morbid obesity due to excess calories (HCC), Nonischemic cardiomyopathy (HCC), Nonischemic cardiomyopathy (HCC), ARVIND (obstructive sleep apnea), S/P left heart catheterization by percutaneous approach, S/P left pulmonary artery pressure sensor implant placement, Systolic heart failure (HCC), Type 2 diabetes mellitus with complication, with long-term current use of insulin (HCC), and URI, acute.      Patient presented to ER from home with complaint of persistent nausea and vomiting for 2 months, he is unable to keep anything down including food, water and most of his medications.   Patient review patient's aunt has called [facility] today to request evaluation and they have recommended to come to the local ED.   Patient stated he had gastric bypass surgery a year ago. Denies abdominal pain. He has been to [facility] but states \"they can not not figure it out\"  Patient has bilateral lower extremity edema for long time, right more than left. Denies calf pain. Denies fevers, chills, shortness of breath or chest pain, constipation. Admits to dry cough   On ER he is afebrile, /90 HR 68 RR 20 sPO2 99% in RA  Labs significant for potassium 6.3, CO2 19, , creatinine 6.3, anion gap 19.  Elevated LFTs.  Elevated proBNP 15,000.  Troponin 149  CT abdomen pelvis without contrast with moderate ascites.  Indeterminate left hepatic lobe liver lesion measuring up to 5.9 cm.  Body wall edema.  Chest x-ray

## 2024-04-03 NOTE — DISCHARGE SUMMARY
Hospitalist Discharge Summary    Patient ID: Rober Mejía III   Patient : 1980  Patient's PCP: Awadalla, Maged I, MD    Admit Date: 2024   Admitting Physician: No admitting provider for patient encounter.    Discharge Date:  4/3/2024   Discharge Physician: Adelia Larose MD   Discharge Condition: Stable  Discharge Disposition: Transfer to [facility]      Hospital course in brief:  (Please refer to daily progress notes for a comprehensive review of the hospitalization by requesting medical records)   43 y.o. year old male  who  has a past medical history of Acute CHF (HCC), Acute CHF (HCC), Acute idiopathic gout of right foot, AF (atrial fibrillation) (HCC), Anemia, CAD (coronary artery disease), cardioversion, Cerebral artery occlusion with cerebral infarction (HCC), CKD (chronic kidney disease), Gout, HTN (hypertension), Hyperlipidemia, Hypertension, Morbid obesity due to excess calories (HCC), Nonischemic cardiomyopathy (HCC), Nonischemic cardiomyopathy (HCC), ARVIND (obstructive sleep apnea), S/P left heart catheterization by percutaneous approach, S/P left pulmonary artery pressure sensor implant placement, Systolic heart failure (HCC), Type 2 diabetes mellitus with complication, with long-term current use of insulin (HCC), and URI, acute.      Patient presented to ER from home with complaint of persistent nausea and vomiting for 2 months, he is unable to keep anything down including food, water and most of his medications.   Patient review patient's aunt has called [facility] today to request evaluation and they have recommended to come to the local ED.   Patient stated he had gastric bypass surgery a year ago. Denies abdominal pain. He has been to [facility] but states \"they can not not figure it out\"  Patient has bilateral lower extremity edema for long time, right more than left. Denies calf pain. Denies fevers, chills, shortness of breath or chest

## 2024-04-03 NOTE — BRIEF OP NOTE
Brief-Op Note  ______________________________________________________________      IR U/S GUIDED PARACENTESIS  [facility] 6WE IMCU    Patient Name: Rober Mejía III   YOB: 1980  Medical Record Number: [id number]  Date of Procedure: 4/3/24  Room/Bed: [address] [place] [address]      Pre-operative Diagnosis: Ascites    Post-operative Diagnosis: Ascites    Consent: Informed consent was obtained from the patient prior to the procedure. The details of the procedure, as well is its risks, benefits, and alternatives, were explained.      Anesthesia: Local anesthesia.    Performed by: JAMESON Serna under on-site supervision by Carmen Gallo MD.    Estimated blood loss: Minimal    Complications: None    Specimen Obtained: 3450mL of clear straw colored ascites fluid was withdrawn.    (See radiology dictation in PACs for image review and additional procedural information)    Electronically signed by JAMESON Serna   DD: 4/3/24  2:09 PM     

## 2024-04-03 NOTE — PROCEDURES
Spoke with bedside RN to confirm patient is oriented and able to sign consent. Per RN, patient is able to sign consent at this time.

## 2024-04-03 NOTE — PROGRESS NOTES
Call placed to [facility] and spoke with Mireya [facility]. Per Mireya, mexitil was sent to pharmacy on 2/19/24 for 60 tablets and has not been filled since. Bicarb and vit. D called in 2/16/24 and were never filled. Attempted to review medications with pt and aunt at bedside who state that pt was previously using [facility] pre-filled packets for medication delivery. Pt is unsure when/if medications have been changed and is not sure which physicians have prescribed what/when.

## 2024-04-03 NOTE — CONSULTS
[facility]   Gastroenterology, Hepatology, &  Advanced Endoscopy    Consult       Hepatic lesion found during evaluation for a heart/Kidney transplant evaluation at  11/16/23.    Reason for consult: Elevated LFT's in the setting of Cardiac Hepatopathy, Ascites, Nausea/vomiting post gastric bypass 5/23.  ASSESSMENT AND PLAN:    Isoechoic Lesion within the L Hepatic Lobe   -MRI liver was completed 11/16/23 at  showing liver lesion which demonstrates arterial phase hyper-enhancement and washout, as well as hepatic iron overload. Per tumor board discussion recommendations, patient underwent transcutaneous biopsy of liver and surrounding tissue on 11/22     liver biopsy showed focal nodular hyperplasia; the background biopsy showed zone 3 sinusoidal dilation which likely reflects hepatic congestion from known heart failure and no evidence of cirrhosis.     Repeat CT abdomen (11/29): decreasing size of left perihepatic hematoma compared to 11/25 from     \"As the biopsy did not show malignancy plan will be to list him for heart and kidney transplant as a UNOS status 5 once his repeat PRA is completed given recent transfusions.\"     AFP level (5) at     - Vit K ordered  -Hepatitis panel ordered, negative  - AFP ordered  - Paracentesis ordered with fluids for analysis  - INR ordered  2.3    Pt has had extensive workup at  regarding his liver as noted above. Congestive Hepatopathy with ascites    Nausea/vomiting  -Noted since  discharge 3 weeks ago  -Unable to keep solids or liquids down  - Protonix 20 mg qd  - carafate BID  - Last EGD was with the gastric bypass    Was to follow up with:  Kevan Mcdaniel MD   Advanced Heart Failure and Transplant Cardiology   [facility]  [address]  [address]  [facility] [id number] [phone]     Kanwal Gates DO   Nephrology   [facility]  [address]  [address]  Critical access hospital [id number] [phone]     Shola Roldan MD  Cardiology 
HEPATOBILIARY AND PANCREATIC SURGERY  CONSULT NOTE    Patient's Name/Date of Birth: Rober Mejía III / 1980    Date: April 3, 2024     PCP: Awadalla, Maged I, MD     Chief Complaint:   Chief Complaint   Patient presents with    Nausea    Emesis     Symptoms ongoing since last year + weight loss, poor history, pt vague        Physician Consulted: Dr. Gee  Reason for Consult: liver lesion  Referring Physician: Dr. Lachelle SEXTON  Rober Mejía III is a 43 y.o. male who presents to hepatobiliary and pancreatic service for evaluation of left liver lesion. Pt has been worked up for liver lesion and found to have focal nodular hyperplasia. Pt has dilated cardiomyopathy with EF 20-25% and has been evaluated for heart transplant. Pt also has CKD been recommended to have kidney transplant. GI has been seeing the patient for elevated LFTs.    Pt presented to the hospital yesterday (4/2) due to persistent nausea and vomiting. At the time of my exam, he had just returned from  where he had a paracentesis. He was slightly drowsy but was able to answer questions. He states his nausea and emesis have resolved. He recently had a bowel movement. He does state that his liver has been worked up at  and agrees that he was recommended to have a heart and kidney transplant.    Pt has past medical history of CHF, Afib on xarelto, CAD s/p catheterization, DM. He recently had a gastric bypass one year ago and most recent EGD was done at this time.      Past Medical History:   Diagnosis Date    Acute CHF (HCC) 11/29/2011    Acute CHF (HCC) 12/26/2011    Acute idiopathic gout of right foot 08/13/2016    AF (atrial fibrillation) (HCC) 02/2020    Anemia 11/29/2011    CAD (coronary artery disease)     cardioversion 06/26/2023    dR Diaz    Cerebral artery occlusion with cerebral infarction (HCC)     CKD (chronic kidney disease)     Gout     HTN (hypertension) 11/29/2011    Hyperlipidemia     Hypertension     Morbid obesity due 
than 60 years:   no established reference range  Pediatrics:                     no established reference range       Iron   Date Value Ref Range Status   07/13/2023 53 (L) 59 - 158 mcg/dL Final     TIBC   Date Value Ref Range Status   03/15/2020 247 (L) 250 - 450 mcg/dL Final       Vitamin B-12   Date Value Ref Range Status   07/13/2023 354 211 - 946 pg/mL Final       Folate   Date Value Ref Range Status   07/13/2023 6.7 4.8 - 24.2 ng/mL Final       Lab Results   Component Value Date/Time    COLORU Yellow 11/25/2023 12:06 AM    NITRU NEGATIVE 11/25/2023 12:06 AM    GLUCOSEU NEGATIVE 11/25/2023 12:06 AM    KETUA NEGATIVE 11/25/2023 12:06 AM    UROBILINOGEN 0.2 11/25/2023 12:06 AM    BILIRUBINUR NEGATIVE 11/25/2023 12:06 AM       Lab Results   Component Value Date/Time    DELFINO <20 10/18/2023 09:15 AM    OSMOU 412 04/04/2023 08:30 PM       No components found for: \"URIC\"    No results found for: \"LIPIDPAN\"    Assessment and Plans:    TAIWO stage II on CKD IV   TAIWO presumed likely in the setting of nausea/emesis and poor appetite likely exacerbated by outpatient torsemide and patient also s/p diuresis at  in March  CKD with history of HTN and CHF   Prior to hospitalization at  baseline creatinine 2.8 - 3   Creatinine at discharge from  3.73 on 3/23  Creatinine peaked at 6.3/ on 4/2--> creatinine 5.9/ today  CT abdomen 4/2 no hydronephrosis or obstructing stone  Following at  for possible heart/kidney transplant  Check urine studies  Avoid nephrotoxins/NSAIDs  Strict I&O, daily weights  Patient may require KRT with HD-follow labs and UOP closely  S/p short course IV fluids  Monitor labs    2.  Hyperkalemia  With TAIWO/CKD  K+ 6.3 on 4/2 (hemolyzed)--> 4.7 today   Low potassium diet when able to eat  Monitor labs    3.  High anion gap metabolic acidosis  With TAIWO/CKD  On sodium bicarbonate 650 mg oral 3 times daily  Monitor labs    4.  CHF  TTE 3/2024 EF 20%, mod-severe elevated right ventricular

## 2024-04-03 NOTE — INTERVAL H&P NOTE
IR H&P UPDATE NOTE  [facility] IMCU    Patient's History and Physical Examination were reviewed from current hospital admission records.    Rober Mejía III appears likely to able to tolerate the requested Paracentesis procedure by the consultant.    Risk and benefits were discussed with patient including ultimate complications, possibly death and consent was obtained.    Electronically signed by JAMESON Serna   DD: 4/3/24  2:09 PM

## 2024-04-03 NOTE — PLAN OF CARE
Problem: Discharge Planning  Goal: Discharge to home or other facility with appropriate resources  4/3/2024 0241 by Elaine Lemos RN  Outcome: Not Progressing     Problem: Cardiovascular - Adult  Goal: Maintains optimal cardiac output and hemodynamic stability  4/3/2024 0241 by Elaine Lemos RN  Outcome: Not Progressing     Problem: Gastrointestinal - Adult  Goal: Minimal or absence of nausea and vomiting  4/3/2024 0241 by Elaine Lemos RN  Outcome: Not Progressing     Problem: Metabolic/Fluid and Electrolytes - Adult  Goal: Hemodynamic stability and optimal renal function maintained  4/3/2024 0241 by Elaine Lemos RN  Outcome: Not Progressing     Problem: Discharge Planning  Goal: Discharge to home or other facility with appropriate resources  4/3/2024 0241 by Elaine Lemos RN  Outcome: Not Progressing     Problem: Cardiovascular - Adult  Goal: Maintains optimal cardiac output and hemodynamic stability  4/3/2024 0241 by Elaine Lemos RN  Outcome: Not Progressing     Problem: Gastrointestinal - Adult  Goal: Minimal or absence of nausea and vomiting  4/3/2024 0241 by Elaine Lemos RN  Outcome: Not Progressing     Problem: Metabolic/Fluid and Electrolytes - Adult  Goal: Hemodynamic stability and optimal renal function maintained  4/3/2024 0241 by Elaine Lemos RN  Outcome: Not Progressing     Problem: Chronic Conditions and Co-morbidities  Goal: Patient's chronic conditions and co-morbidity symptoms are monitored and maintained or improved  4/3/2024 1031 by Freida Funes RN  Outcome: Progressing  4/3/2024 0241 by Elaine Lemos RN  Outcome: Not Progressing     Problem: Safety - Adult  Goal: Free from fall injury  4/3/2024 1031 by Freida Funes RN  Outcome: Progressing  4/3/2024 0241 by Elaine Lemos RN  Outcome: Not Progressing     Problem: Cardiovascular - Adult  Goal: Absence of cardiac dysrhythmias or at baseline  4/3/2024 0241 by Canelo

## 2024-04-03 NOTE — OR NURSING
Sterile, woven gauze and clear transparent applied to RLQ.    The dressing application occurred in a well-lit room with strict adherence to aseptic technique. Examination of the procedure site revealed no signs of hematoma or complications.    The next dressing change and wound assessment should occur in 24 hours

## 2024-04-03 NOTE — PROGRESS NOTES
Spoke with RN regarding patient's ordered paracentesis. Patient INR is 2.4 patient remains on heparin drip. Awaiting response from team about any orders for changes to INR.

## 2024-04-03 NOTE — PLAN OF CARE
Problem: Chronic Conditions and Co-morbidities  Goal: Patient's chronic conditions and co-morbidity symptoms are monitored and maintained or improved  Outcome: Not Progressing     Problem: Discharge Planning  Goal: Discharge to home or other facility with appropriate resources  Outcome: Not Progressing     Problem: Safety - Adult  Goal: Free from fall injury  Outcome: Not Progressing     Problem: Cardiovascular - Adult  Goal: Maintains optimal cardiac output and hemodynamic stability  Outcome: Not Progressing     Problem: Gastrointestinal - Adult  Goal: Minimal or absence of nausea and vomiting  Outcome: Not Progressing     Problem: Metabolic/Fluid and Electrolytes - Adult  Goal: Hemodynamic stability and optimal renal function maintained  Outcome: Not Progressing

## 2024-04-03 NOTE — PROGRESS NOTES
4 Eyes Skin Assessment     NAME:  Rober Mejía III  YOB: 1980  MEDICAL RECORD NUMBER:  [id number]    The patient is being assessed for  Admission    I agree that at least one RN has performed a thorough Head to Toe Skin Assessment on the patient. ALL assessment sites listed below have been assessed.      Areas assessed by both nurses:    Head, Face, Ears, Shoulders, Back, Chest, Arms, Elbows, Hands, and Legs. Feet and Heels        Does the Patient have a Wound? No noted wound(s)       James Prevention initiated by RN: Yes  Wound Care Orders initiated by RN: No    Pressure Injury (Stage 3,4, Unstageable, DTI, NWPT, and Complex wounds) if present, place Wound referral order by RN under : No    New Ostomies, if present place, Ostomy referral order under : No     Nurse 1 eSignature: Electronically signed by Elaine Lemos RN on 4/2/24 at 11:44 PM EDT    **SHARE this note so that the co-signing nurse can place an eSignature**    Nurse 2 eSignature: {Esignature:[id number]}

## 2024-04-04 LAB — AFP SERPL-MCNC: 3.9 UG/L

## 2024-04-04 NOTE — PROGRESS NOTES
TRANSFER - OUT REPORT:    Verbal report given to *** on Rober Alfonzo III  being transferred to *** for {Transfer Care:60769}       Report consisted of patient's Situation, Background, Assessment and   Recommendations(SBAR).     Information from the following report(s) {SBAR Reports List:71704} was reviewed with the receiving nurse.    Lines:   Peripheral IV 04/02/24 Distal;Left;Ventral Forearm (Active)   Site Assessment Clean, dry & intact 04/03/24 2009   Line Status Infusing 04/03/24 2009   Line Care Connections checked and tightened 04/03/24 2009   Phlebitis Assessment No symptoms 04/03/24 2009   Infiltration Assessment 0 04/03/24 2009   Alcohol Cap Used Yes 04/03/24 2009   Dressing Status Clean, dry & intact 04/03/24 2009   Dressing Type Transparent 04/03/24 2009       Peripheral IV 04/02/24 Left;Proximal;Anterior Forearm (Active)   Site Assessment Clean, dry & intact 04/03/24 2009   Line Status Infusing 04/03/24 2009   Line Care Connections checked and tightened 04/03/24 2009   Phlebitis Assessment No symptoms 04/03/24 2009   Infiltration Assessment 0 04/03/24 2009   Alcohol Cap Used Yes 04/03/24 2009   Dressing Status Clean, dry & intact 04/03/24 2009   Dressing Type Transparent 04/03/24 2009        Opportunity for questions and clarification was provided.      Patient transported with:  {Transport Details:17170}

## 2024-04-05 LAB — NON-GYN CYTOLOGY REPORT: NORMAL

## 2024-04-06 LAB
MICROORGANISM SPEC CULT: NO GROWTH
MICROORGANISM/AGENT SPEC: NORMAL
SPECIMEN DESCRIPTION: NORMAL

## 2024-04-25 RX ORDER — NITROGLYCERIN 0.4 MG/1
TABLET SUBLINGUAL
Qty: 25 TABLET | Refills: 10 | Status: SHIPPED | OUTPATIENT
Start: 2024-04-25

## 2024-05-06 ENCOUNTER — TELEPHONE (OUTPATIENT)
Dept: CARDIOLOGY CLINIC | Age: 44
End: 2024-05-06

## 2024-05-06 DIAGNOSIS — Z95.818 PRESENCE OF CARDIOMEMS HF SYSTEM: Primary | ICD-10-CM

## 2024-05-06 DIAGNOSIS — I50.22 CHRONIC SYSTOLIC HEART FAILURE (HCC): ICD-10-CM

## 2024-05-06 NOTE — TELEPHONE ENCOUNTER
Patient has cardioMEMS heart failure sensor implanted in pulmonary artery by Dr. Pacheco on 12/13/2018    CardioMems update:      PAD:  Goal:24 mmHg  Current trends: No readings since 4/10/2024          Providers:  Cardiomems team: ANTIONE Kat CNP, Aliza Calderon, MONICA  Cardiologist: Dr. Driscoll/Dr. Mcdaniel ([facility])      CHF:   HFrEF 35%    Diuretic Regimen  Torsemide 100 mg daily      GUIDELINE DIRECTED MEDICAL THERAPY for HFrEF/HFmrEF:  ARNI/ACE I/ARB: (1a indication)  No  Hydralazine/Nitrates (1a in symptomatic AA population, 2b if unable to tolerate ACE/ARB/ARNI)  Hydralazine/Isordil  Beta blocker: (1a indication)  Carvedilol (Coreg)  Aldosterone antagonist: (1a indication)  No  SGLT2i: (1a indication)  No    If Channel inhibitor (2a indication if HR >70 on maximally tolerated beta blocker)  No  Cardiac glycoside (2b indication for symptomatic HFrEF)  No  Soluble Guanylate Cyclase (sGC) Stimulator (2b indication LVEF <45% with recent HFH, IV diuretics or elevated BNP)  No  Potassium binders (2b indication for hyperkalemia while taking RAASi)  No             ASSESSMENT:  No recent cardioMEMS readings. Patient currently getting dialysis three times weekly at [facility] on [facility] by [facility] he says. He reports he is still on torsemide diuretic also. Patient reports \" I have my good days and bad days\" he is currently on transplant waiting list for kidney and heart.    PLAN:  Resume daily cardioMEMS readings. Go to appointments as scheduled.          Future Appointments   Date Time Provider Department Center   5/23/2024 11:00 AM SCHEDULE, [facility]       Electronically signed by Aliza Calderon RN on 5/6/2024 at 3:51 PM

## 2024-06-10 ENCOUNTER — HOSPITAL ENCOUNTER (EMERGENCY)
Age: 44
Discharge: HOME | End: 2024-06-10
Payer: MEDICARE

## 2024-06-10 VITALS
SYSTOLIC BLOOD PRESSURE: 108 MMHG | TEMPERATURE: 98 F | RESPIRATION RATE: 16 BRPM | DIASTOLIC BLOOD PRESSURE: 65 MMHG | OXYGEN SATURATION: 99 % | HEART RATE: 78 BPM

## 2024-06-10 DIAGNOSIS — M10.9 ACUTE GOUT OF LEFT KNEE, UNSPECIFIED CAUSE: Primary | ICD-10-CM

## 2024-06-10 PROCEDURE — 6370000000 HC RX 637 (ALT 250 FOR IP): Performed by: NURSE PRACTITIONER

## 2024-06-10 PROCEDURE — 99283 EMERGENCY DEPT VISIT LOW MDM: CPT

## 2024-06-10 RX ORDER — HYDROCODONE BITARTRATE AND ACETAMINOPHEN 5; 325 MG/1; MG/1
1 TABLET ORAL ONCE
Status: COMPLETED | OUTPATIENT
Start: 2024-06-10 | End: 2024-06-10

## 2024-06-10 RX ORDER — HYDROCODONE BITARTRATE AND ACETAMINOPHEN 5; 325 MG/1; MG/1
1 TABLET ORAL EVERY 6 HOURS PRN
Qty: 12 TABLET | Refills: 0 | Status: SHIPPED | OUTPATIENT
Start: 2024-06-10 | End: 2024-06-13

## 2024-06-10 RX ADMIN — HYDROCODONE BITARTRATE AND ACETAMINOPHEN 1 TABLET: 5; 325 TABLET ORAL at 13:06

## 2024-06-10 ASSESSMENT — PAIN SCALES - GENERAL: PAINLEVEL_OUTOF10: 10

## 2024-06-10 ASSESSMENT — PAIN DESCRIPTION - LOCATION: LOCATION: KNEE

## 2024-06-10 NOTE — ED PROVIDER NOTES
[facility] EMERGENCY DEPARTMENT  ED  Encounter Note  Admit Date/RoomTime: 6/10/2024 11:45 AM  ED Room: [facility]    Independent TRESSA Visit.  HPI:  6/10/24,   Time: 3:10 PM EDT         Rober Mejía III is a 43 y.o. male presenting to the ED for left knee pain, beginning chronic in nature ago.  The complaint has been persistent, moderate in severity, and worsened by nothing.  Presents for complaints of left knee pain which she states is chronic however worse believes he is having a flareup of his gout.  He takes colchicine for pain.  He denies any falls or injuries.  Denies any shortness of breath.  He is in end-stage renal disease on hemodialysis was was to have his dialysis treatment today however canceled and rescheduled for tomorrow.    ROS:   Pertinent positives and negatives are stated within HPI, all other systems reviewed and are negative.  --------------------------------------------- PAST HISTORY ---------------------------------------------  Past Medical History:  has a past medical history of Acute CHF (HCC), Acute CHF (HCC), Acute idiopathic gout of right foot, AF (atrial fibrillation) (HCC), Anemia, CAD (coronary artery disease), cardioversion, Cerebral artery occlusion with cerebral infarction (HCC), CKD (chronic kidney disease), Gout, HTN (hypertension), Hyperlipidemia, Hypertension, Morbid obesity due to excess calories (HCC), Nonischemic cardiomyopathy (HCC), Nonischemic cardiomyopathy (HCC), ARVIND (obstructive sleep apnea), S/P left heart catheterization by percutaneous approach, S/P left pulmonary artery pressure sensor implant placement, Systolic heart failure (HCC), Type 2 diabetes mellitus with complication, with long-term current use of insulin (HCC), and URI, acute.    Past Surgical History:  has a past surgical history that includes ECHO Compl W Dop Color Flow (11/30/2011); ECHO Compl W Dop Color Flow (03/27/2012); Insertable Cardiac Monitor

## 2024-06-17 ENCOUNTER — HOSPITAL ENCOUNTER (OUTPATIENT)
Age: 44
Setting detail: OBSERVATION
Discharge: HOME | End: 2024-06-18
Attending: EMERGENCY MEDICINE | Admitting: INTERNAL MEDICINE
Payer: MEDICARE

## 2024-06-17 ENCOUNTER — APPOINTMENT (OUTPATIENT)
Dept: GENERAL RADIOLOGY | Age: 44
End: 2024-06-17
Payer: MEDICARE

## 2024-06-17 DIAGNOSIS — R06.00 DYSPNEA, UNSPECIFIED TYPE: ICD-10-CM

## 2024-06-17 DIAGNOSIS — R79.89 ELEVATED TROPONIN: Primary | ICD-10-CM

## 2024-06-17 LAB
ANION GAP SERPL CALCULATED.3IONS-SCNC: 20 MMOL/L (ref 7–16)
BASOPHILS # BLD: 0.06 K/UL (ref 0–0.2)
BASOPHILS NFR BLD: 1 % (ref 0–2)
BNP SERPL-MCNC: ABNORMAL PG/ML (ref 0–125)
BUN SERPL-MCNC: 56 MG/DL (ref 6–20)
CALCIUM SERPL-MCNC: 10.2 MG/DL (ref 8.6–10.2)
CHLORIDE SERPL-SCNC: 92 MMOL/L (ref 98–107)
CO2 SERPL-SCNC: 21 MMOL/L (ref 22–29)
CREAT SERPL-MCNC: 7.1 MG/DL (ref 0.7–1.2)
EKG ATRIAL RATE: 81 BPM
EKG P-R INTERVAL: 144 MS
EKG Q-T INTERVAL: 516 MS
EKG QRS DURATION: 178 MS
EKG QTC CALCULATION (BAZETT): 599 MS
EKG R AXIS: 165 DEGREES
EKG T AXIS: -7 DEGREES
EKG VENTRICULAR RATE: 81 BPM
EOSINOPHIL # BLD: 0.24 K/UL (ref 0.05–0.5)
EOSINOPHILS RELATIVE PERCENT: 4 % (ref 0–6)
ERYTHROCYTE [DISTWIDTH] IN BLOOD BY AUTOMATED COUNT: 17.3 % (ref 11.5–15)
GFR, ESTIMATED: 9 ML/MIN/1.73M2
GLUCOSE SERPL-MCNC: 106 MG/DL (ref 74–99)
HCT VFR BLD AUTO: 36.6 % (ref 37–54)
HGB BLD-MCNC: 11.8 G/DL (ref 12.5–16.5)
IMM GRANULOCYTES # BLD AUTO: 0.08 K/UL (ref 0–0.58)
IMM GRANULOCYTES NFR BLD: 1 % (ref 0–5)
LYMPHOCYTES NFR BLD: 1.34 K/UL (ref 1.5–4)
LYMPHOCYTES RELATIVE PERCENT: 22 % (ref 20–42)
MAGNESIUM SERPL-MCNC: 2.7 MG/DL (ref 1.6–2.6)
MCH RBC QN AUTO: 28.3 PG (ref 26–35)
MCHC RBC AUTO-ENTMCNC: 32.2 G/DL (ref 32–34.5)
MCV RBC AUTO: 87.8 FL (ref 80–99.9)
MONOCYTES NFR BLD: 0.86 K/UL (ref 0.1–0.95)
MONOCYTES NFR BLD: 14 % (ref 2–12)
NEUTROPHILS NFR BLD: 58 % (ref 43–80)
NEUTS SEG NFR BLD: 3.58 K/UL (ref 1.8–7.3)
PLATELET # BLD AUTO: 280 K/UL (ref 130–450)
PMV BLD AUTO: 8.9 FL (ref 7–12)
POTASSIUM SERPL-SCNC: 4.6 MMOL/L (ref 3.5–5)
RBC # BLD AUTO: 4.17 M/UL (ref 3.8–5.8)
SODIUM SERPL-SCNC: 133 MMOL/L (ref 132–146)
TROPONIN I SERPL HS-MCNC: 351 NG/L (ref 0–11)
TROPONIN I SERPL HS-MCNC: 358 NG/L (ref 0–11)
WBC OTHER # BLD: 6.2 K/UL (ref 4.5–11.5)

## 2024-06-17 PROCEDURE — 99285 EMERGENCY DEPT VISIT HI MDM: CPT

## 2024-06-17 PROCEDURE — 99221 1ST HOSP IP/OBS SF/LOW 40: CPT | Performed by: INTERNAL MEDICINE

## 2024-06-17 PROCEDURE — G0378 HOSPITAL OBSERVATION PER HR: HCPCS

## 2024-06-17 PROCEDURE — 99223 1ST HOSP IP/OBS HIGH 75: CPT | Performed by: INTERNAL MEDICINE

## 2024-06-17 PROCEDURE — 90935 HEMODIALYSIS ONE EVALUATION: CPT

## 2024-06-17 PROCEDURE — 93010 ELECTROCARDIOGRAM REPORT: CPT | Performed by: INTERNAL MEDICINE

## 2024-06-17 PROCEDURE — 83880 ASSAY OF NATRIURETIC PEPTIDE: CPT

## 2024-06-17 PROCEDURE — 2580000003 HC RX 258: Performed by: FAMILY MEDICINE

## 2024-06-17 PROCEDURE — 80048 BASIC METABOLIC PNL TOTAL CA: CPT

## 2024-06-17 PROCEDURE — 94660 CPAP INITIATION&MGMT: CPT

## 2024-06-17 PROCEDURE — 84484 ASSAY OF TROPONIN QUANT: CPT

## 2024-06-17 PROCEDURE — 6370000000 HC RX 637 (ALT 250 FOR IP): Performed by: FAMILY MEDICINE

## 2024-06-17 PROCEDURE — 83735 ASSAY OF MAGNESIUM: CPT

## 2024-06-17 PROCEDURE — APPSS60 APP SPLIT SHARED TIME 46-60 MINUTES

## 2024-06-17 PROCEDURE — 93005 ELECTROCARDIOGRAM TRACING: CPT

## 2024-06-17 PROCEDURE — 71045 X-RAY EXAM CHEST 1 VIEW: CPT

## 2024-06-17 PROCEDURE — 85025 COMPLETE CBC W/AUTO DIFF WBC: CPT

## 2024-06-17 RX ORDER — ONDANSETRON 4 MG/1
4 TABLET, ORALLY DISINTEGRATING ORAL EVERY 8 HOURS PRN
Status: DISCONTINUED | OUTPATIENT
Start: 2024-06-17 | End: 2024-06-17

## 2024-06-17 RX ORDER — ENOXAPARIN SODIUM 100 MG/ML
30 INJECTION SUBCUTANEOUS DAILY
Status: DISCONTINUED | OUTPATIENT
Start: 2024-06-17 | End: 2024-06-17

## 2024-06-17 RX ORDER — HYDRALAZINE HYDROCHLORIDE 50 MG/1
150 TABLET, FILM COATED ORAL 3 TIMES DAILY
Status: DISCONTINUED | OUTPATIENT
Start: 2024-06-17 | End: 2024-06-17

## 2024-06-17 RX ORDER — HEPARIN SODIUM 5000 [USP'U]/ML
5000 INJECTION, SOLUTION INTRAVENOUS; SUBCUTANEOUS EVERY 8 HOURS
Status: DISCONTINUED | OUTPATIENT
Start: 2024-06-17 | End: 2024-06-17

## 2024-06-17 RX ORDER — M-VIT,TX,IRON,MINS/CALC/FOLIC 27MG-0.4MG
1 TABLET ORAL DAILY
Status: DISCONTINUED | OUTPATIENT
Start: 2024-06-17 | End: 2024-06-18 | Stop reason: HOSPADM

## 2024-06-17 RX ORDER — ACETAMINOPHEN 650 MG/1
650 SUPPOSITORY RECTAL EVERY 6 HOURS PRN
Status: DISCONTINUED | OUTPATIENT
Start: 2024-06-17 | End: 2024-06-18 | Stop reason: HOSPADM

## 2024-06-17 RX ORDER — SODIUM CHLORIDE 0.9 % (FLUSH) 0.9 %
5-40 SYRINGE (ML) INJECTION PRN
Status: DISCONTINUED | OUTPATIENT
Start: 2024-06-17 | End: 2024-06-18 | Stop reason: HOSPADM

## 2024-06-17 RX ORDER — ATORVASTATIN CALCIUM 40 MG/1
40 TABLET, FILM COATED ORAL NIGHTLY
Status: DISCONTINUED | OUTPATIENT
Start: 2024-06-17 | End: 2024-06-18 | Stop reason: HOSPADM

## 2024-06-17 RX ORDER — ISOSORBIDE DINITRATE 10 MG/1
40 TABLET ORAL 3 TIMES DAILY
Status: DISCONTINUED | OUTPATIENT
Start: 2024-06-17 | End: 2024-06-18 | Stop reason: HOSPADM

## 2024-06-17 RX ORDER — TORSEMIDE 20 MG/1
100 TABLET ORAL DAILY
Status: DISCONTINUED | OUTPATIENT
Start: 2024-06-17 | End: 2024-06-18 | Stop reason: HOSPADM

## 2024-06-17 RX ORDER — PROCHLORPERAZINE EDISYLATE 5 MG/ML
10 INJECTION INTRAMUSCULAR; INTRAVENOUS EVERY 6 HOURS PRN
Status: DISCONTINUED | OUTPATIENT
Start: 2024-06-17 | End: 2024-06-18 | Stop reason: HOSPADM

## 2024-06-17 RX ORDER — SODIUM CHLORIDE 9 MG/ML
INJECTION, SOLUTION INTRAVENOUS PRN
Status: DISCONTINUED | OUTPATIENT
Start: 2024-06-17 | End: 2024-06-18 | Stop reason: HOSPADM

## 2024-06-17 RX ORDER — PROCHLORPERAZINE MALEATE 10 MG
10 TABLET ORAL EVERY 8 HOURS PRN
Status: DISCONTINUED | OUTPATIENT
Start: 2024-06-17 | End: 2024-06-18 | Stop reason: HOSPADM

## 2024-06-17 RX ORDER — MIDODRINE HYDROCHLORIDE 5 MG/1
2.5 TABLET ORAL 3 TIMES DAILY
Status: DISCONTINUED | OUTPATIENT
Start: 2024-06-17 | End: 2024-06-17

## 2024-06-17 RX ORDER — CARVEDILOL 3.12 MG/1
3.12 TABLET ORAL 2 TIMES DAILY WITH MEALS
Status: DISCONTINUED | OUTPATIENT
Start: 2024-06-17 | End: 2024-06-18 | Stop reason: HOSPADM

## 2024-06-17 RX ORDER — SODIUM BICARBONATE 650 MG/1
650 TABLET ORAL 2 TIMES DAILY
Status: DISCONTINUED | OUTPATIENT
Start: 2024-06-17 | End: 2024-06-18 | Stop reason: HOSPADM

## 2024-06-17 RX ORDER — MEXILETINE HYDROCHLORIDE 150 MG/1
150 CAPSULE ORAL 3 TIMES DAILY
Status: DISCONTINUED | OUTPATIENT
Start: 2024-06-17 | End: 2024-06-18 | Stop reason: HOSPADM

## 2024-06-17 RX ORDER — SUCRALFATE 1 G/1
1 TABLET ORAL EVERY 12 HOURS SCHEDULED
Status: DISCONTINUED | OUTPATIENT
Start: 2024-06-17 | End: 2024-06-18 | Stop reason: HOSPADM

## 2024-06-17 RX ORDER — MAGNESIUM OXIDE 400 MG/1
400 TABLET ORAL 2 TIMES DAILY
COMMUNITY

## 2024-06-17 RX ORDER — PANTOPRAZOLE SODIUM 40 MG/1
40 TABLET, DELAYED RELEASE ORAL DAILY
Status: DISCONTINUED | OUTPATIENT
Start: 2024-06-17 | End: 2024-06-18 | Stop reason: HOSPADM

## 2024-06-17 RX ORDER — HYDRALAZINE HYDROCHLORIDE 50 MG/1
100 TABLET, FILM COATED ORAL EVERY 8 HOURS SCHEDULED
Status: DISCONTINUED | OUTPATIENT
Start: 2024-06-17 | End: 2024-06-18 | Stop reason: HOSPADM

## 2024-06-17 RX ORDER — ONDANSETRON 2 MG/ML
4 INJECTION INTRAMUSCULAR; INTRAVENOUS EVERY 6 HOURS PRN
Status: DISCONTINUED | OUTPATIENT
Start: 2024-06-17 | End: 2024-06-17

## 2024-06-17 RX ORDER — SODIUM CHLORIDE 0.9 % (FLUSH) 0.9 %
5-40 SYRINGE (ML) INJECTION EVERY 12 HOURS SCHEDULED
Status: DISCONTINUED | OUTPATIENT
Start: 2024-06-17 | End: 2024-06-18 | Stop reason: HOSPADM

## 2024-06-17 RX ORDER — POLYETHYLENE GLYCOL 3350 17 G/17G
17 POWDER, FOR SOLUTION ORAL DAILY PRN
Status: DISCONTINUED | OUTPATIENT
Start: 2024-06-17 | End: 2024-06-18 | Stop reason: HOSPADM

## 2024-06-17 RX ORDER — ACETAMINOPHEN 325 MG/1
650 TABLET ORAL EVERY 6 HOURS PRN
Status: DISCONTINUED | OUTPATIENT
Start: 2024-06-17 | End: 2024-06-18 | Stop reason: HOSPADM

## 2024-06-17 RX ADMIN — SODIUM BICARBONATE 650 MG: 650 TABLET ORAL at 21:03

## 2024-06-17 RX ADMIN — SODIUM CHLORIDE, PRESERVATIVE FREE 10 ML: 5 INJECTION INTRAVENOUS at 21:08

## 2024-06-17 RX ADMIN — APIXABAN 5 MG: 5 TABLET, FILM COATED ORAL at 21:02

## 2024-06-17 RX ADMIN — ISOSORBIDE DINITRATE 40 MG: 10 TABLET ORAL at 21:03

## 2024-06-17 RX ADMIN — ATORVASTATIN CALCIUM 40 MG: 40 TABLET, FILM COATED ORAL at 21:02

## 2024-06-17 RX ADMIN — SODIUM CHLORIDE, PRESERVATIVE FREE 10 ML: 5 INJECTION INTRAVENOUS at 08:53

## 2024-06-17 RX ADMIN — SUCRALFATE 1 G: 1 TABLET ORAL at 21:02

## 2024-06-17 ASSESSMENT — PAIN SCALES - GENERAL: PAINLEVEL_OUTOF10: 0

## 2024-06-17 ASSESSMENT — PAIN - FUNCTIONAL ASSESSMENT: PAIN_FUNCTIONAL_ASSESSMENT: NONE - DENIES PAIN

## 2024-06-17 NOTE — FLOWSHEET NOTE
06/17/24 1732   Vital Signs   BP (!) 121/90   Temp 96.9 °F (36.1 °C)   Pulse 81   Post-Hemodialysis Assessment   Post-Treatment Procedures Blood returned;Catheter capped, clamped and heparinized x 2 ports   Machine Disinfection Process Acid/Vinegar Clean;Heat Disinfect;Exterior Machine Disinfection   Rinseback Volume (ml) 300 ml   Blood Volume Processed (Liters) 75.4 L   Dialyzer Clearance Lightly streaked   Duration of Treatment (minutes) 240 minutes   Heparin Amount Administered During Treatment (mL) 0 mL   Hemodialysis Intake (ml) 300 ml   Hemodialysis Output (ml) 2800 ml   NET Removed (ml) 2500   Tolerated Treatment Good   Patient Response to Treatment tolerated well, blood retuned, cath care per policy/procedure, lines flushed, heparin instilled ports capped

## 2024-06-17 NOTE — ED PROVIDER NOTES
43-year-old male with history of type 2 diabetes, chronic kidney disease to get his dialysis Monday Wednesday Friday and has not missed a session, history of a flutter on chronic anticoagulation of Eliquis.  He also has a pacemaker.  He presents to the emergency department for the concerns of shortness of breath.  He stated that he woke up today feeling short of breath which seems to be worse with exertion and better with rest.  He denies the following: Fever, chills, chest pain, chest pressure, abdominal pain, nausea, vomiting the patient states that at baseline he does not make much urine, no GI bleed noted.        Chief Complaint   Patient presents with    Shortness of Breath     Pt complaint of SOB tonight on his c-pap machine. Denies chest pain        Review of Systems   Pert stated in the hpi above   Physical Exam  Vitals reviewed.   Constitutional:       General: He is not in acute distress.     Appearance: He is not ill-appearing.   HENT:      Head: Normocephalic.      Right Ear: External ear normal.      Left Ear: External ear normal.      Nose: Nose normal.      Mouth/Throat:      Mouth: Mucous membranes are moist.   Eyes:      General:         Right eye: No discharge.         Left eye: No discharge.      Conjunctiva/sclera: Conjunctivae normal.   Cardiovascular:      Rate and Rhythm: Normal rate and regular rhythm.      Heart sounds:      No friction rub. No gallop.   Pulmonary:      Effort: No respiratory distress.      Breath sounds: No stridor.   Abdominal:      General: There is no distension.      Tenderness: There is no abdominal tenderness. There is no guarding or rebound.   Musculoskeletal:         General: No deformity or signs of injury.      Cervical back: Normal range of motion and neck supple. No rigidity or tenderness.   Skin:     Coloration: Skin is not jaundiced.   Neurological:      Mental Status: He is alert.      Sensory: No sensory deficit.      Motor: No weakness.   Psychiatric:

## 2024-06-17 NOTE — H&P
K 4.6   CL 92*   CO2 21*   BUN 56*   CREATININE 7.1*   GLUCOSE 106*   CALCIUM 10.2       Recent Labs     06/17/24  0145   WBC 6.2   RBC 4.17   HGB 11.8*   HCT 36.6*   MCV 87.8   MCH 28.3   MCHC 32.2   RDW 17.3*      MPV 8.9       No results for input(s): \"POCGLU\" in the last 72 hours.    Radiology:   XR CHEST PORTABLE   Final Result   1. No acute cardiopulmonary process.   2. Cardiomegaly.             EKG:   Encounter Date: 06/17/24   EKG 12 Lead   Result Value    Ventricular Rate 81    Atrial Rate 81    P-R Interval 144    QRS Duration 178    Q-T Interval 516    QTc Calculation (Bazett) 599    R Axis 165    T Axis -7    Narrative    Atrial-sensed ventricular-paced rhythm  Abnormal ECG  When compared with ECG of 02-APR-2024 13:18,  Significant changes have occurred       No results found for this or any previous visit.      Principal Problem:    Elevated troponin  Resolved Problems:    * No resolved hospital problems. *      ASSESSMENT and PLAN:    Chest pain with associated shortness of breath  Elevated troponin, rule out NSTEMI  Nonischemic cardiomyopathy, ejection fraction 45%, euvolemic on exam  Paroxysmal A-fib, on Xarelto  Ventricular tachycardia, AICD in situ  Anion gap metabolic acidosis secondary to azotemia  And secondary disease on hemodialysis  Insulin-dependent diabetes mellitus  Obstructive sleep apnea, CPAP dependent      Rule out NSTEMI  Obtain stat EKG, repeat troponin  Consult cardiology and nephrology  Resume home medicine after med rec is complete  Monitor blood sugar  CPAP nightly      Diet: No diet orders on file  Code Status: Prior  DVT Prophylaxis    Additional work up or/and treatment plan may be added today or thereafter based on clinical progression, result of labs and imaging. I am managing admission portion of patient care. Some medical issues are handled by other specialists. Additional work up and treatment should be done by my colleague hospitalist who assumes care

## 2024-06-17 NOTE — CONSULTS
Inpatient Cardiology Consultation      Reason for Consult:  Elevated troponin    Consulting Physician: Dr Pacheco    Requesting Physician:  Gregor Girard DO    Date of Consultation: 6/17/2024    HISTORY OF PRESENT ILLNESS:   Patient is a 43-year-old male who is known to [facility] cardiology through Dr Driscoll and [facility] EP with Dr Millard.  Patient also follows with  cardiology.  Patient was last seen by [facility] cardiology inpatient 1/8/2024 for elevated troponin and CHF.     cardiology 6/4/2024: Colchicine started twice weekly Monday and Friday for gout.  Discussion was to be had to transition from Eliquis to warfarin on the next visit and plan for 3-month RHC was scheduled.  Currently inactive on transplant list 2/2 malnutrition.  Coreg, hydralazine, Isodril, furosemide continue.    HPI:  Patient presented to ER 6/17/2024 for shortness of breath while on his CPAP machine.  Patient also reporting he developed chest pain earlier that day after dialysis with associated shortness of breath.  Chest pain resolved prior to coming to the ER.  Cardiology consulted for elevated troponin and nephrology consulted for dialysis management.  VS upon arrival 98.7, 16 respirations, 88 pulse, 111/87, 98% room air.  Labs: Potassium 4.6, chloride 92, CO2 21, BUN 56, creatinine 7.1 (6/3: 8.09), anion gap 20, magnesium 2.7, glucose 106, serum calcium 10.2, proBNP 33,500, troponin 358 > 351 (4/3: 148 > 140), WBC 6.2, H&H 11/36, platelet 280  CXR: No acute process.  Cardiomegaly    Upon assessment today 6/17/2024 patient is sitting on edge of bed on room air.  Patient is alert and oriented, speaking full sentences, and is in no apparent distress at this time.  Patient reports he came in due to 1 day of shortness of breath while on CPAP.  He reports orthopnea.  He denies any recent chest pain.  He reports he has been having weight gain at home with worsening shortness of breath.  He also reports worsening peripheral swelling.  His 
of insulin (HCC)    Hepatomegaly    PAF (paroxysmal atrial fibrillation) (HCC)    Essential hypertension    VHD (valvular heart disease)    Morbid obesity due to excess calories (HCC)    Cardiac resynchronization therapy defibrillator (CRT-D) in place    Ventricular arrhythmia    NICM (nonischemic cardiomyopathy) ([facility])    S/P left pulmonary artery pressure sensor implant placement    Acute decompensated heart failure (HCC)    Carotid disease, bilateral (HCC)    Chronic combined systolic and diastolic congestive heart failure (HCC)    Atrial flutter (HCC)    Atrial fibrillation (HCC)    TAIWO (acute kidney injury) (HCC)    HFrEF (heart failure with reduced ejection fraction) ([facility])    Presence of CardioMEMS HF system    Ventricular tachycardia (HCC)    Acute chest pain    Presence of cardiac resynchronization therapy defibrillator (CRT-D)    Syncope and collapse    Elevated brain natriuretic peptide (BNP) level    Coronary artery disease involving native coronary artery of native heart without angina pectoris    Nonrheumatic mitral valve regurgitation    Nonrheumatic tricuspid valve regurgitation    Pulmonary hypertension (HCC)    History of CVA (cerebrovascular accident)    Chronic anticoagulation    Hypotension    Chest pain    Swelling of lower extremity    Dyspnea    Stage 3b chronic kidney disease (HCC)    CHF (congestive heart failure), NYHA class III, unspecified failure chronicity, combined (HCC)    Elevated troponin       Meds:     sodium chloride flush  5-40 mL IntraVENous 2 times per day    apixaban  5 mg Oral BID    atorvastatin  40 mg Oral Nightly    carvedilol  3.125 mg Oral BID WC    [Held by provider] hydrALAZINE  150 mg Oral TID    isosorbide dinitrate  40 mg Oral TID    mexiletine  150 mg Oral TID    midodrine  2.5 mg Oral TID    therapeutic multivitamin-minerals  1 tablet Oral Daily    pantoprazole  40 mg Oral Daily    sodium bicarbonate  650 mg Oral BID    sucralfate  1 g Oral 2 times per day

## 2024-06-17 NOTE — ED NOTES
Report given to , RN from 8200.   Report method by phone   The following was reviewed with receiving RN:   Current vital signs:  /86   Pulse 88   Temp 98.5 °F (36.9 °C) (Oral)   Resp 20   Ht 1.829 m (6')   Wt 97.1 kg (214 lb)   SpO2 98%   BMI 29.02 kg/m²                MEWS Score: 1     Any medication or safety alerts were reviewed. Any pending diagnostics and notifications were also reviewed, as well as any safety concerns or issues, abnormal labs, abnormal imaging, and abnormal assessment findings. Questions were answered.

## 2024-06-18 VITALS
TEMPERATURE: 97.3 F | DIASTOLIC BLOOD PRESSURE: 92 MMHG | RESPIRATION RATE: 21 BRPM | BODY MASS INDEX: 30.35 KG/M2 | HEART RATE: 84 BPM | OXYGEN SATURATION: 98 % | SYSTOLIC BLOOD PRESSURE: 114 MMHG | HEIGHT: 72 IN | WEIGHT: 224.1 LBS

## 2024-06-18 LAB
ANION GAP SERPL CALCULATED.3IONS-SCNC: 20 MMOL/L (ref 7–16)
BASOPHILS # BLD: 0.05 K/UL (ref 0–0.2)
BASOPHILS NFR BLD: 1 % (ref 0–2)
BUN SERPL-MCNC: 42 MG/DL (ref 6–20)
CALCIUM SERPL-MCNC: 9.5 MG/DL (ref 8.6–10.2)
CHLORIDE SERPL-SCNC: 93 MMOL/L (ref 98–107)
CO2 SERPL-SCNC: 22 MMOL/L (ref 22–29)
CREAT SERPL-MCNC: 5.7 MG/DL (ref 0.7–1.2)
EOSINOPHIL # BLD: 0.19 K/UL (ref 0.05–0.5)
EOSINOPHILS RELATIVE PERCENT: 3 % (ref 0–6)
ERYTHROCYTE [DISTWIDTH] IN BLOOD BY AUTOMATED COUNT: 17.5 % (ref 11.5–15)
GFR, ESTIMATED: 12 ML/MIN/1.73M2
GLUCOSE SERPL-MCNC: 78 MG/DL (ref 74–99)
HCT VFR BLD AUTO: 35.3 % (ref 37–54)
HGB BLD-MCNC: 11.4 G/DL (ref 12.5–16.5)
IMM GRANULOCYTES # BLD AUTO: 0.03 K/UL (ref 0–0.58)
IMM GRANULOCYTES NFR BLD: 1 % (ref 0–5)
LYMPHOCYTES NFR BLD: 1.4 K/UL (ref 1.5–4)
LYMPHOCYTES RELATIVE PERCENT: 24 % (ref 20–42)
MAGNESIUM SERPL-MCNC: 2.4 MG/DL (ref 1.6–2.6)
MCH RBC QN AUTO: 28.4 PG (ref 26–35)
MCHC RBC AUTO-ENTMCNC: 32.3 G/DL (ref 32–34.5)
MCV RBC AUTO: 87.8 FL (ref 80–99.9)
MONOCYTES NFR BLD: 0.65 K/UL (ref 0.1–0.95)
MONOCYTES NFR BLD: 11 % (ref 2–12)
NEUTROPHILS NFR BLD: 61 % (ref 43–80)
NEUTS SEG NFR BLD: 3.65 K/UL (ref 1.8–7.3)
PHOSPHATE SERPL-MCNC: 4.4 MG/DL (ref 2.5–4.5)
PLATELET # BLD AUTO: 270 K/UL (ref 130–450)
PMV BLD AUTO: 9 FL (ref 7–12)
POTASSIUM SERPL-SCNC: 4.4 MMOL/L (ref 3.5–5)
RBC # BLD AUTO: 4.02 M/UL (ref 3.8–5.8)
SODIUM SERPL-SCNC: 135 MMOL/L (ref 132–146)
WBC OTHER # BLD: 6 K/UL (ref 4.5–11.5)

## 2024-06-18 PROCEDURE — 80048 BASIC METABOLIC PNL TOTAL CA: CPT

## 2024-06-18 PROCEDURE — 36415 COLL VENOUS BLD VENIPUNCTURE: CPT

## 2024-06-18 PROCEDURE — 6370000000 HC RX 637 (ALT 250 FOR IP): Performed by: FAMILY MEDICINE

## 2024-06-18 PROCEDURE — 84100 ASSAY OF PHOSPHORUS: CPT

## 2024-06-18 PROCEDURE — 2580000003 HC RX 258: Performed by: FAMILY MEDICINE

## 2024-06-18 PROCEDURE — 85025 COMPLETE CBC W/AUTO DIFF WBC: CPT

## 2024-06-18 PROCEDURE — 94660 CPAP INITIATION&MGMT: CPT

## 2024-06-18 PROCEDURE — 99238 HOSP IP/OBS DSCHRG MGMT 30/<: CPT | Performed by: INTERNAL MEDICINE

## 2024-06-18 PROCEDURE — G0378 HOSPITAL OBSERVATION PER HR: HCPCS

## 2024-06-18 PROCEDURE — 83735 ASSAY OF MAGNESIUM: CPT

## 2024-06-18 RX ADMIN — APIXABAN 5 MG: 5 TABLET, FILM COATED ORAL at 08:42

## 2024-06-18 RX ADMIN — SODIUM BICARBONATE 650 MG: 650 TABLET ORAL at 08:42

## 2024-06-18 RX ADMIN — MEXILETINE HYDROCHLORIDE 150 MG: 150 CAPSULE ORAL at 08:43

## 2024-06-18 RX ADMIN — SUCRALFATE 1 G: 1 TABLET ORAL at 08:41

## 2024-06-18 RX ADMIN — PANTOPRAZOLE SODIUM 40 MG: 40 TABLET, DELAYED RELEASE ORAL at 08:43

## 2024-06-18 RX ADMIN — ISOSORBIDE DINITRATE 40 MG: 10 TABLET ORAL at 08:41

## 2024-06-18 RX ADMIN — SODIUM CHLORIDE, PRESERVATIVE FREE 10 ML: 5 INJECTION INTRAVENOUS at 08:42

## 2024-06-18 RX ADMIN — Medication 1 TABLET: at 08:41

## 2024-06-18 RX ADMIN — CARVEDILOL 3.12 MG: 3.12 TABLET, FILM COATED ORAL at 08:42

## 2024-06-18 NOTE — PROGRESS NOTES
4 Eyes Skin Assessment     NAME:  Rober Mejía III  YOB: 1980  MEDICAL RECORD NUMBER:  [id number]    The patient is being assessed for  Admission    I agree that at least one RN has performed a thorough Head to Toe Skin Assessment on the patient. ALL assessment sites listed below have been assessed.      Areas assessed by both nurses:    Head, Face, Ears, Shoulders, Back, Chest, Arms, Elbows, Hands, Sacrum. Buttock, Coccyx, Ischium, Legs. Feet and Heels, Under Medical Devices , and Other N/A        Does the Patient have a Wound? No noted wound(s)       James Prevention initiated by RN: No  Wound Care Orders initiated by RN: No    Pressure Injury (Stage 3,4, Unstageable, DTI, NWPT, and Complex wounds) if present, place Wound referral order by RN under : No    New Ostomies, if present place, Ostomy referral order under : No     Nurse 1 eSignature: Electronically signed by Bree Rosario RN on 6/17/24 at 1:04 PM EDT    **SHARE this note so that the co-signing nurse can place an eSignature**    Nurse 2 eSignature: Electronically signed by JUDSON WADE RN on 6/17/24 at 1:05 PM EDT  
Patient admitted and seen in this morning by one of my partners.  Labs, vitals, medications and images are reviewed.    43-year-old male past medical history of ESRD nonischemic cardiomyopathy status post AICD, diabetes presented to chest pain troponin elevated at 358.  Hide at his baseline troponin of around 140s on previous labs and admissions.  Echocardiogram back in October 2022 with EF of 25%.  Has been admitted for dialysis as proBNP was elevated and so cardiology has been consulted for chest pain.  EKG with atrial sensed ventricular paced rhythm.  Medications reviewed and reconciled.  
mg Oral TID    therapeutic multivitamin-minerals  1 tablet Oral Daily    pantoprazole  40 mg Oral Daily    sodium bicarbonate  650 mg Oral BID    sucralfate  1 g Oral 2 times per day    [Held by provider] torsemide  100 mg Oral Daily    hydrALAZINE  100 mg Oral 3 times per day     PRN Meds: sodium chloride flush, sodium chloride, polyethylene glycol, acetaminophen **OR** acetaminophen, prochlorperazine **OR** prochlorperazine        Labs:     Recent Labs     06/17/24 0145 06/18/24 0453   WBC 6.2 6.0   HGB 11.8* 11.4*   HCT 36.6* 35.3*    270       Recent Labs     06/17/24 0145 06/18/24 0453    135   K 4.6 4.4   CL 92* 93*   CO2 21* 22   BUN 56* 42*   CREATININE 7.1* 5.7*   CALCIUM 10.2 9.5   PHOS  --  4.4       No results for input(s): \"PROT\", \"ALKPHOS\", \"ALT\", \"AST\", \"BILITOT\", \"AMYLASE\", \"LIPASE\" in the last 72 hours.    Invalid input(s): \"ALB\"    No results for input(s): \"INR\" in the last 72 hours.    No results for input(s): \"CKTOTAL\", \"TROPONINI\" in the last 72 hours.    Chronic labs:    Lab Results   Component Value Date    CHOL 131 01/07/2024    TRIG 57 01/07/2024    HDL 57 01/07/2024    TSH 5.32 (H) 01/07/2024    INR 2.3 04/03/2024    LABA1C 6.3 (H) 01/07/2024       Radiology: REVIEWED DAILY    +++++++++++++++++++++++++++++++++++++++++++++++++  MD Jayna Dietz - Hospitalist  Yimi Salcedo  [facility], [place]  +++++++++++++++++++++++++++++++++++++++++++++++++  NOTE: This report was transcribed using voice recognition software. Every effort was made to ensure accuracy; however, inadvertent computerized transcription errors may be present.

## 2024-06-18 NOTE — CARE COORDINATION
6/18/24, Discharge Acknowledged-patient admitted for Elevated Troponin.  SW met with patient at bedside to discuss transition of care/discharge preference and SW role.  Patient lives alone in a 2 story apartment with elevator access.  DME owned is a [employer] that he only uses when patient's gout flares up.  Pharmacy is [facility].  A new PCP appointment was made at the [facility] primary care with Dr. Yony Owens on 6/24/24 at 11:15 am.  Per patient he is not able to see Dr. Awadalla.  Patient is in process of getting aides services through [employer] [facility].  Patient is on the heart and kidney transplant list at [facility].  Patient is a dialysis patient at [facility] on [address] on Mon-Wed and Fridays 11:30 am-4 pm. Call placed to [facility] on patient to be on dialysis list for tomorrow since he will be discharging to home today.  Patient confirmed he already has transport set up for dialysis tomorrow through his insurance.  Patient says that insurance confirmed with him on  time in am tomorrow for dialysis.   Urber or aunt will  patient today.  SW to follow.      Case Management Assessment  Initial Evaluation    Date/Time of Evaluation: 6/18/2024 11:59 AM  Assessment Completed by: LUCIO Ramos    If patient is discharged prior to next notation, then this note serves as note for discharge by case management.    Patient Name: Rober Mejía III                   YOB: 1980  Diagnosis: Elevated troponin [R79.89]  Dyspnea, unspecified type [R06.00]                   Date / Time: 6/17/2024  1:35 AM    Patient Admission Status: Observation   Readmission Risk (Low < 19, Mod (19-27), High > 27): Readmission Risk Score: 32.5    Current PCP: Awadalla, Maged I, MD  PCP verified by CM? Yes    Chart Reviewed: Yes      History Provided by: Patient  Patient Orientation: Alert and Oriented    Patient Cognition: Alert    Hospitalization in the last 30 days (Readmission):  No    If yes, Readmission

## 2024-06-18 NOTE — PLAN OF CARE
Problem: Chronic Conditions and Co-morbidities  Goal: Patient's chronic conditions and co-morbidity symptoms are monitored and maintained or improved  6/17/2024 2346 by Omar Mejía RN  Outcome: Progressing  6/17/2024 1517 by Kierra Guillen RN  Outcome: Progressing     Problem: Safety - Adult  Goal: Free from fall injury  6/17/2024 2346 by Omar Mejía RN  Outcome: Progressing  6/17/2024 1517 by Kierra Guillen RN  Outcome: Progressing     Problem: Respiratory - Adult  Goal: Achieves optimal ventilation and oxygenation  6/17/2024 2346 by Omar Mejía RN  Outcome: Progressing  6/17/2024 1517 by Kierra Guillen RN  Outcome: Progressing     Problem: Cardiovascular - Adult  Goal: Maintains optimal cardiac output and hemodynamic stability  6/17/2024 2346 by Omar Mejía RN  Outcome: Progressing  6/17/2024 1517 by Kierra Guillen RN  Outcome: Progressing  Goal: Absence of cardiac dysrhythmias or at baseline  6/17/2024 2346 by Omar Mejía RN  Outcome: Progressing  6/17/2024 1517 by Kierra Guillen RN  Outcome: Progressing     Problem: Metabolic/Fluid and Electrolytes - Adult  Goal: Electrolytes maintained within normal limits  6/17/2024 2346 by Omar Mejía RN  Outcome: Progressing  6/17/2024 1517 by Kierra Guillen RN  Outcome: Progressing  Goal: Hemodynamic stability and optimal renal function maintained  6/17/2024 2346 by Omar Mejía RN  Outcome: Progressing  6/17/2024 1517 by Kierra Guillen RN  Outcome: Progressing

## 2024-06-18 NOTE — ACP (ADVANCE CARE PLANNING)
6/18/24, Advance Care Planning   Healthcare Decision Maker:    Primary Decision Maker: Rober Mejía  - [facility] - [phone]    Secondary Decision Maker: Lillian Mejía - Aunt/Uncle - [phone]    Click here to complete Healthcare Decision Makers including selection of the Healthcare Decision Maker Relationship (ie \"Primary\").

## 2024-06-19 ENCOUNTER — CARE COORDINATION (OUTPATIENT)
Dept: CARE COORDINATION | Age: 44
End: 2024-06-19

## 2024-06-19 ENCOUNTER — TELEPHONE (OUTPATIENT)
Dept: PHARMACY | Facility: CLINIC | Age: 44
End: 2024-06-19

## 2024-06-19 DIAGNOSIS — I50.22 CHRONIC SYSTOLIC HEART FAILURE (HCC): ICD-10-CM

## 2024-06-19 NOTE — TELEPHONE ENCOUNTER
----- Message from NATI Dyson sent at 6/19/2024 12:32 PM EDT -----  Regarding: med rec completion  Hello,     Patient discharged from [facility] for elevated troponin on 6/18/24. CTN unable to perform med rec as patient is currently at hemodialysis. Noted no new meds ordered on hosp d/c but patient advises he is awaiting for several meds to be delivered to home via [facility].. Can you reach out to patient to review and provide med rec completion?     Thanks,     Yessi

## 2024-06-19 NOTE — CARE COORDINATION
Care Transitions Note    Initial Call - Call within 2 business days of discharge: Yes    Patient Current Location:  [place]    Care Transition Nurse contacted the patient by telephone to perform post hospital discharge assessment, verified name and  as identifiers. Provided introduction to self, and explanation of the Care Transition Nurse role.     Patient: Rober Mejía III    Patient : 1980   MRN: [id number]    Reason for Admission: Elevated Troponin  Discharge Date: 24  RURS: Readmission Risk Score: 32.5      Last Discharge Facility       Date Complaint Diagnosis Description Type Department Provider    24 Shortness of Breath Elevated troponin ... ED to Hosp-Admission (Discharged) (ADMITTED) [facility]S [facility]U Baldomero Velasco MD; Amireh...            Was this an external facility discharge? No    Additional needs identified to be addressed with provider   No needs identified             Method of communication with provider: none.    Patients top risk factors for readmission: lack of knowledge about disease, level of motivation, medical condition-DM, CHF, ESRD on HD, AFib on ACT,CKD, polypharmacy, and utilization of services    Interventions to address risk factors:   Education: Discussed CHF zone tool and knowing when to call doctor or seek medical attention   Referrals: Pharmacy referral for med rec completion. Patient states he is awaiting for several meds to be delivered to home via [email or web address]. Patient at dialysis and unable to review meds at this time.      Care Summary Note: Spoke with patient today 24 for initial AMAIRANI/hospital discharge (high risk) follow up. Confirmed patient was admitted to [facility] for elevated troponin and according to cardiology not consistent with ACS and was deferred to nephrology as patient has ESRD on HD. Noted cardiology s/o.     Patient states he is currently at hemo dialysis at this time. States he is feeling \"sluggish\". States having shortness of breath

## 2024-06-19 NOTE — TELEPHONE ENCOUNTER
CLINICAL PHARMACY NOTE - Population [facility] Pharmacy Referral    Patient can be scheduled with:  Team Schedule- General Referrals, etc.    Received a referral from: Care Coordinator to discuss patient’s medications. Will outreach on non dialysis day.    Savannah Hughes CPhT.   [facility] Clinical   [facility]  Toll free: [phone] Option 1

## 2024-06-20 ENCOUNTER — TELEPHONE (OUTPATIENT)
Dept: CARDIOLOGY CLINIC | Age: 44
End: 2024-06-20

## 2024-06-20 RX ORDER — MEXILETINE HYDROCHLORIDE 150 MG/1
150 CAPSULE ORAL 3 TIMES DAILY
Qty: 90 CAPSULE | Refills: 3 | OUTPATIENT
Start: 2024-06-20

## 2024-06-20 RX ORDER — MAGNESIUM OXIDE 400 MG/1
400 TABLET ORAL 2 TIMES DAILY
Qty: 30 TABLET | Refills: 3 | OUTPATIENT
Start: 2024-06-20

## 2024-06-20 RX ORDER — MIDODRINE HYDROCHLORIDE 5 MG/1
2.5 TABLET ORAL 3 TIMES DAILY
Qty: 90 TABLET | Refills: 3 | OUTPATIENT
Start: 2024-06-20

## 2024-06-20 RX ORDER — ERGOCALCIFEROL 1.25 MG/1
50000 CAPSULE ORAL WEEKLY
Qty: 5 CAPSULE | Refills: 3 | OUTPATIENT
Start: 2024-06-20

## 2024-06-20 RX ORDER — ISOSORBIDE DINITRATE 40 MG/1
40 TABLET ORAL 3 TIMES DAILY
Qty: 90 TABLET | Refills: 3 | OUTPATIENT
Start: 2024-06-20

## 2024-06-20 RX ORDER — POTASSIUM CHLORIDE 20 MEQ/1
40 TABLET, EXTENDED RELEASE ORAL 2 TIMES DAILY
Qty: 60 TABLET | Refills: 3 | OUTPATIENT
Start: 2024-06-20

## 2024-06-20 RX ORDER — NITROGLYCERIN 0.4 MG/1
TABLET SUBLINGUAL
Qty: 25 TABLET | Refills: 10 | OUTPATIENT
Start: 2024-06-20

## 2024-06-20 RX ORDER — HYDRALAZINE HYDROCHLORIDE 50 MG/1
150 TABLET, FILM COATED ORAL 3 TIMES DAILY
Qty: 90 TABLET | Refills: 3 | OUTPATIENT
Start: 2024-06-20

## 2024-06-20 NOTE — TELEPHONE ENCOUNTER
CLINICAL PHARMACY NOTE - Population Health Pharmacy Referral    Patient can be scheduled with:  Team Schedule- General Referrals, etc.    Received a referral from: Care Coordinator to discuss patient’s medications. Called patient to schedule a time to speak with a pharmacist over the telephone.     Spoke to patient and advised them of the above message. He asked me to call back around 12:00 PM.    Called pt back @ 12:30 PM, left voicemail.    Deja Montgomery CPhT  [facility]    [facility] Clinical Pharmacy   [phone] option 1

## 2024-06-20 NOTE — TELEPHONE ENCOUNTER
Patient requesting prescription refills from [facility].      Call placed to Dr. Roldan's office at  [phone] due to prescription request for refills to verify current medications since patient has not been under care of  CHF clinic in [place] since he is on dialysis and does not lay on cardioMEMS pillow .    Spoke with Jeanmarieia who says he will need to sign a release form to give out his information and they also received refill request.    Per [facility]   can fulfill prescription refills since he was just seen in office by Dr. Roldan on 6/4/2024.    Electronically signed by Aliza Calderon RN on 6/20/2024 at 2:49 PM        Electronically signed by Aliza Calderon RN on 6/20/2024 at 2:49 PM      Received call from Malena who says they will take care of refills.    Electronically signed by Aliza Calderon RN on 6/20/2024 at 3:19 PM

## 2024-06-22 SDOH — HEALTH STABILITY: PHYSICAL HEALTH: ON AVERAGE, HOW MANY MINUTES DO YOU ENGAGE IN EXERCISE AT THIS LEVEL?: 20 MIN

## 2024-06-22 SDOH — HEALTH STABILITY: PHYSICAL HEALTH: ON AVERAGE, HOW MANY DAYS PER WEEK DO YOU ENGAGE IN MODERATE TO STRENUOUS EXERCISE (LIKE A BRISK WALK)?: 6 DAYS

## 2024-06-24 ENCOUNTER — TELEPHONE (OUTPATIENT)
Dept: CARDIOLOGY CLINIC | Age: 44
End: 2024-06-24

## 2024-06-24 NOTE — TELEPHONE ENCOUNTER
2nd Attempt Documentation:  2nd attempt to contact this patient regarding the previous message    CLINICAL PHARMACY: REFERRAL    Appointment scheduled for 06.25.24 @ 12:00 PM.    [facility]  Population Health    [facility] Clinical [facility]   [phone] option 1     For Pharmacy Admin Tracking Only    Program: [employer]  CPA in place:  No  Recommendation Provided To: Patient/Caregiver: 1 via Telephone  Intervention Detail: Scheduled Appointment  Intervention Accepted By: Patient/Caregiver: 1  Gap Closed?: Yes   Time Spent (min): 10

## 2024-06-25 ENCOUNTER — OFFICE VISIT (OUTPATIENT)
Dept: PRIMARY CARE CLINIC | Age: 44
End: 2024-06-25
Payer: MEDICARE

## 2024-06-25 ENCOUNTER — TELEPHONE (OUTPATIENT)
Dept: PHARMACY | Facility: CLINIC | Age: 44
End: 2024-06-25

## 2024-06-25 VITALS
TEMPERATURE: 97 F | HEART RATE: 74 BPM | DIASTOLIC BLOOD PRESSURE: 80 MMHG | HEIGHT: 72 IN | SYSTOLIC BLOOD PRESSURE: 122 MMHG | OXYGEN SATURATION: 98 % | BODY MASS INDEX: 29.26 KG/M2 | WEIGHT: 216 LBS | RESPIRATION RATE: 16 BRPM

## 2024-06-25 DIAGNOSIS — I48.0 PAROXYSMAL ATRIAL FIBRILLATION (HCC): ICD-10-CM

## 2024-06-25 DIAGNOSIS — I42.8 NON-ISCHEMIC CARDIOMYOPATHY (HCC): Primary | ICD-10-CM

## 2024-06-25 DIAGNOSIS — Z99.2 ESRD ON HEMODIALYSIS (HCC): ICD-10-CM

## 2024-06-25 DIAGNOSIS — I10 ESSENTIAL HYPERTENSION: ICD-10-CM

## 2024-06-25 DIAGNOSIS — Z95.818 PRESENCE OF CARDIOMEMS HF SYSTEM: ICD-10-CM

## 2024-06-25 DIAGNOSIS — I50.42 CHRONIC COMBINED SYSTOLIC AND DIASTOLIC CONGESTIVE HEART FAILURE (HCC): ICD-10-CM

## 2024-06-25 DIAGNOSIS — I65.23 BILATERAL CAROTID ARTERY OCCLUSION: ICD-10-CM

## 2024-06-25 DIAGNOSIS — R79.89 ELEVATED TROPONIN: Primary | ICD-10-CM

## 2024-06-25 DIAGNOSIS — N18.6 ESRD ON HEMODIALYSIS (HCC): ICD-10-CM

## 2024-06-25 PROCEDURE — 99204 OFFICE O/P NEW MOD 45 MIN: CPT | Performed by: INTERNAL MEDICINE

## 2024-06-25 PROCEDURE — 3074F SYST BP LT 130 MM HG: CPT | Performed by: INTERNAL MEDICINE

## 2024-06-25 PROCEDURE — 1111F DSCHRG MED/CURRENT MED MERGE: CPT

## 2024-06-25 PROCEDURE — 3079F DIAST BP 80-89 MM HG: CPT | Performed by: INTERNAL MEDICINE

## 2024-06-25 RX ORDER — COLCHICINE 0.6 MG/1
0.3 TABLET ORAL
COMMUNITY
Start: 2024-06-06 | End: 2025-06-06

## 2024-06-25 RX ORDER — ALLOPURINOL 100 MG/1
100 TABLET ORAL DAILY
COMMUNITY
Start: 2024-06-19

## 2024-06-25 SDOH — ECONOMIC STABILITY: HOUSING INSECURITY
IN THE LAST 12 MONTHS, WAS THERE A TIME WHEN YOU DID NOT HAVE A STEADY PLACE TO SLEEP OR SLEPT IN A SHELTER (INCLUDING NOW)?: NO

## 2024-06-25 SDOH — ECONOMIC STABILITY: FOOD INSECURITY: WITHIN THE PAST 12 MONTHS, THE FOOD YOU BOUGHT JUST DIDN'T LAST AND YOU DIDN'T HAVE MONEY TO GET MORE.: NEVER TRUE

## 2024-06-25 SDOH — ECONOMIC STABILITY: FOOD INSECURITY: WITHIN THE PAST 12 MONTHS, YOU WORRIED THAT YOUR FOOD WOULD RUN OUT BEFORE YOU GOT MONEY TO BUY MORE.: NEVER TRUE

## 2024-06-25 SDOH — ECONOMIC STABILITY: INCOME INSECURITY: HOW HARD IS IT FOR YOU TO PAY FOR THE VERY BASICS LIKE FOOD, HOUSING, MEDICAL CARE, AND HEATING?: NOT HARD AT ALL

## 2024-06-25 ASSESSMENT — ENCOUNTER SYMPTOMS
DIARRHEA: 0
SHORTNESS OF BREATH: 0
ABDOMINAL PAIN: 0
COUGH: 0
VOMITING: 0
NAUSEA: 0

## 2024-06-25 ASSESSMENT — PATIENT HEALTH QUESTIONNAIRE - PHQ9
SUM OF ALL RESPONSES TO PHQ9 QUESTIONS 1 & 2: 0
SUM OF ALL RESPONSES TO PHQ QUESTIONS 1-9: 0
2. FEELING DOWN, DEPRESSED OR HOPELESS: NOT AT ALL
1. LITTLE INTEREST OR PLEASURE IN DOING THINGS: NOT AT ALL
SUM OF ALL RESPONSES TO PHQ QUESTIONS 1-9: 0

## 2024-06-25 NOTE — TELEPHONE ENCOUNTER
[facility] CLINICAL PHARMACY REVIEW: Post-Discharge Transitions of Care (AMAIRANI)  Subjective/Objective:  Rober Mejía III is a 43 y.o. male. Patient was discharged from [facility] on 6/18/24 with a diagnosis of Elevated troponin.    Patient outreach to review discharge medications and provide medication review and management.    Spoke with patient.    Allergies   Allergen Reactions    Farxiga [Dapagliflozin] Other (See Comments)     Caused throat to swell    Tikosyn [Dofetilide] Palpitations       Discharge Medications (as per discharging medication list found in AVS):  There are NEW medications for you. START taking them after you leave the hospital:  None    You told us you were taking these medications at home, but the amount or how often you take this medication has CHANGED:  None    These are medications you told us you were taking at home, CONTINUE taking them after you leave the hospital:  Current Outpatient Medications   Medication Sig Comments    magnesium oxide (MAG-OX) 400 MG tablet Take 1 tablet by mouth 2 times daily     nitroGLYCERIN (NITROSTAT) 0.4 MG SL tablet DISSOLVE 1 TABLET UNDER THE TONGUE AS NEEDED FOR CHEST PAIN EVERY 5 MINUTES UP TO 3 TIMES. IF NO RELIEF CALL 911.     nitroGLYCERIN (NITROSTAT) 0.4 MG SL tablet DISSOLVE 1 TABLET UNDER THE TONGUE AS NEEDED FOR CHEST PAIN EVERY 5 MINUTES UP TO 3 TIMES. IF NO RELIEF CALL 911.     isosorbide dinitrate (ISORDIL) 40 MG tablet Take 1 tablet by mouth 3 times daily     hydrALAZINE (APRESOLINE) 50 MG tablet Take 3 tablets by mouth 3 times daily     sucralfate (CARAFATE) 1 GM tablet Take 1 tablet by mouth every 12 hours     potassium chloride (KLOR-CON M) 20 MEQ extended release tablet Take 2 tablets by mouth 2 times daily     torsemide (DEMADEX) 100 MG tablet Take 1 tablet by mouth daily     vitamin D (ERGOCALCIFEROL) 1.25 MG (96756 UT) CAPS capsule Take 1 capsule by mouth once a week Given Monday     sodium bicarbonate 650 MG tablet Take

## 2024-06-25 NOTE — PROGRESS NOTES
SUBJECTIVE  Rober Mejía III is a 43 y.o. male new  was seen In the office  for evaluation.    HPI/Chief C/O:  Chief Complaint   Patient presents with    New Patient     Patient is here for a check up    Hx of cardiomyopathy on cardiac transplant list ,ESRD on hemodialysis her to be established ,feels fine no discomfort today   Allergies   Allergen Reactions    Farxiga [Dapagliflozin] Other (See Comments)     Caused throat to swell    Tikosyn [Dofetilide] Palpitations       ROS:  Review of Systems   Respiratory:  Negative for cough and shortness of breath.         Negative for Hemoptysis   Cardiovascular:  Negative for chest pain.   Gastrointestinal:  Negative for abdominal pain, diarrhea, nausea and vomiting.   Endocrine: Negative for polydipsia, polyphagia and polyuria.   Genitourinary:  Negative for dysuria and hematuria.   Skin:  Negative for rash.   Neurological:  Negative for tremors and seizures.        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

## 2024-06-26 ENCOUNTER — APPOINTMENT (OUTPATIENT)
Dept: GENERAL RADIOLOGY | Age: 44
End: 2024-06-26
Payer: MEDICARE

## 2024-06-26 ENCOUNTER — HOSPITAL ENCOUNTER (OUTPATIENT)
Age: 44
Setting detail: OBSERVATION
Discharge: ANOTHER HOSPITAL | End: 2024-06-27
Attending: EMERGENCY MEDICINE | Admitting: FAMILY MEDICINE
Payer: MEDICARE

## 2024-06-26 DIAGNOSIS — R07.9 CHEST PAIN, UNSPECIFIED TYPE: Primary | ICD-10-CM

## 2024-06-26 LAB
ALBUMIN SERPL-MCNC: 3.8 G/DL (ref 3.5–5.2)
ALP SERPL-CCNC: 289 U/L (ref 40–129)
ALT SERPL-CCNC: 23 U/L (ref 0–40)
ANION GAP SERPL CALCULATED.3IONS-SCNC: 17 MMOL/L (ref 7–16)
AST SERPL-CCNC: 39 U/L (ref 0–39)
BASOPHILS # BLD: 0.05 K/UL (ref 0–0.2)
BASOPHILS NFR BLD: 1 % (ref 0–2)
BILIRUB SERPL-MCNC: 2.2 MG/DL (ref 0–1.2)
BNP SERPL-MCNC: ABNORMAL PG/ML (ref 0–125)
BUN SERPL-MCNC: 27 MG/DL (ref 6–20)
CALCIUM SERPL-MCNC: 9.5 MG/DL (ref 8.6–10.2)
CHLORIDE SERPL-SCNC: 96 MMOL/L (ref 98–107)
CO2 SERPL-SCNC: 23 MMOL/L (ref 22–29)
CREAT SERPL-MCNC: 4.4 MG/DL (ref 0.7–1.2)
EOSINOPHIL # BLD: 0.11 K/UL (ref 0.05–0.5)
EOSINOPHILS RELATIVE PERCENT: 2 % (ref 0–6)
ERYTHROCYTE [DISTWIDTH] IN BLOOD BY AUTOMATED COUNT: 20.1 % (ref 11.5–15)
GFR, ESTIMATED: 16 ML/MIN/1.73M2
GLUCOSE SERPL-MCNC: 137 MG/DL (ref 74–99)
HCT VFR BLD AUTO: 38.5 % (ref 37–54)
HGB BLD-MCNC: 12.7 G/DL (ref 12.5–16.5)
IMM GRANULOCYTES # BLD AUTO: <0.03 K/UL (ref 0–0.58)
IMM GRANULOCYTES NFR BLD: 0 % (ref 0–5)
LYMPHOCYTES NFR BLD: 1.14 K/UL (ref 1.5–4)
LYMPHOCYTES RELATIVE PERCENT: 22 % (ref 20–42)
MCH RBC QN AUTO: 29 PG (ref 26–35)
MCHC RBC AUTO-ENTMCNC: 33 G/DL (ref 32–34.5)
MCV RBC AUTO: 87.9 FL (ref 80–99.9)
MONOCYTES NFR BLD: 0.88 K/UL (ref 0.1–0.95)
MONOCYTES NFR BLD: 17 % (ref 2–12)
NEUTROPHILS NFR BLD: 58 % (ref 43–80)
NEUTS SEG NFR BLD: 3.09 K/UL (ref 1.8–7.3)
PLATELET # BLD AUTO: 148 K/UL (ref 130–450)
PMV BLD AUTO: 9.4 FL (ref 7–12)
POTASSIUM SERPL-SCNC: 3.9 MMOL/L (ref 3.5–5)
PROT SERPL-MCNC: 7.7 G/DL (ref 6.4–8.3)
RBC # BLD AUTO: 4.38 M/UL (ref 3.8–5.8)
RBC # BLD: ABNORMAL 10*6/UL
SODIUM SERPL-SCNC: 136 MMOL/L (ref 132–146)
TROPONIN I SERPL HS-MCNC: 329 NG/L (ref 0–11)
WBC OTHER # BLD: 5.3 K/UL (ref 4.5–11.5)

## 2024-06-26 PROCEDURE — 93005 ELECTROCARDIOGRAM TRACING: CPT | Performed by: NURSE PRACTITIONER

## 2024-06-26 PROCEDURE — 71046 X-RAY EXAM CHEST 2 VIEWS: CPT

## 2024-06-26 PROCEDURE — 99285 EMERGENCY DEPT VISIT HI MDM: CPT

## 2024-06-26 PROCEDURE — G0378 HOSPITAL OBSERVATION PER HR: HCPCS

## 2024-06-26 PROCEDURE — 2580000003 HC RX 258: Performed by: FAMILY MEDICINE

## 2024-06-26 PROCEDURE — 84484 ASSAY OF TROPONIN QUANT: CPT

## 2024-06-26 PROCEDURE — 85025 COMPLETE CBC W/AUTO DIFF WBC: CPT

## 2024-06-26 PROCEDURE — 99222 1ST HOSP IP/OBS MODERATE 55: CPT | Performed by: FAMILY MEDICINE

## 2024-06-26 PROCEDURE — 83880 ASSAY OF NATRIURETIC PEPTIDE: CPT

## 2024-06-26 PROCEDURE — 80053 COMPREHEN METABOLIC PANEL: CPT

## 2024-06-26 RX ORDER — ONDANSETRON 4 MG/1
4 TABLET, ORALLY DISINTEGRATING ORAL EVERY 8 HOURS PRN
Status: DISCONTINUED | OUTPATIENT
Start: 2024-06-26 | End: 2024-06-26

## 2024-06-26 RX ORDER — POLYETHYLENE GLYCOL 3350 17 G/17G
17 POWDER, FOR SOLUTION ORAL DAILY PRN
Status: DISCONTINUED | OUTPATIENT
Start: 2024-06-26 | End: 2024-06-27 | Stop reason: HOSPADM

## 2024-06-26 RX ORDER — SODIUM CHLORIDE 9 MG/ML
INJECTION, SOLUTION INTRAVENOUS PRN
Status: DISCONTINUED | OUTPATIENT
Start: 2024-06-26 | End: 2024-06-27 | Stop reason: HOSPADM

## 2024-06-26 RX ORDER — PROCHLORPERAZINE MALEATE 10 MG
10 TABLET ORAL EVERY 8 HOURS PRN
Status: DISCONTINUED | OUTPATIENT
Start: 2024-06-26 | End: 2024-06-27 | Stop reason: HOSPADM

## 2024-06-26 RX ORDER — ONDANSETRON 2 MG/ML
4 INJECTION INTRAMUSCULAR; INTRAVENOUS EVERY 6 HOURS PRN
Status: DISCONTINUED | OUTPATIENT
Start: 2024-06-26 | End: 2024-06-26

## 2024-06-26 RX ORDER — PROCHLORPERAZINE EDISYLATE 5 MG/ML
10 INJECTION INTRAMUSCULAR; INTRAVENOUS EVERY 6 HOURS PRN
Status: DISCONTINUED | OUTPATIENT
Start: 2024-06-26 | End: 2024-06-27 | Stop reason: HOSPADM

## 2024-06-26 RX ORDER — ACETAMINOPHEN 325 MG/1
650 TABLET ORAL EVERY 6 HOURS PRN
Status: DISCONTINUED | OUTPATIENT
Start: 2024-06-26 | End: 2024-06-27 | Stop reason: HOSPADM

## 2024-06-26 RX ORDER — PROCHLORPERAZINE EDISYLATE 5 MG/ML
10 INJECTION INTRAMUSCULAR; INTRAVENOUS EVERY 6 HOURS PRN
Status: DISCONTINUED | OUTPATIENT
Start: 2024-06-26 | End: 2024-06-26

## 2024-06-26 RX ORDER — SODIUM CHLORIDE 0.9 % (FLUSH) 0.9 %
5-40 SYRINGE (ML) INJECTION EVERY 12 HOURS SCHEDULED
Status: DISCONTINUED | OUTPATIENT
Start: 2024-06-26 | End: 2024-06-27 | Stop reason: HOSPADM

## 2024-06-26 RX ORDER — SODIUM CHLORIDE 0.9 % (FLUSH) 0.9 %
5-40 SYRINGE (ML) INJECTION PRN
Status: DISCONTINUED | OUTPATIENT
Start: 2024-06-26 | End: 2024-06-27 | Stop reason: HOSPADM

## 2024-06-26 RX ORDER — ACETAMINOPHEN 650 MG/1
650 SUPPOSITORY RECTAL EVERY 6 HOURS PRN
Status: DISCONTINUED | OUTPATIENT
Start: 2024-06-26 | End: 2024-06-27 | Stop reason: HOSPADM

## 2024-06-26 RX ADMIN — SODIUM CHLORIDE, PRESERVATIVE FREE 10 ML: 5 INJECTION INTRAVENOUS at 23:25

## 2024-06-26 ASSESSMENT — LIFESTYLE VARIABLES
HOW MANY STANDARD DRINKS CONTAINING ALCOHOL DO YOU HAVE ON A TYPICAL DAY: 1 OR 2
HOW OFTEN DO YOU HAVE A DRINK CONTAINING ALCOHOL: 2-4 TIMES A MONTH

## 2024-06-27 ENCOUNTER — APPOINTMENT (OUTPATIENT)
Age: 44
End: 2024-06-27
Attending: FAMILY MEDICINE
Payer: MEDICARE

## 2024-06-27 VITALS
BODY MASS INDEX: 28.99 KG/M2 | WEIGHT: 214 LBS | SYSTOLIC BLOOD PRESSURE: 138 MMHG | OXYGEN SATURATION: 99 % | HEIGHT: 72 IN | HEART RATE: 67 BPM | DIASTOLIC BLOOD PRESSURE: 102 MMHG | RESPIRATION RATE: 16 BRPM | TEMPERATURE: 97 F

## 2024-06-27 LAB
ANION GAP SERPL CALCULATED.3IONS-SCNC: 15 MMOL/L (ref 7–16)
ATYPICAL LYMPHOCYTE ABSOLUTE COUNT: 0.09 K/UL (ref 0–0.46)
ATYPICAL LYMPHOCYTES: 2 % (ref 0–4)
BASOPHILS # BLD: 0.09 K/UL (ref 0–0.2)
BASOPHILS NFR BLD: 2 % (ref 0–2)
BUN SERPL-MCNC: 32 MG/DL (ref 6–20)
CALCIUM SERPL-MCNC: 9.6 MG/DL (ref 8.6–10.2)
CHLORIDE SERPL-SCNC: 96 MMOL/L (ref 98–107)
CO2 SERPL-SCNC: 25 MMOL/L (ref 22–29)
CREAT SERPL-MCNC: 4.9 MG/DL (ref 0.7–1.2)
EKG ATRIAL RATE: 80 BPM
EKG P AXIS: -28 DEGREES
EKG P-R INTERVAL: 136 MS
EKG Q-T INTERVAL: 530 MS
EKG QRS DURATION: 170 MS
EKG QTC CALCULATION (BAZETT): 611 MS
EKG R AXIS: -155 DEGREES
EKG T AXIS: 15 DEGREES
EKG VENTRICULAR RATE: 80 BPM
EOSINOPHIL # BLD: 0.27 K/UL (ref 0.05–0.5)
EOSINOPHILS RELATIVE PERCENT: 5 % (ref 0–6)
ERYTHROCYTE [DISTWIDTH] IN BLOOD BY AUTOMATED COUNT: 20.1 % (ref 11.5–15)
GFR, ESTIMATED: 14 ML/MIN/1.73M2
GLUCOSE SERPL-MCNC: 74 MG/DL (ref 74–99)
HCT VFR BLD AUTO: 38.4 % (ref 37–54)
HGB BLD-MCNC: 12.5 G/DL (ref 12.5–16.5)
LYMPHOCYTES NFR BLD: 1.14 K/UL (ref 1.5–4)
LYMPHOCYTES RELATIVE PERCENT: 22 % (ref 20–42)
MCH RBC QN AUTO: 28.4 PG (ref 26–35)
MCV RBC AUTO: 87.3 FL (ref 80–99.9)
MONOCYTES NFR BLD: 0.87 K/UL (ref 0.1–0.95)
MONOCYTES NFR BLD: 17 % (ref 2–12)
NEUTROPHILS NFR BLD: 53 % (ref 43–80)
NEUTS SEG NFR BLD: 2.74 K/UL (ref 1.8–7.3)
PLATELET # BLD AUTO: 146 K/UL (ref 130–450)
PMV BLD AUTO: 9.8 FL (ref 7–12)
POTASSIUM SERPL-SCNC: 4.1 MMOL/L (ref 3.5–5)
RBC # BLD AUTO: 4.4 M/UL (ref 3.8–5.8)
RBC # BLD: ABNORMAL 10*6/UL
SODIUM SERPL-SCNC: 136 MMOL/L (ref 132–146)
TROPONIN I SERPL HS-MCNC: 301 NG/L (ref 0–11)
WBC OTHER # BLD: 5.2 K/UL (ref 4.5–11.5)

## 2024-06-27 PROCEDURE — 2580000003 HC RX 258: Performed by: FAMILY MEDICINE

## 2024-06-27 PROCEDURE — 85025 COMPLETE CBC W/AUTO DIFF WBC: CPT

## 2024-06-27 PROCEDURE — 99223 1ST HOSP IP/OBS HIGH 75: CPT | Performed by: INTERNAL MEDICINE

## 2024-06-27 PROCEDURE — G0378 HOSPITAL OBSERVATION PER HR: HCPCS

## 2024-06-27 PROCEDURE — 6360000002 HC RX W HCPCS: Performed by: FAMILY MEDICINE

## 2024-06-27 PROCEDURE — APPSS180 APP SPLIT SHARED TIME > 60 MINUTES

## 2024-06-27 PROCEDURE — 93010 ELECTROCARDIOGRAM REPORT: CPT | Performed by: INTERNAL MEDICINE

## 2024-06-27 PROCEDURE — 6370000000 HC RX 637 (ALT 250 FOR IP): Performed by: FAMILY MEDICINE

## 2024-06-27 PROCEDURE — 80048 BASIC METABOLIC PNL TOTAL CA: CPT

## 2024-06-27 PROCEDURE — 96374 THER/PROPH/DIAG INJ IV PUSH: CPT

## 2024-06-27 PROCEDURE — 99239 HOSP IP/OBS DSCHRG MGMT >30: CPT | Performed by: INTERNAL MEDICINE

## 2024-06-27 RX ORDER — SUCRALFATE 1 G/1
1 TABLET ORAL EVERY 12 HOURS SCHEDULED
Status: DISCONTINUED | OUTPATIENT
Start: 2024-06-27 | End: 2024-06-27 | Stop reason: HOSPADM

## 2024-06-27 RX ORDER — ATORVASTATIN CALCIUM 40 MG/1
40 TABLET, FILM COATED ORAL NIGHTLY
Status: DISCONTINUED | OUTPATIENT
Start: 2024-06-27 | End: 2024-06-27 | Stop reason: HOSPADM

## 2024-06-27 RX ORDER — PANTOPRAZOLE SODIUM 40 MG/1
40 TABLET, DELAYED RELEASE ORAL DAILY
Status: DISCONTINUED | OUTPATIENT
Start: 2024-06-27 | End: 2024-06-27 | Stop reason: HOSPADM

## 2024-06-27 RX ORDER — M-VIT,TX,IRON,MINS/CALC/FOLIC 27MG-0.4MG
1 TABLET ORAL DAILY
Status: DISCONTINUED | OUTPATIENT
Start: 2024-06-27 | End: 2024-06-27 | Stop reason: HOSPADM

## 2024-06-27 RX ORDER — HYDRALAZINE HYDROCHLORIDE 50 MG/1
100 TABLET, FILM COATED ORAL 3 TIMES DAILY
Status: DISCONTINUED | OUTPATIENT
Start: 2024-06-27 | End: 2024-06-27 | Stop reason: HOSPADM

## 2024-06-27 RX ORDER — COLCHICINE 0.6 MG/1
0.3 TABLET ORAL
Status: DISCONTINUED | OUTPATIENT
Start: 2024-06-27 | End: 2024-06-27 | Stop reason: HOSPADM

## 2024-06-27 RX ORDER — ISOSORBIDE DINITRATE 10 MG/1
40 TABLET ORAL 3 TIMES DAILY
Status: DISCONTINUED | OUTPATIENT
Start: 2024-06-27 | End: 2024-06-27 | Stop reason: HOSPADM

## 2024-06-27 RX ORDER — REGADENOSON 0.08 MG/ML
0.4 INJECTION, SOLUTION INTRAVENOUS
Status: DISCONTINUED | OUTPATIENT
Start: 2024-06-27 | End: 2024-06-27 | Stop reason: HOSPADM

## 2024-06-27 RX ORDER — ALLOPURINOL 100 MG/1
100 TABLET ORAL DAILY
Status: DISCONTINUED | OUTPATIENT
Start: 2024-06-27 | End: 2024-06-27 | Stop reason: HOSPADM

## 2024-06-27 RX ORDER — HYDRALAZINE HYDROCHLORIDE 50 MG/1
150 TABLET, FILM COATED ORAL 3 TIMES DAILY
Status: DISCONTINUED | OUTPATIENT
Start: 2024-06-27 | End: 2024-06-27

## 2024-06-27 RX ORDER — MEXILETINE HYDROCHLORIDE 150 MG/1
150 CAPSULE ORAL 3 TIMES DAILY
Status: DISCONTINUED | OUTPATIENT
Start: 2024-06-27 | End: 2024-06-27 | Stop reason: HOSPADM

## 2024-06-27 RX ORDER — SODIUM BICARBONATE 650 MG/1
650 TABLET ORAL 2 TIMES DAILY
Status: DISCONTINUED | OUTPATIENT
Start: 2024-06-27 | End: 2024-06-27 | Stop reason: HOSPADM

## 2024-06-27 RX ORDER — MIDODRINE HYDROCHLORIDE 2.5 MG/1
2.5 TABLET ORAL 3 TIMES DAILY
Status: DISCONTINUED | OUTPATIENT
Start: 2024-06-27 | End: 2024-06-27 | Stop reason: HOSPADM

## 2024-06-27 RX ORDER — LANOLIN ALCOHOL/MO/W.PET/CERES
400 CREAM (GRAM) TOPICAL 2 TIMES DAILY
Status: DISCONTINUED | OUTPATIENT
Start: 2024-06-27 | End: 2024-06-27 | Stop reason: HOSPADM

## 2024-06-27 RX ORDER — CARVEDILOL 3.12 MG/1
3.12 TABLET ORAL 2 TIMES DAILY WITH MEALS
Status: DISCONTINUED | OUTPATIENT
Start: 2024-06-27 | End: 2024-06-27 | Stop reason: HOSPADM

## 2024-06-27 RX ADMIN — SUCRALFATE 1 G: 1 TABLET ORAL at 09:43

## 2024-06-27 RX ADMIN — APIXABAN 5 MG: 5 TABLET, FILM COATED ORAL at 09:42

## 2024-06-27 RX ADMIN — ALLOPURINOL 100 MG: 100 TABLET ORAL at 09:42

## 2024-06-27 RX ADMIN — SODIUM BICARBONATE 650 MG: 650 TABLET ORAL at 09:42

## 2024-06-27 RX ADMIN — PROCHLORPERAZINE EDISYLATE 10 MG: 5 INJECTION INTRAMUSCULAR; INTRAVENOUS at 00:28

## 2024-06-27 RX ADMIN — Medication 1 TABLET: at 09:42

## 2024-06-27 RX ADMIN — Medication 400 MG: at 09:42

## 2024-06-27 RX ADMIN — MIDODRINE HYDROCHLORIDE 2.5 MG: 2.5 TABLET ORAL at 09:42

## 2024-06-27 RX ADMIN — SODIUM CHLORIDE, PRESERVATIVE FREE 10 ML: 5 INJECTION INTRAVENOUS at 09:43

## 2024-06-27 RX ADMIN — ISOSORBIDE DINITRATE 40 MG: 10 TABLET ORAL at 09:42

## 2024-06-27 RX ADMIN — PANTOPRAZOLE SODIUM 40 MG: 40 TABLET, DELAYED RELEASE ORAL at 09:42

## 2024-06-27 ASSESSMENT — PAIN SCALES - GENERAL: PAINLEVEL_OUTOF10: 0

## 2024-06-27 NOTE — PROGRESS NOTES
Transport here to  patient. Update given to transport staff. 8S- heart monitor taken off patient.

## 2024-06-27 NOTE — ED NOTES
Patient c/o SOB with SpO2 90% on room air. Patient placed on 2L NC with SpO2 improving. Patient also c/o nausea and dry heaving. Patient medicated per orders for symptoms.

## 2024-06-27 NOTE — CARE COORDINATION
discharge plan. Freedom of choice list with basic dialogue that supports the patient's individualized plan of care/goals and shares the quality data associated with the providers was provided to:     Patient Representative Name:       The Patient and/or Patient Representative Agree with the Discharge Plan?      LUCIO Ramos  Case Management Department  Ph: [phone] Fax: N/A

## 2024-06-27 NOTE — PROGRESS NOTES
Nurse to nurse called to Tonja at .    All questions answered.   Patient was given room number and pick-up time.

## 2024-06-27 NOTE — ED PROVIDER NOTES
PM he states is constant never goes away.  Patient reporting shortness of breath.  Patient reporting no abdominal pain or vomiting.  Patient reporting no leg pain.  Patient reporting no upper or lower extremity weakness reports no headache he reports no syncopal event.  Patient reports he is reportedly on transplant list at [facility].  Patient reporting no productive cough.  There is no vomiting or diarrhea there is no history of black or tarry stools.  Patient reports he had dialysis today.  Patient reports chest pain started after dialysis.  CC/HPI Summary, DDx, ED Course, Reassessment, Tests Considered, Patient expectation:        Rober Mejía III is a 43 y.o. male history of heart failure history of gout history of atrial fibrillation anemia coronary disease history of stroke history of chronic kidney disease history of hypertension hyperlipidemia nonischemic cardiomyopathy history of heart failure and presenting to the ED for chest pain, beginning since 4 PM ago.  The complaint has been persistent, moderate in severity, and worsened by nothing.  Patient reports pressure on chest since 4 PM he states is constant never goes away.  Patient reporting shortness of breath.  Patient reporting no abdominal pain or vomiting.  Patient reporting no leg pain.  Patient reporting no upper or lower extremity weakness reports no headache he reports no syncopal event.  Patient reports he is reportedly on transplant list at [facility].  Patient reporting no productive cough.  There is no vomiting or diarrhea there is no history of black or tarry stools.  Patient reports he had dialysis today.  Patient reports chest pain started after dialysis.  Patient is awake alert oriented x 3 heart exam normal lungs are clear abdomen soft nontender.  Patient able move all extremities he has edema to lower extremities patient differential includes myocardial infarction as well as angina as well as PE.  Patient while here emerged

## 2024-06-27 NOTE — DISCHARGE SUMMARY
[facility] Hospitalist Physician Discharge Summary     Patient ID:  Rober Mejía III  [id number]  43 y.o.  1980    Admit date: 6/26/2024    Discharge date and time:  6/27/2024  10:37 AM    Hospital Course:   Patient Rober Mejía III is a 43 y.o. presented with Chest pain [R07.9]  Chest pain, unspecified type [R07.9]    43 y.o. male who presented to [facility] with chest pain.  Does have history of nonischemic cardiomyopathy, CAD, ESRD, VT status post AICD.  Mention the chest pain felt like someone was standing on his chest started around 4 PM today.  Patient was metabolic above elevated troponin concern for chest pain seen by cardiology and at that time no workup was recommended.  Troponin of 329.  He was started on Nitropaste due to persistent of symptoms.     Patient asymptomatic this morning.  Contacted by  as patient is on their heart transplant list. Recommending transfer to their institution for continuity of care. Contacted  transfer line and connected to Dr. Eduardo Cerda who accepted patient for transfer. He recommended patient be kept NPO for heart cath as soon as he is transferred to their facility. No need for stress test or Cardiology consult here. Nursing advised.     Follow Up:    With providers at       Disposition:    Activity level: As tolerated     Dispo:     Condition on discharge: Stable       Discharge Exam:    General Appearance: alert and oriented to person, place and time and in no acute distress  Skin: warm and dry  Head: normocephalic and atraumatic  Eyes: pupils equal, round, and reactive to light, extraocular eye movements intact, conjunctivae normal  Neck: neck supple and non tender without mass   Pulmonary/Chest: clear to auscultation bilaterally- no wheezes, rales or rhonchi, normal air movement, no respiratory distress  Cardiovascular: normal rate, normal S1 and S2 and no carotid bruits  Abdomen: soft, non-tender, non-distended, normal bowel sounds,

## 2024-06-27 NOTE — PLAN OF CARE
Contacted by  as patient is on their heart transplant list. Recommending transfer to their institution for continuity of care. Contacted  transfer line and connected to Dr. Eduardo Cerda who accepted patient for transfer. He recommended patient be kept NPO for heart cath as soon as he is transferred to their facility. No need for stress test or Cardiology consult here. Nursing advised.    Electronically signed by Shola Grossman MD on 6/27/2024 at 10:07 AM

## 2024-06-27 NOTE — PROGRESS NOTES
Spoke with . Patient accepted and to transfer to [facility] heart failure ICU bed 13 today with transport at 1215. Nurse to nurse to be called to [phone]

## 2024-06-27 NOTE — ACP (ADVANCE CARE PLANNING)
6/27/24, Advance Care Planning   Healthcare Decision Maker:    Primary Decision Maker: Rober Mejía  - [facility] - [phone]    Secondary Decision Maker: Lillian Mejía - Aunt/Uncle - [phone]    Click here to complete Healthcare Decision Makers including selection of the Healthcare Decision Maker Relationship (ie \"Primary\").

## 2024-06-27 NOTE — CONSULTS
the left ventricle with minor regional variations.    3. Left ventricular cavity size is severely dilated.    4. There is severe eccentric left ventricular hypertrophy.    5. Abnormal septal motion consistent with RV pacemaker.    6. Spectral Doppler shows an abnormal pattern of left ventricular diastolic filling.    7. There is mildly reduced right ventricular systolic function.    8. The left atrium is severely dilated.    9. The right atrium is moderately dilated.   10. Mild to moderate mitral valve regurgitation.   11. Moderate to severe tricuspid regurgitation visualized.   12. Severely elevated right ventricular systolic pressure. The Doppler estimated RVSP is severely elevated at 62.3 mmHg     Echocardiogram: 3/8/2024   1. Left ventricular systolic function is severely decreased with a 20% estimated ejection fraction.   2. There is reduced right ventricular systolic function.   3. The left atrium is severely dilated.   4. The right atrium is moderately dilated.   5. Moderate to severely elevated right ventricular systolic pressure.   6. Moderate tricuspid regurgitation visualized.   7. Left ventricular cavity size is severely dilated.   8. There is global hypokinesis of the left ventricle with minor regional variations.       Medications Prior to admit:  Prior to Admission medications    Medication Sig Start Date End Date Taking? Authorizing Provider   allopurinol (ZYLOPRIM) 100 MG tablet Take 1 tablet by mouth daily 6/19/24   Saw Larson MD   colchicine (COLCRYS) 0.6 MG tablet Take 0.5 tablets by mouth Twice a Week 6/6/24 6/6/25  Saw Larson MD   magnesium oxide (MAG-OX) 400 MG tablet Take 1 tablet by mouth 2 times daily    Saw Larson MD   nitroGLYCERIN (NITROSTAT) 0.4 MG SL tablet DISSOLVE 1 TABLET UNDER THE TONGUE AS NEEDED FOR CHEST PAIN EVERY 5 MINUTES UP TO 3 TIMES. IF NO RELIEF CALL 911. 4/25/24   Awadalla, Maged I, MD   isosorbide dinitrate (ISORDIL) 40 MG tablet Take 1

## 2024-06-28 NOTE — CONSULTS
CHF  TTE 3/2024 EF 20%, mod-severe elevated right ventricular systolic pressure, mild tricuspid regurg   proBNP 20,524  Following at  for possible heart/kidney transplant   Strict I&O, daily weights  Monitor volume status closely    4.  Intermittent hypotension  On midodrine 2.5 mg oral 3 times daily  Monitor BPs    Oscar Us MD

## 2024-07-08 ENCOUNTER — TELEPHONE (OUTPATIENT)
Dept: VASCULAR SURGERY | Age: 44
End: 2024-07-08
